# Patient Record
Sex: MALE | Race: OTHER | HISPANIC OR LATINO | URBAN - METROPOLITAN AREA
[De-identification: names, ages, dates, MRNs, and addresses within clinical notes are randomized per-mention and may not be internally consistent; named-entity substitution may affect disease eponyms.]

---

## 2017-02-13 ENCOUNTER — EMERGENCY (EMERGENCY)
Facility: HOSPITAL | Age: 62
LOS: 1 days | Discharge: HOME | End: 2017-02-13
Attending: EMERGENCY MEDICINE
Payer: COMMERCIAL

## 2017-02-13 VITALS
RESPIRATION RATE: 18 BRPM | OXYGEN SATURATION: 96 % | HEIGHT: 63 IN | TEMPERATURE: 98 F | WEIGHT: 199.96 LBS | SYSTOLIC BLOOD PRESSURE: 119 MMHG | HEART RATE: 80 BPM | DIASTOLIC BLOOD PRESSURE: 81 MMHG

## 2017-02-13 DIAGNOSIS — R41.82 ALTERED MENTAL STATUS, UNSPECIFIED: ICD-10-CM

## 2017-02-13 PROCEDURE — 99283 EMERGENCY DEPT VISIT LOW MDM: CPT

## 2017-02-13 NOTE — ED PROVIDER NOTE - OBJECTIVE STATEMENT
62 yo male sent in 62 yo male sent in from [facility] after he was difficult to awake and was sleeping for 10 minutes.  Patient has no complaints and wants to go back.

## 2017-02-13 NOTE — ED PROVIDER NOTE - PHYSICAL EXAMINATION
Constitutional: Alert, NAD.   ENT: Airway patent. Nose clear. Mouth with normal mucosa.   Head: Atraumatic.   Eyes: Clear bilateraly. PERRL.   Cardiac: Normal rate.   Respiratory: Breath sounds clear bilaterally.   GI: Abdomen soft, non-tender, no guarding.   : No CVA or bladder tenderness.   Musculoskeletal: FROM, no muscle or joint tenderness or swelling.   Neuro: alert and oriented, no focal deficits, no motor or sensory deficits.   Skin: Dry, intact, no rash.   Psych: normal mood and affect.

## 2017-02-13 NOTE — ED ADULT NURSE NOTE - OBJECTIVE STATEMENT
60y/o male, uncooperative. no complaints at this times. states 62y/o male, uncooperative. no complaints at this times. states he wants to leave. will continue to monitor.

## 2017-02-13 NOTE — ED PROVIDER NOTE - MEDICAL DECISION MAKING DETAILS
PAtient is alert and oriented and [facility] and is refusing any workup including EKG or labs.  Will d/c patient.

## 2017-08-28 ENCOUNTER — EMERGENCY (EMERGENCY)
Facility: HOSPITAL | Age: 62
LOS: 1 days | Discharge: HOME | End: 2017-08-28
Attending: EMERGENCY MEDICINE | Admitting: EMERGENCY MEDICINE
Payer: MEDICAID

## 2017-08-28 VITALS
HEART RATE: 65 BPM | RESPIRATION RATE: 20 BRPM | WEIGHT: 199.96 LBS | SYSTOLIC BLOOD PRESSURE: 147 MMHG | HEIGHT: 63 IN | OXYGEN SATURATION: 96 % | TEMPERATURE: 98 F | DIASTOLIC BLOOD PRESSURE: 91 MMHG

## 2017-08-28 VITALS
RESPIRATION RATE: 18 BRPM | HEART RATE: 75 BPM | SYSTOLIC BLOOD PRESSURE: 123 MMHG | DIASTOLIC BLOOD PRESSURE: 78 MMHG | TEMPERATURE: 99 F | OXYGEN SATURATION: 98 %

## 2017-08-28 LAB
ALBUMIN SERPL ELPH-MCNC: <1 G/DL — LOW (ref 3.3–5.2)
ALP SERPL-CCNC: 61 U/L — SIGNIFICANT CHANGE UP (ref 40–120)
ALT FLD-CCNC: <4 U/L — SIGNIFICANT CHANGE UP
ANION GAP SERPL CALC-SCNC: 13 MMOL/L — SIGNIFICANT CHANGE UP (ref 5–17)
AST SERPL-CCNC: 34 U/L — SIGNIFICANT CHANGE UP
BASOPHILS # BLD AUTO: 0 K/UL — SIGNIFICANT CHANGE UP (ref 0–0.2)
BILIRUB SERPL-MCNC: 0.3 MG/DL — LOW (ref 0.4–2)
BUN SERPL-MCNC: 12 MG/DL — SIGNIFICANT CHANGE UP (ref 8–20)
CALCIUM SERPL-MCNC: 9.4 MG/DL — SIGNIFICANT CHANGE UP (ref 8.6–10.2)
CHLORIDE SERPL-SCNC: 94 MMOL/L — LOW (ref 98–107)
CK SERPL-CCNC: 130 U/L — SIGNIFICANT CHANGE UP (ref 30–200)
CO2 SERPL-SCNC: 29 MMOL/L — SIGNIFICANT CHANGE UP (ref 22–29)
CREAT SERPL-MCNC: 0.93 MG/DL — SIGNIFICANT CHANGE UP (ref 0.5–1.3)
EOSINOPHIL # BLD AUTO: 0 K/UL — SIGNIFICANT CHANGE UP (ref 0–0.5)
GLUCOSE SERPL-MCNC: 67 MG/DL — LOW (ref 70–115)
HCT VFR BLD CALC: 44.3 % — SIGNIFICANT CHANGE UP (ref 42–52)
HGB BLD-MCNC: 15.5 G/DL — SIGNIFICANT CHANGE UP (ref 14–18)
LYMPHOCYTES # BLD AUTO: 0.7 K/UL — LOW (ref 1–4.8)
LYMPHOCYTES # BLD AUTO: 18 % — LOW (ref 20–55)
MCHC RBC-ENTMCNC: 32.7 PG — HIGH (ref 27–31)
MCHC RBC-ENTMCNC: 35 G/DL — SIGNIFICANT CHANGE UP (ref 32–36)
MCV RBC AUTO: 93.5 FL — SIGNIFICANT CHANGE UP (ref 80–94)
MONOCYTES # BLD AUTO: 0.5 K/UL — SIGNIFICANT CHANGE UP (ref 0–0.8)
MONOCYTES NFR BLD AUTO: 11 % — HIGH (ref 3–10)
NEUTROPHILS # BLD AUTO: 2.9 K/UL — SIGNIFICANT CHANGE UP (ref 1.8–8)
NEUTROPHILS NFR BLD AUTO: 69 % — SIGNIFICANT CHANGE UP (ref 37–73)
NEUTS BAND # BLD: 2 % — SIGNIFICANT CHANGE UP (ref 0–8)
PLAT MORPH BLD: NORMAL — SIGNIFICANT CHANGE UP
PLATELET # BLD AUTO: 149 K/UL — LOW (ref 150–400)
POTASSIUM SERPL-MCNC: 4.7 MMOL/L — SIGNIFICANT CHANGE UP (ref 3.5–5.3)
POTASSIUM SERPL-SCNC: 4.7 MMOL/L — SIGNIFICANT CHANGE UP (ref 3.5–5.3)
PROT SERPL-MCNC: 8 G/DL — SIGNIFICANT CHANGE UP (ref 6.6–8.7)
RBC # BLD: 4.74 M/UL — SIGNIFICANT CHANGE UP (ref 4.6–6.2)
RBC # FLD: 14.5 % — SIGNIFICANT CHANGE UP (ref 11–15.6)
RBC BLD AUTO: NORMAL — SIGNIFICANT CHANGE UP
SODIUM SERPL-SCNC: 136 MMOL/L — SIGNIFICANT CHANGE UP (ref 135–145)
TROPONIN T SERPL-MCNC: <0.01 NG/ML — SIGNIFICANT CHANGE UP (ref 0–0.06)
VALPROATE SERPL-MCNC: 113.2 UG/ML — HIGH (ref 50–100)
WBC # BLD: 4.2 K/UL — LOW (ref 4.8–10.8)
WBC # FLD AUTO: 4.2 K/UL — LOW (ref 4.8–10.8)

## 2017-08-28 PROCEDURE — 84484 ASSAY OF TROPONIN QUANT: CPT

## 2017-08-28 PROCEDURE — 82550 ASSAY OF CK (CPK): CPT

## 2017-08-28 PROCEDURE — 70450 CT HEAD/BRAIN W/O DYE: CPT | Mod: 26

## 2017-08-28 PROCEDURE — 85027 COMPLETE CBC AUTOMATED: CPT

## 2017-08-28 PROCEDURE — 96374 THER/PROPH/DIAG INJ IV PUSH: CPT

## 2017-08-28 PROCEDURE — 96375 TX/PRO/DX INJ NEW DRUG ADDON: CPT

## 2017-08-28 PROCEDURE — 93005 ELECTROCARDIOGRAM TRACING: CPT

## 2017-08-28 PROCEDURE — 70450 CT HEAD/BRAIN W/O DYE: CPT

## 2017-08-28 PROCEDURE — 80164 ASSAY DIPROPYLACETIC ACD TOT: CPT

## 2017-08-28 PROCEDURE — 93010 ELECTROCARDIOGRAM REPORT: CPT

## 2017-08-28 PROCEDURE — 36415 COLL VENOUS BLD VENIPUNCTURE: CPT

## 2017-08-28 PROCEDURE — 99284 EMERGENCY DEPT VISIT MOD MDM: CPT | Mod: 25

## 2017-08-28 PROCEDURE — 99285 EMERGENCY DEPT VISIT HI MDM: CPT

## 2017-08-28 PROCEDURE — 80053 COMPREHEN METABOLIC PANEL: CPT

## 2017-08-28 RX ORDER — HALOPERIDOL DECANOATE 100 MG/ML
5 INJECTION INTRAMUSCULAR ONCE
Qty: 0 | Refills: 0 | Status: DISCONTINUED | OUTPATIENT
Start: 2017-08-28 | End: 2017-08-28

## 2017-08-28 RX ORDER — DIPHENHYDRAMINE HCL 50 MG
25 CAPSULE ORAL ONCE
Qty: 0 | Refills: 0 | Status: DISCONTINUED | OUTPATIENT
Start: 2017-08-28 | End: 2017-08-28

## 2017-08-28 RX ORDER — HALOPERIDOL DECANOATE 100 MG/ML
5 INJECTION INTRAMUSCULAR ONCE
Qty: 0 | Refills: 0 | Status: COMPLETED | OUTPATIENT
Start: 2017-08-28 | End: 2017-08-28

## 2017-08-28 RX ORDER — SODIUM CHLORIDE 9 MG/ML
1000 INJECTION INTRAMUSCULAR; INTRAVENOUS; SUBCUTANEOUS ONCE
Qty: 0 | Refills: 0 | Status: COMPLETED | OUTPATIENT
Start: 2017-08-28 | End: 2017-08-28

## 2017-08-28 RX ORDER — DIPHENHYDRAMINE HCL 50 MG
25 CAPSULE ORAL ONCE
Qty: 0 | Refills: 0 | Status: COMPLETED | OUTPATIENT
Start: 2017-08-28 | End: 2017-08-28

## 2017-08-28 RX ADMIN — SODIUM CHLORIDE 1000 MILLILITER(S): 9 INJECTION INTRAMUSCULAR; INTRAVENOUS; SUBCUTANEOUS at 12:08

## 2017-08-28 RX ADMIN — Medication 2 MILLIGRAM(S): at 16:11

## 2017-08-28 RX ADMIN — HALOPERIDOL DECANOATE 5 MILLIGRAM(S): 100 INJECTION INTRAMUSCULAR at 14:35

## 2017-08-28 RX ADMIN — Medication 25 MILLIGRAM(S): at 14:35

## 2017-08-28 NOTE — ED PROVIDER NOTE - PHYSICAL EXAMINATION
Small ~1cm abrasion/ laceration to lateral R eyebrow with small amt surrounding edema. Small amt oozing.

## 2017-08-28 NOTE — ED PROVIDER NOTE - MEDICAL DECISION MAKING DETAILS
Fall at [place]. No known cardiac history.  Baseline shuffling gait as per records.  Plan to check CTh, EKG, labs to r/o hyponatremia or MI, wound care, reassess.

## 2017-08-28 NOTE — ED ADULT TRIAGE NOTE - CHIEF COMPLAINT QUOTE
Patient arrived to ED from [place] after trip and fall and hitting his head.  Patient has laceration above his right eye.  Non-witnessed fall.  Patient states he feels dizzy.  Patient is baseline as per EMS via [place] staff.

## 2017-08-28 NOTE — ED ADULT NURSE NOTE - OBJECTIVE STATEMENT
Patient A&OX3. c/o dizziness. Patient arrived to ED from [place] after trip and fall and hitting his head. laceration above his right eye noted.  Patient states he feels dizzy. VSS. safety maintained.

## 2017-08-28 NOTE — ED PROVIDER NOTE - PROGRESS NOTE DETAILS
I attempted to clean patient's eyebrow abrasion/ laceraiton and was able to irrigate with some water but patient started yelling that I almost got water in his eye and refused further wound care. Pt ambulating at baseline with shuffling gait.  I confirmed that pt normally walks like this w/ staff at [place] ( Kevin). Plan to discharge.

## 2017-08-28 NOTE — ED ADULT NURSE NOTE - CHIEF COMPLAINT QUOTE
Patient arrived to ED from [place] after trip and fall and hitting his head.  Patient has laceration above his right eye.  Non-witnessed fall.  Patient states he feels dizzy.  Patient is baseline as per EMS via [place] staff.

## 2017-08-28 NOTE — ED PROVIDER NOTE - OBJECTIVE STATEMENT
61M with h/o schizophrenia, polydipsia, hypothyroidism, HLD, pw fall at [place].  As per paperwork patient was reporting dizziness, but in the ER he denies.  He states he is unsure why he fell but just " fell".  Denies any headache/ pain.  Asking for coffee. Unable to provide more history.

## 2017-08-28 NOTE — ED ADULT NURSE REASSESSMENT NOTE - NS ED NURSE REASSESS COMMENT FT1
Report received from offgoing RN, chart as noted, pt awake and alert a&ox2 received in clothing on stretcher eating sandwich. Per day shift RN, pt is discharged and to be transported to [place]. RR even and unlabored. MAEx4, unsteady gait noted, pt ambulate to restroom with 1x person assist. Pt eating sandwich @  this time, cooperative with RN during assessment. PO fluids encouraged. Pt with small abrasion to Right forehead, bleeding controlled. PEARRL. Pt updated on POC, in no apparent distress, awaiting transport to [place] will continue to monitor and reassess
Patient A&OX3. Denies any pain or discomfort at this time. VSS. safety Maintained.

## 2018-02-05 ENCOUNTER — EMERGENCY (EMERGENCY)
Facility: HOSPITAL | Age: 63
LOS: 1 days | Discharge: HOME | End: 2018-02-05
Attending: STUDENT IN AN ORGANIZED HEALTH CARE EDUCATION/TRAINING PROGRAM | Admitting: EMERGENCY MEDICINE
Payer: MEDICAID

## 2018-02-05 VITALS
SYSTOLIC BLOOD PRESSURE: 122 MMHG | OXYGEN SATURATION: 99 % | RESPIRATION RATE: 16 BRPM | HEART RATE: 89 BPM | WEIGHT: 175.05 LBS | HEIGHT: 65 IN | TEMPERATURE: 99 F | DIASTOLIC BLOOD PRESSURE: 69 MMHG

## 2018-02-05 LAB
FLUBV RNA SPEC QL NAA+PROBE: DETECTED
RAPID RVP RESULT: DETECTED

## 2018-02-05 PROCEDURE — 99284 EMERGENCY DEPT VISIT MOD MDM: CPT

## 2018-02-05 PROCEDURE — 87798 DETECT AGENT NOS DNA AMP: CPT

## 2018-02-05 PROCEDURE — 71045 X-RAY EXAM CHEST 1 VIEW: CPT

## 2018-02-05 PROCEDURE — 87633 RESP VIRUS 12-25 TARGETS: CPT

## 2018-02-05 PROCEDURE — 71045 X-RAY EXAM CHEST 1 VIEW: CPT | Mod: 26

## 2018-02-05 PROCEDURE — 87486 CHLMYD PNEUM DNA AMP PROBE: CPT

## 2018-02-05 PROCEDURE — 99283 EMERGENCY DEPT VISIT LOW MDM: CPT | Mod: 25

## 2018-02-05 PROCEDURE — 87581 M.PNEUMON DNA AMP PROBE: CPT

## 2018-02-05 RX ADMIN — Medication 100 MILLIGRAM(S): at 21:24

## 2018-02-05 RX ADMIN — Medication 75 MILLIGRAM(S): at 22:56

## 2018-02-05 NOTE — ED ADULT NURSE REASSESSMENT NOTE - NS ED NURSE REASSESS COMMENT FT1
Pt is urinating on the floor, cursing at staff, refusing to sit in bed or chair. Call out to Dr. Mann. DARYL states "pt is not safe for dc due to being flu pos". ANM speaking with DARYL @ this time. Will continue to monitor.

## 2018-02-05 NOTE — ED ADULT NURSE NOTE - OBJECTIVE STATEMENT
Assumed pt care @ 1955. Pt received sitting on stretcher in NAD. Pt Awake and alert but not speaking. Pt follows commands. Unknown baseline. Pt with harsh cough. Rhonchi noted to the lungs. Pt refusing to wear a mask.  Abd soft non tender, + bowel sounds x 4quads.  Skin warm, dry, color appropriate for age and race.

## 2018-02-05 NOTE — ED ADULT NURSE NOTE - EENT ASSESSMENT, MLM
Stable.  ?cystic duct stump leak.  GI consult - spoke to  re ERCP.  Observation for now.  Check LFT's. - - -

## 2018-02-05 NOTE — ED PROVIDER NOTE - OBJECTIVE STATEMENT
This is a 61 y/o M PMHx HLD, hypothyroid and schizophrenia presents to ED c/o cough. Pt not providing hx, actively coughing during exam.

## 2018-02-05 NOTE — ED PROVIDER NOTE - MEDICAL DECISION MAKING DETAILS
63 y/o M pt with likely acute bronchitis will evaluate for flu due to group home setting. If stable will discharge home.

## 2018-02-05 NOTE — CHART NOTE - NSCHARTNOTEFT_GEN_A_CORE
DARYL Note - pt tests positive for FLU - pt resides in [place] [id number] at [facility] - Called and spoke to JENNIFER Boland and alerted her to returning pt - RN states she will alert Infection Control and pt will need to be isolated. pt given voucher  $15 to return as pt was disruptive and agitated. pt left with mask.

## 2018-02-05 NOTE — ED PROVIDER NOTE - UNABLE TO OBTAIN
Unable to obtain history due to patient not speaking. Unresponsive Unable to obtain ROS due to patient not speaking. Unable to obtain history due to patient refusing not speaking.

## 2018-02-05 NOTE — ED ADULT NURSE REASSESSMENT NOTE - NS ED NURSE REASSESS COMMENT FT1
SW called [facility] they aware working on getting a private room for pt. Will continue to monitor.

## 2018-11-03 ENCOUNTER — INPATIENT (INPATIENT)
Facility: HOSPITAL | Age: 63
LOS: 4 days | Discharge: OTHER INSTITUTION | DRG: 872 | End: 2018-11-08
Attending: HOSPITALIST | Admitting: HOSPITALIST
Payer: MEDICAID

## 2018-11-03 VITALS
WEIGHT: 184.97 LBS | OXYGEN SATURATION: 91 % | RESPIRATION RATE: 16 BRPM | HEART RATE: 110 BPM | TEMPERATURE: 102 F | SYSTOLIC BLOOD PRESSURE: 155 MMHG | DIASTOLIC BLOOD PRESSURE: 75 MMHG | HEIGHT: 67 IN

## 2018-11-03 DIAGNOSIS — A41.9 SEPSIS, UNSPECIFIED ORGANISM: ICD-10-CM

## 2018-11-03 DIAGNOSIS — R09.89 OTHER SPECIFIED SYMPTOMS AND SIGNS INVOLVING THE CIRCULATORY AND RESPIRATORY SYSTEMS: ICD-10-CM

## 2018-11-03 PROBLEM — E78.5 HYPERLIPIDEMIA, UNSPECIFIED: Chronic | Status: ACTIVE | Noted: 2017-08-28

## 2018-11-03 PROBLEM — F20.9 SCHIZOPHRENIA, UNSPECIFIED: Chronic | Status: ACTIVE | Noted: 2017-08-28

## 2018-11-03 PROBLEM — E03.9 HYPOTHYROIDISM, UNSPECIFIED: Chronic | Status: ACTIVE | Noted: 2017-08-28

## 2018-11-03 LAB
ALBUMIN SERPL ELPH-MCNC: 3.9 G/DL — SIGNIFICANT CHANGE UP (ref 3.3–5.2)
ALP SERPL-CCNC: 57 U/L — SIGNIFICANT CHANGE UP (ref 40–120)
ALT FLD-CCNC: 14 U/L — SIGNIFICANT CHANGE UP
ANION GAP SERPL CALC-SCNC: 13 MMOL/L — SIGNIFICANT CHANGE UP (ref 5–17)
ANISOCYTOSIS BLD QL: SLIGHT — SIGNIFICANT CHANGE UP
APPEARANCE UR: CLEAR — SIGNIFICANT CHANGE UP
APTT BLD: 20.4 SEC — LOW (ref 27.5–36.3)
AST SERPL-CCNC: 40 U/L — HIGH
BILIRUB SERPL-MCNC: 0.4 MG/DL — SIGNIFICANT CHANGE UP (ref 0.4–2)
BILIRUB UR-MCNC: NEGATIVE — SIGNIFICANT CHANGE UP
BUN SERPL-MCNC: 14 MG/DL — SIGNIFICANT CHANGE UP (ref 8–20)
CALCIUM SERPL-MCNC: 9.6 MG/DL — SIGNIFICANT CHANGE UP (ref 8.6–10.2)
CHLORIDE SERPL-SCNC: 93 MMOL/L — LOW (ref 98–107)
CO2 SERPL-SCNC: 23 MMOL/L — SIGNIFICANT CHANGE UP (ref 22–29)
COLOR SPEC: YELLOW — SIGNIFICANT CHANGE UP
CREAT SERPL-MCNC: 0.93 MG/DL — SIGNIFICANT CHANGE UP (ref 0.5–1.3)
DIFF PNL FLD: ABNORMAL
EPI CELLS # UR: SIGNIFICANT CHANGE UP
GLUCOSE SERPL-MCNC: 90 MG/DL — SIGNIFICANT CHANGE UP (ref 70–115)
GLUCOSE UR QL: NEGATIVE MG/DL — SIGNIFICANT CHANGE UP
HCT VFR BLD CALC: 39.6 % — LOW (ref 42–52)
HGB BLD-MCNC: 14.2 G/DL — SIGNIFICANT CHANGE UP (ref 14–18)
INR BLD: 1.04 RATIO — SIGNIFICANT CHANGE UP (ref 0.88–1.16)
KETONES UR-MCNC: NEGATIVE — SIGNIFICANT CHANGE UP
LACTATE BLDV-MCNC: 1.1 MMOL/L — SIGNIFICANT CHANGE UP (ref 0.5–2)
LACTATE BLDV-MCNC: 2.2 MMOL/L — HIGH (ref 0.5–2)
LEUKOCYTE ESTERASE UR-ACNC: NEGATIVE — SIGNIFICANT CHANGE UP
LYMPHOCYTES # BLD AUTO: 2 % — LOW (ref 20–55)
MACROCYTES BLD QL: SLIGHT — SIGNIFICANT CHANGE UP
MCHC RBC-ENTMCNC: 33.7 PG — HIGH (ref 27–31)
MCHC RBC-ENTMCNC: 35.9 G/DL — SIGNIFICANT CHANGE UP (ref 32–36)
MCV RBC AUTO: 94.1 FL — HIGH (ref 80–94)
MICROCYTES BLD QL: SLIGHT — SIGNIFICANT CHANGE UP
MONOCYTES NFR BLD AUTO: 4 % — SIGNIFICANT CHANGE UP (ref 3–10)
NEUTROPHILS NFR BLD AUTO: 92 % — HIGH (ref 37–73)
NEUTS BAND # BLD: 2 % — SIGNIFICANT CHANGE UP (ref 0–8)
NITRITE UR-MCNC: NEGATIVE — SIGNIFICANT CHANGE UP
OVALOCYTES BLD QL SMEAR: SLIGHT — SIGNIFICANT CHANGE UP
PH UR: 6.5 — SIGNIFICANT CHANGE UP (ref 5–8)
PLAT MORPH BLD: NORMAL — SIGNIFICANT CHANGE UP
PLATELET # BLD AUTO: 184 K/UL — SIGNIFICANT CHANGE UP (ref 150–400)
POIKILOCYTOSIS BLD QL AUTO: SLIGHT — SIGNIFICANT CHANGE UP
POLYCHROMASIA BLD QL SMEAR: SLIGHT — SIGNIFICANT CHANGE UP
POTASSIUM SERPL-MCNC: 4.1 MMOL/L — SIGNIFICANT CHANGE UP (ref 3.5–5.3)
POTASSIUM SERPL-SCNC: 4.1 MMOL/L — SIGNIFICANT CHANGE UP (ref 3.5–5.3)
PROT SERPL-MCNC: 7 G/DL — SIGNIFICANT CHANGE UP (ref 6.6–8.7)
PROT UR-MCNC: 15 MG/DL
PROTHROM AB SERPL-ACNC: 12 SEC — SIGNIFICANT CHANGE UP (ref 10–12.9)
RAPID RVP RESULT: SIGNIFICANT CHANGE UP
RBC # BLD: 4.21 M/UL — LOW (ref 4.6–6.2)
RBC # FLD: 14 % — SIGNIFICANT CHANGE UP (ref 11–15.6)
RBC BLD AUTO: ABNORMAL
RBC CASTS # UR COMP ASSIST: ABNORMAL /HPF (ref 0–4)
SODIUM SERPL-SCNC: 129 MMOL/L — LOW (ref 135–145)
SP GR SPEC: 1 — LOW (ref 1.01–1.02)
UROBILINOGEN FLD QL: 1 MG/DL
WBC # BLD: 11.9 K/UL — HIGH (ref 4.8–10.8)
WBC # FLD AUTO: 11.9 K/UL — HIGH (ref 4.8–10.8)
WBC UR QL: NEGATIVE — SIGNIFICANT CHANGE UP

## 2018-11-03 PROCEDURE — 99291 CRITICAL CARE FIRST HOUR: CPT

## 2018-11-03 PROCEDURE — 99223 1ST HOSP IP/OBS HIGH 75: CPT | Mod: GC

## 2018-11-03 RX ORDER — ENOXAPARIN SODIUM 100 MG/ML
40 INJECTION SUBCUTANEOUS EVERY 24 HOURS
Qty: 0 | Refills: 0 | Status: DISCONTINUED | OUTPATIENT
Start: 2018-11-03 | End: 2018-11-08

## 2018-11-03 RX ORDER — DIVALPROEX SODIUM 500 MG/1
1500 TABLET, DELAYED RELEASE ORAL
Qty: 0 | Refills: 0 | Status: DISCONTINUED | OUTPATIENT
Start: 2018-11-03 | End: 2018-11-08

## 2018-11-03 RX ORDER — ZIPRASIDONE HYDROCHLORIDE 20 MG/1
80 CAPSULE ORAL
Qty: 0 | Refills: 0 | Status: DISCONTINUED | OUTPATIENT
Start: 2018-11-03 | End: 2018-11-03

## 2018-11-03 RX ORDER — ACETAMINOPHEN 500 MG
650 TABLET ORAL EVERY 6 HOURS
Qty: 0 | Refills: 0 | Status: DISCONTINUED | OUTPATIENT
Start: 2018-11-03 | End: 2018-11-08

## 2018-11-03 RX ORDER — ERGOCALCIFEROL 1.25 MG/1
50000 CAPSULE ORAL
Qty: 0 | Refills: 0 | Status: DISCONTINUED | OUTPATIENT
Start: 2018-11-03 | End: 2018-11-08

## 2018-11-03 RX ORDER — ACETAMINOPHEN 500 MG
650 TABLET ORAL ONCE
Qty: 0 | Refills: 0 | Status: COMPLETED | OUTPATIENT
Start: 2018-11-03 | End: 2018-11-03

## 2018-11-03 RX ORDER — LEVOTHYROXINE SODIUM 125 MCG
50 TABLET ORAL DAILY
Qty: 0 | Refills: 0 | Status: DISCONTINUED | OUTPATIENT
Start: 2018-11-03 | End: 2018-11-08

## 2018-11-03 RX ORDER — SACCHAROMYCES BOULARDII 250 MG
250 POWDER IN PACKET (EA) ORAL
Qty: 0 | Refills: 0 | Status: DISCONTINUED | OUTPATIENT
Start: 2018-11-03 | End: 2018-11-08

## 2018-11-03 RX ORDER — OMEGA-3 ACID ETHYL ESTERS 1 G
1 CAPSULE ORAL
Qty: 0 | Refills: 0 | Status: DISCONTINUED | OUTPATIENT
Start: 2018-11-03 | End: 2018-11-08

## 2018-11-03 RX ORDER — VANCOMYCIN HCL 1 G
1250 VIAL (EA) INTRAVENOUS EVERY 12 HOURS
Qty: 0 | Refills: 0 | Status: DISCONTINUED | OUTPATIENT
Start: 2018-11-03 | End: 2018-11-06

## 2018-11-03 RX ORDER — CEFAZOLIN SODIUM 1 G
2000 VIAL (EA) INJECTION EVERY 8 HOURS
Qty: 0 | Refills: 0 | Status: DISCONTINUED | OUTPATIENT
Start: 2018-11-03 | End: 2018-11-06

## 2018-11-03 RX ORDER — CEFAZOLIN SODIUM 1 G
2000 VIAL (EA) INJECTION ONCE
Qty: 0 | Refills: 0 | Status: COMPLETED | OUTPATIENT
Start: 2018-11-03 | End: 2018-11-03

## 2018-11-03 RX ORDER — KETOROLAC TROMETHAMINE 30 MG/ML
15 SYRINGE (ML) INJECTION ONCE
Qty: 0 | Refills: 0 | Status: DISCONTINUED | OUTPATIENT
Start: 2018-11-03 | End: 2018-11-06

## 2018-11-03 RX ORDER — DOCUSATE SODIUM 100 MG
200 CAPSULE ORAL
Qty: 0 | Refills: 0 | Status: DISCONTINUED | OUTPATIENT
Start: 2018-11-03 | End: 2018-11-08

## 2018-11-03 RX ORDER — SIMVASTATIN 20 MG/1
20 TABLET, FILM COATED ORAL AT BEDTIME
Qty: 0 | Refills: 0 | Status: DISCONTINUED | OUTPATIENT
Start: 2018-11-03 | End: 2018-11-08

## 2018-11-03 RX ORDER — ASPIRIN/CALCIUM CARB/MAGNESIUM 324 MG
81 TABLET ORAL DAILY
Qty: 0 | Refills: 0 | Status: DISCONTINUED | OUTPATIENT
Start: 2018-11-03 | End: 2018-11-08

## 2018-11-03 RX ORDER — FOLIC ACID 0.8 MG
1 TABLET ORAL DAILY
Qty: 0 | Refills: 0 | Status: DISCONTINUED | OUTPATIENT
Start: 2018-11-03 | End: 2018-11-08

## 2018-11-03 RX ORDER — ZIPRASIDONE HYDROCHLORIDE 20 MG/1
120 CAPSULE ORAL
Qty: 0 | Refills: 0 | Status: DISCONTINUED | OUTPATIENT
Start: 2018-11-03 | End: 2018-11-08

## 2018-11-03 RX ORDER — SODIUM CHLORIDE 9 MG/ML
2500 INJECTION INTRAMUSCULAR; INTRAVENOUS; SUBCUTANEOUS ONCE
Qty: 0 | Refills: 0 | Status: COMPLETED | OUTPATIENT
Start: 2018-11-03 | End: 2018-11-03

## 2018-11-03 RX ORDER — BENZTROPINE MESYLATE 1 MG
1 TABLET ORAL
Qty: 0 | Refills: 0 | Status: DISCONTINUED | OUTPATIENT
Start: 2018-11-03 | End: 2018-11-08

## 2018-11-03 RX ADMIN — Medication 650 MILLIGRAM(S): at 17:45

## 2018-11-03 RX ADMIN — Medication 100 MILLIGRAM(S): at 17:45

## 2018-11-03 RX ADMIN — SODIUM CHLORIDE 5000 MILLILITER(S): 9 INJECTION INTRAMUSCULAR; INTRAVENOUS; SUBCUTANEOUS at 18:03

## 2018-11-03 NOTE — ED PROVIDER NOTE - OBJECTIVE STATEMENT
61 YO MALE SENT FROM [place]. PAIN LLE AND REDNESS WITH SWELLING. PT DENIED FEVER. + PAIN + CHILLS + REDNESS TO LEG. NO SOB. NO COUGH NO OTHER SYMPTOMS  PT IS SOMEWHAT BELLIGERENT BUT DISTRACTABLE. NOT A GOOD HISTORIAN  MED HX Hyperlipidemia    Hypothyroidism    Schizophrenia

## 2018-11-03 NOTE — H&P ADULT - HISTORY OF PRESENT ILLNESS
62M w/ PMH of HLD, hypothyroid & schizophrenia presented w/ unsteady gait 62M w/ PMH of HLD, hypothyroid & schizophrenia presented w/ LLE pain & swellling 62M w/ PMH of HLD, hypothyroid & schizophrenia sent from [place] w/ LLE pain & swelling 62M w/ PMH of HLD, hypothyroid, schizoaffective disorder & polydipsia sent from [place] w/ LLE pain & swelling 62M w/ PMH of HLD, hypothyroidism, schizoaffective disorder & polydipsia sent from [place] w/ LLE pain & swelling. Patient is a poor historian and goes off on tangents. He states he feels weak and has a fever but cannot tell me when it started. He states he also has a cough but it is nonproductive. He states his bones have been broken for many years and he just repaired them himself. Patient is urinating frequently but denies dysuria and had a normal BM yesterday. Patient is refusing to answer a lot of questions and is resistant to allowing a physical exam as well but allows with some coercion.

## 2018-11-03 NOTE — H&P ADULT - FAMILY HISTORY
No pertinent family history in first degree relatives No pertinent family history in first degree relatives, no family h/o skin infection.

## 2018-11-03 NOTE — H&P ADULT - ASSESSMENT
62M w/ PMH of HLD, hypothyroid & schizophrenia presented w/ unsteady gait admitted for sepsis 2/2 to E cellulitis    > Admit to medicine-resident service under Dr. Enriquez  > Bed:    > Diet: DASH/TLC  > Activity: ambulate w/ assistance  > Nursing: vitals per routine  > Oxygen: O2 via NC; keep O2 saturation > 92%    Cellulitis  > tylenol 650 mg PO Q6H PRN pain and/or fever  > IV fluids   > Blood, urine & wound cultures, lactate   > wound care, leg elevation   > LE dopplers to rule-out DVT or abscess collection     Sepsis  > plan as above     HLD  > lipid panel     Hypothyroidism  > TSH    Schizophrenia   >stable  >c/w home meds    DVT  > Lovenox 40mg SQ daily 62M w/ PMH of HLD, hypothyroid & schizophrenia presented w/  LLE pain & swelling admitted for sepsis 2/2 to LLE cellulitis    > Admit to medicine-resident service under Dr. Enriquez  > Bed:    > Diet: DASH/TLC  > Activity: ambulate w/ assistance  > Nursing: vitals per routine  > Oxygen: O2 via NC; keep O2 saturation > 92%    Cellulitis  > tylenol 650 mg PO Q6H PRN pain and/or fever  > IV fluids   > Blood, urine & wound cultures, lactate   > wound care, leg elevation   > LE dopplers to rule-out DVT or abscess collection     Sepsis  > plan as above     HLD  > f/u lipid panel   > c/w statins     Hypothyroidism  > stable  > f/u TSH    Schizophrenia   >stable  >c/w home meds    DVT  > Lovenox 40mg SQ daily 62M w/ PMH of HLD, hypothyroid & schizophrenia sent from [place] w/ LLE pain & swelling admitted for sepsis 2/2 to E cellulitis    > Admit to medicine-resident service under Dr. Enriquez  > Bed:    > Diet: DASH/TLC  > Activity: ambulate w/ assistance  > Nursing: vitals per routine  > Oxygen: O2 via NC; keep O2 saturation > 92%    Cellulitis  > tylenol 650 mg PO Q6H PRN pain and/or fever  > IV fluids   > Blood, urine & wound cultures, lactate   > wound care, leg elevation   > LE dopplers to rule-out DVT or abscess collection     Sepsis  > plan as above     HLD  > f/u lipid panel   > c/w statins     Hypothyroidism  > stable  > f/u TSH    Schizophrenia   >stable  >c/w home meds    DVT  > Lovenox 40mg SQ daily 62M w/ PMH of HLD, hypothyroid & schizophrenia sent from [place] w/ LLE pain & swelling admitted for sepsis 2/2 to [facility] cellulitis    > Admit to medicine-resident service under Dr. Enriquez  > Bed:    > Diet: DASH/TLC  > Activity: ambulate w/ assistance  > Nursing: vitals per routine  > Oxygen: O2 via NC; keep O2 saturation > 92%    Cellulitis  > tylenol 650 mg PO Q6H PRN pain and/or fever  > s/p IV fluids & ancef x1 in ED; now on ancef 2g IV Q8H w/ florastor   > Blood, urine & wound cultures, lactate   > wound care, leg elevation   > LE dopplers to rule-out DVT or abscess collection     Sepsis  > s/p IV fluids & ancef x1 in ED  > plan as above     HLD  > f/u lipid panel   > c/w statins     Hypothyroidism  > stable  > f/u TSH    Schizophrenia   >stable  >c/w home meds    DVT  > Lovenox 40mg SQ daily 62M w/ PMH of HLD, hypothyroid, schizoaffective disorder & polydipsia sent from [place] w/ LLE pain & swelling admitted for sepsis 2/2 to [facility] cellulitis    > Admit to medicine-resident service under Dr. Enriquez  > Bed:    > Diet: DASH/TLC  > Activity: ambulate w/ assistance  > Nursing: vitals per routine  > Oxygen: O2 via NC; keep O2 saturation > 92%    Cellulitis  > tylenol 650 mg PO Q6H PRN pain and/or fever  > s/p IV fluids & ancef x1 in ED; now on ancef 2g IV Q8H w/ florastor   > Blood, urine & wound cultures, lactate   > wound care, leg elevation   > LE dopplers to rule-out DVT or abscess collection     Sepsis  > s/p IV fluids & ancef x1 in ED  > plan as above     HLD  > f/u lipid panel   > c/w statins     Hypothyroidism  > stable  > c/w synthroid   > f/u TSH    Schizophrenia   > stable  > c/w home meds    Constipation  > c/w docusate     DVT  > Lovenox 40mg SQ daily 62M w/ PMH of HLD, hypothyroid, schizoaffective disorder & polydipsia sent from [place] w/ LLE pain & swelling admitted for sepsis 2/2 to E cellulitis    > Admit to medicine-resident service under Dr. Enriquez  > Bed:  Any bed  > Diet: DASH/TLC  > Activity: ambulate w/ assistance  > One to one observation  > Nursing: vitals per routine  > Oxygen: O2 via NC; keep O2 saturation > 92%    Cellulitis  > Tylenol 650 mg PO Q6H PRN pain and/or fever  > Lactate: 2.2  > s/p 2.5L NS & ancef x1 in ED; now on ancef 2g IV Q8H and Vancomycin 1250mg BID w/ florastor   > Vanc trough pre 4th dose  > Blood, urine & wound cultures  > wound care, leg elevation   > LE dopplers to rule-out DVT or abscess collection     Sepsis  > s/p IV fluids & ancef x1 in ED  > plan as above     LE Edema  > Chronic history, 3+ pitting, up to knees bilaterally  > Pending DVT doppler   > Pending US Abd/Pelv, r/o cirrhosis, abd ascites, obstructing abd/pelvic mass    Tinea Pedis  > B/l plantar surface skin infection  > Clotrimazole cream BID    HLD  > f/u lipid panel   > c/w statins     Hypothyroidism  > stable  > c/w synthroid   > f/u TSH    Schizophrenia   > stable  > c/w home meds    Constipation  > c/w docusate   > Last BM yesterday, normal    DVT  > Lovenox 40mg SQ daily 62M w/ PMH of HLD, hypothyroid, schizoaffective disorder & polydipsia sent from [place] w/ LLE pain & swelling admitted for sepsis 2/2 to E cellulitis    > Admit to medicine-resident service under Dr. Enriquez  > Bed:  Any bed  > Diet: DASH/TLC  > Activity: ambulate w/ assistance  > One to one observation, risk of removing IV lines  > Nursing: vitals per routine  > Oxygen: O2 via NC; keep O2 saturation > 92%    Cellulitis  > Tylenol 650 mg PO Q6H PRN pain and/or fever,   > Lactate: 2.2  > s/p 2.5L NS & ancef x1 in ED; now on ancef 2g IV Q8H and Vancomycin 1250mg BID w/ florastor   > Vanc trough pre 4th dose  > Blood, urine & wound cultures  > wound care, leg elevation   > LE dopplers to rule-out DVT or abscess collection     Sepsis  > s/p IV fluids & ancef x1 in ED  > plan as above     LE Edema  > Chronic history, 3+ pitting, up to knees bilaterally  > Pending DVT doppler   > Pending US Abd/Pelv, r/o cirrhosis, abd ascites, obstructing abd/pelvic mass    Tinea Pedis  > B/l plantar surface skin infection  > Clotrimazole cream BID    HLD  > f/u lipid panel   > c/w statins     Hypothyroidism  > stable  > c/w synthroid   > f/u TSH    Schizophrenia   > stable  > c/w home meds    Constipation  > c/w docusate   > Last BM yesterday, normal    DVT  > Lovenox 40mg SQ daily 62M w/ PMH of HLD, hypothyroid, schizoaffective disorder & polydipsia sent from [place] w/ LLE pain & swelling admitted for sepsis 2/2 to E cellulitis    > Admit to medicine-resident service under Dr. Enriquez  > Bed:  Any bed  > Diet: DASH/TLC  > Activity: ambulate w/ assistance  > One to one observation, risk of removing IV lines  > Nursing: vitals per routine  > Oxygen: O2 via NC; keep O2 saturation > 92%    Cellulitis  > Tylenol 650 mg PO Q6H PRN pain and/or fever,   > Lactate: 2.2  > s/p 2.5L NS & ancef x1 in ED; now on ancef 2g IV Q8H and Vancomycin 1250mg BID w/ florastor   > Vanc trough pre 4th dose  > Blood, urine & wound cultures  > wound care, leg elevation   > LE dopplers to rule-out DVT or abscess collection     Sepsis  > s/p IV fluids & ancef x1 in ED  > plan as above     LE Edema  > Chronic history, 3+ pitting, up to knees bilaterally  > Pending DVT doppler   > Pending US Abd/Pelv, r/o cirrhosis, abd ascites, obstructing abd/pelvic mass    Electrolyte Abnormality  > Euvolemic hyponatremia, likely secondary to polydipsia vs psych medication   > Unlikely to be secondary to IV fluids as labs drawn prior to infusion  > Currently asymptomatic  > Will trend, consider further work up and treatment with fluid restriction if it presists    Tinea Pedis  > B/l plantar surface skin infection  > Clotrimazole cream BID    HLD  > f/u lipid panel   > c/w statins     Hypothyroidism  > stable  > c/w synthroid   > f/u TSH    Schizophrenia   > stable  > c/w home meds    Constipation  > c/w docusate   > Last BM yesterday, normal    DVT  > Lovenox 40mg SQ daily 62M w/ PMH of HLD, hypothyroid, schizoaffective disorder & polydipsia sent from [place] w/ LLE pain & swelling admitted for sepsis 2/2 to E cellulitis    > Admit to medicine-resident service under Dr. Enriquez  > Bed:  Any bed  > Diet: DASH/TLC  > Activity: ambulate w/ assistance  > One to one observation, risk of removing IV lines  > Nursing: vitals per routine  > Oxygen: O2 via NC; keep O2 saturation > 92%    Cellulitis  > Tylenol 650 mg PO Q6H PRN pain and/or fever,   > Lactate: 2.2  > s/p 2.5L NS & ancef x1 in ED; now on ancef 2g IV Q8H and Vancomycin 1250mg BID w/ florastor   > Vanc trough pre 4th dose  > Blood, urine cultures  > Leg elevation   > LE dopplers to rule-out DVT      Sepsis  > s/p IV fluids & ancef x1 in ED  > plan as above     LE Edema  > Chronic history, 3+ pitting, up to knees bilaterally  > Pending DVT doppler   > Pending US Abd/Pelv, r/o cirrhosis, abd ascites, obstructing abd/pelvic mass    Electrolyte Abnormality  > Euvolemic hyponatremia, likely secondary to polydipsia vs psych medication   > Unlikely to be secondary to IV fluids as labs drawn prior to infusion  > Currently asymptomatic  > Will trend, consider further work up and treatment with fluid restriction if it presists    Tinea Pedis  > B/l plantar surface skin infection  > Clotrimazole cream BID    HLD  > f/u lipid panel   > c/w statins     Hypothyroidism  > stable  > c/w synthroid   > f/u TSH    Schizophrenia   > stable  > c/w home meds    Constipation  > c/w docusate   > Last BM yesterday, normal    DVT  > Lovenox 40mg SQ daily

## 2018-11-03 NOTE — ED PROVIDER NOTE - CRITICAL CARE INDICATION, MLM
patient was critically ill... Patient was critically ill with a high probability of imminent or life threatening deterioration. PT IS CODE SEPSIS. TACHYCARDIA AND FEBRILE. STAT IV FLUIDS 30 CC PER KG IV AB AND LABS XRAY CHEST ADMIT

## 2018-11-03 NOTE — ED PROVIDER NOTE - CHPI ED SYMPTOMS NEG
no headache/no decreased eating/drinking/no abdominal pain/no cough/no diarrhea/no fever/no vomiting

## 2018-11-03 NOTE — ED PROVIDER NOTE - CARE PLAN
Principal Discharge DX:	Sepsis, due to unspecified organism  Secondary Diagnosis:	Cellulitis of left leg

## 2018-11-03 NOTE — ED PROVIDER NOTE - CADM POA CENTRAL LINE
MRN:[id number]                      After Visit Summary   7/6/2018    Inga Ledesma    MRN: [id number]           Thank you!     Thank you for choosing [facility] for your care. Our goal is always to provide you with excellent care.        Patient Information     Date Of Birth          1966        Designated Caregiver       Most Recent Value    Caregiver    Will someone help with your care after discharge? no      About your hospital stay     You were admitted on:  July 6, 2018 You last received care in the:  [facility]    You were discharged on:  July 13, 2018       Who to Call     For medical emergencies, please call 911.  For non-urgent questions about your medical care, please call your primary care provider or clinic, [phone]          Attending Provider     Provider Specialty    Nacho Kirby MD Emergency Medicine    Nataliya Nathan MD Psychiatry    Desrosashli, Cosme Barajas MD Psychiatry    [facility], Aimee Brush MD Psychiatry       Primary Care Provider Office Phone # Fax #    Min Toledo PA-C [phone]      Your next 10 appointments already scheduled     Aug 14, 2018  4:20 PM CDT   [facility] Visit with Min [facility]-C   [facility]    [address] [address] [phone]           Bring a current list of meds and any records pertaining to this visit. For Physicals, please bring immunization records and any forms needing to be filled out. Please arrive 10 minutes early to complete paperwork.              Further instructions from your care team       Behavioral Discharge Planning and Instructions  THANK YOU FOR CHOOSING THE [facility]  [address] [place]                          [phone]    Summary:   You were admitted to [address] on 7/6/2018 for detoxification from alcohol. You are being discharged on 7/13/2018.  A medical examination was  performed that included lab work. You met with a  and opted to begin residential treatment at [facility] on 7/13/2018 - with transportation provided by [facility].   Please take care and make your recovery a daily priority, [place].     Recommendation: Transfer directly to begin residential treatment as stated above.    Main Diagnosis:    Alcohol use disorder severe  Tobacco use disorder  History of major depressive disorder  Likely sleep disorder  Rule out borderline personality disorder    Major Treatments, Procedures and Findings:  You have withdrawn from alcohol using the appropriate protocols.  You have had blood drawn, and the results have been reviewed with you.  Please take a copy of your lab work with you to your next primary care provider appointment.    Symptoms to Report:  If you experience more anxiety, confusion, sleeplessness, deep sadness or thoughts of suicide, notify your treatment team or notify your primary care physician. IF ANY OF THE SYMPTOMS YOU ARE EXPERIENCING ARE A MEDICAL EMERGENCY CALL 911 IMMEDIATELY.     Treatment Program Provider:  Admission on Friday, July 13, 2018  [place] [address]  Ph: [phone]    Primary Provider:    Dr. Min Toledo  [facility]  August 14, 2018 @ 4:20  [phone]    Lifestyle Adjustment & Health Action Plan:  1.   Create a daily schedule  2.   Eat Healthy  3.   Plan Enjoyable Sober Activities  4.   Use Problem Solving Skills and Deal with Issues as they Arise.   5.   Be Physically Active  6.   Take your medications as prescribed  7.   Get enough restful sleep  8.   Practice Relaxation  9.   Spend time with Supportive People  10. No use of alcohol, illegal drugs or addictive medications other than what is currently prescribed.   11. AA, NA Sponsor are excellent resources for support    Disposition:  [facility].    Resources:   National Suicide Prevention Line  (www.mentalhealthmn.org): 771-509-ADSA (6402).  Pathways ~ A Health Crisis Resource & Support Center:  294.940.4857.   Support Group:  AA/NA and Sponsor/support.  SMART Recovery - self management for addiction recovery:  www.smartrecovery.org  National Emerson on Mental Illness (www.mn.dakotah.org): 699.569.7256 or 561-047-1371.  Alcoholics Anonymous (www.alcoholics-anonymous.org): Check your phone book for your local chapter.  [facility] Emergency [facility]:  738.173.5244 or 1-653.673.4351.  Suicide Awareness Voices of Education (SAVE) (www.save.org): 836-441-SAVE (3931).  Mental Health Consumer/Survivor Network of [place] (www.mhcsn.net): 563.126.8707 or 163-009-0421.  Mental Health Association [place] (www.mentalhealth.org): 167.999.4402 or 514-514-4955.     Substance Abuse and Mental Health Services (www.samhsa.gov).    [facility] ([facility])  [facility] connects people seeking recovery to resources that help foster and sustain long-term recovery.  Whether you are seeking resources for treatment, transportation, housing, job training, education, health care or other pathways to recovery, [facility] is a great place to start.  156.754.9223. www.[facility].org    General Medication Instructions:   See your medication papers for instructions. Take all medicines as directed.  Make no changes unless your doctor suggests them. Go to all your doctor visits.  Be sure to have all your required lab tests. This way, your medicines can be refilled on time. Do not use any drugs not prescribed by your provider.  AA/NA and Sponsors are excellent resources for support. Avoid alcohol at all costs!    THANK YOU FOR CHOOSING THE [facility]    The treatment team has appreciated the opportunity to work with you.  We wish you the best in the future. Please bring this discharge folder with you to all follow  up appointments - it contains your lab results, diagnosis, medication list and discharge  recommendations.    For follow up questions:  Detox Nursing [phone]  Past medical records [phone]  Readmission [phone]         INSTRUCTIONS FOR USE OF SSRI ANTIDEPRESSANTS      DO:    - call clinic if you, or another provider, makes any medication changes  - call clinic if your use of Ibuprofen (or similar) increases significantly  - call clinic if you experience any symptom listed below      DO NOT:  stop this med abruptly         TRY TO AVOID:  excessive use of ibuprofen or similar pain medication      FOOD: take with or without food       COMMON ADVERSE EFFECTS:  headache, dizziness, nausea, diarrhea, fatigue, sleepiness, sexual problems, weight gain, anxiety, a need to pace or restlessness       CALL CLINIC URGENTLY or EMERGENCY ROOM:  if you have any concerns, especially the following...    - abnormal bleeding  or  easy bruising (susy if increase use of ibuprofen etc)  - significant pacing, restlessness or muscle spasm  - thoughts of death or hurting yourself    - suspect Serotonin Syndrome:   confusion   tremor  severe headache  sweats  fever   funny feeling in muscles  need to pace / restlessness   severe nausea, diarrhea      Pending Results     No orders found from 7/4/2018 to 7/7/2018.            Admission Information     Date & Time Provider Department Dept. Phone    7/6/2018 Aimee Vizcarra MD [facility] [phone]      Your Vitals Were     Blood Pressure Pulse Temperature Respirations Last Period Pulse Oximetry    118/77 (BP Location: Left arm) 83 98  F (36.7  C) (Tympanic) 16 06/02/2010 98%      MyChart Information     [employer] gives you secure access to your electronic health record. If you see a primary care provider, you can also send messages to your care team and make appointments. If you have questions, please call your primary care clinic.  If you do not have a primary care provider, please call [phone] and they will assist you.        Care  EveryWhere ID     This is your Care EveryWhere ID. This could be used by other organizations to access your [place] medical records  [id number]        Equal Access to Services     ИРИНА KERNS : Susan Mata, jenelle urbina, subhadom sharpejuaquin vilmaluis m, waxsudheer elainain hayaaeric esparzaagustín lewis jin george. So [facility] [phone].    ATENCIÓN: Si habla español, tiene a cook disposición servicios gratuitos de asistencia lingüística. Llame al [phone].    We comply with applicable federal civil rights laws and [place] laws. We do not discriminate on the basis of race, color, national origin, age, disability, sex, sexual orientation, or gender identity.               Review of your medicines      UNREVIEWED medicines. Ask your doctor about these medicines        Dose / Directions    alum & mag hydroxide-simethicone 200-200-20 MG/5ML Susp suspension   Commonly known as:  MYLANTA/MAALOX        Dose:  30 mL   Take 30 mLs by mouth every 6 hours as needed for indigestion   Refills:  0       guaiFENesin 20 mg/mL Soln solution   Commonly known as:  ROBITUSSIN        Dose:  10 mL   Take 10 mLs by mouth every 4 hours as needed for cough   Refills:  0       IBUPROFEN PO        Dose:  400 mg   Take 400 mg by mouth every 6 hours as needed for moderate pain   Refills:  0       loratadine 10 MG tablet   Commonly known as:  CLARITIN        Dose:  10 mg   Take 10 mg by mouth daily as needed for allergies   Refills:  0       MELATONIN PO        Dose:  3 mg   Take 3 mg by mouth nightly as needed   Refills:  0       phenol-menthol 14.5 MG lozenge        Dose:  1 lozenge   Place 1 lozenge inside cheek every 2 hours as needed for moderate pain   Refills:  0       senna-docusate 8.6-50 MG per tablet   Commonly known as:  SENOKOT-S;PERICOLACE        Dose:  2 tablet   Take 2 tablets by mouth daily as needed for constipation   Refills:  0         START taking        Dose / Directions    acetaminophen 325 MG tablet   Commonly known as:   TYLENOL   Used for:  Chronic pain syndrome   Replaces:  APAP 500 PO        Dose:  325 mg   Take 1 tablet (325 mg) by mouth every 4 hours as needed for mild pain   Quantity:  60 tablet   Refills:  1       traZODone 50 MG tablet   Commonly known as:  DESYREL   Used for:  Recurrent major depressive disorder, remission status unspecified (H)        Dose:  25 mg   Take 0.5 tablets (25 mg) by mouth At Bedtime   Quantity:  15 tablet   Refills:  1         CONTINUE these medicines which may have CHANGED, or have new prescriptions. If we are uncertain of the size of tablets/capsules you have at home, strength may be listed as something that might have changed.        Dose / Directions    albuterol 108 (90 Base) MCG/ACT Inhaler   Commonly known as:  VENTOLIN HFA   This may have changed:  additional instructions   Used for:  Chronic bronchitis, unspecified chronic bronchitis type (H)        Dose:  1-2 puff   Inhale 1-2 puffs into the lungs every 4 hours as needed for shortness of breath / dyspnea or wheezing   Quantity:  1 Inhaler   Refills:  0       DULoxetine 60 MG EC capsule   Commonly known as:  CYMBALTA   This may have changed:  medication strength   Used for:  Recurrent major depressive disorder, remission status unspecified (H)        Dose:  60 mg   Take 1 capsule (60 mg) by mouth daily   Quantity:  30 capsule   Refills:  1       FLUoxetine 40 MG capsule   Commonly known as:  PROzac   This may have changed:    - medication strength  - how much to take   Used for:  Major depressive disorder, recurrent episode, mild (H)        Dose:  40 mg   Take 1 capsule (40 mg) by mouth daily   Quantity:  30 capsule   Refills:  1       hydrOXYzine 25 MG tablet   Commonly known as:  ATARAX   This may have changed:    - medication strength  - how much to take  - when to take this   Used for:  Sacroiliitis (H)        Dose:  25 mg   Take 1 tablet (25 mg) by mouth 2 times daily as needed for anxiety or other (adjuvant pain)   Quantity:  60  tablet   Refills:  1       losartan 50 MG tablet   Commonly known as:  COZAAR   This may have changed:    - medication strength  - how much to take   Used for:  Hypertension goal BP (blood pressure) < 140/90        Dose:  50 mg   Take 1 tablet (50 mg) by mouth daily   Quantity:  30 tablet   Refills:  1       magnesium oxide 400 MG tablet   Commonly known as:  MAG-OX   This may have changed:  medication strength   Used for:  Alcohol dependence with unspecified alcohol-induced disorder (H)        Dose:  400 mg   Take 1 tablet (400 mg) by mouth daily   Quantity:  30 tablet   Refills:  1       oyster shell calcium 500 MG tablet   Commonly known as:  OS-[place] 500 mg [place]. Ca   This may have changed:  medication strength   Used for:  Alcohol dependence with unspecified alcohol-induced disorder (H)        Dose:  500 mg   Take 1 tablet (500 mg) by mouth daily   Quantity:  30 tablet   Refills:  1         CONTINUE these medicines which have NOT CHANGED        Dose / Directions    fluticasone-salmeterol 500-50 MCG/DOSE diskus inhaler   Commonly known as:  ADVAIR DISKUS   Used for:  Chronic bronchitis, unspecified chronic bronchitis type (H)        Dose:  1 puff   Inhale 1 puff into the lungs every 12 hours   Quantity:  1 Inhaler   Refills:  0       folic acid 1 MG tablet   Commonly known as:  FOLVITE   Used for:  Alcohol dependence with uncomplicated withdrawal (H)        Dose:  1 mg   Take 1 tablet (1 mg) by mouth daily   Quantity:  30 tablet   Refills:  1       furosemide 20 MG tablet   Commonly known as:  LASIX   Used for:  Essential hypertension with goal blood pressure less than 140/90        Dose:  10 mg   Take 0.5 tablets (10 mg) by mouth daily   Quantity:  30 tablet   Refills:  1       gabapentin 300 MG capsule   Commonly known as:  NEURONTIN   Used for:  Major depressive disorder, recurrent episode, mild (H), Sacroiliitis (H)        Take 1 capsule by mouth in the morning, 1 capsule in the afternoon, and 2 capsules at  bedtime.   Quantity:  120 capsule   Refills:  1       ipratropium - albuterol 0.5 mg/2.5 mg/3 mL 0.5-2.5 (3) MG/3ML neb solution   Commonly known as:  DUONEB   Used for:  Chronic bronchitis, unspecified chronic bronchitis type (H)        Dose:  3 mL   Take 1 vial (3 mLs) by nebulization 4 times daily as needed for wheezing   Quantity:  360 mL   Refills:  1       labetalol 100 MG tablet   Commonly known as:  NORMODYNE   Used for:  Hypertension goal BP (blood pressure) < 140/90        100mg in am, 200mg in pm.   Quantity:  90 tablet   Refills:  1       menthol 5 % Ptch   Commonly known as:  ICY HOT   Used for:  Muscle soreness        Dose:  1 patch   Apply 1 patch topically every 8 hours as needed for muscle soreness   Quantity:  30 patch   Refills:  1       multivitamin, therapeutic with minerals Tabs tablet   Used for:  Alcohol dependence with uncomplicated withdrawal (H)        Dose:  1 tablet   Take 1 tablet by mouth daily   Quantity:  30 each   Refills:  1       nicotine 21 MG/24HR 24 hr patch   Commonly known as:  NICODERM CQ   Used for:  Tobacco abuse        Dose:  1 patch   Place 1 patch onto the skin daily   Quantity:  30 patch   Refills:  1       nicotine polacrilex 2 MG gum   Commonly known as:  NICORETTE   Used for:  Tobacco abuse        Dose:  2 mg   Place 1 each (2 mg) inside cheek every hour as needed for smoking cessation   Quantity:  30 tablet   Refills:  1       omeprazole 20 MG CR capsule   Commonly known as:  priLOSEC   Used for:  Alcoholic gastritis with hemorrhage, unspecified chronicity        Dose:  20 mg   Take 1 capsule (20 mg) by mouth 2 times daily (before meals)   Quantity:  60 capsule   Refills:  1       order for DME   Used for:  Alcohol abuse, Fall, initial encounter, Dehydration, Alcohol withdrawal syndrome with perceptual disturbance (H)        Equipment being ordered: Cane () Treatment Diagnosis: Impaired functional mobility   Quantity:  1 each   Refills:  0       potassium  chloride SA 10 MEQ CR tablet   Commonly known as:  K-DUR/KLOR-CON M   Used for:  Essential hypertension with goal blood pressure less than 140/90        Dose:  10 mEq   Take 1 tablet (10 mEq) by mouth daily   Quantity:  30 tablet   Refills:  1       rOPINIRole 1 MG tablet   Commonly known as:  REQUIP   Used for:  RLS (restless legs syndrome)        Dose:  1 mg   Take 1 tablet (1 mg) by mouth 3 times daily   Quantity:  90 tablet   Refills:  1         STOP taking     APAP 500 PO   Replaced by:  acetaminophen 325 MG tablet                Where to get your medicines      These medications were sent to [facility] [place], [place] - [address]  [address] [address]     Phone:  [phone]     acetaminophen 325 MG tablet    albuterol 108 (90 Base) MCG/ACT Inhaler    DULoxetine 60 MG EC capsule    FLUoxetine 40 MG capsule    fluticasone-salmeterol 500-50 MCG/DOSE diskus inhaler    folic acid 1 MG tablet    furosemide 20 MG tablet    gabapentin 300 MG capsule    hydrOXYzine 25 MG tablet    ipratropium - albuterol 0.5 mg/2.5 mg/3 mL 0.5-2.5 (3) MG/3ML neb solution    labetalol 100 MG tablet    losartan 50 MG tablet    magnesium oxide 400 MG tablet    menthol 5 % Ptch    multivitamin, therapeutic with minerals Tabs tablet    nicotine 21 MG/24HR 24 hr patch    nicotine polacrilex 2 MG gum    omeprazole 20 MG CR capsule    oyster shell calcium 500 MG tablet    potassium chloride SA 10 MEQ CR tablet    rOPINIRole 1 MG tablet    traZODone 50 MG tablet                Protect others around you: Learn how to safely use, store and throw away your medicines at www.disposemymeds.org.             Medication List: This is a list of all your medications and when to take them. Check marks below indicate your daily home schedule. Keep this list as a reference.      Medications           Morning Afternoon Evening Bedtime As Needed    acetaminophen 325 MG tablet   Commonly known as:  TYLENOL    Take 1 tablet (325 mg) by mouth every 4 hours as needed for mild pain   Last time this was given:  650 mg on 7/12/2018  2:05 PM                                albuterol 108 (90 Base) MCG/ACT Inhaler   Commonly known as:  VENTOLIN HFA   Inhale 1-2 puffs into the lungs every 4 hours as needed for shortness of breath / dyspnea or wheezing   Last time this was given:  1 puff on 7/13/2018  8:27 AM                                alum & mag hydroxide-simethicone 200-200-20 MG/5ML Susp suspension   Commonly known as:  MYLANTA/MAALOX   Take 30 mLs by mouth every 6 hours as needed for indigestion                                DULoxetine 60 MG EC capsule   Commonly known as:  CYMBALTA   Take 1 capsule (60 mg) by mouth daily   Last time this was given:  60 mg on 7/13/2018  8:27 AM                                FLUoxetine 40 MG capsule   Commonly known as:  PROzac   Take 1 capsule (40 mg) by mouth daily   Last time this was given:  40 mg on 7/13/2018  8:27 AM                                fluticasone-salmeterol 500-50 MCG/DOSE diskus inhaler   Commonly known as:  ADVAIR DISKUS   Inhale 1 puff into the lungs every 12 hours                                folic acid 1 MG tablet   Commonly known as:  FOLVITE   Take 1 tablet (1 mg) by mouth daily   Last time this was given:  1 mg on 7/13/2018  8:26 AM                                furosemide 20 MG tablet   Commonly known as:  LASIX   Take 0.5 tablets (10 mg) by mouth daily   Last time this was given:  10 mg on 7/13/2018  8:26 AM                                gabapentin 300 MG capsule   Commonly known as:  NEURONTIN   Take 1 capsule by mouth in the morning, 1 capsule in the afternoon, and 2 capsules at bedtime.   Last time this was given:  300 mg on 7/13/2018  8:27 AM                                guaiFENesin 20 mg/mL Soln solution   Commonly known as:  ROBITUSSIN   Take 10 mLs by mouth every 4 hours as needed for cough                                hydrOXYzine 25 MG tablet    Commonly known as:  ATARAX   Take 1 tablet (25 mg) by mouth 2 times daily as needed for anxiety or other (adjuvant pain)   Last time this was given:  50 mg on 7/10/2018  8:48 PM                                IBUPROFEN PO   Take 400 mg by mouth every 6 hours as needed for moderate pain                                ipratropium - albuterol 0.5 mg/2.5 mg/3 mL 0.5-2.5 (3) MG/3ML neb solution   Commonly known as:  DUONEB   Take 1 vial (3 mLs) by nebulization 4 times daily as needed for wheezing   Last time this was given:  3 mLs on 7/8/2018  2:08 PM                                labetalol 100 MG tablet   Commonly known as:  NORMODYNE   100mg in am, 200mg in pm.   Last time this was given:  100 mg on 7/13/2018  8:26 AM                                loratadine 10 MG tablet   Commonly known as:  CLARITIN   Take 10 mg by mouth daily as needed for allergies                                losartan 50 MG tablet   Commonly known as:  COZAAR   Take 1 tablet (50 mg) by mouth daily   Last time this was given:  50 mg on 7/13/2018  8:27 AM                                magnesium oxide 400 MG tablet   Commonly known as:  MAG-OX   Take 1 tablet (400 mg) by mouth daily   Last time this was given:  400 mg on 7/13/2018  8:26 AM                                MELATONIN PO   Take 3 mg by mouth nightly as needed                                menthol 5 % Ptch   Commonly known as:  ICY HOT   Apply 1 patch topically every 8 hours as needed for muscle soreness   Last time this was given:  1 patch on 7/11/2018  4:17 PM                                multivitamin, therapeutic with minerals Tabs tablet   Take 1 tablet by mouth daily   Last time this was given:  1 tablet on 7/13/2018  8:26 AM                                nicotine 21 MG/24HR 24 hr patch   Commonly known as:  NICODERM CQ   Place 1 patch onto the skin daily                                nicotine polacrilex 2 MG gum   Commonly known as:  NICORETTE   Place 1 each (2 mg) inside  cheek every hour as needed for smoking cessation   Last time this was given:  4 mg on 7/13/2018  8:25 AM                                omeprazole 20 MG CR capsule   Commonly known as:  priLOSEC   Take 1 capsule (20 mg) by mouth 2 times daily (before meals)   Last time this was given:  20 mg on 7/13/2018  8:26 AM                                order for DME   Equipment being ordered: Cane () Treatment Diagnosis: Impaired functional mobility                                oyster shell calcium 500 MG tablet   Commonly known as:  OS-[place] 500 mg [place]. Ca   Take 1 tablet (500 mg) by mouth daily   Last time this was given:  500 mg on 7/12/2018  8:10 AM                                phenol-menthol 14.5 MG lozenge   Place 1 lozenge inside cheek every 2 hours as needed for moderate pain                                potassium chloride SA 10 MEQ CR tablet   Commonly known as:  K-DUR/KLOR-CON M   Take 1 tablet (10 mEq) by mouth daily   Last time this was given:  10 mEq on 7/13/2018  8:27 AM                                rOPINIRole 1 MG tablet   Commonly known as:  REQUIP   Take 1 tablet (1 mg) by mouth 3 times daily   Last time this was given:  1 mg on 7/13/2018  8:26 AM                                senna-docusate 8.6-50 MG per tablet   Commonly known as:  SENOKOT-S;PERICOLACE   Take 2 tablets by mouth daily as needed for constipation                                traZODone 50 MG tablet   Commonly known as:  DESYREL   Take 0.5 tablets (25 mg) by mouth At Bedtime   Last time this was given:  50 mg on 7/12/2018  8:02 PM                                   No

## 2018-11-03 NOTE — ED PROVIDER NOTE - CRITICAL CARE PROVIDED
additional history taking/consultation with other physicians/direct patient care (not related to procedure)/documentation/40 MINUTES

## 2018-11-03 NOTE — ED ADULT NURSE NOTE - OBJECTIVE STATEMENT
pt comes to ED from [place] with fever and reports of left pain. pt sent for unsteady gait. pt awake, combative and alert to person and place on arrival. pt uncooperative and refusing care initially. even and unlabored resps present, no distress noted. redness and swelling to left lower extremity. security brought to bedside on arrival as well as ER MD. code sepsis activated.

## 2018-11-03 NOTE — H&P ADULT - NSHPPHYSICALEXAM_GEN_ALL_CORE
Vitals  T(C): 37.3 (11-03-18 @ 20:30), Max: 38.8 (11-03-18 @ 17:35)  HR: 102 (11-03-18 @ 20:30) (102 - 119)  BP: 127/74 (11-03-18 @ 20:30) (127/74 - 155/75)  RR: 16 (11-03-18 @ 20:30) (16 - 16)  SpO2: 95% (11-03-18 @ 20:30) (91% - 96%)  Wt(kg): --    Physical Exam:   GENERAL: NAD, well-groomed, well-developed  HEAD:  Atraumatic, Normocephalic  EYES: EOMI, conjunctiva and sclera clear  ENMT: No tonsillar erythema, exudates, or enlargement; Moist mucous membranes, Poor dentition, No lesions  NECK: Supple, No JVD, Normal thyroid  RESP: Clear to auscultation bilaterally; No rales, rhonchi, wheezing, or rubs  CVS: Tachycardic, normal S1/S2; No murmurs, rubs, or gallops  GI: Large, Soft, Nontender; Bowel sounds present  EXTREMITIES:  3+ pitting edema bilaterally in LE from feet to knees, soft distal pulses due to swelling, good motor control of both legs  LYMPH: Positive left inguinal lymphadenopathy   SKIN: Cellulitis of left lower leg from ankle to the knee, warm to the touch

## 2018-11-04 ENCOUNTER — TRANSCRIPTION ENCOUNTER (OUTPATIENT)
Age: 63
End: 2018-11-04

## 2018-11-04 LAB
ANION GAP SERPL CALC-SCNC: 12 MMOL/L — SIGNIFICANT CHANGE UP (ref 5–17)
BASOPHILS # BLD AUTO: 0 K/UL — SIGNIFICANT CHANGE UP (ref 0–0.2)
BASOPHILS NFR BLD AUTO: 0.1 % — SIGNIFICANT CHANGE UP (ref 0–2)
BUN SERPL-MCNC: 11 MG/DL — SIGNIFICANT CHANGE UP (ref 8–20)
CALCIUM SERPL-MCNC: 8.7 MG/DL — SIGNIFICANT CHANGE UP (ref 8.6–10.2)
CHLORIDE SERPL-SCNC: 94 MMOL/L — LOW (ref 98–107)
CHOLEST SERPL-MCNC: 120 MG/DL — SIGNIFICANT CHANGE UP (ref 110–199)
CO2 SERPL-SCNC: 21 MMOL/L — LOW (ref 22–29)
CREAT SERPL-MCNC: 0.87 MG/DL — SIGNIFICANT CHANGE UP (ref 0.5–1.3)
CULTURE RESULTS: NO GROWTH — SIGNIFICANT CHANGE UP
EOSINOPHIL # BLD AUTO: 0 K/UL — SIGNIFICANT CHANGE UP (ref 0–0.5)
EOSINOPHIL NFR BLD AUTO: 0 % — SIGNIFICANT CHANGE UP (ref 0–5)
GLUCOSE SERPL-MCNC: 85 MG/DL — SIGNIFICANT CHANGE UP (ref 70–115)
HCT VFR BLD CALC: 38.2 % — LOW (ref 42–52)
HDLC SERPL-MCNC: 43 MG/DL — SIGNIFICANT CHANGE UP
HGB BLD-MCNC: 13.4 G/DL — LOW (ref 14–18)
LIPID PNL WITH DIRECT LDL SERPL: 62 MG/DL — SIGNIFICANT CHANGE UP
LYMPHOCYTES # BLD AUTO: 0.6 K/UL — LOW (ref 1–4.8)
LYMPHOCYTES # BLD AUTO: 4.4 % — LOW (ref 20–55)
MAGNESIUM SERPL-MCNC: 1.6 MG/DL — SIGNIFICANT CHANGE UP (ref 1.6–2.6)
MCHC RBC-ENTMCNC: 33.1 PG — HIGH (ref 27–31)
MCHC RBC-ENTMCNC: 35.1 G/DL — SIGNIFICANT CHANGE UP (ref 32–36)
MCV RBC AUTO: 94.3 FL — HIGH (ref 80–94)
MONOCYTES # BLD AUTO: 0.4 K/UL — SIGNIFICANT CHANGE UP (ref 0–0.8)
MONOCYTES NFR BLD AUTO: 2.4 % — LOW (ref 3–10)
NEUTROPHILS # BLD AUTO: 13.3 K/UL — HIGH (ref 1.8–8)
NEUTROPHILS NFR BLD AUTO: 92.5 % — HIGH (ref 37–73)
PHOSPHATE SERPL-MCNC: 2.6 MG/DL — SIGNIFICANT CHANGE UP (ref 2.4–4.7)
PLATELET # BLD AUTO: 170 K/UL — SIGNIFICANT CHANGE UP (ref 150–400)
POTASSIUM SERPL-MCNC: 3.7 MMOL/L — SIGNIFICANT CHANGE UP (ref 3.5–5.3)
POTASSIUM SERPL-SCNC: 3.7 MMOL/L — SIGNIFICANT CHANGE UP (ref 3.5–5.3)
RBC # BLD: 4.05 M/UL — LOW (ref 4.6–6.2)
RBC # FLD: 14.1 % — SIGNIFICANT CHANGE UP (ref 11–15.6)
SODIUM SERPL-SCNC: 127 MMOL/L — LOW (ref 135–145)
SPECIMEN SOURCE: SIGNIFICANT CHANGE UP
TOTAL CHOLESTEROL/HDL RATIO MEASUREMENT: 3 RATIO — LOW (ref 3.4–9.6)
TRIGL SERPL-MCNC: 73 MG/DL — SIGNIFICANT CHANGE UP (ref 10–200)
TSH SERPL-MCNC: 2.22 UIU/ML — SIGNIFICANT CHANGE UP (ref 0.27–4.2)
WBC # BLD: 14.4 K/UL — HIGH (ref 4.8–10.8)
WBC # FLD AUTO: 14.4 K/UL — HIGH (ref 4.8–10.8)

## 2018-11-04 PROCEDURE — 99232 SBSQ HOSP IP/OBS MODERATE 35: CPT | Mod: GC

## 2018-11-04 RX ORDER — POTASSIUM CHLORIDE 20 MEQ
40 PACKET (EA) ORAL ONCE
Qty: 0 | Refills: 0 | Status: COMPLETED | OUTPATIENT
Start: 2018-11-04 | End: 2018-11-04

## 2018-11-04 RX ADMIN — Medication 1 GRAM(S): at 17:18

## 2018-11-04 RX ADMIN — Medication 1 MILLIGRAM(S): at 18:05

## 2018-11-04 RX ADMIN — Medication 50 MICROGRAM(S): at 05:34

## 2018-11-04 RX ADMIN — Medication 81 MILLIGRAM(S): at 12:49

## 2018-11-04 RX ADMIN — SIMVASTATIN 20 MILLIGRAM(S): 20 TABLET, FILM COATED ORAL at 22:07

## 2018-11-04 RX ADMIN — Medication 40 MILLIEQUIVALENT(S): at 09:29

## 2018-11-04 RX ADMIN — ZIPRASIDONE HYDROCHLORIDE 120 MILLIGRAM(S): 20 CAPSULE ORAL at 18:06

## 2018-11-04 RX ADMIN — Medication 100 MILLIGRAM(S): at 23:41

## 2018-11-04 RX ADMIN — Medication 250 MILLIGRAM(S): at 17:17

## 2018-11-04 RX ADMIN — Medication 250 MILLIGRAM(S): at 05:34

## 2018-11-04 RX ADMIN — Medication 100 MILLIGRAM(S): at 09:29

## 2018-11-04 RX ADMIN — Medication 100 MILLIGRAM(S): at 00:33

## 2018-11-04 RX ADMIN — Medication 1 MILLIGRAM(S): at 05:35

## 2018-11-04 RX ADMIN — DIVALPROEX SODIUM 1500 MILLIGRAM(S): 500 TABLET, DELAYED RELEASE ORAL at 05:34

## 2018-11-04 RX ADMIN — Medication 100 MILLIGRAM(S): at 18:05

## 2018-11-04 RX ADMIN — Medication 1 MILLIGRAM(S): at 12:49

## 2018-11-04 RX ADMIN — Medication 1 APPLICATION(S): at 17:21

## 2018-11-04 RX ADMIN — ERGOCALCIFEROL 50000 UNIT(S): 1.25 CAPSULE ORAL at 18:05

## 2018-11-04 RX ADMIN — SIMVASTATIN 20 MILLIGRAM(S): 20 TABLET, FILM COATED ORAL at 00:33

## 2018-11-04 RX ADMIN — Medication 166.67 MILLIGRAM(S): at 05:35

## 2018-11-04 RX ADMIN — DIVALPROEX SODIUM 1500 MILLIGRAM(S): 500 TABLET, DELAYED RELEASE ORAL at 17:17

## 2018-11-04 RX ADMIN — Medication 200 MILLIGRAM(S): at 17:18

## 2018-11-04 RX ADMIN — Medication 200 MILLIGRAM(S): at 05:34

## 2018-11-04 RX ADMIN — Medication 0.5 MILLIGRAM(S): at 12:49

## 2018-11-04 RX ADMIN — ZIPRASIDONE HYDROCHLORIDE 120 MILLIGRAM(S): 20 CAPSULE ORAL at 05:33

## 2018-11-04 RX ADMIN — Medication 1 GRAM(S): at 05:34

## 2018-11-04 RX ADMIN — Medication 1 APPLICATION(S): at 05:56

## 2018-11-04 RX ADMIN — Medication 166.67 MILLIGRAM(S): at 18:00

## 2018-11-04 RX ADMIN — ENOXAPARIN SODIUM 40 MILLIGRAM(S): 100 INJECTION SUBCUTANEOUS at 05:46

## 2018-11-04 NOTE — DISCHARGE NOTE ADULT - INSTRUCTIONS
- Heart-healthy fish. Eat heart-healthy fish at least twice a week. Fish can be a good alternative to high-fat meats. For example, cod, tuna and halibut have less total fat, saturated fat and cholesterol than do meat and poultry. Fish such as salmon, mackerel, tuna, sardines and bluefish are rich in omega-3 fatty acids, which promote heart health by lowering blood fats called triglycerides. Avoid fried fish and fish with high levels of mercury, such as tilefish, swordfish and rahul mackerel.    - "Good" fats. Foods containing monounsaturated and polyunsaturated fats can help lower your cholesterol levels. These include avocados, almonds, pecans, walnuts, olives, and canola, olive and peanut oils. But don't overdo it, as all fats are high in calories.    Foods to avoid:  - Saturated fats. High-fat dairy products and animal proteins such as beef, hot dogs, sausage and pryor contain saturated fats. Limit your daily calories from saturated fat to less than 7 percent.  - Trans fats. These types of fats are found in processed snacks, baked goods, shortening and stick margarines. Avoid these items.  - Cholesterol. Sources of cholesterol include high-fat dairy products and high-fat animal proteins, egg yolks, liver, and other organ meats. Aim for no more than 200 milligrams (mg) of cholesterol a day.  Sodium. Aim for less than 2,300 mg of sodium a day.

## 2018-11-04 NOTE — DISCHARGE NOTE ADULT - MEDICATION SUMMARY - MEDICATIONS TO TAKE
I will START or STAY ON the medications listed below when I get home from the hospital:    Aspir 81 oral delayed release tablet  -- 1 tab(s) by mouth once a day  -- Indication: For Blood thinner    LORazepam 0.5 mg oral tablet  -- 1 tab(s) by mouth once a day (in the morning)  -- Indication: For Anxiety     divalproex sodium 500 mg oral delayed release tablet  -- 3 tab(s) by mouth 2 times a day  -- Indication: For Seizures    simvastatin 20 mg oral tablet  -- 1 tab(s) by mouth once a day (at bedtime)  -- Indication: For Hyperlipidemia    benztropine 1 mg oral tablet  -- 1 tab(s) by mouth 2 times a day  -- Indication: For Schizophrenia, unspecified type    ziprasidone 40 mg oral capsule  -- 1 cap(s) by mouth 2 times a day  -- Indication: For Schizophrenia, unspecified type    ziprasidone 80 mg oral capsule  -- 1 cap(s) by mouth 2 times a day  -- Indication: For Schizophrenia, unspecified type    Haldol Decanoate 100 mg/mL intramuscular solution  -- 4 milliliter(s) intramuscular every 4 weeks  -- Indication: For Schizophrenia, unspecified type    cephalexin 500 mg oral capsule  -- 1 cap(s) by mouth 4 times a day  -- Indication: For Cellulitis of left leg    clotrimazole 1% topical cream  -- 1 application on skin 2 times a day  -- Indication: For Fungal infection    nystatin 100,000 units/g topical cream  -- Apply on skin to affected area 2 times a day  -- Indication: For Fungal infection    docusate potassium 100 mg oral capsule  -- 2 tab(s) by mouth 2 times a day  -- Indication: For Constipation    clindamycin 300 mg oral capsule  -- 1 cap(s) by mouth 3 times a day  -- Indication: For Cellulitis of left leg    Fish Oil 1000 mg oral capsule  -- 1 cap(s) by mouth 2 times a day  -- Indication: For Hyperlipidemia    saccharomyces boulardii lyo 250 mg oral capsule  -- 1 cap(s) by mouth 2 times a day  -- Indication: For Prophylaxis     Synthroid 50 mcg (0.05 mg) oral tablet  -- 1 tab(s) by mouth once a day  -- Indication: For Hyperthiroidism    Vitamin D3 50,000 intl units oral capsule  -- 1 cap(s) by mouth every other week  -- Indication: For Low Vit D    folic acid 1 mg oral tablet  -- 1 tab(s) by mouth once a day  -- Indication: For Anemia I will START or STAY ON the medications listed below when I get home from the hospital:    Aspir 81 oral delayed release tablet  -- 1 tab(s) by mouth once a day  -- Indication: For Blood thinner    divalproex sodium 500 mg oral delayed release tablet  -- 3 tab(s) by mouth 2 times a day  -- Indication: For Seizures    LORazepam 0.5 mg oral tablet  -- 1 tab(s) by mouth once a day (in the morning)  -- Indication: For Anxiety     simvastatin 20 mg oral tablet  -- 1 tab(s) by mouth once a day (at bedtime)  -- Indication: For Hyperlipidemia    benztropine 1 mg oral tablet  -- 1 tab(s) by mouth 2 times a day  -- Indication: For Schizophrenia, unspecified type    ziprasidone 40 mg oral capsule  -- 1 cap(s) by mouth 2 times a day  -- Indication: For Schizophrenia, unspecified type    ziprasidone 80 mg oral capsule  -- 1 cap(s) by mouth 2 times a day  -- Indication: For Schizophrenia, unspecified type    Haldol Decanoate 100 mg/mL intramuscular solution  -- 4 milliliter(s) intramuscular every 4 weeks  -- Indication: For Schizophrenia, unspecified type    nystatin 100,000 units/g topical cream  -- Apply on skin to affected area 2 times a day  -- Indication: For Fungal infection    clotrimazole 1% topical cream  -- 1 application on skin 2 times a day  -- Indication: For Fungal infection    docusate potassium 100 mg oral capsule  -- 2 tab(s) by mouth 2 times a day  -- Indication: For Constipation    Fish Oil 1000 mg oral capsule  -- 1 cap(s) by mouth 2 times a day  -- Indication: For Hyperlipidemia    saccharomyces boulardii lyo 250 mg oral capsule  -- 1 cap(s) by mouth 2 times a day  -- Indication: For Prophylaxis     Synthroid 50 mcg (0.05 mg) oral tablet  -- 1 tab(s) by mouth once a day  -- Indication: For Hyperthiroidism    Vitamin D3 50,000 intl units oral capsule  -- 1 cap(s) by mouth every other week  -- Indication: For Low Vit D    folic acid 1 mg oral tablet  -- 1 tab(s) by mouth once a day  -- Indication: For Anemia I will START or STAY ON the medications listed below when I get home from the hospital:    Aspir 81 oral delayed release tablet  -- 1 tab(s) by mouth once a day  -- Indication: For Blood thinner    divalproex sodium 500 mg oral delayed release tablet  -- 3 tab(s) by mouth 2 times a day  -- Indication: For Seizures    LORazepam 0.5 mg oral tablet  -- 1 tab(s) by mouth once a day (in the morning)  -- Indication: For Anxiety     simvastatin 20 mg oral tablet  -- 1 tab(s) by mouth once a day (at bedtime)  -- Indication: For Hyperlipidemia    benztropine 1 mg oral tablet  -- 1 tab(s) by mouth 2 times a day  -- Indication: For Schizophrenia, unspecified type    ziprasidone 40 mg oral capsule  -- 1 cap(s) by mouth 2 times a day  -- Indication: For Schizophrenia, unspecified type    ziprasidone 80 mg oral capsule  -- 1 cap(s) by mouth 2 times a day  -- Indication: For Schizophrenia, unspecified type    Haldol Decanoate 100 mg/mL intramuscular solution  -- 4 milliliter(s) intramuscular every 4 weeks  -- Indication: For Schizophrenia, unspecified type    nystatin 100,000 units/g topical cream  -- Apply on skin to affected area 2 times a day  -- Indication: For Fungal infection    clotrimazole 1% topical cream  -- 1 application on skin 2 times a day  -- Indication: For Fungal infection    docusate potassium 100 mg oral capsule  -- 2 tab(s) by mouth 2 times a day  -- Indication: For Constipation    Fish Oil 1000 mg oral capsule  -- 1 cap(s) by mouth 2 times a day  -- Indication: For Hyperlipidemia    Synthroid 50 mcg (0.05 mg) oral tablet  -- 1 tab(s) by mouth once a day  -- Indication: For Hyperthiroidism    Vitamin D3 50,000 intl units oral capsule  -- 1 cap(s) by mouth every other week  -- Indication: For Low Vit D    folic acid 1 mg oral tablet  -- 1 tab(s) by mouth once a day  -- Indication: For Anemia I will START or STAY ON the medications listed below when I get home from the hospital:    Aspir 81 oral delayed release tablet  -- 1 tab(s) by mouth once a day  -- Indication: For Blood thinner    divalproex sodium 500 mg oral delayed release tablet  -- 3 tab(s) by mouth 2 times a day  -- Indication: For Seizures    LORazepam 0.5 mg oral tablet  -- 1 tab(s) by mouth once a day (in the morning)  -- Indication: For Anxiety     simvastatin 20 mg oral tablet  -- 1 tab(s) by mouth once a day (at bedtime)  -- Indication: For Hyperlipidemia    benztropine 1 mg oral tablet  -- 1 tab(s) by mouth 2 times a day  -- Indication: For Schizophrenia, unspecified type    ziprasidone 40 mg oral capsule  -- 1 cap(s) by mouth 2 times a day  -- Indication: For Schizophrenia, unspecified type    ziprasidone 80 mg oral capsule  -- 1 cap(s) by mouth 2 times a day  -- Indication: For Schizophrenia, unspecified type    Haldol Decanoate 100 mg/mL intramuscular solution  -- 4 milliliter(s) intramuscular every 4 weeks  -- Indication: For Schizophrenia, unspecified type    cephalexin 500 mg oral capsule  -- 1 cap(s) by mouth 4 times a day  -- Indication: For Cellulitis of left leg    clotrimazole 1% topical cream  -- 1 application on skin 2 times a day  -- Indication: For Fungal infection    nystatin 100,000 units/g topical cream  -- Apply on skin to affected area 2 times a day  -- Indication: For Fungal infection    docusate potassium 100 mg oral capsule  -- 2 tab(s) by mouth 2 times a day  -- Indication: For Constipation    clindamycin 300 mg oral capsule  -- 1 cap(s) by mouth 3 times a day  -- Indication: For Cellulitis of left leg    Fish Oil 1000 mg oral capsule  -- 1 cap(s) by mouth 2 times a day  -- Indication: For Hyperlipidemia    saccharomyces boulardii lyo 250 mg oral capsule  -- 1 cap(s) by mouth 2 times a day  -- Indication: For prophylaxis     Synthroid 50 mcg (0.05 mg) oral tablet  -- 1 tab(s) by mouth once a day  -- Indication: For Hyperthiroidism    Vitamin D3 50,000 intl units oral capsule  -- 1 cap(s) by mouth every other week  -- Indication: For Low Vit D    folic acid 1 mg oral tablet  -- 1 tab(s) by mouth once a day  -- Indication: For Anemia I will START or STAY ON the medications listed below when I get home from the hospital:    Aspir 81 oral delayed release tablet  -- 1 tab(s) by mouth once a day  -- Indication: For Blood thinner    divalproex sodium 500 mg oral delayed release tablet  -- 3 tab(s) by mouth 2 times a day  -- Indication: For Seizures    LORazepam 0.5 mg oral tablet  -- 1 tab(s) by mouth once a day (in the morning)  -- Indication: For Anxiety     simvastatin 20 mg oral tablet  -- 1 tab(s) by mouth once a day (at bedtime)  -- Indication: For Hyperlipidemia    benztropine 1 mg oral tablet  -- 1 tab(s) by mouth 2 times a day  -- Indication: For Schizophrenia, unspecified type    Haldol Decanoate 100 mg/mL intramuscular solution  -- 4 milliliter(s) intramuscular every 4 weeks  -- Indication: For Schizophrenia, unspecified type    ziprasidone 60 mg oral capsule  -- 2 cap(s) by mouth 2 times a day  -- Indication: For Schizophrenia, unspecified type    clotrimazole 1% topical cream  -- 1 application on skin 2 times a day  -- Indication: For Fungal infection    nystatin 100,000 units/g topical cream  -- Apply on skin to affected area 2 times a day  -- Indication: For Fungal infection    docusate potassium 100 mg oral capsule  -- 2 tab(s) by mouth 2 times a day  -- Indication: For Constipation    Fish Oil 1000 mg oral capsule  -- 1 cap(s) by mouth 2 times a day  -- Indication: For Hyperlipidemia    Synthroid 50 mcg (0.05 mg) oral tablet  -- 1 tab(s) by mouth once a day  -- Indication: For Hyperthiroidism    Vitamin D3 50,000 intl units oral capsule  -- 1 cap(s) by mouth every other week  -- Indication: For Low Vit D    folic acid 1 mg oral tablet  -- 1 tab(s) by mouth once a day  -- Indication: For Anemia I will START or STAY ON the medications listed below when I get home from the hospital:    Aspir 81 oral delayed release tablet  -- 1 tab(s) by mouth once a day  -- Indication: For Blood thinner    divalproex sodium 500 mg oral delayed release tablet  -- 3 tab(s) by mouth 2 times a day  -- Indication: For Seizures    LORazepam 0.5 mg oral tablet  -- 1 tab(s) by mouth once a day (in the morning)  -- Indication: For Anxiety     simvastatin 20 mg oral tablet  -- 1 tab(s) by mouth once a day (at bedtime)  -- Indication: For Hyperlipidemia    benztropine 1 mg oral tablet  -- 1 tab(s) by mouth 2 times a day  -- Indication: For Schizophrenia, unspecified type    Haldol Decanoate 100 mg/mL intramuscular solution  -- 4 milliliter(s) intramuscular every 4 weeks  -- Indication: For Schizophrenia, unspecified type    ziprasidone 60 mg oral capsule  -- 2 cap(s) by mouth 2 times a day  -- Indication: For Schizophrenia, unspecified type    cephalexin 500 mg oral capsule  -- 1 cap(s) by mouth 4 times a day  -- Indication: For Cellulitis of left leg    clotrimazole 1% topical cream  -- 1 application on skin 2 times a day  -- Indication: For Fungal infection    nystatin 100,000 units/g topical cream  -- Apply on skin to affected area 2 times a day  -- Indication: For Fungal infection    docusate potassium 100 mg oral capsule  -- 2 tab(s) by mouth 2 times a day  -- Indication: For Constipation    clindamycin 300 mg oral capsule  -- 1 cap(s) by mouth 3 times a day  -- Indication: For Cellulitis of left leg    Fish Oil 1000 mg oral capsule  -- 1 cap(s) by mouth 2 times a day  -- Indication: For Hyperlipidemia    saccharomyces boulardii lyo 250 mg oral capsule  -- 1 cap(s) by mouth 2 times a day  -- Indication: For Cellulitis of left leg    Synthroid 50 mcg (0.05 mg) oral tablet  -- 1 tab(s) by mouth once a day  -- Indication: For Hyperthiroidism    Vitamin D3 50,000 intl units oral capsule  -- 1 cap(s) by mouth every other week  -- Indication: For Low Vit D    folic acid 1 mg oral tablet  -- 1 tab(s) by mouth once a day  -- Indication: For Anemia

## 2018-11-04 NOTE — DISCHARGE NOTE ADULT - CARE PROVIDER_API CALL
[facility] outpatient,   Phone: (   )    -  Fax: (   )    -    Facility doctor,   [facility] facility doctor  Phone: (   )    -  Fax: (   )    -

## 2018-11-04 NOTE — DISCHARGE NOTE ADULT - OTHER SIGNIFICANT FINDINGS
13.6   5.8   )-----------( 157      ( 06 Nov 2018 08:20 )             38.9     11-06    135  |  97<L>  |  10.0  ----------------------------<  91  4.6   |  28.0  |  0.72    Ca    9.3      06 Nov 2018 08:20    .Urine Clean Catch (Midstream)  11-03 @ 18:08   No growth  --  --      .Blood Blood  11-03 @ 17:54   No growth at 48 hours  --  --      .Blood Blood  11-03 @ 17:53   No growth at 48 hours  --  --       EXAM:  US DPLX LWR EXT VEINS COMPL BI                        PROCEDURE DATE:  11/06/2018    INTERPRETATION:  CLINICAL INFORMATION: Lower extremity edema  COMPARISON: None available.  TECHNIQUE: Duplex sonography of the right LOWER extremities with color   and spectral Doppler, with and without compression.      FINDINGS:    There is normal compressibility of the bilateral common femoral, femoral   and popliteal veins. No calf vein thrombosis is detected.  Doppler examination shows normal spontaneous and phasic flow.    IMPRESSION:   No evidence of right deep venous thrombosis. Patient refused examination   of left leg.

## 2018-11-04 NOTE — DISCHARGE NOTE ADULT - SECONDARY DIAGNOSIS.
Sepsis, due to unspecified organism Hyponatremia Hyperlipidemia, unspecified hyperlipidemia type Hypothyroidism, unspecified type Schizophrenia, unspecified type

## 2018-11-04 NOTE — DISCHARGE NOTE ADULT - MEDICATION SUMMARY - MEDICATIONS TO CHANGE
I will SWITCH the dose or number of times a day I take the medications listed below when I get home from the hospital:  None I will SWITCH the dose or number of times a day I take the medications listed below when I get home from the hospital:    ziprasidone 80 mg oral capsule  -- 1 cap(s) by mouth 2 times a day    ziprasidone 40 mg oral capsule  -- 1 cap(s) by mouth 2 times a day

## 2018-11-04 NOTE — DISCHARGE NOTE ADULT - PLAN OF CARE
Resolution of infection Continue all antibiotics as prescribed. You may benefit from taking a probiotic such a florastor which can be bought over the counter for the duration of time that you are on antibiotics to help prevent an infectious diarrhea called Clostridium difficile. If you notice you are having more than 4-5 bowel movements that are very liquidy or diarrheal, be sure to see your primary care doctor or come to the ER to have your stool tested. If your original infection seems to get worse, if you are having lots of high fevers over 100.4F, chills, extreme shakiness, very bad difficulty breathing, cannot hold what you are eating down, or develop a bad rash, come to the ER immediately. Resolved Sepsis occurs when an infection trigger inflammation throughout the body. This can cause damage multiple organ systems, leading them to fail, sometimes even resulting in death.    Symptoms include fever, difficulty breathing, low blood pressure, fast heart rate, and mental confusion. if you continue to have symptoms, please see your Primary Care doctor or return to the ER. You were noticed to have blood work with some abnormalities. At this time, you are safe to leave the hospital, we do not suspect that anything immediately will come of these findings, but it is something that should be followed to see if it is getting better, staying the same, or getting worse. Be sure to discuss this at your follow up visit with your Primary Care Doctor to have these tests repeated and to see what work up, if any, is necessary to continue managing it. Stable Follow a low fat diet. Continue taking your cholesterol medications as prescribed. Follow up with your Primary Care Doctor to continue having your cholesterol levels checked on a continual basis. You have hypothyroidism. For this condition, it is important to continue taking medication as prescribed. If your dose of thyroid replacement medication has been changed, be sure to have your Primary Care Doctor or Endocronologist repeat blood work to check your Thyroid function in 6 weeks to make sure that your current dose does not need to be changed again to reach the best possible level for you. schizophrenia is characterized by thoughts or experiences that seem out of touch with reality, disorganized speech or behavior, and decreased participation in daily activities. Difficulty with concentration and memory may also be present. If you have any of symptoms mentioned above please seek medical attention. You were treated with IV antibiotics during the first few days of your hospital stay and then transitioned on 11/6 to keflex and clindamycin after being seen by Infectious disease Doctor   Your duplex was negative for blood clot, however, you did not cooperate much during the left sided exam   Your cellulitis has improved with treatment   please keep leg elevated when seated or when in bed INitially presented with sepsis due to cellulitis which was likely due to gram positive organism  resolved Likely due to polydypsia, and has improved during this admission   labs as below Follow a low fat diet. Continue taking your cholesterol medications as prescribed.   c/w statin continue with synthroid c/w home medications

## 2018-11-04 NOTE — DISCHARGE NOTE ADULT - HOSPITAL COURSE
62M w/ PMH of HLD, hypothyroid, schizoaffective disorder & polydipsia sent from [place] w/ LLE pain & swelling admitted for sepsis 2/2 to LLE cellulitis 62M w/ PMH of HLD, hypothyroid, schizoaffective disorder & polydipsia sent from [place] w/ [facility] pain & swelling admitted for sepsis 2/2 to [facility] cellulitis. On admission lab work showed elevated WBC, lactate, tachycardia and fever, meeting criteria for Sepsis. He was given IV fluids and antibiotics were started; Cefazolin and Vancomycin. On arrival significant bilateral LE 3 + pitting edema from feet to knees were noted, soft distal pulses due to swelling, skin in E was erythematous, painful to palpation, war to touch, with findings of Cellulitis from ankle to the knee in the LLE. Both blood cultures and urine cultures were negative, WBC came back to normal limits, Infectious Disease was consulted and the decision to transition IV meds to PO were made. Due to lower extremity swelling, a Ultrasound was done, DVT's were ruled out.   Hospital course was complicated for Hyponatremia, which could associated with polydipsia or a side effect of his psych medication, fluid restriction was ordered and Sodium level came to normal limits. While in the hospital patient continue receiving treatment for his other medical conditions with his current home meds.  No episodes of fever have been reported in the past 48 hours, pt remains asymptomatic, swelling and redness in left lower extremity have considerable improved.    All electrolyte abnormalities were monitored carefully and repleted as necessary during this hospitalization. At the time of discharge patient was hemodynamically stable and amenable to all terms of discharge. The patient has received verbal instructions from myself regarding discharge plans.     Length of Discharge: 45MIN 62M w/ PMH of HLD, hypothyroid, schizoaffective disorder & polydipsia sent from [place] w/ [facility] pain & swelling admitted for sepsis 2/2 to [facility] cellulitis. On admission lab work showed elevated WBC, lactate, tachycardia and fever, meeting criteria for Sepsis. He was given IV fluids and antibiotics were started; Cefazolin and Vancomycin. On arrival significant bilateral LE 3 + pitting edema from feet to knees were noted, soft distal pulses due to swelling, skin in E was erythematous, painful to palpation, war to touch, with findings of Cellulitis from ankle to the knee in the LLE. Both blood cultures and urine cultures were negative, WBC came back to normal limits, Infectious Disease was consulted and the decision to transition IV meds to PO were made. Due to lower extremity swelling, a Ultrasound was done, DVT's were ruled out.     Hospital course was complicated for Hyponatremia, which could associated with polydipsia or a side effect of his psych medication, fluid restriction was ordered and Sodium level came to normal limits. While in the hospital patient continue receiving treatment for his other medical conditions with his current home meds. No episodes of fever have been reported in the past 48 hours, pt remains asymptomatic, swelling and redness in left lower extremity have considerable improved.    Physical Therapist evaluation showed pt. unable to ambulate safely, limited mobility, due to poor balance, weakness and poor safety awareness, was unable to walk more than 5 feet, recommendation was made to discharge patient to [facility] in a skilled nursing facility.    All electrolyte abnormalities were monitored carefully and replated as necessary during this hospitalization. At the time of discharge patient was hemodynamically stable and amenable to all terms of discharge. The patient has received verbal instructions from myself regarding discharge plans.     Length of Discharge: 45MIN 62M w/ PMH of HLD, hypothyroid, schizoaffective disorder & polydipsia sent from [place] w/ [facility] pain & swelling admitted for sepsis 2/2 to [facility] cellulitis. On admission lab work showed elevated WBC, lactate, tachycardia and fever, meeting criteria for Sepsis. He was given IV fluids and antibiotics were started; Cefazolin and Vancomycin. On arrival significant bilateral LE 3 + pitting edema from feet to knees were noted, soft distal pulses due to swelling, skin in E was erythematous, painful to palpation, war to touch, with findings of Cellulitis from ankle to the knee in the LLE. Both blood cultures and urine cultures were negative, WBC came back to normal limits, Infectious Disease was consulted and the decision to transition IV meds to PO were made. Due to lower extremity swelling, a Ultrasound was done, DVT's were ruled out.     Hospital course was complicated for Hyponatremia, which could associated with polydipsia or a side effect of his psych medication, fluid restriction was ordered and Sodium level came to normal limits. While in the hospital patient continue receiving treatment for his other medical conditions with his current home meds. No episodes of fever have been reported in the past 48 hours, pt remains asymptomatic, swelling and redness in left lower extremity have considerable improved.    Physical Therapist evaluation showed pt. unable to ambulate safely, limited mobility, due to poor balance, weakness and poor safety awareness, was unable to walk more than 5 feet, recommendation was made to discharge patient to [facility] in a skilled nursing facility.    All electrolyte abnormalities were monitored carefully and replated as necessary during this hospitalization. At the time of discharge patient was hemodynamically stable and amenable to all terms of discharge. The patient has received verbal instructions from myself regarding discharge plans.     Length of Discharge: 45MIN. Plan d/w care team. 62M w/ PMH of HLD, hypothyroid, schizoaffective disorder & polydipsia sent from [place] w/ [facility] pain & swelling admitted for sepsis 2/2 to [facility] cellulitis. On admission lab work showed elevated WBC, lactate, tachycardia and fever, meeting criteria for Sepsis. He was given IV fluids and antibiotics were started; Cefazolin and Vancomycin. On arrival significant bilateral LE 3 + pitting edema from feet to knees were noted, soft distal pulses due to swelling, skin in E was erythematous, painful to palpation, war to touch, with findings of Cellulitis from ankle to the knee in the E. Both blood cultures and urine cultures were negative, WBC came back to normal limits, Infectious Disease was consulted and the decision to transition IV meds to PO were made. Due to lower extremity swelling, a Ultrasound was done, DVT's were ruled out.     Hospital course was complicated for Hyponatremia, which could associated with polydipsia or a side effect of his psych medication, fluid restriction was ordered and Sodium level came to normal limits. While in the hospital patient continue receiving treatment for his other medical conditions with his current home meds. No episodes of fever have been reported in the past 48 hours, pt remains asymptomatic, swelling and redness in left lower extremity have considerable improved.    Physical Therapist evaluation showed pt. unable to ambulate safely, limited mobility, due to poor balance, weakness and poor safety awareness, was unable to walk more than 5 feet, recommendation was made to discharge patient to [facility] in a skilled nursing facility. Plan changed on 11/8 when he walked 100 ft with PT and is now going to go back to [facility]     All electrolyte abnormalities were monitored carefully and replated as necessary during this hospitalization. At the time of discharge patient was hemodynamically stable and amenable to all terms of discharge. The patient has received verbal instructions from myself regarding discharge plans.     Length of Discharge: 45MIN. Plan d/w care team.

## 2018-11-04 NOTE — DISCHARGE NOTE ADULT - CARE PLAN
Principal Discharge DX:	Cellulitis of left leg  Goal:	Resolution of infection  Assessment and plan of treatment:	Continue all antibiotics as prescribed. You may benefit from taking a probiotic such a florastor which can be bought over the counter for the duration of time that you are on antibiotics to help prevent an infectious diarrhea called Clostridium difficile. If you notice you are having more than 4-5 bowel movements that are very liquidy or diarrheal, be sure to see your primary care doctor or come to the ER to have your stool tested. If your original infection seems to get worse, if you are having lots of high fevers over 100.4F, chills, extreme shakiness, very bad difficulty breathing, cannot hold what you are eating down, or develop a bad rash, come to the ER immediately.  Secondary Diagnosis:	Sepsis, due to unspecified organism Principal Discharge DX:	Cellulitis of left leg  Goal:	Resolution of infection  Assessment and plan of treatment:	Continue all antibiotics as prescribed. You may benefit from taking a probiotic such a florastor which can be bought over the counter for the duration of time that you are on antibiotics to help prevent an infectious diarrhea called Clostridium difficile. If you notice you are having more than 4-5 bowel movements that are very liquidy or diarrheal, be sure to see your primary care doctor or come to the ER to have your stool tested. If your original infection seems to get worse, if you are having lots of high fevers over 100.4F, chills, extreme shakiness, very bad difficulty breathing, cannot hold what you are eating down, or develop a bad rash, come to the ER immediately.  Secondary Diagnosis:	Sepsis, due to unspecified organism  Goal:	Resolved  Assessment and plan of treatment:	Sepsis occurs when an infection trigger inflammation throughout the body. This can cause damage multiple organ systems, leading them to fail, sometimes even resulting in death.    Symptoms include fever, difficulty breathing, low blood pressure, fast heart rate, and mental confusion. if you continue to have symptoms, please see your Primary Care doctor or return to the ER.  Secondary Diagnosis:	Hyponatremia  Goal:	Resolved  Assessment and plan of treatment:	You were noticed to have blood work with some abnormalities. At this time, you are safe to leave the hospital, we do not suspect that anything immediately will come of these findings, but it is something that should be followed to see if it is getting better, staying the same, or getting worse. Be sure to discuss this at your follow up visit with your Primary Care Doctor to have these tests repeated and to see what work up, if any, is necessary to continue managing it.  Secondary Diagnosis:	Hyperlipidemia, unspecified hyperlipidemia type  Goal:	Stable  Assessment and plan of treatment:	Follow a low fat diet. Continue taking your cholesterol medications as prescribed. Follow up with your Primary Care Doctor to continue having your cholesterol levels checked on a continual basis.  Secondary Diagnosis:	Hypothyroidism, unspecified type  Goal:	Stable  Assessment and plan of treatment:	You have hypothyroidism. For this condition, it is important to continue taking medication as prescribed. If your dose of thyroid replacement medication has been changed, be sure to have your Primary Care Doctor or Endocronologist repeat blood work to check your Thyroid function in 6 weeks to make sure that your current dose does not need to be changed again to reach the best possible level for you.  Secondary Diagnosis:	Schizophrenia, unspecified type  Goal:	Stable  Assessment and plan of treatment:	schizophrenia is characterized by thoughts or experiences that seem out of touch with reality, disorganized speech or behavior, and decreased participation in daily activities. Difficulty with concentration and memory may also be present. If you have any of symptoms mentioned above please seek medical attention. Principal Discharge DX:	Cellulitis of left leg  Goal:	Resolution of infection  Assessment and plan of treatment:	You were treated with IV antibiotics during the first few days of your hospital stay and then transitioned on 11/6 to keflex and clindamycin after being seen by Infectious disease Doctor   Your duplex was negative for blood clot, however, you did not cooperate much during the left sided exam   Your cellulitis has improved with treatment   please keep leg elevated when seated or when in bed  Secondary Diagnosis:	Sepsis, due to unspecified organism  Goal:	Resolved  Assessment and plan of treatment:	INitially presented with sepsis due to cellulitis which was likely due to gram positive organism  resolved  Secondary Diagnosis:	Hyponatremia  Goal:	Resolved  Assessment and plan of treatment:	Likely due to polydypsia, and has improved during this admission   labs as below  Secondary Diagnosis:	Hyperlipidemia, unspecified hyperlipidemia type  Goal:	Stable  Assessment and plan of treatment:	Follow a low fat diet. Continue taking your cholesterol medications as prescribed.   c/w statin  Secondary Diagnosis:	Hypothyroidism, unspecified type  Goal:	Stable  Assessment and plan of treatment:	continue with synthroid  Secondary Diagnosis:	Schizophrenia, unspecified type  Goal:	Stable  Assessment and plan of treatment:	c/w home medications

## 2018-11-04 NOTE — PROGRESS NOTE ADULT - SUBJECTIVE AND OBJECTIVE BOX
Patient is a 62y old  Male who presents with a chief complaint of unsteady gait (03 Nov 2018 20:18)    OVERNIGHT EVENTS:  Patient seen and examined at bedside.    Vitals  T(C): 36.9 (11-04-18 @ 04:40), Max: 38.8 (11-03-18 @ 17:35)  HR: 92 (11-04-18 @ 04:40) (92 - 119)  BP: 147/84 (11-04-18 @ 04:40) (127/74 - 155/75)  RR: 20 (11-04-18 @ 04:40) (16 - 20)  SpO2: 93% (11-04-18 @ 04:40) (91% - 96%)    Physical Exam:   GENERAL: NAD, well-groomed, well-developed  HEAD:  Atraumatic, Normocephalic  EYES: EOMI, conjunctiva and sclera clear  ENMT: No tonsillar erythema, exudates, or enlargement; Moist mucous membranes, Poor dentition, No lesions  NECK: Supple, No JVD, Normal thyroid  RESP: Clear to auscultation bilaterally; No rales, rhonchi, wheezing, or rubs  CVS: Tachycardic, normal S1/S2; No murmurs, rubs, or gallops  GI: Large, Soft, Nontender; Bowel sounds present  EXTREMITIES:  3+ pitting edema bilaterally in LE from feet to knees, soft distal pulses due to swelling, good motor control of both legs  LYMPH: Positive left inguinal lymphadenopathy   SKIN: Cellulitis of left lower leg from ankle to the knee, warm to the touch    Labs  Pending AM labs    MEDICATIONS  (STANDING):  aspirin enteric coated 81 milliGRAM(s) Oral daily  benztropine 1 milliGRAM(s) Oral two times a day  ceFAZolin   IVPB 2000 milliGRAM(s) IV Intermittent every 8 hours  clotrimazole 1% Cream 1 Application(s) Topical two times a day  diVALproex DR 1500 milliGRAM(s) Oral two times a day  docusate sodium 200 milliGRAM(s) Oral two times a day  enoxaparin Injectable 40 milliGRAM(s) SubCutaneous every 24 hours  ergocalciferol 53041 Unit(s) Oral every week  folic acid 1 milliGRAM(s) Oral daily  levothyroxine 50 MICROGram(s) Oral daily  LORazepam     Tablet 0.5 milliGRAM(s) Oral daily  omega-3-Acid Ethyl Esters 1 Gram(s) Oral two times a day  saccharomyces boulardii 250 milliGRAM(s) Oral two times a day  simvastatin 20 milliGRAM(s) Oral at bedtime  vancomycin  IVPB 1250 milliGRAM(s) IV Intermittent every 12 hours  ziprasidone 120 milliGRAM(s) Oral two times a day    MEDICATIONS  (PRN):  acetaminophen   Tablet .. 650 milliGRAM(s) Oral every 6 hours PRN Temp greater or equal to 38C (100.4F)  acetaminophen   Tablet .. 650 milliGRAM(s) Oral every 6 hours PRN Mild Pain (1 - 3), Moderate Pain (4 - 6)  ketorolac   Injectable 15 milliGRAM(s) IV Push once PRN Severe Pain (7 - 10) Patient is a 62y old  Male who presents with a chief complaint of unsteady gait (03 Nov 2018 20:18)    OVERNIGHT EVENTS:  Patient seen and examined at bedside. He reports he feels a little better but still feels warm. He then goes on to explain how he is trapped here and mumbles other unintelligible words.    Vitals  T(C): 36.9 (11-04-18 @ 04:40), Max: 38.8 (11-03-18 @ 17:35)  HR: 92 (11-04-18 @ 04:40) (92 - 119)  BP: 147/84 (11-04-18 @ 04:40) (127/74 - 155/75)  RR: 20 (11-04-18 @ 04:40) (16 - 20)  SpO2: 93% (11-04-18 @ 04:40) (91% - 96%)    Physical Exam:   GENERAL: NAD, well-groomed, well-developed  HEAD:  Atraumatic, Normocephalic  EYES: EOMI, conjunctiva and sclera clear  ENMT: No tonsillar erythema, exudates, or enlargement; Moist mucous membranes, Poor dentition, No lesions  NECK: Supple, No JVD, Normal thyroid  RESP: Clear to auscultation bilaterally; No rales, rhonchi, wheezing, or rubs  CVS: Tachycardic, normal S1/S2; No murmurs, rubs, or gallops  GI: Large, Soft, Nontender; Bowel sounds present  EXTREMITIES:  3+ pitting edema bilaterally in LE from feet to knees, soft distal pulses due to swelling, good motor control of both legs  LYMPH: Positive left inguinal lymphadenopathy   SKIN: Cellulitis of left lower leg from ankle to the knee, warm to the touch    Labs  Pending AM labs    Repeat LActate: 1.1    MEDICATIONS  (STANDING):  aspirin enteric coated 81 milliGRAM(s) Oral daily  benztropine 1 milliGRAM(s) Oral two times a day  ceFAZolin   IVPB 2000 milliGRAM(s) IV Intermittent every 8 hours  clotrimazole 1% Cream 1 Application(s) Topical two times a day  diVALproex DR 1500 milliGRAM(s) Oral two times a day  docusate sodium 200 milliGRAM(s) Oral two times a day  enoxaparin Injectable 40 milliGRAM(s) SubCutaneous every 24 hours  ergocalciferol 15339 Unit(s) Oral every week  folic acid 1 milliGRAM(s) Oral daily  levothyroxine 50 MICROGram(s) Oral daily  LORazepam     Tablet 0.5 milliGRAM(s) Oral daily  omega-3-Acid Ethyl Esters 1 Gram(s) Oral two times a day  saccharomyces boulardii 250 milliGRAM(s) Oral two times a day  simvastatin 20 milliGRAM(s) Oral at bedtime  vancomycin  IVPB 1250 milliGRAM(s) IV Intermittent every 12 hours  ziprasidone 120 milliGRAM(s) Oral two times a day    MEDICATIONS  (PRN):  acetaminophen   Tablet .. 650 milliGRAM(s) Oral every 6 hours PRN Temp greater or equal to 38C (100.4F)  acetaminophen   Tablet .. 650 milliGRAM(s) Oral every 6 hours PRN Mild Pain (1 - 3), Moderate Pain (4 - 6)  ketorolac   Injectable 15 milliGRAM(s) IV Push once PRN Severe Pain (7 - 10) Patient is a 62y old  Male who presents with a chief complaint of unsteady gait (03 Nov 2018 20:18)    OVERNIGHT EVENTS:  Patient seen and examined at bedside. He reports he feels a little better but still feels warm. He then goes on to explain how he is trapped here and mumbles other many words incoherently.    ROS: No chest pain, palpitations, sob, light headedness/dizziness, difficulty breathing/cough, fevers/chills, abdominal pain, n/v, diarrhea/constipation, dysuria or increased urinary frequency. All other ROS negative     Vitals  T(C): 36.9 (11-04-18 @ 04:40), Max: 38.8 (11-03-18 @ 17:35)  HR: 92 (11-04-18 @ 04:40) (92 - 119)  BP: 147/84 (11-04-18 @ 04:40) (127/74 - 155/75)  RR: 20 (11-04-18 @ 04:40) (16 - 20)  SpO2: 93% (11-04-18 @ 04:40) (91% - 96%)    Physical Exam:   GENERAL: NAD, well-groomed, well-developed  HEAD:  Atraumatic, Normocephalic  EYES: EOMI, conjunctiva and sclera clear  ENMT: No tonsillar erythema, exudates, or enlargement; Moist mucous membranes, Poor dentition, No lesions  NECK: Supple, No JVD, Normal thyroid  RESP: Clear to auscultation bilaterally; No rales, rhonchi, wheezing, or rubs  CVS: Tachycardic, normal S1/S2; No murmurs, rubs, or gallops  GI: Large, Soft, Nontender; Bowel sounds present  EXTREMITIES:  3+ pitting edema bilaterally in LE from feet to knees, soft distal pulses due to swelling, good motor control of both legs  LYMPH: Positive left inguinal lymphadenopathy   SKIN:Erythema of left lower leg from ankle to the knee with anterior bruising lesions noted, warm to the touch    Labs                        13.4   14.4  )-----------( 170      ( 04 Nov 2018 06:43 )             38.2   11-04    127<L>  |  94<L>  |  11.0  ----------------------------<  85  3.7   |  21.0<L>  |  0.87    Ca    8.7      04 Nov 2018 06:43  Phos  2.6     11-04  Mg     1.6     11-04    TPro  7.0  /  Alb  3.9  /  TBili  0.4  /  DBili  x   /  AST  40<H>  /  ALT  14  /  AlkPhos  57  11-03      Repeat LActate: 1.1    MEDICATIONS  (STANDING):  aspirin enteric coated 81 milliGRAM(s) Oral daily  benztropine 1 milliGRAM(s) Oral two times a day  ceFAZolin   IVPB 2000 milliGRAM(s) IV Intermittent every 8 hours  clotrimazole 1% Cream 1 Application(s) Topical two times a day  diVALproex DR 1500 milliGRAM(s) Oral two times a day  docusate sodium 200 milliGRAM(s) Oral two times a day  enoxaparin Injectable 40 milliGRAM(s) SubCutaneous every 24 hours  ergocalciferol 45337 Unit(s) Oral every week  folic acid 1 milliGRAM(s) Oral daily  levothyroxine 50 MICROGram(s) Oral daily  LORazepam     Tablet 0.5 milliGRAM(s) Oral daily  omega-3-Acid Ethyl Esters 1 Gram(s) Oral two times a day  saccharomyces boulardii 250 milliGRAM(s) Oral two times a day  simvastatin 20 milliGRAM(s) Oral at bedtime  vancomycin  IVPB 1250 milliGRAM(s) IV Intermittent every 12 hours  ziprasidone 120 milliGRAM(s) Oral two times a day    MEDICATIONS  (PRN):  acetaminophen   Tablet .. 650 milliGRAM(s) Oral every 6 hours PRN Temp greater or equal to 38C (100.4F)  acetaminophen   Tablet .. 650 milliGRAM(s) Oral every 6 hours PRN Mild Pain (1 - 3), Moderate Pain (4 - 6)  ketorolac   Injectable 15 milliGRAM(s) IV Push once PRN Severe Pain (7 - 10)

## 2018-11-04 NOTE — DISCHARGE NOTE ADULT - PROVIDER TOKENS
FREE:[LAST:[[facility] outpatient],PHONE:[(   )    -],FAX:[(   )    -]],FREE:[LAST:[Facility doctor],PHONE:[(   )    -],FAX:[(   )    -],ADDRESS:[[facility] doctor]]

## 2018-11-04 NOTE — DISCHARGE NOTE ADULT - ADDITIONAL INSTRUCTIONS
-Follow up with your Primary Care Doctor within 1-2 weeks after discharge. -Follow up with your Primary Care Doctor within 1-2 weeks after discharge who is affiliated with [place]

## 2018-11-04 NOTE — DISCHARGE NOTE ADULT - PATIENT PORTAL LINK FT
You can access the [facility], offered by [facility], by registering with the following website: [email or web address]

## 2018-11-04 NOTE — PROGRESS NOTE ADULT - ASSESSMENT
62M w/ PMH of HLD, hypothyroid, schizoaffective disorder & polydipsia sent from [place] w/ LLE pain & swelling admitted for sepsis 2/2 to LLE cellulitis    > Diet: DASH/TLC  > Activity: ambulate w/ assistance  > One to one observation, risk of removing IV lines    Cellulitis  > Tylenol 650 mg PO Q6H PRN pain and/or fever,   > Lactate: 2.2  > s/p 2.5L NS & ancef x1 in ED; now on ancef 2g IV Q8H and Vancomycin 1250mg BID w/ florastor   > Vanc trough pre 4th dose  > Blood, urine cultures  > Leg elevation   > LE dopplers to rule-out DVT      Sepsis  > s/p IV fluids & ancef x1 in ED  > plan as above     LE Edema  > Chronic history, 3+ pitting, up to knees bilaterally  > Pending DVT doppler   > Pending US Abd/Pelv, r/o cirrhosis, abd ascites, obstructing abd/pelvic mass    Electrolyte Abnormality  > Euvolemic hyponatremia, likely secondary to polydipsia vs psych medication   > Unlikely to be secondary to IV fluids as labs drawn prior to infusion  > Currently asymptomatic  > Will trend, consider further work up and treatment with fluid restriction if it presists    Tinea Pedis  > B/l plantar surface skin infection  > Clotrimazole cream BID    HLD  > f/u lipid panel   > c/w statins     Hypothyroidism  > stable  > c/w synthroid   > f/u TSH    Schizophrenia   > stable  > c/w home meds    Constipation  > c/w docusate   > Last BM yesterday, normal    DVT  > Lovenox 40mg SQ daily 62M w/ PMH of HLD, hypothyroid, schizoaffective disorder & polydipsia sent from [facility] w/ LLE pain & swelling admitted for sepsis 2/2 to LLE cellulitis    > Diet: DASH/TLC  > Activity: ambulate w/ assistance  > One to one observation, risk of removing IV lines    Cellulitis  > Tylenol 650 mg PO Q6H PRN pain and/or fever,   > Lactate: 2.2, Repeat 1.1  > s/p 2.5L NS & ancef x1 in ED; now on ancef 2g IV Q8H and Vancomycin 1250mg BID w/ florastor   > Vanc trough pre 4th dose  > Blood, urine cultures  > Leg elevation   > LE dopplers to rule-out DVT      Sepsis  > s/p IV fluids & ancef x1 in ED  > plan as above     LE Edema  > Chronic history, 3+ pitting, up to knees bilaterally  > Pending DVT doppler   > Pending US Abd/Pelv, r/o cirrhosis, abd ascites, obstructing abd/pelvic mass    Electrolyte Abnormality  > Euvolemic hyponatremia, likely secondary to polydipsia vs psych medication   > Unlikely to be secondary to IV fluids as labs drawn prior to infusion  > Currently asymptomatic  > Will trend, consider further work up and treatment with fluid restriction if it persists    Tinea Pedis  > B/l plantar surface skin infection  > Clotrimazole cream BID    HLD  > f/u lipid panel   > c/w statins     Hypothyroidism  > stable  > c/w synthroid   > f/u TSH    Schizophrenia   > stable  > c/w home meds    Constipation  > c/w docusate   > Last BM yesterday, normal    DVT  > Lovenox 40mg SQ daily 62M w/ PMH of HLD, hypothyroid, schizoaffective disorder & polydipsia sent from [place] w/ LLE pain & swelling admitted for sepsis 2/2 to [facility] cellulitis    Cellulitis presenting with sepsis   > Tylenol 650 mg PO Q6H PRN pain and/or fever,   > Lactate: 2.2, Repeat 1.1  > s/p 2.5L NS & ancef x1 in ED; now on ancef 2g IV Q8H and Vancomycin 1250mg BID w/ florastor   > Vanc trough pre 4th dose  > Blood, urine cultures  > Leg elevation   > LE dopplers to rule-out DVT - were refused by patient      LE Edema in both legs   -tried to inquire about sleeping habits, however, patient is not a good historian  > Chronic history, 3+ pitting, up to knees bilaterally  > Pending DVT doppler - refused by patient     Electrolyte Abnormality  > Euvolemic hyponatremia, likely secondary to polydipsia vs psych medication   > Unlikely to be secondary to IV fluids as labs drawn prior to infusion  > Currently asymptomatic  > Will trend, consider further work up and treatment with fluid restriction if it persists    Tinea Pedis  > B/l plantar surface skin infection  > Clotrimazole cream BID    HLD  > f/u lipid panel   > c/w statins     Hypothyroidism  > stable  > c/w synthroid   > f/u TSH    Schizophrenia   > stable  > c/w home meds    Constipation  > c/w docusate   > Last BM yesterday, normal    DVT  > Lovenox 40mg SQ daily 62M w/ PMH of HLD, hypothyroid, schizoaffective disorder & polydipsia sent from [place] w/ LLE pain & swelling admitted for sepsis 2/2 to [facility] cellulitis    Cellulitis presenting with sepsis   -with uptrending leucocytosis but no fevers at this time, f/up in am   > Tylenol 650 mg PO Q6H PRN pain and/or fever,   > Lactate: 2.2, Repeat 1.1  > s/p 2.5L NS & ancef x1 in ED; now on ancef 2g IV Q8H and Vancomycin 1250mg BID w/ florastor   > Vanc trough pre 4th dose  > Blood, urine cultures  > Leg elevation   > LE dopplers to rule-out DVT - were refused by patient      LE Edema in both legs   -tried to inquire about sleeping habits, however, patient is not a good historian  > Chronic history, 3+ pitting, up to knees bilaterally  > Pending DVT doppler - refused by patient     Electrolyte Abnormality  > Euvolemic hyponatremia, likely secondary to polydipsia vs psych medication   > Unlikely to be secondary to IV fluids as labs drawn prior to infusion  > Currently asymptomatic  > Will trend, consider further work up and treatment with fluid restriction if it persists    Tinea Pedis  > B/l plantar surface skin infection  > Clotrimazole cream BID    HLD  > f/u lipid panel   > c/w statins     Hypothyroidism  > stable  > c/w synthroid   > f/u TSH    Schizophrenia   > stable  > c/w home meds    Constipation  > c/w docusate   > Last BM yesterday, normal    DVT  > Lovenox 40mg SQ daily

## 2018-11-05 LAB
ANION GAP SERPL CALC-SCNC: 11 MMOL/L — SIGNIFICANT CHANGE UP (ref 5–17)
BASOPHILS # BLD AUTO: 0 K/UL — SIGNIFICANT CHANGE UP (ref 0–0.2)
BASOPHILS NFR BLD AUTO: 0.1 % — SIGNIFICANT CHANGE UP (ref 0–2)
BUN SERPL-MCNC: 12 MG/DL — SIGNIFICANT CHANGE UP (ref 8–20)
CALCIUM SERPL-MCNC: 8.5 MG/DL — LOW (ref 8.6–10.2)
CHLORIDE SERPL-SCNC: 93 MMOL/L — LOW (ref 98–107)
CO2 SERPL-SCNC: 27 MMOL/L — SIGNIFICANT CHANGE UP (ref 22–29)
CREAT SERPL-MCNC: 0.86 MG/DL — SIGNIFICANT CHANGE UP (ref 0.5–1.3)
EOSINOPHIL # BLD AUTO: 0 K/UL — SIGNIFICANT CHANGE UP (ref 0–0.5)
EOSINOPHIL NFR BLD AUTO: 0.7 % — SIGNIFICANT CHANGE UP (ref 0–6)
GLUCOSE SERPL-MCNC: 128 MG/DL — HIGH (ref 70–115)
HCT VFR BLD CALC: 37.2 % — LOW (ref 42–52)
HGB BLD-MCNC: 13 G/DL — LOW (ref 14–18)
LYMPHOCYTES # BLD AUTO: 0.5 K/UL — LOW (ref 1–4.8)
LYMPHOCYTES # BLD AUTO: 7.4 % — LOW (ref 20–55)
MCHC RBC-ENTMCNC: 32.7 PG — HIGH (ref 27–31)
MCHC RBC-ENTMCNC: 34.9 G/DL — SIGNIFICANT CHANGE UP (ref 32–36)
MCV RBC AUTO: 93.5 FL — SIGNIFICANT CHANGE UP (ref 80–94)
MONOCYTES # BLD AUTO: 0.5 K/UL — SIGNIFICANT CHANGE UP (ref 0–0.8)
MONOCYTES NFR BLD AUTO: 7.8 % — SIGNIFICANT CHANGE UP (ref 3–10)
NEUTROPHILS # BLD AUTO: 5.7 K/UL — SIGNIFICANT CHANGE UP (ref 1.8–8)
NEUTROPHILS NFR BLD AUTO: 83.7 % — HIGH (ref 37–73)
PLATELET # BLD AUTO: 142 K/UL — LOW (ref 150–400)
POTASSIUM SERPL-MCNC: 4.3 MMOL/L — SIGNIFICANT CHANGE UP (ref 3.5–5.3)
POTASSIUM SERPL-SCNC: 4.3 MMOL/L — SIGNIFICANT CHANGE UP (ref 3.5–5.3)
RBC # BLD: 3.98 M/UL — LOW (ref 4.6–6.2)
RBC # FLD: 13.9 % — SIGNIFICANT CHANGE UP (ref 11–15.6)
SODIUM SERPL-SCNC: 131 MMOL/L — LOW (ref 135–145)
VANCOMYCIN TROUGH SERPL-MCNC: 10.8 UG/ML — SIGNIFICANT CHANGE UP (ref 10–20)
WBC # BLD: 6.8 K/UL — SIGNIFICANT CHANGE UP (ref 4.8–10.8)
WBC # FLD AUTO: 6.8 K/UL — SIGNIFICANT CHANGE UP (ref 4.8–10.8)

## 2018-11-05 PROCEDURE — 93010 ELECTROCARDIOGRAM REPORT: CPT

## 2018-11-05 PROCEDURE — 99232 SBSQ HOSP IP/OBS MODERATE 35: CPT | Mod: GC

## 2018-11-05 RX ORDER — DOCUSATE SODIUM 100 MG
100 CAPSULE ORAL
Qty: 0 | Refills: 0 | Status: DISCONTINUED | OUTPATIENT
Start: 2018-11-05 | End: 2018-11-05

## 2018-11-05 RX ORDER — SENNA PLUS 8.6 MG/1
1 TABLET ORAL AT BEDTIME
Qty: 0 | Refills: 0 | Status: COMPLETED | OUTPATIENT
Start: 2018-11-05 | End: 2018-11-07

## 2018-11-05 RX ADMIN — Medication 1 GRAM(S): at 17:10

## 2018-11-05 RX ADMIN — Medication 100 MILLIGRAM(S): at 18:45

## 2018-11-05 RX ADMIN — Medication 1 MILLIGRAM(S): at 17:08

## 2018-11-05 RX ADMIN — SIMVASTATIN 20 MILLIGRAM(S): 20 TABLET, FILM COATED ORAL at 21:01

## 2018-11-05 RX ADMIN — SENNA PLUS 1 TABLET(S): 8.6 TABLET ORAL at 21:01

## 2018-11-05 RX ADMIN — Medication 50 MICROGRAM(S): at 05:53

## 2018-11-05 RX ADMIN — Medication 1 MILLIGRAM(S): at 05:52

## 2018-11-05 RX ADMIN — DIVALPROEX SODIUM 1500 MILLIGRAM(S): 500 TABLET, DELAYED RELEASE ORAL at 05:53

## 2018-11-05 RX ADMIN — ENOXAPARIN SODIUM 40 MILLIGRAM(S): 100 INJECTION SUBCUTANEOUS at 05:52

## 2018-11-05 RX ADMIN — ZIPRASIDONE HYDROCHLORIDE 120 MILLIGRAM(S): 20 CAPSULE ORAL at 05:53

## 2018-11-05 RX ADMIN — DIVALPROEX SODIUM 1500 MILLIGRAM(S): 500 TABLET, DELAYED RELEASE ORAL at 17:08

## 2018-11-05 RX ADMIN — Medication 166.67 MILLIGRAM(S): at 17:08

## 2018-11-05 RX ADMIN — Medication 100 MILLIGRAM(S): at 11:50

## 2018-11-05 RX ADMIN — Medication 250 MILLIGRAM(S): at 05:53

## 2018-11-05 RX ADMIN — Medication 166.67 MILLIGRAM(S): at 05:53

## 2018-11-05 RX ADMIN — Medication 1 APPLICATION(S): at 05:54

## 2018-11-05 RX ADMIN — Medication 1 GRAM(S): at 05:54

## 2018-11-05 RX ADMIN — Medication 81 MILLIGRAM(S): at 11:51

## 2018-11-05 RX ADMIN — Medication 1 MILLIGRAM(S): at 11:50

## 2018-11-05 RX ADMIN — Medication 0.5 MILLIGRAM(S): at 11:53

## 2018-11-05 RX ADMIN — Medication 250 MILLIGRAM(S): at 17:09

## 2018-11-05 RX ADMIN — Medication 200 MILLIGRAM(S): at 17:09

## 2018-11-05 RX ADMIN — ZIPRASIDONE HYDROCHLORIDE 120 MILLIGRAM(S): 20 CAPSULE ORAL at 17:09

## 2018-11-05 RX ADMIN — Medication 1 APPLICATION(S): at 17:08

## 2018-11-05 RX ADMIN — Medication 200 MILLIGRAM(S): at 05:53

## 2018-11-05 NOTE — PHYSICAL THERAPY INITIAL EVALUATION ADULT - CRITERIA FOR SKILLED THERAPEUTIC INTERVENTIONS
anticipated discharge recommendation/impairments found/rehab potential/predicted duration of therapy intervention/therapy frequency/functional limitations in following categories

## 2018-11-05 NOTE — PHYSICAL THERAPY INITIAL EVALUATION ADULT - GAIT PATTERN USED, PT EVAL
unsteadiness throughout, max verbal cues for sequencing, pt declining further ambulation despite max encouragement,

## 2018-11-05 NOTE — PROGRESS NOTE ADULT - ASSESSMENT
62M w/ PMH of HLD, hypothyroid, schizoaffective disorder & polydipsia sent from [place] w/ LLE pain & swelling admitted for sepsis 2/2 to E cellulitis  Pt. remains afebrile, unable to trend leucocytosis pt refused labs today as well as physical examination.      Cellulitis presenting with sepsis   -with uptrending leucocytosis but afebrile at this time  > Tylenol 650 mg PO Q6H PRN pain and/or fever,   > Lactate: 2.2, Repeat 1.1  > s/p 2.5L NS & ancef x1 in ED; now on ancef 2g IV Q8H and Vancomycin 1250mg BID w/ florastor   > Vanc trough pre 4th dose (pt refused lab this morning)  > Blood, urine cultures (11/03/2018)  > Leg elevation   > LE dopplers to rule-out DVT - were refused by patient      LE Edema in both legs   > Chronic history, 3+ pitting, up to knees bilaterally  > Pending DVT doppler - refused by patient     Electrolyte Abnormality  > Euvolemic hyponatremia, likely secondary to polydipsia vs psych medication   > Unlikely to be secondary to IV fluids as labs drawn prior to infusion  > Currently asymptomatic  > Will trend, consider further work up and treatment with fluid restriction if it persists    Tinea Pedis  > B/l plantar surface skin infection  > Clotrimazole cream BID    HLD  > f/u lipid panel   > c/w statins     Hypothyroidism  > stable  > c/w synthroid   > f/u TSH    Schizophrenia   > stable  > c/w home meds Ziprasidone Benztropine and Divalproex      Constipation  > c/w docusate   > Last BM yesterday, normal    DVT  > Lovenox 40mg SQ daily 62M w/ PMH of HLD, hypothyroid, schizoaffective disorder & polydipsia sent from [place] w/ LLE pain & swelling admitted for sepsis 2/2 to E cellulitis  Pt. remains afebrile, unable to trend leucocytosis pt refused labs today as well as physical examination.      Cellulitis presenting with sepsis   -with uptrending leucocytosis but afebrile at this time  > Tylenol 650 mg PO Q6H PRN pain and/or fever,   > Lactate: 2.2, Repeat 1.1  > s/p 2.5L NS & ancef x1 in ED; now on ancef 2g IV Q8H and Vancomycin 1250mg BID w/ florastor   > Vanc trough pre 4th dose (pt refused lab this morning)  > Blood, urine cultures (11/03/2018)  > Leg elevation   > LE dopplers to rule-out DVT - were refused by patient      LE Edema in both legs   > Chronic history, 3+ pitting, up to knees bilaterally  > Pending DVT doppler - refused by patient     Electrolyte Abnormality  > Euvolemic hyponatremia, likely secondary to polydipsia vs psych medication   > Unlikely to be secondary to IV fluids as labs drawn prior to infusion  > Currently asymptomatic  > Will trend, consider further work up and treatment with fluid restriction if it persists    Tinea Pedis  > B/l plantar surface skin infection  > Clotrimazole cream BID    HLD  > f/u lipid panel   > c/w statins     Hypothyroidism  > stable  > c/w synthroid   > f/u TSH    Schizophrenia   > stable  > c/w home meds Ziprasidone Benztropine and Divalproex      Constipation  > c/w docusate   > Last BM yesterday, normal    DVT  > Lovenox 40mg SQ daily    Dispo  > Pt to return to [facility] after discharge. 62M w/ PMH of HLD, hypothyroid, schizoaffective disorder & polydipsia sent from [place] w/ LLE pain & swelling admitted for sepsis 2/2 to E cellulitis  Pt. remains afebrile, unable to trend leucocytosis pt refused labs today as well as physical examination.      Cellulitis presenting with sepsis   -with uptrending leucocytosis but afebrile at this time and no new labs   > Tylenol 650 mg PO Q6H PRN pain and/or fever,   > Lactate: 2.2, Repeat 1.1  > s/p 2.5L NS & ancef x1 in ED; now on ancef 2g IV Q8H and Vancomycin 1250mg BID w/ florastor   > Vanc trough pre 4th dose (pt refused lab this morning)  > Blood, urine cultures (11/03/2018)  > Leg elevation   > LE dopplers to rule-out DVT - were refused by patient      LE Edema in both legs   > Chronic history, 3+ pitting, up to knees bilaterally  > Pending DVT doppler - refused by patient     Electrolyte Abnormality  > Euvolemic hyponatremia, likely secondary to polydipsia vs psych medication   > Unlikely to be secondary to IV fluids as labs drawn prior to infusion  > Currently asymptomatic  > Will trend, consider further work up and treatment with fluid restriction if it persists    Tinea Pedis  > B/l plantar surface skin infection  > Clotrimazole cream BID    HLD  > f/u lipid panel   > c/w statins     Hypothyroidism  > stable  > c/w synthroid   > f/u TSH    Schizophrenia   > stable  > c/w home meds Ziprasidone Benztropine and Divalproex      Constipation  > c/w docusate   > Last BM yesterday, normal    DVT  > Lovenox 40mg SQ daily    Dispo  > Pt to return to [facility] after discharge.

## 2018-11-05 NOTE — PROGRESS NOTE ADULT - SUBJECTIVE AND OBJECTIVE BOX
Patient is a 62y old  Male who presents with a chief complaint of unsteady gait (03 Nov 2018 20:18)    OVERNIGHT EVENTS:  Patient seen and examined at bedside. Pt. laying in bed, refused labs and physical examination today. As per nurse pt sleep well overnight, remains afebrile, less agitated, but still confused, mumbles words incoherently.  ROS: No chest pain, palpitations, sob, light headedness/dizziness, difficulty breathing/cough, fevers/chills, abdominal pain, n/v, diarrhea/constipation, dysuria or increased urinary frequency. All other ROS negative     Vitals  Vital Signs Last 24 Hrs  T(C): 36.8 (05 Nov 2018 00:58), Max: 36.8 (05 Nov 2018 00:58)  T(F): 98.2 (05 Nov 2018 00:58), Max: 98.2 (05 Nov 2018 00:58)  HR: 77 (05 Nov 2018 00:58) (77 - 87)  BP: 87/55 (05 Nov 2018 00:58) (70/48 - 114/78)  RR: 18 (04 Nov 2018 12:28) (18 - 18)  SpO2: 96% (04 Nov 2018 12:28) (96% - 96%)    Physical Exam: (unable to performed today, pt refused)  AS PER (11-)  GENERAL: NAD, well-groomed, well-developed  HEAD:  Atraumatic, Normocephalic  EYES: EOMI, conjunctiva and sclera clear  ENMT: No tonsillar erythema, exudates, or enlargement; Moist mucous membranes, Poor dentition, No lesions  NECK: Supple, No JVD, Normal thyroid  RESP: Clear to auscultation bilaterally; No rales, rhonchi, wheezing, or rubs  CVS: Tachycardic, normal S1/S2; No murmurs, rubs, or gallops  GI: Large, Soft, Nontender; Bowel sounds present  EXTREMITIES:  3+ pitting edema bilaterally in LE from feet to knees, soft distal pulses due to swelling, good motor control of both legs  LYMPH: Positive left inguinal lymphadenopathy   SKIN:Erythema of left lower leg from ankle to the knee with anterior bruising lesions noted, warm to the touch    Labs                        13.4   14.4  )-----------( 170      ( 04 Nov 2018 06:43 )             38.2   11-04    127<L>  |  94<L>  |  11.0  ----------------------------<  85  3.7   |  21.0<L>  |  0.87    Ca    8.7      04 Nov 2018 06:43  Phos  2.6     11-04  Mg     1.6     11-04    TPro  7.0  /  Alb  3.9  /  TBili  0.4  /  DBili  x   /  AST  40<H>  /  ALT  14  /  AlkPhos  57  11-03        Repeat LActate: 1.1    MEDICATIONS  (STANDING):  aspirin enteric coated 81 milliGRAM(s) Oral daily  benztropine 1 milliGRAM(s) Oral two times a day  ceFAZolin   IVPB 2000 milliGRAM(s) IV Intermittent every 8 hours  clotrimazole 1% Cream 1 Application(s) Topical two times a day  diVALproex DR 1500 milliGRAM(s) Oral two times a day  docusate sodium 200 milliGRAM(s) Oral two times a day  enoxaparin Injectable 40 milliGRAM(s) SubCutaneous every 24 hours  ergocalciferol 98225 Unit(s) Oral every week  folic acid 1 milliGRAM(s) Oral daily  levothyroxine 50 MICROGram(s) Oral daily  LORazepam     Tablet 0.5 milliGRAM(s) Oral daily  omega-3-Acid Ethyl Esters 1 Gram(s) Oral two times a day  saccharomyces boulardii 250 milliGRAM(s) Oral two times a day  simvastatin 20 milliGRAM(s) Oral at bedtime  vancomycin  IVPB 1250 milliGRAM(s) IV Intermittent every 12 hours  ziprasidone 120 milliGRAM(s) Oral two times a day    MEDICATIONS  (PRN):  acetaminophen   Tablet .. 650 milliGRAM(s) Oral every 6 hours PRN Temp greater or equal to 38C (100.4F)  acetaminophen   Tablet .. 650 milliGRAM(s) Oral every 6 hours PRN Mild Pain (1 - 3), Moderate Pain (4 - 6)  ketorolac   Injectable 15 milliGRAM(s) IV Push once PRN Severe Pain (7 - 10) Patient is a 62y old  Male who presents with a chief complaint of unsteady gait (03 Nov 2018 20:18)    OVERNIGHT EVENTS:  Patient seen and examined at bedside. Pt. laying in bed, refused labs and physical examination today. As per nurse pt sleep well overnight, remains afebrile, less agitated, but still confused, mumbles words incoherently.    ROS: No chest pain, palpitations, sob, light headedness/dizziness, difficulty breathing/cough, fevers/chills, abdominal pain, n/v, diarrhea/constipation, dysuria or increased urinary frequency. All other ROS negative     Vitals  Vital Signs Last 24 Hrs  T(C): 36.8 (05 Nov 2018 00:58), Max: 36.8 (05 Nov 2018 00:58)  T(F): 98.2 (05 Nov 2018 00:58), Max: 98.2 (05 Nov 2018 00:58)  HR: 77 (05 Nov 2018 00:58) (77 - 87)  BP: 87/55 (05 Nov 2018 00:58) (70/48 - 114/78)  RR: 18 (04 Nov 2018 12:28) (18 - 18)  SpO2: 96% (04 Nov 2018 12:28) (96% - 96%)    Physical Exam: (unable to performed today, pt refused)  AS PER (11-)  GENERAL: NAD, well-groomed, well-developed  HEENT: eomi, perrla ncat   RESP: Clear to auscultation bilaterally; No rales, rhonchi, wheezing, or rubs  CVS: Tachycardic, normal S1/S2; No murmurs, rubs, or gallops  GI: Large, Soft, Nontender; Bowel sounds present  EXTREMITIES:  3+ pitting edema bilaterally in LE from feet to knees, soft distal pulses due to swelling, good motor control of both legs  LYMPH: Positive left inguinal lymphadenopathy   SKIN:Erythema of left lower leg from ankle to the knee with anterior bruising lesions noted, warm to the touch- improved    Labs                        13.4   14.4  )-----------( 170      ( 04 Nov 2018 06:43 )             38.2   11-04    127<L>  |  94<L>  |  11.0  ----------------------------<  85  3.7   |  21.0<L>  |  0.87    Ca    8.7      04 Nov 2018 06:43  Phos  2.6     11-04  Mg     1.6     11-04    TPro  7.0  /  Alb  3.9  /  TBili  0.4  /  DBili  x   /  AST  40<H>  /  ALT  14  /  AlkPhos  57  11-03        Repeat LActate: 1.1    MEDICATIONS  (STANDING):  aspirin enteric coated 81 milliGRAM(s) Oral daily  benztropine 1 milliGRAM(s) Oral two times a day  ceFAZolin   IVPB 2000 milliGRAM(s) IV Intermittent every 8 hours  clotrimazole 1% Cream 1 Application(s) Topical two times a day  diVALproex DR 1500 milliGRAM(s) Oral two times a day  docusate sodium 200 milliGRAM(s) Oral two times a day  enoxaparin Injectable 40 milliGRAM(s) SubCutaneous every 24 hours  ergocalciferol 84106 Unit(s) Oral every week  folic acid 1 milliGRAM(s) Oral daily  levothyroxine 50 MICROGram(s) Oral daily  LORazepam     Tablet 0.5 milliGRAM(s) Oral daily  omega-3-Acid Ethyl Esters 1 Gram(s) Oral two times a day  saccharomyces boulardii 250 milliGRAM(s) Oral two times a day  simvastatin 20 milliGRAM(s) Oral at bedtime  vancomycin  IVPB 1250 milliGRAM(s) IV Intermittent every 12 hours  ziprasidone 120 milliGRAM(s) Oral two times a day    MEDICATIONS  (PRN):  acetaminophen   Tablet .. 650 milliGRAM(s) Oral every 6 hours PRN Temp greater or equal to 38C (100.4F)  acetaminophen   Tablet .. 650 milliGRAM(s) Oral every 6 hours PRN Mild Pain (1 - 3), Moderate Pain (4 - 6)  ketorolac   Injectable 15 milliGRAM(s) IV Push once PRN Severe Pain (7 - 10) Patient is a 62y old  Male who presents with a chief complaint of unsteady gait (03 Nov 2018 20:18)    OVERNIGHT EVENTS:  Patient seen and examined at bedside. Pt. laying in bed, refused labs and physical examination today. As per nurse pt sleep well overnight, remains afebrile, less agitated, but still confused, mumbles words incoherently.    ROS: No chest pain, palpitations, sob, light headedness/dizziness, difficulty breathing/cough, fevers/chills, abdominal pain, n/v, diarrhea/constipation, dysuria or increased urinary frequency. All other ROS negative     Vitals  Vital Signs Last 24 Hrs  T(C): 36.8 (05 Nov 2018 00:58), Max: 36.8 (05 Nov 2018 00:58)  T(F): 98.2 (05 Nov 2018 00:58), Max: 98.2 (05 Nov 2018 00:58)  HR: 77 (05 Nov 2018 00:58) (77 - 87)  BP: 87/55 (05 Nov 2018 00:58) (70/48 - 114/78)  RR: 18 (04 Nov 2018 12:28) (18 - 18)  SpO2: 96% (04 Nov 2018 12:28) (96% - 96%)    Physical Exam: (unable to performed today, pt refused)  AS PER (11-)  GENERAL: NAD, well-groomed, well-developed  HEENT: eomi, perrla ncat   RESP: Clear to auscultation bilaterally; No rales, rhonchi, wheezing, or rubs  CVS: Tachycardic, normal S1/S2; No murmurs, rubs, or gallops  GI: Large, Soft, Nontender; Bowel sounds present  EXTREMITIES:  3+ pitting edema bilaterally in LE from feet to knees, soft distal pulses due to swelling, good motor control of both legs  LYMPH: Positive left inguinal lymphadenopathy   SKIN:Erythema of left lower leg from ankle to the knee with anterior bruising lesions noted, warm to the touch- improved    Labs                        13.4   14.4  )-----------( 170      ( 04 Nov 2018 06:43 )             38.2   11-04    127<L>  |  94<L>  |  11.0  ----------------------------<  85  3.7   |  21.0<L>  |  0.87    Ca    8.7      04 Nov 2018 06:43  Phos  2.6     11-04  Mg     1.6     11-04    TPro  7.0  /  Alb  3.9  /  TBili  0.4  /  DBili  x   /  AST  40<H>  /  ALT  14  /  AlkPhos  57  11-03      EKG - nsr no acute kris or twi     Repeat LActate: 1.1    MEDICATIONS  (STANDING):  aspirin enteric coated 81 milliGRAM(s) Oral daily  benztropine 1 milliGRAM(s) Oral two times a day  ceFAZolin   IVPB 2000 milliGRAM(s) IV Intermittent every 8 hours  clotrimazole 1% Cream 1 Application(s) Topical two times a day  diVALproex DR 1500 milliGRAM(s) Oral two times a day  docusate sodium 200 milliGRAM(s) Oral two times a day  enoxaparin Injectable 40 milliGRAM(s) SubCutaneous every 24 hours  ergocalciferol 96596 Unit(s) Oral every week  folic acid 1 milliGRAM(s) Oral daily  levothyroxine 50 MICROGram(s) Oral daily  LORazepam     Tablet 0.5 milliGRAM(s) Oral daily  omega-3-Acid Ethyl Esters 1 Gram(s) Oral two times a day  saccharomyces boulardii 250 milliGRAM(s) Oral two times a day  simvastatin 20 milliGRAM(s) Oral at bedtime  vancomycin  IVPB 1250 milliGRAM(s) IV Intermittent every 12 hours  ziprasidone 120 milliGRAM(s) Oral two times a day    MEDICATIONS  (PRN):  acetaminophen   Tablet .. 650 milliGRAM(s) Oral every 6 hours PRN Temp greater or equal to 38C (100.4F)  acetaminophen   Tablet .. 650 milliGRAM(s) Oral every 6 hours PRN Mild Pain (1 - 3), Moderate Pain (4 - 6)  ketorolac   Injectable 15 milliGRAM(s) IV Push once PRN Severe Pain (7 - 10)

## 2018-11-05 NOTE — PHYSICAL THERAPY INITIAL EVALUATION ADULT - ADDITIONAL COMMENTS
pt a poor historian, as per medical chart, pt lives at [place], was independent with all mobility

## 2018-11-06 DIAGNOSIS — L03.116 CELLULITIS OF LEFT LOWER LIMB: ICD-10-CM

## 2018-11-06 DIAGNOSIS — F20.9 SCHIZOPHRENIA, UNSPECIFIED: ICD-10-CM

## 2018-11-06 LAB
ANION GAP SERPL CALC-SCNC: 10 MMOL/L — SIGNIFICANT CHANGE UP (ref 5–17)
BASOPHILS # BLD AUTO: 0 K/UL — SIGNIFICANT CHANGE UP (ref 0–0.2)
BASOPHILS NFR BLD AUTO: 0.2 % — SIGNIFICANT CHANGE UP (ref 0–2)
BUN SERPL-MCNC: 10 MG/DL — SIGNIFICANT CHANGE UP (ref 8–20)
CALCIUM SERPL-MCNC: 9.3 MG/DL — SIGNIFICANT CHANGE UP (ref 8.6–10.2)
CHLORIDE SERPL-SCNC: 97 MMOL/L — LOW (ref 98–107)
CO2 SERPL-SCNC: 28 MMOL/L — SIGNIFICANT CHANGE UP (ref 22–29)
CREAT SERPL-MCNC: 0.72 MG/DL — SIGNIFICANT CHANGE UP (ref 0.5–1.3)
EOSINOPHIL # BLD AUTO: 0 K/UL — SIGNIFICANT CHANGE UP (ref 0–0.5)
EOSINOPHIL NFR BLD AUTO: 0.7 % — SIGNIFICANT CHANGE UP (ref 0–5)
GLUCOSE SERPL-MCNC: 91 MG/DL — SIGNIFICANT CHANGE UP (ref 70–115)
HCT VFR BLD CALC: 38.9 % — LOW (ref 42–52)
HGB BLD-MCNC: 13.6 G/DL — LOW (ref 14–18)
LYMPHOCYTES # BLD AUTO: 0.4 K/UL — LOW (ref 1–4.8)
LYMPHOCYTES # BLD AUTO: 6.9 % — LOW (ref 20–55)
MCHC RBC-ENTMCNC: 32.7 PG — HIGH (ref 27–31)
MCHC RBC-ENTMCNC: 35 G/DL — SIGNIFICANT CHANGE UP (ref 32–36)
MCV RBC AUTO: 93.5 FL — SIGNIFICANT CHANGE UP (ref 80–94)
MONOCYTES # BLD AUTO: 0.6 K/UL — SIGNIFICANT CHANGE UP (ref 0–0.8)
MONOCYTES NFR BLD AUTO: 9.6 % — SIGNIFICANT CHANGE UP (ref 3–10)
NEUTROPHILS # BLD AUTO: 4.8 K/UL — SIGNIFICANT CHANGE UP (ref 1.8–8)
NEUTROPHILS NFR BLD AUTO: 82.3 % — HIGH (ref 37–73)
PLATELET # BLD AUTO: 157 K/UL — SIGNIFICANT CHANGE UP (ref 150–400)
POTASSIUM SERPL-MCNC: 4.6 MMOL/L — SIGNIFICANT CHANGE UP (ref 3.5–5.3)
POTASSIUM SERPL-SCNC: 4.6 MMOL/L — SIGNIFICANT CHANGE UP (ref 3.5–5.3)
RBC # BLD: 4.16 M/UL — LOW (ref 4.6–6.2)
RBC # FLD: 13.8 % — SIGNIFICANT CHANGE UP (ref 11–15.6)
SODIUM SERPL-SCNC: 135 MMOL/L — SIGNIFICANT CHANGE UP (ref 135–145)
WBC # BLD: 5.8 K/UL — SIGNIFICANT CHANGE UP (ref 4.8–10.8)
WBC # FLD AUTO: 5.8 K/UL — SIGNIFICANT CHANGE UP (ref 4.8–10.8)

## 2018-11-06 PROCEDURE — 93970 EXTREMITY STUDY: CPT | Mod: 26

## 2018-11-06 PROCEDURE — 99232 SBSQ HOSP IP/OBS MODERATE 35: CPT | Mod: GC

## 2018-11-06 PROCEDURE — 99222 1ST HOSP IP/OBS MODERATE 55: CPT

## 2018-11-06 RX ORDER — HALOPERIDOL DECANOATE 100 MG/ML
0.5 INJECTION INTRAMUSCULAR ONCE
Qty: 0 | Refills: 0 | Status: DISCONTINUED | OUTPATIENT
Start: 2018-11-06 | End: 2018-11-06

## 2018-11-06 RX ORDER — CEPHALEXIN 500 MG
1 CAPSULE ORAL
Qty: 20 | Refills: 0 | OUTPATIENT
Start: 2018-11-06 | End: 2018-11-10

## 2018-11-06 RX ORDER — HALOPERIDOL DECANOATE 100 MG/ML
0.5 INJECTION INTRAMUSCULAR ONCE
Qty: 0 | Refills: 0 | Status: DISCONTINUED | OUTPATIENT
Start: 2018-11-06 | End: 2018-11-08

## 2018-11-06 RX ORDER — CEPHALEXIN 500 MG
500 CAPSULE ORAL
Qty: 0 | Refills: 0 | Status: DISCONTINUED | OUTPATIENT
Start: 2018-11-06 | End: 2018-11-08

## 2018-11-06 RX ORDER — SACCHAROMYCES BOULARDII 250 MG
1 POWDER IN PACKET (EA) ORAL
Qty: 10 | Refills: 0 | OUTPATIENT
Start: 2018-11-06 | End: 2018-11-10

## 2018-11-06 RX ADMIN — SIMVASTATIN 20 MILLIGRAM(S): 20 TABLET, FILM COATED ORAL at 20:49

## 2018-11-06 RX ADMIN — Medication 1 MILLIGRAM(S): at 04:52

## 2018-11-06 RX ADMIN — Medication 500 MILLIGRAM(S): at 17:28

## 2018-11-06 RX ADMIN — Medication 300 MILLIGRAM(S): at 20:48

## 2018-11-06 RX ADMIN — SENNA PLUS 1 TABLET(S): 8.6 TABLET ORAL at 20:49

## 2018-11-06 RX ADMIN — Medication 0.5 MILLIGRAM(S): at 12:26

## 2018-11-06 RX ADMIN — Medication 50 MICROGRAM(S): at 04:53

## 2018-11-06 RX ADMIN — Medication 200 MILLIGRAM(S): at 04:53

## 2018-11-06 RX ADMIN — Medication 250 MILLIGRAM(S): at 17:28

## 2018-11-06 RX ADMIN — Medication 81 MILLIGRAM(S): at 12:19

## 2018-11-06 RX ADMIN — Medication 250 MILLIGRAM(S): at 04:52

## 2018-11-06 RX ADMIN — Medication 1 GRAM(S): at 04:55

## 2018-11-06 RX ADMIN — Medication 1 APPLICATION(S): at 16:37

## 2018-11-06 RX ADMIN — DIVALPROEX SODIUM 1500 MILLIGRAM(S): 500 TABLET, DELAYED RELEASE ORAL at 04:53

## 2018-11-06 RX ADMIN — Medication 1 MILLIGRAM(S): at 12:19

## 2018-11-06 RX ADMIN — Medication 1 APPLICATION(S): at 05:45

## 2018-11-06 RX ADMIN — Medication 166.67 MILLIGRAM(S): at 04:55

## 2018-11-06 RX ADMIN — Medication 1 GRAM(S): at 18:18

## 2018-11-06 RX ADMIN — DIVALPROEX SODIUM 1500 MILLIGRAM(S): 500 TABLET, DELAYED RELEASE ORAL at 17:28

## 2018-11-06 RX ADMIN — ZIPRASIDONE HYDROCHLORIDE 120 MILLIGRAM(S): 20 CAPSULE ORAL at 18:18

## 2018-11-06 RX ADMIN — Medication 1 MILLIGRAM(S): at 18:18

## 2018-11-06 RX ADMIN — ZIPRASIDONE HYDROCHLORIDE 120 MILLIGRAM(S): 20 CAPSULE ORAL at 04:52

## 2018-11-06 RX ADMIN — Medication 100 MILLIGRAM(S): at 02:10

## 2018-11-06 RX ADMIN — Medication 200 MILLIGRAM(S): at 18:18

## 2018-11-06 RX ADMIN — Medication 300 MILLIGRAM(S): at 14:13

## 2018-11-06 NOTE — CONSULT NOTE ADULT - SUBJECTIVE AND OBJECTIVE BOX
[facility]  INFECTIOUS DISEASES AND INTERNAL MEDICINE at [place]  =======================================================  Hansel Corrales MD  Diplomates American Board of Internal Medicine and Infectious Diseases  =======================================================    [facility]-[id number]  RM HORTON   "62M w/ PMH of HLD, hypothyroidism, schizoaffective disorder & polydipsia sent from [place] w/ LLE pain & swelling. Patient is a poor historian and goes off on tangents. He states he feels weak and has a fever but cannot tell me when it started. He states he also has a cough but it is nonproductive. He states his bones have been broken for many years and he just repaired them himself. Patient is urinating frequently but denies dysuria and had a normal BM yesterday. Patient is refusing to answer a lot of questions and is resistant to allowing a physical exam as well but allows with some coercion. (03 Nov 2018 20:18)"    patient is being treated here for left leg cellulitis.  patient was on Vancomycin and Kefzol for cellulitis.  This AM, patient removed his peripheral IV lines, being cooperative.  history is limited due to his psychiatric illness.  no fevers noted. no hypotension currently.     =======================================================  Past Medical & Surgical Hx:  =====================  PAST MEDICAL & SURGICAL HISTORY:  Hyperlipidemia  Hypothyroidism  Schizophrenia  No significant past surgical history      Problem List:  ==========  HEALTH ISSUES - PROBLEM Dx:  Suspected deep vein thrombosis       Social Hx:  =======  no toxic habits currently    FAMILY HISTORY:  No pertinent family history in first degree relatives: no family h/o skin infection.  no significant family history of immunosuppressive disorders in mother or father    Allergies    clozapine (Unknown)  lithium (Unknown)    Intolerances        Antibiotics:  cephalexin 500 milliGRAM(s) Oral four times a day  clindamycin   Capsule 300 milliGRAM(s) Oral three times a day    Other medications:  aspirin enteric coated 81 milliGRAM(s) Oral daily  benztropine 1 milliGRAM(s) Oral two times a day  clotrimazole 1% Cream 1 Application(s) Topical two times a day  diVALproex DR 1500 milliGRAM(s) Oral two times a day  docusate sodium 200 milliGRAM(s) Oral two times a day  enoxaparin Injectable 40 milliGRAM(s) SubCutaneous every 24 hours  ergocalciferol 55403 Unit(s) Oral every week  folic acid 1 milliGRAM(s) Oral daily  haloperidol    Injectable 0.5 milliGRAM(s) IntraMuscular once  levothyroxine 50 MICROGram(s) Oral daily  LORazepam     Tablet 0.5 milliGRAM(s) Oral daily  omega-3-Acid Ethyl Esters 1 Gram(s) Oral two times a day  saccharomyces boulardii 250 milliGRAM(s) Oral two times a day  senna 1 Tablet(s) Oral at bedtime  simvastatin 20 milliGRAM(s) Oral at bedtime  ziprasidone 120 milliGRAM(s) Oral two times a day     ceFAZolin   IVPB   100 mL/Hr IV Intermittent (11-03-18 @ 17:45)    ceFAZolin   IVPB   100 mL/Hr IV Intermittent (11-04-18 @ 00:33)   100 mL/Hr IV Intermittent (11-04-18 @ 09:29)   100 mL/Hr IV Intermittent (11-04-18 @ 18:05)   100 mL/Hr IV Intermittent (11-04-18 @ 23:41)   100 mL/Hr IV Intermittent (11-05-18 @ 11:50)   100 mL/Hr IV Intermittent (11-05-18 @ 18:45)   100 mL/Hr IV Intermittent (11-06-18 @ 02:10)    vancomycin  IVPB   166.67 mL/Hr IV Intermittent (11-04-18 @ 05:35)   166.67 mL/Hr IV Intermittent (11-04-18 @ 18:00)   166.67 mL/Hr IV Intermittent (11-05-18 @ 05:53)   166.67 mL/Hr IV Intermittent (11-05-18 @ 17:08)   166.67 mL/Hr IV Intermittent (11-06-18 @ 04:55)       =======================================================  REVIEW OF SYSTEMS:  limited; history is limited due to his psychiatric illness.  ======================================================  Physical Exam:  ============  T(F): 98.2 (06 Nov 2018 08:10), Max: 98.3 (05 Nov 2018 15:23)  HR: 68 (05 Nov 2018 23:40)  BP: 96/72 (05 Nov 2018 23:40)  RR: 18 (05 Nov 2018 23:40)  SpO2: 99% (05 Nov 2018 23:40) (94% - 99%)    General:  No acute distress. SITTING IN CHAIR  Eye: Pupils are equal, round and reactive to light, Extraocular movements are intact, Normal conjunctiva.  HENT: Normocephalic, Oral mucosa is moist, No pharyngeal erythema, No sinus tenderness.  Neck: Supple, No lymphadenopathy.  Respiratory: Lungs are clear to auscultation, Respirations are non-labored.  Cardiovascular: Normal rate, Regular rhythm, No murmur, Good pulses equal in all extremities, No edema.  Gastrointestinal: Soft, Non-tender, Non-distended, Normal bowel sounds.  Genitourinary: No costovertebral angle tenderness.  Lymphatics: No lymphadenopathy neck,   Musculoskeletal: Normal range of motion, Normal strength.  Integumentary:  LEFT LEG WITH ERYTHEMA ANTERIORLY. INCREASE WARMTH  Neurologic:  No focal deficits, Cranial Nerves II-XII are grossly intact.  Psychiatric: Uncooperative      =======================================================  Labs:  ====                           13.6   5.8   )-----------( 157      ( 06 Nov 2018 08:20 )             38.9     11-06    135  |  97<L>  |  10.0  ----------------------------<  91  4.6   |  28.0  |  0.72    Ca    9.3      06 Nov 2018 08:20         Culture - Urine (collected 11-03-18 @ 18:08)  Source: .Urine Clean Catch (Midstream)  Final Report (11-04-18 @ 16:26):    No growth    Culture - Blood (collected 11-03-18 @ 17:54)  Source: .Blood Blood    Culture - Blood (collected 11-03-18 @ 17:53)  Source: .Blood Blood

## 2018-11-06 NOTE — PROGRESS NOTE ADULT - ASSESSMENT
62M w/ PMH of HLD, hypothyroid, schizoaffective disorder & polydipsia sent from [facility] w/ LLE pain & swelling admitted for sepsis 2/2 to LLE cellulitis  Pt. remains afebrile, no elevated white count, however erythema and swelling in LLE persists, will consult ID        Cellulitis presenting with sepsis   > Elevated WBC at admision, today wnl  > Tylenol 650 mg PO Q6H PRN pain and/or fever,   > Lactate: 2.2, Repeat 1.1  > s/p 2.5L NS & ancef x1 in ED; now on ancef 2g IV Q8H and Vancomycin 1250mg BID w/ florastor   > Vanc trough 10.8  > Preliminary Blood and urine cultures negative (11/03/2018)  > Leg elevation   > LLE Doppler pending   > ID consult pending      LE Edema in both legs   > Chronic history, 3+ pitting, up to knees bilaterally  > Pending DVT doppler     Electrolyte Abnormality  > Euvolemic hyponatremia, resolved likely secondary to polydipsia vs psych medication   > Currently asymptomatic  > Will trend continue to monitor     Tinea Pedis  > B/l plantar surface skin infection  > Clotrimazole cream BID    HLD  > f/u lipid panel   > c/w statins     Hypothyroidism  > stable  > c/w synthroid   > TSH normal    Schizophrenia   > stable  > c/w home meds Ziprasidone Benztropine and Divalproex      Constipation  > c/w docusate   > Last BM yesterday, normal    DVT  > Lovenox 40mg SQ daily    Dispo  > Pt to return to [place] after discharge. 62M w/ PMH of HLD, hypothyroid, schizoaffective disorder & polydipsia sent from [facility] w/ LLE pain & swelling admitted for sepsis 2/2 to LLE cellulitis  Pt. remains afebrile, no elevated white count, however erythema and swelling in LLE persists, will consult ID        Cellulitis presenting with sepsis   > Elevated WBC at admision, today wnl  > Tylenol 650 mg PO Q6H PRN pain and/or fever,   > Lactate: 2.2, Repeat 1.1  > s/p 2.5L NS & ancef x1 in ED; now on ancef 2g IV Q8H and Vancomycin 1250mg BID w/ florastor   > Vanc trough 10.8  > Preliminary Blood and urine cultures negative (11/03/2018)  > Leg elevation   > LLE Doppler pending   > ID consult pending      LE Edema in both legs   > Chronic history, 2+ pitting, improved   > Pending DVT doppler     Electrolyte Abnormality  > Euvolemic hyponatremia, resolved likely secondary to polydipsia vs psych medication   > Currently asymptomatic  > Will trend continue to monitor     Tinea Pedis  > B/l plantar surface skin infection  > Clotrimazole cream BID    HLD  > f/u lipid panel   > c/w statins     Hypothyroidism  > stable  > c/w synthroid   > TSH normal    Schizophrenia   > stable  > c/w home meds Ziprasidone Benztropine and Divalproex      Constipation  > c/w docusate   > Last BM yesterday, normal    DVT  > Lovenox 40mg SQ daily    Dispo  > Pt to return to [place] after discharge. 62M w/ PMH of HLD, hypothyroid, schizoaffective disorder & polydipsia sent from [facility] w/ LLE pain & swelling admitted for sepsis 2/2 to LLE cellulitis  Pt. remains afebrile, no elevated white count, however erythema and swelling in LLE persists, will consult ID        Cellulitis presenting with sepsis   > Elevated WBC at admision, today wnl - clinically improving   -chronic LE edema   > Tylenol 650 mg PO Q6H PRN pain and/or fever,   > Lactate: 2.2, Repeat 1.1  > s/p 2.5L NS & ancef x1 in ED; now on ancef 2g IV Q8H and Vancomycin 1250mg BID w/ florastor   > Vanc trough 10.8  > Preliminary Blood and urine cultures negative (11/03/2018)  > Leg elevation   > LLE Doppler negative, refused left leg exam   > ID consult noted - switch abx to keflex and clinda     Electrolyte Abnormality  > Euvolemic hyponatremia, resolved likely secondary to polydipsia vs psych medication   > Currently asymptomatic  > Will trend continue to monitor     Tinea Pedis  > B/l plantar surface skin infection  > Clotrimazole cream BID    HLD  > f/u lipid panel   > c/w statins     Hypothyroidism  > stable  > c/w synthroid   > TSH normal    Schizophrenia   > stable  > c/w home meds Ziprasidone Benztropine and Divalproex      Constipation  > c/w docusate   > Last BM yesterday, normal    DVT  > Lovenox 40mg SQ daily    Dispo  > Pt to return to [place] however, PT with 5 steps only - needs to go to [facility] 62M w/ PMH of HLD, hypothyroid, schizoaffective disorder & polydipsia sent from [facility] w/ LLE pain & swelling admitted for sepsis 2/2 to E cellulitis  Pt. remains afebrile, no elevated white count, however erythema and swelling in LLE persists, will consult ID        Cellulitis presenting with sepsis   > Elevated WBC at admision, today wnl - clinically improving   -chronic LE edema   > Tylenol 650 mg PO Q6H PRN pain and/or fever,   > Lactate: 2.2, Repeat 1.1  > s/p 2.5L NS & ancef x1 in ED; now on ancef 2g IV Q8H and Vancomycin 1250mg BID w/ florastor   > Vanc trough 10.8  > Preliminary Blood and urine cultures negative (11/03/2018)  > Leg elevation   > LLE Doppler negative, refused left leg exam   > ID consult noted - switch abx to keflex and clinda     Elevated BP   -x1, will repeat for now     Electrolyte Abnormality  > Euvolemic hyponatremia, resolved likely secondary to polydipsia vs psych medication   > Currently asymptomatic  > Will trend continue to monitor     Tinea Pedis  > B/l plantar surface skin infection  > Clotrimazole cream BID    HLD  > f/u lipid panel   > c/w statins     Hypothyroidism  > stable  > c/w synthroid   > TSH normal    Schizophrenia   > stable  > c/w home meds Ziprasidone Benztropine and Divalproex      Constipation  > c/w docusate   > Last BM yesterday, normal    DVT  > Lovenox 40mg SQ daily    Dispo  > Pt to return to [place] however, PT with 5 steps only - needs to go to [facility]

## 2018-11-06 NOTE — CONSULT NOTE ADULT - ASSESSMENT
This 62 y.o. man from outpatient psych, here for left leg cellulitis.     - had been on Vanco and Kefzol.   no IV access    - will change to PO Keflex  x 5 days (ordered) and PO Clindamycin x 3 days (ordered) to cover skin greer

## 2018-11-06 NOTE — CHART NOTE - NSCHARTNOTEFT_GEN_A_CORE
Pt was evaluated by Physical Therapist today, unable to ambulate safely,  mobility remains limited, due to poor balance, weakness and poor safety awareness. Pt unable to walk more than 5 feet.   Pt. needs [facility] in a skilled nursing facility which do not have to exceed 120 days.  aware and working on [facility] placement.

## 2018-11-07 PROCEDURE — 99239 HOSP IP/OBS DSCHRG MGMT >30: CPT

## 2018-11-07 RX ORDER — CEPHALEXIN 500 MG
1 CAPSULE ORAL
Qty: 16 | Refills: 0 | OUTPATIENT
Start: 2018-11-07 | End: 2018-11-10

## 2018-11-07 RX ORDER — ZIPRASIDONE HYDROCHLORIDE 20 MG/1
2 CAPSULE ORAL
Qty: 0 | Refills: 0 | DISCHARGE
Start: 2018-11-07

## 2018-11-07 RX ORDER — CEPHALEXIN 500 MG
1 CAPSULE ORAL
Qty: 20 | Refills: 0 | OUTPATIENT
Start: 2018-11-07 | End: 2018-11-11

## 2018-11-07 RX ORDER — ZIPRASIDONE HYDROCHLORIDE 20 MG/1
1 CAPSULE ORAL
Qty: 0 | Refills: 0 | COMMUNITY

## 2018-11-07 RX ORDER — SACCHAROMYCES BOULARDII 250 MG
1 POWDER IN PACKET (EA) ORAL
Qty: 14 | Refills: 0 | OUTPATIENT
Start: 2018-11-07 | End: 2018-11-13

## 2018-11-07 RX ORDER — SACCHAROMYCES BOULARDII 250 MG
1 POWDER IN PACKET (EA) ORAL
Qty: 10 | Refills: 0 | OUTPATIENT
Start: 2018-11-07 | End: 2018-11-11

## 2018-11-07 RX ADMIN — Medication 1 MILLIGRAM(S): at 11:19

## 2018-11-07 RX ADMIN — Medication 0.5 MILLIGRAM(S): at 11:19

## 2018-11-07 RX ADMIN — Medication 300 MILLIGRAM(S): at 21:16

## 2018-11-07 RX ADMIN — Medication 1 GRAM(S): at 17:22

## 2018-11-07 RX ADMIN — SENNA PLUS 1 TABLET(S): 8.6 TABLET ORAL at 21:16

## 2018-11-07 RX ADMIN — ZIPRASIDONE HYDROCHLORIDE 120 MILLIGRAM(S): 20 CAPSULE ORAL at 17:22

## 2018-11-07 RX ADMIN — Medication 81 MILLIGRAM(S): at 11:19

## 2018-11-07 RX ADMIN — Medication 1 MILLIGRAM(S): at 17:21

## 2018-11-07 RX ADMIN — Medication 250 MILLIGRAM(S): at 04:21

## 2018-11-07 RX ADMIN — Medication 1 GRAM(S): at 04:21

## 2018-11-07 RX ADMIN — Medication 500 MILLIGRAM(S): at 00:54

## 2018-11-07 RX ADMIN — Medication 500 MILLIGRAM(S): at 11:51

## 2018-11-07 RX ADMIN — Medication 500 MILLIGRAM(S): at 04:21

## 2018-11-07 RX ADMIN — Medication 500 MILLIGRAM(S): at 17:22

## 2018-11-07 RX ADMIN — SIMVASTATIN 20 MILLIGRAM(S): 20 TABLET, FILM COATED ORAL at 21:16

## 2018-11-07 RX ADMIN — DIVALPROEX SODIUM 1500 MILLIGRAM(S): 500 TABLET, DELAYED RELEASE ORAL at 04:21

## 2018-11-07 RX ADMIN — ZIPRASIDONE HYDROCHLORIDE 120 MILLIGRAM(S): 20 CAPSULE ORAL at 07:25

## 2018-11-07 RX ADMIN — Medication 200 MILLIGRAM(S): at 04:21

## 2018-11-07 RX ADMIN — Medication 1 APPLICATION(S): at 04:22

## 2018-11-07 RX ADMIN — DIVALPROEX SODIUM 1500 MILLIGRAM(S): 500 TABLET, DELAYED RELEASE ORAL at 17:21

## 2018-11-07 RX ADMIN — Medication 250 MILLIGRAM(S): at 17:21

## 2018-11-07 RX ADMIN — Medication 1 MILLIGRAM(S): at 04:21

## 2018-11-07 RX ADMIN — Medication 500 MILLIGRAM(S): at 23:42

## 2018-11-07 RX ADMIN — Medication 300 MILLIGRAM(S): at 04:22

## 2018-11-07 RX ADMIN — Medication 50 MICROGRAM(S): at 04:21

## 2018-11-07 RX ADMIN — Medication 300 MILLIGRAM(S): at 13:26

## 2018-11-07 NOTE — PROGRESS NOTE ADULT - SUBJECTIVE AND OBJECTIVE BOX
Patient is a 62y old  Male who presents with a chief complaint of unsteady gait (03 Nov 2018 20:18)    OVERNIGHT EVENTS:  Patient seen and examined at bedside. Pt. back to his base line mentation, swelling in LLE has significantly improve as well as the erythema. Pt. voiding properlly, last BM this morining. Pt. is tolerating PO w/o n  ROS: No chest pain, palpitations, sob, light headedness/dizziness, difficulty breathing/cough, fevers/chills, abdominal pain, n/v, diarrhea/constipation, dysuria or increased urinary frequency. All other ROS negative     Vital Signs Last 24 Hrs  T(C): 36.8 (07 Nov 2018 07:40), Max: 36.8 (07 Nov 2018 07:40)  T(F): 98.2 (07 Nov 2018 07:40), Max: 98.2 (07 Nov 2018 07:40)  HR: 68 (07 Nov 2018 07:40) (68 - 81)  BP: 135/79 (07 Nov 2018 07:40) (135/79 - 162/89)  RR: 18 (07 Nov 2018 07:40) (18 - 18)  SpO2: 96% (07 Nov 2018 07:40) (95% - 96%)    Physical Exam:   GENERAL: NAD  HEENT: eomi, perrla ncat   RESP: Clear to auscultation bilaterally; No rales, rhonchi, wheezing, or rubs  CVS: Tachycardic, normal S1/S2; No murmurs, rubs, or gallops  GI: Large, Soft, Nontender; Bowel sounds present  EXTREMITIES:  2+ pitting edema bilaterally in LE improved, soft distal pulses due to swelling, good motor control of both legs  LYMPH: Positive left inguinal lymphadenopathy   SKIN: Erythema of left lower leg from ankle to the knee with anterior bruising lesions noted, warm to the touch improved significantly   Vascular : b/l DP pulses noted 2+    Labs                                   13.6   5.8   )-----------( 157      ( 06 Nov 2018 08:20 )             38.9     11-06    135  |  97<L>  |  10.0  ----------------------------<  91  4.6   |  28.0  |  0.72    Ca    9.3      06 Nov 2018 08:20        EKG - nsr no acute kris or twi     Repeat LActate: 1.1    MEDICATIONS  (STANDING):  aspirin enteric coated 81 milliGRAM(s) Oral daily  benztropine 1 milliGRAM(s) Oral two times a day  ceFAZolin   IVPB 2000 milliGRAM(s) IV Intermittent every 8 hours  clotrimazole 1% Cream 1 Application(s) Topical two times a day  diVALproex DR 1500 milliGRAM(s) Oral two times a day  docusate sodium 200 milliGRAM(s) Oral two times a day  enoxaparin Injectable 40 milliGRAM(s) SubCutaneous every 24 hours  ergocalciferol 26506 Unit(s) Oral every week  folic acid 1 milliGRAM(s) Oral daily  levothyroxine 50 MICROGram(s) Oral daily  LORazepam     Tablet 0.5 milliGRAM(s) Oral daily  omega-3-Acid Ethyl Esters 1 Gram(s) Oral two times a day  saccharomyces boulardii 250 milliGRAM(s) Oral two times a day  simvastatin 20 milliGRAM(s) Oral at bedtime  vancomycin  IVPB 1250 milliGRAM(s) IV Intermittent every 12 hours  ziprasidone 120 milliGRAM(s) Oral two times a day    MEDICATIONS  (PRN):  acetaminophen   Tablet .. 650 milliGRAM(s) Oral every 6 hours PRN Temp greater or equal to 38C (100.4F)  acetaminophen   Tablet .. 650 milliGRAM(s) Oral every 6 hours PRN Mild Pain (1 - 3), Moderate Pain (4 - 6)  ketorolac   Injectable 15 milliGRAM(s) IV Push once PRN Severe Pain (7 - 10) Patient is a 62y old  Male who presents with a chief complaint of unsteady gait (03 Nov 2018 20:18)    OVERNIGHT EVENTS:  Patient seen and examined at bedside. Pt. back to his base line mentation, swelling in LLE has significantly improve as well as the erythema. Pt. voiding properlly, last BM this morining. Pt. is tolerating PO w/o nausea     ROS: No chest pain, palpitations, sob, light headedness/dizziness, difficulty breathing/cough, fevers/chills, abdominal pain, n/v, diarrhea/constipation, dysuria or increased urinary frequency. All other ROS negative     Vital Signs Last 24 Hrs  T(C): 36.8 (07 Nov 2018 07:40), Max: 36.8 (07 Nov 2018 07:40)  T(F): 98.2 (07 Nov 2018 07:40), Max: 98.2 (07 Nov 2018 07:40)  HR: 68 (07 Nov 2018 07:40) (68 - 81)  BP: 135/79 (07 Nov 2018 07:40) (135/79 - 162/89)  RR: 18 (07 Nov 2018 07:40) (18 - 18)  SpO2: 96% (07 Nov 2018 07:40) (95% - 96%)    Physical Exam:   GENERAL: NAD  HEENT: eomi, perrla ncat   RESP: Clear to auscultation bilaterally; No rales, rhonchi, wheezing, or rubs  CVS: Tachycardic, normal S1/S2; No murmurs, rubs, or gallops  GI: Large, Soft, Nontender; Bowel sounds present  EXTREMITIES:  2+ pitting edema bilaterally in LE improved, soft distal pulses due to swelling, good motor control of both legs  LYMPH: Positive left inguinal lymphadenopathy   SKIN: Erythema of left lower leg from ankle to the knee with anterior bruising lesions noted, warm to the touch improved significantly   Vascular : b/l DP pulses noted 2+    Labs - no new labs   Blood cx x 2 negative                                    13.6   5.8   )-----------( 157      ( 06 Nov 2018 08:20 )             38.9     11-06    135  |  97<L>  |  10.0  ----------------------------<  91  4.6   |  28.0  |  0.72    Ca    9.3      06 Nov 2018 08:20        EKG - nsr no acute kris or twi     Repeat LActate: 1.1    MEDICATIONS  (STANDING):  aspirin enteric coated 81 milliGRAM(s) Oral daily  benztropine 1 milliGRAM(s) Oral two times a day  cephalexin 500 milliGRAM(s) Oral four times a day  clindamycin   Capsule 300 milliGRAM(s) Oral three times a day  clotrimazole 1% Cream 1 Application(s) Topical two times a day  diVALproex DR 1500 milliGRAM(s) Oral two times a day  docusate sodium 200 milliGRAM(s) Oral two times a day  enoxaparin Injectable 40 milliGRAM(s) SubCutaneous every 24 hours  ergocalciferol 81915 Unit(s) Oral every week  folic acid 1 milliGRAM(s) Oral daily  haloperidol    Injectable 0.5 milliGRAM(s) IntraMuscular once  levothyroxine 50 MICROGram(s) Oral daily  LORazepam     Tablet 0.5 milliGRAM(s) Oral daily  omega-3-Acid Ethyl Esters 1 Gram(s) Oral two times a day  saccharomyces boulardii 250 milliGRAM(s) Oral two times a day  senna 1 Tablet(s) Oral at bedtime  simvastatin 20 milliGRAM(s) Oral at bedtime  ziprasidone 120 milliGRAM(s) Oral two times a day    MEDICATIONS  (PRN):  acetaminophen   Tablet .. 650 milliGRAM(s) Oral every 6 hours PRN Temp greater or equal to 38C (100.4F)  acetaminophen   Tablet .. 650 milliGRAM(s) Oral every 6 hours PRN Mild Pain (1 - 3), Moderate Pain (4 - 6)

## 2018-11-07 NOTE — PROGRESS NOTE ADULT - ASSESSMENT
62M w/ PMH of HLD, hypothyroid, schizoaffective disorder & polydipsia sent from [facility] w/ LLE pain & swelling admitted for sepsis 2/2 to E cellulitis  Pt. remains afebrile, no elevated white count, erythema and swelling have improved. Pt switched to PO antibiotics yesterday.        Cellulitis presenting with sepsis   > Elevated WBC at admision, today wnl - clinically improving   -chronic LE edema   > Tylenol 650 mg PO Q6H PRN pain and/or fever,   > Lactate: 2.2, Repeat 1.1  > switched to PO antibiotics Keflex 500mg q6h and Clindamycin 300mg TID  w/ florastor   > Vanc trough 10.8  > Blood and urine cultures negative (11/03/2018)  > Leg elevation   > LLE Doppler negative.   > ID consult noted - switch abx to keflex and clinda     Elevated BP   -x1, will repeat for now     Electrolyte Abnormality  > Euvolemic hyponatremia, resolved likely secondary to polydipsia vs psych medication   > Currently asymptomatic  > Will trend continue to monitor     Tinea Pedis  > B/l plantar surface skin infection  > Clotrimazole cream BID    HLD  > f/u lipid panel   > c/w statins     Hypothyroidism  > stable  > c/w synthroid   > TSH normal    Schizophrenia   > stable  > c/w home meds Ziprasidone Benztropine and Divalproex      Constipation  > c/w docusate   > Last BM yesterday, normal    DVT  > Lovenox 40mg SQ daily    Dispo  > Pt to return to [place] however, PT with 5 steps only - needs to go to [facility] 62M w/ PMH of HLD, hypothyroid, schizoaffective disorder & polydipsia sent from [place] w/ LLE pain & swelling admitted for sepsis 2/2 to E cellulitis  Pt. remains afebrile, no elevated white count, erythema and swelling have improved. Pt switched to PO antibiotics yesterday.        Cellulitis presenting with sepsis   > Elevated WBC at admision, today wnl - clinically improving   -chronic LE edema   > Tylenol 650 mg PO Q6H PRN pain and/or fever,   > Lactate: 2.2, Repeat 1.1  > switched to PO antibiotics Keflex 500mg q6h and Clindamycin 300mg TID  w/ florastor   > Vanc trough 10.8  > Blood and urine cultures negative (11/03/2018)  > Leg elevation   > LLE Doppler negative.   > ID consult noted - switch abx to keflex and clinda     Elevated BP   -x1, will repeat for now     Electrolyte Abnormality  > Euvolemic hyponatremia, resolved likely secondary to polydipsia vs psych medication   > Currently asymptomatic  > Will trend continue to monitor     Tinea Pedis  > B/l plantar surface skin infection  > Clotrimazole cream BID    HLD  > f/u lipid panel   > c/w statins     Hypothyroidism  > stable  > c/w synthroid   > TSH normal    Schizophrenia   > stable  > c/w home meds Ziprasidone Benztropine and Divalproex      Constipation  > c/w docusate   > Last BM yesterday, normal    DVT  > Lovenox 40mg SQ daily    Dispo  > PT walks 5 steps only - needs to go to [facility], pending placement today. 62M w/ PMH of HLD, hypothyroid, schizoaffective disorder & polydipsia sent from [place] w/ LLE pain & swelling admitted for sepsis 2/2 to E cellulitis  Pt. remains afebrile, no elevated white count, erythema and swelling have improved. Pt switched to PO antibiotics yesterday on 11/6. He is planned for rehab discharge. Pending placement     Cellulitis presenting with sepsis   > Elevated WBC at admision, today wnl - clinically improving   -chronic LE edema   > Tylenol 650 mg PO Q6H PRN pain and/or fever,   > Lactate: 2.2, Repeat 1.1  > switched to PO antibiotics Keflex 500mg q6h and Clindamycin 300mg TID  w/ florastor (Day #2, day 5 of all abx)   > Blood and urine cultures negative (11/03/2018)  > Leg elevation   > LLE Doppler negative.   > ID consult noted - switch abx to keflex and clinda     Elevated BP   -x2,  and trending down. If continues to have elevated bp, will start low dose ccb     Electrolyte Abnormality  > Euvolemic hyponatremia, resolved likely secondary to polydipsia vs psych medication     Tinea Pedis  > B/l plantar surface skin infection  > Clotrimazole cream BID    HLD  > c/w statins     Hypothyroidism  > c/w synthroid   > TSH normal    Schizophrenia   > stable  > c/w home meds Ziprasidone Benztropine and Divalproex      Constipation  > c/w docusate   > Last BM yesterday, normal    DVT  > Lovenox 40mg SQ daily    Dispo  > PT walks 5 steps only - needs to go to [facility], pending placement today.

## 2018-11-08 VITALS
SYSTOLIC BLOOD PRESSURE: 135 MMHG | TEMPERATURE: 98 F | RESPIRATION RATE: 18 BRPM | HEART RATE: 70 BPM | DIASTOLIC BLOOD PRESSURE: 77 MMHG | OXYGEN SATURATION: 97 %

## 2018-11-08 LAB
CULTURE RESULTS: SIGNIFICANT CHANGE UP
CULTURE RESULTS: SIGNIFICANT CHANGE UP
SPECIMEN SOURCE: SIGNIFICANT CHANGE UP
SPECIMEN SOURCE: SIGNIFICANT CHANGE UP

## 2018-11-08 PROCEDURE — 97163 PT EVAL HIGH COMPLEX 45 MIN: CPT

## 2018-11-08 PROCEDURE — 87798 DETECT AGENT NOS DNA AMP: CPT

## 2018-11-08 PROCEDURE — 99232 SBSQ HOSP IP/OBS MODERATE 35: CPT | Mod: GC

## 2018-11-08 PROCEDURE — 80048 BASIC METABOLIC PNL TOTAL CA: CPT

## 2018-11-08 PROCEDURE — 87086 URINE CULTURE/COLONY COUNT: CPT

## 2018-11-08 PROCEDURE — 97110 THERAPEUTIC EXERCISES: CPT

## 2018-11-08 PROCEDURE — 80053 COMPREHEN METABOLIC PANEL: CPT

## 2018-11-08 PROCEDURE — 97116 GAIT TRAINING THERAPY: CPT

## 2018-11-08 PROCEDURE — 83735 ASSAY OF MAGNESIUM: CPT

## 2018-11-08 PROCEDURE — 84100 ASSAY OF PHOSPHORUS: CPT

## 2018-11-08 PROCEDURE — 96374 THER/PROPH/DIAG INJ IV PUSH: CPT

## 2018-11-08 PROCEDURE — 87040 BLOOD CULTURE FOR BACTERIA: CPT

## 2018-11-08 PROCEDURE — 80202 ASSAY OF VANCOMYCIN: CPT

## 2018-11-08 PROCEDURE — 80061 LIPID PANEL: CPT

## 2018-11-08 PROCEDURE — 93005 ELECTROCARDIOGRAM TRACING: CPT

## 2018-11-08 PROCEDURE — 85027 COMPLETE CBC AUTOMATED: CPT

## 2018-11-08 PROCEDURE — 85730 THROMBOPLASTIN TIME PARTIAL: CPT

## 2018-11-08 PROCEDURE — 36415 COLL VENOUS BLD VENIPUNCTURE: CPT

## 2018-11-08 PROCEDURE — 83605 ASSAY OF LACTIC ACID: CPT

## 2018-11-08 PROCEDURE — 99285 EMERGENCY DEPT VISIT HI MDM: CPT | Mod: 25

## 2018-11-08 PROCEDURE — 93970 EXTREMITY STUDY: CPT

## 2018-11-08 PROCEDURE — 87486 CHLMYD PNEUM DNA AMP PROBE: CPT

## 2018-11-08 PROCEDURE — 81001 URINALYSIS AUTO W/SCOPE: CPT

## 2018-11-08 PROCEDURE — 87633 RESP VIRUS 12-25 TARGETS: CPT

## 2018-11-08 PROCEDURE — 87581 M.PNEUMON DNA AMP PROBE: CPT

## 2018-11-08 PROCEDURE — 84443 ASSAY THYROID STIM HORMONE: CPT

## 2018-11-08 PROCEDURE — 85610 PROTHROMBIN TIME: CPT

## 2018-11-08 RX ORDER — CEPHALEXIN 500 MG
1 CAPSULE ORAL
Qty: 20 | Refills: 0
Start: 2018-11-08 | End: 2018-11-12

## 2018-11-08 RX ORDER — SACCHAROMYCES BOULARDII 250 MG
1 POWDER IN PACKET (EA) ORAL
Qty: 14 | Refills: 0
Start: 2018-11-08 | End: 2018-11-14

## 2018-11-08 RX ADMIN — ZIPRASIDONE HYDROCHLORIDE 120 MILLIGRAM(S): 20 CAPSULE ORAL at 06:11

## 2018-11-08 RX ADMIN — Medication 200 MILLIGRAM(S): at 06:11

## 2018-11-08 RX ADMIN — Medication 300 MILLIGRAM(S): at 15:06

## 2018-11-08 RX ADMIN — Medication 1 MILLIGRAM(S): at 06:13

## 2018-11-08 RX ADMIN — ENOXAPARIN SODIUM 40 MILLIGRAM(S): 100 INJECTION SUBCUTANEOUS at 06:13

## 2018-11-08 RX ADMIN — Medication 250 MILLIGRAM(S): at 06:11

## 2018-11-08 RX ADMIN — Medication 500 MILLIGRAM(S): at 12:03

## 2018-11-08 RX ADMIN — DIVALPROEX SODIUM 1500 MILLIGRAM(S): 500 TABLET, DELAYED RELEASE ORAL at 06:11

## 2018-11-08 RX ADMIN — Medication 300 MILLIGRAM(S): at 06:11

## 2018-11-08 RX ADMIN — Medication 1 APPLICATION(S): at 06:13

## 2018-11-08 RX ADMIN — Medication 81 MILLIGRAM(S): at 12:03

## 2018-11-08 RX ADMIN — Medication 50 MICROGRAM(S): at 06:11

## 2018-11-08 RX ADMIN — Medication 500 MILLIGRAM(S): at 06:11

## 2018-11-08 RX ADMIN — Medication 1 GRAM(S): at 06:11

## 2018-11-08 RX ADMIN — Medication 0.5 MILLIGRAM(S): at 12:03

## 2018-11-08 RX ADMIN — Medication 1 MILLIGRAM(S): at 12:03

## 2018-11-08 NOTE — PROGRESS NOTE ADULT - SUBJECTIVE AND OBJECTIVE BOX
Patient is a 62y old  Male who presents with a chief complaint of unsteady gait (03 Nov 2018 20:18)    OVERNIGHT EVENTS:  Patient seen and examined at bedside. Pt. back to his base line mentation. He is pleasant today with no complaints and wants to go home.     ROS: No chest pain, palpitations, sob, light headedness/dizziness, difficulty breathing/cough, fevers/chills, abdominal pain, n/v, diarrhea/constipation, dysuria or increased urinary frequency. All other ROS negative     Vital Signs Last 24 Hrs  T(C): 36.8 (08 Nov 2018 07:29), Max: 36.9 (07 Nov 2018 16:01)  T(F): 98.2 (08 Nov 2018 07:29), Max: 98.5 (07 Nov 2018 16:01)  HR: 70 (08 Nov 2018 07:29) (62 - 78)  BP: 135/77 (08 Nov 2018 07:29) (134/78 - 139/74)  RR: 18 (08 Nov 2018 07:29) (18 - 18)  SpO2: 97% (08 Nov 2018 07:29) (97% - 98%)    Physical Exam:   GENERAL: NAD  HEENT: eomi, perrla ncat   RESP: Clear to auscultation bilaterally; No rales, rhonchi, wheezing, or rubs  CVS: Tachycardic, normal S1/S2; No murmurs, rubs, or gallops  GI: Large, Soft, Nontender; Bowel sounds present  EXTREMITIES:  2+ pitting edema bilaterally in LE improved, soft distal pulses due to swelling, good motor control of both legs  LYMPH: Positive left inguinal lymphadenopathy   SKIN: Erythema of left lower leg from ankle to the knee with anterior bruising lesions noted, warm to the touch improved significantly   Vascular : b/l DP pulses noted 2+    Labs - no new labs   Blood cx x 2 negative   urine cx negative                                    13.6   5.8   )-----------( 157      ( 06 Nov 2018 08:20 )             38.9     11-06    135  |  97<L>  |  10.0  ----------------------------<  91  4.6   |  28.0  |  0.72    Ca    9.3      06 Nov 2018 08:20        EKG - nsr no acute kris or twi     Repeat LActate: 1.1    MEDICATIONS  (STANDING):  aspirin enteric coated 81 milliGRAM(s) Oral daily  benztropine 1 milliGRAM(s) Oral two times a day  cephalexin 500 milliGRAM(s) Oral four times a day  clindamycin   Capsule 300 milliGRAM(s) Oral three times a day  clotrimazole 1% Cream 1 Application(s) Topical two times a day  diVALproex DR 1500 milliGRAM(s) Oral two times a day  docusate sodium 200 milliGRAM(s) Oral two times a day  enoxaparin Injectable 40 milliGRAM(s) SubCutaneous every 24 hours  ergocalciferol 91623 Unit(s) Oral every week  folic acid 1 milliGRAM(s) Oral daily  haloperidol    Injectable 0.5 milliGRAM(s) IntraMuscular once  levothyroxine 50 MICROGram(s) Oral daily  LORazepam     Tablet 0.5 milliGRAM(s) Oral daily  omega-3-Acid Ethyl Esters 1 Gram(s) Oral two times a day  saccharomyces boulardii 250 milliGRAM(s) Oral two times a day  simvastatin 20 milliGRAM(s) Oral at bedtime  ziprasidone 120 milliGRAM(s) Oral two times a day    MEDICATIONS  (PRN):  acetaminophen   Tablet .. 650 milliGRAM(s) Oral every 6 hours PRN Temp greater or equal to 38C (100.4F)  acetaminophen   Tablet .. 650 milliGRAM(s) Oral every 6 hours PRN Mild Pain (1 - 3), Moderate Pain (4 - 6)

## 2018-11-08 NOTE — CHART NOTE - NSCHARTNOTEFT_GEN_A_CORE
62M w/ PMH of HLD, hypothyroid, schizoaffective disorder & polydipsia sent from [place] w/ E pain & swelling admitted for sepsis 2/2 to [facility] cellulitis  Pt. remains afebrile, hemodynamically stable, no elevated white count, erythema and swelling have considerably improved. Pt switched to PO antibiotics 11/6. Planned for discharge to rehab facility. Pending placement.

## 2018-11-08 NOTE — PROGRESS NOTE ADULT - ATTENDING COMMENTS
Note addended where needed. Plan discussed with patient and care team. ALC
Note addended where needed. Plan discussed with patient at bedside with nurse
Note addended where needed. Plan discussed with patient at bedside. Pending Placement to rehab
Note addended where needed. Plan discussed with patient and CM
Note addended where needed. Plan discussed with patient at bedside with resident team. c/w 1;1

## 2018-11-08 NOTE — PROGRESS NOTE ADULT - ASSESSMENT
62M w/ PMH of HLD, hypothyroid, schizoaffective disorder & polydipsia sent from [place] w/ LLE pain & swelling admitted for sepsis 2/2 to [facility] cellulitis  Pt. remains afebrile, no elevated white count, erythema and swelling have improved. Pt switched to PO antibiotics yesterday on 11/6. He is planned for rehab discharge. Pending placement     Cellulitis presenting with sepsis   > Elevated WBC at admision, today wnl - clinically improving   -chronic LE edema   > Tylenol 650 mg PO Q6H PRN pain and/or fever,   > Lactate: 2.2, Repeat 1.1  > switched to PO antibiotics Keflex 500mg q6h and Clindamycin 300mg TID  w/ florastor (Day #3, day 5 of all abx)   > Blood and urine cultures negative (11/03/2018)  > ID consult noted - switch abx to keflex and clinda     Elevated BP   -x2,  and trending down. If continues to have elevated bp, will start low dose ccb     Electrolyte Abnormality  > Euvolemic hyponatremia, resolved likely secondary to polydipsia vs psych medication     Tinea Pedis  > B/l plantar surface skin infection  > Clotrimazole cream BID    HLD  > c/w statins     Hypothyroidism  > c/w synthroid   > TSH normal    Schizophrenia   > stable  > c/w home meds Ziprasidone Benztropine and Divalproex      Constipation  > c/w docusate   -s/p BM 2 days ago     DVT  > Lovenox 40mg SQ daily    Dispo  > PT walks 5 steps only - needs to go to [facility], pending placement today.

## 2019-07-30 ENCOUNTER — EMERGENCY (EMERGENCY)
Facility: HOSPITAL | Age: 64
LOS: 1 days | Discharge: HOME | End: 2019-07-30
Attending: EMERGENCY MEDICINE
Payer: MEDICAID

## 2019-07-30 VITALS
OXYGEN SATURATION: 96 % | HEART RATE: 69 BPM | SYSTOLIC BLOOD PRESSURE: 146 MMHG | DIASTOLIC BLOOD PRESSURE: 76 MMHG | RESPIRATION RATE: 18 BRPM | TEMPERATURE: 96 F

## 2019-07-30 VITALS
DIASTOLIC BLOOD PRESSURE: 65 MMHG | HEART RATE: 78 BPM | SYSTOLIC BLOOD PRESSURE: 132 MMHG | TEMPERATURE: 98 F | OXYGEN SATURATION: 98 % | RESPIRATION RATE: 16 BRPM

## 2019-07-30 PROCEDURE — 99284 EMERGENCY DEPT VISIT MOD MDM: CPT

## 2019-07-30 PROCEDURE — 73130 X-RAY EXAM OF HAND: CPT

## 2019-07-30 PROCEDURE — 93971 EXTREMITY STUDY: CPT

## 2019-07-30 PROCEDURE — 73130 X-RAY EXAM OF HAND: CPT | Mod: 26,LT

## 2019-07-30 PROCEDURE — 93971 EXTREMITY STUDY: CPT | Mod: 26,LT

## 2019-07-30 RX ORDER — ACETAMINOPHEN 500 MG
650 TABLET ORAL ONCE
Refills: 0 | Status: COMPLETED | OUTPATIENT
Start: 2019-07-30 | End: 2019-07-30

## 2019-07-30 RX ADMIN — Medication 650 MILLIGRAM(S): at 12:56

## 2019-07-30 NOTE — ED PROVIDER NOTE - CLINICAL SUMMARY MEDICAL DECISION MAKING FREE TEXT BOX
63y M w/ schizophrenia, presenting from [facility] for evaluation of L leg ecchymosis/pain/swelling and chronic L hand swelling.  Pt has been ambulatory as per [facility] staff and is unlikely to have lower extremity fracture; ecchymosis may be due to ruptured muscle.  Will obtain venous doppler LLE to r/o DVT, obtain X-ray L hand, treat with Tylenol.
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
## 2019-07-30 NOTE — ED PROVIDER NOTE - ATTENDING CONTRIBUTION TO CARE
I personally saw the patient with the resident, and completed the key components of the history and physical exam. I then discussed the management plan with the resident.    62 y/o M with schizophrenia who lives at a [facility] on [address] presents for evaluation of left lower extremity bruising x 1 week as well as intermittent left hand swelling. Per [facility] manager, patient reported falling into bed, but unclear of etiology of the ecchymosis, but worsening swelling of the left lower leg. Patient has no complaints.     Exam: NAD, baseline mental status, poor dentition, RRR, lungs CTA B/L, abd soft, NT/ND, left leg with tense ecchymosis on the posterior aspect streaking down to the posterior calf, with 4+  pitting edema to the left leg, normal ROM of the left ankle and foot, 2+ distal pulses, decreased ROM of the left knee 2/2 pain.    Will obtain US to r/o DVT of the left lower extremity and xray left hand as [facility] has been trying to get it XR'd for a long time, tylenol for pain and reassess. Patient has been ambulating steadily.
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
## 2019-07-30 NOTE — ED PROVIDER NOTE - NS ED ROS FT
Constitutional: no fever, no chills  Eyes: no vision changes  ENT: no nasal congestion  CV: no chest pain  Resp: no shortness of breath  GI: no abdominal pain  : no dysuria  MSK: +L hand swelling, +L leg pain  Skin: no rash  Neuro: no headache

## 2019-07-30 NOTE — ED PROVIDER NOTE - PHYSICAL EXAMINATION
Constitutional: Awake, alert, in no acute distress, obese  Eyes: no scleral icterus  HENT: moist oral mucosa  CV: RRR, no murmur  Pulm: non-labored respirations, CTAB  Abdomen: soft, non-tender, non-distended  Extremities: Extensive, healing ecchymosis over L anterior, medial and posterior thigh/knee, +mild diffuse L calf tenderness, no ankle tenderness or swelling.  B/l 2-3+ pitting edema lower extremities, L > R; 2+ distal pulses.  +Significant swelling over dorsum of L hand, +mild diffuse tenderness.  Skin: no jaundice  Neuro: moving all extremities equally  Psych: cooperative
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
## 2019-07-30 NOTE — CHART NOTE - NSCHARTNOTEFT_GEN_A_CORE
Social work note:  arranged medicaid taxi through [employer]. Spoke to Lucretia. Reservation # for this trip is [id number]. Taxi [employer] is bringing patient to [facility] [address] Worker called [facility] and spoke to Alisson ([phone]) who confirmd pt can return in a medicaid taxi; asked worker to fax d/c paperwork to [phone]. RN to be notified. Pt must wait in ER lobby.  time is 5pm.
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
## 2019-07-30 NOTE — ED ADULT NURSE NOTE - CHPI ED NUR SYMPTOMS NEG
no fever/no numbness/no difficulty bearing weight/no abrasion/no stiffness/no back pain/no weakness/no tingling/no bruising/no deformity

## 2019-08-08 ENCOUNTER — INPATIENT (INPATIENT)
Facility: HOSPITAL | Age: 64
LOS: 11 days | Discharge: HOME | DRG: 605 | End: 2019-08-20
Attending: HOSPITALIST | Admitting: FAMILY MEDICINE
Payer: MEDICAID

## 2019-08-08 VITALS
WEIGHT: 199.96 LBS | HEART RATE: 68 BPM | TEMPERATURE: 97 F | OXYGEN SATURATION: 98 % | DIASTOLIC BLOOD PRESSURE: 81 MMHG | SYSTOLIC BLOOD PRESSURE: 132 MMHG | RESPIRATION RATE: 18 BRPM | HEIGHT: 64 IN

## 2019-08-08 DIAGNOSIS — M79.605 PAIN IN LEFT LEG: ICD-10-CM

## 2019-08-08 LAB
AMPHET UR-MCNC: NEGATIVE — SIGNIFICANT CHANGE UP
APPEARANCE UR: CLEAR — SIGNIFICANT CHANGE UP
BACTERIA # UR AUTO: ABNORMAL
BARBITURATES UR SCN-MCNC: NEGATIVE — SIGNIFICANT CHANGE UP
BENZODIAZ UR-MCNC: NEGATIVE — SIGNIFICANT CHANGE UP
BILIRUB UR-MCNC: NEGATIVE — SIGNIFICANT CHANGE UP
COCAINE METAB.OTHER UR-MCNC: NEGATIVE — SIGNIFICANT CHANGE UP
COLOR SPEC: YELLOW — SIGNIFICANT CHANGE UP
DIFF PNL FLD: ABNORMAL
EPI CELLS # UR: SIGNIFICANT CHANGE UP
GLUCOSE UR QL: NEGATIVE MG/DL — SIGNIFICANT CHANGE UP
KETONES UR-MCNC: NEGATIVE — SIGNIFICANT CHANGE UP
LEUKOCYTE ESTERASE UR-ACNC: NEGATIVE — SIGNIFICANT CHANGE UP
METHADONE UR-MCNC: NEGATIVE — SIGNIFICANT CHANGE UP
NITRITE UR-MCNC: NEGATIVE — SIGNIFICANT CHANGE UP
OPIATES UR-MCNC: NEGATIVE — SIGNIFICANT CHANGE UP
PCP SPEC-MCNC: SIGNIFICANT CHANGE UP
PCP UR-MCNC: NEGATIVE — SIGNIFICANT CHANGE UP
PH UR: 8 — SIGNIFICANT CHANGE UP (ref 5–8)
PROT UR-MCNC: NEGATIVE MG/DL — SIGNIFICANT CHANGE UP
RBC CASTS # UR COMP ASSIST: SIGNIFICANT CHANGE UP /HPF (ref 0–4)
SP GR SPEC: 1.01 — SIGNIFICANT CHANGE UP (ref 1.01–1.02)
THC UR QL: NEGATIVE — SIGNIFICANT CHANGE UP
UROBILINOGEN FLD QL: NEGATIVE MG/DL — SIGNIFICANT CHANGE UP
WBC UR QL: SIGNIFICANT CHANGE UP

## 2019-08-08 PROCEDURE — 70450 CT HEAD/BRAIN W/O DYE: CPT | Mod: 26

## 2019-08-08 PROCEDURE — 73562 X-RAY EXAM OF KNEE 3: CPT | Mod: 26,LT

## 2019-08-08 PROCEDURE — 71045 X-RAY EXAM CHEST 1 VIEW: CPT | Mod: 26

## 2019-08-08 PROCEDURE — 99285 EMERGENCY DEPT VISIT HI MDM: CPT

## 2019-08-08 RX ORDER — DIVALPROEX SODIUM 500 MG/1
1500 TABLET, DELAYED RELEASE ORAL
Refills: 0 | Status: DISCONTINUED | OUTPATIENT
Start: 2019-08-08 | End: 2019-08-20

## 2019-08-08 RX ORDER — DOCUSATE SODIUM 100 MG
200 CAPSULE ORAL
Refills: 0 | Status: DISCONTINUED | OUTPATIENT
Start: 2019-08-08 | End: 2019-08-20

## 2019-08-08 RX ORDER — ZIPRASIDONE HYDROCHLORIDE 20 MG/1
40 CAPSULE ORAL
Refills: 0 | Status: DISCONTINUED | OUTPATIENT
Start: 2019-08-08 | End: 2019-08-20

## 2019-08-08 RX ORDER — ASPIRIN/CALCIUM CARB/MAGNESIUM 324 MG
81 TABLET ORAL DAILY
Refills: 0 | Status: DISCONTINUED | OUTPATIENT
Start: 2019-08-08 | End: 2019-08-20

## 2019-08-08 RX ORDER — FOLIC ACID 0.8 MG
1 TABLET ORAL DAILY
Refills: 0 | Status: DISCONTINUED | OUTPATIENT
Start: 2019-08-08 | End: 2019-08-20

## 2019-08-08 RX ORDER — LEVOTHYROXINE SODIUM 125 MCG
50 TABLET ORAL DAILY
Refills: 0 | Status: DISCONTINUED | OUTPATIENT
Start: 2019-08-08 | End: 2019-08-20

## 2019-08-08 RX ORDER — FUROSEMIDE 40 MG
20 TABLET ORAL DAILY
Refills: 0 | Status: DISCONTINUED | OUTPATIENT
Start: 2019-08-08 | End: 2019-08-20

## 2019-08-08 RX ORDER — ZIPRASIDONE HYDROCHLORIDE 20 MG/1
80 CAPSULE ORAL
Refills: 0 | Status: DISCONTINUED | OUTPATIENT
Start: 2019-08-08 | End: 2019-08-20

## 2019-08-08 RX ORDER — BENZTROPINE MESYLATE 1 MG
1 TABLET ORAL
Refills: 0 | Status: DISCONTINUED | OUTPATIENT
Start: 2019-08-08 | End: 2019-08-20

## 2019-08-08 RX ORDER — SIMVASTATIN 20 MG/1
20 TABLET, FILM COATED ORAL AT BEDTIME
Refills: 0 | Status: DISCONTINUED | OUTPATIENT
Start: 2019-08-08 | End: 2019-08-09

## 2019-08-08 NOTE — PHYSICAL THERAPY INITIAL EVALUATION ADULT - IMPAIRMENTS FOUND, PT EVAL
ROM/arousal, attention, and cognition/muscle strength/poor safety awareness/posture/gait, locomotion, and balance/cognitive impairment

## 2019-08-08 NOTE — ED ADULT NURSE NOTE - OBJECTIVE STATEMENT
pt biba c/o left LE edema and left hand swelling that has been present for the past 3 weeks pt  went to his PMD who thought that he was very lethargic and was concerned for stroke, Sheila (Manager) at [facility] who knows him well, notes that he alternated between feisty and lethargic and this is normal behavior for him, especially because he frequently stays up all night smoking and then sleeps all day, but was unable to do that today because he had a doctor's appointment. Manager Sheila states that he was feisty yesterday, is unsure if he was up all night last night, but did not seem to think he was acting abnormally for himself today. He has been requiring assistance with ADL's, such as getting dressed and ambulating due to the tense edema of his left leg and says "ow" when walking at times, but his gait is not difference than usual. He does take 0.5 mg of lorazepam every morning and did have his dose today. He is on antibiotics for cellulitis of the left leg,     HIV:

## 2019-08-08 NOTE — H&P ADULT - NSICDXFAMILYHX_GEN_ALL_CORE_FT
FAMILY HISTORY:  No pertinent family history in first degree relatives, pt. not giving good family  history. has h/o schizophrenia .

## 2019-08-08 NOTE — ED ADULT NURSE REASSESSMENT NOTE - NS ED NURSE REASSESS COMMENT FT1
Received report from JENNIFER Salcido. As per raghav, pt baseline mental status, breathing equal and unlabored, color good. Pt has no medical complaints at this time. POC explained and verbalized understanding.  Pt awaiting admit bed.

## 2019-08-08 NOTE — ED ADULT NURSE NOTE - CHIEF COMPLAINT QUOTE
From [facility] with aide.  Sent from [facility] for swelling of left leg and left hand X 2 weeks.   Has been to ER and multiple doctors over the past two  weeks.  Paperwork from MD office today states "change in mental status and gait possible CVA."  This RN spoke with EDGARDO Persaud who states unknown onset of change in mental status/lethargy definitely more than 24 hours.

## 2019-08-08 NOTE — PHYSICAL THERAPY INITIAL EVALUATION ADULT - ADDITIONAL COMMENTS
Pt lives in a group home on [address] property. Pt states there are stairs but unclear if it is necessary for pt to navigate them or how many are needed. Per MD note, Pt was ambulatory PTA. Pt state he does not  have a RW or other DME.

## 2019-08-08 NOTE — PHYSICAL THERAPY INITIAL EVALUATION ADULT - RANGE OF MOTION EXAMINATION, REHAB EVAL
LLE knee and hip ROM severely limited 2*2 edema and c/o pain with PROM of knee and abduction of hip.

## 2019-08-08 NOTE — ED STATDOCS - NS ED ROS FT
Constitutional: (-) fever  (-)chills  (-)sweats  Eyes/ENT: (-) blurry vision, (-) epistaxis  (-)rhinorrhea   (-) sore throat    Cardiovascular: (-) chest pain, (-) palpitations (-) edema   Respiratory: (-) cough, (-) shortness of breath   Gastrointestinal: (-)nausea  (-)vomiting, (-) diarrhea  (-) abdominal pain   :  (-)dysuria, (-)frequency, (-)urgency, (-)hematuria  Musculoskeletal: (-) neck pain, (-) back pain, (+) left knee swelling and pain, (+) left hand swelling and pain  Integumentary: (-) rash, (-) edema  Neurological: (-) headache, (-) altered mental status  (-)LOC

## 2019-08-08 NOTE — ED PROVIDER NOTE - PHYSICAL EXAMINATION
Const: Awake, somnolent, but arousable to voice. In no acute distress. Well appearing.  HEENT: NC/AT. Moist mucous membranes.  Eyes: No scleral icterus. EOMI.  Neck:. Soft and supple. Full ROM without pain.  Cardiac: Regular rate and regular rhythm. +S1/S2. No murmurs. Peripheral pulses 2+ and symmetric. Tense edema of the left lower extremity to the hip.  Resp: Speaking in full sentences. No evidence of respiratory distress. Coarse breath sounds diffusely  Abd: Soft, non-tender, non-distended. Normal bowel sounds in all 4 quadrants. No guarding or rebound.  Back: Spine midline and non-tender. No CVAT.  Skin: Erythema of the left lower extremity and hand. Ecchymosis in advanced stage of healing over left posterior upper leg.  MSK: Decreased ROM of the left knee, left LE held in external rotation. Left hand edematous with normal ROM.  Neuro: Awake, somnolent, but awake. Garbled speech (baseline), withdraws to pain symmetrically. Moves all extremities.

## 2019-08-08 NOTE — H&P ADULT - NSHPPHYSICALEXAM_GEN_ALL_CORE
General: Well developed. male sitting up in bed not in distress.   HEENT: AT, NC. PERRL. intact EOM. no throat erythema or exudate.   Neck: supple. no JVD.  Chest: CTA bilaterally  Heart: S1,S2. RRR. no heart murmur.  Abdomen: soft. non-tender. obese + BS.   Ext: pt. reports pain around knee , thigh and lower leg. not co-operative with lower ext. good exam with ROM. + DP, no warmth or erythema over LLE  noted. refusing ct scan of LLE.   Neuro: Alert, awake, no focal weakness. motor /sensory intact.   psych : no agitation, no si/hi, speech at times is pressured type. General: Well developed. male sitting up in bed not in distress.   HEENT: AT, NC. PERRL. intact EOM. no throat erythema or exudate.   Neck: supple. no JVD.  Chest: CTA bilaterally  Heart: S1,S2. RRR. no heart murmur. 1 + edema of b/l lower ext . noted.   Abdomen: soft. non-tender. obese + BS.   Ext: pt. reports pain around knee , thigh and lower leg. not co-operative with lower ext. good exam with ROM. + DP, no warmth or erythema over LLE  noted. refusing ct scan of LLE.   Neuro: Alert, awake, no focal weakness. motor /sensory intact.   psych : no agitation, no si/hi, speech at times is pressured type.

## 2019-08-08 NOTE — PHYSICAL THERAPY INITIAL EVALUATION ADULT - PERTINENT HX OF CURRENT PROBLEM, REHAB EVAL
62 y/o M with PMH schizophrenia, hypothyrodism and hyperlipidemia for left LE edema and left hand swelling that has been present for the past 3 weeks (possible fall at that time?). Today Pt went to his PMD who thought that he was very lethargic and was concerned for CVA. MD sent pt to ED for workup.

## 2019-08-08 NOTE — PROVIDER CONTACT NOTE (OTHER) - BACKGROUND
64 y/o M PMH schizophrenia, hypothyrodism and hyperlipidemia for LLE edema and LUE swelling for 3 weeks. (? fall 3 weeks ago) Of note: + ecchymosis noted on left inner thigh with 4+ Edema in LLE.

## 2019-08-08 NOTE — H&P ADULT - HISTORY OF PRESENT ILLNESS
62 y/o male with pmh h/o schizophrenia , hypothyroidism and hyperlipidemia  was sent in for left lower ext pain/ swelling which pt. has c/o for past 3 weeks. pt. was seen at [facility] ER about 1 week ago as well and had negative work up and was sent back. pt's ct head was done as his pcp thought that he was a little slow than normal. ct head is negative. mmended walker and back to his residence or subacute rehab. Pt's residence was contacted by ER physician and they cannot take him back as he will need more help for ADLS due to his current lower ext. complaint. now pt. is admitted for sub acute rehab placement. pt. is not a good historian and does not want any CT scans of his LLE. pt. can not tell what happened to his lt. lower ext. pt. keep on asking for coffee and ginger ale. 62 y/o male with pmh h/o schizophrenia , hypothyroidism and hyperlipidemia  was sent in for left lower ext pain/ swelling which pt. has c/o for past 3 weeks. pt. was seen at [facility] ER about 1 week ago as well and had negative work up and was sent back. Today pt's ct head was done as his pcp thought that he was a little slow than normal. ct head is negative. PT recommended walker and subacute rehab. Pt's residence was contacted by ER physician to see if they cannot take him back but was not accepted as he will need more help for ADLS due to his current lower ext. complaint. now pt. is admitted for sub acute rehab placement. pt. is not a good historian and does not want any CT scans of his LLE. pt. can not tell what happened to his lt. lower ext. pt. keep on asking for coffee and ginger ale. does not want any needles or blood work. 62 y/o male with pmh h/o schizophrenia , hypothyroidism and hyperlipidemia  was sent in for left lower ext pain/ swelling which pt. has c/o for past 3 weeks. pt. was seen at [facility] ER about 1 week ago as well and had negative work up and was sent back. Today pt's ct head was done as his pcp thought that he was a little slow than normal. ct head is negative. PT recommended walker and subacute rehab. Pt's residence was contacted by ER physician to see if they cannot take him back but was not accepted as he will need more help for ADLS due to his current lower ext. complaint. now pt. is admitted for sub acute rehab placement. As per ER physician she spoke to the staff at his residence and they told that pt. is sometimes a little slow and other times is hyper and that is normal for him. pt. is not a good historian and does not want any CT scans of his LLE. pt. can not tell what happened to his lt. lower ext. pt. keep on asking for coffee and ginger ale. does not want any needles or blood work. 62 y/o male with pmh h/o schizophrenia , hypothyroidism and hyperlipidemia  was sent in for left lower ext pain/ swelling which pt. has c/o for past 3 weeks. pt. was seen at [facility] ER about 1 week ago as well and had negative work up and was sent back. Today pt's ct head was done as his pcp thought that he was a little slow than normal. ct head is negative. PT recommended walker and subacute rehab. Pt's residence was contacted by ER physician to see if they cannot take him back but was not accepted as he will need more help for ADLS due to his current lower ext. complaint. now pt. is admitted for sub acute rehab placement. As per ER physician she spoke to the staff at his residence and they told that pt. is sometimes a little slow and other times is hyper and that is normal for him. pt. is not a good historian and does not want any CT scans of his LLE. pt. can not tell what happened to his lt. lower ext. pt. keep on asking for coffee and ginger ale. does not want any needles or blood work.   As per his med record he is on haldol im 400 mg q hr weeks and last dose was on 7/24/19. pt. was treated with keflex by his pcp at facility for possible leg cellulitis ? about few pills are left. clinically no signs of cellulitis from the extent of exam pt. let me to have. 62 y/o male with pmh h/o schizophrenia , hypothyroidism and hyperlipidemia  was sent in for left lower ext pain/ swelling which pt. has c/o for past 3 weeks. pt. was seen at [facility] ER about 1 week ago as well and had negative work up and was sent back. Today pt's ct head was done as his pcp thought that he was a little slow than normal. ct head is negative. PT recommended walker and subacute rehab. Pt's residence was contacted by ER physician to see if they cannot take him back but was not accepted as he will need more help for ADLS due to his current lower ext. complaint. now pt. is admitted for sub acute rehab placement. As per ER physician she spoke to the staff at his residence and they told that pt. is sometimes a little slow and other times is hyper and that is normal for him. pt. is not a good historian and does not want any CT scans or any studies of his LLE. pt. can not tell what happened to his lt. lower ext. pt. keep on asking for coffee and ginger ale. does not want any needles or blood work.   As per his med record he is on haldol im 400 mg q hr weeks and last dose was on 7/24/19. pt. was treated with keflex by his pcp at facility for possible leg cellulitis ? about few pills are left. clinically no signs of cellulitis from the extent of exam pt. let me to have. 64 y/o male with pmh h/o schizophrenia , hypothyroidism and hyperlipidemia  was sent in for left lower ext pain/ swelling which pt. has c/o for past 3 weeks. pt. was seen at [facility] ER about 1 week ago as well and had negative work up and was sent back. Today pt's ct head was done as his pcp thought that he was a little slow than normal. ct head is negative. PT recommended walker and subacute rehab. Pt's residence was contacted by ER physician to see if they cannot take him back but was not accepted as he will need more help for ADLS due to his current lower ext. complaint. now pt. is admitted for sub acute rehab placement. As per ER physician she spoke to the staff at his residence and they told that pt. is sometimes a little slow and other times is hyper and that is normal for him. pt. is not a good historian and does not want any CT scans or any studies of his LLE. pt. can not tell what happened to his lt. lower ext. pt. keep on asking for coffee and ginger ale. does not want any needles or blood work.   As per his med record he is on haldol im 400 mg q 4 weeks and last dose was on 7/24/19. pt. was treated with keflex by his pcp at facility for possible leg cellulitis ? about few pills are left. clinically no signs of cellulitis from the extent of exam pt. let me to have.

## 2019-08-08 NOTE — PHYSICAL THERAPY INITIAL EVALUATION ADULT - LEVEL OF INDEPENDENCE: STAIR NEGOTIATION, REHAB EVAL
unable to perform/Pt unable to flex left or right hips in standing, Unsafe to attempt stair assessment.

## 2019-08-08 NOTE — PHYSICAL THERAPY INITIAL EVALUATION ADULT - GAIT DEVIATIONS NOTED, PT EVAL
decreased step length/decreased stride length/decreased edgar/decreased weight-shifting ability/increased time in double stance

## 2019-08-08 NOTE — ED ADULT NURSE REASSESSMENT NOTE - NS ED NURSE REASSESS COMMENT FT1
pt continues to pull off his gown and expose himself, despite encouragement to remain covered up. pt refused dinner tray, drank a total of 5 coffee's , 3 ginger ales, and cup of water. pt awaiting dispo back to [facility].

## 2019-08-08 NOTE — PHYSICAL THERAPY INITIAL EVALUATION ADULT - GENERAL OBSERVATIONS, REHAB EVAL
Pt received supine in bed, c/o 0/10 pain except with mobility pt demonstrating non verbal indications of pain, PT is A&O x2, agreeable to PT

## 2019-08-08 NOTE — PHYSICAL THERAPY INITIAL EVALUATION ADULT - CRITERIA FOR SKILLED THERAPEUTIC INTERVENTIONS
anticipated discharge recommendation/anticipated equipment needs at discharge/JUANA/impairments found

## 2019-08-08 NOTE — ED STATDOCS - OBJECTIVE STATEMENT
63y/oM; PMHx signif for schizophrenia, hypothyroidism; presents to the ED with [place] aide c/o left leg and left hand swelling and weakness x2weeks. Per aide, pt sent from [facility].

## 2019-08-08 NOTE — H&P ADULT - ASSESSMENT
pt. is admitted for     left lower ext. pain. i will order venous doppler of LLE to reassess for dvt. july 30th , 2019 it was negative. we will see if pt. let tech to do exam. refusing ct scan of LLE. will add po lasix 20 mg daily for lower ext. edema.     Schizophrenia, unspecified type, continue his home meds as he uses.     Hypothyroidism unspecified type, continue synthroid.     SW consult for JUANA. pt. is admitted for     left lower ext. pain. i will order venous doppler of LLE to reassess for dvt. july 30th , 2019 it was negative. we will see if pt. let tech to do exam. refusing ct scan / studies of LLE. will add po lasix 20 mg daily for lower ext. edema.     Schizophrenia, unspecified type, continue his home meds as he uses.     Hypothyroidism unspecified type, continue synthroid.     DARYL consult for JUANA. pt. is admitted for     left lower ext. pain. i will order venous doppler of LLE to reassess for dvt. july 30th , 2019 it was negative. we will see if pt. let tech to do exam. refusing ct scan / studies of LLE. will add po lasix 20 mg daily for lower ext. edema.     Schizophrenia, unspecified type, continue his home meds as he uses.     Hypothyroidism unspecified type, continue synthroid.     SW consult for JUANA.     cannot put him on lovenox for dvt prophylaxis atthism point as he is refusing lab work. will order blood work for am and request day team to follow on that and if blood work done then use chemical dvt prophylaxis.

## 2019-08-08 NOTE — ED PROVIDER NOTE - OBJECTIVE STATEMENT
64 y/o M with PMH schizophrenia, hypothyrodism and hyperlipidemia for left LE edema and left hand swelling that has been present for the past 3 weeks (I saw him 8 days ago for this problem), today went to his PMD who thought that he was very lethargic and was concerned for CVA - she was unsure when this started. I discussed with Sheila (Manager) at [facility] who knows him well, notes that he alternated between fiesty and lethargic and this is normal behavior for him, especially because he frequently stays up all night smoking and then sleeps all day, but was unable to do that today because he had a doctor's appointment. Manager Sheila states that he was fiesty yesterday, is unsure if he was up all night last night, but did not seem to think he was acting abnormally for himself today. He has been requiring assistance with ADL's, such as getting dressed and ambulating due to the tense edema of his left leg and says "ow" when walking at times, but his gait is not difference than usual. He does take 0.5 mg of lorazapam every morning and did have his dose today. He is on antibiotics for cellulitis of the left leg, has about 3 pills left.

## 2019-08-08 NOTE — PHYSICAL THERAPY INITIAL EVALUATION ADULT - IMPAIRMENTS CONTRIBUTING TO GAIT DEVIATIONS, PT EVAL
impaired balance/pain/impaired postural control/decreased strength/pt ambulating with short shuffled steps, unsteady on turns but pt refuses to allow physical contact to assist. PT maintained close supervision for safety.

## 2019-08-08 NOTE — ED PROVIDER NOTE - PROGRESS NOTE DETAILS
Patient refused XR. I bartered 2 coffees and 2 ginger ales in order for patient to get XR. He agrees.

## 2019-08-08 NOTE — ED STATDOCS - PHYSICAL EXAMINATION
Gen: Alert, NAD  Head: NC, AT, PERRL, EOMI, normal lids/conjunctiva  ENT: B TM WNL, normal hearing, patent oropharynx without erythema/exudate, uvula midline  Neck: +supple, no tenderness/meningismus/JVD, +Trachea midline  Pulm: Bilateral BS, normal resp effort, no wheeze/stridor/retractions  CV: RRR, no M/R/G, +dist pulses  Abd: soft, NT/ND, +BS, no hepatosplenomegaly  Mskel: no edema/erythema/cyanosis  Skin: no rash  Neuro: AAOx3, CN 2-12 intact, LUE drift, LLE drift

## 2019-08-08 NOTE — PROVIDER CONTACT NOTE (OTHER) - ASSESSMENT
Pt requires Min A for transfers and supervision for ambulation with RW. Pt ambulates with short step length bilaterally and shuffled steps with instability more apparent on turns.

## 2019-08-09 LAB
HCV AB S/CO SERPL IA: 0.14 S/CO — SIGNIFICANT CHANGE UP (ref 0–0.99)
HCV AB SERPL-IMP: SIGNIFICANT CHANGE UP

## 2019-08-09 PROCEDURE — 93970 EXTREMITY STUDY: CPT | Mod: 26

## 2019-08-09 PROCEDURE — 99233 SBSQ HOSP IP/OBS HIGH 50: CPT

## 2019-08-09 PROCEDURE — 73700 CT LOWER EXTREMITY W/O DYE: CPT | Mod: 26,LT

## 2019-08-09 PROCEDURE — 99222 1ST HOSP IP/OBS MODERATE 55: CPT

## 2019-08-09 RX ORDER — HEPARIN SODIUM 5000 [USP'U]/ML
5000 INJECTION INTRAVENOUS; SUBCUTANEOUS EVERY 12 HOURS
Refills: 0 | Status: DISCONTINUED | OUTPATIENT
Start: 2019-08-09 | End: 2019-08-12

## 2019-08-09 RX ADMIN — Medication 50 MICROGRAM(S): at 05:22

## 2019-08-09 RX ADMIN — Medication 81 MILLIGRAM(S): at 11:43

## 2019-08-09 RX ADMIN — Medication 20 MILLIGRAM(S): at 05:21

## 2019-08-09 RX ADMIN — Medication 200 MILLIGRAM(S): at 17:30

## 2019-08-09 RX ADMIN — HEPARIN SODIUM 5000 UNIT(S): 5000 INJECTION INTRAVENOUS; SUBCUTANEOUS at 17:30

## 2019-08-09 RX ADMIN — ZIPRASIDONE HYDROCHLORIDE 80 MILLIGRAM(S): 20 CAPSULE ORAL at 05:23

## 2019-08-09 RX ADMIN — Medication 0.5 MILLIGRAM(S): at 11:43

## 2019-08-09 RX ADMIN — Medication 2 MILLIGRAM(S): at 12:33

## 2019-08-09 RX ADMIN — ZIPRASIDONE HYDROCHLORIDE 40 MILLIGRAM(S): 20 CAPSULE ORAL at 17:30

## 2019-08-09 RX ADMIN — ZIPRASIDONE HYDROCHLORIDE 40 MILLIGRAM(S): 20 CAPSULE ORAL at 05:21

## 2019-08-09 RX ADMIN — DIVALPROEX SODIUM 1500 MILLIGRAM(S): 500 TABLET, DELAYED RELEASE ORAL at 05:22

## 2019-08-09 RX ADMIN — Medication 200 MILLIGRAM(S): at 05:23

## 2019-08-09 RX ADMIN — ZIPRASIDONE HYDROCHLORIDE 80 MILLIGRAM(S): 20 CAPSULE ORAL at 17:30

## 2019-08-09 RX ADMIN — Medication 1 MILLIGRAM(S): at 05:22

## 2019-08-09 RX ADMIN — Medication 1 MILLIGRAM(S): at 17:30

## 2019-08-09 RX ADMIN — DIVALPROEX SODIUM 1500 MILLIGRAM(S): 500 TABLET, DELAYED RELEASE ORAL at 17:30

## 2019-08-09 RX ADMIN — Medication 1 MILLIGRAM(S): at 11:43

## 2019-08-09 NOTE — CONSULT NOTE ADULT - ATTENDING COMMENTS
Patient seen and examined.  unclear history of trauma  left medial thing with ecchymosis ands edema, thigh compartments are soft, pain on knee flexion but not out of proportion,   At this current time, this specific patient does not have compartment syndrome/  agree with compression, elevation ands supportive care Patient seen and examined.  unclear history of trauma  left medial thing with ecchymosis ands edema, thigh compartments are soft, pain on knee flexion but not out of proportion,   At this current time, this specific patient does not have compartment syndrome/  hematoma etiology could be as described by radiology form heterotropic calcifications.  agree with compression, elevation ands supportive care
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
## 2019-08-09 NOTE — PROGRESS NOTE ADULT - SUBJECTIVE AND OBJECTIVE BOX
CC: Left lower ext pain, swelling and limitation of movement.  Schizophrenia.   HPI:  64 y/o male with pmh h/o schizophrenia , hypothyroidism and hyperlipidemia  was sent in for left lower ext pain/ swelling which pt. has c/o for past 3 weeks. pt. was seen at [facility] ER about 1 week ago as well and had negative work up and was sent back. Today pt's ct head was done as his pcp thought that he was a little slow than normal. ct head is negative. PT recommended walker and subacute rehab. Pt's residence was contacted by ER physician to see if they cannot take him back but was not accepted as he will need more help for ADLS due to his current lower ext. complaint. now pt. is admitted for sub acute rehab placement. As per ER physician she spoke to the staff at his residence and they told that pt. is sometimes a little slow and other times is hyper and that is normal for him. pt. is not a good historian and does not want any CT scans or any studies of his LLE. pt. can not tell what happened to his lt. lower ext. pt. keep on asking for coffee and ginger ale. does not want any needles or blood work.   As per his med record he is on haldol im 400 mg q 4 weeks and last dose was on 19. pt. was treated with keflex by his pcp at facility for possible leg cellulitis ? about few pills are left. clinically no signs of cellulitis from the extent of exam pt. let me to have. (08 Aug 2019 21:01)    REVIEW OF SYSTEMS:    Patient denied fever, chills, abdominal pain, nausea, vomiting, cough, shortness of breath, chest pain or palpitations    Vital Signs Last 24 Hrs  T(C): 36.7 (09 Aug 2019 00:12), Max: 36.7 (09 Aug 2019 00:12)  T(F): 98 (09 Aug 2019 00:12), Max: 98 (09 Aug 2019 00:12)  HR: 74 (09 Aug 2019 00:12) (66 - 74)  BP: 148/88 (09 Aug 2019 00:12) (120/74 - 148/88)  BP(mean): --  RR: 18 (09 Aug 2019 00:12) (18 - 18)  SpO2: 98% (09 Aug 2019 00:12) (98% - 98%)I&O's Summary    08 Aug 2019 07:01  -  09 Aug 2019 07:00  --------------------------------------------------------  IN: 0 mL / OUT: 3700 mL / NET: -3700 mL      PHYSICAL EXAM:  GENERAL: NAD,   HEENT: PERRL, +EOMI, anicteric, no [place]  NECK: Supple, No JVD   CHEST/LUNG: CTA bilaterally; Normal effort  HEART: S1S2 Normal intensity, no murmurs, gallops or rubs noted  ABDOMEN: Soft, BS Normoactive, NT, ND, no HSM noted  EXTREMITIES:  Left lower ext edema, tenderness on palpation. Motion tenderness. Limitation of movement  SKIN: No rashes or lesions noted  NEURO: Somnolent, no focal deficits noted, CN II-XII intact  PSYCH: Depressed mood and affect; insight/judgement appropriate  LABS:            Urinalysis Basic - ( 08 Aug 2019 18:09 )    Color: Yellow / Appearance: Clear / S.010 / pH: x  Gluc: x / Ketone: Negative  / Bili: Negative / Urobili: Negative mg/dL   Blood: x / Protein: Negative mg/dL / Nitrite: Negative   Leuk Esterase: Negative / RBC: 0-2 /HPF / WBC 0-2   Sq Epi: x / Non Sq Epi: Occasional / Bacteria: Occasional      RADIOLOGY & ADDITIONAL TESTS:    MEDICATIONS:  MEDICATIONS  (STANDING):  aspirin enteric coated 81 milliGRAM(s) Oral daily  benztropine 1 milliGRAM(s) Oral two times a day  diVALproex DR 1500 milliGRAM(s) Oral two times a day  docusate sodium 200 milliGRAM(s) Oral two times a day  folic acid 1 milliGRAM(s) Oral daily  furosemide    Tablet 20 milliGRAM(s) Oral daily  heparin  Injectable 5000 Unit(s) SubCutaneous every 12 hours  levothyroxine 50 MICROGram(s) Oral daily  LORazepam     Tablet 0.5 milliGRAM(s) Oral daily  simvastatin 20 milliGRAM(s) Oral at bedtime  ziprasidone 80 milliGRAM(s) Oral two times a day  ziprasidone 40 milliGRAM(s) Oral two times a day    MEDICATIONS  (PRN):
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
## 2019-08-09 NOTE — PROGRESS NOTE ADULT - ASSESSMENT
left lower ext. pain and swelling   Involve entire limb.  Exquisitely tender on palpation.  Venous doppler of LLE showing no DVT  Compartment syndrome may be a possibility.  Called acute care surgery to evaluate.   Refusing ct scan / studies of LLE. w  Lasix 20 mg daily for localized edema.  Hold simvastatin      Schizophrenia, unspecified type, continue Ziprasidone     Hypothyroidism unspecified type, continue synthroid.     SW consult for JUANA.     cannot place on lovenox for dvt prophylaxis at this time t as he is refusing lab work.   Will order blood work start.

## 2019-08-10 PROCEDURE — 99233 SBSQ HOSP IP/OBS HIGH 50: CPT

## 2019-08-10 PROCEDURE — 99231 SBSQ HOSP IP/OBS SF/LOW 25: CPT

## 2019-08-10 RX ORDER — FAMOTIDINE 10 MG/ML
20 INJECTION INTRAVENOUS
Refills: 0 | Status: DISCONTINUED | OUTPATIENT
Start: 2019-08-10 | End: 2019-08-20

## 2019-08-10 RX ORDER — IBUPROFEN 200 MG
400 TABLET ORAL
Refills: 0 | Status: DISCONTINUED | OUTPATIENT
Start: 2019-08-10 | End: 2019-08-12

## 2019-08-10 RX ADMIN — Medication 1 MILLIGRAM(S): at 04:50

## 2019-08-10 RX ADMIN — FAMOTIDINE 20 MILLIGRAM(S): 10 INJECTION INTRAVENOUS at 16:46

## 2019-08-10 RX ADMIN — ZIPRASIDONE HYDROCHLORIDE 40 MILLIGRAM(S): 20 CAPSULE ORAL at 04:50

## 2019-08-10 RX ADMIN — Medication 20 MILLIGRAM(S): at 04:50

## 2019-08-10 RX ADMIN — ZIPRASIDONE HYDROCHLORIDE 40 MILLIGRAM(S): 20 CAPSULE ORAL at 16:40

## 2019-08-10 RX ADMIN — DIVALPROEX SODIUM 1500 MILLIGRAM(S): 500 TABLET, DELAYED RELEASE ORAL at 04:50

## 2019-08-10 RX ADMIN — Medication 1 MILLIGRAM(S): at 12:10

## 2019-08-10 RX ADMIN — Medication 200 MILLIGRAM(S): at 16:40

## 2019-08-10 RX ADMIN — Medication 81 MILLIGRAM(S): at 12:10

## 2019-08-10 RX ADMIN — DIVALPROEX SODIUM 1500 MILLIGRAM(S): 500 TABLET, DELAYED RELEASE ORAL at 16:38

## 2019-08-10 RX ADMIN — ZIPRASIDONE HYDROCHLORIDE 80 MILLIGRAM(S): 20 CAPSULE ORAL at 04:50

## 2019-08-10 RX ADMIN — Medication 50 MICROGRAM(S): at 04:49

## 2019-08-10 RX ADMIN — HEPARIN SODIUM 5000 UNIT(S): 5000 INJECTION INTRAVENOUS; SUBCUTANEOUS at 04:51

## 2019-08-10 RX ADMIN — Medication 0.5 MILLIGRAM(S): at 12:10

## 2019-08-10 RX ADMIN — Medication 1 MILLIGRAM(S): at 16:39

## 2019-08-10 RX ADMIN — ZIPRASIDONE HYDROCHLORIDE 80 MILLIGRAM(S): 20 CAPSULE ORAL at 16:41

## 2019-08-10 RX ADMIN — Medication 200 MILLIGRAM(S): at 04:50
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
## 2019-08-10 NOTE — PROGRESS NOTE ADULT - SUBJECTIVE AND OBJECTIVE BOX
CC: Schizophrenia disorganized.  Left leg pain swelling and mobility limitation secondary to likely trauma.    HPI:  62 y/o male with pmh h/o schizophrenia , hypothyroidism and hyperlipidemia  was sent in for left lower ext pain/ swelling which pt. has c/o for past 3 weeks. pt. was seen at [facility] ER about 1 week ago as well and had negative work up and was sent back. Today pt's ct head was done as his pcp thought that he was a little slow than normal. ct head is negative. PT recommended walker and subacute rehab. Pt's residence was contacted by ER physician to see if they cannot take him back but was not accepted as he will need more help for ADLS due to his current lower ext. complaint. now pt. is admitted for sub acute rehab placement. As per ER physician she spoke to the staff at his residence and they told that pt. is sometimes a little slow and other times is hyper and that is normal for him. pt. is not a good historian and does not want any CT scans or any studies of his LLE. pt. can not tell what happened to his lt. lower ext. pt. keep on asking for coffee and ginger ale. does not want any needles or blood work.   As per his med record he is on haldol im 400 mg q 4 weeks and last dose was on 19. pt. was treated with keflex by his pcp at facility for possible leg cellulitis ? about few pills are left. clinically no signs of cellulitis from the extent of exam pt. let me to have. (08 Aug 2019 21:01)    REVIEW OF SYSTEMS:    Patient denied fever, chills, abdominal pain, nausea, vomiting, cough, shortness of breath, chest pain or palpitations    Vital Signs Last 24 Hrs  T(C): 36.8 (10 Aug 2019 07:39), Max: 37 (09 Aug 2019 17:25)  T(F): 98.2 (10 Aug 2019 07:39), Max: 98.6 (09 Aug 2019 17:25)  HR: 70 (10 Aug 2019 07:39) (70 - 83)  BP: 147/70 (10 Aug 2019 07:39) (139/79 - 147/70)  BP(mean): --  RR: 19 (10 Aug 2019 07:39) (18 - 19)  SpO2: 98% (09 Aug 2019 17:25) (98% - 98%)I&O's Summary    09 Aug 2019 07:01  -  10 Aug 2019 07:00  --------------------------------------------------------  IN: 0 mL / OUT: 900 mL / NET: -900 mL      PHYSICAL EXAM:  GENERAL: Confused   HEENT: PERRL, +EOMI, anicteric, no [place]  NECK: Supple, No JVD   CHEST/LUNG: CTA bilaterally; Normal effort  HEART: S1S2 Normal intensity, no murmurs, gallops or rubs noted  ABDOMEN: Soft, BS Normoactive, NT, ND, no HSM noted  EXTREMITIES:  left lower ext edema, tenderness, motion tenderness.    SKIN: No rashes or lesions noted  NEURO: Confused, shuffling gait. Bradykinesia  PSYCH: Depressed mood and affect; insight/judgement appropriate  LABS:            Urinalysis Basic - ( 08 Aug 2019 18:09 )    Color: Yellow / Appearance: Clear / S.010 / pH: x  Gluc: x / Ketone: Negative  / Bili: Negative / Urobili: Negative mg/dL   Blood: x / Protein: Negative mg/dL / Nitrite: Negative   Leuk Esterase: Negative / RBC: 0-2 /HPF / WBC 0-2   Sq Epi: x / Non Sq Epi: Occasional / Bacteria: Occasional      RADIOLOGY & ADDITIONAL TESTS:    MEDICATIONS:  MEDICATIONS  (STANDING):  aspirin enteric coated 81 milliGRAM(s) Oral daily  benztropine 1 milliGRAM(s) Oral two times a day  diVALproex DR 1500 milliGRAM(s) Oral two times a day  docusate sodium 200 milliGRAM(s) Oral two times a day  folic acid 1 milliGRAM(s) Oral daily  furosemide    Tablet 20 milliGRAM(s) Oral daily  heparin  Injectable 5000 Unit(s) SubCutaneous every 12 hours  levothyroxine 50 MICROGram(s) Oral daily  LORazepam     Tablet 0.5 milliGRAM(s) Oral daily  ziprasidone 80 milliGRAM(s) Oral two times a day  ziprasidone 40 milliGRAM(s) Oral two times a day    MEDICATIONS  (PRN):
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
## 2019-08-11 PROCEDURE — 99233 SBSQ HOSP IP/OBS HIGH 50: CPT

## 2019-08-11 RX ADMIN — DIVALPROEX SODIUM 1500 MILLIGRAM(S): 500 TABLET, DELAYED RELEASE ORAL at 18:26

## 2019-08-11 RX ADMIN — DIVALPROEX SODIUM 1500 MILLIGRAM(S): 500 TABLET, DELAYED RELEASE ORAL at 05:10

## 2019-08-11 RX ADMIN — Medication 20 MILLIGRAM(S): at 05:09

## 2019-08-11 RX ADMIN — Medication 200 MILLIGRAM(S): at 05:09

## 2019-08-11 RX ADMIN — HEPARIN SODIUM 5000 UNIT(S): 5000 INJECTION INTRAVENOUS; SUBCUTANEOUS at 18:27

## 2019-08-11 RX ADMIN — Medication 1 MILLIGRAM(S): at 18:27

## 2019-08-11 RX ADMIN — ZIPRASIDONE HYDROCHLORIDE 80 MILLIGRAM(S): 20 CAPSULE ORAL at 18:26

## 2019-08-11 RX ADMIN — Medication 1 MILLIGRAM(S): at 05:09

## 2019-08-11 RX ADMIN — HEPARIN SODIUM 5000 UNIT(S): 5000 INJECTION INTRAVENOUS; SUBCUTANEOUS at 05:10

## 2019-08-11 RX ADMIN — FAMOTIDINE 20 MILLIGRAM(S): 10 INJECTION INTRAVENOUS at 05:09

## 2019-08-11 RX ADMIN — Medication 0.5 MILLIGRAM(S): at 13:11

## 2019-08-11 RX ADMIN — Medication 81 MILLIGRAM(S): at 13:02

## 2019-08-11 RX ADMIN — Medication 200 MILLIGRAM(S): at 18:27

## 2019-08-11 RX ADMIN — ZIPRASIDONE HYDROCHLORIDE 40 MILLIGRAM(S): 20 CAPSULE ORAL at 05:09

## 2019-08-11 RX ADMIN — Medication 1 MILLIGRAM(S): at 13:03

## 2019-08-11 RX ADMIN — FAMOTIDINE 20 MILLIGRAM(S): 10 INJECTION INTRAVENOUS at 18:27

## 2019-08-11 RX ADMIN — Medication 50 MICROGRAM(S): at 05:10

## 2019-08-11 RX ADMIN — ZIPRASIDONE HYDROCHLORIDE 40 MILLIGRAM(S): 20 CAPSULE ORAL at 18:27

## 2019-08-11 RX ADMIN — ZIPRASIDONE HYDROCHLORIDE 80 MILLIGRAM(S): 20 CAPSULE ORAL at 05:09

## 2019-08-11 NOTE — PROGRESS NOTE ADULT - SUBJECTIVE AND OBJECTIVE BOX
CC:   HPI:  62 y/o male with pmh h/o schizophrenia , hypothyroidism and hyperlipidemia  was sent in for left lower ext pain/ swelling which pt. has c/o for past 3 weeks. pt. was seen at [facility] ER about 1 week ago as well and had negative work up and was sent back. Today pt's ct head was done as his pcp thought that he was a little slow than normal. ct head is negative. PT recommended walker and subacute rehab. Pt's residence was contacted by ER physician to see if they cannot take him back but was not accepted as he will need more help for ADLS due to his current lower ext. complaint. now pt. is admitted for sub acute rehab placement. As per ER physician she spoke to the staff at his residence and they told that pt. is sometimes a little slow and other times is hyper and that is normal for him. pt. is not a good historian and does not want any CT scans or any studies of his LLE. pt. can not tell what happened to his lt. lower ext. pt. keep on asking for coffee and ginger ale. does not want any needles or blood work.   As per his med record he is on haldol im 400 mg q 4 weeks and last dose was on 7/24/19. pt. was treated with keflex by his pcp at facility for possible leg cellulitis ? about few pills are left. clinically no signs of cellulitis from the extent of exam pt. let me to have. (08 Aug 2019 21:01)    REVIEW OF SYSTEMS:    Patient denied fever, chills, abdominal pain, nausea, vomiting, cough, shortness of breath, chest pain or palpitations    Vital Signs Last 24 Hrs  T(C): 36.7 (11 Aug 2019 08:06), Max: 36.7 (10 Aug 2019 16:07)  T(F): 98 (11 Aug 2019 08:06), Max: 98 (10 Aug 2019 16:07)  HR: 80 (11 Aug 2019 08:06) (80 - 98)  BP: 111/75 (11 Aug 2019 08:06) (111/75 - 121/75)  BP(mean): --  RR: 19 (11 Aug 2019 08:06) (18 - 19)  SpO2: 92% (10 Aug 2019 16:07) (92% - 92%)I&O's Summary    10 Aug 2019 07:01  -  11 Aug 2019 07:00  --------------------------------------------------------  IN: 0 mL / OUT: 1425 mL / NET: -1425 mL    11 Aug 2019 07:01  -  11 Aug 2019 12:17  --------------------------------------------------------  IN: 472 mL / OUT: 700 mL / NET: -228 mL      PHYSICAL EXAM:  GENERAL: NAD, well-groomed  HEENT: PERRL, +EOMI, anicteric, no [place]  NECK: Supple, No JVD   CHEST/LUNG: CTA bilaterally; Normal effort  HEART: S1S2 Normal intensity, no murmurs, gallops or rubs noted  ABDOMEN: Soft, BS Normoactive, NT, ND, no HSM noted  EXTREMITIES:  2+ radial and DP pulses noted, no clubbing, cyanosis, or edema noted, FROM x 4  SKIN: No rashes or lesions noted  NEURO: A&Ox3, no focal deficits noted, CN II-XII intact  PSYCH: normal mood and affect; insight/judgement appropriate  LABS:              RADIOLOGY & ADDITIONAL TESTS:    MEDICATIONS:  MEDICATIONS  (STANDING):  aspirin enteric coated 81 milliGRAM(s) Oral daily  benztropine 1 milliGRAM(s) Oral two times a day  diVALproex DR 1500 milliGRAM(s) Oral two times a day  docusate sodium 200 milliGRAM(s) Oral two times a day  famotidine    Tablet 20 milliGRAM(s) Oral two times a day  folic acid 1 milliGRAM(s) Oral daily  furosemide    Tablet 20 milliGRAM(s) Oral daily  heparin  Injectable 5000 Unit(s) SubCutaneous every 12 hours  ibuprofen  Tablet. 400 milliGRAM(s) Oral four times a day  levothyroxine 50 MICROGram(s) Oral daily  LORazepam     Tablet 0.5 milliGRAM(s) Oral daily  ziprasidone 80 milliGRAM(s) Oral two times a day  ziprasidone 40 milliGRAM(s) Oral two times a day    MEDICATIONS  (PRN): CC: Left vastus intermedius hematoma. Left lower ext pain and swelling is resolving. Schizophrenia  HPI:  62 y/o male with pmh h/o schizophrenia , hypothyroidism and hyperlipidemia  was sent in for left lower ext pain/ swelling which pt. has c/o for past 3 weeks. pt. was seen at [facility] ER about 1 week ago as well and had negative work up and was sent back. Today pt's ct head was done as his pcp thought that he was a little slow than normal. ct head is negative. PT recommended walker and subacute rehab. Pt's residence was contacted by ER physician to see if they cannot take him back but was not accepted as he will need more help for ADLS due to his current lower ext. complaint. now pt. is admitted for sub acute rehab placement. As per ER physician she spoke to the staff at his residence and they told that pt. is sometimes a little slow and other times is hyper and that is normal for him. pt. is not a good historian and does not want any CT scans or any studies of his LLE. pt. can not tell what happened to his lt. lower ext. pt. keep on asking for coffee and ginger ale. does not want any needles or blood work.   As per his med record he is on haldol im 400 mg q 4 weeks and last dose was on 7/24/19. pt. was treated with keflex by his pcp at facility for possible leg cellulitis ? about few pills are left. clinically no signs of cellulitis from the extent of exam pt. let me to have. (08 Aug 2019 21:01)    REVIEW OF SYSTEMS:    Patient denied fever, chills, abdominal pain, nausea, vomiting, cough, shortness of breath, chest pain or palpitations    Vital Signs Last 24 Hrs  T(C): 36.7 (11 Aug 2019 08:06), Max: 36.7 (10 Aug 2019 16:07)  T(F): 98 (11 Aug 2019 08:06), Max: 98 (10 Aug 2019 16:07)  HR: 80 (11 Aug 2019 08:06) (80 - 98)  BP: 111/75 (11 Aug 2019 08:06) (111/75 - 121/75)  BP(mean): --  RR: 19 (11 Aug 2019 08:06) (18 - 19)  SpO2: 92% (10 Aug 2019 16:07) (92% - 92%)I&O's Summary    10 Aug 2019 07:01  -  11 Aug 2019 07:00  --------------------------------------------------------  IN: 0 mL / OUT: 1425 mL / NET: -1425 mL    11 Aug 2019 07:01  -  11 Aug 2019 12:17  --------------------------------------------------------  IN: 472 mL / OUT: 700 mL / NET: -228 mL      PHYSICAL EXAM:  GENERAL: Disorganized.  HEENT: PERRL, +EOMI, anicteric, no [place]  NECK: Supple, No JVD   CHEST/LUNG: CTA bilaterally; Normal effort  HEART: S1S2 Normal intensity, no murmurs, gallops or rubs noted  ABDOMEN: Soft, BS Normoactive, NT, ND, no HSM noted  EXTREMITIES:  No cyanosis, Left pedal edema noted, Limited mobility.  SKIN: No rashes or lesions noted  NEURO: Confused , no focal deficits noted, CN II-XII intact  PSYCH: Depressed mood and affect; insight/judgement inappropriate  LABS:      RADIOLOGY & ADDITIONAL TESTS:    MEDICATIONS:  MEDICATIONS  (STANDING):  aspirin enteric coated 81 milliGRAM(s) Oral daily  benztropine 1 milliGRAM(s) Oral two times a day  diVALproex DR 1500 milliGRAM(s) Oral two times a day  docusate sodium 200 milliGRAM(s) Oral two times a day  famotidine    Tablet 20 milliGRAM(s) Oral two times a day  folic acid 1 milliGRAM(s) Oral daily  furosemide    Tablet 20 milliGRAM(s) Oral daily  heparin  Injectable 5000 Unit(s) SubCutaneous every 12 hours  ibuprofen  Tablet. 400 milliGRAM(s) Oral four times a day  levothyroxine 50 MICROGram(s) Oral daily  LORazepam     Tablet 0.5 milliGRAM(s) Oral daily  ziprasidone 80 milliGRAM(s) Oral two times a day  ziprasidone 40 milliGRAM(s) Oral two times a day    MEDICATIONS  (PRN):
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
## 2019-08-11 NOTE — PROGRESS NOTE ADULT - SUBJECTIVE AND OBJECTIVE BOX
INTERVAL HPI/OVERNIGHT EVENTS: Patient removed his dressing overnight. Still refusing labs      MEDICATIONS  (STANDING):  aspirin enteric coated 81 milliGRAM(s) Oral daily  benztropine 1 milliGRAM(s) Oral two times a day  diVALproex DR 1500 milliGRAM(s) Oral two times a day  docusate sodium 200 milliGRAM(s) Oral two times a day  famotidine    Tablet 20 milliGRAM(s) Oral two times a day  folic acid 1 milliGRAM(s) Oral daily  furosemide    Tablet 20 milliGRAM(s) Oral daily  heparin  Injectable 5000 Unit(s) SubCutaneous every 12 hours  ibuprofen  Tablet. 400 milliGRAM(s) Oral four times a day  levothyroxine 50 MICROGram(s) Oral daily  LORazepam     Tablet 0.5 milliGRAM(s) Oral daily  ziprasidone 80 milliGRAM(s) Oral two times a day  ziprasidone 40 milliGRAM(s) Oral two times a day    MEDICATIONS  (PRN):      Vital Signs Last 24 Hrs  T(C): 36.7 (11 Aug 2019 08:06), Max: 36.7 (10 Aug 2019 16:07)  T(F): 98 (11 Aug 2019 08:06), Max: 98 (10 Aug 2019 16:07)  HR: 80 (11 Aug 2019 08:06) (80 - 98)  BP: 111/75 (11 Aug 2019 08:06) (111/75 - 121/75)  BP(mean): --  RR: 19 (11 Aug 2019 08:06) (18 - 19)  SpO2: 92% (10 Aug 2019 16:07) (92% - 92%)    PE  Gen: NAD  Pulm: No increased WOB  Abd: Soft, NT, ND  Ext: LLE hematoma has not changed in size, stable      I&O's Detail    10 Aug 2019 07:01  -  11 Aug 2019 07:00  --------------------------------------------------------  IN:  Total IN: 0 mL    OUT:    Voided: 1425 mL  Total OUT: 1425 mL    Total NET: -1425 mL      11 Aug 2019 07:01  -  11 Aug 2019 09:08  --------------------------------------------------------  IN:    Oral Fluid: 354 mL  Total IN: 354 mL    OUT:    Voided: 400 mL  Total OUT: 400 mL    Total NET: -46 mL          LABS:                RADIOLOGY & ADDITIONAL STUDIES:

## 2019-08-12 LAB
ALBUMIN SERPL ELPH-MCNC: 3 G/DL — LOW (ref 3.3–5.2)
ALP SERPL-CCNC: 108 U/L — SIGNIFICANT CHANGE UP (ref 40–120)
ALT FLD-CCNC: 7 U/L — SIGNIFICANT CHANGE UP
ANION GAP SERPL CALC-SCNC: 10 MMOL/L — SIGNIFICANT CHANGE UP (ref 5–17)
ANION GAP SERPL CALC-SCNC: 8 MMOL/L — SIGNIFICANT CHANGE UP (ref 5–17)
AST SERPL-CCNC: 14 U/L — SIGNIFICANT CHANGE UP
BASOPHILS # BLD AUTO: 0.1 K/UL — SIGNIFICANT CHANGE UP (ref 0–0.2)
BASOPHILS NFR BLD AUTO: 1.8 % — SIGNIFICANT CHANGE UP (ref 0–2)
BILIRUB SERPL-MCNC: 0.4 MG/DL — SIGNIFICANT CHANGE UP (ref 0.4–2)
BUN SERPL-MCNC: 18 MG/DL — SIGNIFICANT CHANGE UP (ref 8–20)
BUN SERPL-MCNC: 19 MG/DL — SIGNIFICANT CHANGE UP (ref 8–20)
CALCIUM SERPL-MCNC: 8.8 MG/DL — SIGNIFICANT CHANGE UP (ref 8.6–10.2)
CALCIUM SERPL-MCNC: 8.9 MG/DL — SIGNIFICANT CHANGE UP (ref 8.6–10.2)
CHLORIDE SERPL-SCNC: 93 MMOL/L — LOW (ref 98–107)
CHLORIDE SERPL-SCNC: 94 MMOL/L — LOW (ref 98–107)
CK SERPL-CCNC: 90 U/L — SIGNIFICANT CHANGE UP (ref 30–200)
CO2 SERPL-SCNC: 29 MMOL/L — SIGNIFICANT CHANGE UP (ref 22–29)
CO2 SERPL-SCNC: 31 MMOL/L — HIGH (ref 22–29)
CREAT SERPL-MCNC: 1.01 MG/DL — SIGNIFICANT CHANGE UP (ref 0.5–1.3)
CREAT SERPL-MCNC: 1.02 MG/DL — SIGNIFICANT CHANGE UP (ref 0.5–1.3)
EOSINOPHIL # BLD AUTO: 0.15 K/UL — SIGNIFICANT CHANGE UP (ref 0–0.5)
EOSINOPHIL NFR BLD AUTO: 2.6 % — SIGNIFICANT CHANGE UP (ref 0–6)
GIANT PLATELETS BLD QL SMEAR: PRESENT — SIGNIFICANT CHANGE UP
GLUCOSE SERPL-MCNC: 152 MG/DL — HIGH (ref 70–115)
GLUCOSE SERPL-MCNC: 155 MG/DL — HIGH (ref 70–115)
HCT VFR BLD CALC: 34.7 % — LOW (ref 39–50)
HGB BLD-MCNC: 11.4 G/DL — LOW (ref 13–17)
LYMPHOCYTES # BLD AUTO: 0.79 K/UL — LOW (ref 1–3.3)
LYMPHOCYTES # BLD AUTO: 13.9 % — SIGNIFICANT CHANGE UP (ref 13–44)
MANUAL SMEAR VERIFICATION: SIGNIFICANT CHANGE UP
MCHC RBC-ENTMCNC: 31.9 PG — SIGNIFICANT CHANGE UP (ref 27–34)
MCHC RBC-ENTMCNC: 32.9 GM/DL — SIGNIFICANT CHANGE UP (ref 32–36)
MCV RBC AUTO: 97.2 FL — SIGNIFICANT CHANGE UP (ref 80–100)
MONOCYTES # BLD AUTO: 0.1 K/UL — SIGNIFICANT CHANGE UP (ref 0–0.9)
MONOCYTES NFR BLD AUTO: 1.7 % — LOW (ref 2–14)
MYELOCYTES NFR BLD: 1.7 % — HIGH (ref 0–0)
NEUTROPHILS # BLD AUTO: 4.32 K/UL — SIGNIFICANT CHANGE UP (ref 1.8–7.4)
NEUTROPHILS NFR BLD AUTO: 75.7 % — SIGNIFICANT CHANGE UP (ref 43–77)
PLAT MORPH BLD: NORMAL — SIGNIFICANT CHANGE UP
PLATELET # BLD AUTO: 297 K/UL — SIGNIFICANT CHANGE UP (ref 150–400)
POTASSIUM SERPL-MCNC: 4.2 MMOL/L — SIGNIFICANT CHANGE UP (ref 3.5–5.3)
POTASSIUM SERPL-MCNC: 4.2 MMOL/L — SIGNIFICANT CHANGE UP (ref 3.5–5.3)
POTASSIUM SERPL-SCNC: 4.2 MMOL/L — SIGNIFICANT CHANGE UP (ref 3.5–5.3)
POTASSIUM SERPL-SCNC: 4.2 MMOL/L — SIGNIFICANT CHANGE UP (ref 3.5–5.3)
PROT SERPL-MCNC: 6.1 G/DL — LOW (ref 6.6–8.7)
RBC # BLD: 3.57 M/UL — LOW (ref 4.2–5.8)
RBC # FLD: 14.2 % — SIGNIFICANT CHANGE UP (ref 10.3–14.5)
RBC BLD AUTO: NORMAL — SIGNIFICANT CHANGE UP
SODIUM SERPL-SCNC: 132 MMOL/L — LOW (ref 135–145)
SODIUM SERPL-SCNC: 133 MMOL/L — LOW (ref 135–145)
VARIANT LYMPHS # BLD: 2.6 % — SIGNIFICANT CHANGE UP (ref 0–6)
WBC # BLD: 5.71 K/UL — SIGNIFICANT CHANGE UP (ref 3.8–10.5)
WBC # FLD AUTO: 5.71 K/UL — SIGNIFICANT CHANGE UP (ref 3.8–10.5)

## 2019-08-12 PROCEDURE — 99232 SBSQ HOSP IP/OBS MODERATE 35: CPT

## 2019-08-12 RX ADMIN — ZIPRASIDONE HYDROCHLORIDE 80 MILLIGRAM(S): 20 CAPSULE ORAL at 05:29

## 2019-08-12 RX ADMIN — Medication 200 MILLIGRAM(S): at 05:29

## 2019-08-12 RX ADMIN — Medication 50 MICROGRAM(S): at 05:29

## 2019-08-12 RX ADMIN — FAMOTIDINE 20 MILLIGRAM(S): 10 INJECTION INTRAVENOUS at 05:29

## 2019-08-12 RX ADMIN — Medication 1 MILLIGRAM(S): at 11:14

## 2019-08-12 RX ADMIN — Medication 81 MILLIGRAM(S): at 11:14

## 2019-08-12 RX ADMIN — FAMOTIDINE 20 MILLIGRAM(S): 10 INJECTION INTRAVENOUS at 17:14

## 2019-08-12 RX ADMIN — Medication 0.5 MILLIGRAM(S): at 11:14

## 2019-08-12 RX ADMIN — DIVALPROEX SODIUM 1500 MILLIGRAM(S): 500 TABLET, DELAYED RELEASE ORAL at 05:29

## 2019-08-12 RX ADMIN — DIVALPROEX SODIUM 1500 MILLIGRAM(S): 500 TABLET, DELAYED RELEASE ORAL at 17:14

## 2019-08-12 RX ADMIN — HEPARIN SODIUM 5000 UNIT(S): 5000 INJECTION INTRAVENOUS; SUBCUTANEOUS at 05:28

## 2019-08-12 RX ADMIN — Medication 1 MILLIGRAM(S): at 05:29

## 2019-08-12 RX ADMIN — ZIPRASIDONE HYDROCHLORIDE 80 MILLIGRAM(S): 20 CAPSULE ORAL at 17:15

## 2019-08-12 RX ADMIN — ZIPRASIDONE HYDROCHLORIDE 40 MILLIGRAM(S): 20 CAPSULE ORAL at 17:14

## 2019-08-12 RX ADMIN — Medication 20 MILLIGRAM(S): at 05:37

## 2019-08-12 RX ADMIN — ZIPRASIDONE HYDROCHLORIDE 40 MILLIGRAM(S): 20 CAPSULE ORAL at 05:29

## 2019-08-12 RX ADMIN — Medication 1 MILLIGRAM(S): at 17:15

## 2019-08-12 RX ADMIN — Medication 200 MILLIGRAM(S): at 17:15

## 2019-08-12 NOTE — PROGRESS NOTE ADULT - ASSESSMENT
64 y/o male with h/o schizophrenia, hypothyroidism and hyperlipidemia, s/p fall and traum a few weeks prior to admission and noted with LLE swelling.  Noticed on imaging with LLE hematoma of vastus intermedius, initially pt refusing blood work and care but amenable today. H/h remains stable and sx consulted and no evidence of compartment syndrome. Given hematoma difficult for pt to ambulate PT consulted and patient recommended for JUANA placement.     Left lower extremity  Hematoma of vastus intermedius    - h/h stable   - improving swelling and pain   - ck wnl   - discontinued heparin sq   - Venous doppler of LLE showing no DVT  - surgery f/u noted and appreciated, no further work up and signed off  - pt will need repeat imaging in 1-2 weeks to show improvement of hematoma, likely to self resolve  - Avoid blood thinner therapy     Schizophrenia, unspecified type  - c/w  Ziprasidone  - c/w 1:1 observation for safety b/c pt agitated at times  - psych consulted for medication optimization as well as level 2 clearance need for JUANA placement     Hypothyroidism   - c/w synthroid po qd     DVT ppx  - d/c heparin sq due to hematoma       Dispo: D/w CM and SW unable to return to psych facility b/c of difficulty with ambulation pt for JUANA, needs level 2 clearance. DC instructions

## 2019-08-12 NOTE — PROGRESS NOTE ADULT - SUBJECTIVE AND OBJECTIVE BOX
Patient is a 63y old  Male who presents with a chief complaint of left leg pain (11 Aug 2019 12:17)      HEALTH ISSUES - PROBLEM Dx:      INTERVAL HPI/OVERNIGHT EVENTS:  Patient seen and examined at bedside. No acute events overnight. Patient states           Vital Signs Last 24 Hrs  T(C): 37.1 (12 Aug 2019 16:07), Max: 37.1 (12 Aug 2019 16:07)  T(F): 98.8 (12 Aug 2019 16:07), Max: 98.8 (12 Aug 2019 16:07)  HR: 71 (12 Aug 2019 16:07) (71 - 73)  BP: 113/72 (12 Aug 2019 16:07) (101/64 - 113/72)  BP(mean): --  RR: 20 (12 Aug 2019 16:07) (19 - 20)  SpO2: 94% (12 Aug 2019 16:07) (94% - 94%)    I&O's Summary    11 Aug 2019 07:01  -  12 Aug 2019 07:00  --------------------------------------------------------  IN: 832 mL / OUT: 1800 mL / NET: -968 mL        CONSTITUTIONAL: Well appearing, well nourished, awake, alert and in no apparent distress  ENMT: Airway patent, Nasal mucosa clear. Mouth with normal mucosa. Throat has no vesicles, no oropharyngeal exudates and uvula is midline.  EYES: Clear bilaterally, pupils equal, round and reactive to light. EOMI.  CARDIAC: Normal rate, regular rhythm.  Heart sounds S1, S2.  No murmurs, rubs or gallops   RESPIRATORY: Breath sounds clear and equal bilaterally. No wheezes, rhales or rhonchi  MUSCULOSKELETAL: Spine appears normal, range of motion is not limited, no muscle or joint tenderness  EXTREMITIES: No edema, cyanosis or deformity   NEUROLOGICAL: Alert and oriented, no focal deficits, no motor or sensory deficits.  SKIN: No rash, skin turgor     MEDICATIONS  (STANDING):  aspirin enteric coated 81 milliGRAM(s) Oral daily  benztropine 1 milliGRAM(s) Oral two times a day  diVALproex DR 1500 milliGRAM(s) Oral two times a day  docusate sodium 200 milliGRAM(s) Oral two times a day  famotidine    Tablet 20 milliGRAM(s) Oral two times a day  folic acid 1 milliGRAM(s) Oral daily  furosemide    Tablet 20 milliGRAM(s) Oral daily  levothyroxine 50 MICROGram(s) Oral daily  LORazepam     Tablet 0.5 milliGRAM(s) Oral daily  ziprasidone 80 milliGRAM(s) Oral two times a day  ziprasidone 40 milliGRAM(s) Oral two times a day    MEDICATIONS  (PRN):  HYDROcodone/homatropine Syrup 5 milliLiter(s) Oral three times a day PRN Cough      LABS:                        11.4   5.71  )-----------( 297      ( 12 Aug 2019 11:37 )             34.7     08-12    133<L>  |  94<L>  |  18.0  ----------------------------<  152<H>  4.2   |  31.0<H>  |  1.01    Ca    8.8      12 Aug 2019 11:37    TPro  6.1<L>  /  Alb  3.0<L>  /  TBili  0.4  /  DBili  x   /  AST  14  /  ALT  7   /  AlkPhos  108  08-12        LIVER FUNCTIONS - ( 12 Aug 2019 11:37 )  Alb: 3.0 g/dL / Pro: 6.1 g/dL / ALK PHOS: 108 U/L / ALT: 7 U/L / AST: 14 U/L / GGT: x             RADIOLOGY & ADDITIONAL TESTS: Patient is a 63y old  Male who presents with a chief complaint of left leg pain (11 Aug 2019 12:17) improving       HEALTH ISSUES - PROBLEM Dx:      INTERVAL HPI/OVERNIGHT EVENTS:  Patient seen and examined at bedside. No acute events overnight. Patient states he is felling fine. Wants to sleep. Is amenable to having blood tested to chest hemoglboin level. Reports pain is improving. Remains sleepy. Patient denies chest pain, SOB, abd pain, N/V, fever, chills, dysuria or any other complaints. All remainder ROS negative.       Vital Signs Last 24 Hrs  T(C): 37.1 (12 Aug 2019 16:07), Max: 37.1 (12 Aug 2019 16:07)  T(F): 98.8 (12 Aug 2019 16:07), Max: 98.8 (12 Aug 2019 16:07)  HR: 71 (12 Aug 2019 16:07) (71 - 73)  BP: 113/72 (12 Aug 2019 16:07) (101/64 - 113/72)  BP(mean): --  RR: 20 (12 Aug 2019 16:07) (19 - 20)  SpO2: 94% (12 Aug 2019 16:07) (94% - 94%)    I&O's Summary    11 Aug 2019 07:01  -  12 Aug 2019 07:00  --------------------------------------------------------  IN: 832 mL / OUT: 1800 mL / NET: -968 mL        CONSTITUTIONAL: Well appearing, well nourished, awake, alert and in no apparent distress  CARDIAC: Normal rate, regular rhythm.  Heart sounds S1, S2.  No murmurs, rubs or gallops   RESPIRATORY: Breath sounds clear and equal bilaterally. No wheezes, rhales or rhonchi  GASTROENTEROLOGY: Soft nt nd bs +   EXTREMITIES:   NEUROLOGICAL: Alert and oriented, no focal deficits, no motor or sensory deficits.  SKIN: No rash, skin turgor     MEDICATIONS  (STANDING):  aspirin enteric coated 81 milliGRAM(s) Oral daily  benztropine 1 milliGRAM(s) Oral two times a day  diVALproex DR 1500 milliGRAM(s) Oral two times a day  docusate sodium 200 milliGRAM(s) Oral two times a day  famotidine    Tablet 20 milliGRAM(s) Oral two times a day  folic acid 1 milliGRAM(s) Oral daily  furosemide    Tablet 20 milliGRAM(s) Oral daily  levothyroxine 50 MICROGram(s) Oral daily  LORazepam     Tablet 0.5 milliGRAM(s) Oral daily  ziprasidone 80 milliGRAM(s) Oral two times a day  ziprasidone 40 milliGRAM(s) Oral two times a day    MEDICATIONS  (PRN):  HYDROcodone/homatropine Syrup 5 milliLiter(s) Oral three times a day PRN Cough      LABS:                        11.4   5.71  )-----------( 297      ( 12 Aug 2019 11:37 )             34.7     08-12    133<L>  |  94<L>  |  18.0  ----------------------------<  152<H>  4.2   |  31.0<H>  |  1.01    Ca    8.8      12 Aug 2019 11:37    TPro  6.1<L>  /  Alb  3.0<L>  /  TBili  0.4  /  DBili  x   /  AST  14  /  ALT  7   /  AlkPhos  108  08-12        LIVER FUNCTIONS - ( 12 Aug 2019 11:37 )  Alb: 3.0 g/dL / Pro: 6.1 g/dL / ALK PHOS: 108 U/L / ALT: 7 U/L / AST: 14 U/L / GGT: x             RADIOLOGY & ADDITIONAL TESTS:  No new imaging studies Left vastus intermedius hematoma     Patient is a 63y old  Male who presents with a chief complaint of left leg pain (11 Aug 2019 12:17) improving       HEALTH ISSUES - PROBLEM Dx:      INTERVAL HPI/OVERNIGHT EVENTS:  Patient seen and examined at bedside. No acute events overnight. Patient states he is felling fine. Wants to sleep. Is amenable to having blood tested to chest hemoglboin level. Reports pain is improving. Remains sleepy. Patient denies chest pain, SOB, abd pain, N/V, fever, chills, dysuria or any other complaints. All remainder ROS negative.       Vital Signs Last 24 Hrs  T(C): 37.1 (12 Aug 2019 16:07), Max: 37.1 (12 Aug 2019 16:07)  T(F): 98.8 (12 Aug 2019 16:07), Max: 98.8 (12 Aug 2019 16:07)  HR: 71 (12 Aug 2019 16:07) (71 - 73)  BP: 113/72 (12 Aug 2019 16:07) (101/64 - 113/72)  BP(mean): --  RR: 20 (12 Aug 2019 16:07) (19 - 20)  SpO2: 94% (12 Aug 2019 16:07) (94% - 94%)    I&O's Summary    11 Aug 2019 07:01  -  12 Aug 2019 07:00  --------------------------------------------------------  IN: 832 mL / OUT: 1800 mL / NET: -968 mL        CONSTITUTIONAL: Well appearing, well nourished, awake, alert and in no apparent distress  CARDIAC: Normal rate, regular rhythm.  Heart sounds S1, S2.  No murmurs, rubs or gallops   RESPIRATORY: Breath sounds clear and equal bilaterally. No wheezes, rhales or rhonchi  GASTROENTEROLOGY: Soft nt nd bs +   EXTREMITIES:   NEUROLOGICAL: Alert and oriented, no focal deficits, no motor or sensory deficits.  SKIN: No rash, skin turgor     MEDICATIONS  (STANDING):  aspirin enteric coated 81 milliGRAM(s) Oral daily  benztropine 1 milliGRAM(s) Oral two times a day  diVALproex DR 1500 milliGRAM(s) Oral two times a day  docusate sodium 200 milliGRAM(s) Oral two times a day  famotidine    Tablet 20 milliGRAM(s) Oral two times a day  folic acid 1 milliGRAM(s) Oral daily  furosemide    Tablet 20 milliGRAM(s) Oral daily  levothyroxine 50 MICROGram(s) Oral daily  LORazepam     Tablet 0.5 milliGRAM(s) Oral daily  ziprasidone 80 milliGRAM(s) Oral two times a day  ziprasidone 40 milliGRAM(s) Oral two times a day    MEDICATIONS  (PRN):  HYDROcodone/homatropine Syrup 5 milliLiter(s) Oral three times a day PRN Cough      LABS:                        11.4   5.71  )-----------( 297      ( 12 Aug 2019 11:37 )             34.7     08-12    133<L>  |  94<L>  |  18.0  ----------------------------<  152<H>  4.2   |  31.0<H>  |  1.01    Ca    8.8      12 Aug 2019 11:37    TPro  6.1<L>  /  Alb  3.0<L>  /  TBili  0.4  /  DBili  x   /  AST  14  /  ALT  7   /  AlkPhos  108  08-12        LIVER FUNCTIONS - ( 12 Aug 2019 11:37 )  Alb: 3.0 g/dL / Pro: 6.1 g/dL / ALK PHOS: 108 U/L / ALT: 7 U/L / AST: 14 U/L / GGT: x             RADIOLOGY & ADDITIONAL TESTS:  No new imaging studies

## 2019-08-13 DIAGNOSIS — F20.9 SCHIZOPHRENIA, UNSPECIFIED: ICD-10-CM

## 2019-08-13 PROCEDURE — 99222 1ST HOSP IP/OBS MODERATE 55: CPT

## 2019-08-13 PROCEDURE — 99233 SBSQ HOSP IP/OBS HIGH 50: CPT

## 2019-08-13 RX ADMIN — Medication 1 MILLIGRAM(S): at 06:41

## 2019-08-13 RX ADMIN — FAMOTIDINE 20 MILLIGRAM(S): 10 INJECTION INTRAVENOUS at 17:43

## 2019-08-13 RX ADMIN — Medication 1 MILLIGRAM(S): at 17:43

## 2019-08-13 RX ADMIN — ZIPRASIDONE HYDROCHLORIDE 80 MILLIGRAM(S): 20 CAPSULE ORAL at 06:42

## 2019-08-13 RX ADMIN — DIVALPROEX SODIUM 1500 MILLIGRAM(S): 500 TABLET, DELAYED RELEASE ORAL at 06:42

## 2019-08-13 RX ADMIN — Medication 200 MILLIGRAM(S): at 17:43

## 2019-08-13 RX ADMIN — Medication 1 MILLIGRAM(S): at 11:25

## 2019-08-13 RX ADMIN — DIVALPROEX SODIUM 1500 MILLIGRAM(S): 500 TABLET, DELAYED RELEASE ORAL at 17:42

## 2019-08-13 RX ADMIN — Medication 200 MILLIGRAM(S): at 06:41

## 2019-08-13 RX ADMIN — Medication 50 MICROGRAM(S): at 06:41

## 2019-08-13 RX ADMIN — ZIPRASIDONE HYDROCHLORIDE 80 MILLIGRAM(S): 20 CAPSULE ORAL at 17:43

## 2019-08-13 RX ADMIN — FAMOTIDINE 20 MILLIGRAM(S): 10 INJECTION INTRAVENOUS at 06:41

## 2019-08-13 RX ADMIN — ZIPRASIDONE HYDROCHLORIDE 40 MILLIGRAM(S): 20 CAPSULE ORAL at 06:42

## 2019-08-13 RX ADMIN — Medication 20 MILLIGRAM(S): at 06:41

## 2019-08-13 RX ADMIN — Medication 0.5 MILLIGRAM(S): at 11:25

## 2019-08-13 RX ADMIN — Medication 81 MILLIGRAM(S): at 11:25

## 2019-08-13 RX ADMIN — ZIPRASIDONE HYDROCHLORIDE 40 MILLIGRAM(S): 20 CAPSULE ORAL at 17:43

## 2019-08-13 NOTE — BEHAVIORAL HEALTH ASSESSMENT NOTE - HPI (INCLUDE ILLNESS QUALITY, SEVERITY, DURATION, TIMING, CONTEXT, MODIFYING FACTORS, ASSOCIATED SIGNS AND SYMPTOMS)
pt is 63 year old male who presented from [facility] due to left leg pain. pt states he cannot ambulate well currently due to his condition. pt currently with one to one for fall risk. per [facility] forms pt has schizophrenia and is living in residence on [place] grounds. pt displays disorganized speech but states he does not want to harm himself or others. pt appears to be at his baseline and denies hearing voices.

## 2019-08-13 NOTE — BEHAVIORAL HEALTH ASSESSMENT NOTE - NSBHCHARTREVIEWLAB_PSY_A_CORE FT
11.4   5.71  )-----------( 297      ( 12 Aug 2019 11:37 )             34.7   08-12  133<L>  |  94<L>  |  18.0  ----------------------------<  152<H>  4.2   |  31.0<H>  |  1.01  Ca    8.8      12 Aug 2019 11:37  TPro  6.1<L>  /  Alb  3.0<L>  /  TBili  0.4  /  DBili  x   /  AST  14  /  ALT  7   /  AlkPhos  108  08-12  LIVER FUNCTIONS - ( 12 Aug 2019 11:37 )  Alb: 3.0 g/dL / Pro: 6.1 g/dL / ALK PHOS: 108 U/L / ALT: 7 U/L / AST: 14 U/L / GGT: x

## 2019-08-13 NOTE — BEHAVIORAL HEALTH ASSESSMENT NOTE - SUMMARY
pt is 63 year old male being treated inpatient for left lower extremity pain. pt dx with schizophrenia and  has longstanding psychiatric history and is currently living on [place] grounds at [facility]. pt appears baseline and has no acute psychiatric complaints.

## 2019-08-13 NOTE — BEHAVIORAL HEALTH ASSESSMENT NOTE - NSBHCONSULTFOLLOWAFTERCARE_PSY_A_CORE FT
continue all psychiatric medications [facility]. transfer to [facility] to regain strength. No symptoms to warrant inpatient psychiatric admission

## 2019-08-13 NOTE — PROGRESS NOTE ADULT - SUBJECTIVE AND OBJECTIVE BOX
CC: Left   HPI:  64 y/o male with pmh h/o schizophrenia , hypothyroidism and hyperlipidemia  was sent in for left lower ext pain/ swelling which pt. has c/o for past 3 weeks. pt. was seen at [facility] ER about 1 week ago as well and had negative work up and was sent back. Today pt's ct head was done as his pcp thought that he was a little slow than normal. ct head is negative. PT recommended walker and subacute rehab. Pt's residence was contacted by ER physician to see if they cannot take him back but was not accepted as he will need more help for ADLS due to his current lower ext. complaint. now pt. is admitted for sub acute rehab placement. As per ER physician she spoke to the staff at his residence and they told that pt. is sometimes a little slow and other times is hyper and that is normal for him. pt. is not a good historian and does not want any CT scans or any studies of his LLE. pt. can not tell what happened to his lt. lower ext. pt. keep on asking for coffee and ginger ale. does not want any needles or blood work.   As per his med record he is on haldol im 400 mg q 4 weeks and last dose was on 7/24/19. pt. was treated with keflex by his pcp at facility for possible leg cellulitis ? about few pills are left. clinically no signs of cellulitis from the extent of exam pt. let me to have. (08 Aug 2019 21:01)    REVIEW OF SYSTEMS:    Patient denied fever, chills, abdominal pain, nausea, vomiting, cough, shortness of breath, chest pain or palpitations    Vital Signs Last 24 Hrs  T(C): 36.8 (13 Aug 2019 07:30), Max: 37.1 (12 Aug 2019 16:07)  T(F): 98.2 (13 Aug 2019 07:30), Max: 98.8 (12 Aug 2019 16:07)  HR: 70 (13 Aug 2019 07:30) (65 - 71)  BP: 125/70 (13 Aug 2019 07:30) (113/72 - 137/78)  BP(mean): --  RR: 19 (13 Aug 2019 07:30) (19 - 20)  SpO2: 96% (13 Aug 2019 07:30) (94% - 98%)I&O's Summary    PHYSICAL EXAM:  GENERAL: NAD, well-groomed  HEENT: PERRL, +EOMI, anicteric, no [place]  NECK: Supple, No JVD   CHEST/LUNG: CTA bilaterally; Normal effort  HEART: S1S2 Normal intensity, no murmurs, gallops or rubs noted  ABDOMEN: Soft, BS Normoactive, NT, ND, no HSM noted  EXTREMITIES:  2+ radial and DP pulses noted, no clubbing, cyanosis, or edema noted, FROM x 4  SKIN: No rashes or lesions noted  NEURO: A&Ox3, no focal deficits noted, CN II-XII intact  PSYCH: normal mood and affect; insight/judgement appropriate  LABS:                        11.4   5.71  )-----------( 297      ( 12 Aug 2019 11:37 )             34.7     08-12    133<L>  |  94<L>  |  18.0  ----------------------------<  152<H>  4.2   |  31.0<H>  |  1.01    Ca    8.8      12 Aug 2019 11:37    TPro  6.1<L>  /  Alb  3.0<L>  /  TBili  0.4  /  DBili  x   /  AST  14  /  ALT  7   /  AlkPhos  108  08-12        RADIOLOGY & ADDITIONAL TESTS:    MEDICATIONS:  MEDICATIONS  (STANDING):  aspirin enteric coated 81 milliGRAM(s) Oral daily  benztropine 1 milliGRAM(s) Oral two times a day  diVALproex DR 1500 milliGRAM(s) Oral two times a day  docusate sodium 200 milliGRAM(s) Oral two times a day  famotidine    Tablet 20 milliGRAM(s) Oral two times a day  folic acid 1 milliGRAM(s) Oral daily  furosemide    Tablet 20 milliGRAM(s) Oral daily  levothyroxine 50 MICROGram(s) Oral daily  LORazepam     Tablet 0.5 milliGRAM(s) Oral daily  ziprasidone 80 milliGRAM(s) Oral two times a day  ziprasidone 40 milliGRAM(s) Oral two times a day    MEDICATIONS  (PRN):  HYDROcodone/homatropine Syrup 5 milliLiter(s) Oral three times a day PRN Cough

## 2019-08-13 NOTE — PROGRESS NOTE ADULT - ASSESSMENT
64 y/o male with h/o schizophrenia, hypothyroidism and hyperlipidemia, s/p fall and traum a few weeks prior to admission and noted with LLE swelling.  Noticed on imaging with LLE hematoma of vastus intermedius, initially pt refusing blood work and care but amenable today. H/h remains stable and sx consulted and no evidence of compartment syndrome. Given hematoma difficult for pt to ambulate PT consulted and patient recommended for JUANA placement.     Left lower extremity  Hematoma of vastus intermedius    - h/h stable   - improving swelling and pain   - ck wnl   - discontinued heparin sq   - Venous doppler of LLE showing no DVT  - surgery f/u noted and appreciated, no further work up and signed off  - pt will need repeat imaging in 1-2 weeks to show improvement of hematoma, likely to self resolve  - Avoid blood thinner therapy     Schizophrenia, unspecified type  - c/w  Ziprasidone  - c/w 1:1 observation for safety b/c pt agitated at times  - psych consulted for medication optimization as well as level 2 clearance need for JUANA placement     Hypothyroidism   - c/w synthroid po qd     DVT ppx  - d/c heparin sq due to hematoma       Dispo: D/w CM and SW unable to return to psych facility b/c of difficulty with ambulation pt for JUANA, needs level 2 clearance.

## 2019-08-13 NOTE — BEHAVIORAL HEALTH ASSESSMENT NOTE - AFFECT RANGE
"  [facility]  Pediatric Cardiology  Visit Note    October 16, 2018    Dear Dr. Whitt,    I had the pleasure of evaluating Sarbjit Mora in the [facility] on 10/16/2018 for follow-up evaluation. He presents with his mother. As you remember, Sarbjit is a 12  year old 5  month old male with shortness of breath and palpitations described as fast heart rate associated with playing soccer. These symptoms have been ongoing for the past 2 years but have started to occur more frequently since Spring 2018 (about once a month). They occur suddenly and disappear slowly with rest. Of note, he does not endorse these symptoms with other activities. Sarbjit particularly struggles with playing in the heat and complains of easy fatigability, often sooner than his peers. He denies chest pain, dizziness or fainting. Sarbjit generally has a poor appetite and doesn't eat much throughout the day. He also does not drink much in the way of fluids. His mother says that Sarbjit is usually an anxious child, which she thinks may contribute to some of his complaints.    I initially saw Sarbjit 2 months ago, at which point I thought his anxiety may have been playing a role in making his shortness of breath worse. Given palpitations, I placed an event monitor. I allowed him to continue playing soccer competitively, which he has done. Since that visit, Sarbjit has noticed a \"fast heart rate\" up to 2 times per week, again only occurring with playing soccer. He denies, again, chest pain or dizziness. He has experienced less shortness of breath and fatigue, which mom attributes to cooler temperatures.  His fluid intake has not improved; however, he is sleeping more now that school has started. Mom reports he is eating regular meals, particularly a good breakfast every morning.    A comprehensive review of systems is otherwise negative.    Past Medical History  As " "above.  Anxiety  ADHD    Family History  Maternal grandfather-valve disease; sudden death while walking  Father-adopted; HTN  Cousin-sudden death at age 29 years while playing kickball; known to have a valve defect    Social History  Lives with family in [place], [place]. In 7th grade. Plays soccer competitively.    Medications    No current outpatient prescriptions on file prior to visit.  No current facility-administered medications on file prior to visit.     Allergies  No Known Allergies    Physical Examination  /73  Pulse 90  Resp 16  Ht 1.467 m (4' 9.75\")  Wt 35.6 kg (78 lb 7.7 oz)  SpO2 97%  BMI 16.55 kg/m2    Blood pressure percentiles are 83 % systolic and 86 % diastolic based on the 2017 AAP Clinical Practice Guideline. Blood pressure percentile targets: 90: 115/75, 95: 119/79, 95 + 12 mmH/91.    General: in no acute distress, well-appearing  HEENT: atraumatic, extraocular movements intact, moist mucous membranes  Resp: easy work of breathing, equal air entry bilaterally, clear to auscultate bilaterally  CVS: regular rate and rhythm, normal S1 and physiologically split S2; no murmurs, rubs or gallops  Abdomen: soft, non-tender, non-distended, no organomegaly  Extremities: warm and well-perfused; peripheral pulses 2+; no edema  Skin: acyanotic  Neuro: normal tone; antigravity strength  Mental Status: alert and active    Laboratory Studies:  Event Monitor (-2018): No symptoms or activities were noted. 6 auto triggered events revealed sinus tachycardia to max rate of 182 bpm.    Assessment:  Patient Active Problem List   Diagnosis     Heart palpitations     Exercise intolerance     Exertional dyspnea     Anxiety     Sarbjit has situational palpitations and dyspnea only while playing soccer, which improve slowly with rest. He continues to deny these symptoms with other physical activity, which is still reassuring. There is no evidence of a tachyarrhythmia on event monitor while he " is exerting himself. I suspect that he may be hyper-aware of his heart rate during peak exertion. Additionally, I think that he is not keeping himself hydrated enough and/or may be a little deconditioned to play at the level he desires, especially in a hot environment. His anxiety could be exacerbating his dyspnea.    Plan:  - recommended improving diet, eating regularly and improving fluid intake  - an exercise stress test would be unlikely to yield useful data given reassuring results of event monitor  - could consider pulmonary function tests  - if shortness of breath with soccer persists, consider pulmonary referral    Activity Restriction: none  SBE prophylaxis: NOT indicated    Follow-up: none needed unless new concerns arise    Thank you for allowing me to participate in Sarbjit's care. Please contact me with questions or concerns.    Sincerely,    Vincent Choudhury MD    Division of Pediatric Cardiology  Department of Pediatrics  [facility]   Full

## 2019-08-13 NOTE — BEHAVIORAL HEALTH ASSESSMENT NOTE - NSBHCHARTREVIEWVS_PSY_A_CORE FT
Vital Signs Last 24 Hrs  T(C): 36.8 (13 Aug 2019 07:30), Max: 37.1 (12 Aug 2019 16:07)  T(F): 98.2 (13 Aug 2019 07:30), Max: 98.8 (12 Aug 2019 16:07)  HR: 70 (13 Aug 2019 07:30) (65 - 71)  BP: 125/70 (13 Aug 2019 07:30) (113/72 - 137/78)  RR: 19 (13 Aug 2019 07:30) (19 - 20)  SpO2: 96% (13 Aug 2019 07:30) (94% - 98%)

## 2019-08-13 NOTE — BEHAVIORAL HEALTH ASSESSMENT NOTE - RISK ASSESSMENT
high fall risk due to left lower extremity pain  Acute Suicide Risk  (  ) High   (  ) Moderate   ( x ) Low   (  ) Unable to determine   Rationale: states he does not want to harm himself or others   Elevated Chronic Risk   (  ) Yes   Details:   (x  ) No

## 2019-08-13 NOTE — BEHAVIORAL HEALTH ASSESSMENT NOTE - NSBHCONSULTMEDS_PSY_A_CORE FT
MEDICATIONS  (STANDING):  benztropine 1 milliGRAM(s) Oral two times a day  Divalproex DR 1500 milliGRAM(s) Oral two times a day  Lorazepam     Tablet 0.5 milliGRAM(s) Oral daily  ziprasidone 80 milliGRAM(s) Oral two times a day  ziprasidone 40 milliGRAM(s) Oral two times a day

## 2019-08-13 NOTE — BEHAVIORAL HEALTH ASSESSMENT NOTE - DIFFERENTIAL
C-SSRS Screener     1. Have you ever wished to be dead or wished you could go to sleep and not wake up?  [  ]Yes, [  x]No, [  ]Unable to Assess  Details: states he does not want to harm himself or others     2. Have you actually had any thoughts of killing yourself?   [  ]Yes, [x  ]No, [  ]Unable to Assess  Details:     If answer is “No” for 1 and 2, stop here. If answer is “Yes” to 1 or 2, proceed to 3.

## 2019-08-14 ENCOUNTER — TRANSCRIPTION ENCOUNTER (OUTPATIENT)
Age: 64
End: 2019-08-14

## 2019-08-14 PROCEDURE — 99232 SBSQ HOSP IP/OBS MODERATE 35: CPT

## 2019-08-14 RX ADMIN — Medication 200 MILLIGRAM(S): at 06:14

## 2019-08-14 RX ADMIN — Medication 1 MILLIGRAM(S): at 17:29

## 2019-08-14 RX ADMIN — DIVALPROEX SODIUM 1500 MILLIGRAM(S): 500 TABLET, DELAYED RELEASE ORAL at 17:28

## 2019-08-14 RX ADMIN — DIVALPROEX SODIUM 1500 MILLIGRAM(S): 500 TABLET, DELAYED RELEASE ORAL at 06:13

## 2019-08-14 RX ADMIN — Medication 50 MICROGRAM(S): at 06:15

## 2019-08-14 RX ADMIN — Medication 81 MILLIGRAM(S): at 10:23

## 2019-08-14 RX ADMIN — Medication 200 MILLIGRAM(S): at 17:28

## 2019-08-14 RX ADMIN — ZIPRASIDONE HYDROCHLORIDE 40 MILLIGRAM(S): 20 CAPSULE ORAL at 17:29

## 2019-08-14 RX ADMIN — FAMOTIDINE 20 MILLIGRAM(S): 10 INJECTION INTRAVENOUS at 06:13

## 2019-08-14 RX ADMIN — FAMOTIDINE 20 MILLIGRAM(S): 10 INJECTION INTRAVENOUS at 17:29

## 2019-08-14 RX ADMIN — ZIPRASIDONE HYDROCHLORIDE 80 MILLIGRAM(S): 20 CAPSULE ORAL at 06:15

## 2019-08-14 RX ADMIN — Medication 20 MILLIGRAM(S): at 06:14

## 2019-08-14 RX ADMIN — Medication 1 MILLIGRAM(S): at 10:23

## 2019-08-14 RX ADMIN — ZIPRASIDONE HYDROCHLORIDE 40 MILLIGRAM(S): 20 CAPSULE ORAL at 06:13

## 2019-08-14 RX ADMIN — Medication 0.5 MILLIGRAM(S): at 10:23

## 2019-08-14 RX ADMIN — ZIPRASIDONE HYDROCHLORIDE 80 MILLIGRAM(S): 20 CAPSULE ORAL at 17:28

## 2019-08-14 RX ADMIN — Medication 1 MILLIGRAM(S): at 06:15

## 2019-08-14 NOTE — PROGRESS NOTE ADULT - ASSESSMENT
64 y/o male with h/o schizophrenia, hypothyroidism and hyperlipidemia, s/p fall and traum a few weeks prior to admission and noted with LLE swelling.  Noticed on imaging with LLE hematoma of vastus intermedius, initially pt refusing blood work and care but amenable today. H/h remains stable and sx consulted and no evidence of compartment syndrome. Given hematoma difficult for pt to ambulate PT consulted and patient recommended for JUANA placement.     Left lower extremity  Hematoma of vastus intermedius    - h/h stable   - improving swelling and pain   - ck wnl   - discontinued heparin sq   - Venous doppler of LLE showing no DVT  - surgery f/u noted and appreciated, no further work up and signed off  - pt will need repeat imaging in 1-2 weeks to show improvement of hematoma, likely to self resolve      Schizophrenia, unspecified type  - c/w  Ziprasidone  - c/w 1:1 observation for safety b/c pt agitated at times  - psych consulted for medication optimization as well as level 2 clearance need for JUANA placement     Hypothyroidism   - c/w synthroid po qd     DVT ppx  - d/c heparin sq due to hematoma       Dispo: D/w CM and SW unable to return to psych facility b/c of difficulty with ambulation pt for JUANA, needs level 2 clearance.

## 2019-08-14 NOTE — PROGRESS NOTE ADULT - SUBJECTIVE AND OBJECTIVE BOX
CC: Left vastus intermedius hematoma.  Schizophrenia.   HPI:  62 y/o male with pmh h/o schizophrenia , hypothyroidism and hyperlipidemia  was sent in for left lower ext pain/ swelling which pt. has c/o for past 3 weeks. pt. was seen at [facility] ER about 1 week ago as well and had negative work up and was sent back. Today pt's ct head was done as his pcp thought that he was a little slow than normal. ct head is negative. PT recommended walker and subacute rehab. Pt's residence was contacted by ER physician to see if they cannot take him back but was not accepted as he will need more help for ADLS due to his current lower ext. complaint. now pt. is admitted for sub acute rehab placement. As per ER physician she spoke to the staff at his residence and they told that pt. is sometimes a little slow and other times is hyper and that is normal for him. pt. is not a good historian and does not want any CT scans or any studies of his LLE. pt. can not tell what happened to his lt. lower ext. pt. keep on asking for coffee and ginger ale. does not want any needles or blood work.   As per his med record he is on haldol im 400 mg q 4 weeks and last dose was on 7/24/19. pt. was treated with keflex by his pcp at facility for possible leg cellulitis ? about few pills are left. clinically no signs of cellulitis from the extent of exam pt. let me to have. (08 Aug 2019 21:01)    REVIEW OF SYSTEMS:    Patient denied fever, chills, abdominal pain, nausea, vomiting, cough, shortness of breath, chest pain or palpitations    Vital Signs Last 24 Hrs  T(C): 36.6 (14 Aug 2019 07:18), Max: 36.6 (14 Aug 2019 07:18)  T(F): 97.9 (14 Aug 2019 07:18), Max: 97.9 (14 Aug 2019 07:18)  HR: 62 (14 Aug 2019 07:18) (62 - 62)  BP: 129/78 (14 Aug 2019 07:18) (129/78 - 129/78)  BP(mean): --  RR: 19 (14 Aug 2019 07:18) (19 - 19)  SpO2: 96% (14 Aug 2019 07:18) (96% - 96%)I&O's Summary    13 Aug 2019 07:01  -  14 Aug 2019 07:00  --------------------------------------------------------  IN: 0 mL / OUT: 350 mL / NET: -350 mL    14 Aug 2019 07:01  -  14 Aug 2019 16:49  --------------------------------------------------------  IN: 0 mL / OUT: 2700 mL / NET: -2700 mL      PHYSICAL EXAM:  GENERAL: NAD,   HEENT: PERRL, +EOMI, anicteric, no [place]  NECK: Supple, No JVD   CHEST/LUNG: CTA bilaterally; Normal effort  HEART: S1S2 Normal intensity, no murmurs, gallops or rubs noted  ABDOMEN: Soft, BS Normoactive, NT, ND, no HSM noted  EXTREMITIES:  2+ radial and DP pulses noted, no clubbing, cyanosis, Left lower ext edema noted, Limited mobility. Bradykinesia   SKIN: No rashes or lesions noted  NEURO: A&O, no focal deficits noted, CN II-XII intact  PSYCH: Depressed  mood and affect; insight/judgement inappropriate  LABS:              RADIOLOGY & ADDITIONAL TESTS:    MEDICATIONS:  MEDICATIONS  (STANDING):  aspirin enteric coated 81 milliGRAM(s) Oral daily  benztropine 1 milliGRAM(s) Oral two times a day  diVALproex DR 1500 milliGRAM(s) Oral two times a day  docusate sodium 200 milliGRAM(s) Oral two times a day  famotidine    Tablet 20 milliGRAM(s) Oral two times a day  folic acid 1 milliGRAM(s) Oral daily  furosemide    Tablet 20 milliGRAM(s) Oral daily  levothyroxine 50 MICROGram(s) Oral daily  LORazepam     Tablet 0.5 milliGRAM(s) Oral daily  ziprasidone 80 milliGRAM(s) Oral two times a day  ziprasidone 40 milliGRAM(s) Oral two times a day    MEDICATIONS  (PRN):  HYDROcodone/homatropine Syrup 5 milliLiter(s) Oral three times a day PRN Cough

## 2019-08-14 NOTE — DISCHARGE NOTE PROVIDER - HOSPITAL COURSE
64 y/o male with h/o schizophrenia, hypothyroidism and hyperlipidemia, s/p fall and trauma few weeks prior to admission and noted with LLE swelling.  Noticed on imaging, with LLE hematoma of vastus intermedius. Initially pt refused blood work and care, but later cooperated.     A Venous doppler of LLE done showed no evidence of no DVT. No evidence of compartment syndrome noted. Acute care surgery was consulted and evaluated patient for compartmental syndrome, and determined that no further work up indicated.      Patient will need repeat imaging in 1-2 weeks to show improvement of hematoma, likely to self resolve. Du to ambulation secondary to hematoma, PT consulted, evaluated patient and recommended for JUANA placement.         For Schizophrenia, unspecified type, Ziprasidone was continued    Constant 1:1 observation for safety also put in place due to patient being agitated at times    Psych consulted for medication optimization as well as level 2 clearance need for JUANA placement         For Hypothyroidism Synthroid po daily was continued.        The patient has improved in response to medical management, and is currently medically stable for discharge 62 y/o male with h/o schizophrenia, hypothyroidism and hyperlipidemia, s/p fall and trauma few weeks prior to admission and noted with LLE swelling.  Noticed on imaging, with LLE hematoma of vastus intermedius. Initially pt refused blood work and care, but later cooperated.     A Venous doppler of LLE done showed no evidence of no DVT. No evidence of compartment syndrome noted. Acute care surgery was consulted and evaluated patient for compartmental syndrome, and determined that no further work up indicated.      Patient will need repeat imaging in 1 week to show improvement of hematoma, likely to self resolve. Due to difficulty with ambulation secondary to hematoma, PT consulted, evaluated patient and recommended for JUANA placement. PT contiued to work with patient and patient now stable for discharge to [facility].  Psych followed during hospitalization.  Patient takes IM Haldol q month outpatient.  Haldol due 8/21/19.  Haldol ordered and to be given today, 8/20/19 prior to discharge. 64 y/o male with h/o schizophrenia, hypothyroidism and hyperlipidemia, s/p fall and trauma few weeks prior to admission and noted with LLE swelling.  Noticed on imaging, with LLE hematoma of vastus intermedius. Initially pt refused blood work and care, but later cooperated.     A Venous doppler of LLE done showed no evidence of no DVT. No evidence of compartment syndrome noted. Acute care surgery was consulted and evaluated patient for compartmental syndrome, and determined that no further work up indicated.      Patient will need repeat imaging in 1 week to show improvement of hematoma, likely to self resolve. Due to difficulty with ambulation secondary to hematoma, PT consulted, evaluated patient and recommended for JUNAA placement. PT contiued to work with patient and patient now stable for discharge to [facility].  Psych followed during hospitalization.  Patient takes IM Haldol q month outpatient.  Haldol due 8/21/19.  Haldol ordered and to be given today, 8/20/19 prior to discharge.        total time spend 35 min coordinating care and discharge

## 2019-08-14 NOTE — DISCHARGE NOTE PROVIDER - NSDCCPCAREPLAN_GEN_ALL_CORE_FT
PRINCIPAL DISCHARGE DIAGNOSIS  Diagnosis: Hematoma of left lower extremity  Assessment and Plan of Treatment: Hematoma of vastus intermedius of left lower extremity secondary to fall  noted on CT and doppler US. Due to suspicion for DVT or compartmental syndrome, Dopppler US done, but showed no evidence of DVT or compartmental syndrom. Surgery consulted, but on evaluation determined that no further work up was indicated.   -Pain control with analgesics.  -IV fluids and CK monitored PRINCIPAL DISCHARGE DIAGNOSIS  Diagnosis: Hematoma of left lower extremity  Assessment and Plan of Treatment: Hematoma of vastus intermedius of left lower extremity secondary to fall  noted on CT and doppler US. Due to suspicion for DVT or compartmental syndrome, Dopppler US done, but showed no evidence of DVT or compartmental syndrom. Surgery consulted, but on evaluation determined that no further work up was indicated.   Will need repeat imaging in 1 week to follow up.      SECONDARY DISCHARGE DIAGNOSES  Diagnosis: Schizophrenia, unspecified type  Assessment and Plan of Treatment: Continue current medications as prescribed.  Follow up with psych.

## 2019-08-15 PROCEDURE — 99232 SBSQ HOSP IP/OBS MODERATE 35: CPT

## 2019-08-15 RX ORDER — NICOTINE POLACRILEX 2 MG
1 GUM BUCCAL DAILY
Refills: 0 | Status: DISCONTINUED | OUTPATIENT
Start: 2019-08-15 | End: 2019-08-20

## 2019-08-15 RX ADMIN — FAMOTIDINE 20 MILLIGRAM(S): 10 INJECTION INTRAVENOUS at 05:10

## 2019-08-15 RX ADMIN — Medication 1 PATCH: at 13:20

## 2019-08-15 RX ADMIN — Medication 200 MILLIGRAM(S): at 17:35

## 2019-08-15 RX ADMIN — Medication 50 MICROGRAM(S): at 05:10

## 2019-08-15 RX ADMIN — DIVALPROEX SODIUM 1500 MILLIGRAM(S): 500 TABLET, DELAYED RELEASE ORAL at 17:35

## 2019-08-15 RX ADMIN — Medication 81 MILLIGRAM(S): at 11:26

## 2019-08-15 RX ADMIN — DIVALPROEX SODIUM 1500 MILLIGRAM(S): 500 TABLET, DELAYED RELEASE ORAL at 05:11

## 2019-08-15 RX ADMIN — ZIPRASIDONE HYDROCHLORIDE 80 MILLIGRAM(S): 20 CAPSULE ORAL at 17:36

## 2019-08-15 RX ADMIN — ZIPRASIDONE HYDROCHLORIDE 40 MILLIGRAM(S): 20 CAPSULE ORAL at 05:10

## 2019-08-15 RX ADMIN — FAMOTIDINE 20 MILLIGRAM(S): 10 INJECTION INTRAVENOUS at 17:35

## 2019-08-15 RX ADMIN — ZIPRASIDONE HYDROCHLORIDE 80 MILLIGRAM(S): 20 CAPSULE ORAL at 05:10

## 2019-08-15 RX ADMIN — Medication 1 MILLIGRAM(S): at 11:26

## 2019-08-15 RX ADMIN — ZIPRASIDONE HYDROCHLORIDE 40 MILLIGRAM(S): 20 CAPSULE ORAL at 17:36

## 2019-08-15 RX ADMIN — Medication 1 MILLIGRAM(S): at 05:10

## 2019-08-15 RX ADMIN — Medication 20 MILLIGRAM(S): at 05:10

## 2019-08-15 RX ADMIN — Medication 0.5 MILLIGRAM(S): at 11:25

## 2019-08-15 RX ADMIN — Medication 200 MILLIGRAM(S): at 05:12

## 2019-08-15 RX ADMIN — Medication 1 MILLIGRAM(S): at 17:35

## 2019-08-15 NOTE — PROGRESS NOTE ADULT - SUBJECTIVE AND OBJECTIVE BOX
CC: Left vastus intermedius hematoma.  Schizophrenia disorganized.   HPI:  64 y/o male with pmh h/o schizophrenia , hypothyroidism and hyperlipidemia  was sent in for left lower ext pain/ swelling which pt. has c/o for past 3 weeks. pt. was seen at [facility] ER about 1 week ago as well and had negative work up and was sent back. Today pt's ct head was done as his pcp thought that he was a little slow than normal. ct head is negative. PT recommended walker and subacute rehab. Pt's residence was contacted by ER physician to see if they cannot take him back but was not accepted as he will need more help for ADLS due to his current lower ext. complaint. now pt. is admitted for sub acute rehab placement. As per ER physician she spoke to the staff at his residence and they told that pt. is sometimes a little slow and other times is hyper and that is normal for him. pt. is not a good historian and does not want any CT scans or any studies of his LLE. pt. can not tell what happened to his lt. lower ext. pt. keep on asking for coffee and ginger ale. does not want any needles or blood work.   As per his med record he is on haldol im 400 mg q 4 weeks and last dose was on 7/24/19. pt. was treated with keflex by his pcp at facility for possible leg cellulitis ? about few pills are left. clinically no signs of cellulitis from the extent of exam pt. let me to have. (08 Aug 2019 21:01)    REVIEW OF SYSTEMS:    Patient denied fever, chills, abdominal pain, nausea, vomiting, cough, shortness of breath, chest pain or palpitations    Vital Signs Last 24 Hrs  T(C): 36.8 (15 Aug 2019 08:33), Max: 36.9 (14 Aug 2019 22:42)  T(F): 98.2 (15 Aug 2019 08:33), Max: 98.5 (15 Aug 2019 00:00)  HR: 70 (15 Aug 2019 08:33) (70 - 81)  BP: 153/74 (15 Aug 2019 08:33) (135/87 - 153/74)  BP(mean): --  RR: 18 (15 Aug 2019 08:33) (18 - 18)  SpO2: 98% (15 Aug 2019 08:33) (98% - 98%)I&O's Summary    14 Aug 2019 07:01  -  15 Aug 2019 07:00  --------------------------------------------------------  IN: 0 mL / OUT: 5650 mL / NET: -5650 mL      PHYSICAL EXAM:  GENERAL: NAD,   HEENT: PERRL, +EOMI, anicteric, no [place]  NECK: Supple, No JVD   CHEST/LUNG: CTA bilaterally; Normal effort  HEART: S1S2 Normal intensity, no murmurs, gallops or rubs noted  ABDOMEN: Soft, BS Normoactive, NT, ND, no HSM noted  EXTREMITIES:  2+ radial and DP pulses noted, no clubbing, cyanosis, or edema noted, Limited mobility   SKIN: No rashes or lesions noted  NEURO: Awake , confused , Hypokinesia, CN II-XII intact  PSYCH: Depressed mood and affect; insight/judgement inappropriate  LABS:        RADIOLOGY & ADDITIONAL TESTS:    MEDICATIONS:  MEDICATIONS  (STANDING):  aspirin enteric coated 81 milliGRAM(s) Oral daily  benztropine 1 milliGRAM(s) Oral two times a day  diVALproex DR 1500 milliGRAM(s) Oral two times a day  docusate sodium 200 milliGRAM(s) Oral two times a day  famotidine    Tablet 20 milliGRAM(s) Oral two times a day  folic acid 1 milliGRAM(s) Oral daily  furosemide    Tablet 20 milliGRAM(s) Oral daily  levothyroxine 50 MICROGram(s) Oral daily  LORazepam     Tablet 0.5 milliGRAM(s) Oral daily  nicotine - 21 mG/24Hr(s) Patch 1 patch Transdermal daily  ziprasidone 80 milliGRAM(s) Oral two times a day  ziprasidone 40 milliGRAM(s) Oral two times a day    MEDICATIONS  (PRN):  HYDROcodone/homatropine Syrup 5 milliLiter(s) Oral three times a day PRN Cough

## 2019-08-15 NOTE — PROGRESS NOTE ADULT - ASSESSMENT
62 y/o male with h/o schizophrenia, hypothyroidism and hyperlipidemia, s/p fall and traum a few weeks prior to admission and noted with LLE swelling.  Noticed on imaging with LLE hematoma of vastus intermedius, initially pt refusing blood work and care but amenable today. H/h remains stable and sx consulted and no evidence of compartment syndrome. Given hematoma difficult for pt to ambulate PT consulted and patient recommended for [facility] placement.     Left lower extremity  Hematoma of vastus intermedius    - h/h stable   - improving swelling and pain   - ck wnl   - discontinued heparin sq   - Venous doppler of LLE showing no DVT  - surgery f/u noted and appreciated, no further work up and signed off  - pt will need repeat imaging in 1-2 weeks to show improvement of hematoma, likely to self resolve      Schizophrenia, unspecified type  - c/w  Ziprasidone  - c/w 1:1 observation for safety b/c pt agitated at times  - psych consulted for medication optimization as well as level 2 clearance need for [facility] placement     Hypothyroidism   - c/w synthroid po qd     DVT ppx  - d/c heparin sq due to hematoma       Dispo: Unable to return to psych facility b/c of difficulty with ambulation pt for [facility], May or may not needs level 2 clearance.

## 2019-08-16 PROCEDURE — 99232 SBSQ HOSP IP/OBS MODERATE 35: CPT

## 2019-08-16 RX ADMIN — Medication 20 MILLIGRAM(S): at 05:36

## 2019-08-16 RX ADMIN — ZIPRASIDONE HYDROCHLORIDE 80 MILLIGRAM(S): 20 CAPSULE ORAL at 05:35

## 2019-08-16 RX ADMIN — FAMOTIDINE 20 MILLIGRAM(S): 10 INJECTION INTRAVENOUS at 18:30

## 2019-08-16 RX ADMIN — FAMOTIDINE 20 MILLIGRAM(S): 10 INJECTION INTRAVENOUS at 05:35

## 2019-08-16 RX ADMIN — Medication 1 MILLIGRAM(S): at 18:28

## 2019-08-16 RX ADMIN — Medication 1 MILLIGRAM(S): at 13:27

## 2019-08-16 RX ADMIN — ZIPRASIDONE HYDROCHLORIDE 40 MILLIGRAM(S): 20 CAPSULE ORAL at 05:35

## 2019-08-16 RX ADMIN — Medication 0.5 MILLIGRAM(S): at 13:27

## 2019-08-16 RX ADMIN — Medication 50 MICROGRAM(S): at 05:36

## 2019-08-16 RX ADMIN — Medication 1 MILLIGRAM(S): at 05:36

## 2019-08-16 RX ADMIN — DIVALPROEX SODIUM 1500 MILLIGRAM(S): 500 TABLET, DELAYED RELEASE ORAL at 05:36

## 2019-08-16 RX ADMIN — Medication 1 PATCH: at 19:34

## 2019-08-16 RX ADMIN — ZIPRASIDONE HYDROCHLORIDE 80 MILLIGRAM(S): 20 CAPSULE ORAL at 18:27

## 2019-08-16 RX ADMIN — Medication 81 MILLIGRAM(S): at 13:27

## 2019-08-16 RX ADMIN — Medication 1 PATCH: at 13:27

## 2019-08-16 RX ADMIN — Medication 200 MILLIGRAM(S): at 18:29

## 2019-08-16 RX ADMIN — Medication 200 MILLIGRAM(S): at 05:37

## 2019-08-16 RX ADMIN — DIVALPROEX SODIUM 1500 MILLIGRAM(S): 500 TABLET, DELAYED RELEASE ORAL at 18:29

## 2019-08-16 RX ADMIN — ZIPRASIDONE HYDROCHLORIDE 40 MILLIGRAM(S): 20 CAPSULE ORAL at 18:28

## 2019-08-16 NOTE — PROGRESS NOTE ADULT - SUBJECTIVE AND OBJECTIVE BOX
CC: left leg pain/hematoma    INTERVAL HPI/OVERNIGHT EVENTS: PAtient seen and examined. Impulse, tries to get up without assistance, unsteady on feet. LEg swelling improving. Appears comfortable.    Vital Signs Last 24 Hrs  T(C): 36.7 (16 Aug 2019 07:40), Max: 36.7 (15 Aug 2019 16:10)  T(F): 98 (16 Aug 2019 07:40), Max: 98.1 (15 Aug 2019 16:10)  HR: 72 (16 Aug 2019 07:40) (66 - 73)  BP: 104/65 (16 Aug 2019 07:40) (104/65 - 126/65)  BP(mean): 85 (15 Aug 2019 23:36) (85 - 85)  RR: 17 (16 Aug 2019 07:40) (17 - 18)  SpO2: 98% (16 Aug 2019 07:40) (96% - 98%)    ROS:  Limited due to baseline mental status      PHYSICAL EXAM:  GENERAL: NAD,   HEENT: PERRL, +EOMI, anicteric, no [place]  NECK: Supple, No JVD   CHEST/LUNG: CTA bilaterally; Normal effort  HEART: S1S2 Normal intensity, no murmurs, gallops or rubs noted  ABDOMEN: Soft, BS Normoactive, NT, ND, no HSM noted  EXTREMITIES:  LLE hematoma  SKIN: No rashes or lesions noted  NEURO: Awake , non focal  PSYCH: Impulsive; insight/judgement inappropriate      I&O's Detail    16 Aug 2019 07:01  -  16 Aug 2019 14:19  --------------------------------------------------------  IN:  Total IN: 0 mL    OUT:    Voided: 800 mL  Total OUT: 800 mL    Total NET: -800 mL                        CAPILLARY BLOOD GLUCOSE              MEDICATIONS  (STANDING):  aspirin enteric coated 81 milliGRAM(s) Oral daily  benztropine 1 milliGRAM(s) Oral two times a day  diVALproex DR 1500 milliGRAM(s) Oral two times a day  docusate sodium 200 milliGRAM(s) Oral two times a day  famotidine    Tablet 20 milliGRAM(s) Oral two times a day  folic acid 1 milliGRAM(s) Oral daily  furosemide    Tablet 20 milliGRAM(s) Oral daily  levothyroxine 50 MICROGram(s) Oral daily  LORazepam     Tablet 0.5 milliGRAM(s) Oral daily  nicotine - 21 mG/24Hr(s) Patch 1 patch Transdermal daily  ziprasidone 80 milliGRAM(s) Oral two times a day  ziprasidone 40 milliGRAM(s) Oral two times a day    MEDICATIONS  (PRN):  HYDROcodone/homatropine Syrup 5 milliLiter(s) Oral three times a day PRN Cough      RADIOLOGY & ADDITIONAL TESTS:

## 2019-08-16 NOTE — PROGRESS NOTE ADULT - ASSESSMENT
64 y/o male with h/o schizophrenia, hypothyroidism and hyperlipidemia, s/p fall and traum a few weeks prior to admission and noted with LLE swelling.  Noticed on imaging with LLE hematoma of vastus intermedius, initially pt refusing blood work and care but amenable today. H/h remains stable and sx consulted and no evidence of compartment syndrome. Given hematoma difficult for pt to ambulate PT consulted and patient recommended for [facility] placement.     Left lower extremity  Hematoma of vastus intermedius    - h/h stable   - improving swelling and pain   - ck wnl   - discontinued heparin sq   - Venous doppler of LLE showing no DVT  - surgery f/u noted and appreciated, no further work up and signed off  - pt will need repeat imaging in 1-2 weeks to show improvement of hematoma, likely to self resolve      Schizophrenia, unspecified type  - c/w  Ziprasidone  - c/w 1:1 observation for safety b/c pt agitated at times  - will require level 2 clearance need for [facility]NA placement . Social Work on board    Hypothyroidism   - c/w synthroid po qd     DVT ppx  - d/c heparin sq due to hematoma       Dispo: Unable to return to psych facility b/c of difficulty with ambulation pt for J[facility]NA needs level 2 clearance.

## 2019-08-16 NOTE — PROGRESS NOTE ADULT - ATTENDING COMMENTS
Schizophrenia  Left lower ext hematoma is resolving ;  Pain and swelling is resolving.   Hypokinesia persist.   Ambulate with pT  Continue ziprasidone for schizo  Discharge planning - for Rehab placement

## 2019-08-17 PROCEDURE — 99232 SBSQ HOSP IP/OBS MODERATE 35: CPT

## 2019-08-17 RX ADMIN — ZIPRASIDONE HYDROCHLORIDE 80 MILLIGRAM(S): 20 CAPSULE ORAL at 17:46

## 2019-08-17 RX ADMIN — Medication 50 MICROGRAM(S): at 05:31

## 2019-08-17 RX ADMIN — Medication 20 MILLIGRAM(S): at 05:31

## 2019-08-17 RX ADMIN — ZIPRASIDONE HYDROCHLORIDE 40 MILLIGRAM(S): 20 CAPSULE ORAL at 17:46

## 2019-08-17 RX ADMIN — Medication 1 MILLIGRAM(S): at 11:14

## 2019-08-17 RX ADMIN — Medication 1 PATCH: at 11:14

## 2019-08-17 RX ADMIN — Medication 81 MILLIGRAM(S): at 11:14

## 2019-08-17 RX ADMIN — Medication 200 MILLIGRAM(S): at 05:32

## 2019-08-17 RX ADMIN — Medication 200 MILLIGRAM(S): at 17:45

## 2019-08-17 RX ADMIN — Medication 1 PATCH: at 06:25

## 2019-08-17 RX ADMIN — ZIPRASIDONE HYDROCHLORIDE 80 MILLIGRAM(S): 20 CAPSULE ORAL at 05:32

## 2019-08-17 RX ADMIN — Medication 1 MILLIGRAM(S): at 05:31

## 2019-08-17 RX ADMIN — DIVALPROEX SODIUM 1500 MILLIGRAM(S): 500 TABLET, DELAYED RELEASE ORAL at 05:26

## 2019-08-17 RX ADMIN — Medication 1 PATCH: at 11:18

## 2019-08-17 RX ADMIN — DIVALPROEX SODIUM 1500 MILLIGRAM(S): 500 TABLET, DELAYED RELEASE ORAL at 17:46

## 2019-08-17 RX ADMIN — Medication 1 MILLIGRAM(S): at 17:46

## 2019-08-17 RX ADMIN — Medication 1 PATCH: at 19:30

## 2019-08-17 RX ADMIN — Medication 0.5 MILLIGRAM(S): at 11:14

## 2019-08-17 RX ADMIN — FAMOTIDINE 20 MILLIGRAM(S): 10 INJECTION INTRAVENOUS at 17:46

## 2019-08-17 RX ADMIN — ZIPRASIDONE HYDROCHLORIDE 40 MILLIGRAM(S): 20 CAPSULE ORAL at 05:31

## 2019-08-17 RX ADMIN — FAMOTIDINE 20 MILLIGRAM(S): 10 INJECTION INTRAVENOUS at 05:31

## 2019-08-17 NOTE — PROGRESS NOTE ADULT - SUBJECTIVE AND OBJECTIVE BOX
CC: Left thigh hematoma involving the vastus intermedius.  Swelling hematoma is resolving .  Schizophrenia. Difficulty ambulating- Hypokinesia.   HPI:  64 y/o male with pmh h/o schizophrenia , hypothyroidism and hyperlipidemia  was sent in for left lower ext pain/ swelling which pt. has c/o for past 3 weeks. pt. was seen at [facility] ER about 1 week ago as well and had negative work up and was sent back. Today pt's ct head was done as his pcp thought that he was a little slow than normal. ct head is negative. PT recommended walker and subacute rehab. Pt's residence was contacted by ER physician to see if they cannot take him back but was not accepted as he will need more help for ADLS due to his current lower ext. complaint. now pt. is admitted for sub acute rehab placement. As per ER physician she spoke to the staff at his residence and they told that pt. is sometimes a little slow and other times is hyper and that is normal for him. pt. is not a good historian and does not want any CT scans or any studies of his LLE. pt. can not tell what happened to his lt. lower ext. pt. keep on asking for coffee and ginger ale. does not want any needles or blood work.   As per his med record he is on haldol im 400 mg q 4 weeks and last dose was on 7/24/19. pt. was treated with keflex by his pcp at facility for possible leg cellulitis ? about few pills are left. clinically no signs of cellulitis from the extent of exam pt. let me to have. (08 Aug 2019 21:01)    REVIEW OF SYSTEMS:    Patient denied fever, chills, abdominal pain, nausea, vomiting, cough, shortness of breath, chest pain or palpitations    Vital Signs Last 24 Hrs  T(C): 36.8 (17 Aug 2019 09:16), Max: 36.8 (16 Aug 2019 23:11)  T(F): 98.2 (17 Aug 2019 09:16), Max: 98.2 (16 Aug 2019 23:11)  HR: 66 (17 Aug 2019 09:16) (61 - 78)  BP: 124/78 (17 Aug 2019 09:16) (98/62 - 162/81)  BP(mean): --  RR: 18 (17 Aug 2019 09:16) (18 - 20)  SpO2: 98% (17 Aug 2019 09:16) (95% - 99%)I&O's Summary    16 Aug 2019 07:01  -  17 Aug 2019 07:00  --------------------------------------------------------  IN: 1320 mL / OUT: 2800 mL / NET: -1480 mL      PHYSICAL EXAM:  GENERAL: NAD,   HEENT: PERRL, +EOMI, anicteric, no [place]  NECK: Supple, No JVD   CHEST/LUNG: CTA bilaterally; Normal effort  HEART: S1S2 Normal intensity, no murmurs, gallops or rubs noted  ABDOMEN: Soft, BS Normoactive, NT, ND, no HSM noted  EXTREMITIES:  2+ radial and DP pulses noted, no clubbing, cyanosis, or edema noted, Limited mobility   SKIN: No rashes or lesions noted  NEURO: Confused,  CN II-XII intact  PSYCH: Depressed mood and affect; insight/judgement inappropriate  LABS:        RADIOLOGY & ADDITIONAL TESTS:    MEDICATIONS:  MEDICATIONS  (STANDING):  aspirin enteric coated 81 milliGRAM(s) Oral daily  benztropine 1 milliGRAM(s) Oral two times a day  diVALproex DR 1500 milliGRAM(s) Oral two times a day  docusate sodium 200 milliGRAM(s) Oral two times a day  famotidine    Tablet 20 milliGRAM(s) Oral two times a day  folic acid 1 milliGRAM(s) Oral daily  furosemide    Tablet 20 milliGRAM(s) Oral daily  levothyroxine 50 MICROGram(s) Oral daily  LORazepam     Tablet 0.5 milliGRAM(s) Oral daily  nicotine - 21 mG/24Hr(s) Patch 1 patch Transdermal daily  ziprasidone 80 milliGRAM(s) Oral two times a day  ziprasidone 40 milliGRAM(s) Oral two times a day    MEDICATIONS  (PRN):  HYDROcodone/homatropine Syrup 5 milliLiter(s) Oral three times a day PRN Cough

## 2019-08-17 NOTE — DIETITIAN INITIAL EVALUATION ADULT. - OTHER INFO
Pt s/p fall and trauma a few weeks ago presents with L. leg pain, hx schizophrenia, hypothyroid, HLD. Pt is a poor historian, alert and confused unable to report weight hx/PO intake PTA. RN reports Pt with good PO intake, completing >75% of meals. Observed breakfast tray at bedside, Pt completed 100% of meal per plate waste. Unclear accuracy of weights, admit weight 199.9 lbs (noted with 2+ L leg/foot/ankle edema), current bedscale weight 181lbs. Aware diuretic use and no edema currently documented, ~19lbs weight loss likely fluid related as there are no physical signs of muscle wasting/fat loss present and Pt continues with good appetite/PO. Will continue to monitor weights for accuracy. Pt s/p fall and trauma a few weeks ago presents with L. leg pain, hx schizophrenia, hypothyroid, HLD. Pt is a poor historian, alert and confused unable to report weight hx/PO intake PTA. RN reports Pt with good PO intake, completing >75% of meals. Observed breakfast tray at bedside, Pt completed 100% of meal per plate waste. Unclear accuracy of weights, admit weight 199.9 lbs (noted with 2+ L leg/foot/ankle edema), current bedscale weight 181lbs. Aware diuretic use and no edema currently documented, ~19lbs weight loss likely fluid related as there are no physical signs of muscle wasting/fat loss present and Pt continues with good appetite/PO. Will continue to monitor weights for accuracy. Aware Pt pending level 2 clearance for d/c to [facility].

## 2019-08-17 NOTE — PROGRESS NOTE ADULT - ASSESSMENT
64 y/o male with h/o schizophrenia, hypothyroidism and hyperlipidemia, s/p fall and traum a few weeks prior to admission and noted with LLE swelling.  Noticed on imaging with LLE hematoma of vastus intermedius, initially pt refusing blood work and care but amenable today. H/h remains stable and sx consulted and no evidence of compartment syndrome. Given hematoma difficult for pt to ambulate PT consulted and patient recommended for [facility] placement.     Left lower extremity  Hematoma of vastus intermedius    - h/h stable   - improving swelling and pain   - CPK is WNL   - Venous doppler of LLE showing no DVT  - surgery f/u noted and appreciated, no further work up and signed off  - pt will need repeat imaging in 1-2 weeks to show improvement of hematoma, likely to self resolve    Schizophrenia, unspecified type  - c/w  Ziprasidone  - c/w 1:1 observation for safety b/c pt agitated at times  - will require level 2 clearance need for J[facility]NA placement . Social Work on board    Hypothyroidism   - c/w synthroid po qd     DVT ppx  - d/c heparin sq due to hematoma     Dispo: Unable to return to psych facility b/c of difficulty with ambulation pt for J[facility]NA awaiting.

## 2019-08-18 PROCEDURE — 99232 SBSQ HOSP IP/OBS MODERATE 35: CPT

## 2019-08-18 RX ADMIN — DIVALPROEX SODIUM 1500 MILLIGRAM(S): 500 TABLET, DELAYED RELEASE ORAL at 05:37

## 2019-08-18 RX ADMIN — DIVALPROEX SODIUM 1500 MILLIGRAM(S): 500 TABLET, DELAYED RELEASE ORAL at 18:31

## 2019-08-18 RX ADMIN — Medication 200 MILLIGRAM(S): at 18:32

## 2019-08-18 RX ADMIN — Medication 1 PATCH: at 11:36

## 2019-08-18 RX ADMIN — ZIPRASIDONE HYDROCHLORIDE 40 MILLIGRAM(S): 20 CAPSULE ORAL at 18:31

## 2019-08-18 RX ADMIN — Medication 0.5 MILLIGRAM(S): at 11:36

## 2019-08-18 RX ADMIN — Medication 1 MILLIGRAM(S): at 05:37

## 2019-08-18 RX ADMIN — FAMOTIDINE 20 MILLIGRAM(S): 10 INJECTION INTRAVENOUS at 18:31

## 2019-08-18 RX ADMIN — Medication 50 MICROGRAM(S): at 05:37

## 2019-08-18 RX ADMIN — ZIPRASIDONE HYDROCHLORIDE 40 MILLIGRAM(S): 20 CAPSULE ORAL at 05:37

## 2019-08-18 RX ADMIN — ZIPRASIDONE HYDROCHLORIDE 80 MILLIGRAM(S): 20 CAPSULE ORAL at 05:37

## 2019-08-18 RX ADMIN — Medication 1 MILLIGRAM(S): at 18:32

## 2019-08-18 RX ADMIN — FAMOTIDINE 20 MILLIGRAM(S): 10 INJECTION INTRAVENOUS at 05:37

## 2019-08-18 RX ADMIN — Medication 20 MILLIGRAM(S): at 05:37

## 2019-08-18 RX ADMIN — ZIPRASIDONE HYDROCHLORIDE 80 MILLIGRAM(S): 20 CAPSULE ORAL at 18:31

## 2019-08-18 RX ADMIN — Medication 1 PATCH: at 11:00

## 2019-08-18 RX ADMIN — Medication 1 PATCH: at 07:33

## 2019-08-18 RX ADMIN — Medication 1 MILLIGRAM(S): at 11:36

## 2019-08-18 RX ADMIN — Medication 1 PATCH: at 19:30

## 2019-08-18 RX ADMIN — Medication 81 MILLIGRAM(S): at 11:36

## 2019-08-18 RX ADMIN — Medication 200 MILLIGRAM(S): at 05:37

## 2019-08-18 NOTE — PROGRESS NOTE ADULT - ASSESSMENT
64 y/o male with h/o schizophrenia, hypothyroidism and hyperlipidemia, s/p fall and traum a few weeks prior to admission and noted with LLE swelling.  Noticed on imaging with LLE hematoma of vastus intermedius, initially pt refusing blood work and care but amenable today. H/h remains stable and sx consulted and no evidence of compartment syndrome. Given hematoma difficult for pt to ambulate PT consulted and patient recommended for [facility] placement.     Left lower extremity  Hematoma of vastus intermedius    - h/h stable   - improving swelling and pain   - CPK is WNL   - Venous doppler of LLE showing no DVT  - surgery f/u noted and appreciated, no further work up and signed off  - pt will need repeat imaging in 1-2 weeks to show improvement of hematoma, likely to self resolve    Schizophrenia, unspecified type  - c/w  Ziprasidone  - c/w 1:1 observation for safety b/c pt agitated at times  -  for [facility]NA placement . Social Work on board    Hypothyroidism   - c/w synthroid po qd     DVT ppx  - d/c heparin sq due to hematoma     Dispo: Unable to return to psych facility b/c of difficulty with ambulation pt for J[facility]NA awaiting.

## 2019-08-18 NOTE — PROGRESS NOTE ADULT - SUBJECTIVE AND OBJECTIVE BOX
CC: Left lower ext hematoma is resolving.  Mobility limitations . Schizophrenia.   HPI:  62 y/o male with pmh h/o schizophrenia , hypothyroidism and hyperlipidemia  was sent in for left lower ext pain/ swelling which pt. has c/o for past 3 weeks. pt. was seen at [facility] ER about 1 week ago as well and had negative work up and was sent back. Today pt's ct head was done as his pcp thought that he was a little slow than normal. ct head is negative. PT recommended walker and subacute rehab. Pt's residence was contacted by ER physician to see if they cannot take him back but was not accepted as he will need more help for ADLS due to his current lower ext. complaint. now pt. is admitted for sub acute rehab placement. As per ER physician she spoke to the staff at his residence and they told that pt. is sometimes a little slow and other times is hyper and that is normal for him. pt. is not a good historian and does not want any CT scans or any studies of his LLE. pt. can not tell what happened to his lt. lower ext. pt. keep on asking for coffee and ginger ale. does not want any needles or blood work.   As per his med record he is on haldol im 400 mg q 4 weeks and last dose was on 7/24/19. pt. was treated with keflex by his pcp at facility for possible leg cellulitis ? about few pills are left. clinically no signs of cellulitis from the extent of exam pt. let me to have. (08 Aug 2019 21:01)    REVIEW OF SYSTEMS:    Patient denied fever, chills, abdominal pain, nausea, vomiting, cough, shortness of breath, chest pain or palpitations    Vital Signs Last 24 Hrs  T(C): 36.3 (18 Aug 2019 16:00), Max: 37.1 (18 Aug 2019 00:08)  T(F): 97.3 (18 Aug 2019 16:00), Max: 98.7 (18 Aug 2019 00:08)  HR: 72 (18 Aug 2019 16:00) (64 - 82)  BP: 117/73 (18 Aug 2019 16:00) (102/62 - 121/82)  BP(mean): --  RR: 18 (18 Aug 2019 16:00) (18 - 18)  SpO2: 98% (18 Aug 2019 16:00) (98% - 98%)I&O's Summary    17 Aug 2019 07:01  -  18 Aug 2019 07:00  --------------------------------------------------------  IN: 0 mL / OUT: 950 mL / NET: -950 mL      PHYSICAL EXAM:  GENERAL: NAD,   HEENT: PERRL, +EOMI, anicteric, no [place]  NECK: Supple, No JVD   CHEST/LUNG: CTA bilaterally; Normal effort  HEART: S1S2 Normal intensity, no murmurs, gallops or rubs noted  ABDOMEN: Soft, BS Normoactive, NT, ND, no HSM noted  EXTREMITIES:  2+ radial and DP pulses noted, no clubbing, cyanosis, or edema noted, Mobility limitations   SKIN: No rashes or lesions noted  NEURO: A&O , hypokinesia .  CN II-XII intact  PSYCH: Depressed mood and affect; insight/judgement in appropriate  LABS:              RADIOLOGY & ADDITIONAL TESTS:    MEDICATIONS:  MEDICATIONS  (STANDING):  aspirin enteric coated 81 milliGRAM(s) Oral daily  benztropine 1 milliGRAM(s) Oral two times a day  diVALproex DR 1500 milliGRAM(s) Oral two times a day  docusate sodium 200 milliGRAM(s) Oral two times a day  famotidine    Tablet 20 milliGRAM(s) Oral two times a day  folic acid 1 milliGRAM(s) Oral daily  furosemide    Tablet 20 milliGRAM(s) Oral daily  levothyroxine 50 MICROGram(s) Oral daily  LORazepam     Tablet 0.5 milliGRAM(s) Oral daily  nicotine - 21 mG/24Hr(s) Patch 1 patch Transdermal daily  ziprasidone 80 milliGRAM(s) Oral two times a day  ziprasidone 40 milliGRAM(s) Oral two times a day    MEDICATIONS  (PRN):  HYDROcodone/homatropine Syrup 5 milliLiter(s) Oral three times a day PRN Cough

## 2019-08-19 PROCEDURE — 99232 SBSQ HOSP IP/OBS MODERATE 35: CPT

## 2019-08-19 RX ADMIN — ZIPRASIDONE HYDROCHLORIDE 80 MILLIGRAM(S): 20 CAPSULE ORAL at 05:29

## 2019-08-19 RX ADMIN — Medication 1 PATCH: at 12:05

## 2019-08-19 RX ADMIN — Medication 1 PATCH: at 19:57

## 2019-08-19 RX ADMIN — FAMOTIDINE 20 MILLIGRAM(S): 10 INJECTION INTRAVENOUS at 17:22

## 2019-08-19 RX ADMIN — DIVALPROEX SODIUM 1500 MILLIGRAM(S): 500 TABLET, DELAYED RELEASE ORAL at 05:28

## 2019-08-19 RX ADMIN — Medication 200 MILLIGRAM(S): at 05:28

## 2019-08-19 RX ADMIN — DIVALPROEX SODIUM 1500 MILLIGRAM(S): 500 TABLET, DELAYED RELEASE ORAL at 17:23

## 2019-08-19 RX ADMIN — ZIPRASIDONE HYDROCHLORIDE 40 MILLIGRAM(S): 20 CAPSULE ORAL at 05:29

## 2019-08-19 RX ADMIN — Medication 200 MILLIGRAM(S): at 17:22

## 2019-08-19 RX ADMIN — Medication 1 MILLIGRAM(S): at 12:05

## 2019-08-19 RX ADMIN — FAMOTIDINE 20 MILLIGRAM(S): 10 INJECTION INTRAVENOUS at 05:28

## 2019-08-19 RX ADMIN — ZIPRASIDONE HYDROCHLORIDE 40 MILLIGRAM(S): 20 CAPSULE ORAL at 17:23

## 2019-08-19 RX ADMIN — Medication 1 MILLIGRAM(S): at 05:29

## 2019-08-19 RX ADMIN — ZIPRASIDONE HYDROCHLORIDE 80 MILLIGRAM(S): 20 CAPSULE ORAL at 17:23

## 2019-08-19 RX ADMIN — Medication 20 MILLIGRAM(S): at 05:28

## 2019-08-19 RX ADMIN — Medication 1 MILLIGRAM(S): at 17:22

## 2019-08-19 RX ADMIN — Medication 50 MICROGRAM(S): at 05:28

## 2019-08-19 RX ADMIN — Medication 81 MILLIGRAM(S): at 12:05

## 2019-08-19 RX ADMIN — Medication 1 PATCH: at 12:04

## 2019-08-19 RX ADMIN — Medication 0.5 MILLIGRAM(S): at 12:04

## 2019-08-19 NOTE — PROGRESS NOTE ADULT - ASSESSMENT
62 y/o male with h/o schizophrenia, hypothyroidism and hyperlipidemia, s/p fall and traum a few weeks prior to admission and noted with LLE swelling.  Noticed on imaging with LLE hematoma of vastus intermedius, initially pt refusing blood work and care but amenable today. H/h remains stable and sx consulted and no evidence of compartment syndrome. Given hematoma difficult for pt to ambulate PT consulted.     Left lower extremity  Hematoma of vastus intermedius    - h/h stable   - improving swelling and pain   - CPK is WNL   - Venous doppler of LLE showing no DVT  - surgery f/u noted and appreciated, no further work up and signed off  - pt will need repeat imaging in 1-2 weeks to show improvement of hematoma, likely to self resolve    Schizophrenia, unspecified type  - c/w  Ziprasidone  - enhanced supervision    Hypothyroidism   - c/w synthroid po qd     Nicotine dependence  -Nicotine patch  -smoking cessation advised    DVT ppx  - Not on heparin due to hematoma.     Dispo: Patient ambulating in room. Will have PT see patient to determine if at acceptable level to return to [place] outpatient residence

## 2019-08-19 NOTE — PROGRESS NOTE ADULT - SUBJECTIVE AND OBJECTIVE BOX
CC: LLE hematoma    INTERVAL HPI/OVERNIGHT EVENTS: Patient seen and examined. Ambulating in room to bathroom. Wants to go home. Wants to have a cigarette. C/O discomfort on bottom of left foot. Denies chest pain, SOB, dizziness, nausea, vomiting, fever, chills.     Vital Signs Last 24 Hrs  T(C): 36.4 (19 Aug 2019 08:14), Max: 36.8 (19 Aug 2019 00:06)  T(F): 97.6 (19 Aug 2019 08:14), Max: 98.2 (19 Aug 2019 00:06)  HR: 71 (19 Aug 2019 08:14) (65 - 72)  BP: 119/78 (19 Aug 2019 08:14) (117/73 - 126/79)  BP(mean): --  RR: 18 (19 Aug 2019 08:14) (18 - 18)  SpO2: 98% (19 Aug 2019 08:14) (98% - 98%)      PHYSICAL EXAM:  GENERAL: NAD,   HEENT: PERRL, +EOMI, anicteric, no [place]  NECK: Supple, No JVD   CHEST/LUNG: CTA bilaterally; Normal effort  HEART: S1S2 Normal intensity, no murmurs, gallops or rubs noted  ABDOMEN: Soft, BS Normoactive, NT, ND, no HSM noted  EXTREMITIES:  2+ radial and DP pulses noted, LLE hematoma resolving  SKIN: No rashes or lesions noted  NEURO: No focal deficits  PSYCH: Calm and cooperative at present    LABS:                  MEDICATIONS  (STANDING):  aspirin enteric coated 81 milliGRAM(s) Oral daily  benztropine 1 milliGRAM(s) Oral two times a day  diVALproex DR 1500 milliGRAM(s) Oral two times a day  docusate sodium 200 milliGRAM(s) Oral two times a day  famotidine    Tablet 20 milliGRAM(s) Oral two times a day  folic acid 1 milliGRAM(s) Oral daily  furosemide    Tablet 20 milliGRAM(s) Oral daily  levothyroxine 50 MICROGram(s) Oral daily  LORazepam     Tablet 0.5 milliGRAM(s) Oral daily  nicotine - 21 mG/24Hr(s) Patch 1 patch Transdermal daily  ziprasidone 80 milliGRAM(s) Oral two times a day  ziprasidone 40 milliGRAM(s) Oral two times a day    MEDICATIONS  (PRN):      RADIOLOGY & ADDITIONAL TESTS:

## 2019-08-20 ENCOUNTER — TRANSCRIPTION ENCOUNTER (OUTPATIENT)
Age: 64
End: 2019-08-20

## 2019-08-20 VITALS
SYSTOLIC BLOOD PRESSURE: 149 MMHG | HEART RATE: 66 BPM | TEMPERATURE: 98 F | RESPIRATION RATE: 18 BRPM | DIASTOLIC BLOOD PRESSURE: 79 MMHG

## 2019-08-20 PROCEDURE — 99239 HOSP IP/OBS DSCHRG MGMT >30: CPT

## 2019-08-20 PROCEDURE — 71045 X-RAY EXAM CHEST 1 VIEW: CPT

## 2019-08-20 PROCEDURE — 82550 ASSAY OF CK (CPK): CPT

## 2019-08-20 PROCEDURE — 86803 HEPATITIS C AB TEST: CPT

## 2019-08-20 PROCEDURE — 93970 EXTREMITY STUDY: CPT

## 2019-08-20 PROCEDURE — 80053 COMPREHEN METABOLIC PANEL: CPT

## 2019-08-20 PROCEDURE — 73700 CT LOWER EXTREMITY W/O DYE: CPT

## 2019-08-20 PROCEDURE — 80048 BASIC METABOLIC PNL TOTAL CA: CPT

## 2019-08-20 PROCEDURE — 93005 ELECTROCARDIOGRAM TRACING: CPT

## 2019-08-20 PROCEDURE — 80307 DRUG TEST PRSMV CHEM ANLYZR: CPT

## 2019-08-20 PROCEDURE — 85027 COMPLETE CBC AUTOMATED: CPT

## 2019-08-20 PROCEDURE — 99285 EMERGENCY DEPT VISIT HI MDM: CPT

## 2019-08-20 PROCEDURE — 73562 X-RAY EXAM OF KNEE 3: CPT

## 2019-08-20 PROCEDURE — 76377 3D RENDER W/INTRP POSTPROCES: CPT

## 2019-08-20 PROCEDURE — 81001 URINALYSIS AUTO W/SCOPE: CPT

## 2019-08-20 PROCEDURE — 70450 CT HEAD/BRAIN W/O DYE: CPT

## 2019-08-20 PROCEDURE — 36415 COLL VENOUS BLD VENIPUNCTURE: CPT

## 2019-08-20 RX ORDER — FUROSEMIDE 40 MG
1 TABLET ORAL
Qty: 30 | Refills: 0
Start: 2019-08-20 | End: 2019-09-18

## 2019-08-20 RX ORDER — NYSTATIN CREAM 100000 [USP'U]/G
1 CREAM TOPICAL
Qty: 0 | Refills: 0 | DISCHARGE

## 2019-08-20 RX ORDER — HALOPERIDOL DECANOATE 100 MG/ML
400 INJECTION INTRAMUSCULAR ONCE
Refills: 0 | Status: COMPLETED | OUTPATIENT
Start: 2019-08-20 | End: 2019-08-20

## 2019-08-20 RX ORDER — HALOPERIDOL DECANOATE 100 MG/ML
4 INJECTION INTRAMUSCULAR
Qty: 0 | Refills: 0 | DISCHARGE

## 2019-08-20 RX ORDER — FAMOTIDINE 10 MG/ML
1 INJECTION INTRAVENOUS
Qty: 60 | Refills: 0
Start: 2019-08-20 | End: 2019-09-18

## 2019-08-20 RX ORDER — ZIPRASIDONE HYDROCHLORIDE 20 MG/1
1 CAPSULE ORAL
Qty: 0 | Refills: 0 | DISCHARGE
Start: 2019-08-20

## 2019-08-20 RX ADMIN — Medication 1 PATCH: at 12:15

## 2019-08-20 RX ADMIN — Medication 1 MILLIGRAM(S): at 05:19

## 2019-08-20 RX ADMIN — Medication 1 PATCH: at 07:53

## 2019-08-20 RX ADMIN — Medication 81 MILLIGRAM(S): at 12:18

## 2019-08-20 RX ADMIN — ZIPRASIDONE HYDROCHLORIDE 40 MILLIGRAM(S): 20 CAPSULE ORAL at 05:17

## 2019-08-20 RX ADMIN — Medication 50 MICROGRAM(S): at 05:19

## 2019-08-20 RX ADMIN — HALOPERIDOL DECANOATE 400 MILLIGRAM(S): 100 INJECTION INTRAMUSCULAR at 13:12

## 2019-08-20 RX ADMIN — Medication 1 MILLIGRAM(S): at 12:18

## 2019-08-20 RX ADMIN — Medication 200 MILLIGRAM(S): at 05:19

## 2019-08-20 RX ADMIN — Medication 1 PATCH: at 12:18

## 2019-08-20 RX ADMIN — DIVALPROEX SODIUM 1500 MILLIGRAM(S): 500 TABLET, DELAYED RELEASE ORAL at 05:18

## 2019-08-20 RX ADMIN — FAMOTIDINE 20 MILLIGRAM(S): 10 INJECTION INTRAVENOUS at 05:19

## 2019-08-20 RX ADMIN — ZIPRASIDONE HYDROCHLORIDE 80 MILLIGRAM(S): 20 CAPSULE ORAL at 05:17

## 2019-08-20 RX ADMIN — Medication 0.5 MILLIGRAM(S): at 12:18

## 2019-08-20 RX ADMIN — Medication 20 MILLIGRAM(S): at 05:18

## 2019-08-20 NOTE — PROGRESS NOTE ADULT - PROVIDER SPECIALTY LIST ADULT
Hospitalist
Surgery
Surgery
Hospitalist
Hospitalist

## 2019-08-20 NOTE — PROGRESS NOTE ADULT - SUBJECTIVE AND OBJECTIVE BOX
CC: left leg pain (19 Aug 2019 13:22)    HPI:  62 y/o male with pmh h/o schizophrenia , hypothyroidism and hyperlipidemia  was sent in for left lower ext pain/ swelling which pt. had for 3 weeks.     INTERVAL HPI/OVERNIGHT EVENTS: Patient seen and examined lying in bed.  Patient denies any complaints.  No acute issues overnight.    Vital Signs Last 24 Hrs  T(C): 36.7 (20 Aug 2019 07:46), Max: 36.7 (19 Aug 2019 17:38)  T(F): 98 (20 Aug 2019 07:46), Max: 98 (19 Aug 2019 17:38)  HR: 66 (20 Aug 2019 07:46) (66 - 87)  BP: 149/79 (20 Aug 2019 07:46) (112/60 - 149/79)  BP(mean): --  RR: 18 (20 Aug 2019 07:46) (18 - 18)  SpO2: 99% (19 Aug 2019 23:10) (96% - 99%)  I&O's Detail    19 Aug 2019 07:01  -  20 Aug 2019 07:00  --------------------------------------------------------  IN:  Total IN: 0 mL    OUT:    Voided: 2575 mL  Total OUT: 2575 mL    Total NET: -2575 mL    PHYSICAL EXAM:  GENERAL: NAD  HEAD:  Atraumatic, Normocephalic  NECK: Supple, No JVD, Normal thyroid  NERVOUS SYSTEM:  Alert, Good concentration; Motor Strength 5/5 B/L upper and lower extremities  CHEST/LUNG: Clear to auscultation bilaterally; No rales, rhonchi, wheezing, or rubs  HEART: Regular rate and rhythm; No murmurs, rubs, or gallops  ABDOMEN: Soft, Nontender, Nondistended; Bowel sounds present  EXTREMITIES:  2+ Peripheral Pulses        MEDICATIONS  (STANDING):  aspirin enteric coated 81 milliGRAM(s) Oral daily  benztropine 1 milliGRAM(s) Oral two times a day  diVALproex DR 1500 milliGRAM(s) Oral two times a day  docusate sodium 200 milliGRAM(s) Oral two times a day  famotidine    Tablet 20 milliGRAM(s) Oral two times a day  folic acid 1 milliGRAM(s) Oral daily  furosemide    Tablet 20 milliGRAM(s) Oral daily  haloperidol decanoate Injectable, Long Acting 400 milliGRAM(s) IntraMuscular once  levothyroxine 50 MICROGram(s) Oral daily  LORazepam     Tablet 0.5 milliGRAM(s) Oral daily  nicotine - 21 mG/24Hr(s) Patch 1 patch Transdermal daily  ziprasidone 80 milliGRAM(s) Oral two times a day  ziprasidone 40 milliGRAM(s) Oral two times a day    MEDICATIONS  (PRN):

## 2019-08-20 NOTE — PROGRESS NOTE ADULT - ASSESSMENT
64 y/o male with h/o schizophrenia, hypothyroidism and hyperlipidemia, s/p fall and trauma a few weeks prior to admission and noted with LLE swelling.  Noticed on imaging with LLE hematoma of vastus intermedius, initially pt refusing blood work and care but now amenable. H/h remains stable and sx consulted and no evidence of compartment syndrome. Given hematoma difficult for pt to ambulate. PT consulted.  PT initially recommended JUANA, but then patient progressed to be able to go home.    Left lower extremity  Hematoma of vastus intermedius    - h/h stable   - improving swelling and pain   - CPK is WNL   - Venous doppler of LLE showing no DVT  - surgery f/u noted and appreciated, no further work up and signed off  - pt will need repeat imaging in 1-2 weeks to show improvement of hematoma, likely to self resolve    Schizophrenia, unspecified type  - c/w  Ziprasidone  - enhanced supervision  - Patient was due for Haldol 400mg IM qmonthly shot 8/21/19, discussed with Dr. Alarcon, who will order for today prior to discharge.    Hypothyroidism   - c/w synthroid po qd     Nicotine dependence  -Nicotine patch  -smoking cessation advised    DVT ppx  - Not on heparin due to hematoma.     Dispo: Patient ambulating in room. PT followed up on patient and determined he is at acceptable level to return to [place] outpatient residence

## 2019-08-20 NOTE — PROGRESS NOTE ADULT - ATTENDING COMMENTS
pt is seen examined , d/w NP Pily   agree with above findings and examination   discharge back to [place],

## 2019-08-20 NOTE — DISCHARGE NOTE NURSING/CASE MANAGEMENT/SOCIAL WORK - NSDCDPATPORTLINK_GEN_ALL_CORE
You can access the [facility] Patient Portal, offered by [facility], by registering with the following website: [email or web address]

## 2019-08-20 NOTE — PROGRESS NOTE ADULT - REASON FOR ADMISSION
left leg pain

## 2020-01-30 NOTE — ED ADULT NURSE NOTE - BREATHING, MLM
Spontaneous, unlabored and symmetrical Silver Nitrate Text: The wound bed was treated with silver nitrate after the biopsy was performed.

## 2020-02-09 NOTE — ED ADULT TRIAGE NOTE - BP NONINVASIVE DIASTOLIC (MM HG)
69
Airway patent, TM normal bilaterally, normal appearing mouth, nose, throat, neck supple with full range of motion, no cervical adenopathy.

## 2020-03-07 NOTE — PROGRESS NOTE ADULT - SUBJECTIVE AND OBJECTIVE BOX
Patient is a 62y old  Male who presents with a chief complaint of unsteady gait (03 Nov 2018 20:18)    OVERNIGHT EVENTS:  Patient seen and examined at bedside. Pt. laying in bed, no episodes of fever were reported in the past 24 hours, pt slept well during night, pt. remains confused but less agitated and combative, mumbles words incoherently.   ROS: No chest pain, palpitations, sob, light headedness/dizziness, difficulty breathing/cough, fevers/chills, abdominal pain, n/v, diarrhea/constipation, dysuria or increased urinary frequency. All other ROS negative     Vital Signs Last 24 Hrs  T(C): 36.8 (06 Nov 2018 08:10), Max: 36.8 (05 Nov 2018 15:23)  T(F): 98.2 (06 Nov 2018 08:10), Max: 98.3 (05 Nov 2018 15:23)  HR: 68 (05 Nov 2018 23:40) (68 - 82)  BP: 96/72 (05 Nov 2018 23:40) (94/70 - 100/60)  RR: 18 (05 Nov 2018 23:40) (18 - 18)  SpO2: 99% (05 Nov 2018 23:40) (94% - 99%)    Physical Exam:   GENERAL: NAD  HEENT: eomi, perrla ncat   RESP: Clear to auscultation bilaterally; No rales, rhonchi, wheezing, or rubs  CVS: Tachycardic, normal S1/S2; No murmurs, rubs, or gallops  GI: Large, Soft, Nontender; Bowel sounds present  EXTREMITIES:  3+ pitting edema bilaterally in LE from feet to knees, soft distal pulses due to swelling, good motor control of both legs  LYMPH: Positive left inguinal lymphadenopathy   SKIN: Erythema of left lower leg from ankle to the knee with anterior bruising lesions noted, warm to the touch    Labs                        13.6   5.8   )-----------( 157      ( 06 Nov 2018 08:20 )             38.9     11-06    135  |  97<L>  |  10.0  ----------------------------<  91  4.6   |  28.0  |  0.72    Ca    9.3      06 Nov 2018 08:20        EKG - nsr no acute kris or twi     Repeat LActate: 1.1    MEDICATIONS  (STANDING):  aspirin enteric coated 81 milliGRAM(s) Oral daily  benztropine 1 milliGRAM(s) Oral two times a day  ceFAZolin   IVPB 2000 milliGRAM(s) IV Intermittent every 8 hours  clotrimazole 1% Cream 1 Application(s) Topical two times a day  diVALproex DR 1500 milliGRAM(s) Oral two times a day  docusate sodium 200 milliGRAM(s) Oral two times a day  enoxaparin Injectable 40 milliGRAM(s) SubCutaneous every 24 hours  ergocalciferol 81289 Unit(s) Oral every week  folic acid 1 milliGRAM(s) Oral daily  levothyroxine 50 MICROGram(s) Oral daily  LORazepam     Tablet 0.5 milliGRAM(s) Oral daily  omega-3-Acid Ethyl Esters 1 Gram(s) Oral two times a day  saccharomyces boulardii 250 milliGRAM(s) Oral two times a day  simvastatin 20 milliGRAM(s) Oral at bedtime  vancomycin  IVPB 1250 milliGRAM(s) IV Intermittent every 12 hours  ziprasidone 120 milliGRAM(s) Oral two times a day    MEDICATIONS  (PRN):  acetaminophen   Tablet .. 650 milliGRAM(s) Oral every 6 hours PRN Temp greater or equal to 38C (100.4F)  acetaminophen   Tablet .. 650 milliGRAM(s) Oral every 6 hours PRN Mild Pain (1 - 3), Moderate Pain (4 - 6)  ketorolac   Injectable 15 milliGRAM(s) IV Push once PRN Severe Pain (7 - 10) Patient is a 62y old  Male who presents with a chief complaint of unsteady gait (03 Nov 2018 20:18)    OVERNIGHT EVENTS:  Patient seen and examined at bedside. Pt. laying in bed, no episodes of fever were reported in the past 24 hours, pt slept well during night, pt. remains confused but less agitated and combative, mumbles words incoherently.   ROS: No chest pain, palpitations, sob, light headedness/dizziness, difficulty breathing/cough, fevers/chills, abdominal pain, n/v, diarrhea/constipation, dysuria or increased urinary frequency. All other ROS negative     Vital Signs Last 24 Hrs  T(C): 36.8 (06 Nov 2018 08:10), Max: 36.8 (05 Nov 2018 15:23)  T(F): 98.2 (06 Nov 2018 08:10), Max: 98.3 (05 Nov 2018 15:23)  HR: 68 (05 Nov 2018 23:40) (68 - 82)  BP: 96/72 (05 Nov 2018 23:40) (94/70 - 100/60)  RR: 18 (05 Nov 2018 23:40) (18 - 18)  SpO2: 99% (05 Nov 2018 23:40) (94% - 99%)    Physical Exam:   GENERAL: NAD  HEENT: eomi, perrla ncat   RESP: Clear to auscultation bilaterally; No rales, rhonchi, wheezing, or rubs  CVS: Tachycardic, normal S1/S2; No murmurs, rubs, or gallops  GI: Large, Soft, Nontender; Bowel sounds present  EXTREMITIES:  2+ pitting edema bilaterally in LE improved, soft distal pulses due to swelling, good motor control of both legs  LYMPH: Positive left inguinal lymphadenopathy   SKIN: Erythema of left lower leg from ankle to the knee with anterior bruising lesions noted, warm to the touch    Labs                        13.6   5.8   )-----------( 157      ( 06 Nov 2018 08:20 )             38.9     11-06    135  |  97<L>  |  10.0  ----------------------------<  91  4.6   |  28.0  |  0.72    Ca    9.3      06 Nov 2018 08:20        EKG - nsr no acute kris or twi     Repeat LActate: 1.1    MEDICATIONS  (STANDING):  aspirin enteric coated 81 milliGRAM(s) Oral daily  benztropine 1 milliGRAM(s) Oral two times a day  ceFAZolin   IVPB 2000 milliGRAM(s) IV Intermittent every 8 hours  clotrimazole 1% Cream 1 Application(s) Topical two times a day  diVALproex DR 1500 milliGRAM(s) Oral two times a day  docusate sodium 200 milliGRAM(s) Oral two times a day  enoxaparin Injectable 40 milliGRAM(s) SubCutaneous every 24 hours  ergocalciferol 57904 Unit(s) Oral every week  folic acid 1 milliGRAM(s) Oral daily  levothyroxine 50 MICROGram(s) Oral daily  LORazepam     Tablet 0.5 milliGRAM(s) Oral daily  omega-3-Acid Ethyl Esters 1 Gram(s) Oral two times a day  saccharomyces boulardii 250 milliGRAM(s) Oral two times a day  simvastatin 20 milliGRAM(s) Oral at bedtime  vancomycin  IVPB 1250 milliGRAM(s) IV Intermittent every 12 hours  ziprasidone 120 milliGRAM(s) Oral two times a day    MEDICATIONS  (PRN):  acetaminophen   Tablet .. 650 milliGRAM(s) Oral every 6 hours PRN Temp greater or equal to 38C (100.4F)  acetaminophen   Tablet .. 650 milliGRAM(s) Oral every 6 hours PRN Mild Pain (1 - 3), Moderate Pain (4 - 6)  ketorolac   Injectable 15 milliGRAM(s) IV Push once PRN Severe Pain (7 - 10) Patient is a 62y old  Male who presents with a chief complaint of unsteady gait (03 Nov 2018 20:18)    OVERNIGHT EVENTS:  Patient seen and examined at bedside. Pt. laying in bed, no episodes of fever were reported in the past 24 hours, pt slept well during night, pt. remains confused but less agitated and combative, mumbles words incoherently.   ROS: No chest pain, palpitations, sob, light headedness/dizziness, difficulty breathing/cough, fevers/chills, abdominal pain, n/v, diarrhea/constipation, dysuria or increased urinary frequency. All other ROS negative     Vital Signs Last 24 Hrs  T(C): 36.8 (06 Nov 2018 08:10), Max: 36.8 (05 Nov 2018 15:23)  T(F): 98.2 (06 Nov 2018 08:10), Max: 98.3 (05 Nov 2018 15:23)  HR: 68 (05 Nov 2018 23:40) (68 - 82)  BP: 96/72 (05 Nov 2018 23:40) (94/70 - 100/60)  RR: 18 (05 Nov 2018 23:40) (18 - 18)  SpO2: 99% (05 Nov 2018 23:40) (94% - 99%)    Physical Exam:   GENERAL: NAD  HEENT: eomi, perrla ncat   RESP: Clear to auscultation bilaterally; No rales, rhonchi, wheezing, or rubs  CVS: Tachycardic, normal S1/S2; No murmurs, rubs, or gallops  GI: Large, Soft, Nontender; Bowel sounds present  EXTREMITIES:  2+ pitting edema bilaterally in LE improved, soft distal pulses due to swelling, good motor control of both legs  LYMPH: Positive left inguinal lymphadenopathy   SKIN: Erythema of left lower leg from ankle to the knee with anterior bruising lesions noted, warm to the touch improved significantly   Vascular : b/l DP pulses noted 2+    Labs                        13.6   5.8   )-----------( 157      ( 06 Nov 2018 08:20 )             38.9     11-06    135  |  97<L>  |  10.0  ----------------------------<  91  4.6   |  28.0  |  0.72    Ca    9.3      06 Nov 2018 08:20        EKG - nsr no acute kris or twi     Repeat LActate: 1.1    MEDICATIONS  (STANDING):  aspirin enteric coated 81 milliGRAM(s) Oral daily  benztropine 1 milliGRAM(s) Oral two times a day  ceFAZolin   IVPB 2000 milliGRAM(s) IV Intermittent every 8 hours  clotrimazole 1% Cream 1 Application(s) Topical two times a day  diVALproex DR 1500 milliGRAM(s) Oral two times a day  docusate sodium 200 milliGRAM(s) Oral two times a day  enoxaparin Injectable 40 milliGRAM(s) SubCutaneous every 24 hours  ergocalciferol 21473 Unit(s) Oral every week  folic acid 1 milliGRAM(s) Oral daily  levothyroxine 50 MICROGram(s) Oral daily  LORazepam     Tablet 0.5 milliGRAM(s) Oral daily  omega-3-Acid Ethyl Esters 1 Gram(s) Oral two times a day  saccharomyces boulardii 250 milliGRAM(s) Oral two times a day  simvastatin 20 milliGRAM(s) Oral at bedtime  vancomycin  IVPB 1250 milliGRAM(s) IV Intermittent every 12 hours  ziprasidone 120 milliGRAM(s) Oral two times a day    MEDICATIONS  (PRN):  acetaminophen   Tablet .. 650 milliGRAM(s) Oral every 6 hours PRN Temp greater or equal to 38C (100.4F)  acetaminophen   Tablet .. 650 milliGRAM(s) Oral every 6 hours PRN Mild Pain (1 - 3), Moderate Pain (4 - 6)  ketorolac   Injectable 15 milliGRAM(s) IV Push once PRN Severe Pain (7 - 10) no

## 2020-07-23 ENCOUNTER — EMERGENCY (EMERGENCY)
Facility: HOSPITAL | Age: 65
LOS: 1 days | Discharge: HOME | End: 2020-07-23
Attending: EMERGENCY MEDICINE
Payer: MEDICAID

## 2020-07-23 VITALS
HEIGHT: 60 IN | HEART RATE: 53 BPM | RESPIRATION RATE: 16 BRPM | DIASTOLIC BLOOD PRESSURE: 84 MMHG | WEIGHT: 179.02 LBS | OXYGEN SATURATION: 98 % | TEMPERATURE: 98 F | SYSTOLIC BLOOD PRESSURE: 131 MMHG

## 2020-07-23 LAB — AMMONIA BLD-MCNC: 34 UMOL/L — SIGNIFICANT CHANGE UP (ref 11–55)

## 2020-07-23 PROCEDURE — 82140 ASSAY OF AMMONIA: CPT

## 2020-07-23 PROCEDURE — 36415 COLL VENOUS BLD VENIPUNCTURE: CPT

## 2020-07-23 PROCEDURE — 70450 CT HEAD/BRAIN W/O DYE: CPT | Mod: 26

## 2020-07-23 PROCEDURE — 99285 EMERGENCY DEPT VISIT HI MDM: CPT

## 2020-07-23 PROCEDURE — 99284 EMERGENCY DEPT VISIT MOD MDM: CPT

## 2020-07-23 PROCEDURE — 70450 CT HEAD/BRAIN W/O DYE: CPT

## 2020-07-23 NOTE — ED PROVIDER NOTE - PATIENT PORTAL LINK FT
You can access the FollowMyHealth Patient Portal offered by [facility] by registering at the following website: [email or web address]. By joining [employer]’s FollowMyHealth portal, you will also be able to view your health information using other applications (apps) compatible with our system.

## 2020-07-23 NOTE — ED ADULT TRIAGE NOTE - CHIEF COMPLAINT QUOTE
coming from [place] with aid. unwitnessed fall from height few days ago. as per aid at baseline mental status (nonverbal, lethargic). minor abrasions and lacs to head. sent in by family md for ct of head.

## 2020-07-23 NOTE — ED ADULT NURSE NOTE - NSIMPLEMENTINTERV_GEN_ALL_ED
Implemented All Fall Risk Interventions:  [place] to call system. Call bell, personal items and telephone within reach. Instruct patient to call for assistance. Room bathroom lighting operational. Non-slip footwear when patient is off stretcher. Physically safe environment: no spills, clutter or unnecessary equipment. Stretcher in lowest position, wheels locked, appropriate side rails in place. Provide visual cue, wrist band, yellow gown, etc. Monitor gait and stability. Monitor for mental status changes and reorient to person, place, and time. Review medications for side effects contributing to fall risk. Reinforce activity limits and safety measures with patient and family.

## 2020-07-23 NOTE — ED ADULT NURSE NOTE - CAS DISC DELAYS
40 k units of epo changed to 14 k units of epo three times a week per protocol    JOSSE BlackmonD Waiting ambulance service

## 2020-07-23 NOTE — ED ADULT NURSE REASSESSMENT NOTE - NS ED NURSE REASSESS COMMENT FT1
pt a&ox1, denies any pain/discomfort. ambulated to bathroom with asst. meal provided. no n/v. ct resulted. pending dispo

## 2020-07-23 NOTE — ED PROVIDER NOTE - OBJECTIVE STATEMENT
65 y/o M, resides at [facility] sent in from PMD for head CT.  History obtained from patient's aide at bedside.  He states that the patient complained that he had fallen yesterday while walking around outside.  Patient unable to give any further details due to mental impairment.  He was seen at PMD today who sent him to ED for a head CT and serum ammonia level.  As per discussion with manager at [facility], patient has had increased somnolence and declining ability to walk over the past few weeks.  Patient recently had a negative neurology workup since these symptoms have started.  As per aide, patient is at his baseline.

## 2020-07-23 NOTE — CHART NOTE - NSCHARTNOTEFT_GEN_A_CORE
Social work note: Pt from [place] [address]. Worker spoke with Mimi at [facility] who confirmed pt an return [phone]. Worker requested first available ambulette. Pt is not eligible for medicaid taxi's at this time. No NEAF required. No additional SW needs at this time

## 2020-07-23 NOTE — ED ADULT NURSE NOTE - OBJECTIVE STATEMENT
65yo male sent from [place] for multiple falls and increased lethargy. pt arrives a&ox1 per [facility] aide baseline mental status. pt using wheelchair. denies any pain/discomfort. MIGUEL x 4, PERRL. scab noted to right eye brow

## 2020-07-23 NOTE — ED PROVIDER NOTE - ATTENDING CONTRIBUTION TO CARE
64 year old male PMHx schizophrenia, HTN c/o transient headache s/p mechanical fall few days ago. PE: NAD, at baseline as per aide, CV RRR, lungs clear, CN grossly intact. I&P: CT WNL, transient headache resolved, PMD or clinic follow up recommended for reassessment. Patient and aide are aware of signs/symptoms to return to the emergency department.

## 2020-07-23 NOTE — ED ADULT NURSE NOTE - CHIEF COMPLAINT QUOTE
coming from [place] with aid. unwitnessed fall from height few days ago. as per aid at baseline mental status (nonverbal, lethargic). minor abrasions and lacs to head. sent in by family md for ct of head.

## 2020-08-10 ENCOUNTER — EMERGENCY (EMERGENCY)
Facility: HOSPITAL | Age: 65
LOS: 1 days | Discharge: OTHER INSTITUTION | End: 2020-08-10
Attending: STUDENT IN AN ORGANIZED HEALTH CARE EDUCATION/TRAINING PROGRAM
Payer: MEDICAID

## 2020-08-10 ENCOUNTER — INPATIENT (INPATIENT)
Facility: HOSPITAL | Age: 65
LOS: 2 days | Discharge: HOME | End: 2020-08-13
Attending: STUDENT IN AN ORGANIZED HEALTH CARE EDUCATION/TRAINING PROGRAM | Admitting: STUDENT IN AN ORGANIZED HEALTH CARE EDUCATION/TRAINING PROGRAM
Payer: MEDICAID

## 2020-08-10 VITALS
DIASTOLIC BLOOD PRESSURE: 93 MMHG | HEART RATE: 66 BPM | RESPIRATION RATE: 18 BRPM | TEMPERATURE: 98 F | OXYGEN SATURATION: 100 % | SYSTOLIC BLOOD PRESSURE: 154 MMHG

## 2020-08-10 VITALS
HEART RATE: 94 BPM | DIASTOLIC BLOOD PRESSURE: 78 MMHG | SYSTOLIC BLOOD PRESSURE: 112 MMHG | OXYGEN SATURATION: 97 % | RESPIRATION RATE: 18 BRPM

## 2020-08-10 VITALS — WEIGHT: 179.9 LBS

## 2020-08-10 LAB
APAP SERPL-MCNC: <7.5 UG/ML — LOW (ref 10–26)
BASOPHILS # BLD AUTO: 0 K/UL — SIGNIFICANT CHANGE UP (ref 0–0.2)
BASOPHILS NFR BLD AUTO: 0 % — SIGNIFICANT CHANGE UP (ref 0–2)
EOSINOPHIL # BLD AUTO: 0 K/UL — SIGNIFICANT CHANGE UP (ref 0–0.5)
EOSINOPHIL NFR BLD AUTO: 0 % — SIGNIFICANT CHANGE UP (ref 0–6)
ETHANOL SERPL-MCNC: <10 MG/DL — SIGNIFICANT CHANGE UP
HCT VFR BLD CALC: 43.9 % — SIGNIFICANT CHANGE UP (ref 39–50)
HGB BLD-MCNC: 14.9 G/DL — SIGNIFICANT CHANGE UP (ref 13–17)
LYMPHOCYTES # BLD AUTO: 0.41 K/UL — LOW (ref 1–3.3)
LYMPHOCYTES # BLD AUTO: 5.3 % — LOW (ref 13–44)
MANUAL SMEAR VERIFICATION: SIGNIFICANT CHANGE UP
MCHC RBC-ENTMCNC: 33.7 PG — SIGNIFICANT CHANGE UP (ref 27–34)
MCHC RBC-ENTMCNC: 33.9 GM/DL — SIGNIFICANT CHANGE UP (ref 32–36)
MCV RBC AUTO: 99.3 FL — SIGNIFICANT CHANGE UP (ref 80–100)
MONOCYTES # BLD AUTO: 1.09 K/UL — HIGH (ref 0–0.9)
MONOCYTES NFR BLD AUTO: 14 % — SIGNIFICANT CHANGE UP (ref 2–14)
NEUTROPHILS # BLD AUTO: 6.28 K/UL — SIGNIFICANT CHANGE UP (ref 1.8–7.4)
NEUTROPHILS NFR BLD AUTO: 77.2 % — HIGH (ref 43–77)
NEUTS BAND # BLD: 3.5 % — SIGNIFICANT CHANGE UP (ref 0–8)
PLAT MORPH BLD: NORMAL — SIGNIFICANT CHANGE UP
PLATELET # BLD AUTO: 148 K/UL — LOW (ref 150–400)
POLYCHROMASIA BLD QL SMEAR: SIGNIFICANT CHANGE UP
RBC # BLD: 4.42 M/UL — SIGNIFICANT CHANGE UP (ref 4.2–5.8)
RBC # FLD: 13.2 % — SIGNIFICANT CHANGE UP (ref 10.3–14.5)
RBC BLD AUTO: ABNORMAL
SALICYLATES SERPL-MCNC: <0.6 MG/DL — LOW (ref 10–20)
WBC # BLD: 7.78 K/UL — SIGNIFICANT CHANGE UP (ref 3.8–10.5)
WBC # FLD AUTO: 7.78 K/UL — SIGNIFICANT CHANGE UP (ref 3.8–10.5)

## 2020-08-10 PROCEDURE — 99284 EMERGENCY DEPT VISIT MOD MDM: CPT

## 2020-08-10 PROCEDURE — 99285 EMERGENCY DEPT VISIT HI MDM: CPT

## 2020-08-10 PROCEDURE — 99285 EMERGENCY DEPT VISIT HI MDM: CPT | Mod: 25

## 2020-08-10 PROCEDURE — 80307 DRUG TEST PRSMV CHEM ANLYZR: CPT

## 2020-08-10 PROCEDURE — 80053 COMPREHEN METABOLIC PANEL: CPT

## 2020-08-10 PROCEDURE — 96372 THER/PROPH/DIAG INJ SC/IM: CPT | Mod: XU

## 2020-08-10 PROCEDURE — 96365 THER/PROPH/DIAG IV INF INIT: CPT

## 2020-08-10 PROCEDURE — 70487 CT MAXILLOFACIAL W/DYE: CPT | Mod: 26

## 2020-08-10 PROCEDURE — 96375 TX/PRO/DX INJ NEW DRUG ADDON: CPT | Mod: XU

## 2020-08-10 PROCEDURE — 85027 COMPLETE CBC AUTOMATED: CPT

## 2020-08-10 PROCEDURE — 36415 COLL VENOUS BLD VENIPUNCTURE: CPT

## 2020-08-10 PROCEDURE — 70487 CT MAXILLOFACIAL W/DYE: CPT

## 2020-08-10 RX ORDER — AMPICILLIN SODIUM AND SULBACTAM SODIUM 250; 125 MG/ML; MG/ML
3 INJECTION, POWDER, FOR SUSPENSION INTRAMUSCULAR; INTRAVENOUS ONCE
Refills: 0 | Status: COMPLETED | OUTPATIENT
Start: 2020-08-10 | End: 2020-08-10

## 2020-08-10 RX ORDER — HALOPERIDOL DECANOATE 100 MG/ML
2 INJECTION INTRAMUSCULAR ONCE
Refills: 0 | Status: DISCONTINUED | OUTPATIENT
Start: 2020-08-10 | End: 2020-08-10

## 2020-08-10 RX ORDER — HALOPERIDOL DECANOATE 100 MG/ML
5 INJECTION INTRAMUSCULAR ONCE
Refills: 0 | Status: COMPLETED | OUTPATIENT
Start: 2020-08-10 | End: 2020-08-10

## 2020-08-10 RX ORDER — HALOPERIDOL DECANOATE 100 MG/ML
5 INJECTION INTRAMUSCULAR ONCE
Refills: 0 | Status: DISCONTINUED | OUTPATIENT
Start: 2020-08-10 | End: 2020-08-10

## 2020-08-10 RX ADMIN — AMPICILLIN SODIUM AND SULBACTAM SODIUM 200 GRAM(S): 250; 125 INJECTION, POWDER, FOR SUSPENSION INTRAMUSCULAR; INTRAVENOUS at 15:29

## 2020-08-10 RX ADMIN — Medication 2 MILLIGRAM(S): at 10:19

## 2020-08-10 RX ADMIN — Medication 2 MILLIGRAM(S): at 15:03

## 2020-08-10 RX ADMIN — HALOPERIDOL DECANOATE 5 MILLIGRAM(S): 100 INJECTION INTRAMUSCULAR at 13:00

## 2020-08-10 RX ADMIN — AMPICILLIN SODIUM AND SULBACTAM SODIUM 3 GRAM(S): 250; 125 INJECTION, POWDER, FOR SUSPENSION INTRAMUSCULAR; INTRAVENOUS at 16:00

## 2020-08-10 NOTE — ED PROVIDER NOTE - CLINICAL SUMMARY MEDICAL DECISION MAKING FREE TEXT BOX
63 y/o M presents with facial swelling for the past 24 hours    CT maxillofacial w/ contrast  Ativan 2 mg PO / Haldol 5mg IM for sedation due to patient being very combative and non-cooperative. Patient refusing physical exam and unable to look inside oral cavity. Noticable swelling of the L face, hard to understand the few words that the patient did manage to say.

## 2020-08-10 NOTE — ED PROVIDER NOTE - CLINICAL SUMMARY MEDICAL DECISION MAKING FREE TEXT BOX
Lisseth Preciado MD, PGY-1: 65YO male, hx of schizophrenia, presenting as a transfer for dental to drain periapical abscess as determined by CT maxillofacial w/IV contrast at OSH. Pt with stable VS, afebrile. Plan for dental consult and medications for agitation. Pt given unasyn at OSH. Lisseth Preciado MD, PGY-1: 65YO male, hx of schizophrenia, presenting as a transfer for dental to drain periapical abscess as determined by CT maxillofacial w/IV contrast at OSH. Pt with stable VS, afebrile. dentistry consulted, saw patient at bedside. q6h unasyn and plan for I+D at dental clinic in AM on tuesday pending consent from psychiatric facility, who I attempted to contact x2 with no response. given psychiatric dx, will admit to medicine with dentistry following.

## 2020-08-10 NOTE — ED ADULT NURSE NOTE - CHIEF COMPLAINT QUOTE
Pt arrives from [place] with Left facial swelling and confirmed dental abcess; Hx of schizophrenia; was sedated for CT of jaw and decaying teeth given Haldol and ativan in order to complete the CT scan because pt is combative and not cooperative, pt is still sedated on arrival, does wake up to loud stimulus; addendum: pt awake and ripped IV out of Left AC.

## 2020-08-10 NOTE — ED ADULT TRIAGE NOTE - CHIEF COMPLAINT QUOTE
Pt arrives from [place] with Left facial swelling and confirmed dental abcess; Hx of schizophrenia; was sedated for CT of jaw and decaying teeth given Haldol and ativan in order to complete the CT scan because pt is combative and not cooperative, pt is still sedated on arrival, does wake up to loud stimulus Pt arrives from [place] with Left facial swelling and confirmed dental abcess; Hx of schizophrenia; was sedated for CT of jaw and decaying teeth given Haldol and ativan in order to complete the CT scan because pt is combative and not cooperative, pt is still sedated on arrival, does wake up to loud stimulus; addendum: pt awake and ripped IV out of Left AC.

## 2020-08-10 NOTE — ED ADULT NURSE NOTE - OBJECTIVE STATEMENT
65 y/o male arrives from [place] and presents to spot #10 with facial swelling to left side - confirmed dental abscess. Pt sedated, responds to painful stimuli. See triage note. Vitals as per flowsheets. MD at bedside for eval. Will monitor.

## 2020-08-10 NOTE — ED PROVIDER NOTE - PROGRESS NOTE DETAILS
Patient going for CT maxillofacial w/ IV contrast, sedated with 5mg haldol IM, unable to consent due to underlying psychiatric illness. Scan, IV and sedation done due to medical necessity. Will transfer patient to Dr. Price, dental service at [facility]. Spoke to Dr. Price and ER doctor Dr. Broderick who will accept patient for further management.

## 2020-08-10 NOTE — ED ADULT NURSE NOTE - OBJECTIVE STATEMENT
Pt BIBA from [facility] for  L sided  facial swelling that started this morning.  P/s  " I got my ass whooped" denies pain, no bruising noted to area.    Patient is agitated and  combative and threatened staff when trying to obtain blood.   Patient declines to give any more information and declines physical exam, wants us to go away.

## 2020-08-10 NOTE — ED CLERICAL - NS ED CLERK NOTE PRE-ARRIVAL INFORMATION; ADDITIONAL PRE-ARRIVAL INFORMATION
Left facial swelling.  CT shows periapical abscess.  Sent for dental eval.  Hx schizophrenia- OP @ [facility]. Uncooperative at [place]- required Ativan and Haldol for CT and exam

## 2020-08-10 NOTE — ED PROVIDER NOTE - PROGRESS NOTE DETAILS
Lisseth Preciado MD, PGY-1: attempted to contact [facility] x2. No response. Given that patient does not appear capable of consenting to I+D procedure by dentistry, plan to admit and have dentistry complete procedure at later date.

## 2020-08-10 NOTE — ED PROVIDER NOTE - OBJECTIVE STATEMENT
63 y/o M presents with facial swelling for the past 24 hours. Patient is agitated, combative when trying to obtain history. Patient declines to give any more information and declines physical exam, wants us to go away.

## 2020-08-10 NOTE — ED ADULT NURSE REASSESSMENT NOTE - NS ED NURSE REASSESS COMMENT FT1
Report given to Lety RODRIGUEZ at [facility]- Pt transferred at this time with EMS.  Pt left on EMS stretcher, calm and cooperative.

## 2020-08-10 NOTE — ED PROVIDER NOTE - OBJECTIVE STATEMENT
65YO male, hx of schizophrenia, presenting as a transfer from OSH for dental abscess. CT maxillofacial showing: There is extensive inflammation involving the buccal region of the left mandible with periapical abscess as described. No abscess is seen in the soft tissues. Patient arrives with stable vital signs but appears somnolent due to Ativan 2 mg PO / Haldol 5mg IM given. 63YO male, hx of schizophrenia, presenting as a transfer from OSH for dental abscess. CT maxillofacial showing: There is extensive inflammation involving the buccal region of the left mandible with periapical abscess as described. No abscess is seen in the soft tissues. Patient arrives with stable vital signs but appears somnolent due to Ativan 2 mg PO / Haldol 5mg IM given. pt currently denies any pain. Is able to open mouth with difficulty.

## 2020-08-10 NOTE — ED PROVIDER NOTE - PHYSICAL EXAMINATION
General: Combative, non-cooperative   HEENT: Normocephalic, atraumatic. Moist mucous membranes. Oropharynx clear. No lymphadenopathy. + swelling L oral cavity, large abscess located  deep inside oral cavity, difficult to appreciate nidus and complete borders of the abscess. Tenderness over L facial swelling.   Eyes: No scleral icterus. EOMI. SARBJIT.  Neck:. Soft and supple. Full ROM without pain. No midline tenderness  Cardiac: Regular rate and regular rhythm. No murmurs, rubs, gallops. Peripheral pulses 2+ and symmetric. No LE edema.  Resp: Lungs CTAB. Speaking in full sentences. No wheezes, rales or rhonchi.  Abd: Soft, non-tender, non-distended. No guarding or rebound. No scars, masses, or lesions.  Back: Spine midline and non-tender. No CVA tenderness.    Skin: No rashes, abrasions, or lacerations.  Neuro: AO x 3. Moves all extremities symmetrically. Motor strength and sensation grossly intact.

## 2020-08-10 NOTE — ED PROVIDER NOTE - ATTENDING CONTRIBUTION TO CARE
64 yom pmh hld, hypothyroid, schizophrenia sent from [place] for facial swelling. Patient is poor historian and combative, unwilling to have anyone look in his mouth.   AP - poor dentition and left swelling in cheek. will need CT imaging to evaluate for infection. sedation as patient is combative 64 yom pmh hld, hypothyroid, schizophrenia sent from [facility] for facial swelling. Patient is poor historian and combative, unwilling to have anyone look in his mouth. Unable to consent  AP - poor dentition and left swelling in cheek. will need CT imaging to evaluate for infection. sedation as patient is combative. attempted to I&D abscess but unable to given depth. attempted to reach psych center ([facility]) for consent to transfer but no one able to give consent. will transfer out of medical necessity for dental eval

## 2020-08-10 NOTE — ED ADULT TRIAGE NOTE - CHIEF COMPLAINT QUOTE
Pt comes from [facility] s/p left facial swelling starting this morning, pt at baseline and nonverbal as per EMS, pt noted to eat out of garbage on occasion, no known food allergies, resp even and unlabored

## 2020-08-10 NOTE — ED PROVIDER NOTE - NS ED ROS FT
General: Denies fever, chills  HEENT: Denies sensory changes, sore throat. + L sided facial/oral swelling   Neck: Denies neck pain, neck stiffness  Resp: Denies coughing, SOB  Cardiovascular: Denies CP, palpitations, LE edema  GI: Denies nausea, vomiting, abdominal pain, diarrhea, constipation, blood in stool  : Denies dysuria, hematuria, frequency, incontinence  MSK: Denies back pain  Neuro: Denies HA, dizziness, numbness, weakness  Skin: Denies rashes.

## 2020-08-10 NOTE — ED PROVIDER NOTE - PHYSICAL EXAMINATION
Gen: WDWN, NAD  HEENT: EOMI, no nasal discharge, mucous membranes moist. L cheek swelling; tenderness to palpation. Able to open and close mouth. Poor dentition. Large abscess located  deep inside oral cavity,  CV: RRR, +S1/S2, no M/R/G  Resp: CTAB, no W/R/R  GI: Abdomen soft non-distended, NTTP, no masses  MSK: No open wounds, no bruising, no LE edema  Neuro: A&Ox4, following commands, moving all four extremities spontaneously  Psych: appropriate mood, denies AH, VH, SI

## 2020-08-11 DIAGNOSIS — E78.5 HYPERLIPIDEMIA, UNSPECIFIED: ICD-10-CM

## 2020-08-11 DIAGNOSIS — F20.9 SCHIZOPHRENIA, UNSPECIFIED: ICD-10-CM

## 2020-08-11 DIAGNOSIS — E03.9 HYPOTHYROIDISM, UNSPECIFIED: ICD-10-CM

## 2020-08-11 DIAGNOSIS — K04.7 PERIAPICAL ABSCESS WITHOUT SINUS: ICD-10-CM

## 2020-08-11 DIAGNOSIS — Z29.9 ENCOUNTER FOR PROPHYLACTIC MEASURES, UNSPECIFIED: ICD-10-CM

## 2020-08-11 DIAGNOSIS — Z02.9 ENCOUNTER FOR ADMINISTRATIVE EXAMINATIONS, UNSPECIFIED: ICD-10-CM

## 2020-08-11 LAB
ANION GAP SERPL CALC-SCNC: 12 MMO/L — SIGNIFICANT CHANGE UP (ref 7–14)
APTT BLD: 32.5 SEC — SIGNIFICANT CHANGE UP (ref 27–36.3)
BLD GP AB SCN SERPL QL: NEGATIVE — SIGNIFICANT CHANGE UP
BUN SERPL-MCNC: 14 MG/DL — SIGNIFICANT CHANGE UP (ref 7–23)
CALCIUM SERPL-MCNC: 9.4 MG/DL — SIGNIFICANT CHANGE UP (ref 8.4–10.5)
CHLORIDE SERPL-SCNC: 99 MMOL/L — SIGNIFICANT CHANGE UP (ref 98–107)
CO2 SERPL-SCNC: 27 MMOL/L — SIGNIFICANT CHANGE UP (ref 22–31)
CREAT SERPL-MCNC: 0.78 MG/DL — SIGNIFICANT CHANGE UP (ref 0.5–1.3)
GLUCOSE SERPL-MCNC: 91 MG/DL — SIGNIFICANT CHANGE UP (ref 70–99)
HCT VFR BLD CALC: 40.9 % — SIGNIFICANT CHANGE UP (ref 39–50)
HGB BLD-MCNC: 13.6 G/DL — SIGNIFICANT CHANGE UP (ref 13–17)
INR BLD: 1.12 — SIGNIFICANT CHANGE UP (ref 0.88–1.16)
MAGNESIUM SERPL-MCNC: 1.9 MG/DL — SIGNIFICANT CHANGE UP (ref 1.6–2.6)
MCHC RBC-ENTMCNC: 32.5 PG — SIGNIFICANT CHANGE UP (ref 27–34)
MCHC RBC-ENTMCNC: 33.3 % — SIGNIFICANT CHANGE UP (ref 32–36)
MCV RBC AUTO: 97.6 FL — SIGNIFICANT CHANGE UP (ref 80–100)
NRBC # FLD: 0 K/UL — SIGNIFICANT CHANGE UP (ref 0–0)
PHOSPHATE SERPL-MCNC: 3.7 MG/DL — SIGNIFICANT CHANGE UP (ref 2.5–4.5)
PLATELET # BLD AUTO: 133 K/UL — LOW (ref 150–400)
PMV BLD: 11.4 FL — SIGNIFICANT CHANGE UP (ref 7–13)
POTASSIUM SERPL-MCNC: 4.5 MMOL/L — SIGNIFICANT CHANGE UP (ref 3.5–5.3)
POTASSIUM SERPL-SCNC: 4.5 MMOL/L — SIGNIFICANT CHANGE UP (ref 3.5–5.3)
PROTHROM AB SERPL-ACNC: 12.8 SEC — SIGNIFICANT CHANGE UP (ref 10.6–13.6)
RBC # BLD: 4.19 M/UL — LOW (ref 4.2–5.8)
RBC # FLD: 13.4 % — SIGNIFICANT CHANGE UP (ref 10.3–14.5)
RH IG SCN BLD-IMP: POSITIVE — SIGNIFICANT CHANGE UP
SARS-COV-2 RNA SPEC QL NAA+PROBE: SIGNIFICANT CHANGE UP
SODIUM SERPL-SCNC: 138 MMOL/L — SIGNIFICANT CHANGE UP (ref 135–145)
WBC # BLD: 5.38 K/UL — SIGNIFICANT CHANGE UP (ref 3.8–10.5)
WBC # FLD AUTO: 5.38 K/UL — SIGNIFICANT CHANGE UP (ref 3.8–10.5)

## 2020-08-11 PROCEDURE — 12345: CPT | Mod: NC

## 2020-08-11 PROCEDURE — 99223 1ST HOSP IP/OBS HIGH 75: CPT

## 2020-08-11 PROCEDURE — 93010 ELECTROCARDIOGRAM REPORT: CPT

## 2020-08-11 RX ORDER — ENOXAPARIN SODIUM 100 MG/ML
40 INJECTION SUBCUTANEOUS DAILY
Refills: 0 | Status: DISCONTINUED | OUTPATIENT
Start: 2020-08-11 | End: 2020-08-13

## 2020-08-11 RX ORDER — DOCUSATE SODIUM 100 MG
2 CAPSULE ORAL
Qty: 0 | Refills: 0 | DISCHARGE

## 2020-08-11 RX ORDER — LEVOTHYROXINE SODIUM 125 MCG
1 TABLET ORAL
Qty: 0 | Refills: 0 | DISCHARGE

## 2020-08-11 RX ORDER — FUROSEMIDE 40 MG
20 TABLET ORAL DAILY
Refills: 0 | Status: DISCONTINUED | OUTPATIENT
Start: 2020-08-11 | End: 2020-08-13

## 2020-08-11 RX ORDER — CHOLECALCIFEROL (VITAMIN D3) 125 MCG
1 CAPSULE ORAL
Qty: 0 | Refills: 0 | DISCHARGE

## 2020-08-11 RX ORDER — ZIPRASIDONE HYDROCHLORIDE 20 MG/1
80 CAPSULE ORAL
Refills: 0 | Status: DISCONTINUED | OUTPATIENT
Start: 2020-08-11 | End: 2020-08-11

## 2020-08-11 RX ORDER — SENNA PLUS 8.6 MG/1
2 TABLET ORAL AT BEDTIME
Refills: 0 | Status: DISCONTINUED | OUTPATIENT
Start: 2020-08-11 | End: 2020-08-13

## 2020-08-11 RX ORDER — NYSTATIN CREAM 100000 [USP'U]/G
1 CREAM TOPICAL
Qty: 0 | Refills: 0 | DISCHARGE

## 2020-08-11 RX ORDER — FAMOTIDINE 10 MG/ML
20 INJECTION INTRAVENOUS
Refills: 0 | Status: DISCONTINUED | OUTPATIENT
Start: 2020-08-11 | End: 2020-08-13

## 2020-08-11 RX ORDER — FOLIC ACID 0.8 MG
1 TABLET ORAL
Qty: 0 | Refills: 0 | DISCHARGE

## 2020-08-11 RX ORDER — HALOPERIDOL DECANOATE 100 MG/ML
4 INJECTION INTRAMUSCULAR
Qty: 0 | Refills: 0 | DISCHARGE

## 2020-08-11 RX ORDER — SIMVASTATIN 20 MG/1
20 TABLET, FILM COATED ORAL AT BEDTIME
Refills: 0 | Status: DISCONTINUED | OUTPATIENT
Start: 2020-08-11 | End: 2020-08-13

## 2020-08-11 RX ORDER — ICOSAPENT ETHYL 500 MG/1
1 CAPSULE, LIQUID FILLED ORAL
Qty: 0 | Refills: 0 | DISCHARGE

## 2020-08-11 RX ORDER — DIVALPROEX SODIUM 500 MG/1
3 TABLET, DELAYED RELEASE ORAL
Qty: 0 | Refills: 0 | DISCHARGE

## 2020-08-11 RX ORDER — ZIPRASIDONE HYDROCHLORIDE 20 MG/1
40 CAPSULE ORAL
Refills: 0 | Status: DISCONTINUED | OUTPATIENT
Start: 2020-08-11 | End: 2020-08-11

## 2020-08-11 RX ORDER — SIMVASTATIN 20 MG/1
1 TABLET, FILM COATED ORAL
Qty: 0 | Refills: 0 | DISCHARGE

## 2020-08-11 RX ORDER — HALOPERIDOL DECANOATE 100 MG/ML
0.5 INJECTION INTRAMUSCULAR ONCE
Refills: 0 | Status: COMPLETED | OUTPATIENT
Start: 2020-08-11 | End: 2020-08-11

## 2020-08-11 RX ORDER — LEVOTHYROXINE SODIUM 125 MCG
50 TABLET ORAL DAILY
Refills: 0 | Status: DISCONTINUED | OUTPATIENT
Start: 2020-08-11 | End: 2020-08-12

## 2020-08-11 RX ORDER — ERGOCALCIFEROL 1.25 MG/1
1 CAPSULE ORAL
Qty: 0 | Refills: 0 | DISCHARGE

## 2020-08-11 RX ORDER — BENZTROPINE MESYLATE 1 MG
1 TABLET ORAL
Qty: 0 | Refills: 0 | DISCHARGE

## 2020-08-11 RX ORDER — SODIUM CHLORIDE 9 MG/ML
1000 INJECTION, SOLUTION INTRAVENOUS
Refills: 0 | Status: DISCONTINUED | OUTPATIENT
Start: 2020-08-11 | End: 2020-08-12

## 2020-08-11 RX ORDER — ASPIRIN/CALCIUM CARB/MAGNESIUM 324 MG
1 TABLET ORAL
Qty: 0 | Refills: 0 | DISCHARGE

## 2020-08-11 RX ORDER — AMPICILLIN SODIUM AND SULBACTAM SODIUM 250; 125 MG/ML; MG/ML
3 INJECTION, POWDER, FOR SUSPENSION INTRAMUSCULAR; INTRAVENOUS EVERY 6 HOURS
Refills: 0 | Status: DISCONTINUED | OUTPATIENT
Start: 2020-08-11 | End: 2020-08-12

## 2020-08-11 RX ORDER — HALOPERIDOL DECANOATE 100 MG/ML
5 INJECTION INTRAMUSCULAR ONCE
Refills: 0 | Status: COMPLETED | OUTPATIENT
Start: 2020-08-11 | End: 2020-08-11

## 2020-08-11 RX ORDER — FAMOTIDINE 10 MG/ML
1 INJECTION INTRAVENOUS
Qty: 0 | Refills: 0 | DISCHARGE

## 2020-08-11 RX ORDER — BENZTROPINE MESYLATE 1 MG
1 TABLET ORAL
Refills: 0 | Status: DISCONTINUED | OUTPATIENT
Start: 2020-08-11 | End: 2020-08-13

## 2020-08-11 RX ORDER — DIVALPROEX SODIUM 500 MG/1
1500 TABLET, DELAYED RELEASE ORAL
Refills: 0 | Status: DISCONTINUED | OUTPATIENT
Start: 2020-08-11 | End: 2020-08-13

## 2020-08-11 RX ORDER — OMEGA-3 ACID ETHYL ESTERS 1 G
1 CAPSULE ORAL
Qty: 0 | Refills: 0 | DISCHARGE

## 2020-08-11 RX ORDER — HALOPERIDOL DECANOATE 100 MG/ML
5 INJECTION INTRAMUSCULAR ONCE
Refills: 0 | Status: DISCONTINUED | OUTPATIENT
Start: 2020-08-11 | End: 2020-08-11

## 2020-08-11 RX ORDER — FOLIC ACID 0.8 MG
1 TABLET ORAL DAILY
Refills: 0 | Status: DISCONTINUED | OUTPATIENT
Start: 2020-08-11 | End: 2020-08-13

## 2020-08-11 RX ORDER — ASPIRIN/CALCIUM CARB/MAGNESIUM 324 MG
81 TABLET ORAL DAILY
Refills: 0 | Status: DISCONTINUED | OUTPATIENT
Start: 2020-08-11 | End: 2020-08-13

## 2020-08-11 RX ORDER — ZIPRASIDONE HYDROCHLORIDE 20 MG/1
120 CAPSULE ORAL
Refills: 0 | Status: DISCONTINUED | OUTPATIENT
Start: 2020-08-11 | End: 2020-08-13

## 2020-08-11 RX ADMIN — Medication 0.5 MILLIGRAM(S): at 13:50

## 2020-08-11 RX ADMIN — DIVALPROEX SODIUM 1500 MILLIGRAM(S): 500 TABLET, DELAYED RELEASE ORAL at 18:49

## 2020-08-11 RX ADMIN — Medication 50 MICROGRAM(S): at 05:57

## 2020-08-11 RX ADMIN — Medication 81 MILLIGRAM(S): at 13:50

## 2020-08-11 RX ADMIN — Medication 20 MILLIGRAM(S): at 13:50

## 2020-08-11 RX ADMIN — Medication 2 MILLIGRAM(S): at 04:28

## 2020-08-11 RX ADMIN — ZIPRASIDONE HYDROCHLORIDE 40 MILLIGRAM(S): 20 CAPSULE ORAL at 19:28

## 2020-08-11 RX ADMIN — HALOPERIDOL DECANOATE 0.5 MILLIGRAM(S): 100 INJECTION INTRAMUSCULAR at 10:38

## 2020-08-11 RX ADMIN — HALOPERIDOL DECANOATE 5 MILLIGRAM(S): 100 INJECTION INTRAMUSCULAR at 08:51

## 2020-08-11 RX ADMIN — AMPICILLIN SODIUM AND SULBACTAM SODIUM 200 GRAM(S): 250; 125 INJECTION, POWDER, FOR SUSPENSION INTRAMUSCULAR; INTRAVENOUS at 05:57

## 2020-08-11 RX ADMIN — Medication 2 MILLIGRAM(S): at 00:42

## 2020-08-11 RX ADMIN — FAMOTIDINE 20 MILLIGRAM(S): 10 INJECTION INTRAVENOUS at 18:49

## 2020-08-11 RX ADMIN — AMPICILLIN SODIUM AND SULBACTAM SODIUM 200 GRAM(S): 250; 125 INJECTION, POWDER, FOR SUSPENSION INTRAMUSCULAR; INTRAVENOUS at 23:17

## 2020-08-11 RX ADMIN — Medication 1 MILLIGRAM(S): at 13:50

## 2020-08-11 RX ADMIN — Medication 1 MILLIGRAM(S): at 18:49

## 2020-08-11 RX ADMIN — AMPICILLIN SODIUM AND SULBACTAM SODIUM 3 GRAM(S): 250; 125 INJECTION, POWDER, FOR SUSPENSION INTRAMUSCULAR; INTRAVENOUS at 02:05

## 2020-08-11 RX ADMIN — AMPICILLIN SODIUM AND SULBACTAM SODIUM 200 GRAM(S): 250; 125 INJECTION, POWDER, FOR SUSPENSION INTRAMUSCULAR; INTRAVENOUS at 01:21

## 2020-08-11 RX ADMIN — SODIUM CHLORIDE 100 MILLILITER(S): 9 INJECTION, SOLUTION INTRAVENOUS at 04:29

## 2020-08-11 RX ADMIN — HALOPERIDOL DECANOATE 5 MILLIGRAM(S): 100 INJECTION INTRAMUSCULAR at 00:42

## 2020-08-11 NOTE — BEHAVIORAL HEALTH ASSESSMENT NOTE - NSBHCHARTREVIEWLAB_PSY_A_CORE FT
CBC Full  -  ( 11 Aug 2020 06:00 )  WBC Count : 5.38 K/uL  RBC Count : 4.19 M/uL  Hemoglobin : 13.6 g/dL  Hematocrit : 40.9 %  Platelet Count - Automated : 133 K/uL  Mean Cell Volume : 97.6 fL  Mean Cell Hemoglobin : 32.5 pg  Mean Cell Hemoglobin Concentration : 33.3 %  Auto Neutrophil # : x  Auto Lymphocyte # : x  Auto Monocyte # : x  Auto Eosinophil # : x  Auto Basophil # : x  Auto Neutrophil % : x  Auto Lymphocyte % : x  Auto Monocyte % : x  Auto Eosinophil % : x  Auto Basophil % : x  08-11    138  |  99  |  14  ----------------------------<  91  4.5   |  27  |  0.78    Ca    9.4      11 Aug 2020 06:00  Phos  3.7     08-11  Mg     1.9     08-11    TPro  6.9  /  Alb  3.5  /  TBili  0.4  /  DBili  x   /  AST  20  /  ALT  11  /  AlkPhos  71  08-10

## 2020-08-11 NOTE — H&P ADULT - PROBLEM SELECTOR PLAN 3
Needs med rec for synthroid dosing. Per old records 2019 was on synthroid 50mcg qd. Will resume for now

## 2020-08-11 NOTE — ED ADULT NURSE REASSESSMENT NOTE - NS ED NURSE REASSESS COMMENT FT1
break coverage RN: pt received in spot 10. pt agitated and attempting to get off stretcher but is able to be redirected by staff at this time. L facial swelling noted however pt able to swallow w/o difficulty. 20G IV placed to R hand. Medicated as per EMAR. will continue to monitor

## 2020-08-11 NOTE — BEHAVIORAL HEALTH ASSESSMENT NOTE - NSBHCONSULTOBSREASON_PSY_A_CORE FT
per primary team, enhanced supervision-monitor patient's behavior, change in presentation, may have to adjust observational status accordingly

## 2020-08-11 NOTE — H&P ADULT - ASSESSMENT
64M with PMHx schizophrenia, HLD, hypothyroidism presenting from [facility] with periapical left mandibular abscess on CT

## 2020-08-11 NOTE — ED ADULT NURSE REASSESSMENT NOTE - NS ED NURSE REASSESS COMMENT FT1
pt got up from stretcher and IV got pulled out. pt became agitated but was able to be redirected to bed. new IV inserted. pt medicated as per MD orders. safety maintained. will continue to monitor.

## 2020-08-11 NOTE — H&P ADULT - PROBLEM SELECTOR PLAN 6
Lovenox  NPO pending possible I&D of dental abscess Hold DVT ppx as ambulatory and possible dental procedure  NPO pending possible I&D of dental abscess

## 2020-08-11 NOTE — H&P ADULT - NSHPPHYSICALEXAM_GEN_ALL_CORE
PHYSICAL EXAM:  Vital Signs Last 24 Hrs  T(C): 36.7 (08-11-20 @ 03:03)  T(F): 98.1 (08-11-20 @ 03:03), Max: 98.6 (08-10-20 @ 23:24)  HR: 75 (08-11-20 @ 03:03) (66 - 94)  BP: 113/89 (08-11-20 @ 03:03)  BP(mean): --  RR: 16 (08-11-20 @ 03:03) (12 - 18)  SpO2: 100% (08-11-20 @ 03:03) (97% - 100%)  Wt(kg): --    Patient uncooperative with full exam    Constitutional: NAD, sleeping but arousable to voice  EYES: EOMI  ENT:  Left side facial swelling and erythema, TTP left lower mandible, no drainage. Mouth partially opened and no drooling but patient not following command to open more wide  Neck: Soft and supple , no thyromegaly   Respiratory: Breath sounds are clear bilaterally, No wheezing, rales or rhonchi  Cardiovascular: S1 and S2, regular rate and rhythm, no Murmurs, gallops or rubs, no JVD,    Gastrointestinal: Bowel Sounds present, soft, nontender, nondistended, no guarding, no rebound  Extremities: No cyanosis or clubbing; warm to touch  Vascular: 2+ peripheral pulses lower ex  Neurological: Unable to assess due to mental status  Musculoskeletal: Unable to assess due to mental status  Skin: Erythema of left jaw line  Psych: Unable to assess due to mental status

## 2020-08-11 NOTE — H&P ADULT - PROBLEM SELECTOR PLAN 2
Unable to reach [facility] regarding medication list. Had agitation in [place] ED requiring ativan/haldol  -needs med rec as soon as possible when they are reachable for psych meds. Appears to be on valproic acid, ziprasidone, benztropine, haldol from old records   -ativan 2mg IV q8hrs for agitation for now  -add haldol PRN if needed Unable to reach [facility] regarding medication list. Had agitation in [place] ED requiring ativan/haldol  -needs med rec as soon as possible when they are reachable for psych meds. Appears to be on valproic acid, ziprasidone, benztropine, haldol from old records. [facility]   -ativan 2mg IV q8hrs PRN for agitation for now  -add haldol PRN if needed

## 2020-08-11 NOTE — BEHAVIORAL HEALTH ASSESSMENT NOTE - OTHER
limited cooperation not assessed-in bed variable ? r/t dental abscess pt would not answer focused on leaving/eating unable to assess-pt not engaged in interview hx schizophrenia, agitation, combativeness residential stability, has brother

## 2020-08-11 NOTE — H&P ADULT - NSHPLABSRESULTS_GEN_ALL_CORE
Labs personally reviewed by me below:  Imaging personally reviewed by my below:                          14.9   7.78  )-----------( 148      ( 10 Aug 2020 11:10 )             43.9   08-10-20 @ 11:11    136  |  97<L>  |  15.0             --------------------------< 63<L>     4.5  |  29.0  | 1.20    eGFR AA: 74  eGFR N-AA: 64    Calcium: 9.7  Phosphorus: --  Magnesium: --    AST: 20    ALT: 11  AlkPhos: 71  Protein: 6.9  Albumin: 3.5  TBili: 0.4  D-Bili: --    CT maxillofacial with IV contrast:  IMPRESSION:    There is extensive inflammation involving the buccal region of the left mandible with periapical abscess as described. No abscess is seen in the soft tissues.

## 2020-08-11 NOTE — BEHAVIORAL HEALTH ASSESSMENT NOTE - NSBHMEDSOTHERFT_PSY_A_CORE
Home Medications:  Aspirin Enteric Coated 81 mg oral delayed release tablet: 1 tab(s) orally once a day (11 Aug 2020 17:19)  benztropine 1 mg oral tablet: 1 tab(s) orally 2 times a day (11 Aug 2020 17:19)  divalproex sodium 500 mg oral delayed release tablet: 3 tab(s) (1500mg) orally 2 times a day (11 Aug 2020 17:19)  docusate sodium 100 mg oral capsule: 2 cap(s) orally 2 times a day (11 Aug 2020 17:19)  famotidine 20 mg oral tablet: 1 tab(s) orally once a day (in the morning) (11 Aug 2020 17:19)  folic acid 1 mg oral tablet: 1 tab(s) orally once a day (in the morning) (11 Aug 2020 17:19)  Haldol Decanoate 100 mg/mL intramuscular solution: 4 milliliter(s) intramuscular every 4 weeks  Last dose 8/6/2020 (11 Aug 2020 17:19)  levothyroxine 88 mcg (0.088 mg) oral tablet: 1 tab(s) orally once a day (in the morning) (11 Aug 2020 17:19)  LORazepam 0.5 mg oral tablet: 1 tab(s) orally once a day (in the morning) (11 Aug 2020 17:19)  nystatin 100,000 units/g topical powder: Apply topically to affected area 2 times a day (11 Aug 2020 17:19)  potassium chloride 10 mEq oral tablet, extended release: 1 tab(s) orally 2 times a day (11 Aug 2020 17:19)  simvastatin 20 mg oral tablet: 1 tab(s) orally once a day (at bedtime) (11 Aug 2020 17:19)  traZODone 100 mg oral tablet: 1 tab(s) orally once a day (at bedtime) (11 Aug 2020 17:19)  Vascepa 1 g oral capsule: 1 cap(s) orally 2 times a day (11 Aug 2020 17:19)  Vitamin D2 50,000 intl units (1.25 mg) oral capsule: 1 cap(s) orally every 2 weeks  Next dose due 8/15/2020 (11 Aug 2020 17:19)  ziprasidone 40 mg oral capsule: 1 cap(s) orally 2 times a day  Total dose 120mg orally 2 times a day (11 Aug 2020 17:19)  ziprasidone 80 mg oral capsule: 1 cap(s) orally 2 times a day  Total dose 120mg orally 2 times a day (11 Aug 2020 17:19)

## 2020-08-11 NOTE — BEHAVIORAL HEALTH ASSESSMENT NOTE - NSBHCONSULTMEDAGITATION_PSY_A_CORE FT
Ativan 2mg PO/IV/IM Q6H PRN anxiety/agitation-monitor respiratory status, hold if RR<12, hold for sedation

## 2020-08-11 NOTE — BEHAVIORAL HEALTH ASSESSMENT NOTE - VIOLENCE RISK FACTORS:
Affective dysregulation/Feeling of being under threat and being unable to control threat/Impulsivity

## 2020-08-11 NOTE — BEHAVIORAL HEALTH ASSESSMENT NOTE - RISK ASSESSMENT
Low Acute Suicide Risk Risk Factors: acute medical condition, schizophrenia, combative, aggressive, pulling out iv's, denies having dental abscess, refusing to sign consent for treatment  Protective Factors: residential stability, has brother, denies si, poor historian, denies a/vh

## 2020-08-11 NOTE — BEHAVIORAL HEALTH ASSESSMENT NOTE - NSBHCHARTREVIEWVS_PSY_A_CORE FT
Vital Signs Last 24 Hrs  T(C): 36.7 (11 Aug 2020 03:03), Max: 37 (10 Aug 2020 23:24)  T(F): 98.1 (11 Aug 2020 03:03), Max: 98.6 (10 Aug 2020 23:24)  HR: 60 (11 Aug 2020 06:19) (60 - 75)  BP: 148/69 (11 Aug 2020 06:19) (113/89 - 154/93)  BP(mean): --  RR: 16 (11 Aug 2020 06:19) (12 - 18)  SpO2: 100% (11 Aug 2020 06:19) (100% - 100%)

## 2020-08-11 NOTE — BEHAVIORAL HEALTH ASSESSMENT NOTE - NSBHCONSFOLLOWNEEDS_PSY_A_CORE
This note written by NP, the above impression/plan needs to be reviewed by Dr. Iniguez, this note needs to be cosigned.

## 2020-08-11 NOTE — H&P ADULT - PROBLEM SELECTOR PLAN 4
Per old med list appears to be on ASA and simvastatin and will resume. Needs med rec Per old med list appears to be on ASA and simvastatin. Will resume simvastatin but hold ASA for now as possible dental procedure

## 2020-08-11 NOTE — PHARMACOTHERAPY INTERVENTION NOTE - COMMENTS
Medication history is complete. Medication list updated in Outpatient Medication Record (OMR). Please call spectra [id number] if you have any questions.  Patient uses [facility]; Phone: [phone]  Patient is not inpatient from [facility]. Per EDGARDO Le, lives on the grounds as part of outpatient group home program: [facility]   Phone: [phone]  Address: [facility] [address] [place] [address]

## 2020-08-11 NOTE — H&P ADULT - PROBLEM SELECTOR PLAN 1
Found to have periapical abscess of left mandible of unknown duration on CT. Nontoxic appearing, no leukocytosis, afebrile. However patient cannot provide hx due to schizophrenia. He is unable to give consent for I&D by dentistry. Attempts by myself and ED providers have been unsuccessful to reach [facility] for collateral and consent  -obtain collateral from [facility] and reach out to dentistry when able to get consent  -NPO/IVF pending possible procedure  -continue unasyn 3g IV q6hrs  -monitor VS and CBC

## 2020-08-11 NOTE — BEHAVIORAL HEALTH ASSESSMENT NOTE - NSBHCHARTREVIEWIMAGING_PSY_A_CORE FT
EXAM:  CT MAXILLOFACIAL  IC                          PROCEDURE DATE:  08/10/2020          INTERPRETATION:  CT maxillofacial with contrast.    CLINICAL INDICATION: Facial swelling.    TECHNIQUE:  Axial CT imaging was performed through the facial bones without the administration of intravenous contrast.. Coronal and sagittal reformatted images were also obtained. Dose optimization techniques were utilized including kVp/mA modulation along with iterative reconstructions.    COMPARISON: Relevant portions of CT brain, 7/23/2020.    DOSE REPORT: Radiation Dose Estimate (DLP) Dose 267 mGy-cm.    Contrast: 92 mL of intravenous contrast Omnipaque 300 was administered.    INTERPRETATION:    The bony orbits are intact. The nasal bones are intact. Themandible, maxilla, zygoma and pterygoid plates are intact.    The globes are symmetric and intact. There is no retrobulbar hematoma. The extraocular muscles and optic nerve sheath complexes are unremarkable.    There are no fluid levels within the paranasal sinuses. The visualized mastoid air cells are clear.    The visualized portions of the intracranial compartment are unremarkable.    There is soft tissue stranding of the subcutaneous tissues in the buccal region of the left mandible and thickening of the platysma muscle. There is periapical cyst involving the left lower mandibular molar with dehiscence in alveolar ridge, series 7 image 78, without evidence of loculated fluid collection suggest abscess.      IMPRESSION:    There is extensive inflammation involving the buccal region of the left mandible with periapical abscess as described. No abscess is seen in the soft tissues.

## 2020-08-11 NOTE — BEHAVIORAL HEALTH ASSESSMENT NOTE - SUMMARY
63 yo male residing in outpatient residence on grounds of [facility] with PMHx HLD, hypothyroidism, PPHx schizophrenia presents from [facility] with dental abscess, combative in ED required Haldol 5/Ativan 2, started on Unasyn. [facility] psychiatry consulted for agitation.    Patient seen and evaluated at bedside accompanied by PCA, patient calm initially, however irritable at times, easily agitated, raises voice when irritated, alert to self, , aware in hospital but does not know name, poor historian, however, denies having dental abscess, limited interview as patient not engaged, perseverating on eating, (NPO r/t ?I&d), denies a/v/h, denies si.    Staff report patient can be combative, redirectable at times, pulled out iv multiple times. Personal collateral unavailable, unable to reach [facility] for collateral.    Medications below were reconciled by Dr. Caruso,  per ELLEN Candelaria MD was able to contact [facility] today and verify medication regiment, see recommendations below:    PLAN:  -c/w Enhanced Supervision per primary team, monitor and adjust accordingly based on patient's presentation, behavior  -Obtain EKG qtc given patient given multiple prn's today of Haldol  -Obtain Valproic Level  -c/w Cogentin 1mg bid  -c/w Depakote DR 1500mg bid  -c/w Ativan 0.5mg daily  -c/w Geodon 120mg bid  -Ativan 2mg prn po/iv/im q6h agitation/anxiety, monitor respiratory status, hold for sedation, hold if RR<12  ?-Unable to determine last dose given Haldol Decanoate 100mg/ml IM q4wks at this time.    [facility] psychiatry to follow patient.

## 2020-08-11 NOTE — BEHAVIORAL HEALTH ASSESSMENT NOTE - HPI (INCLUDE ILLNESS QUALITY, SEVERITY, DURATION, TIMING, CONTEXT, MODIFYING FACTORS, ASSOCIATED SIGNS AND SYMPTOMS)
63 yo male residing in outpatient residence on grounds of [facility] with PMHx HLD, hypothyroidism, PPHx schizophrenia presents from [facility] with dental abscess, combative in ED required Haldol 5/Ativan 2, received Haldol X2 on unit today, started on Unasyn. [facility] psychiatry consulted for agitation.    Patient seen and evaluated at bedside accompanied by PCA, patient calm initially, however irritable at times, easily agitated, raises voice when irritated, alert to self, , aware in hospital but does not know name, poor historian, however, denies having dental abscess, limited interview as patient not engaged, perseverating on eating, (NPO r/t ?I&d), denies a/v/h, denies si.    Staff report patient can be combative, redirectable at times, pulled out iv multiple times. Collateral unavailable.

## 2020-08-11 NOTE — H&P ADULT - HISTORY OF PRESENT ILLNESS
64M with PMHx schizophrenia, HLD, hypothyroidism presenting from [facility] with dental abscess. Patient cannot provide any history and will not answer any questions or cooperate with exam due to his behavioral disorder. He was arousable to voice upon interview but would not speak. Called [facility] at [phone] number provided in chart for collateral and medication list but phone rang out. ED provider tried twice as well but no response. History is obtained from chart review. Patient initially sent to [facility] ED for left facial swelling for one day? CT done and showed here is extensive inflammation involving the buccal region of the left mandible with periapical abscess as described. No abscess is seen in the soft tissues. Transferred here for dental evaluation. Pt was combative and required haldol 5 and ativan 2. Per ED notes, pt was able to open mouth but with difficulty. Started on unasyn. Evaluated by dentistry and rec'd to reach back to them when consent can be provided for I&D of abscess.

## 2020-08-12 PROCEDURE — 99223 1ST HOSP IP/OBS HIGH 75: CPT

## 2020-08-12 PROCEDURE — 99233 SBSQ HOSP IP/OBS HIGH 50: CPT

## 2020-08-12 RX ORDER — LEVOTHYROXINE SODIUM 125 MCG
88 TABLET ORAL DAILY
Refills: 0 | Status: DISCONTINUED | OUTPATIENT
Start: 2020-08-12 | End: 2020-08-13

## 2020-08-12 RX ADMIN — Medication 1 MILLIGRAM(S): at 09:27

## 2020-08-12 RX ADMIN — Medication 1 MILLIGRAM(S): at 17:26

## 2020-08-12 RX ADMIN — ZIPRASIDONE HYDROCHLORIDE 120 MILLIGRAM(S): 20 CAPSULE ORAL at 17:26

## 2020-08-12 RX ADMIN — Medication 81 MILLIGRAM(S): at 12:39

## 2020-08-12 RX ADMIN — FAMOTIDINE 20 MILLIGRAM(S): 10 INJECTION INTRAVENOUS at 17:26

## 2020-08-12 RX ADMIN — Medication 1 TABLET(S): at 17:25

## 2020-08-12 RX ADMIN — Medication 2 MILLIGRAM(S): at 01:20

## 2020-08-12 RX ADMIN — Medication 20 MILLIGRAM(S): at 09:27

## 2020-08-12 RX ADMIN — Medication 1 TABLET(S): at 14:49

## 2020-08-12 RX ADMIN — Medication 0.5 MILLIGRAM(S): at 12:39

## 2020-08-12 RX ADMIN — ENOXAPARIN SODIUM 40 MILLIGRAM(S): 100 INJECTION SUBCUTANEOUS at 12:39

## 2020-08-12 RX ADMIN — Medication 50 MICROGRAM(S): at 09:28

## 2020-08-12 RX ADMIN — ZIPRASIDONE HYDROCHLORIDE 120 MILLIGRAM(S): 20 CAPSULE ORAL at 09:28

## 2020-08-12 RX ADMIN — SIMVASTATIN 20 MILLIGRAM(S): 20 TABLET, FILM COATED ORAL at 22:49

## 2020-08-12 RX ADMIN — Medication 1 MILLIGRAM(S): at 12:39

## 2020-08-12 RX ADMIN — DIVALPROEX SODIUM 1500 MILLIGRAM(S): 500 TABLET, DELAYED RELEASE ORAL at 09:27

## 2020-08-12 RX ADMIN — FAMOTIDINE 20 MILLIGRAM(S): 10 INJECTION INTRAVENOUS at 09:27

## 2020-08-12 RX ADMIN — DIVALPROEX SODIUM 1500 MILLIGRAM(S): 500 TABLET, DELAYED RELEASE ORAL at 17:27

## 2020-08-12 NOTE — PROVIDER CONTACT NOTE (OTHER) - SITUATION
Pt received alert and agitated, on e/c. IV placed for ABX tx, tolerated 10pm dose, removed IV. refuses to take any PO medications. Combative with sitter/RNS

## 2020-08-12 NOTE — PROGRESS NOTE BEHAVIORAL HEALTH - SUMMARY
63 yo male residing in outpatient residence on grounds of [facility] with PMHx HLD, hypothyroidism, PPHx schizophrenia presents from [facility] with dental abscess, combative in ED required Haldol 5/Ativan 2, started on Unasyn. [facility] psychiatry consulted for agitation.    PLAN:  -c/w Enhanced Supervision per primary team, monitor and adjust accordingly based on patient's presentation, behavior  -Obtain EKG qtc given patient given multiple prn's today of Haldol  -Obtain Valproic Level  -c/w Cogentin 1mg bid  -c/w Depakote DR 1500mg bid  -c/w Ativan 0.5mg daily  -c/w Geodon 120mg bid  -Ativan 2mg prn po/iv/im q6h agitation/anxiety, monitor respiratory status, hold for sedation, hold if RR<12  ?-Unable to determine last dose given Haldol Decanoate 100mg/ml IM q4wks at this time.    [facility] psychiatry to follow patient. 63 yo male residing in outpatient residence on grounds of [facility] with PMHx HLD, hypothyroidism, PPHx schizophrenia presents from [facility] with dental abscess, combative in ED required Haldol 5/Ativan 2, started on Unasyn. [facility] psychiatry consulted for agitation.    Patient irritable, disorganized. Awaiting collateral from group home. PRNs as below, patient mostly somnolent today but would increase meds as below to prevent agitation while inpt. Patient does not demonstrate capacity to refuse workup, necessary interventions regarding the dental abscess.     PLAN:  -c/w Enhanced Supervision per primary team, monitor and adjust accordingly based on patient's presentation, behavior  - Increase Ativan to 0.5mg qAM + 1mg qHS standing (hold for respiratory depression)  - f/u EKG for qtc, hold antipsychotics for qtc> 500 and call psych  -Obtain Valproic Level  -c/w Cogentin 1mg bid  -c/w Depakote DR 1500mg bid  -c/w Geodon 120mg bid  -Haldol 5mg + Ativan 2mg prn po/iv/im q6h agitation/anxiety, monitor respiratory status, hold for sedation, hold if RR<12  - awaitng callback to confirm last haldol dec DURAN shot.

## 2020-08-12 NOTE — PROVIDER CONTACT NOTE (OTHER) - RECOMMENDATIONS
Assess pt at bedside, reschedule morning meds to 9AM, Hold of on new IV placement while agitated. Allow pt to rest safely will on an e/c.

## 2020-08-12 NOTE — PROGRESS NOTE BEHAVIORAL HEALTH - RELATEDNESS
Eyes with no visual disturbances.  Ears clean and dry and no hearing difficulties. Nose with pink mucosa and no drainage.  Mouth mucous membranes moist and pink.  No tenderness or swelling to throat or neck.
Poor

## 2020-08-12 NOTE — CHART NOTE - NSCHARTNOTEFT_GEN_A_CORE
Dental evaluation for today for decreased swelling, infection improved.  Difficult IV access.  Will change to PO Augmentin 875mg BID x 7 days and starting today.  Breanne HUGHES Dental abscessevaluated today and found decreased swelling, infection improved.  Difficult IV access.  Will change to PO Augmentin 875mg BID x 7 days and starting today. PMD made aware.  Breanne HUGHES

## 2020-08-12 NOTE — PROGRESS NOTE BEHAVIORAL HEALTH - NSBHCHARTREVIEWLAB_PSY_A_CORE FT
13.6   5.38  )-----------( 133      ( 11 Aug 2020 06:00 )             40.9     08-11    138  |  99  |  14  ----------------------------<  91  4.5   |  27  |  0.78    Ca    9.4      11 Aug 2020 06:00  Phos  3.7     08-11  Mg     1.9     08-11          PT/INR - ( 11 Aug 2020 06:00 )   PT: 12.8 SEC;   INR: 1.12          PTT - ( 11 Aug 2020 06:00 )  PTT:32.5 SEC

## 2020-08-12 NOTE — PROVIDER CONTACT NOTE (OTHER) - ASSESSMENT
Pt is alert and combative very agitated, pulled out IV, refusing to take morning medications. Therapeutic Communication used.

## 2020-08-12 NOTE — PROGRESS NOTE BEHAVIORAL HEALTH - NSBHFUPINTERVALHXFT_PSY_A_CORE
patient seen as f/u, somnlent on exam, poor historian, no understanding of current condition, irritable, exam limited    per staff patient disorganized, does not believe he has any illness, asking for "chinese cigarettes", easily agitated.    Chart reviewed, meds consistant with med list when seen by psych in 2019, patient was similarly disorganized then, is likely baseline.

## 2020-08-12 NOTE — PROGRESS NOTE BEHAVIORAL HEALTH - NSBHCHARTREVIEWVS_PSY_A_CORE FT
Vital Signs Last 24 Hrs  T(C): 36.4 (12 Aug 2020 06:58), Max: 37.1 (11 Aug 2020 23:27)  T(F): 97.5 (12 Aug 2020 06:58), Max: 98.7 (11 Aug 2020 23:27)  HR: 70 (12 Aug 2020 06:58) (70 - 85)  BP: 145/90 (12 Aug 2020 06:58) (135/70 - 145/90)  BP(mean): --  RR: 18 (12 Aug 2020 06:58) (18 - 18)  SpO2: 97% (12 Aug 2020 06:58) (97% - 99%)

## 2020-08-12 NOTE — PROGRESS NOTE BEHAVIORAL HEALTH - NSBHCONSFOLLOWNEEDS_PSY_A_CORE
This note written by NP, the above impression/plan needs to be reviewed by Dr. Iniguez, this note needs to be cosigned. no psychiatric contraindications to discharge

## 2020-08-12 NOTE — PROGRESS NOTE ADULT - PROBLEM SELECTOR PLAN 6
Hold DVT ppx as ambulatory and possible dental procedure  NPO pending possible I&D of dental abscess
Hold DVT ppx as ambulatory and possible dental procedure  NPO pending possible I&D of dental abscess

## 2020-08-12 NOTE — PROGRESS NOTE BEHAVIORAL HEALTH - NSBHCONSULTFOLLOWDETAILS_PSY_A_CORE FT
Patient chronically psychiatrically ill, lives in psychiatric residence, should return to this when medicaly stable

## 2020-08-12 NOTE — PROGRESS NOTE BEHAVIORAL HEALTH - OTHER
limited cooperation not assessed-in bed variable focused on leaving/eating unable to assess-pt not engaged in interview

## 2020-08-12 NOTE — PROVIDER CONTACT NOTE (OTHER) - ACTION/TREATMENT ORDERED:
Provider made aware, will reschedule medications, will continue to monitor pt during my shift. and endorse new orders to dayshift RN.

## 2020-08-13 ENCOUNTER — TRANSCRIPTION ENCOUNTER (OUTPATIENT)
Age: 65
End: 2020-08-13

## 2020-08-13 VITALS
DIASTOLIC BLOOD PRESSURE: 97 MMHG | RESPIRATION RATE: 18 BRPM | SYSTOLIC BLOOD PRESSURE: 151 MMHG | OXYGEN SATURATION: 100 % | HEART RATE: 99 BPM | TEMPERATURE: 98 F

## 2020-08-13 LAB
HCT VFR BLD CALC: 41.5 % — SIGNIFICANT CHANGE UP (ref 39–50)
HGB BLD-MCNC: 13.9 G/DL — SIGNIFICANT CHANGE UP (ref 13–17)
MCHC RBC-ENTMCNC: 32.4 PG — SIGNIFICANT CHANGE UP (ref 27–34)
MCHC RBC-ENTMCNC: 33.5 % — SIGNIFICANT CHANGE UP (ref 32–36)
MCV RBC AUTO: 96.7 FL — SIGNIFICANT CHANGE UP (ref 80–100)
NRBC # FLD: 0 K/UL — SIGNIFICANT CHANGE UP (ref 0–0)
PLATELET # BLD AUTO: 161 K/UL — SIGNIFICANT CHANGE UP (ref 150–400)
PMV BLD: 11.2 FL — SIGNIFICANT CHANGE UP (ref 7–13)
RBC # BLD: 4.29 M/UL — SIGNIFICANT CHANGE UP (ref 4.2–5.8)
RBC # FLD: 13.3 % — SIGNIFICANT CHANGE UP (ref 10.3–14.5)
VALPROATE SERPL-MCNC: 77.3 UG/ML — SIGNIFICANT CHANGE UP (ref 50–100)
WBC # BLD: 4.76 K/UL — SIGNIFICANT CHANGE UP (ref 3.8–10.5)
WBC # FLD AUTO: 4.76 K/UL — SIGNIFICANT CHANGE UP (ref 3.8–10.5)

## 2020-08-13 PROCEDURE — 99239 HOSP IP/OBS DSCHRG MGMT >30: CPT

## 2020-08-13 RX ORDER — POTASSIUM CHLORIDE 20 MEQ
1 PACKET (EA) ORAL
Qty: 0 | Refills: 0 | DISCHARGE

## 2020-08-13 RX ORDER — SENNA PLUS 8.6 MG/1
2 TABLET ORAL
Qty: 0 | Refills: 0 | DISCHARGE
Start: 2020-08-13

## 2020-08-13 RX ORDER — DOCUSATE SODIUM 100 MG
2 CAPSULE ORAL
Qty: 0 | Refills: 0 | DISCHARGE

## 2020-08-13 RX ORDER — TRAZODONE HCL 50 MG
1 TABLET ORAL
Qty: 0 | Refills: 0 | DISCHARGE

## 2020-08-13 RX ADMIN — Medication 20 MILLIGRAM(S): at 06:30

## 2020-08-13 RX ADMIN — Medication 1 TABLET(S): at 17:33

## 2020-08-13 RX ADMIN — DIVALPROEX SODIUM 1500 MILLIGRAM(S): 500 TABLET, DELAYED RELEASE ORAL at 06:30

## 2020-08-13 RX ADMIN — Medication 88 MICROGRAM(S): at 06:31

## 2020-08-13 RX ADMIN — Medication 81 MILLIGRAM(S): at 11:36

## 2020-08-13 RX ADMIN — Medication 1 MILLIGRAM(S): at 06:30

## 2020-08-13 RX ADMIN — DIVALPROEX SODIUM 1500 MILLIGRAM(S): 500 TABLET, DELAYED RELEASE ORAL at 17:34

## 2020-08-13 RX ADMIN — ZIPRASIDONE HYDROCHLORIDE 120 MILLIGRAM(S): 20 CAPSULE ORAL at 06:32

## 2020-08-13 RX ADMIN — Medication 1 MILLIGRAM(S): at 17:33

## 2020-08-13 RX ADMIN — ZIPRASIDONE HYDROCHLORIDE 120 MILLIGRAM(S): 20 CAPSULE ORAL at 17:33

## 2020-08-13 RX ADMIN — Medication 1 TABLET(S): at 06:30

## 2020-08-13 RX ADMIN — FAMOTIDINE 20 MILLIGRAM(S): 10 INJECTION INTRAVENOUS at 17:33

## 2020-08-13 RX ADMIN — FAMOTIDINE 20 MILLIGRAM(S): 10 INJECTION INTRAVENOUS at 06:30

## 2020-08-13 RX ADMIN — Medication 1 MILLIGRAM(S): at 11:36

## 2020-08-13 RX ADMIN — ENOXAPARIN SODIUM 40 MILLIGRAM(S): 100 INJECTION SUBCUTANEOUS at 11:36

## 2020-08-13 RX ADMIN — Medication 0.5 MILLIGRAM(S): at 11:36

## 2020-08-13 NOTE — PROGRESS NOTE ADULT - ASSESSMENT
64M with PMHx schizophrenia, HLD, hypothyroidism presenting from [facility] with periapical left mandibular abscess on CT started on po abx
64M with PMHx schizophrenia, HLD, hypothyroidism presenting from [facility] with periapical left mandibular abscess on CT
64M with PMHx schizophrenia, HLD, hypothyroidism presenting from [facility] with periapical left mandibular abscess on CT

## 2020-08-13 NOTE — PHYSICAL THERAPY INITIAL EVALUATION ADULT - GENERAL OBSERVATIONS, REHAB EVAL
Patient found supine in bed; alert; confused; requires verbal cues to stay on task and to increase safety awareness; responds well to garcia commands.

## 2020-08-13 NOTE — PHYSICAL THERAPY INITIAL EVALUATION ADULT - PLANNED THERAPY INTERVENTIONS, PT EVAL
transfer training/bed mobility training/Patient left supine in bed in NAD, call bell in reach, all lines intact. +Bed alarm. Nursing staff present./gait training/strengthening

## 2020-08-13 NOTE — DISCHARGE NOTE PROVIDER - NSDCMRMEDTOKEN_GEN_ALL_CORE_FT
amoxicillin-clavulanate 875 mg-125 mg oral tablet: 1 tab(s) orally 2 times a day  Aspirin Enteric Coated 81 mg oral delayed release tablet: 1 tab(s) orally once a day  benztropine 1 mg oral tablet: 1 tab(s) orally 2 times a day  divalproex sodium 500 mg oral delayed release tablet: 3 tab(s) (1500mg) orally 2 times a day  famotidine 20 mg oral tablet: 1 tab(s) orally once a day (in the morning)  folic acid 1 mg oral tablet: 1 tab(s) orally once a day (in the morning)  Haldol Decanoate 100 mg/mL intramuscular solution: 4 milliliter(s) intramuscular every 4 weeks  Last dose 8/6/2020  levothyroxine 88 mcg (0.088 mg) oral tablet: 1 tab(s) orally once a day (in the morning)  LORazepam 0.5 mg oral tablet: 1 tab(s) orally once a day (in the morning)  nystatin 100,000 units/g topical powder: Apply topically to affected area 2 times a day  senna oral tablet: 2 tab(s) orally once a day (at bedtime)  simvastatin 20 mg oral tablet: 1 tab(s) orally once a day (at bedtime)  traZODone 100 mg oral tablet: 1 tab(s) orally once a day (at bedtime)  Vascepa 1 g oral capsule: 1 cap(s) orally 2 times a day  Vitamin D2 50,000 intl units (1.25 mg) oral capsule: 1 cap(s) orally every 2 weeks  Next dose due 8/15/2020  ziprasidone 40 mg oral capsule: 1 cap(s) orally 2 times a day  Total dose 120mg orally 2 times a day  ziprasidone 80 mg oral capsule: 1 cap(s) orally 2 times a day  Total dose 120mg orally 2 times a day amoxicillin-clavulanate 875 mg-125 mg oral tablet: 1 tab(s) orally 2 times a day  Aspirin Enteric Coated 81 mg oral delayed release tablet: 1 tab(s) orally once a day  benztropine 1 mg oral tablet: 1 tab(s) orally 2 times a day  divalproex sodium 500 mg oral delayed release tablet: 3 tab(s) (1500mg) orally 2 times a day  docusate sodium 100 mg oral tablet: 2 tab(s) orally 2 times a day  famotidine 20 mg oral tablet: 1 tab(s) orally once a day (in the morning)  folic acid 1 mg oral tablet: 1 tab(s) orally once a day (in the morning)  Haldol Decanoate 100 mg/mL intramuscular solution: 4 milliliter(s) intramuscular every 4 weeks  Last dose 8/6/2020  levothyroxine 88 mcg (0.088 mg) oral tablet: 1 tab(s) orally once a day (in the morning)  LORazepam 0.5 mg oral tablet: 1 tab(s) orally once a day (in the morning)  nystatin 100,000 units/g topical powder: Apply topically to affected area 2 times a day  simvastatin 20 mg oral tablet: 1 tab(s) orally once a day (at bedtime)  Vascepa 1 g oral capsule: 1 cap(s) orally 2 times a day  Vitamin D2 50,000 intl units (1.25 mg) oral capsule: 1 cap(s) orally every 2 weeks  Next dose due 8/15/2020  ziprasidone 40 mg oral capsule: 1 cap(s) orally 2 times a day  Total dose 120mg orally 2 times a day  ziprasidone 80 mg oral capsule: 1 cap(s) orally 2 times a day  Total dose 120mg orally 2 times a day

## 2020-08-13 NOTE — PHYSICAL THERAPY INITIAL EVALUATION ADULT - DISCHARGE DISPOSITION, PT EVAL
Anticipate return to [facility] facility with no PT needs. Patient to be seen during this hospital stay to attain all PT goals.

## 2020-08-13 NOTE — DISCHARGE NOTE NURSING/CASE MANAGEMENT/SOCIAL WORK - PATIENT PORTAL LINK FT
You can access the FollowMyHealth Patient Portal offered by [facility] by registering at the following website: [email or web address]. By joining [facility]’s FollowMyHealth portal, you will also be able to view your health information using other applications (apps) compatible with our system.

## 2020-08-13 NOTE — PROGRESS NOTE ADULT - PROBLEM SELECTOR PLAN 4
Will resume simvastatin but hold ASA for now as possible dental procedure
Will resume simvastatin, ASA resume on d/c
Will resume simvastatin but hold ASA for now as possible dental procedure

## 2020-08-13 NOTE — DISCHARGE NOTE PROVIDER - HOSPITAL COURSE
64M with PMHx schizophrenia, HLD, hypothyroidism presenting from [facility] with periapical left mandibular abscess on CT         Problem/Plan - 1:    ·  Problem: Dental abscess.  Plan: Found to have periapical abscess of left mandible of unknown duration on CT. Nontoxic appearing, no leukocytosis, afebrile. However patient cannot provide hx due to schizophrenia.     - per discussion with Linda he is resident at [facility], [facility] ([phone]), no designated HCP. Brother James Orellana [phone] is only living relative and unable to contact after several attempts. Per discussion w/ SW and Linda pt would require 2 PC for dental abscess drainage.     - transitioned to amoxicillin 875 bid for treatment of abscess    - as per dental,  Continue abx treatment and monitor for decreased swelling.       F/U with outpatient dentist for comprehensive dental care upon discharge.         Problem/Plan - 2:    ·  Problem: Schizophrenia, unspecified type.  Plan: - pt on benztropine 1 mg bid  mg 3 tabs bid Haldol Deconoate 100 mg IM 4 ml q4 weeks Lorazepam 0.5 mg 1 tab in AM Ziprasidone 40 mg 1 cap bid ziprasidone 80 mg 1 cap bid, confirmed by [place]    - QTC <500, on ziprasidone 120 mg bid, Cogentin 1mg bid, Depakote DR 1500mg bid    -Haldol 5mg + Ativan 2mg prn q6 for agitation    - VPA level wnl         Problem/Plan - 3:    ·  Problem: Hypothyroidism, unspecified type.  Plan: - pt on synthroid 88 mcg at home, will resume for now.          Problem/Plan - 4:    ·  Problem: Hyperlipidemia, unspecified hyperlipidemia type.  Plan: Will resume simvastatin but hold ASA for now as possible dental procedure.             Discussed case with / Zuly, pt cleared for discharge back to group home with oral antibiotics.

## 2020-08-13 NOTE — PROGRESS NOTE ADULT - PROBLEM SELECTOR PLAN 2
- pt on benztropine 1 mg bid  mg 3 tabs bid Haldol Deconoate 100 mg IM 4 ml q4 weeks Lorazepam 0.5 mg 1 tab in AM Ziprasidone 40 mg 1 cap bid ziprasidone 80 mg 1 cap bid, confirmed by [place]  - will obtain QTC, on ziprasidone 120 mg bid, Cogentin 1mg bid, Depakote DR 1500mg bid  -Haldol 5mg + Ativan 2mg prn q6 for agitation  - VPA wnl
- pt on benztropine 1 mg bid  mg 3 tabs bid Haldol Deconoate 100 mg IM 4 ml q4 weeks Lorazepam 0.5 mg 1 tab in AM Ziprasidone 40 mg 1 cap bid ziprasidone 80 mg 1 cap bid, confirmed by [place]  - will resume medications at this time, will obtain baseline EKG to measure QTC
- pt on benztropine 1 mg bid  mg 3 tabs bid Haldol Deconoate 100 mg IM 4 ml q4 weeks Lorazepam 0.5 mg 1 tab in AM Ziprasidone 40 mg 1 cap bid ziprasidone 80 mg 1 cap bid, confirmed by [place]  - will obtain QTC, on ziprasidone 120 mg bid, Cogentin 1mg bid, Depakote DR 1500mg bid  -Haldol 5mg + Ativan 2mg prn q6 for agitation  - VPA level in AM.

## 2020-08-13 NOTE — DISCHARGE NOTE PROVIDER - NSDCCPCAREPLAN_GEN_ALL_CORE_FT
PRINCIPAL DISCHARGE DIAGNOSIS  Diagnosis: Dental abscess  Assessment and Plan of Treatment: Continue Augmentin for 6 more days to complete a 7 day course. Monitor for improved swelling, follow up with PCP and with dental for dental check up.      SECONDARY DISCHARGE DIAGNOSES  Diagnosis: Schizophrenia, unspecified type  Assessment and Plan of Treatment: Continue all medications as prescribed. Follow up with your psychiatrist for close monitoring.    Diagnosis: Hyperlipidemia, unspecified hyperlipidemia type  Assessment and Plan of Treatment: Low fat diet, exercise daily and continue current medications. Follow up with primary care physician and cardiologist for management.    Diagnosis: Hypothyroidism, unspecified type  Assessment and Plan of Treatment: Continue Synthroid. Follow up with PCP.

## 2020-08-13 NOTE — PROGRESS NOTE ADULT - REASON FOR ADMISSION
Dental abscess from OSH
Left facial swelling
Dental abscess from OSH

## 2020-08-13 NOTE — PHYSICAL THERAPY INITIAL EVALUATION ADULT - PERTINENT HX OF CURRENT PROBLEM, REHAB EVAL
64 year old male with PMHx schizophrenia, HLD, hypothyroidism presenting from [facility] with dental abscess.

## 2020-08-13 NOTE — PROGRESS NOTE ADULT - PROBLEM SELECTOR PLAN 5
Transitions of Care Status:  1.  Name of PCP:  2.  PCP Contacted on Admission: [ ] Y    [ x] N  Night admission  3.  PCP contacted at Discharge: [ ] Y    [ ] N    [ ] N/A  4.  Post-Discharge Appointment Date and Location:  5.  Summary of Handoff given to PCP:
Hold DVT ppx as ambulatory
Transitions of Care Status:  1.  Name of PCP:  2.  PCP Contacted on Admission: [ ] Y    [ x] N  Night admission  3.  PCP contacted at Discharge: [ ] Y    [ ] N    [ ] N/A  4.  Post-Discharge Appointment Date and Location:  5.  Summary of Handoff given to PCP:

## 2020-08-13 NOTE — PROGRESS NOTE ADULT - PROBLEM SELECTOR PLAN 1
Found to have periapical abscess of left mandible of unknown duration on CT. Nontoxic appearing, no leukocytosis, afebrile. However patient cannot provide hx due to schizophrenia.   - per discussion with Linda he is resident at outpatient [place], [facility] ([phone]), no designated HCP. Brother James Orellana [phone] is only living relative and unable to contact after several attempts. Per discussion w/ DARYL and Linda pt would require 2 PC for dental abscess drainage but given improvement w/ abx plan to d/c today w/ total 10 day course of amoxicillin with outpatient dental f/u  - will f/u with dental regarding necessity of drainage, likely temporizing measure and pt prone to repeat abscess formation given dentition.
Found to have periapical abscess of left mandible of unknown duration on CT. Nontoxic appearing, no leukocytosis, afebrile. However patient cannot provide hx due to schizophrenia.   - per discussion with Linda he is resident at outpatient [facility], [facility] ([phone]), no designated HCP. Brother James Orellana [phone] is only living relative and unable to contact after several attempts. Per discussion w/ SW and Linda pt would require 2 PC for dental abscess drainage. Will f/u with dentistry regarding absolute necessity for drainage.  -continue unasyn 3g IV q6hrs  -monitor VS and CBC
Found to have periapical abscess of left mandible of unknown duration on CT. Nontoxic appearing, no leukocytosis, afebrile. However patient cannot provide hx due to schizophrenia.   - per discussion with Linda he is resident at outpatient [place], [facility] ([phone]), no designated HCP. Brother James Orellana [phone] is only living relative and unable to contact after several attempts. Per discussion w/ SW and Linda pt would require 2 PC for dental abscess drainage.   - transitioned to amoxicillin 875 bid for treatment of abscess  - will f/u with dental regarding necessity of drainage, likely temporizing measure and pt prone to repeat abscess formation given dentition.   -monitor VS and CBC

## 2020-08-13 NOTE — PROGRESS NOTE ADULT - PROBLEM SELECTOR PLAN 3
- pt on synthroid 88 mcg at home, will resume for now.
Needs med rec for synthroid dosing. Per old records 2019 was on synthroid 50mcg qd. Will resume for now
- pt on synthroid 88 mcg at home, will resume for now.

## 2020-09-03 ENCOUNTER — TRANSCRIPTION ENCOUNTER (OUTPATIENT)
Age: 65
End: 2020-09-03

## 2020-09-06 ENCOUNTER — EMERGENCY (EMERGENCY)
Facility: HOSPITAL | Age: 65
LOS: 1 days | Discharge: HOME | End: 2020-09-06
Attending: EMERGENCY MEDICINE
Payer: MEDICAID

## 2020-09-06 VITALS
SYSTOLIC BLOOD PRESSURE: 101 MMHG | OXYGEN SATURATION: 95 % | HEIGHT: 62 IN | HEART RATE: 92 BPM | TEMPERATURE: 98 F | DIASTOLIC BLOOD PRESSURE: 61 MMHG | WEIGHT: 179.9 LBS | RESPIRATION RATE: 18 BRPM

## 2020-09-06 VITALS
RESPIRATION RATE: 18 BRPM | DIASTOLIC BLOOD PRESSURE: 89 MMHG | HEART RATE: 64 BPM | OXYGEN SATURATION: 93 % | TEMPERATURE: 98 F | SYSTOLIC BLOOD PRESSURE: 188 MMHG

## 2020-09-06 LAB — SARS-COV-2 RNA SPEC QL NAA+PROBE: SIGNIFICANT CHANGE UP

## 2020-09-06 PROCEDURE — 99284 EMERGENCY DEPT VISIT MOD MDM: CPT

## 2020-09-06 PROCEDURE — 70450 CT HEAD/BRAIN W/O DYE: CPT

## 2020-09-06 PROCEDURE — 70450 CT HEAD/BRAIN W/O DYE: CPT | Mod: 26

## 2020-09-06 PROCEDURE — 99285 EMERGENCY DEPT VISIT HI MDM: CPT

## 2020-09-06 PROCEDURE — 87635 SARS-COV-2 COVID-19 AMP PRB: CPT

## 2020-09-06 PROCEDURE — 70486 CT MAXILLOFACIAL W/O DYE: CPT

## 2020-09-06 PROCEDURE — 72125 CT NECK SPINE W/O DYE: CPT

## 2020-09-06 PROCEDURE — 70486 CT MAXILLOFACIAL W/O DYE: CPT | Mod: 26

## 2020-09-06 PROCEDURE — 72125 CT NECK SPINE W/O DYE: CPT | Mod: 26

## 2020-09-06 NOTE — ED ADULT TRIAGE NOTE - CHIEF COMPLAINT QUOTE
pt arrive by ambulance from [place] with reports of a fall off a bench, story unclear, pt poor historian. pt recalls the fall, baseline oriented x1 as per EMS. abrasion to left upper and lower eye. MD Scott norton, priority CT called 1341.

## 2020-09-06 NOTE — ED ADULT NURSE NOTE - CHIEF COMPLAINT QUOTE
pt arrive by ambulance from [place] with reports of a fall off a bench, story unclear, pt poor historian. pt recalls the fall, baseline oriented x1 as per EMS. abrasion to left upper and lower eye. MD Scott norton, priority CT called 1341.

## 2020-09-06 NOTE — ED PROVIDER NOTE - PROGRESS NOTE DETAILS
Patient smiling.  no pain.  reassessed.  no signs of infection.  no focal pain.  no rash/redness on legs.  d/w Beulah at [place] and will return.

## 2020-09-06 NOTE — ED PROVIDER NOTE - OBJECTIVE STATEMENT
Pertinent PMH/PSH/FHx/SHx and Review of Systems contained within:  Patient presents to the ED for fall with facial injury.  VSS but BP 100s/60s. BIBA due to fall.  Patient with PMH of schizophrenai, recent dental abscess.  Unclear mechanism.  Patient states "I slid down off the bench."  Patient not tolerating ccollar.  due to nature of injury and no focal TTP, will obtain stat CT.  Non toxic.  Well appearing. No aggravating or relieving factors. No other pertinent PMH.  No other pertinent PSH.  No other pertinent FHx.  Patient denies EtOH/tobacco/illicit substance use. No fever/chills, No photophobia/eye pain/changes in vision, No ear pain/sore throat/dysphagia, No chest pain/palpitations, no SOB/cough/wheeze/stridor, No abdominal pain, No N/V/D, no dysuria/frequency/discharge, No neck/back pain, no rash, no changes in neurological status/function.

## 2020-09-06 NOTE — ED ADULT NURSE NOTE - NS_NURSE_DISC_TEACHING_YN_ED_ALL_ED
[FreeTextEntry1] : She is a 89 y/o  F with Stage II B left breast cancer s/p left lumpectomy. We reviewed her pathology and the role of adjuvant therapy. She tolerated surgery well. Given her age and performance status, we would not recommend any adjuvant chemotherapy. As long as she is recovered from surgery in June, we would recommend adjuvant endocrine therapy with AI: letrozole starting with MWF initially to ensure no intolerable side effects and then increase to daily. If she has intolerable side effects, we would recommend continued surveillance. Her family is agreeable with conservative plan. Next follow up in 2 months. \par \par Her dtr is working with social work to see what additional help she could qualify for at home. 
Yes

## 2020-09-06 NOTE — ED PROVIDER NOTE - PATIENT PORTAL LINK FT
You can access the FollowMyHealth Patient Portal offered by [facility] by registering at the following website: [email or web address]. By joining [facility]’s FollowMyHealth portal, you will also be able to view your health information using other applications (apps) compatible with our system.

## 2020-09-06 NOTE — ED PROVIDER NOTE - CLINICAL SUMMARY MEDICAL DECISION MAKING FREE TEXT BOX
Patient with stated slip from seated position with abrasion on left face.  CT head/face/cspine without acute pathology.  no return of prior dental abscess.  BP normotensive.  Non toxic.  Well appearing. Uneventful ED observation period. Discussed results and outcome of testing with the patient.  Patient advised to please follow up with their primary care doctor within the next 24 hours and return to the Emergency Department for worsening symptoms or any other concerns.  Patient advised that their doctor may call  to follow up on the specific results of the tests performed today in the emergency department.

## 2020-09-06 NOTE — CHART NOTE - NSCHARTNOTEFT_GEN_A_CORE
SW Note: SW received call that pt is from [facility], on gounds on PPC, is covid neg, and medically ready to return. SW placed call to [facility], spoke to Donna to make aware of d/c, d/c documents and negative covid faxed to [phone]. DARYL arranged transport via [facility] (Pt is A&Ox1 at baseline, hx of schizophrenia) NEAF left on unit, no further SW services identified

## 2020-09-06 NOTE — ED PROVIDER NOTE - PHYSICAL EXAMINATION
Gen: Alert, NAD  Head: NC, AT, PERRL, EOMI, normal lids/conjunctiva  ENT: normal hearing, patent oropharynx without erythema/exudate, uvula midline  Neck: +supple, no tenderness/meningismus/JVD, +Trachea midline  Pulm: Bilateral BS, normal resp effort, no wheeze/stridor/retractions  CV: RRR, no M/R/G, +dist pulses  Abd: soft, NT/ND, +BS, no hepatosplenomegaly  Mskel: no edema/erythema/cyanosis  Skin: no rash, abrasions over left orbit and left zygoma  Neuro: AAOx2 (no date), no gross sensory/motor deficits,

## 2020-09-06 NOTE — ED ADULT NURSE NOTE - OBJECTIVE STATEMENT
63 y/o male comes to ED from [place] for fall. pt. is poor historian. pt. not answering questions appropriately. abrasion noted above left eye.

## 2020-12-15 NOTE — ED PROVIDER NOTE - CROS ED CARDIOVAS ALL NEG
Physical Therapy Daily Progress Note    Patient: Eliseo Phan   : 1965  Diagnosis/ICD-10 Code:  Acute left-sided low back pain with left-sided sciatica [M54.42]  Referring practitioner: Thomas Torres MD  Date of Initial Visit: Type: THERAPY  Noted: 2020  Today's Date: 12/15/2020  Patient seen for 24 sessions           Subjective Feeling good     Objective     LTR x 5 bilaterally in 2 foot positions  HIP stretch              Cross body and piriformis stretch   REVIEW DYNAMIC LUMBAR STABILIZATION  With legs unsupported       side lying over ball fascial stretch               Upper torso rotation x 5 bilaterally              Upper and lower torso dynamic ROM x 10 bilaterally ADD red resistance loop              Both with verbal and tactile cueing for proper technique    ball under sacrum              Scissor legs              Hip abd/add              integration combination 4 count   NEW hip  Circles both directions x 6 each 2 sets               All x 10 2 sets with verbal and tactile cueing for proper technique      Single limb stance + hip hinging with red resistance loop x 10 with verbal and tactile cueing for core stabilization   Squats with resistance from loop x 10       Assessment/Plan  A: progressing well with fascial work to manage symptoms  PLAN: finalize HEP and prep for DC          Timed:    Manual Therapy:    0     mins  95402;  Therapeutic Exercise:    30     mins  00077;     Neuromuscular Sharlene:    15    mins  16826;    Therapeutic Activity:     0     mins  74011;     Gait Trainin     mins  55717;     Ultrasound:     0     mins  25653;    Electrical Stimulation:    0     mins  42861 ( );  Iontophoresis    0     mins 45720;  Aquatic Therapy    0     mins 18671;  Dry Needling              0     mins    Untimed:  Electrical Stimulation:    0     mins  06800 ( );  Mechanical Traction:    0     mins  96649;     Timed Treatment:   45   mins   Total Treatment:     45    mins  Steffanie Hickman, PT  Physical Therapist   negative...
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
## 2021-06-05 NOTE — ED PROVIDER NOTE - NS ED MD TWO NIGHTS YN
Assessment and Plan:   Ms Amilcar Pratt a 40-year-old female with history significant for systemic lupus erythematosus and Sjogren's syndrome diagnosed in her early 45s by Rheumatology in [place], who presents for a follow-up  She is temporarily holding the HCQ as she thought this may be associated with worsening joint pains       # SLE and Sjogren's syndrome  # Sjogren's related ILD  - Mauricio Fierro presents today for a follow-up of systemic lupus erythematosus and Sjogren's syndrome which has primarily presented with chronic fatigue, sicca complaints, the possible lower extremity skin rash and likely the Sjogren's syndrome related interstitial lung disease  She was started on hydroxychloroquine 200 mg twice daily at her office visit in October 2020 but temporarily discontinued the hydroxychloroquine as she thought that it may be contributing to joint pains which resolved upon cessation of the medication  I advised her to retry this again in a few weeks and if she continues to experience the joint pains we will monitor her off the hydroxychloroquine  I did advise her that this is an important medication to continue given her underlying autoimmune disorders as it can prevent future complications as a result of the lupus  If she restarts the hydroxychloroquine she will follow-up with ophthalmology for annual eye exams    She will continue the pilocarpine 5 mg 3 times daily to help with the sicca complaints      - It is reassuring to note over the years she has not presented with progressively worsening complaints and overall appears to be stable at this time, with the most pertinent issues that need to be addressed including the interstitial lung disease (likely lymphocytic interstitial pneumonia as a result of Sjogren's syndrome) and extensive intrathoracic and intra-abdominal lymphadenopathy, for which she did have an axillary lymph node biopsy repeated in February 2021 with the histopathological diagnosis suggestive of an atypical lymphoproliferative disorder  She will follow-up with Hematology periodically to continue monitoring this but at this time there does not appear to be concerns for an underlying malignant process  It is reassuring to note that she has been stable from a respiratory standpoint and only complains of dyspnea on exertion which is not debilitating to her  She is still due to schedule the pulmonary function testing  The plan from pulmonology is to continue monitoring for now without the indication to start her on immunosuppressives      - Other then the hydroxychloroquine if additional rheumatic medications are required I would likely consider mycophenolate or belimumab   I will try to avoid use of methotrexate given the underlying lung/renal disease as well as azathioprine which can be associated with an increased risk of lymphoma which she is anyways predisposed to due to the underlying Sjogren's syndrome      # Skin rash  - The appearance of this seems suggestive of small-vessel vasculitis, but it is interesting to note that her symptoms resolve spontaneously within a few days  As it has been an ongoing and recurrent issue I would like her to establish with Dermatology for consideration of a skin biopsy        Plan:  Diagnoses and all orders for this visit:    Systemic lupus erythematosus, unspecified SLE type, unspecified organ involvement status ([facility] )  -     CBC and differential; Future  -     Comprehensive metabolic panel; Future  -     C-reactive protein; Future  -     Sedimentation rate, automated; Future  -     C4 complement; Future  -     C3 complement; Future  -     Anti-DNA antibody, double-stranded; Future  -     Urinalysis with microscopic  -     Protein / creatinine ratio, urine  -     Ambulatory referral to Dermatology; Future    Sjogren's syndrome, with unspecified organ involvement ([facility] )  -     pilocarpine (SALAGEN) 5 mg tablet;  Take 1 tablet (5 mg total) by mouth 3 (three) times a day    Long-term use of Plaquenil    Skin rash  -     Ambulatory referral to Dermatology; Future    Lymphocytic interstitial pneumonia (HCC)    Atypical lymphoproliferative disorder (HCC)    Mediastinal adenopathy    Bilateral primary osteoarthritis of knee    Osteopenia, unspecified location    Stage 3b chronic kidney disease ([address] )      Activities as tolerated  Exercise: try to maintain a low impact exercise regimen as much as possible  Walk for 30 minutes a day for at least 3 days a week  Continue other medications as prescribed by PCP and other specialists  RTC in 3 months          HPI    INITIAL VISIT NOTE (10/2020):  Ms Soraida Delgado a 59-year-old female with history significant for systemic lupus erythematosus and Sjogren's syndrome diagnosed in her early 45s by Rheumatology in [place], who presents to reestablish with [facility]  Eliceo Willingham is currently not on DMARDs  Eliceo Willingham is referred today by Dr Karishma Wade for a rheumatology consult      Patient reports in her early 45s she started to develop various joint pains which eventually led to the diagnosis of systemic lupus erythematosus and Sjogren's syndrome   She reports surrounding the time of her diagnosis is when the joint pains were most prevalent, but appear to have spontaneously resolved   She did experience joint pains again a few years later but states that this also resolved and recently she has not had any joint related issues   At the time of her diagnosis it appears like she was treated with steroids as well as hydroxychloroquine   She was on the hydroxychloroquine for less than a year at which time it was discontinued due to nausea and other side effects which she cannot recall  Eliceo Willingham has never been restarted on the hydroxychloroquine following that      The timeline of her rheumatology visits is unclear as we do not have her complete prior records   Based on chart review as well as patient's history, she has been on multiple DMARDs including methotrexate, azathioprine and Benlysta   She states that the methotrexate and azathioprine were prescribed in the past 10-15 years  Willjuan Martínez is unclear what these medications for specifically prescribed for and if they helped with any of her symptoms   Most recently she was on Benlysta approximately 3 years ago when she was being seen by Dr Queen Moralez states that this helped significantly with how she was feeling overall and her general well-being, as well as significantly helped with the fatigue   She was on this for about 7-8 months and unfortunately it was discontinued when she turned 65 as her insurance no longer covered it   I do not see any of her lupus related labs in our system, but on review of her prior rheumatologist's note from 2015 she appeared to have a positive MALATHI at 1:1280, with positive SSA, SSB and RNP antibodies    A rheumatoid factor was also elevated at 105 with an ESR of 84   Her antiphospholipid antibodies were negative        She states over the years it has primarily been the excessive fatigue that has bothered her and only recently has she been dealing with chronic respiratory symptoms as well as intrathoracic and abdominal lymphadenopathy that has been monitored by Oncology  Lima Franz terms of her respiratory symptoms she reports shortness of breath with exertion, usually with walking about a block, but at times she may experience shortness of breath with day-to-day activities  Gayla Moreland also has a chronic productive cough   On review of her CT chest and abdomen as far back as 2017, she has had abnormal findings concerning for interstitial lung disease as well as the extensive lymphadenopathy   On her most recent CT chest from September 2020 this showed asymmetric bilateral areas of ground-glass opacities and lung cysts likely representing a spectrum of lymphocytic interstitial pneumonia as well as worsening adenopathy in the mediastinum and bilateral hilar regions   She did undergo an IR guided biopsy of an axillary lymph node on 10/08/2020 which did not show any evidence of a lymphoproliferative disorder   The biopsy was not diagnostic   On her most recent follow-up with Hematology/Oncology, as there are no clear features to suggest an underlying malignancy the plan will be to continue monitoring   She has not seen pulmonology yet and is actually establishing for her initial appointment tomorrow  Chanda Warren did undergo a bronchoscopy in December 2017 with cytology negative for malignancy and showing an abundant mixed but predominantly chronic inflammatory infiltrate   Flow cytometry as well as cultures at that time were unrevealing      As mentioned, she is primarily dealing with the abnormal CT chest and abdomen findings at this time   Symptom wise she overall appears to be stable   She does report chronic dry eyes, dry nose and dry mouth which she manages with topical lubricants   Due to the dry nose as well as recent face masking she has been noticing nose bleeds and feels like there may be a "hole" in her nasal septum   She has not yet established with ENT   On occasion she may notice a "butterfly rash" and states that she is photosensitive but strictly avoids sun exposure and utilizes daily sunscreen   She has also been experiencing blister-like skin lesions scattered on her extremities, which heal spontaneously   She has not had any mouth ulcerations in years  Chanda Warren denies fevers, chills, night sweats, unintentional weight loss, focal alopecia, inflammatory eye disease, psoriasis, swollen glands, pleuritic chest pain, hemoptysis, abdominal pain, vomiting, diarrhea, blood in stools (is up-to-date with a screening colonoscopy), blood clots, miscarriages, Raynaud's, joint pain/swelling/stiffness, renal disease, neurological disease/seizures or family history of autoimmune disease         2/10/2021:  Patient presents for a follow-up of systemic lupus erythematosus and Sjogren's syndrome    She is currently on hydroxychloroquine 200 mg twice daily that was started at the last office visit in October 2020  We reviewed her testing done following the last visit which showed a positive MALATHI 1:1280 speckled pattern with an SSA and SSB antibody greater than 8  An RNP antibody was 6 8  A rheumatoid factor was elevated at 80  An SPEP showed a possible monoclonal gammopathy but this was not clearly determined on serum immunofixation  An immunoglobulin assay showed hypergammaglobulinemia  A peripheral flow cytometry showed monoclonal B-cells with the phenotype of CLL  A vitamin-D level was low at 13 5  An ESR and CRP were elevated at 130 and 5 4, respectively  The remainder of the MALATHI specificity, C3, C4, antiphospholipid antibody testing, hepatitis panel, HIV, urinalysis, urine protein creatinine ratio, angiotensin-converting enzyme, anti neutrophilic cytoplasmic antibody, CK and anti CCP antibody were unremarkable  She had a CT chest done yesterday with the results still pending  She is going to be establishing with thoracic surgery soon to consider the mediastinal lymphadenopathy biopsy      She reports overall she has been stable in terms of her symptoms  She mostly describes the dry eyes worse on the right side and unfortunately the Restasis has not been approved by her insurance  She also reports dry nose and dry mouth  She will have nose bleeds due to the dryness  She reports chronic fatigue without any improvement seen with starting the hydroxychloroquine  She also reports within the past year she has had approximately 4 episodes of a pinpoint, red and itchy skin rash arise on her bilateral lower extremities  At times she may use topical steroids but has never been prescribed oral steroids for this  The rash will usually resolve spontaneously within 3-4 days  She has not been seen by Dermatology for this    She was recently seen in the emergency room for an occurrence of the skin rash after receiving the COVID vaccine without any specific treatment and states that the rash eventually resolved spontaneously  There is no rash today  No other complaints that she describes today  6/8/2021:  Patient presents for a follow-up of systemic lupus erythematosus and Sjogren's syndrome  She temporarily suspended the hydroxychloroquine as she thought that this was causing joint pains in her knees and ankles which did subside with holding the hydroxychloroquine  I did review her labs done following the last office visit which showed an unremarkable CBC, CMP, C3 and C4  The ESR and CRP were elevated at 81 and 4 2, respectively  Since the last office visit she also underwent a left axillary lymph node biopsy which showed an atypical lymphoproliferative disorder  She is followed up with Hematology/Oncology without any concerns for an underlying malignant process and the plan is to continue monitoring for now  She continues to experience an intermittent skin rash affecting her bilateral lower extremities which she thought may be related to an allergic reaction from dog fur  She states that this will happen irrespective of pet exposure  The rash is usually itchy and at times she may use topical hydrocortisone to help with her symptoms but has not been on oral steroids  She mentions that the rash will appear and remit spontaneously  She continues to report the dry eyes and dry mouth which she is able to manage with the pilocarpine  Other than this no complaints today  The following portions of the patient's history were reviewed and updated as appropriate: allergies, current medications, past family history, past medical history, past social history, past surgical history and problem list       Review of Systems  Constitutional: Negative for weight change, fevers, chills, night sweats, fatigue    ENT/Mouth: Negative for hearing changes, ear pain, nasal congestion, sinus pain, hoarseness, sore throat, rhinorrhea, swallowing difficulty  Eyes: Negative for pain, redness, discharge, vision changes  Cardiovascular: Negative for chest pain, SOB, palpitations  Respiratory: Negative for cough, sputum, wheezing, dyspnea  Gastrointestinal: Negative for nausea, vomiting, diarrhea, constipation, pain, heartburn  Genitourinary: Negative for dysuria, urinary frequency, hematuria  Musculoskeletal: As per HPI  Skin: Negative for color changes  Positive for skin rash  Neuro: Negative for weakness, numbness, tingling, loss of consciousness  Psych: Negative for anxiety, depression  Heme/Lymph: Negative for easy bruising, bleeding, lymphadenopathy  Past Medical History:   Diagnosis Date    Bipolar 1 disorder ([facility] )     Deaf     Pt lost all hearing in right ear after having [place] measles    Hard of hearing     Pt wears a hearing in left ear   Hypothyroidism     Psychiatric disorder     Sjogren's syndrome ([facility]     [facility]     Wears glasses     Wears partial dentures        Past Surgical History:   Procedure Laterality Date    BREAST BIOPSY Right     COLONOSCOPY      FRACTURE SURGERY Right     elbow    GASTRIC BYPASS      HYSTERECTOMY Bilateral 1975    IR BIOPSY LYMPH NODE  10/8/2020    NV BRONCHOSCOPY,DIAGNOSTIC N/A 12/8/2017    Procedure: BRONCHOSCOPY;  Surgeon: Emperatriz Padilla MD;  [facility];   Service: Pulmonary    NV NEEDLE BIOPSY, LYMPH NODE(S) Left 2/25/2021    Procedure: EXCISION BIOPSY LYMPH NODE: left axillary lymph node biopsy;  Surgeon: Fran Hagan MD;  [facility]: [facility];  Service: Thoracic    TONSILLECTOMY         Social History     Socioeconomic History    Marital status: /Civil Union     Spouse name: Not on file    Number of children: Not on file    Years of education: Not on file    Highest education level: Not on file   Occupational History    Not on file   Social Needs    Financial resource strain: Not on file  Food insecurity     Worry: Not on file     Inability: Not on file    Transportation needs     Medical: Not on file     Non-medical: Not on file   Tobacco Use    Smoking status: Never Smoker    Smokeless tobacco: Never Used   Substance and Sexual Activity    Alcohol use: No    Drug use: No    Sexual activity: Yes     Partners: Male     Birth control/protection: None     Comment:    Lifestyle    Physical activity     Days per week: Not on file     Minutes per session: Not on file    Stress: Not on file   Relationships    Social connections     Talks on phone: Not on file     Gets together: Not on file     Attends Orthodox service: Not on file     Active member of club or organization: Not on file     Attends meetings of clubs or organizations: Not on file     Relationship status: Not on file    Intimate partner violence     Fear of current or ex partner: Not on file     Emotionally abused: Not on file     Physically abused: Not on file     Forced sexual activity: Not on file   Other Topics Concern    Not on file   Social History Narrative    Not on file       Family History   Problem Relation Age of Onset    Heart disease Mother     Diabetes Mother     Kidney cancer Mother     Heart disease Father     Stroke Father     Diabetes Father     Cancer Father     Leukemia Half-Sister 48       Allergies   Allergen Reactions    Ciprofloxacin Hives and Swelling     Pt reports that lips and mouth and face swelled   Cymbalta [Duloxetine Hcl] Other (See Comments)     Hallucinations, fatigue, faints    Percocet [Oxycodone-Acetaminophen] Other (See Comments)     Dry mouth - pt states it kept her up all night   States had to continually drink fluids       Current Outpatient Medications:     albuterol (Ventolin HFA) 90 mcg/act inhaler, Inhale 1 puff every 4 (four) hours as needed for wheezing, Disp: 3 Inhaler, Rfl: 3    buPROPion (WELLBUTRIN) 100 mg tablet, Take 100 mg by mouth 3 (three) times a day , Disp: , Rfl:     calcium citrate-vitamin D (CITRACAL+D) 315-200 MG-UNIT per tablet, Take 1 tablet by mouth daily  , Disp: , Rfl:     hydroxychloroquine (PLAQUENIL) 200 mg tablet, Take 1 tablet (200 mg total) by mouth 2 (two) times a day with meals (Patient not taking: Reported on 6/7/2021), Disp: 60 tablet, Rfl: 5    levothyroxine 50 mcg tablet, Take 1 tablet (50 mcg total) by mouth daily, Disp: 30 tablet, Rfl: 1    LORazepam (ATIVAN) 1 mg tablet, 0 5 mg as needed , Disp: , Rfl:     omeprazole (PriLOSEC) 20 mg delayed release capsule, Take 1 capsule (20 mg total) by mouth daily (Patient taking differently: Take 20 mg by mouth as needed ), Disp: 90 capsule, Rfl: 1    pilocarpine (SALAGEN) 5 mg tablet, Take 1 tablet (5 mg total) by mouth 3 (three) times a day, Disp: 90 tablet, Rfl: 5    QUEtiapine (SEROquel) 300 mg tablet, Take 300 mg by mouth daily at bedtime  , Disp: , Rfl:     temazepam (RESTORIL) 30 mg capsule, Take 2 capsules by mouth daily at bedtime as needed , Disp: , Rfl:       Objective:    Vitals:    06/08/21 1459   BP: 122/74   Pulse: 74       Physical Exam  General: Well appearing, well nourished, in no distress  Oriented x 3, normal mood and affect  Ambulating without difficulty  Skin: Good turgor, no unusual bruising or prominent lesions  Pinpoint appearing erythematous macular and papular rash scattered on her bilateral lower extremities, with intense erythema noted on the dorsum of her left foot  Hair: Normal texture and distribution  Nails: Normal color, no deformities  HEENT:  Head: Normocephalic, atraumatic  Eyes: Conjunctiva clear, sclera non-icteric, EOM intact  Extremities: No amputations or deformities, cyanosis, edema  Musculoskeletal: No swelling visualized  Neurologic: Alert and oriented  No focal neurological deficits appreciated  Psychiatric: Normal mood and affect  KIRK Juarez    Rheumatology Yes

## 2021-10-13 NOTE — ED PROVIDER NOTE - ATTENDING CONTRIBUTION TO CARE
Attending note:   After face to face evaluation of this patient, I concur with above noted hx, pe, and care plan for this patient.  Vincent: 64 yom with dental abscess on left side. Pt. had ct today. Pt is agitated when examined, required sedation in OSH. Pt pulled out IV in triage. There is very poor dentition and left sided facial edema, no resp distress, no stridor, RRR, moving all ext well. Pt will require further IV meds, due for another dose of unasyn now, will require haldol/ativan prior, dental aware and will come to evaluate pt.
none

## 2021-11-18 NOTE — PHYSICAL THERAPY INITIAL EVALUATION ADULT - MD/RN NOTIFIED
[facility] MEDICAL OFFICE CTR RADIATION ONCOLOGY  [address] [address]  [id number]  [facility] [address]  Dept Phone: [phone]    Radiation Oncology Clinical Treatment Plan Note    Patient Name:  Aaron Ramirez  YOB: 1954  MRN:  [id number]  Account Number:  [id number]  Referring Physician:  Javier Wei MD  Dictating Physician:  Suzi Parker MD    Diagnosis:  Prostate cancer (CMS/HCC) C61    Cancer Staging  No matching staging information was found for the patient.    Summary:  Aaron has been offered radiation therapy.  Clinical treatment plan was done for the patient for the above diagnosis. Plan section describes the radiation treatment plan and prescription. Orders section lists simulation orders and treatment device as well as treatment planning related orders.    Plan - Intent: salvate    We are planning to deliver IMRT.  The planned prescription is listed below.  These elements change at the time of treatment planning or may be altered during the course of therapy based on patient tolerance and disease response.        Orders:  In order to implement the above plan, the following elements will be required and are therefore requested. Simulation orders are included as well as simulation devices. Treatment plan orders are included for physics staff for treatment planning. Imaging orders are included for setting up treatment fields and verifying the accuracy during the treatment as well.  These may change as needed before or during the treatment.    RO Orders 10/7/2021   2.5MM SLICE THICKNESS Yes   CBCT DAILY Yes   COMPUTER PLAN LEVEL - IMRT Yes   CONTINUING MEDICAL PHYSICS Yes   CONTRAST - URETHROGRAM Yes   DOSIMETRY CALC (QTY) Yes   FULL BLADDER Yes   IMRT MLC Yes   PELVIS SACN L2 TO MID FEMUR Yes   RADIATION THERAPY Yes   RECTAL BALLOON Yes   SIMULATION COMPLEX Yes   SUPINE Yes   VAC-SULAIMAN Yes   VERIFICATION SIMULATION Yes        Evaluation:  As the patient undergoes therapy,  this patient will be evaluated at least weekly to assure tolerance to the therapy and so that any of the above elements can be changed as needed. Imaging studies will be done at least weekly using on-board imaging technology as ordered and compared to the reference images for accurate setup of the patient.     Additional Information:  Setup instruction, immobilization device information, contrast information and other simulation details are given below.        yes

## 2021-12-17 ENCOUNTER — EMERGENCY (EMERGENCY)
Facility: HOSPITAL | Age: 66
LOS: 1 days | Discharge: HOME | End: 2021-12-17
Attending: EMERGENCY MEDICINE | Admitting: EMERGENCY MEDICINE
Payer: MEDICAID

## 2021-12-17 VITALS
DIASTOLIC BLOOD PRESSURE: 92 MMHG | OXYGEN SATURATION: 95 % | HEART RATE: 84 BPM | TEMPERATURE: 97 F | RESPIRATION RATE: 20 BRPM | SYSTOLIC BLOOD PRESSURE: 170 MMHG | WEIGHT: 205.03 LBS

## 2021-12-17 DIAGNOSIS — F25.0 SCHIZOAFFECTIVE DISORDER, BIPOLAR TYPE: ICD-10-CM

## 2021-12-17 LAB
ALBUMIN SERPL ELPH-MCNC: 2.9 G/DL — LOW (ref 3.3–5)
ALP SERPL-CCNC: 93 U/L — SIGNIFICANT CHANGE UP (ref 30–120)
ALT FLD-CCNC: 20 U/L DA — SIGNIFICANT CHANGE UP (ref 10–60)
AMPHET UR-MCNC: NEGATIVE — SIGNIFICANT CHANGE UP
ANION GAP SERPL CALC-SCNC: 12 MMOL/L — SIGNIFICANT CHANGE UP (ref 5–17)
APAP SERPL-MCNC: 1 UG/ML — LOW (ref 10–30)
APPEARANCE UR: CLEAR — SIGNIFICANT CHANGE UP
AST SERPL-CCNC: 15 U/L — SIGNIFICANT CHANGE UP (ref 10–40)
BARBITURATES UR SCN-MCNC: NEGATIVE — SIGNIFICANT CHANGE UP
BASOPHILS # BLD AUTO: 0.03 K/UL — SIGNIFICANT CHANGE UP (ref 0–0.2)
BASOPHILS NFR BLD AUTO: 0.4 % — SIGNIFICANT CHANGE UP (ref 0–2)
BENZODIAZ UR-MCNC: NEGATIVE — SIGNIFICANT CHANGE UP
BILIRUB SERPL-MCNC: 0.1 MG/DL — LOW (ref 0.2–1.2)
BILIRUB UR-MCNC: NEGATIVE — SIGNIFICANT CHANGE UP
BUN SERPL-MCNC: 19 MG/DL — SIGNIFICANT CHANGE UP (ref 7–23)
CALCIUM SERPL-MCNC: 9 MG/DL — SIGNIFICANT CHANGE UP (ref 8.4–10.5)
CHLORIDE SERPL-SCNC: 96 MMOL/L — SIGNIFICANT CHANGE UP (ref 96–108)
CO2 SERPL-SCNC: 25 MMOL/L — SIGNIFICANT CHANGE UP (ref 22–31)
COCAINE METAB.OTHER UR-MCNC: NEGATIVE — SIGNIFICANT CHANGE UP
COLOR SPEC: YELLOW — SIGNIFICANT CHANGE UP
CREAT SERPL-MCNC: 0.98 MG/DL — SIGNIFICANT CHANGE UP (ref 0.5–1.3)
DIFF PNL FLD: NEGATIVE — SIGNIFICANT CHANGE UP
EOSINOPHIL # BLD AUTO: 0.11 K/UL — SIGNIFICANT CHANGE UP (ref 0–0.5)
EOSINOPHIL NFR BLD AUTO: 1.5 % — SIGNIFICANT CHANGE UP (ref 0–6)
ETHANOL SERPL-MCNC: <3 MG/DL — SIGNIFICANT CHANGE UP (ref 0–3)
GLUCOSE SERPL-MCNC: 181 MG/DL — HIGH (ref 70–99)
GLUCOSE UR QL: NEGATIVE MG/DL — SIGNIFICANT CHANGE UP
HCT VFR BLD CALC: 40.5 % — SIGNIFICANT CHANGE UP (ref 39–50)
HGB BLD-MCNC: 13.4 G/DL — SIGNIFICANT CHANGE UP (ref 13–17)
IMM GRANULOCYTES NFR BLD AUTO: 1.9 % — HIGH (ref 0–1.5)
KETONES UR-MCNC: NEGATIVE — SIGNIFICANT CHANGE UP
LEUKOCYTE ESTERASE UR-ACNC: NEGATIVE — SIGNIFICANT CHANGE UP
LYMPHOCYTES # BLD AUTO: 1.29 K/UL — SIGNIFICANT CHANGE UP (ref 1–3.3)
LYMPHOCYTES # BLD AUTO: 17.7 % — SIGNIFICANT CHANGE UP (ref 13–44)
MCHC RBC-ENTMCNC: 29.1 PG — SIGNIFICANT CHANGE UP (ref 27–34)
MCHC RBC-ENTMCNC: 33.1 GM/DL — SIGNIFICANT CHANGE UP (ref 32–36)
MCV RBC AUTO: 87.9 FL — SIGNIFICANT CHANGE UP (ref 80–100)
METHADONE UR-MCNC: NEGATIVE — SIGNIFICANT CHANGE UP
MONOCYTES # BLD AUTO: 0.77 K/UL — SIGNIFICANT CHANGE UP (ref 0–0.9)
MONOCYTES NFR BLD AUTO: 10.6 % — SIGNIFICANT CHANGE UP (ref 2–14)
NEUTROPHILS # BLD AUTO: 4.94 K/UL — SIGNIFICANT CHANGE UP (ref 1.8–7.4)
NEUTROPHILS NFR BLD AUTO: 67.9 % — SIGNIFICANT CHANGE UP (ref 43–77)
NITRITE UR-MCNC: NEGATIVE — SIGNIFICANT CHANGE UP
NRBC # BLD: 0 /100 WBCS — SIGNIFICANT CHANGE UP (ref 0–0)
OPIATES UR-MCNC: NEGATIVE — SIGNIFICANT CHANGE UP
PCP SPEC-MCNC: SIGNIFICANT CHANGE UP
PCP UR-MCNC: NEGATIVE — SIGNIFICANT CHANGE UP
PH UR: 6.5 — SIGNIFICANT CHANGE UP (ref 5–8)
PLATELET # BLD AUTO: 241 K/UL — SIGNIFICANT CHANGE UP (ref 150–400)
POTASSIUM SERPL-MCNC: 4.1 MMOL/L — SIGNIFICANT CHANGE UP (ref 3.5–5.3)
POTASSIUM SERPL-SCNC: 4.1 MMOL/L — SIGNIFICANT CHANGE UP (ref 3.5–5.3)
PROT SERPL-MCNC: 7.3 G/DL — SIGNIFICANT CHANGE UP (ref 6–8.3)
PROT UR-MCNC: NEGATIVE MG/DL — SIGNIFICANT CHANGE UP
RBC # BLD: 4.61 M/UL — SIGNIFICANT CHANGE UP (ref 4.2–5.8)
RBC # FLD: 15.7 % — HIGH (ref 10.3–14.5)
SALICYLATES SERPL-MCNC: <3 MG/DL — SIGNIFICANT CHANGE UP (ref 2.8–20)
SARS-COV-2 RNA SPEC QL NAA+PROBE: SIGNIFICANT CHANGE UP
SODIUM SERPL-SCNC: 133 MMOL/L — LOW (ref 135–145)
SP GR SPEC: 1 — LOW (ref 1.01–1.02)
THC UR QL: NEGATIVE — SIGNIFICANT CHANGE UP
UROBILINOGEN FLD QL: NEGATIVE MG/DL — SIGNIFICANT CHANGE UP
VALPROATE SERPL-MCNC: 86 UG/ML — SIGNIFICANT CHANGE UP (ref 50–100)
WBC # BLD: 7.28 K/UL — SIGNIFICANT CHANGE UP (ref 3.8–10.5)
WBC # FLD AUTO: 7.28 K/UL — SIGNIFICANT CHANGE UP (ref 3.8–10.5)

## 2021-12-17 PROCEDURE — 81003 URINALYSIS AUTO W/O SCOPE: CPT

## 2021-12-17 PROCEDURE — 80307 DRUG TEST PRSMV CHEM ANLYZR: CPT

## 2021-12-17 PROCEDURE — 93005 ELECTROCARDIOGRAM TRACING: CPT

## 2021-12-17 PROCEDURE — 99285 EMERGENCY DEPT VISIT HI MDM: CPT

## 2021-12-17 PROCEDURE — 93010 ELECTROCARDIOGRAM REPORT: CPT

## 2021-12-17 PROCEDURE — 85025 COMPLETE CBC W/AUTO DIFF WBC: CPT

## 2021-12-17 PROCEDURE — 99285 EMERGENCY DEPT VISIT HI MDM: CPT | Mod: 25

## 2021-12-17 PROCEDURE — 80164 ASSAY DIPROPYLACETIC ACD TOT: CPT

## 2021-12-17 PROCEDURE — 90792 PSYCH DIAG EVAL W/MED SRVCS: CPT | Mod: 95

## 2021-12-17 PROCEDURE — 87635 SARS-COV-2 COVID-19 AMP PRB: CPT

## 2021-12-17 PROCEDURE — 80053 COMPREHEN METABOLIC PANEL: CPT

## 2021-12-17 PROCEDURE — 36415 COLL VENOUS BLD VENIPUNCTURE: CPT

## 2021-12-17 RX ORDER — BENZTROPINE MESYLATE 1 MG
2 TABLET ORAL ONCE
Refills: 0 | Status: COMPLETED | OUTPATIENT
Start: 2021-12-17 | End: 2021-12-17

## 2021-12-17 RX ORDER — METFORMIN HYDROCHLORIDE 850 MG/1
1000 TABLET ORAL ONCE
Refills: 0 | Status: COMPLETED | OUTPATIENT
Start: 2021-12-17 | End: 2021-12-17

## 2021-12-17 RX ORDER — TRAZODONE HCL 50 MG
100 TABLET ORAL ONCE
Refills: 0 | Status: COMPLETED | OUTPATIENT
Start: 2021-12-17 | End: 2021-12-17

## 2021-12-17 RX ORDER — RISPERIDONE 4 MG/1
2 TABLET ORAL ONCE
Refills: 0 | Status: COMPLETED | OUTPATIENT
Start: 2021-12-17 | End: 2021-12-17

## 2021-12-17 RX ORDER — SIMVASTATIN 20 MG/1
20 TABLET, FILM COATED ORAL AT BEDTIME
Refills: 0 | Status: DISCONTINUED | OUTPATIENT
Start: 2021-12-17 | End: 2021-12-21

## 2021-12-17 RX ORDER — HALOPERIDOL DECANOATE 100 MG/ML
5 INJECTION INTRAMUSCULAR ONCE
Refills: 0 | Status: COMPLETED | OUTPATIENT
Start: 2021-12-17 | End: 2021-12-17

## 2021-12-17 RX ORDER — QUETIAPINE FUMARATE 200 MG/1
400 TABLET, FILM COATED ORAL ONCE
Refills: 0 | Status: COMPLETED | OUTPATIENT
Start: 2021-12-17 | End: 2021-12-17

## 2021-12-17 RX ADMIN — SIMVASTATIN 20 MILLIGRAM(S): 20 TABLET, FILM COATED ORAL at 22:57

## 2021-12-17 RX ADMIN — Medication 100 MILLIGRAM(S): at 22:56

## 2021-12-17 RX ADMIN — QUETIAPINE FUMARATE 400 MILLIGRAM(S): 200 TABLET, FILM COATED ORAL at 22:59

## 2021-12-17 RX ADMIN — Medication 2 MILLIGRAM(S): at 23:00

## 2021-12-17 RX ADMIN — METFORMIN HYDROCHLORIDE 1000 MILLIGRAM(S): 850 TABLET ORAL at 22:59

## 2021-12-17 RX ADMIN — RISPERIDONE 2 MILLIGRAM(S): 4 TABLET ORAL at 22:58

## 2021-12-17 RX ADMIN — HALOPERIDOL DECANOATE 5 MILLIGRAM(S): 100 INJECTION INTRAMUSCULAR at 17:27

## 2021-12-17 NOTE — ED BEHAVIORAL HEALTH ASSESSMENT NOTE - DETAILS
case discussed with ED provider Unable to assess awaiting callback from residence Clozapine & Lithium listed as allergies (reactions unknown)
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
## 2021-12-17 NOTE — ED BEHAVIORAL HEALTH ASSESSMENT NOTE - PSYCHIATRIC ISSUES AND PLAN (INCLUDE STANDING AND PRN MEDICATION)
offer patient Haldol 5 mg PO for psychosis and agitation. For acute agitation with safety threat - can give Haldol 5 mg and Ativan 1 mg PRN
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
## 2021-12-17 NOTE — ED PROVIDER NOTE - NSFOLLOWUPINSTRUCTIONS_ED_ALL_ED_FT
[employer] CareNotes®     :  [facility]  	                       DEMENTIA - AfterCare(R) Instructions(ER/ED)           Dementia    WHAT YOU NEED TO KNOW:    Dementia is a condition that causes loss of memory, thought control, and judgment. Alzheimer disease is the most common cause of dementia. Other common causes are loss of blood flow or nerve damage in the brain, and long-term alcohol or drug use. Dementia cannot be cured or prevented, but treatment may slow or reduce your symptoms.    DISCHARGE INSTRUCTIONS:    Return to the emergency department if you or someone close to you notices:   •You have signs of delirium, such as extreme confusion, and seeing or hearing things that are not there.      •You become angry or violent, and cannot be calmed down.      •You faint and cannot be woken.      Call your doctor or have someone else call if:   •You have a fever.      •You have increased confusion, behavior, or mood changes.      •You have questions or concerns about your condition or care.      Medicines: You may need any of the following:   •Antianxiety medicine may be used to help reduce anxiety and keep you calm.      •Antipsychotics may be used to help control any anger or violence.      •Antidepressants may be used to help improve your mood and reduce your symptoms of depression.      •Take your medicine as directed. Contact your healthcare provider if you think your medicine is not helping or if you have side effects. Tell him of her if you are allergic to any medicine. Keep a list of the medicines, vitamins, and herbs you take. Include the amounts, and when and why you take them. Bring the list or the pill bottles to follow-up visits. Carry your medicine list with you in case of an emergency.      Manage dementia: You may begin to need an in-home aide to help you remember your daily tasks. Ask your healthcare provider for a list of organizations that can help. It is best to arrange for help while you are thinking clearly. The following may also help you manage dementia:  •Keep your mind and body active. Do activities that you love, such as art, gardening, or listening to music. Call or visit people often. This will keep your social skills sharp, and may help reduce depression.      •Take all of your medicines as directed. This will help control medical conditions, such as high blood pressure, high cholesterol, and diabetes.      •Write daily schedules and routines. Record medical appointments, times to take your medicines, meal times, or any other things to remember. Write down reminders to use the bathroom if you have trouble remembering. You may need to ask someone to write things down for you.      •Place clocks and calendars where you can see them. This will help you remember appointments and tasks.      •Do not smoke. Nicotine and other chemicals in cigarettes and cigars can cause blood vessel damage. Ask your healthcare provider for information if you currently smoke and need help to quit. E-cigarettes or smokeless tobacco still contain nicotine. Talk to your healthcare provider before you use these products.      •Eat healthy foods. Examples are fruits, vegetables, whole-grain breads, low-fat dairy products, beans, lean meats, and fish. Ask if you need to be on a special diet.      Follow up with your healthcare provider as directed: Write down your questions so you remember to ask them during your visits. Ask someone to go in with you if you have trouble understanding or remembering what your healthcare provider tells you. The person can take notes for you during the visit and go over the notes with you later.       © Copyright [employer] 2021           back to top                          © Copyright [employer] 2021
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
## 2021-12-17 NOTE — ED PROVIDER NOTE - CLINICAL SUMMARY MEDICAL DECISION MAKING FREE TEXT BOX
Schizophrenia pt from nursing home w/ agitation and aggressive behavior. Plan medical clearance and psych evaluation.

## 2021-12-17 NOTE — ED PROVIDER NOTE - PATIENT PORTAL LINK FT
You can access the FollowMyHealth Patient Portal offered by [facility] by registering at the following website: [email or web address]. By joining [employer]’s FollowMyHealth portal, you will also be able to view your health information using other applications (apps) compatible with our system.

## 2021-12-17 NOTE — ED BEHAVIORAL HEALTH ASSESSMENT NOTE - CURRENT MEDICATION
As per psyckes; Seroquel 400 mg 1/day, Risperdal 2 mg 2/day, Trazodone 100 mg 1/day, Depakote 500 mg 3/day, Cogentin 1 mg 4/day, Glimepridie 1 mg 1/day, Levothyroxine 88 mcg 1/day, Metformin 500 mg 4/day and Simvastain 20 mg 1/day

## 2021-12-17 NOTE — ED BEHAVIORAL HEALTH ASSESSMENT NOTE - OTHER
Peers Records Checked: [facility], [place] [facility], [facility], [facility] [place] [facility], [facility], [facility], [facility], [facility], [facility], [facility], [facility], [facility], [facility], [facility], [facility], [facility], [facility], [facility], [facility], [facility], [place] [facility], Social Media (For example - Facebook, [employer], [email or web address]), Web search, [employer] Unable to assess intermittent disorganized

## 2021-12-17 NOTE — ED BEHAVIORAL HEALTH ASSESSMENT NOTE - RISK ASSESSMENT
Unable to determine Suicide Risk Patient unable to engage meaningfully in safety assessment so unable to definitively determine suicide risk at this time.

## 2021-12-17 NOTE — ED PROVIDER NOTE - PROGRESS NOTE DETAILS
discussed case with telepsych, recommend observation overnight after giving night meds, will call back around 4am, if sleeping well, can d/c back to facility pt just went to sleep 15 minutes ago, discussed case with telepsych, recommend admission, 2pc As per Telepsych fredy Thomas back to CS
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
## 2021-12-17 NOTE — ED ADULT NURSE NOTE - OBJECTIVE STATEMENT
pt is A&Ox4, presented to the ER for psych evaluation, pt appears calm and cooperative at this moment. pt placed on 1:1 constant obs, belongings placed in the cabinet, resp even and unlabored, will continue to monitor

## 2021-12-18 VITALS
RESPIRATION RATE: 18 BRPM | TEMPERATURE: 98 F | SYSTOLIC BLOOD PRESSURE: 179 MMHG | OXYGEN SATURATION: 97 % | HEART RATE: 81 BPM | DIASTOLIC BLOOD PRESSURE: 89 MMHG

## 2021-12-18 DIAGNOSIS — F03.90 UNSPECIFIED DEMENTIA WITHOUT BEHAVIORAL DISTURBANCE: ICD-10-CM

## 2021-12-18 RX ORDER — RISPERIDONE 4 MG/1
2 TABLET ORAL ONCE
Refills: 0 | Status: DISCONTINUED | OUTPATIENT
Start: 2021-12-18 | End: 2021-12-21

## 2021-12-18 RX ORDER — DIVALPROEX SODIUM 500 MG/1
500 TABLET, DELAYED RELEASE ORAL ONCE
Refills: 0 | Status: DISCONTINUED | OUTPATIENT
Start: 2021-12-18 | End: 2021-12-21

## 2021-12-18 RX ORDER — BENZTROPINE MESYLATE 1 MG
2 TABLET ORAL ONCE
Refills: 0 | Status: DISCONTINUED | OUTPATIENT
Start: 2021-12-18 | End: 2021-12-21

## 2021-12-18 RX ORDER — GLIMEPIRIDE 1 MG
1 TABLET ORAL ONCE
Refills: 0 | Status: DISCONTINUED | OUTPATIENT
Start: 2021-12-18 | End: 2021-12-21

## 2021-12-18 RX ORDER — METFORMIN HYDROCHLORIDE 850 MG/1
1000 TABLET ORAL ONCE
Refills: 0 | Status: DISCONTINUED | OUTPATIENT
Start: 2021-12-18 | End: 2021-12-21

## 2021-12-18 NOTE — PROGRESS NOTE BEHAVIORAL HEALTH - NSBHFUPINTERVALHXFT_PSY_A_CORE
Patient seen and examined. Chart reviewed. Case discussed with EM attending. Patient had some sun-downing behaviors overnight, showing more confusion and agitation. He responded well to medications which were given (patient's standing PTA bedtime medications: Seroquel 400 mg PO, Risperdal 2 mg PO, Cogentin 2 mg PO, Trazodone 100 mg PO, Metformin 1000 mg PO and Simvastastin 20 mg PO). This morning he is alert and oriented x1 (to person), unaware of the date/year/season, where he is or why he is there. When asked if he has any complaints, he only complains of being uncomfortably positioned on his stretcher, having difficulty sitting up. He denies any SI, intent or plan. Denies HI/aggressive/violent ideation, saying "I don't want to hurt anyone!" When asked about paranoid ideation regarding his residence, he denies any at this time. No overt delusions elicited on exam this morning. He is calm, cooperative, and re-directable this morning on exam.

## 2021-12-18 NOTE — PROGRESS NOTE BEHAVIORAL HEALTH - NSBHCONSULTFOLLOWAFTERCARE_PSY_A_CORE FT
Recommend discharging to residence with recommendation for residence case management to explore alternate residence placement with higher level of supervision.

## 2021-12-18 NOTE — ED BEHAVIORAL HEALTH NOTE - BEHAVIORAL HEALTH NOTE
TELEPSYCHIATRY REASSESSMENT:      Telepsychiatry consulting on this patient who is holding in the ED for observation of overnight behaviors to finalize disposition      INTERVAL HISTORY:  Patient is sleeping at this time so not visualized on camera  Updated ED course is obtained from RN and ED provider: Patient accepted HS medications without event. However, he has been markedly agitated throughout the night and just finally began to sleep in the last half hour. Prior to that, he was having a rough night, restlessly moving around, screaming and having other behavior outbursts. He has not required any PRN medications for agitation but requires continuous redirection from bedside one to one and RN staff.   MAR reviewed and patient received recommended medications at 2300 as such: Seroquel 400 mg PO, Risperdal 2 mg PO, Cogentin 2 mg PO, Trazodone 100 mg PO, Metformin 1000 mg PO and Simvastatin 20 mg PO.  No updated Vitals from previous.      ASSESSMENT:   This is a 66 year old male, single and disabled, non-caregiver, domiciled in a nursing home ([facility]), with past psychiatric history of Schizophrenia vs Schizoaffective disorder vs Bipolar I disorder, Generalized Anxiety and Major depression superimposed on Dementia with behavioral disturbance (A&Ox1 at baseline; intermittent unprovoked aggression at the residence), on Seroquel, Risperdal, Depakote and Trazodone, with past psychiatric admissions (last known to [facility] 8/6-8/31/2021); otherwise unknown history regarding suicide attempts, non-suicidal self injury, known history of aggressive episodes at the residence, no known legal issues or substance use, and past medical history of aforementioned dementia, heart failure, cataracts, hypothyroidism and type II diabetes who presents to the ED BIB EMS activated by his residence after patient became agitated when confronted about his stealing of property from other residents. He is calm in the ED and has not required any medications for agitation but is noted to be incoherent in his responses to questions. Psychiatric evaluation is limited as patient is largely nonsensical in his responses unable to engage in question and direct answer or safety evaluation. 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 Collateral review is highly suggestive of progression of Dementia with behavioral disturbance characterized by misconduct in the residence (wandering into other residents' rooms and stealing their possession; intermittent unprovoked agitation or staff & residents; odd sleep habits refusing to sleep in his own bed at times and sleeping in common area chairs instead). Further observation in the ED reveals markedly dysregulated sleep pattern and agitated restlessness in spite of compliance with current regimen. It is unlikely that patient's safety can be maintained at his residence with his current mental state. As such, recommend psychiatric admission for mood, behavior and sleep stabilization in an effort to safely return patient to his residence.        PLAN:   - Admit/Transfer to inpatient psychiatric unit ([facility]-psychiatric unit if feasible)      Recommend engaging [facility]  in bed search at [facility] as patient is well known to their psychiatric facility     - Place order for patient's prior to admission medications to ensure he continues to receive them while in the ED as such:          Depakote 1500 mg daily (administered in the mornings), Seroquel 400 mg HS, Risperdal 2 mg BID, Cogentin 2 mg BID, Trazodone 100 mg HS, Metformin 1000 mg BID, Glimepiride 1 mg daily, Levothyroxine 88 mcg daily before breakfast and Simvastatin 20 mg HS     - Address medical issues that may be barriers to psychiatric admissions as such:          Obtain updated vital signs and maintain BP control if still hypertensive        Continue fluid restriction while in the ED (patient with psychogenic polydipsia with tendencies to become hyponatremic if fluid intake is unrestricted)          consider repeating sodium to assure it is stable or up-trending as inpatient psychiatric unit will likely request this       if sodium is downtrending or BP is difficult to control then consider medical admission for stabilization instead    - Residence should be updated on disposition plan and location once a bed is secured      Telepsychiatry remains available overnight for any psychiatric issues pertaining to patient.

## 2021-12-18 NOTE — PROGRESS NOTE BEHAVIORAL HEALTH - RISK ASSESSMENT
Modifiable risk factors: intermittent confusion 2/2 dementia  Unmodifiable risk factors: history of dementia; history of psychiatric hospitalization; history of psychotic disorder  Protective factors: No past SA; no past NSSIB; no co-morbid substance use; living in supervised setting (nursing home); lack of current suicidality or homicidally; lack of urges to self-harm or engage in NSSIB

## 2021-12-18 NOTE — PROGRESS NOTE BEHAVIORAL HEALTH - NSBHCONSULTMEDS_PSY_A_CORE FT
Patient:   SHY VINCENT            MRN: [id number]            FIN: [id number]              Age:   67 years     Sex:  MALE     :  52   Associated Diagnoses:   None   Author:   JARON BARTLETT       Subjective   Patient seen at bedside. Reported groin pain. Denies fevers or chills.  Medications (14) Active  Scheduled: (9)  Acetaminophen 325 mg tab  650 mg 2 tab, Oral, Q6H  Ascorbic acid 500 mg tab  1,000 mg 2 tab, Oral, Daily  Cholecalciferol 1,000 unit (25 mcg) tab  1,000 unit 1 tab, Oral, Daily  Cyanocobalamin 500 mcg tab  1,000 mcg 2 tab, Oral, Daily  Ferrous sulfate 325 mg tab [Fe 65 mg]  325 mg 1 tab, Oral, Daily  Magnesium oxide 400 mg tab [Mg 240 mg]  400 mg 1 tab, Oral, Daily  piperacillin-tazobactam  3.375 gm, IVPB, Q8H  Sodium hypochlorite 0.25% (1/2 str) irrigation 473 mL  1 application, Topical, Q12H  Tamsulosin 0.4 mg cap  0.8 mg 2 cap, Oral, Daily [after dinner]  Continuous: (1)  Dextrose 5% - 0.45% NaCl 1,000 mL  1,000 mL, IV, 80 mL/hr  PRN: (4)  Benzocaine-menthol 15-3.6 mg lozenge  1 lozenge, Buccal, As Directed PRN  Hydrocodone-acetaminophen 7.5-325 mg tab  1 tab, Oral, Q4H  Morphine 5 mg/2.5 mL oral liquid repack  2.5 mg 1.25 mL, Oral, Q2H  Sodium chloride PF 0.9% flush inj 10 mL  2 mL, Flush, As Directed PRN  Objective   Vitals between:   2019 17:38:27   TO   2019 17:38:27                   LAST RESULT MINIMUM MAXIMUM  Temperature 37.3 36.6 37.3  Heart Rate 103 96 107  Respiratory Rate 16 16 18  NISBP           100 100 124  NIDBP           66 66 79  SpO2                    94 93 94  Physical Exam  Gen: NAD, laying in hospital bed  HEENT: Moist mucus membranes. EOMI  Pulm: Normal work of breathing. Equal chest rise and fall  CV: RRR  GI: Soft, non-distended, non-tender. Ostomy in place, no surrounding erythema. Mucosa pink. Dark green liquid in ostomy bag. Perineal wound visualized with mild necrotic tissue on edges of wound. In right medial thigh next to scrotum,  there is a 1cm opening expressing fecal/purulent drainage. Tract packed with iodoform dressing.  Labs between:  06-NOV-2019 17:38 to 07-NOV-2019 17:38  CBC:                 WBC  HgB  Hct  Plt  MCV  RDW   07-NOV-2019 (H) 16.7  (L) 8.6  (L) 28.1  (L) 74  93.7  (H) 16.8   DIFF:                 Seg  Neutroph//ABS  Lymph//ABS  Mono//ABS  EOS/ABS  07-NOV-2019 NOT APPLICABLE  88 // (H) 14.7  5 // (L) 0.8  6 // 0.9 0 // (L) 0.0                  A/P:  67yoM with advanced rectal cancer undergoing chemotherapy that formed a fistula/controlled perforation of his rectum to his scrotum. POD #8 laparoscopic creation of loop colostomy, incision and drainage of scrotal abscess  Surgery re-consulted after recent increased drainage from a potential fistual tract.  Plan  - Wound is freely draining from tract/abscess. Fully drained and packed with iodoform dressing  - IV Abx per ID  - Continue wound care per reccomendations   - Sitz baths TID  F: D5 + 0.5NS @80cc/hr  E: Replete PRN  N: General Diet   Patient discussed with Dr. Martel.   Horacio Diop   General Surgery PGY1  Agree with note   continue home medications

## 2021-12-18 NOTE — PROGRESS NOTE BEHAVIORAL HEALTH - SUMMARY
Scott is a 66 year-old male, single and disabled, non-caregiver, domiciled in a nursing home ([facility]), with past psychiatric history of Schizophrenia vs Schizoaffective disorder vs Bipolar I disorder, Generalized Anxiety and Major depression superimposed on Dementia with behavioral disturbance (A&Ox1 at baseline; intermittent unprovoked aggression at the residence), on Seroquel, Risperdal, Depakote and Trazodone, with past psychiatric admissions (last known to [facility] 8/6-8/31/2021); otherwise unknown history regarding suicide attempts, non-suicidal self injury, known history of aggressive episodes at the residence, no known legal issues or substance use, and past medical history of aforementioned dementia, heart failure, cataracts, hypothyroidism and type II diabetes who presents to the ED BIB EMS activated by his residence after patient became agitated when confronted about his stealing of property from other residents. Overnight patient experienced some confusion and sun-downing behaviors, but responded well to his PTA bedtime medications. Medical workup is unyielding for any acute disturbances provoking behavior change and only notable for mild hyponatremia consistent with psychogenic polydipsia reported by his residence. Per EM, patient is medically cleared at this time.    Collateral review is highly suggestive of progression of Dementia with behavioral disturbance characterized by misconduct in the residence (wandering into other residents' rooms and stealing their possession; intermittent unprovoked agitation or staff & residents; odd sleep habits refusing to sleep in his own bed at times and sleeping in common area chairs instead). It is unclear that psychiatric medication adjustments are required to address these behaviors. Rather, patient likely needs an alternative residence with a higher level of supervision and support. This morning, patient is calm, cooperative and in no acute distress. He is re-directable. Presentation is most consistent at this time with dementia.     Recommend discharging to residence with recommendation for residence case management to explore alternate residence placement with higher level of supervision as was discussed with nursing supervisor at nursing home last night.

## 2021-12-20 NOTE — ED ADULT NURSE NOTE - NSHOSCREENINGQ1_ED_ALL_ED
Additional Notes: Patient consent was obtained to proceed with the visit and recommended plan of care after discussion of all risks and benefits, including the risks of COVID-19 exposure. Detail Level: Simple No

## 2022-08-15 NOTE — ED PROVIDER NOTE - NSTIMEPROVIDERCAREINITIATE_GEN_ER
8 weeks pregnant, vomiting with pregnancy, OB prescribed new medication for n/v. Since last night unable to keep anything down including water. Denies abdominal pain.     23-Jul-2020 11:14

## 2022-10-28 NOTE — ED PROVIDER NOTE - CROS ED ROS STATEMENT
I saw and evaluated the patient, performing the key elements of the service. I discussed the findings, assessment and plan with the resident and agree with the resident's findings and plan as documented in the resident's note. all other ROS negative except as per HPI

## 2022-12-02 NOTE — ED ADULT NURSE REASSESSMENT NOTE - NS ED NURSE REASSESS COMMENT FT1
pt vital signs as documented. updated regarding plan of care. lactate 2.2 will redraw at 830 pm dr dutton aware
security at bedside for assistance.
Report given to CDU nurse Sanaz.  Sepsis flow sheet to be continued, handed over to RN. 1:1 at bedside for safety.
Report received from offgoing RN, charting as noted. Patient is A&Ox3, received in yellow gown, belongings out of reach, 1:1 in place for safety.  Patient agitated, asking for coffee and food. states he wants IV out of his arm. Patient denies chest pain/shortness of breath.
No

## 2023-03-12 NOTE — PROGRESS NOTE ADULT - SUBJECTIVE AND OBJECTIVE BOX
Patient is a 64y old  Male who presents with a chief complaint of Dental abscess from OSH (12 Aug 2020 15:28)      SUBJECTIVE / OVERNIGHT EVENTS:    MEDICATIONS  (STANDING):  amoxicillin  875 milliGRAM(s)/clavulanate 1 Tablet(s) Oral two times a day  aspirin enteric coated 81 milliGRAM(s) Oral daily  benztropine 1 milliGRAM(s) Oral two times a day  diVALproex DR 1500 milliGRAM(s) Oral two times a day  enoxaparin Injectable 40 milliGRAM(s) SubCutaneous daily  famotidine    Tablet 20 milliGRAM(s) Oral two times a day  folic acid 1 milliGRAM(s) Oral daily  furosemide    Tablet 20 milliGRAM(s) Oral daily  levothyroxine 88 MICROGram(s) Oral daily  LORazepam     Tablet 0.5 milliGRAM(s) Oral daily  senna 2 Tablet(s) Oral at bedtime  simvastatin 20 milliGRAM(s) Oral at bedtime  ziprasidone 120 milliGRAM(s) Oral two times a day    MEDICATIONS  (PRN):  LORazepam   Injectable 2 milliGRAM(s) IV Push every 6 hours PRN Agitation      Vital Signs Last 24 Hrs  T(C): 36.4 (13 Aug 2020 10:58), Max: 36.6 (12 Aug 2020 17:24)  T(F): 97.6 (13 Aug 2020 10:58), Max: 97.9 (13 Aug 2020 06:16)  HR: 80 (13 Aug 2020 10:58) (64 - 80)  BP: 115/77 (13 Aug 2020 10:58) (115/77 - 169/78)  BP(mean): --  RR: 18 (13 Aug 2020 10:58) (18 - 18)  SpO2: 97% (13 Aug 2020 10:58) (97% - 99%)  CAPILLARY BLOOD GLUCOSE        I&O's Summary      PHYSICAL EXAM:  Vital Signs Last 24 Hrs  T(C): 36.4 (08-13-20 @ 10:58)  T(F): 97.6 (08-13-20 @ 10:58), Max: 97.9 (08-13-20 @ 06:16)  HR: 80 (08-13-20 @ 10:58) (64 - 80)  BP: 115/77 (08-13-20 @ 10:58)  BP(mean): --  RR: 18 (08-13-20 @ 10:58) (18 - 18)  SpO2: 97% (08-13-20 @ 10:58) (97% - 99%)  Wt(kg): --    Constitutional: NAD, awake and alert  EYES: EOMI  ENT:  Normal Hearing, no tonsillar exudates   Neck: Soft and supple , no thyromegaly   Respiratory: Breath sounds are clear bilaterally, No wheezing, rales or rhonchi  Cardiovascular: S1 and S2, regular rate and rhythm, no Murmurs, gallops or rubs, no JVD,    Gastrointestinal: Bowel Sounds present, soft, nontender, nondistended, no guarding, no rebound  Extremities: No cyanosis or clubbing; warm to touch  Vascular: 2+ peripheral pulses lower ex  Neurological: No focal deficits, CN II-XII intact bilaterally, sensation to light touch intact in all extremities, gait intact. Pupils are equally reactive to light and symmetrical in size.   Musculoskeletal: 5/5 strength b/l upper and lower extremities; no joint swelling.  Skin: No rashes  Psych: no depression or anhedonia, AAOx3  HEME: no bruises, no nose bleeds      LABS:                        13.9   4.76  )-----------( 161      ( 13 Aug 2020 05:36 )             41.5                     RADIOLOGY & ADDITIONAL TESTS:    Imaging Personally Reviewed:    Consultant(s) Notes Reviewed:      Care Discussed with Consultants/Other Providers:
Please refer to previous dental consult note for additional information.     MEDICATIONS  (STANDING):  amoxicillin  875 milliGRAM(s)/clavulanate 1 Tablet(s) Oral two times a day  aspirin enteric coated 81 milliGRAM(s) Oral daily  benztropine 1 milliGRAM(s) Oral two times a day  diVALproex DR 1500 milliGRAM(s) Oral two times a day  enoxaparin Injectable 40 milliGRAM(s) SubCutaneous daily  famotidine    Tablet 20 milliGRAM(s) Oral two times a day  folic acid 1 milliGRAM(s) Oral daily  furosemide    Tablet 20 milliGRAM(s) Oral daily  levothyroxine 88 MICROGram(s) Oral daily  LORazepam     Tablet 0.5 milliGRAM(s) Oral daily  senna 2 Tablet(s) Oral at bedtime  simvastatin 20 milliGRAM(s) Oral at bedtime  ziprasidone 120 milliGRAM(s) Oral two times a day    MEDICATIONS  (PRN):  LORazepam   Injectable 2 milliGRAM(s) IV Push every 6 hours PRN Agitation    Allergies    clozapine (Unknown)  lithium (Unknown)    Intolerances    Vital Signs Last 24 Hrs  T(C): 36.2 (12 Aug 2020 12:38), Max: 37.1 (11 Aug 2020 23:27)  T(F): 97.2 (12 Aug 2020 12:38), Max: 98.7 (11 Aug 2020 23:27)  HR: 60 (12 Aug 2020 12:38) (60 - 85)  BP: 137/80 (12 Aug 2020 12:38) (135/70 - 145/90)  BP(mean): --  RR: 18 (12 Aug 2020 12:38) (18 - 18)  SpO2: 98% (12 Aug 2020 12:38) (97% - 99%)    LABS:                        13.6   5.38  )-----------( 133      ( 11 Aug 2020 06:00 )             40.9     08-11    138  |  99  |  14  ----------------------------<  91  4.5   |  27  |  0.78    Ca    9.4      11 Aug 2020 06:00  Phos  3.7     08-11  Mg     1.9     08-11      CLINICAL EXAM: Patient tolerated bedside exam today with a plastic mirror.    EOE: patient no longer tender to palpation on the left body of the mandible.  Patient still reacts and is tender to palpation on anterior chin. Patient tolerated palpation of the entire inferior border of the mandible.      IOE: (+) palpation on buccal gingiva at the depth of the vestibule on the left and in the anterior region apical to #23, 24, 25. No trismus, no floor of the mouth swelling, no draining sinus tract.    ASSESSMENT: Swelling appears much improved since yesterday's bedside exam.  The patient can tolerate the pressure on the left cheek and left body of the mandible.      PLAN:  Patient should continue abx and be monitored for signs of continued improvement.      PROCEDURE: Limited oral evaluation at bedside.     RECOMMENDATIONS:  1) Continue abx treatment and monitor for decreased swelling.     2) F/U with outpatient dentist for comprehensive dental care upon discharge.  3) If swelling, fever, difficulty breathing/swallowing occurs, page Dental.     Jose Chang, DMD #[id number]
Patient is a 64y old  Male who presents with a chief complaint of Dental abscess from OSH (11 Aug 2020 04:25)      SUBJECTIVE / OVERNIGHT EVENTS:    Pt seen and examined at bedside. Pt attempted to leave bed w/o clothing during interview, did not have specific concerns.    MEDICATIONS  (STANDING):  ampicillin/sulbactam  IVPB 3 Gram(s) IV Intermittent every 6 hours  aspirin enteric coated 81 milliGRAM(s) Oral daily  benztropine 1 milliGRAM(s) Oral two times a day  dextrose 5% + sodium chloride 0.45%. 1000 milliLiter(s) (100 mL/Hr) IV Continuous <Continuous>  diVALproex DR 1500 milliGRAM(s) Oral two times a day  enoxaparin Injectable 40 milliGRAM(s) SubCutaneous daily  famotidine    Tablet 20 milliGRAM(s) Oral two times a day  folic acid 1 milliGRAM(s) Oral daily  furosemide    Tablet 20 milliGRAM(s) Oral daily  levothyroxine 50 MICROGram(s) Oral daily  LORazepam     Tablet 0.5 milliGRAM(s) Oral daily  senna 2 Tablet(s) Oral at bedtime  simvastatin 20 milliGRAM(s) Oral at bedtime  ziprasidone 80 milliGRAM(s) Oral two times a day    MEDICATIONS  (PRN):  LORazepam   Injectable 2 milliGRAM(s) IV Push every 8 hours PRN Agitation      Vital Signs Last 24 Hrs  T(C): 36.7 (11 Aug 2020 03:03), Max: 37 (10 Aug 2020 23:24)  T(F): 98.1 (11 Aug 2020 03:03), Max: 98.6 (10 Aug 2020 23:24)  HR: 60 (11 Aug 2020 06:19) (60 - 75)  BP: 148/69 (11 Aug 2020 06:19) (113/89 - 154/93)  BP(mean): --  RR: 16 (11 Aug 2020 06:19) (12 - 18)  SpO2: 100% (11 Aug 2020 06:19) (100% - 100%)  CAPILLARY BLOOD GLUCOSE        I&O's Summary      PHYSICAL EXAM:  Vital Signs Last 24 Hrs  T(C): 36.7 (08-11-20 @ 03:03)  T(F): 98.1 (08-11-20 @ 03:03), Max: 98.6 (08-10-20 @ 23:24)  HR: 60 (08-11-20 @ 06:19) (60 - 75)  BP: 148/69 (08-11-20 @ 06:19)  BP(mean): --  RR: 16 (08-11-20 @ 06:19) (12 - 18)  SpO2: 100% (08-11-20 @ 06:19) (100% - 100%)  Wt(kg): --    Constitutional: NAD, awake and alert  EYES: EOMI  ENT:  Normal Hearing, TTP L mandible w/ facial swelling, no active drainage.   Neck: Soft and supple , no thyromegaly   Respiratory: Breath sounds are clear bilaterally, No wheezing, rales or rhonchi  Cardiovascular: S1 and S2, regular rate and rhythm, no Murmurs, gallops or rubs, no JVD,    Gastrointestinal: Bowel Sounds present, soft, nontender, nondistended, no guarding, no rebound  Extremities: No cyanosis or clubbing; warm to touch  Vascular: 2+ peripheral pulses lower ex  Neurological: Unable to examine due to mental status   Musculoskeletal: Unable to examine due to mental status however moving all extremities spontaneously.   Skin: No rashes  Psych: no depression or anhedonia, AAOx0-1  HEME: no bruises, no nose bleeds      LABS:                        13.6   5.38  )-----------( 133      ( 11 Aug 2020 06:00 )             40.9     08-11    138  |  99  |  14  ----------------------------<  91  4.5   |  27  |  0.78    Ca    9.4      11 Aug 2020 06:00  Phos  3.7     08-11  Mg     1.9     08-11    TPro  6.9  /  Alb  3.5  /  TBili  0.4  /  DBili  x   /  AST  20  /  ALT  11  /  AlkPhos  71  08-10    PT/INR - ( 11 Aug 2020 06:00 )   PT: 12.8 SEC;   INR: 1.12          PTT - ( 11 Aug 2020 06:00 )  PTT:32.5 SEC          RADIOLOGY & ADDITIONAL TESTS:    Imaging Personally Reviewed:    Consultant(s) Notes Reviewed:      Care Discussed with Consultants/Other Providers:
Patient is a 64y old  Male who presents with a chief complaint of left sided facial swelling.     HPI: Patient was transferred on 8/10/2020 to [facility] ED from [place] ED for left sided facial swelling associated with dental abscess.      PAST MEDICAL & SURGICAL HISTORY:  Hyperlipidemia  Hypothyroidism  Schizophrenia  No significant past surgical history    MEDICATIONS  (STANDING):    MEDICATIONS  (PRN):    Allergies    clozapine (Unknown)  lithium (Unknown)    Intolerances    FAMILY HISTORY:  Family history unknown    SOCIAL HISTORY: Patient is a member of [employer].  Patient presented to ED alone.     Vital Signs Last 24 Hrs  T(C): 37 (10 Aug 2020 23:24), Max: 37 (10 Aug 2020 23:24)  T(F): 98.6 (10 Aug 2020 23:24), Max: 98.6 (10 Aug 2020 23:24)  HR: 70 (10 Aug 2020 23:24) (66 - 94)  BP: 142/86 (10 Aug 2020 23:24) (112/78 - 154/93)  BP(mean): --  RR: 12 (10 Aug 2020 23:24) (12 - 18)  SpO2: 100% (10 Aug 2020 23:24) (97% - 100%)    EOE:    Mandible FROM             Facial bones and MOM grossly intact             ( +  ) trismus - limited opening possibly due to guarding.              ( -  ) LAD             ( +  ) swelling - swelling of the left cheek and chin.              ( +  ) asymmetry - left sided swelling present.             ( +  ) palpation - tender to palpation of left buccal mucosa and chin.              ( -  ) SOB             ( -  ) LOC    IOE:  permanent dentition: multiple carious teeth and multiple missing teeth          ( + ) swelling in the left mandibular region that was cellulitic in appearance.            Multiple carious and broken down teeth.  Heavy plaque and calculus.  Fibrotic gingival tissue.  Left cheek swelling.            #21 root tip         No fluctuant lesion present.       LABS:                        14.9   7.78  )-----------( 148      ( 10 Aug 2020 11:10 )             43.9     08-10    136  |  97<L>  |  15.0  ----------------------------<  63<L>  4.5   |  29.0  |  1.20    Ca    9.7      10 Aug 2020 11:11    TPro  6.9  /  Alb  3.5  /  TBili  0.4  /  DBili  x   /  AST  20  /  ALT  11  /  AlkPhos  71  08-10    WBC Count: 7.78 K/uL [3.80 - 10.50] (08-10 @ 11:10)  Platelet Count - Automated: 148 K/uL <L> [150 - 400] (08-10 @ 11:10)    DENTAL RADIOGRAPHS: unable to obtain dental radiographs at this time.      RADIOLOGY & ADDITIONAL STUDIES: maxillofacial CT taken and interpreted at [facility] with the following impression:    "extensive inflammation involving the buccal region of the left mandible with periapical abscess."     ASSESSMENT: Patient has multiple broken down teeth with left facial swelling.  Patient would benefit from IV antibiotics and Incision and drainage procedure.  At this time, it is unclear if the patient can consent for himself.  The med team has reached out to his home facility, but has yet to hear a response on this matter.  Once consent is obtained, dental team will continue treatment.     Procedure: Limited oral evaluation     RECOMMENDATIONS:  1) IV Unasyn and OTC pain medication for pain management.    2) Page dental [phone]) when consent is obtained.    3) If any difficulty swallowing/breathing, fever occur, page dental.   4) Follow up with outpatient dentist, or [facility] dental clinic ([phone]) for comprehensive care.     Jose Chang DMD Pager #[phone]  Dillon Be DDS Pager #[phone]
Please refer to previous dental consult note for additional information.     MEDICATIONS  (STANDING):  amoxicillin  875 milliGRAM(s)/clavulanate 1 Tablet(s) Oral two times a day  aspirin enteric coated 81 milliGRAM(s) Oral daily  benztropine 1 milliGRAM(s) Oral two times a day  diVALproex DR 1500 milliGRAM(s) Oral two times a day  enoxaparin Injectable 40 milliGRAM(s) SubCutaneous daily  famotidine    Tablet 20 milliGRAM(s) Oral two times a day  folic acid 1 milliGRAM(s) Oral daily  furosemide    Tablet 20 milliGRAM(s) Oral daily  levothyroxine 88 MICROGram(s) Oral daily  LORazepam     Tablet 0.5 milliGRAM(s) Oral daily  senna 2 Tablet(s) Oral at bedtime  simvastatin 20 milliGRAM(s) Oral at bedtime  ziprasidone 120 milliGRAM(s) Oral two times a day    MEDICATIONS  (PRN):  LORazepam   Injectable 2 milliGRAM(s) IV Push every 6 hours PRN Agitation    Allergies    clozapine (Unknown)  lithium (Unknown)    Intolerances    Vital Signs Last 24 Hrs  T(C): 36.7 (13 Aug 2020 17:27), Max: 36.7 (13 Aug 2020 17:27)  T(F): 98.1 (13 Aug 2020 17:27), Max: 98.1 (13 Aug 2020 17:27)  HR: 99 (13 Aug 2020 17:27) (64 - 99)  BP: 151/97 (13 Aug 2020 17:27) (115/77 - 169/78)  BP(mean): --  RR: 18 (13 Aug 2020 17:27) (18 - 18)  SpO2: 100% (13 Aug 2020 17:27) (97% - 100%)    LABS:                        13.9   4.76  )-----------( 161      ( 13 Aug 2020 05:36 )             41.5           WBC Count: 4.76 K/uL [3.8 - 10.5] (08-13 @ 05:36)  Platelet Count - Automated: 161 K/uL [150 - 400] (08-13 @ 05:36)      CLINICAL EXAM:    Patient would not allow a bedside clinical exam.  He yelled and flailed his arms when I asked to examine his cheek.  Patient continued to waive his arms when I approached the patient.    Another bedside exam will be attempted tomorrow.      RECOMMENDATIONS:  1) Continue course of antibiotics.  2) F/U with outpatient dentist for comprehensive dental care upon discharge.  3) If swelling, fever, difficulty breathing/swallowing occurs, page Dental.     Jose Chang DMD #[id number]
Please refer to previous dental consult note for additional information.     MEDICATIONS  (STANDING):  ampicillin/sulbactam  IVPB 3 Gram(s) IV Intermittent every 6 hours  aspirin enteric coated 81 milliGRAM(s) Oral daily  benztropine 1 milliGRAM(s) Oral two times a day  dextrose 5% + sodium chloride 0.45%. 1000 milliLiter(s) (100 mL/Hr) IV Continuous <Continuous>  diVALproex DR 1500 milliGRAM(s) Oral two times a day  enoxaparin Injectable 40 milliGRAM(s) SubCutaneous daily  famotidine    Tablet 20 milliGRAM(s) Oral two times a day  folic acid 1 milliGRAM(s) Oral daily  furosemide    Tablet 20 milliGRAM(s) Oral daily  levothyroxine 50 MICROGram(s) Oral daily  LORazepam     Tablet 0.5 milliGRAM(s) Oral daily  senna 2 Tablet(s) Oral at bedtime  simvastatin 20 milliGRAM(s) Oral at bedtime  ziprasidone 80 milliGRAM(s) Oral two times a day  ziprasidone 40 milliGRAM(s) Oral two times a day    MEDICATIONS  (PRN):  LORazepam   Injectable 2 milliGRAM(s) IV Push every 6 hours PRN Agitation    Allergies    clozapine (Unknown)  lithium (Unknown)    Intolerances      Vital Signs Last 24 Hrs  T(C): 36.7 (11 Aug 2020 03:03), Max: 37 (10 Aug 2020 23:24)  T(F): 98.1 (11 Aug 2020 03:03), Max: 98.6 (10 Aug 2020 23:24)  HR: 60 (11 Aug 2020 06:19) (60 - 75)  BP: 148/69 (11 Aug 2020 06:19) (113/89 - 154/93)  BP(mean): --  RR: 16 (11 Aug 2020 06:19) (12 - 18)  SpO2: 100% (11 Aug 2020 06:19) (100% - 100%)    LABS:                        13.6   5.38  )-----------( 133      ( 11 Aug 2020 06:00 )             40.9     08-11    138  |  99  |  14  ----------------------------<  91  4.5   |  27  |  0.78    Ca    9.4      11 Aug 2020 06:00  Phos  3.7     08-11  Mg     1.9     08-11    TPro  6.9  /  Alb  3.5  /  TBili  0.4  /  DBili  x   /  AST  20  /  ALT  11  /  AlkPhos  71  08-10    WBC Count: 5.38 K/uL [3.8 - 10.5] (08-11 @ 06:00)  Platelet Count - Automated: 133 K/uL <L> [150 - 400] (08-11 @ 06:00)  INR: 1.12 [0.88 - 1.16] (08-11 @ 06:00)      CLINICAL EXAM: Limited oral evaluation completed at bedside.  Patient appears to have little to no improvement from last encounter despite antibiotic treatment.  Patient was slightly combative at evaluation, but did tolerate a mirror in his oral cavity for exam.  Firm swelling of lower left buccal mucosa present which was tender to palpation.  Patient was tender to palpation along body of left mandible extraorally and anterior chin extraorally.      ASSESSMENT: Intraoral and extraoral swelling present with little improvement since last patient encounter.      PLAN: Follow-up with dental team tomorrow to monitor efficacy of abx treatment.     PROCEDURE:  Verbal consent given.    RECOMMENDATIONS:  1) Continue abx treatment.   2) F/U with inpatient dentist tomorrow for evaluation of swelling.  3) If swelling, fever, difficulty breathing/swallowing occurs, page Dental #[phone]    Jose Chang #[id number]
Patient is a 64y old  Male who presents with a chief complaint of Dental abscess from OSH (11 Aug 2020 17:13)      SUBJECTIVE / OVERNIGHT EVENTS:    Pt seen and examined at bedside. Pt resting asleep, no agitation events throughout day. At this time w/o fever, episodes of dyspnea chest pain.     MEDICATIONS  (STANDING):  ampicillin/sulbactam  IVPB 3 Gram(s) IV Intermittent every 6 hours  aspirin enteric coated 81 milliGRAM(s) Oral daily  benztropine 1 milliGRAM(s) Oral two times a day  dextrose 5% + sodium chloride 0.45%. 1000 milliLiter(s) (100 mL/Hr) IV Continuous <Continuous>  diVALproex DR 1500 milliGRAM(s) Oral two times a day  enoxaparin Injectable 40 milliGRAM(s) SubCutaneous daily  famotidine    Tablet 20 milliGRAM(s) Oral two times a day  folic acid 1 milliGRAM(s) Oral daily  furosemide    Tablet 20 milliGRAM(s) Oral daily  levothyroxine 50 MICROGram(s) Oral daily  LORazepam     Tablet 0.5 milliGRAM(s) Oral daily  senna 2 Tablet(s) Oral at bedtime  simvastatin 20 milliGRAM(s) Oral at bedtime  ziprasidone 120 milliGRAM(s) Oral two times a day    MEDICATIONS  (PRN):  LORazepam   Injectable 2 milliGRAM(s) IV Push every 6 hours PRN Agitation      Vital Signs Last 24 Hrs  T(C): 36.2 (12 Aug 2020 12:38), Max: 37.1 (11 Aug 2020 23:27)  T(F): 97.2 (12 Aug 2020 12:38), Max: 98.7 (11 Aug 2020 23:27)  HR: 60 (12 Aug 2020 12:38) (60 - 85)  BP: 137/80 (12 Aug 2020 12:38) (135/70 - 145/90)  BP(mean): --  RR: 18 (12 Aug 2020 12:38) (18 - 18)  SpO2: 98% (12 Aug 2020 12:38) (97% - 99%)  CAPILLARY BLOOD GLUCOSE        I&O's Summary      PHYSICAL EXAM:  Vital Signs Last 24 Hrs  T(C): 36.2 (08-12-20 @ 12:38)  T(F): 97.2 (08-12-20 @ 12:38), Max: 98.7 (08-11-20 @ 23:27)  HR: 60 (08-12-20 @ 12:38) (60 - 85)  BP: 137/80 (08-12-20 @ 12:38)  BP(mean): --  RR: 18 (08-12-20 @ 12:38) (18 - 18)  SpO2: 98% (08-12-20 @ 12:38) (97% - 99%)  Wt(kg): --    Constitutional: NAD, awake and alert  EYES: EOMI  ENT:  Normal Hearing, L facial swelling w/ mild TTP L mandible  Neck: Soft and supple , no thyromegaly   Respiratory: Breath sounds are clear bilaterally, No wheezing, rales or rhonchi  Cardiovascular: S1 and S2, regular rate and rhythm, no Murmurs, gallops or rubs, no JVD,    Gastrointestinal: Bowel Sounds present, soft, nontender, nondistended, no guarding, no rebound  Extremities: No cyanosis or clubbing; warm to touch  Vascular: 2+ peripheral pulses lower ex  Neurological: moving all extremities spontaneously   Musculoskeletal: moving all extremities spontaneously  Skin: No rashes  Psych: no depression or anhedonia, AAOx0-1 w/ tangential speech   HEME: no bruises, no nose bleeds      LABS:                        13.6   5.38  )-----------( 133      ( 11 Aug 2020 06:00 )             40.9     08-11    138  |  99  |  14  ----------------------------<  91  4.5   |  27  |  0.78    Ca    9.4      11 Aug 2020 06:00  Phos  3.7     08-11  Mg     1.9     08-11      PT/INR - ( 11 Aug 2020 06:00 )   PT: 12.8 SEC;   INR: 1.12          PTT - ( 11 Aug 2020 06:00 )  PTT:32.5 SEC          RADIOLOGY & ADDITIONAL TESTS:    Imaging Personally Reviewed:    Consultant(s) Notes Reviewed:      Care Discussed with Consultants/Other Providers:
No
70.8

## 2023-04-04 NOTE — ED ADULT NURSE NOTE - AS SC BRADEN MOISTURE
[FreeTextEntry1] : 1.  symptoms of dyspepsia, mainly manifested by post prandial nausea, fullness, and early  satiety, occurring with most foods,perhaps worse later in the day;  and the symptoms are most suggestive of poor gastric emptying..\par ie dysmotility, perhaps diabetic gstroparesis, or\par perhaps pyloric or pyloroduodenal disease, ulcer, stricture etc\par \par 2.  not particularly progressve\par \par 3.  my recommendation is two fold;\par a.  egd to start, which gives us quite a bit of information\par b.  follow this by a gastric emptying study, ie to measure via radio isotopes the emptying of solid food by the stomach\par c.  further work up would depend on these results\par \par More than 50% of the face to face time was devoted to counseling and /or coordination of care.  THis coordination of care may have included reviewing other medical notes and reports, and communicating with other health professionals\par \par AS WE OBTAIN INFORMED CONSENT FOR COLONOSCOPY, UPPER ENDOSCOPY [EGD], OR BOTH PROCEDURES TOGETHER;\par \par As with all procedures, there are risks of which the patient should be aware\par \par 1.  Anesthesia; deep sedation with Propofol;  there is a small risk of aspiration and pulmonary infection.  The Anesthesiologist meets with the patient the morning of the procedure to discuss in more detail\par \par 2.  risk of bleeding; from removal of a polyp, or rarely, from biopsies, 1 % or less\par \par 3.  risk of injury or perforation of the colon or upper GI tract; one in a thousand or less,  from removing a polyp or from advancing the instrument\par \par \par 
(4) rarely moist

## 2023-04-27 NOTE — BEHAVIORAL HEALTH ASSESSMENT NOTE - NSBHCONSULTWORKUP_PSY_A_CORE
Stairs allowed/Walking - Indoors allowed/No heavy lifting/straining/Walking - Outdoors allowed/Follow Instructions Provided by your Surgical Team
no

## 2023-06-19 NOTE — ED ADULT NURSE NOTE - NS ED NURSE RECORD ANOTHER HT AND WT
Blood sugar and 90 day average sugar reviewed  Results for orders placed or performed in visit on 06/19/23   POC Glycosylated Hemoglobin (Hb A1C)    Specimen: Blood   Result Value Ref Range    Hemoglobin A1C 7.0 %    Lot Number [id number]     Expiration Date 02/19/2025    POC Glucose, Blood    Specimen: Blood   Result Value Ref Range    Glucose 124 70 - 130 mg/dL    Lot Number [id number]     Expiration Date 11/25/2023      Pump and sensor downloaded and discussed 13 days of sensor data  Is utd with eye exam  No foot lesion   Ur alb neg  Higher pp sugars- does not feel like ozempic is suppressing appetite as effectively   Trial mounjaro sent    Yes

## 2023-06-26 NOTE — ED ADULT NURSE NOTE - NSSUHOSCREENINGYN_ED_ALL_ED

## 2023-07-06 NOTE — PROGRESS NOTE ADULT - ASSESSMENT
Left lower extremity Hematoma.   Pain and swelling and mobility limitation  Venous doppler of LLE showing no DVT  Trauma surgery rule out compartment syndrome and recommend compression, coagulation panel , serial cbc, serial exams.  .   CPK  Pain control    Schizophrenia, unspecified type, continue Ziprasidone  Constant observation    Hypothyroidism unspecified type, continue synthroid.     SW consult for JUANA. Discharge planning . Consent was obtained from the patient. The risks and benefits to therapy were discussed in detail. Specifically, the risks of infection, scarring, bleeding, prolonged wound healing, incomplete removal, allergy to anesthesia, nerve injury and recurrence were addressed. Prior to the procedure, the treatment site was clearly identified and confirmed by the patient. All components of Universal Protocol/PAUSE Rule completed.

## 2023-10-07 ENCOUNTER — INPATIENT (INPATIENT)
Facility: HOSPITAL | Age: 68
LOS: 0 days | Discharge: ANOTHER HOSPITAL | DRG: 281 | End: 2023-10-08
Attending: HOSPITALIST | Admitting: HOSPITALIST
Payer: MEDICAID

## 2023-10-07 VITALS
OXYGEN SATURATION: 98 % | SYSTOLIC BLOOD PRESSURE: 88 MMHG | HEIGHT: 65 IN | TEMPERATURE: 98 F | RESPIRATION RATE: 22 BRPM | HEART RATE: 164 BPM | WEIGHT: 201.94 LBS | DIASTOLIC BLOOD PRESSURE: 49 MMHG

## 2023-10-07 DIAGNOSIS — I48.91 UNSPECIFIED ATRIAL FIBRILLATION: ICD-10-CM

## 2023-10-07 LAB
ALBUMIN SERPL ELPH-MCNC: 3 G/DL — LOW (ref 3.3–5)
ALP SERPL-CCNC: 82 U/L — SIGNIFICANT CHANGE UP (ref 30–120)
ALT FLD-CCNC: 19 U/L — SIGNIFICANT CHANGE UP (ref 10–60)
AMPHET UR-MCNC: NEGATIVE — SIGNIFICANT CHANGE UP
ANION GAP SERPL CALC-SCNC: 12 MMOL/L — SIGNIFICANT CHANGE UP (ref 5–17)
APPEARANCE UR: CLEAR — SIGNIFICANT CHANGE UP
APTT BLD: 31.1 SEC — SIGNIFICANT CHANGE UP (ref 24.5–35.6)
APTT BLD: 80.3 SEC — HIGH (ref 24.5–35.6)
AST SERPL-CCNC: 18 U/L — SIGNIFICANT CHANGE UP (ref 10–40)
BARBITURATES UR SCN-MCNC: NEGATIVE — SIGNIFICANT CHANGE UP
BASOPHILS # BLD AUTO: 0.03 K/UL — SIGNIFICANT CHANGE UP (ref 0–0.2)
BASOPHILS NFR BLD AUTO: 0.5 % — SIGNIFICANT CHANGE UP (ref 0–2)
BENZODIAZ UR-MCNC: NEGATIVE — SIGNIFICANT CHANGE UP
BILIRUB SERPL-MCNC: 0.2 MG/DL — SIGNIFICANT CHANGE UP (ref 0.2–1.2)
BILIRUB UR-MCNC: NEGATIVE — SIGNIFICANT CHANGE UP
BUN SERPL-MCNC: 22 MG/DL — SIGNIFICANT CHANGE UP (ref 7–23)
CALCIUM SERPL-MCNC: 8.9 MG/DL — SIGNIFICANT CHANGE UP (ref 8.4–10.5)
CHLORIDE SERPL-SCNC: 98 MMOL/L — SIGNIFICANT CHANGE UP (ref 96–108)
CK SERPL-CCNC: 90 U/L — SIGNIFICANT CHANGE UP (ref 39–308)
CO2 SERPL-SCNC: 24 MMOL/L — SIGNIFICANT CHANGE UP (ref 22–31)
COCAINE METAB.OTHER UR-MCNC: NEGATIVE — SIGNIFICANT CHANGE UP
COLOR SPEC: YELLOW — SIGNIFICANT CHANGE UP
CREAT SERPL-MCNC: 1.63 MG/DL — HIGH (ref 0.5–1.3)
D DIMER BLD IA.RAPID-MCNC: 411 NG/ML DDU — HIGH
DIFF PNL FLD: NEGATIVE — SIGNIFICANT CHANGE UP
EGFR: 46 ML/MIN/1.73M2 — LOW
EOSINOPHIL # BLD AUTO: 0.08 K/UL — SIGNIFICANT CHANGE UP (ref 0–0.5)
EOSINOPHIL NFR BLD AUTO: 1.5 % — SIGNIFICANT CHANGE UP (ref 0–6)
GLUCOSE BLDC GLUCOMTR-MCNC: 118 MG/DL — HIGH (ref 70–99)
GLUCOSE BLDC GLUCOMTR-MCNC: 157 MG/DL — HIGH (ref 70–99)
GLUCOSE SERPL-MCNC: 201 MG/DL — HIGH (ref 70–99)
GLUCOSE UR QL: NEGATIVE MG/DL — SIGNIFICANT CHANGE UP
HCT VFR BLD CALC: 35.6 % — LOW (ref 39–50)
HCT VFR BLD CALC: 39.3 % — SIGNIFICANT CHANGE UP (ref 39–50)
HGB BLD-MCNC: 11.8 G/DL — LOW (ref 13–17)
HGB BLD-MCNC: 13 G/DL — SIGNIFICANT CHANGE UP (ref 13–17)
IMM GRANULOCYTES NFR BLD AUTO: 0.7 % — SIGNIFICANT CHANGE UP (ref 0–0.9)
INR BLD: 1.02 RATIO — SIGNIFICANT CHANGE UP (ref 0.85–1.18)
KETONES UR-MCNC: NEGATIVE MG/DL — SIGNIFICANT CHANGE UP
LACTATE SERPL-SCNC: 3 MMOL/L — HIGH (ref 0.7–2)
LACTATE SERPL-SCNC: 3.9 MMOL/L — HIGH (ref 0.7–2)
LEUKOCYTE ESTERASE UR-ACNC: NEGATIVE — SIGNIFICANT CHANGE UP
LYMPHOCYTES # BLD AUTO: 1.29 K/UL — SIGNIFICANT CHANGE UP (ref 1–3.3)
LYMPHOCYTES # BLD AUTO: 23.5 % — SIGNIFICANT CHANGE UP (ref 13–44)
MCHC RBC-ENTMCNC: 29.9 PG — SIGNIFICANT CHANGE UP (ref 27–34)
MCHC RBC-ENTMCNC: 30.3 PG — SIGNIFICANT CHANGE UP (ref 27–34)
MCHC RBC-ENTMCNC: 33.1 GM/DL — SIGNIFICANT CHANGE UP (ref 32–36)
MCHC RBC-ENTMCNC: 33.1 GM/DL — SIGNIFICANT CHANGE UP (ref 32–36)
MCV RBC AUTO: 90.3 FL — SIGNIFICANT CHANGE UP (ref 80–100)
MCV RBC AUTO: 91.3 FL — SIGNIFICANT CHANGE UP (ref 80–100)
METHADONE UR-MCNC: NEGATIVE — SIGNIFICANT CHANGE UP
MONOCYTES # BLD AUTO: 0.55 K/UL — SIGNIFICANT CHANGE UP (ref 0–0.9)
MONOCYTES NFR BLD AUTO: 10 % — SIGNIFICANT CHANGE UP (ref 2–14)
NEUTROPHILS # BLD AUTO: 3.51 K/UL — SIGNIFICANT CHANGE UP (ref 1.8–7.4)
NEUTROPHILS NFR BLD AUTO: 63.8 % — SIGNIFICANT CHANGE UP (ref 43–77)
NITRITE UR-MCNC: NEGATIVE — SIGNIFICANT CHANGE UP
NRBC # BLD: 0 /100 WBCS — SIGNIFICANT CHANGE UP (ref 0–0)
NRBC # BLD: 0 /100 WBCS — SIGNIFICANT CHANGE UP (ref 0–0)
NT-PROBNP SERPL-SCNC: 179 PG/ML — HIGH (ref 0–125)
OPIATES UR-MCNC: NEGATIVE — SIGNIFICANT CHANGE UP
PCP SPEC-MCNC: SIGNIFICANT CHANGE UP
PCP UR-MCNC: NEGATIVE — SIGNIFICANT CHANGE UP
PH UR: 6 — SIGNIFICANT CHANGE UP (ref 5–8)
PLATELET # BLD AUTO: 237 K/UL — SIGNIFICANT CHANGE UP (ref 150–400)
PLATELET # BLD AUTO: 239 K/UL — SIGNIFICANT CHANGE UP (ref 150–400)
POTASSIUM SERPL-MCNC: 5 MMOL/L — SIGNIFICANT CHANGE UP (ref 3.5–5.3)
POTASSIUM SERPL-SCNC: 5 MMOL/L — SIGNIFICANT CHANGE UP (ref 3.5–5.3)
PROT SERPL-MCNC: 6.9 G/DL — SIGNIFICANT CHANGE UP (ref 6–8.3)
PROT UR-MCNC: SIGNIFICANT CHANGE UP MG/DL
PROTHROM AB SERPL-ACNC: 11.1 SEC — SIGNIFICANT CHANGE UP (ref 9.5–13)
RAPID RVP RESULT: SIGNIFICANT CHANGE UP
RBC # BLD: 3.9 M/UL — LOW (ref 4.2–5.8)
RBC # BLD: 4.35 M/UL — SIGNIFICANT CHANGE UP (ref 4.2–5.8)
RBC # FLD: 13.3 % — SIGNIFICANT CHANGE UP (ref 10.3–14.5)
RBC # FLD: 13.7 % — SIGNIFICANT CHANGE UP (ref 10.3–14.5)
SARS-COV-2 RNA SPEC QL NAA+PROBE: SIGNIFICANT CHANGE UP
SODIUM SERPL-SCNC: 134 MMOL/L — LOW (ref 135–145)
SP GR SPEC: 1.01 — SIGNIFICANT CHANGE UP (ref 1–1.03)
THC UR QL: NEGATIVE — SIGNIFICANT CHANGE UP
TROPONIN I, HIGH SENSITIVITY RESULT: 208.6 NG/L — HIGH
TROPONIN I, HIGH SENSITIVITY RESULT: HIGH NG/L
UROBILINOGEN FLD QL: 0.2 MG/DL — SIGNIFICANT CHANGE UP (ref 0.2–1)
VALPROATE SERPL-MCNC: 60 UG/ML — SIGNIFICANT CHANGE UP (ref 50–100)
WBC # BLD: 5.5 K/UL — SIGNIFICANT CHANGE UP (ref 3.8–10.5)
WBC # BLD: 8.6 K/UL — SIGNIFICANT CHANGE UP (ref 3.8–10.5)
WBC # FLD AUTO: 5.5 K/UL — SIGNIFICANT CHANGE UP (ref 3.8–10.5)
WBC # FLD AUTO: 8.6 K/UL — SIGNIFICANT CHANGE UP (ref 3.8–10.5)

## 2023-10-07 PROCEDURE — 99223 1ST HOSP IP/OBS HIGH 75: CPT

## 2023-10-07 PROCEDURE — 70450 CT HEAD/BRAIN W/O DYE: CPT | Mod: 26,MA

## 2023-10-07 PROCEDURE — 99291 CRITICAL CARE FIRST HOUR: CPT

## 2023-10-07 PROCEDURE — 71045 X-RAY EXAM CHEST 1 VIEW: CPT | Mod: 26

## 2023-10-07 PROCEDURE — 93010 ELECTROCARDIOGRAM REPORT: CPT | Mod: 76

## 2023-10-07 RX ORDER — LEVOTHYROXINE SODIUM 125 MCG
88 TABLET ORAL DAILY
Refills: 0 | Status: DISCONTINUED | OUTPATIENT
Start: 2023-10-07 | End: 2023-10-08

## 2023-10-07 RX ORDER — ASPIRIN/CALCIUM CARB/MAGNESIUM 324 MG
81 TABLET ORAL DAILY
Refills: 0 | Status: DISCONTINUED | OUTPATIENT
Start: 2023-10-07 | End: 2023-10-08

## 2023-10-07 RX ORDER — HEPARIN SODIUM 5000 [USP'U]/ML
5600 INJECTION INTRAVENOUS; SUBCUTANEOUS ONCE
Refills: 0 | Status: COMPLETED | OUTPATIENT
Start: 2023-10-07 | End: 2023-10-07

## 2023-10-07 RX ORDER — ASPIRIN/CALCIUM CARB/MAGNESIUM 324 MG
324 TABLET ORAL ONCE
Refills: 0 | Status: COMPLETED | OUTPATIENT
Start: 2023-10-07 | End: 2023-10-07

## 2023-10-07 RX ORDER — DEXTROSE 50 % IN WATER 50 %
15 SYRINGE (ML) INTRAVENOUS ONCE
Refills: 0 | Status: DISCONTINUED | OUTPATIENT
Start: 2023-10-07 | End: 2023-10-08

## 2023-10-07 RX ORDER — SODIUM CHLORIDE 9 MG/ML
1000 INJECTION INTRAMUSCULAR; INTRAVENOUS; SUBCUTANEOUS ONCE
Refills: 0 | Status: COMPLETED | OUTPATIENT
Start: 2023-10-07 | End: 2023-10-07

## 2023-10-07 RX ORDER — INSULIN LISPRO 100/ML
VIAL (ML) SUBCUTANEOUS
Refills: 0 | Status: DISCONTINUED | OUTPATIENT
Start: 2023-10-07 | End: 2023-10-08

## 2023-10-07 RX ORDER — DEXTROSE 50 % IN WATER 50 %
25 SYRINGE (ML) INTRAVENOUS ONCE
Refills: 0 | Status: DISCONTINUED | OUTPATIENT
Start: 2023-10-07 | End: 2023-10-08

## 2023-10-07 RX ORDER — HEPARIN SODIUM 5000 [USP'U]/ML
5000 INJECTION INTRAVENOUS; SUBCUTANEOUS EVERY 6 HOURS
Refills: 0 | Status: DISCONTINUED | OUTPATIENT
Start: 2023-10-07 | End: 2023-10-08

## 2023-10-07 RX ORDER — DIVALPROEX SODIUM 500 MG/1
1500 TABLET, DELAYED RELEASE ORAL DAILY
Refills: 0 | Status: DISCONTINUED | OUTPATIENT
Start: 2023-10-07 | End: 2023-10-08

## 2023-10-07 RX ORDER — SODIUM CHLORIDE 9 MG/ML
1000 INJECTION INTRAMUSCULAR; INTRAVENOUS; SUBCUTANEOUS
Refills: 0 | Status: DISCONTINUED | OUTPATIENT
Start: 2023-10-07 | End: 2023-10-07

## 2023-10-07 RX ORDER — CEFTRIAXONE 500 MG/1
1000 INJECTION, POWDER, FOR SOLUTION INTRAMUSCULAR; INTRAVENOUS ONCE
Refills: 0 | Status: COMPLETED | OUTPATIENT
Start: 2023-10-07 | End: 2023-10-07

## 2023-10-07 RX ORDER — SODIUM CHLORIDE 9 MG/ML
1000 INJECTION, SOLUTION INTRAVENOUS
Refills: 0 | Status: DISCONTINUED | OUTPATIENT
Start: 2023-10-07 | End: 2023-10-07

## 2023-10-07 RX ORDER — BENZTROPINE MESYLATE 1 MG
2 TABLET ORAL
Refills: 0 | Status: DISCONTINUED | OUTPATIENT
Start: 2023-10-07 | End: 2023-10-08

## 2023-10-07 RX ORDER — SODIUM CHLORIDE 9 MG/ML
1000 INJECTION, SOLUTION INTRAVENOUS
Refills: 0 | Status: DISCONTINUED | OUTPATIENT
Start: 2023-10-07 | End: 2023-10-08

## 2023-10-07 RX ORDER — LANOLIN ALCOHOL/MO/W.PET/CERES
3 CREAM (GRAM) TOPICAL AT BEDTIME
Refills: 0 | Status: DISCONTINUED | OUTPATIENT
Start: 2023-10-07 | End: 2023-10-08

## 2023-10-07 RX ORDER — QUETIAPINE FUMARATE 200 MG/1
400 TABLET, FILM COATED ORAL AT BEDTIME
Refills: 0 | Status: DISCONTINUED | OUTPATIENT
Start: 2023-10-07 | End: 2023-10-08

## 2023-10-07 RX ORDER — CEFTRIAXONE 500 MG/1
1000 INJECTION, POWDER, FOR SOLUTION INTRAMUSCULAR; INTRAVENOUS EVERY 24 HOURS
Refills: 0 | Status: DISCONTINUED | OUTPATIENT
Start: 2023-10-07 | End: 2023-10-08

## 2023-10-07 RX ORDER — RISPERIDONE 4 MG/1
3 TABLET ORAL AT BEDTIME
Refills: 0 | Status: DISCONTINUED | OUTPATIENT
Start: 2023-10-07 | End: 2023-10-08

## 2023-10-07 RX ORDER — ONDANSETRON 8 MG/1
4 TABLET, FILM COATED ORAL EVERY 8 HOURS
Refills: 0 | Status: DISCONTINUED | OUTPATIENT
Start: 2023-10-07 | End: 2023-10-08

## 2023-10-07 RX ORDER — SIMVASTATIN 20 MG/1
20 TABLET, FILM COATED ORAL AT BEDTIME
Refills: 0 | Status: DISCONTINUED | OUTPATIENT
Start: 2023-10-07 | End: 2023-10-08

## 2023-10-07 RX ORDER — DILTIAZEM HCL 120 MG
10 CAPSULE, EXT RELEASE 24 HR ORAL ONCE
Refills: 0 | Status: COMPLETED | OUTPATIENT
Start: 2023-10-07 | End: 2023-10-07

## 2023-10-07 RX ORDER — RISPERIDONE 4 MG/1
2 TABLET ORAL DAILY
Refills: 0 | Status: DISCONTINUED | OUTPATIENT
Start: 2023-10-07 | End: 2023-10-08

## 2023-10-07 RX ORDER — HEPARIN SODIUM 5000 [USP'U]/ML
INJECTION INTRAVENOUS; SUBCUTANEOUS
Qty: 25000 | Refills: 0 | Status: DISCONTINUED | OUTPATIENT
Start: 2023-10-07 | End: 2023-10-08

## 2023-10-07 RX ORDER — HEPARIN SODIUM 5000 [USP'U]/ML
5000 INJECTION INTRAVENOUS; SUBCUTANEOUS ONCE
Refills: 0 | Status: COMPLETED | OUTPATIENT
Start: 2023-10-07 | End: 2023-10-07

## 2023-10-07 RX ORDER — GLUCAGON INJECTION, SOLUTION 0.5 MG/.1ML
1 INJECTION, SOLUTION SUBCUTANEOUS ONCE
Refills: 0 | Status: DISCONTINUED | OUTPATIENT
Start: 2023-10-07 | End: 2023-10-08

## 2023-10-07 RX ORDER — TRAZODONE HCL 50 MG
100 TABLET ORAL AT BEDTIME
Refills: 0 | Status: DISCONTINUED | OUTPATIENT
Start: 2023-10-07 | End: 2023-10-08

## 2023-10-07 RX ORDER — ACETAMINOPHEN 500 MG
650 TABLET ORAL EVERY 6 HOURS
Refills: 0 | Status: DISCONTINUED | OUTPATIENT
Start: 2023-10-07 | End: 2023-10-08

## 2023-10-07 RX ADMIN — Medication 100 MILLIGRAM(S): at 21:10

## 2023-10-07 RX ADMIN — SODIUM CHLORIDE 1000 MILLILITER(S): 9 INJECTION INTRAMUSCULAR; INTRAVENOUS; SUBCUTANEOUS at 11:36

## 2023-10-07 RX ADMIN — Medication 10 MILLIGRAM(S): at 10:30

## 2023-10-07 RX ADMIN — HEPARIN SODIUM 1000 UNIT(S)/HR: 5000 INJECTION INTRAVENOUS; SUBCUTANEOUS at 19:17

## 2023-10-07 RX ADMIN — CEFTRIAXONE 100 MILLIGRAM(S): 500 INJECTION, POWDER, FOR SOLUTION INTRAMUSCULAR; INTRAVENOUS at 11:36

## 2023-10-07 RX ADMIN — SIMVASTATIN 20 MILLIGRAM(S): 20 TABLET, FILM COATED ORAL at 21:10

## 2023-10-07 RX ADMIN — HEPARIN SODIUM 1000 UNIT(S)/HR: 5000 INJECTION INTRAVENOUS; SUBCUTANEOUS at 16:01

## 2023-10-07 RX ADMIN — HEPARIN SODIUM 5000 UNIT(S): 5000 INJECTION INTRAVENOUS; SUBCUTANEOUS at 16:19

## 2023-10-07 RX ADMIN — QUETIAPINE FUMARATE 400 MILLIGRAM(S): 200 TABLET, FILM COATED ORAL at 21:10

## 2023-10-07 RX ADMIN — Medication 1 MILLIGRAM(S): at 17:15

## 2023-10-07 RX ADMIN — Medication 2 MILLIGRAM(S): at 21:10

## 2023-10-07 RX ADMIN — Medication 81 MILLIGRAM(S): at 15:54

## 2023-10-07 RX ADMIN — SODIUM CHLORIDE 1000 MILLILITER(S): 9 INJECTION INTRAMUSCULAR; INTRAVENOUS; SUBCUTANEOUS at 10:50

## 2023-10-07 RX ADMIN — RISPERIDONE 3 MILLIGRAM(S): 4 TABLET ORAL at 21:10

## 2023-10-07 RX ADMIN — Medication 10 MILLIGRAM(S): at 09:53

## 2023-10-07 RX ADMIN — HEPARIN SODIUM 900 UNIT(S)/HR: 5000 INJECTION INTRAVENOUS; SUBCUTANEOUS at 22:38

## 2023-10-07 RX ADMIN — Medication 1 MILLIGRAM(S): at 10:19

## 2023-10-07 RX ADMIN — SODIUM CHLORIDE 1000 MILLILITER(S): 9 INJECTION INTRAMUSCULAR; INTRAVENOUS; SUBCUTANEOUS at 09:54

## 2023-10-07 NOTE — H&P ADULT - NSHPPHYSICALEXAM_GEN_ALL_CORE
GENERAL: NAD  HEAD:  Atraumatic, Normocephalic  EYES: EOMI, PERRLA, conjunctiva and sclera clear  ENMT: No tonsillar erythema, exudates, or enlargement; Moist mucous membranes  NECK: Supple, No JVD, Normal thyroid  CHEST/LUNG: Clear to percussion bilaterally; No rales, rhonchi, wheezing, or rubs  HEART: IRIR,  ABDOMEN: Soft, Nontender, Nondistended; Bowel sounds present  EXTREMITIES:  2+ Peripheral Pulses, No clubbing, cyanosis, or edema  NERVOUS SYSTEM:  Alert & Oriented X1,  Motor Strength 5/5 B/L upper and lower extremities; DTRs 2+ intact and symmetric  SKIN: No rashes or lesions

## 2023-10-07 NOTE — ED PROVIDER NOTE - CLINICAL SUMMARY MEDICAL DECISION MAKING FREE TEXT BOX
Spoke with [facility] where Sharyn Snow use to work at and she no longer works there. I called pt to see who they see as pcp no answer.    67-year-old male with history of schizophrenia, dementia, diabetes, brought by ambulance from [facility] for evaluation of altered mental status today.  Transfer paper states patient had chest pain and was briefly unresponsive.  Patient not answering questions.  Just ranting that he does not want any needles.  Will not cooperate with questions or exam.  His PCP is Dr. Murtaza Oneill.    Physical exam is normal except for tachycardia.  EKG reveals rapid A-fib with ventricular rate of 151.  Impression rapid A-fib  Plan is labs cardiac work-up will give IV Cardizem.  Will need cardiac evaluation and likely admission for further evaluation and treatment.

## 2023-10-07 NOTE — ED ADULT TRIAGE NOTE - WEIGHT IN LBS
Care Due:                  Date            Visit Type   Department     Provider  --------------------------------------------------------------------------------                                [facility]                              [facility]  [facility] 04[facility]      [facility]   [facility]                              [facility]                              [facility]  Next Visit[facility] 07[facility]      CARE ([facility])   [facility]  Karin                                                            Last  Test          Frequency    Reason                     Performed    Due Date  --------------------------------------------------------------------------------    HBA1C.......  6 months...  glipiZIDE, metFORMIN.....  01- 07-    [facility] Embedded Care Gaps. Reference number: [id number]. 5/04/2022   12:15:46 AM CDT  
Refill Authorization Note   Junior Rodriguez  
201.9

## 2023-10-07 NOTE — ED ADULT NURSE NOTE - NSFALLUNIVINTERV_ED_ALL_ED
Bed/Stretcher in lowest position, wheels locked, appropriate side rails in place/Call bell, personal items and telephone in reach/Instruct patient to call for assistance before getting out of bed/chair/stretcher/Non-slip footwear applied when patient is off stretcher/[place] to call system/Physically safe environment - no spills, clutter or unnecessary equipment/Purposeful proactive rounding/Room/bathroom lighting operational, light cord in reach

## 2023-10-07 NOTE — H&P ADULT - HISTORY OF PRESENT ILLNESS
67-year-old male with history of schizophrenia, diabetes, hypothyrodism, hld, tobacco dependence brought by ambulance from [facility] for evaluation of altered mental status today.  Transfer paper states patient had chest pain and was briefly unresponsive.  Patient not answering questions.  Just ranting that he does not want any needles.  Will not cooperate with questions or exam.  His PCP is Dr. Murtaza Oneill.    in the ED                          13.0   5.50  )-----------( 237      ( 07 Oct 2023 09:52 )             39.3     10-07    134<L>  |  98  |  22  ----------------------------<  201<H>  5.0   |  24  |  1.63<H>    Ca    8.9      07 Oct 2023 10:45    TPro  6.9  /  Alb  3.0<L>  /  TBili  0.2  /  DBili  x   /  AST  18  /  ALT  19  /  AlkPhos  82  10-07    seen by cardiology  troponins elevated 2/2 demand ischemia    pt was in rapid afib  s/p IV cardizem 20mg  and ativan given    lactate elevated at 3.9 to 3    Ceftriaxone given    u/a pending

## 2023-10-07 NOTE — PROVIDER CONTACT NOTE (CRITICAL VALUE NOTIFICATION) - ACTION/TREATMENT ORDERED:
Cardiology consult. comfort measure provided, callbell within reach, reinforced surrounding and plan of care,

## 2023-10-07 NOTE — CONSULT NOTE ADULT - ASSESSMENT
Initial evaluation/Pulmonary Critical Care consultation requested by DR HENLEY  on  10/7/2023   from Dr Charly Webster    Patient examined chart reviewed    HOSPITAL ADMISSION   PATIENT CAME  FROM (if information available)      REASON FOR VISIT  .. Management of problems listed below      ROS  . Patient unable to give ROS     PHYSICAL EXAM    HEENT Unremarkable  atraumatic   RESP Fair air entry  Harsh breath sound   CARDIAC S1 S2 No S3     NO JVD    ABDOMEN No hepatosplenomegaly   PEDAL EDEMA present No calf tenderness  NO rash       DOA C/C.     . as per EMS crew  " He is hypotensive, the staff initially called for chest pain   " PM Anemia Schizophrenia Anxiety , Pt is awake but restless as per EMS crew  " He is hypotensive, the staff initially called for chest pain   " [facility] Anemia Schizophrenia Anxiety , Pt is awake but restless  PRESENTATION.   . 10/7/2023 67-year-old male with history of schizophrenia, dementia, diabetes, brought by ambulance from [facility] for evaluation of altered mental status today.  Transfer paper states patient had chest pain and was briefly unresponsive.  Patient not answering questions.  Just ranting that he does not want any needles.  Will not cooperate with questions or exam.  His PCP is Dr. Murtaza Oneill.  . 10/7/2023 Pulm consulted   PMH.   . Details unclear at this time   HOME MEDS.   COURSE.     PROBLEMS/ASSESSMENT/RECOMMENDATIONS (A/R).  PULM.  .. O2 as needed to keep po betw 90 and 95%    INFECTION.   .. W 10/7/2023 w 5.5   .. 10/7/2023 1:14 PM pt already given dose of rocephin   .. Check blod urine cs     CARDIAC.  . Lacticemia  .. La 10/7/2023 la 3.9   .. Cautious fluids monitor     . RO MI   .. Tr 10/7/2023 Tr 208   .. may need cath   .. 10/7/2023 1:11 PM pt already seen by Cardio     . Shock  .. 10/7/2023 1:15 PM pt already got 2l ns bolus   .. 10/7/2023 Cardio recommends pressors if bp remains low   .. 10/7/2023 plan levophed if bp remains low     . CHF  .. Bnp 10/7/2023 bnp 179  .. CXR possible chf   .. 10/7/2023 Check echo    . A fib   .. 10/7/2023 Cardizem as needed  .. 10/7/2023 may not be ble to use cardizem drip as pt is hypotensive   .. 10/7/2023 anticoagn      HEMAT.  .. Hb 10/7/2023 Hb 13   .. INR 10/7/2023 inr 1   .. monitor     RENAL.  .. Na 10/7/2023 Na 134   .. Cr 10/7/2023 Cr 1.6   .. Fluids monitor     NEURO.  .. ct h 10/7/2023 (-)     MAIN ISSUES.  . Sepsis   . Lacticemia   . RO MI   . Shock 10/7/2023   . A fib rvr   . HUBERT     TIME SPENT.   . Over 55 minutes aggregate care time spent on encounter; activities included   direct patient care, counseling and/or coordinating care reviewing notes, lab data/ imaging , discussion with multidisciplinary team/ patient  /family and explaining in detail risks, benefits, alternatives  of the recommendations     CLIFFORD ABDALLA 67 m 10/7/2023 1955 DR CAROLINA JOHNSON

## 2023-10-07 NOTE — ED PROVIDER NOTE - PROGRESS NOTE DETAILS
Heart rate improving after Cardizem blood pressure stable but still on the low side.  Patient more cooperative after Ativan.  Seen by Dr. Webster and Dr. Santos.  Plan is admit to S PCU.  May need Cardizem drip/pressors.

## 2023-10-07 NOTE — ED ADULT NURSE NOTE - CHIEF COMPLAINT QUOTE
as per EMS crew  " He is hypotensive, the staff initially called for chest pain   " PMH Anemia Schizophrenia Anxiety , Pt is awake but restless , pale looking still hypotensive (+) Rapid Afib no fever. Pt complains of chest pain but unable to use # scale

## 2023-10-07 NOTE — H&P ADULT - ASSESSMENT
67-year-old male with history of schizophrenia, diabetes, hypothyrodism, hld, tobacco dependence admitted for rapid afib rule out ACS    #Rapid afib  rule out ACS  initial trops elevated  cardiology recs appreciated  likely from ischemic demand, will repeat   s/p 20mg of IV cardizem  now in NSR    #sepsis?   unclear, pt was hypotensive inititally but now normotensive, BP has improved  lactacte elevated at 3, s/p 3L  will trend  s/p ceftriaxone  blood cx obtain, U/A urine cx not obtained yet,   continue IV ceftriaxone for now until cultures return    #HUBERT vs CKD  Cr elevated  will repeat in AM    #DM2  hold metformin  ISS  A1C    #HLD  statins    #Schizophrenia  continue risperdol, depakoate    #hypothyroidism  levothyroxine    #DVT proph  lovenox

## 2023-10-07 NOTE — CHART NOTE - NSCHARTNOTEFT_GEN_A_CORE
Elevated troponin of >443235  Dr. Kaye recommended transfer for evaluation for cardiac cath    Spoke with Dr. Alonzo from transfer center.  Not urgent need for intervention at this time, can be transferred on monday.  For now start patient on heparin gtt ACS protocol and obtain echo cardiogram

## 2023-10-07 NOTE — CONSULT NOTE ADULT - SUBJECTIVE AND OBJECTIVE BOX
CARDIOLOGY CONSULT NOTE    Patient is a 67y Male with a known history of :    HPI:      REVIEW OF SYSTEMS:    CONSTITUTIONAL: No fever, weight loss, or fatigue  EYES: No eye pain, visual disturbances, or discharge  ENMT:  No difficulty hearing, tinnitus, vertigo; No sinus or throat pain  NECK: No pain or stiffness  BREASTS: No pain, masses, or nipple discharge  RESPIRATORY: No cough, wheezing, chills or hemoptysis; No shortness of breath  CARDIOVASCULAR: No chest pain, palpitations, dizziness, or leg swelling  GASTROINTESTINAL: No abdominal or epigastric pain. No nausea, vomiting, or hematemesis; No diarrhea or constipation. No melena or hematochezia.  GENITOURINARY: No dysuria, frequency, hematuria, or incontinence  NEUROLOGICAL: No headaches, memory loss, loss of strength, numbness, or tremors  SKIN: No itching, burning, rashes, or lesions   LYMPH NODES: No enlarged glands  ENDOCRINE: No heat or cold intolerance; No hair loss  MUSCULOSKELETAL: No joint pain or swelling; No muscle, back, or extremity pain  PSYCHIATRIC: No depression, anxiety, mood swings, or difficulty sleeping  HEME/LYMPH: No easy bruising, or bleeding gums  ALLERGY AND IMMUNOLOGIC: No hives or eczema    MEDICATIONS  (STANDING):    MEDICATIONS  (PRN):      ALLERGIES: aspartate (Unknown)  clozapine (Unknown)  lithium (Unknown)      FAMILY HISTORY:      Social History:  Alochol:   Smoking:   Drug Use:   Marital Status:     I&O's Detail      PHYSICAL EXAMINATION:  -----------------------------  T(C): 36.8 (10-07-23 @ 09:33), Max: 36.8 (10-07-23 @ 09:33)  HR: 129 (10-07-23 @ 10:44) (120 - 164)  BP: 84/48 (10-07-23 @ 10:35) (65/44 - 88/49)  RR: 25 (10-07-23 @ 10:44) (22 - 25)  SpO2: 96% (10-07-23 @ 10:44) (95% - 98%)  Wt(kg): --    Height (cm): 165.1 (10-07 @ 09:33)  Weight (kg): 91.6 (10-07 @ 09:33)  BMI (kg/m2): 33.6 (10-07 @ 09:33)  BSA (m2): 1.99 (10-07 @ 09:33)    Constitutional: well developed, normal appearance, well groomed, well nourished, no deformities and no acute distress.   Eyes: the conjunctiva exhibited no abnormalities and the eyelids demonstrated no xanthelasmas.   HEENT: normal oral mucosa, no oral pallor and no oral cyanosis.   Neck: normal jugular venous A waves present, normal jugular venous V waves present and no jugular venous chung A waves.   Pulmonary: no respiratory distress, normal respiratory rhythm and effort, no accessory muscle use and lungs were clear to auscultation bilaterally. Anteriorly  Cardiovascular: heart rate and rhythm were irreg/irreg, normal S1 and S2 and no murmur, gallop, rub, heave or thrill are present.   Musculoskeletal: the gait could not be assessed.   Extremities: no clubbing of the fingernails, no localized cyanosis, no petechial hemorrhages and no ischemic changes.   Skin: normal skin color and pigmentation, no rash, no venous stasis, no skin lesions, no skin ulcer and no xanthoma was observed.       LABS:   --------                             13.0   5.50  )-----------( 237      ( 07 Oct 2023 09:52 )             39.3                   RADIOLOGY:  -----------------        ECG: A-Fib with diffuse ST changes

## 2023-10-07 NOTE — ED ADULT NURSE NOTE - OBJECTIVE STATEMENT
66yo yo male BIBA, as per ems "he was hypotensive at the nursing home". pt has pmh schizophrenia, pt appears pale, hypotensive, irritable and tachycardic . pt is c/o chest pain.

## 2023-10-07 NOTE — PATIENT PROFILE ADULT - FALL HARM RISK - HARM RISK INTERVENTIONS

## 2023-10-07 NOTE — ED PROVIDER NOTE - OBJECTIVE STATEMENT
RASH 67-year-old male with history of schizophrenia, dementia, diabetes, brought by ambulance from [facility] for evaluation of altered mental status today.  Transfer paper states patient had chest pain and was briefly unresponsive.  Patient not answering questions.  Just ranting that he does not want any needles.  Will not cooperate with questions or exam.  His PCP is Dr. Murtaza Oneill.

## 2023-10-07 NOTE — CONSULT NOTE ADULT - SUBJECTIVE AND OBJECTIVE BOX
CHIEF COMPLAINT/ REASON FOR VISIT  .. Patient was seen to address the  issue listed under PROBLEM LIST which is located toward bottom of this note     RM STEELE EDIP 04    Allergies    aspartate (Unknown)  clozapine (Unknown)  lithium (Unknown)    Intolerances        PAST MEDICAL & SURGICAL HISTORY:  Schizophrenia      Hypothyroid      Anxiety      DM (diabetes mellitus)      No significant past surgical history          FAMILY HISTORY:  No pertinent family history in first degree relatives        Home Medications:  benztropine 2 mg oral tablet: 1 tab(s) orally 2 times a day (07 Oct 2023 11:32)  Depakote  mg oral tablet, extended release: 3 tab(s) orally once a day (07 Oct 2023 11:32)  levothyroxine 88 mcg (0.088 mg) oral tablet: 1 tab(s) orally once a day (07 Oct 2023 11:32)  losartan 50 mg oral tablet: 1 tab(s) orally once a day (07 Oct 2023 11:32)  magnesium oxide 400 mg oral tablet: 1 tab(s) orally 2 times a day (07 Oct 2023 13:14)  metFORMIN 1000 mg oral tablet: 1 tab(s) orally 2 times a day (07 Oct 2023 11:32)  QUEtiapine 400 mg oral tablet: 1 tab(s) orally once a day (at bedtime) (07 Oct 2023 13:11)  RisperDAL 2 mg oral tablet: 1 tab(s) orally once a day (07 Oct 2023 13:16)  RisperDAL 3 mg oral tablet: 1 tab(s) orally once a day (at bedtime) (07 Oct 2023 13:16)  simvastatin 20 mg oral tablet: 1 tab(s) orally once a day (at bedtime) (07 Oct 2023 13:16)  sodium chloride 1 g oral tablet: orally 2 times a day (07 Oct 2023 13:15)  traZODone 100 mg oral tablet: orally once a day (at bedtime) (07 Oct 2023 13:16)      MEDICATIONS  (STANDING):    MEDICATIONS  (PRN):              Vital Signs Last 24 Hrs  T(C): 36.8 (07 Oct 2023 09:33), Max: 36.8 (07 Oct 2023 09:33)  T(F): 98.2 (07 Oct 2023 09:33), Max: 98.2 (07 Oct 2023 09:33)  HR: 73 (07 Oct 2023 13:22) (73 - 164)  BP: 122/71 (07 Oct 2023 13:22) (65/44 - 122/71)  BP(mean): 69 (07 Oct 2023 11:58) (69 - 69)  RR: 20 (07 Oct 2023 13:22) (20 - 25)  SpO2: 97% (07 Oct 2023 13:22) (95% - 98%)    Parameters below as of 07 Oct 2023 13:22  Patient On (Oxygen Delivery Method): nasal cannula  O2 Flow (L/min): 2                LABS:                        13.0   5.50  )-----------( 237      ( 07 Oct 2023 09:52 )             39.3     10-07    134<L>  |  98  |  22  ----------------------------<  201<H>  5.0   |  24  |  1.63<H>    Ca    8.9      07 Oct 2023 10:45    TPro  6.9  /  Alb  3.0<L>  /  TBili  0.2  /  DBili  x   /  AST  18  /  ALT  19  /  AlkPhos  82  10-07    PT/INR - ( 07 Oct 2023 10:45 )   PT: 11.1 sec;   INR: 1.02 ratio         PTT - ( 07 Oct 2023 10:45 )  PTT:31.1 sec  Urinalysis Basic - ( 07 Oct 2023 10:45 )    Color: x / Appearance: x / SG: x / pH: x  Gluc: 201 mg/dL / Ketone: x  / Bili: x / Urobili: x   Blood: x / Protein: x / Nitrite: x   Leuk Esterase: x / RBC: x / WBC x   Sq Epi: x / Non Sq Epi: x / Bacteria: x            WBC:  WBC Count: 5.50 K/uL (10-07 @ 09:52)      MICROBIOLOGY:  RECENT CULTURES:              PT/INR - ( 07 Oct 2023 10:45 )   PT: 11.1 sec;   INR: 1.02 ratio         PTT - ( 07 Oct 2023 10:45 )  PTT:31.1 sec    Sodium:  Sodium: 134 mmol/L (10-07 @ 10:45)      1.63 mg/dL 10-07 @ 10:45      Hemoglobin:  Hemoglobin: 13.0 g/dL (10-07 @ 09:52)      Platelets: Platelet Count - Automated: 237 K/uL (10-07 @ 09:52)      LIVER FUNCTIONS - ( 07 Oct 2023 10:45 )  Alb: 3.0 g/dL / Pro: 6.9 g/dL / ALK PHOS: 82 U/L / ALT: 19 U/L / AST: 18 U/L / GGT: x             Urinalysis Basic - ( 07 Oct 2023 10:45 )    Color: x / Appearance: x / SG: x / pH: x  Gluc: 201 mg/dL / Ketone: x  / Bili: x / Urobili: x   Blood: x / Protein: x / Nitrite: x   Leuk Esterase: x / RBC: x / WBC x   Sq Epi: x / Non Sq Epi: x / Bacteria: x        RADIOLOGY & ADDITIONAL STUDIES:      MICROBIOLOGY:  RECENT CULTURES:

## 2023-10-07 NOTE — CONSULT NOTE ADULT - ASSESSMENT
66y/o seen at [facility] ER. From [facility]  Patient lying flat, sleeping comfortably  Easily arousable but essentially non-verbal (given Ativan earlier)  History from chart  History dementia, HTN, high cholesterol, A-Fib, NIDDM, GERD, thyroid disease, schizophrenia, anxiety, GERD, parkinson's disease    Admitted for mental status changes  According to the transfer papers, patient may have had some chest pain and ?unresponsive episode  Patient also given a total of 20mg IV Cardizem in the ER    Plan:  - Agree with IV fluids  - Pressors as needed  - Hold Losartan due to hypotension  - Continue Simvastatin-20mg HS  - Check TSH  - Echocardiogram  - Check cardiac enzymes

## 2023-10-07 NOTE — ED ADULT TRIAGE NOTE - CHIEF COMPLAINT QUOTE
as per EMS crew  " He is hypotensive, the staff initially called for chest pain   " as per EMS crew  " He is hypotensive, the staff initially called for chest pain   " PMH Anemia Schizophrenia Anxiety , Pt is awake but restless , pale looking still hypotensive (+) Tachycardia no fever. Pt complains of chest pain but unable to use # scale as per EMS crew  " He is hypotensive, the staff initially called for chest pain   " PMH Anemia Schizophrenia Anxiety , Pt is awake but restless , pale looking still hypotensive (+) Rapid Afib no fever. Pt complains of chest pain but unable to use # scale

## 2023-10-08 ENCOUNTER — INPATIENT (INPATIENT)
Facility: HOSPITAL | Age: 68
LOS: 3 days | Discharge: NURSING, REHAB OR LONG-TERM CARE | DRG: 281 | End: 2023-10-12
Attending: GENERAL PRACTICE | Admitting: GENERAL PRACTICE
Payer: MEDICAID

## 2023-10-08 VITALS
SYSTOLIC BLOOD PRESSURE: 142 MMHG | DIASTOLIC BLOOD PRESSURE: 82 MMHG | WEIGHT: 220.02 LBS | TEMPERATURE: 98 F | HEIGHT: 65 IN | RESPIRATION RATE: 20 BRPM | HEART RATE: 83 BPM | OXYGEN SATURATION: 97 %

## 2023-10-08 VITALS — TEMPERATURE: 97 F

## 2023-10-08 DIAGNOSIS — I48.91 UNSPECIFIED ATRIAL FIBRILLATION: ICD-10-CM

## 2023-10-08 DIAGNOSIS — F20.9 SCHIZOPHRENIA, UNSPECIFIED: ICD-10-CM

## 2023-10-08 DIAGNOSIS — I21.4 NON-ST ELEVATION (NSTEMI) MYOCARDIAL INFARCTION: ICD-10-CM

## 2023-10-08 DIAGNOSIS — E11.9 TYPE 2 DIABETES MELLITUS WITHOUT COMPLICATIONS: ICD-10-CM

## 2023-10-08 DIAGNOSIS — E03.9 HYPOTHYROIDISM, UNSPECIFIED: ICD-10-CM

## 2023-10-08 DIAGNOSIS — E78.5 HYPERLIPIDEMIA, UNSPECIFIED: ICD-10-CM

## 2023-10-08 LAB
A1C WITH ESTIMATED AVERAGE GLUCOSE RESULT: 7.1 % — HIGH (ref 4–5.6)
ALBUMIN SERPL ELPH-MCNC: 3 G/DL — LOW (ref 3.3–5)
ALBUMIN SERPL ELPH-MCNC: 3.5 G/DL — SIGNIFICANT CHANGE UP (ref 3.3–5)
ALP SERPL-CCNC: 85 U/L — SIGNIFICANT CHANGE UP (ref 30–120)
ALP SERPL-CCNC: 95 U/L — SIGNIFICANT CHANGE UP (ref 40–120)
ALT FLD-CCNC: 23 U/L — SIGNIFICANT CHANGE UP (ref 10–45)
ALT FLD-CCNC: 33 U/L — SIGNIFICANT CHANGE UP (ref 10–60)
ANION GAP SERPL CALC-SCNC: 4 MMOL/L — LOW (ref 5–17)
ANION GAP SERPL CALC-SCNC: 9 MMOL/L — SIGNIFICANT CHANGE UP (ref 5–17)
APTT BLD: 120.5 SEC — CRITICAL HIGH (ref 24.5–35.6)
APTT BLD: 29 SEC — SIGNIFICANT CHANGE UP (ref 24.5–35.6)
APTT BLD: 37.1 SEC — HIGH (ref 24.5–35.6)
APTT BLD: 58.2 SEC — HIGH (ref 24.5–35.6)
AST SERPL-CCNC: 197 U/L — HIGH (ref 10–40)
AST SERPL-CCNC: 98 U/L — HIGH (ref 10–40)
BASOPHILS # BLD AUTO: 0.03 K/UL — SIGNIFICANT CHANGE UP (ref 0–0.2)
BASOPHILS NFR BLD AUTO: 0.6 % — SIGNIFICANT CHANGE UP (ref 0–2)
BILIRUB SERPL-MCNC: 0.1 MG/DL — LOW (ref 0.2–1.2)
BILIRUB SERPL-MCNC: 0.2 MG/DL — SIGNIFICANT CHANGE UP (ref 0.2–1.2)
BUN SERPL-MCNC: 14 MG/DL — SIGNIFICANT CHANGE UP (ref 7–23)
BUN SERPL-MCNC: 17 MG/DL — SIGNIFICANT CHANGE UP (ref 7–23)
CALCIUM SERPL-MCNC: 9.1 MG/DL — SIGNIFICANT CHANGE UP (ref 8.4–10.5)
CALCIUM SERPL-MCNC: 9.2 MG/DL — SIGNIFICANT CHANGE UP (ref 8.4–10.5)
CHLORIDE SERPL-SCNC: 101 MMOL/L — SIGNIFICANT CHANGE UP (ref 96–108)
CHLORIDE SERPL-SCNC: 101 MMOL/L — SIGNIFICANT CHANGE UP (ref 96–108)
CK SERPL-CCNC: 622 U/L — HIGH (ref 30–200)
CK SERPL-CCNC: 951 U/L — HIGH (ref 39–308)
CO2 SERPL-SCNC: 26 MMOL/L — SIGNIFICANT CHANGE UP (ref 22–31)
CO2 SERPL-SCNC: 28 MMOL/L — SIGNIFICANT CHANGE UP (ref 22–31)
CREAT SERPL-MCNC: 0.86 MG/DL — SIGNIFICANT CHANGE UP (ref 0.5–1.3)
CREAT SERPL-MCNC: 0.87 MG/DL — SIGNIFICANT CHANGE UP (ref 0.5–1.3)
EGFR: 95 ML/MIN/1.73M2 — SIGNIFICANT CHANGE UP
EGFR: 95 ML/MIN/1.73M2 — SIGNIFICANT CHANGE UP
EOSINOPHIL # BLD AUTO: 0.1 K/UL — SIGNIFICANT CHANGE UP (ref 0–0.5)
EOSINOPHIL NFR BLD AUTO: 2 % — SIGNIFICANT CHANGE UP (ref 0–6)
ESTIMATED AVERAGE GLUCOSE: 157 MG/DL — HIGH (ref 68–114)
GLUCOSE BLDC GLUCOMTR-MCNC: 160 MG/DL — HIGH (ref 70–99)
GLUCOSE SERPL-MCNC: 110 MG/DL — HIGH (ref 70–99)
GLUCOSE SERPL-MCNC: 166 MG/DL — HIGH (ref 70–99)
HCT VFR BLD CALC: 34.6 % — LOW (ref 39–50)
HCT VFR BLD CALC: 35.1 % — LOW (ref 39–50)
HCT VFR BLD CALC: 36.2 % — LOW (ref 39–50)
HCV AB S/CO SERPL IA: 0.24 S/CO — SIGNIFICANT CHANGE UP (ref 0–0.99)
HCV AB SERPL-IMP: SIGNIFICANT CHANGE UP
HGB BLD-MCNC: 11.8 G/DL — LOW (ref 13–17)
HGB BLD-MCNC: 11.8 G/DL — LOW (ref 13–17)
HGB BLD-MCNC: 11.9 G/DL — LOW (ref 13–17)
IMM GRANULOCYTES NFR BLD AUTO: 0.8 % — SIGNIFICANT CHANGE UP (ref 0–0.9)
INR BLD: 0.99 RATIO — SIGNIFICANT CHANGE UP (ref 0.85–1.18)
LACTATE SERPL-SCNC: 1.1 MMOL/L — SIGNIFICANT CHANGE UP (ref 0.7–2)
LDH SERPL L TO P-CCNC: 725 U/L — HIGH (ref 50–242)
LYMPHOCYTES # BLD AUTO: 0.99 K/UL — LOW (ref 1–3.3)
LYMPHOCYTES # BLD AUTO: 20.3 % — SIGNIFICANT CHANGE UP (ref 13–44)
MCHC RBC-ENTMCNC: 29.9 PG — SIGNIFICANT CHANGE UP (ref 27–34)
MCHC RBC-ENTMCNC: 30.8 PG — SIGNIFICANT CHANGE UP (ref 27–34)
MCHC RBC-ENTMCNC: 31 PG — SIGNIFICANT CHANGE UP (ref 27–34)
MCHC RBC-ENTMCNC: 32.6 GM/DL — SIGNIFICANT CHANGE UP (ref 32–36)
MCHC RBC-ENTMCNC: 33.9 GM/DL — SIGNIFICANT CHANGE UP (ref 32–36)
MCHC RBC-ENTMCNC: 34.1 GM/DL — SIGNIFICANT CHANGE UP (ref 32–36)
MCV RBC AUTO: 90.3 FL — SIGNIFICANT CHANGE UP (ref 80–100)
MCV RBC AUTO: 91.4 FL — SIGNIFICANT CHANGE UP (ref 80–100)
MCV RBC AUTO: 91.9 FL — SIGNIFICANT CHANGE UP (ref 80–100)
MONOCYTES # BLD AUTO: 0.79 K/UL — SIGNIFICANT CHANGE UP (ref 0–0.9)
MONOCYTES NFR BLD AUTO: 16.2 % — HIGH (ref 2–14)
NEUTROPHILS # BLD AUTO: 2.93 K/UL — SIGNIFICANT CHANGE UP (ref 1.8–7.4)
NEUTROPHILS NFR BLD AUTO: 60.1 % — SIGNIFICANT CHANGE UP (ref 43–77)
NRBC # BLD: 0 /100 WBCS — SIGNIFICANT CHANGE UP (ref 0–0)
NT-PROBNP SERPL-SCNC: 2863 PG/ML — HIGH (ref 0–300)
NT-PROBNP SERPL-SCNC: 3090 PG/ML — HIGH (ref 0–125)
PHOSPHATE SERPL-MCNC: 2.8 MG/DL — SIGNIFICANT CHANGE UP (ref 2.5–4.5)
PLATELET # BLD AUTO: 210 K/UL — SIGNIFICANT CHANGE UP (ref 150–400)
PLATELET # BLD AUTO: 215 K/UL — SIGNIFICANT CHANGE UP (ref 150–400)
PLATELET # BLD AUTO: 228 K/UL — SIGNIFICANT CHANGE UP (ref 150–400)
POTASSIUM SERPL-MCNC: 4.5 MMOL/L — SIGNIFICANT CHANGE UP (ref 3.5–5.3)
POTASSIUM SERPL-MCNC: 4.6 MMOL/L — SIGNIFICANT CHANGE UP (ref 3.5–5.3)
POTASSIUM SERPL-SCNC: 4.5 MMOL/L — SIGNIFICANT CHANGE UP (ref 3.5–5.3)
POTASSIUM SERPL-SCNC: 4.6 MMOL/L — SIGNIFICANT CHANGE UP (ref 3.5–5.3)
PROCALCITONIN SERPL-MCNC: 0.05 NG/ML — SIGNIFICANT CHANGE UP (ref 0.02–0.1)
PROCALCITONIN SERPL-MCNC: 0.07 NG/ML — SIGNIFICANT CHANGE UP (ref 0.02–0.1)
PROT SERPL-MCNC: 6.7 G/DL — SIGNIFICANT CHANGE UP (ref 6–8.3)
PROT SERPL-MCNC: 7 G/DL — SIGNIFICANT CHANGE UP (ref 6–8.3)
PROTHROM AB SERPL-ACNC: 10.4 SEC — SIGNIFICANT CHANGE UP (ref 9.5–13)
RBC # BLD: 3.83 M/UL — LOW (ref 4.2–5.8)
RBC # BLD: 3.84 M/UL — LOW (ref 4.2–5.8)
RBC # BLD: 3.94 M/UL — LOW (ref 4.2–5.8)
RBC # FLD: 13.5 % — SIGNIFICANT CHANGE UP (ref 10.3–14.5)
RBC # FLD: 13.6 % — SIGNIFICANT CHANGE UP (ref 10.3–14.5)
RBC # FLD: 13.7 % — SIGNIFICANT CHANGE UP (ref 10.3–14.5)
SODIUM SERPL-SCNC: 133 MMOL/L — LOW (ref 135–145)
SODIUM SERPL-SCNC: 136 MMOL/L — SIGNIFICANT CHANGE UP (ref 135–145)
TROPONIN I, HIGH SENSITIVITY RESULT: HIGH NG/L
TROPONIN T, HIGH SENSITIVITY RESULT: 3298 NG/L — HIGH (ref 0–51)
TROPONIN T, HIGH SENSITIVITY RESULT: 3403 NG/L — HIGH (ref 0–51)
WBC # BLD: 4.88 K/UL — SIGNIFICANT CHANGE UP (ref 3.8–10.5)
WBC # BLD: 6.09 K/UL — SIGNIFICANT CHANGE UP (ref 3.8–10.5)
WBC # BLD: 6.32 K/UL — SIGNIFICANT CHANGE UP (ref 3.8–10.5)
WBC # FLD AUTO: 4.88 K/UL — SIGNIFICANT CHANGE UP (ref 3.8–10.5)
WBC # FLD AUTO: 6.09 K/UL — SIGNIFICANT CHANGE UP (ref 3.8–10.5)
WBC # FLD AUTO: 6.32 K/UL — SIGNIFICANT CHANGE UP (ref 3.8–10.5)

## 2023-10-08 PROCEDURE — 85730 THROMBOPLASTIN TIME PARTIAL: CPT

## 2023-10-08 PROCEDURE — 80307 DRUG TEST PRSMV CHEM ANLYZR: CPT

## 2023-10-08 PROCEDURE — 82550 ASSAY OF CK (CPK): CPT

## 2023-10-08 PROCEDURE — 85027 COMPLETE CBC AUTOMATED: CPT

## 2023-10-08 PROCEDURE — 87086 URINE CULTURE/COLONY COUNT: CPT

## 2023-10-08 PROCEDURE — 99291 CRITICAL CARE FIRST HOUR: CPT

## 2023-10-08 PROCEDURE — 93005 ELECTROCARDIOGRAM TRACING: CPT

## 2023-10-08 PROCEDURE — 83605 ASSAY OF LACTIC ACID: CPT

## 2023-10-08 PROCEDURE — 82962 GLUCOSE BLOOD TEST: CPT

## 2023-10-08 PROCEDURE — 0225U NFCT DS DNA&RNA 21 SARSCOV2: CPT

## 2023-10-08 PROCEDURE — 81003 URINALYSIS AUTO W/O SCOPE: CPT

## 2023-10-08 PROCEDURE — 36415 COLL VENOUS BLD VENIPUNCTURE: CPT

## 2023-10-08 PROCEDURE — 83615 LACTATE (LD) (LDH) ENZYME: CPT

## 2023-10-08 PROCEDURE — 99233 SBSQ HOSP IP/OBS HIGH 50: CPT

## 2023-10-08 PROCEDURE — 96375 TX/PRO/DX INJ NEW DRUG ADDON: CPT

## 2023-10-08 PROCEDURE — 84100 ASSAY OF PHOSPHORUS: CPT

## 2023-10-08 PROCEDURE — 83036 HEMOGLOBIN GLYCOSYLATED A1C: CPT

## 2023-10-08 PROCEDURE — 96376 TX/PRO/DX INJ SAME DRUG ADON: CPT

## 2023-10-08 PROCEDURE — 71045 X-RAY EXAM CHEST 1 VIEW: CPT

## 2023-10-08 PROCEDURE — 85025 COMPLETE CBC W/AUTO DIFF WBC: CPT

## 2023-10-08 PROCEDURE — 84484 ASSAY OF TROPONIN QUANT: CPT

## 2023-10-08 PROCEDURE — 87040 BLOOD CULTURE FOR BACTERIA: CPT

## 2023-10-08 PROCEDURE — 96374 THER/PROPH/DIAG INJ IV PUSH: CPT

## 2023-10-08 PROCEDURE — 85610 PROTHROMBIN TIME: CPT

## 2023-10-08 PROCEDURE — 85379 FIBRIN DEGRADATION QUANT: CPT

## 2023-10-08 PROCEDURE — 99285 EMERGENCY DEPT VISIT HI MDM: CPT

## 2023-10-08 PROCEDURE — 70450 CT HEAD/BRAIN W/O DYE: CPT | Mod: MA

## 2023-10-08 PROCEDURE — 84145 PROCALCITONIN (PCT): CPT

## 2023-10-08 PROCEDURE — 83880 ASSAY OF NATRIURETIC PEPTIDE: CPT

## 2023-10-08 PROCEDURE — 86803 HEPATITIS C AB TEST: CPT

## 2023-10-08 PROCEDURE — 99255 IP/OBS CONSLTJ NEW/EST HI 80: CPT

## 2023-10-08 PROCEDURE — 80164 ASSAY DIPROPYLACETIC ACD TOT: CPT

## 2023-10-08 PROCEDURE — 80053 COMPREHEN METABOLIC PANEL: CPT

## 2023-10-08 RX ORDER — HEPARIN SODIUM 5000 [USP'U]/ML
700 INJECTION INTRAVENOUS; SUBCUTANEOUS
Qty: 25000 | Refills: 0 | Status: DISCONTINUED | OUTPATIENT
Start: 2023-10-08 | End: 2023-10-10

## 2023-10-08 RX ORDER — DEXTROSE 50 % IN WATER 50 %
15 SYRINGE (ML) INTRAVENOUS ONCE
Refills: 0 | Status: DISCONTINUED | OUTPATIENT
Start: 2023-10-08 | End: 2023-10-12

## 2023-10-08 RX ORDER — DEXTROSE 50 % IN WATER 50 %
25 SYRINGE (ML) INTRAVENOUS ONCE
Refills: 0 | Status: DISCONTINUED | OUTPATIENT
Start: 2023-10-08 | End: 2023-10-12

## 2023-10-08 RX ORDER — CLOPIDOGREL BISULFATE 75 MG/1
75 TABLET, FILM COATED ORAL DAILY
Refills: 0 | Status: DISCONTINUED | OUTPATIENT
Start: 2023-10-09 | End: 2023-10-10

## 2023-10-08 RX ORDER — CLOPIDOGREL BISULFATE 75 MG/1
600 TABLET, FILM COATED ORAL ONCE
Refills: 0 | Status: COMPLETED | OUTPATIENT
Start: 2023-10-08 | End: 2023-10-09

## 2023-10-08 RX ORDER — LEVOTHYROXINE SODIUM 125 MCG
88 TABLET ORAL DAILY
Refills: 0 | Status: DISCONTINUED | OUTPATIENT
Start: 2023-10-08 | End: 2023-10-12

## 2023-10-08 RX ORDER — ASPIRIN/CALCIUM CARB/MAGNESIUM 324 MG
81 TABLET ORAL DAILY
Refills: 0 | Status: DISCONTINUED | OUTPATIENT
Start: 2023-10-08 | End: 2023-10-11

## 2023-10-08 RX ORDER — SODIUM CHLORIDE 9 MG/ML
1000 INJECTION, SOLUTION INTRAVENOUS
Refills: 0 | Status: DISCONTINUED | OUTPATIENT
Start: 2023-10-08 | End: 2023-10-08

## 2023-10-08 RX ORDER — TRAZODONE HCL 50 MG
100 TABLET ORAL AT BEDTIME
Refills: 0 | Status: DISCONTINUED | OUTPATIENT
Start: 2023-10-08 | End: 2023-10-12

## 2023-10-08 RX ORDER — HEPARIN SODIUM 5000 [USP'U]/ML
5600 INJECTION INTRAVENOUS; SUBCUTANEOUS EVERY 6 HOURS
Refills: 0 | Status: DISCONTINUED | OUTPATIENT
Start: 2023-10-08 | End: 2023-10-10

## 2023-10-08 RX ORDER — HEPARIN SODIUM 5000 [USP'U]/ML
1150 INJECTION INTRAVENOUS; SUBCUTANEOUS
Qty: 25000 | Refills: 0 | Status: DISCONTINUED | OUTPATIENT
Start: 2023-10-08 | End: 2023-10-08

## 2023-10-08 RX ORDER — RISPERIDONE 4 MG/1
3 TABLET ORAL AT BEDTIME
Refills: 0 | Status: DISCONTINUED | OUTPATIENT
Start: 2023-10-08 | End: 2023-10-12

## 2023-10-08 RX ORDER — HEPARIN SODIUM 5000 [USP'U]/ML
INJECTION INTRAVENOUS; SUBCUTANEOUS
Qty: 25000 | Refills: 0 | Status: DISCONTINUED | OUTPATIENT
Start: 2023-10-08 | End: 2023-10-08

## 2023-10-08 RX ORDER — ACETAMINOPHEN 500 MG
650 TABLET ORAL EVERY 6 HOURS
Refills: 0 | Status: DISCONTINUED | OUTPATIENT
Start: 2023-10-08 | End: 2023-10-12

## 2023-10-08 RX ORDER — SODIUM CHLORIDE 9 MG/ML
1000 INJECTION, SOLUTION INTRAVENOUS
Refills: 0 | Status: DISCONTINUED | OUTPATIENT
Start: 2023-10-08 | End: 2023-10-12

## 2023-10-08 RX ORDER — LANOLIN ALCOHOL/MO/W.PET/CERES
3 CREAM (GRAM) TOPICAL AT BEDTIME
Refills: 0 | Status: DISCONTINUED | OUTPATIENT
Start: 2023-10-08 | End: 2023-10-12

## 2023-10-08 RX ORDER — GLUCAGON INJECTION, SOLUTION 0.5 MG/.1ML
1 INJECTION, SOLUTION SUBCUTANEOUS ONCE
Refills: 0 | Status: DISCONTINUED | OUTPATIENT
Start: 2023-10-08 | End: 2023-10-12

## 2023-10-08 RX ORDER — INSULIN LISPRO 100/ML
VIAL (ML) SUBCUTANEOUS
Refills: 0 | Status: DISCONTINUED | OUTPATIENT
Start: 2023-10-08 | End: 2023-10-12

## 2023-10-08 RX ORDER — HEPARIN SODIUM 5000 [USP'U]/ML
5600 INJECTION INTRAVENOUS; SUBCUTANEOUS EVERY 6 HOURS
Refills: 0 | Status: DISCONTINUED | OUTPATIENT
Start: 2023-10-08 | End: 2023-10-08

## 2023-10-08 RX ORDER — DIVALPROEX SODIUM 500 MG/1
1500 TABLET, DELAYED RELEASE ORAL DAILY
Refills: 0 | Status: DISCONTINUED | OUTPATIENT
Start: 2023-10-08 | End: 2023-10-12

## 2023-10-08 RX ORDER — METOPROLOL TARTRATE 50 MG
25 TABLET ORAL EVERY 12 HOURS
Refills: 0 | Status: DISCONTINUED | OUTPATIENT
Start: 2023-10-08 | End: 2023-10-12

## 2023-10-08 RX ORDER — RISPERIDONE 4 MG/1
2 TABLET ORAL DAILY
Refills: 0 | Status: DISCONTINUED | OUTPATIENT
Start: 2023-10-08 | End: 2023-10-12

## 2023-10-08 RX ORDER — SIMVASTATIN 20 MG/1
20 TABLET, FILM COATED ORAL AT BEDTIME
Refills: 0 | Status: DISCONTINUED | OUTPATIENT
Start: 2023-10-08 | End: 2023-10-09

## 2023-10-08 RX ORDER — LOSARTAN POTASSIUM 100 MG/1
50 TABLET, FILM COATED ORAL DAILY
Refills: 0 | Status: DISCONTINUED | OUTPATIENT
Start: 2023-10-08 | End: 2023-10-12

## 2023-10-08 RX ORDER — DEXTROSE 50 % IN WATER 50 %
12.5 SYRINGE (ML) INTRAVENOUS ONCE
Refills: 0 | Status: DISCONTINUED | OUTPATIENT
Start: 2023-10-08 | End: 2023-10-12

## 2023-10-08 RX ORDER — MORPHINE SULFATE 50 MG/1
1 CAPSULE, EXTENDED RELEASE ORAL EVERY 4 HOURS
Refills: 0 | Status: DISCONTINUED | OUTPATIENT
Start: 2023-10-08 | End: 2023-10-08

## 2023-10-08 RX ORDER — QUETIAPINE FUMARATE 200 MG/1
400 TABLET, FILM COATED ORAL AT BEDTIME
Refills: 0 | Status: DISCONTINUED | OUTPATIENT
Start: 2023-10-09 | End: 2023-10-12

## 2023-10-08 RX ORDER — QUETIAPINE FUMARATE 200 MG/1
400 TABLET, FILM COATED ORAL ONCE
Refills: 0 | Status: COMPLETED | OUTPATIENT
Start: 2023-10-08 | End: 2023-10-08

## 2023-10-08 RX ORDER — ONDANSETRON 8 MG/1
4 TABLET, FILM COATED ORAL EVERY 8 HOURS
Refills: 0 | Status: DISCONTINUED | OUTPATIENT
Start: 2023-10-08 | End: 2023-10-12

## 2023-10-08 RX ORDER — BENZTROPINE MESYLATE 1 MG
2 TABLET ORAL
Refills: 0 | Status: DISCONTINUED | OUTPATIENT
Start: 2023-10-08 | End: 2023-10-12

## 2023-10-08 RX ADMIN — HEPARIN SODIUM 5600 UNIT(S): 5000 INJECTION INTRAVENOUS; SUBCUTANEOUS at 22:54

## 2023-10-08 RX ADMIN — HEPARIN SODIUM 900 UNIT(S)/HR: 5000 INJECTION INTRAVENOUS; SUBCUTANEOUS at 04:34

## 2023-10-08 RX ADMIN — HEPARIN SODIUM 1150 UNIT(S)/HR: 5000 INJECTION INTRAVENOUS; SUBCUTANEOUS at 14:15

## 2023-10-08 RX ADMIN — Medication 0.5 MILLIGRAM(S): at 12:59

## 2023-10-08 RX ADMIN — HEPARIN SODIUM 700 UNIT(S)/HR: 5000 INJECTION INTRAVENOUS; SUBCUTANEOUS at 19:24

## 2023-10-08 RX ADMIN — Medication 0.5 MILLIGRAM(S): at 07:48

## 2023-10-08 RX ADMIN — Medication 81 MILLIGRAM(S): at 11:31

## 2023-10-08 RX ADMIN — HEPARIN SODIUM 1000 UNIT(S)/HR: 5000 INJECTION INTRAVENOUS; SUBCUTANEOUS at 16:21

## 2023-10-08 RX ADMIN — Medication 1: at 11:47

## 2023-10-08 RX ADMIN — CEFTRIAXONE 100 MILLIGRAM(S): 500 INJECTION, POWDER, FOR SOLUTION INTRAMUSCULAR; INTRAVENOUS at 11:31

## 2023-10-08 RX ADMIN — Medication 88 MICROGRAM(S): at 06:34

## 2023-10-08 RX ADMIN — SODIUM CHLORIDE 125 MILLILITER(S): 9 INJECTION, SOLUTION INTRAVENOUS at 23:03

## 2023-10-08 RX ADMIN — SODIUM CHLORIDE 125 MILLILITER(S): 9 INJECTION, SOLUTION INTRAVENOUS at 11:31

## 2023-10-08 RX ADMIN — DIVALPROEX SODIUM 1500 MILLIGRAM(S): 500 TABLET, DELAYED RELEASE ORAL at 11:41

## 2023-10-08 RX ADMIN — HEPARIN SODIUM 1150 UNIT(S)/HR: 5000 INJECTION INTRAVENOUS; SUBCUTANEOUS at 11:39

## 2023-10-08 RX ADMIN — SODIUM CHLORIDE 125 MILLILITER(S): 9 INJECTION, SOLUTION INTRAVENOUS at 01:19

## 2023-10-08 RX ADMIN — RISPERIDONE 2 MILLIGRAM(S): 4 TABLET ORAL at 11:41

## 2023-10-08 RX ADMIN — HEPARIN SODIUM 900 UNIT(S)/HR: 5000 INJECTION INTRAVENOUS; SUBCUTANEOUS at 07:20

## 2023-10-08 RX ADMIN — HEPARIN SODIUM 1000 UNIT(S)/HR: 5000 INJECTION INTRAVENOUS; SUBCUTANEOUS at 22:54

## 2023-10-08 RX ADMIN — HEPARIN SODIUM 700 UNIT(S)/HR: 5000 INJECTION INTRAVENOUS; SUBCUTANEOUS at 18:51

## 2023-10-08 RX ADMIN — Medication 2 MILLIGRAM(S): at 06:34

## 2023-10-08 RX ADMIN — Medication 25 MILLIGRAM(S): at 18:55

## 2023-10-08 NOTE — ED ADULT TRIAGE NOTE - ADDITIONAL SAFETY/BANDS...
The right coronary artery was selectively engaged and injected. A Catheter Jr Cor 5 Crv 4fr .042in 100cm Infnt was used. Multiple views of the injected vessel were taken.  Additional Safety/Bands:

## 2023-10-08 NOTE — ED ADULT NURSE NOTE - NS ED NURSE LEVEL OF CONSCIOUSNESS SPEECH
Recommendation Preamble: The following recommendations were made during the visit:  do not use razors in the shower make sure it’s dry and clean before every use. Detail Level: Zone Inappropriate speech

## 2023-10-08 NOTE — CONSULT NOTE ADULT - SUBJECTIVE AND OBJECTIVE BOX
Date/Time Patient Seen:  		  Referring MD:   Data Reviewed	       Patient is a 67y old  Male who presents with a chief complaint of     Subjective/HPI   67-year-old male with history of schizophrenia, diabetes, hypothyrodism, hld, tobacco dependence brought by ambulance from [facility] for evaluation of altered mental status today.  Transfer paper states patient had chest pain and was briefly unresponsive.  Patient not answering questions.  Just ranting that he does not want any needles.  Will not cooperate with questions or exam.  His PCP is Dr. Murtaza Oneill.  PAST MEDICAL & SURGICAL HISTORY:  Schizophrenia    Hypothyroid    Anxiety    DM (diabetes mellitus)    No significant past surgical history    PAST MEDICAL HISTORY:  Anxiety     DM (diabetes mellitus)     Hypothyroid     Schizophrenia.     PAST SURGICAL HISTORY:  No significant past surgical history.     FAMILY HISTORY:  No pertinent family history in first degree relatives. No pertinent family history of: asthma.     Social History:  · Substance use	Unable to obtain     Tobacco Screening:  · Core Measure Site	No    Risk Assessment:    Present on Admission:  Deep Venous Thrombosis	no  Pulmonary Embolus	no     HIV Screening:  · In accordance with [place] State law, we offer every patient who comes to our ED an HIV test. Would you like to be tested today?	Unable to answer due to medical condition/unresponsive/etc...        Medication list         MEDICATIONS  (STANDING):  aspirin enteric coated 81 milliGRAM(s) Oral daily  benztropine 2 milliGRAM(s) Oral two times a day  cefTRIAXone   IVPB 1000 milliGRAM(s) IV Intermittent every 24 hours  dextrose 5%. 1000 milliLiter(s) (50 mL/Hr) IV Continuous <Continuous>  dextrose 50% Injectable 25 Gram(s) IV Push once  divalproex ER 1500 milliGRAM(s) Oral daily  glucagon  Injectable 1 milliGRAM(s) IntraMuscular once  heparin  Infusion.  Unit(s)/Hr (10 mL/Hr) IV Continuous <Continuous>  insulin lispro (ADMELOG) corrective regimen sliding scale   SubCutaneous three times a day before meals  lactated ringers. 1000 milliLiter(s) (125 mL/Hr) IV Continuous <Continuous>  lactated ringers. 1000 milliLiter(s) (125 mL/Hr) IV Continuous <Continuous>  levothyroxine 88 MICROGram(s) Oral daily  QUEtiapine 400 milliGRAM(s) Oral at bedtime  risperiDONE   Tablet 3 milliGRAM(s) Oral at bedtime  risperiDONE   Tablet 2 milliGRAM(s) Oral daily  simvastatin 20 milliGRAM(s) Oral at bedtime  traZODone 100 milliGRAM(s) Oral at bedtime    MEDICATIONS  (PRN):  acetaminophen     Tablet .. 650 milliGRAM(s) Oral every 6 hours PRN Temp greater or equal to 38C (100.4F), Mild Pain (1 - 3)  aluminum hydroxide/magnesium hydroxide/simethicone Suspension 30 milliLiter(s) Oral every 4 hours PRN Dyspepsia  dextrose Oral Gel 15 Gram(s) Oral once PRN Blood Glucose LESS THAN 70 milliGRAM(s)/deciliter  heparin   Injectable 5000 Unit(s) IV Push every 6 hours PRN For aPTT less than 40  melatonin 3 milliGRAM(s) Oral at bedtime PRN Insomnia  ondansetron Injectable 4 milliGRAM(s) IV Push every 8 hours PRN Nausea and/or Vomiting         Vitals log        ICU Vital Signs Last 24 Hrs  T(C): 36.3 (08 Oct 2023 04:04), Max: 36.8 (07 Oct 2023 09:33)  T(F): 97.4 (08 Oct 2023 04:04), Max: 98.2 (07 Oct 2023 09:33)  HR: 80 (08 Oct 2023 04:00) (73 - 164)  BP: 95/57 (08 Oct 2023 04:00) (65/44 - 167/92)  BP(mean): 70 (08 Oct 2023 04:00) (69 - 106)  ABP: --  ABP(mean): --  RR: 17 (08 Oct 2023 04:00) (17 - 25)  SpO2: 93% (08 Oct 2023 04:00) (92% - 98%)    O2 Parameters below as of 08 Oct 2023 04:00  Patient On (Oxygen Delivery Method): room air                 Input and Output:  I&O's Detail    07 Oct 2023 07:01  -  08 Oct 2023 06:03  --------------------------------------------------------  IN:    Heparin Infusion: 81 mL    Lactated Ringers: 1125 mL  Total IN: 1206 mL    OUT:    Voided (mL): 1800 mL  Total OUT: 1800 mL    Total NET: -594 mL          Lab Data                        11.8   8.60  )-----------( 239      ( 07 Oct 2023 21:59 )             35.6     10-07    134<L>  |  98  |  22  ----------------------------<  201<H>  5.0   |  24  |  1.63<H>    Ca    8.9      07 Oct 2023 10:45    TPro  6.9  /  Alb  3.0<L>  /  TBili  0.2  /  DBili  x   /  AST  18  /  ALT  19  /  AlkPhos  82  10-07      CARDIAC MARKERS ( 07 Oct 2023 14:00 )  x     / x     / 90 U/L / x     / x            Review of Systems	      Objective     Physical Examination        Pertinent Lab findings & Imaging      Michele:  NO   Adequate UO     I&O's Detail    07 Oct 2023 07:01  -  08 Oct 2023 06:03  --------------------------------------------------------  IN:    Heparin Infusion: 81 mL    Lactated Ringers: 1125 mL  Total IN: 1206 mL    OUT:    Voided (mL): 1800 mL  Total OUT: 1800 mL    Total NET: -594 mL               Discussed with:     Cultures:	        Radiology      ACC: [id number] EXAM:  CT BRAIN   ORDERED BY: CHANO HENLEY     PROCEDURE DATE:  10/07/2023          INTERPRETATION:  Clinical Indication: Altered mental status    5mm axial sections of the brain were obtained from base to vertex,   without the intravenous administration of contrast material. Coronal and   sagittal computer generated reconstructed views are available.    No prior brain imaging is available for comparison.      The ventricles and sulci are prominent consistent age appropriate   involutional changes. Small vessel white matter ischemic changes are   noted. There is no hemorrhage, mass, or shift of midline structures. Bone   window examination is unremarkable.    IMPRESSION: Age-appropriate involutional and ischemic gliotic changes. No   hemorrhage.    --- End of Report ---            HEAVENLY ORTIZ MD; Attending Radiologist  This document has been electronically signed. Oct  7 2023 11:20AM                         Date/Time Patient Seen:  		  Referring MD:   Data Reviewed	       Patient is a 67y old  Male who presents with a chief complaint of     Subjective/HPI   67-year-old male with history of schizophrenia, diabetes, hypothyrodism, hld, tobacco dependence brought by ambulance from [facility] for evaluation of altered mental status today.  Transfer paper states patient had chest pain and was briefly unresponsive.  Patient not answering questions.  Just ranting that he does not want any needles.  Will not cooperate with questions or exam.  His PCP is Dr. Murtaza Oneill.  PAST MEDICAL & SURGICAL HISTORY:  Schizophrenia    Hypothyroid    Anxiety    DM (diabetes mellitus)    No significant past surgical history    PAST MEDICAL HISTORY:  Anxiety     DM (diabetes mellitus)     Hypothyroid     Schizophrenia.     PAST SURGICAL HISTORY:  No significant past surgical history.     FAMILY HISTORY:  No pertinent family history in first degree relatives. No pertinent family history of: asthma.     Social History:  · Substance use	Unable to obtain     Tobacco Screening:  · Core Measure Site	No    Risk Assessment:    Present on Admission:  Deep Venous Thrombosis	no  Pulmonary Embolus	no     HIV Screening:  · In accordance with [place] law, we offer every patient who comes to our ED an HIV test. Would you like to be tested today?	Unable to answer due to medical condition/unresponsive/etc...        Medication list         MEDICATIONS  (STANDING):  aspirin enteric coated 81 milliGRAM(s) Oral daily  benztropine 2 milliGRAM(s) Oral two times a day  cefTRIAXone   IVPB 1000 milliGRAM(s) IV Intermittent every 24 hours  dextrose 5%. 1000 milliLiter(s) (50 mL/Hr) IV Continuous <Continuous>  dextrose 50% Injectable 25 Gram(s) IV Push once  divalproex ER 1500 milliGRAM(s) Oral daily  glucagon  Injectable 1 milliGRAM(s) IntraMuscular once  heparin  Infusion.  Unit(s)/Hr (10 mL/Hr) IV Continuous <Continuous>  insulin lispro (ADMELOG) corrective regimen sliding scale   SubCutaneous three times a day before meals  lactated ringers. 1000 milliLiter(s) (125 mL/Hr) IV Continuous <Continuous>  lactated ringers. 1000 milliLiter(s) (125 mL/Hr) IV Continuous <Continuous>  levothyroxine 88 MICROGram(s) Oral daily  QUEtiapine 400 milliGRAM(s) Oral at bedtime  risperiDONE   Tablet 3 milliGRAM(s) Oral at bedtime  risperiDONE   Tablet 2 milliGRAM(s) Oral daily  simvastatin 20 milliGRAM(s) Oral at bedtime  traZODone 100 milliGRAM(s) Oral at bedtime    MEDICATIONS  (PRN):  acetaminophen     Tablet .. 650 milliGRAM(s) Oral every 6 hours PRN Temp greater or equal to 38C (100.4F), Mild Pain (1 - 3)  aluminum hydroxide/magnesium hydroxide/simethicone Suspension 30 milliLiter(s) Oral every 4 hours PRN Dyspepsia  dextrose Oral Gel 15 Gram(s) Oral once PRN Blood Glucose LESS THAN 70 milliGRAM(s)/deciliter  heparin   Injectable 5000 Unit(s) IV Push every 6 hours PRN For aPTT less than 40  melatonin 3 milliGRAM(s) Oral at bedtime PRN Insomnia  ondansetron Injectable 4 milliGRAM(s) IV Push every 8 hours PRN Nausea and/or Vomiting         Vitals log        ICU Vital Signs Last 24 Hrs  T(C): 36.3 (08 Oct 2023 04:04), Max: 36.8 (07 Oct 2023 09:33)  T(F): 97.4 (08 Oct 2023 04:04), Max: 98.2 (07 Oct 2023 09:33)  HR: 80 (08 Oct 2023 04:00) (73 - 164)  BP: 95/57 (08 Oct 2023 04:00) (65/44 - 167/92)  BP(mean): 70 (08 Oct 2023 04:00) (69 - 106)  ABP: --  ABP(mean): --  RR: 17 (08 Oct 2023 04:00) (17 - 25)  SpO2: 93% (08 Oct 2023 04:00) (92% - 98%)    O2 Parameters below as of 08 Oct 2023 04:00  Patient On (Oxygen Delivery Method): room air                 Input and Output:  I&O's Detail    07 Oct 2023 07:01  -  08 Oct 2023 06:03  --------------------------------------------------------  IN:    Heparin Infusion: 81 mL    Lactated Ringers: 1125 mL  Total IN: 1206 mL    OUT:    Voided (mL): 1800 mL  Total OUT: 1800 mL    Total NET: -594 mL          Lab Data                        11.8   8.60  )-----------( 239      ( 07 Oct 2023 21:59 )             35.6     10-07    134<L>  |  98  |  22  ----------------------------<  201<H>  5.0   |  24  |  1.63<H>    Ca    8.9      07 Oct 2023 10:45    TPro  6.9  /  Alb  3.0<L>  /  TBili  0.2  /  DBili  x   /  AST  18  /  ALT  19  /  AlkPhos  82  10-07      CARDIAC MARKERS ( 07 Oct 2023 14:00 )  x     / x     / 90 U/L / x     / x            Review of Systems	  confused    Objective     Physical Examination    heart s1s2  lung dec BS  headnc      Pertinent Lab findings & Imaging      Michele:  NO   Adequate UO     I&O's Detail    07 Oct 2023 07:01  -  08 Oct 2023 06:03  --------------------------------------------------------  IN:    Heparin Infusion: 81 mL    Lactated Ringers: 1125 mL  Total IN: 1206 mL    OUT:    Voided (mL): 1800 mL  Total OUT: 1800 mL    Total NET: -594 mL               Discussed with:     Cultures:	        Radiology      ACC: [id number] EXAM:  CT BRAIN   ORDERED BY: CHANO HENLEY     PROCEDURE DATE:  10/07/2023          INTERPRETATION:  Clinical Indication: Altered mental status    5mm axial sections of the brain were obtained from base to vertex,   without the intravenous administration of contrast material. Coronal and   sagittal computer generated reconstructed views are available.    No prior brain imaging is available for comparison.      The ventricles and sulci are prominent consistent age appropriate   involutional changes. Small vessel white matter ischemic changes are   noted. There is no hemorrhage, mass, or shift of midline structures. Bone   window examination is unremarkable.    IMPRESSION: Age-appropriate involutional and ischemic gliotic changes. No   hemorrhage.    --- End of Report ---            HEAVENLY ORTIZ MD; Attending Radiologist  This document has been electronically signed. Oct  7 2023 11:20AM

## 2023-10-08 NOTE — CONSULT NOTE ADULT - ASSESSMENT
67-year-old male with history of schizophrenia, diabetes, hypothyrodism, hld, tobacco dependence brought by ambulance from [facility] for evaluation of altered mental status 67-year-old male with history of schizophrenia, diabetes, hypothyrodism, hld, tobacco dependence brought by ambulance from [facility] for evaluation of altered mental status    called emergency numbers - no answer - no voicemail set up  plan for transfer  pt is confused with hx of Psych dx   old records reviewed  ACS eval in progress  morphine prn for chest pain  ativan prn for agitation  supportive measures  oral hygiene  skin care  fall prec

## 2023-10-08 NOTE — H&P ADULT - HISTORY OF PRESENT ILLNESS
>>>>>>Medical Attending Initial / Admission note<<<<<<  -------------------------------------------------------------------------------  CHIEF COMPLAINT & HISTORY OF PRESENT ILLNESS:      Patient is a 66yo Male with past medical history of schizophrenia, DM, hypothyroidism, HLD, tobacco use presented to [facility] from a nursing home with AMS,   Per records, patient had complained of some chest pain at the nursing home. Patient is a very poor historian. At OSH, patient was noted to be in afib w/ RVR. Initial labs showed lactate 3.9 -> 3, trop I 208 -> <25K, . EKG showed Afib w/ , Q wave in III, ST depressions in V4-V6, II, aVF. Patient was given cardizem IV. Repeat EKG showed sinus rhythm with HR 76, Q wave in III, KRISSY in III, aVF. Case was discussed with interventionalist, patient given ASA, heparin gtt, was transferred to [facility].  At [facility], patient reports no chest pain or discomfort. EKG on arrival shows Q waves in II, III, aVF, no ST segment changes. Patient in sinus, HR 82. Labs showing hsTrop 3403. Lactate 1.1.  Patient seen and eroded by cardiology, and admitted to medicine for further management    --------------------------------------------------------------------------------  PAST MEDICAL / SURGICAL  HISTORY:    Schizophrenia  Hypothyroid  Anxiety  DM (diabetes mellitus)    No significant past surgical history Reported    --------------------------------------------------------------------------------  FAMILY HISTORY:    No pertinent family history in first degree relatives Reported    --------------------------------------------------------------------------------  SOCIAL HISTORY:  Alcohol: None reported  Smoking: Positive smoking history, unable to get details from the patient    --------------------------------------------------------------------------------  ALLERGIES:    [As bellow, reviewed]    --------------------------------------------------------------------------------  MEDICATIONS:   [As bellow, reviewed]    --------------------------------------------------------------------------------  REVIEW OF SYSTEM:    Limited, patient not very cooperative, states he does not have chest pain and does not want to be examined, asking for a peanut butter cup  Otherwise an unreliable historian    --------------------------------------------------------------------------------  VITAL SIGNS:  Height (cm): 165.1  Weight (kg): 90.7  BMI (kg/m2): 33.3  Vital Signs Last 24 HrsT(C): 37.2 (10-08-23 @ 21:23)  T(F): 98.9 (10-08-23 @ 21:23), Max: 98.9 (10-08-23 @ 21:23)  HR: 86 (10-08-23 @ 21:23) (80 - 86)  BP: 129/98 (10-08-23 @ 21:23)  RR: 20 (10-08-23 @ 21:23) (15 - 20)  SpO2: 95% (10-08-23 @ 21:23) (95% - 98%)      POCT Blood Glucose.: 160 mg/dL (08 Oct 2023 11:44)      -------------------------------------------------------------------------------  PHYSICAL EXAM:    GEN: Awake and alert, NAD , comfortable, pleasant, calm, Otherwise as above refusing to be examined  HEENT: NCAT, PERRL, MMM, hearing intact  Neck: supple , no JVD  CVS: Unable to examine  PULM: Unable to examine  ABD.: soft. non tender, non distended,  Obese  Extrem: intact pulses , no edema   Derm: No rash , no ecchymoses appreciated   PSYCH :  As above,  no delusion not anxious    --------------------------------------------------------------------------------  LAB AND IMAGING:   [As anita, mesfin]    --------------------------------------------------------------------------------  ASSESSMENT AND PLAN:   [As bellow]    --------------------  Case discussed with medical team and hospital staff..   ___________________________  HNelda Contreras D.O.  Pager: [phone]  10-08-23     ( Note written / Date of service 10-08-23 ( This is certified to be the same as "ENTERED" date above ( for billing Purposes )))

## 2023-10-08 NOTE — ED ADULT NURSE NOTE - OBJECTIVE STATEMENT
66 y/o M w/ PMH of schizo, anxiety, urinary incontinence came via transfer from [place] [facility], w/ complaints of AMS. Per EMS, pt was found more lethargic and AMS at nursing home. Per EMS, at [place], pt was found to be new onset rapid afib, that converted ot NSR, trop of 25,000, and pt required ativan to calm pt down. Pt a/ox0, not able to answer questions, inappropriate speech noted, pt pulling at IV, stating" get these out of me". PA at bedside notified, constant observation placed. side rails up and bed placed in lowest position, hospital socks on. Pt placed on cardiac monitor, reading nsr in 80s. 2 iv 20g located on left forearm noted on arrival. heparin running at 11.5 ml/hr. Pt weighed and documented in flow sheet.

## 2023-10-08 NOTE — PROGRESS NOTE ADULT - SUBJECTIVE AND OBJECTIVE BOX
Patient is a 67y Male with a known history of :    HPI:  67-year-old male with history of schizophrenia, diabetes, hypothyrodism, hld, tobacco dependence brought by ambulance from [facility] for evaluation of altered mental status today.  Transfer paper states patient had chest pain and was briefly unresponsive.  Patient not answering questions.  Just ranting that he does not want any needles.  Will not cooperate with questions or exam.  His PCP is Dr. Murtaza Oneill.    in the ED                          13.0   5.50  )-----------( 237      ( 07 Oct 2023 09:52 )             39.3     10-07    134<L>  |  98  |  22  ----------------------------<  201<H>  5.0   |  24  |  1.63<H>    Ca    8.9      07 Oct 2023 10:45    TPro  6.9  /  Alb  3.0<L>  /  TBili  0.2  /  DBili  x   /  AST  18  /  ALT  19  /  AlkPhos  82  10-07    seen by cardiology  troponins elevated 2/2 demand ischemia    pt was in rapid afib  s/p IV cardizem 20mg  and ativan given    lactate elevated at 3.9 to 3    Ceftriaxone given    u/a pending       (07 Oct 2023 13:01)      REVIEW OF SYSTEMS:    CONSTITUTIONAL: No fever, weight loss, or fatigue  EYES: No eye pain, visual disturbances, or discharge  ENMT:  No difficulty hearing, tinnitus, vertigo; No sinus or throat pain  NECK: No pain or stiffness  BREASTS: No pain, masses, or nipple discharge  RESPIRATORY: No cough, wheezing, chills or hemoptysis; No shortness of breath  CARDIOVASCULAR: No chest pain, palpitations, dizziness, or leg swelling  GASTROINTESTINAL: No abdominal or epigastric pain. No nausea, vomiting, or hematemesis; No diarrhea or constipation. No melena or hematochezia.  GENITOURINARY: No dysuria, frequency, hematuria, or incontinence  NEUROLOGICAL: No headaches, memory loss, loss of strength, numbness, or tremors  SKIN: No itching, burning, rashes, or lesions   LYMPH NODES: No enlarged glands  ENDOCRINE: No heat or cold intolerance; No hair loss  MUSCULOSKELETAL: No joint pain or swelling; No muscle, back, or extremity pain  PSYCHIATRIC: No depression, anxiety, mood swings, or difficulty sleeping  HEME/LYMPH: No easy bruising, or bleeding gums  ALLERGY AND IMMUNOLOGIC: No hives or eczema    MEDICATIONS  (STANDING):  aspirin enteric coated 81 milliGRAM(s) Oral daily  benztropine 2 milliGRAM(s) Oral two times a day  cefTRIAXone   IVPB 1000 milliGRAM(s) IV Intermittent every 24 hours  dextrose 5%. 1000 milliLiter(s) (50 mL/Hr) IV Continuous <Continuous>  dextrose 50% Injectable 25 Gram(s) IV Push once  divalproex ER 1500 milliGRAM(s) Oral daily  glucagon  Injectable 1 milliGRAM(s) IntraMuscular once  heparin  Infusion. 1150 Unit(s)/Hr (11.5 mL/Hr) IV Continuous <Continuous>  insulin lispro (ADMELOG) corrective regimen sliding scale   SubCutaneous three times a day before meals  lactated ringers. 1000 milliLiter(s) (125 mL/Hr) IV Continuous <Continuous>  lactated ringers. 1000 milliLiter(s) (125 mL/Hr) IV Continuous <Continuous>  levothyroxine 88 MICROGram(s) Oral daily  QUEtiapine 400 milliGRAM(s) Oral at bedtime  risperiDONE   Tablet 2 milliGRAM(s) Oral daily  risperiDONE   Tablet 3 milliGRAM(s) Oral at bedtime  simvastatin 20 milliGRAM(s) Oral at bedtime  traZODone 100 milliGRAM(s) Oral at bedtime    MEDICATIONS  (PRN):  acetaminophen     Tablet .. 650 milliGRAM(s) Oral every 6 hours PRN Temp greater or equal to 38C (100.4F), Mild Pain (1 - 3)  aluminum hydroxide/magnesium hydroxide/simethicone Suspension 30 milliLiter(s) Oral every 4 hours PRN Dyspepsia  dextrose Oral Gel 15 Gram(s) Oral once PRN Blood Glucose LESS THAN 70 milliGRAM(s)/deciliter  LORazepam   Injectable 0.5 milliGRAM(s) IV Push every 2 hours PRN Agitation  melatonin 3 milliGRAM(s) Oral at bedtime PRN Insomnia  morphine  - Injectable 1 milliGRAM(s) IV Push every 4 hours PRN for chest pain  ondansetron Injectable 4 milliGRAM(s) IV Push every 8 hours PRN Nausea and/or Vomiting      ALLERGIES: aspartate (Unknown)  clozapine (Unknown)  lithium (Unknown)      FAMILY HISTORY:  No pertinent family history in first degree relatives        Social history:  Alochol:   Smoking:   Drug Use:   Marital Status:     PHYSICAL EXAMINATION:  -----------------------------  T(C): 36.3 (10-08-23 @ 12:10), Max: 36.5 (10-07-23 @ 23:00)  HR: 82 (10-08-23 @ 12:00) (80 - 100)  BP: 149/96 (10-08-23 @ 12:00) (95/57 - 167/92)  RR: 20 (10-08-23 @ 12:00) (17 - 22)  SpO2: 97% (10-08-23 @ 12:00) (92% - 98%)  Wt(kg): --    10-07 @ 07:01  -  10-08 @ 07:00  --------------------------------------------------------  IN:    Heparin Infusion: 90 mL    Lactated Ringers: 1250 mL    Oral Fluid: 240 mL  Total IN: 1580 mL    OUT:    Voided (mL): 2300 mL  Total OUT: 2300 mL    Total NET: -720 mL      10-08 @ 07:01  -  10-08 @ 14:23  --------------------------------------------------------  IN:    Heparin Infusion: 27 mL    Heparin Infusion: 34.5 mL    Lactated Ringers: 500 mL    Oral Fluid: 240 mL  Total IN: 801.5 mL    OUT:    Voided (mL): 550 mL  Total OUT: 550 mL    Total NET: 251.5 mL          Weight (kg): 89.6 (10-07 @ 23:00)    Constitutional: well developed, normal appearance, well groomed, well nourished, no deformities and no acute distress.   Eyes: the conjunctiva exhibited no abnormalities and the eyelids demonstrated no xanthelasmas.   HEENT: normal oral mucosa, no oral pallor and no oral cyanosis.   Neck: normal jugular venous A waves present, normal jugular venous V waves present and no jugular venous chung A waves.   Pulmonary: no respiratory distress, normal respiratory rhythm and effort, no accessory muscle use and lungs were clear to auscultation bilaterally. Anteriorly  Cardiovascular: heart rate and rhythm were normal, normal S1 and S2 and no murmur, gallop, rub, heave or thrill are present.   Musculoskeletal: the gait could not be assessed.   Extremities: no clubbing of the fingernails, no localized cyanosis, no petechial hemorrhages and no ischemic changes.   Skin: normal skin color and pigmentation, no rash, no venous stasis, no skin lesions, no skin ulcer and no xanthoma was observed.       LABS:   --------  10-08    133<L>  |  101  |  17  ----------------------------<  166<H>  4.5   |  28  |  0.87    Ca    9.1      08 Oct 2023 06:47  Phos  2.8     10-08    TPro  6.7  /  Alb  3.0<L>  /  TBili  0.2  /  DBili  x   /  AST  197<H>  /  ALT  33  /  AlkPhos  85  10-08                         11.8   6.09  )-----------( 228      ( 08 Oct 2023 06:47 )             36.2     PT/INR - ( 07 Oct 2023 10:45 )   PT: 11.1 sec;   INR: 1.02 ratio         PTT - ( 08 Oct 2023 10:44 )  PTT:29.0 sec        Culture Results:   No growth at 24 hours (10-07 @ 09:52)  Culture Results:   No growth at 24 hours (10-07 @ 09:52)    10-07 @ 09:52    Organism --   Gram Stain Blood -- Gram Stain --  Specimen Source .Blood Blood  Culture-Blood --        Radiology:    
    CHIEF COMPLAINT/ REASON FOR VISIT  .. Patient was seen to address the  issue listed under PROBLEM LIST which is located toward bottom of this note     [facility]    [facility]    Allergies    aspartate (Unknown)  clozapine (Unknown)  lithium (Unknown)    Intolerances        PAST MEDICAL & SURGICAL HISTORY:  Schizophrenia      Hypothyroid      Anxiety      DM (diabetes mellitus)      No significant past surgical history          FAMILY HISTORY:  No pertinent family history in first degree relatives        Home Medications:  benztropine 2 mg oral tablet: 1 tab(s) orally 2 times a day (07 Oct 2023 11:32)  Depakote  mg oral tablet, extended release: 3 tab(s) orally once a day (07 Oct 2023 11:32)  levothyroxine 88 mcg (0.088 mg) oral tablet: 1 tab(s) orally once a day (07 Oct 2023 11:32)  losartan 50 mg oral tablet: 1 tab(s) orally once a day (07 Oct 2023 11:32)  magnesium oxide 400 mg oral tablet: 1 tab(s) orally 2 times a day (07 Oct 2023 13:14)  metFORMIN 1000 mg oral tablet: 1 tab(s) orally 2 times a day (07 Oct 2023 11:32)  QUEtiapine 400 mg oral tablet: 1 tab(s) orally once a day (at bedtime) (07 Oct 2023 13:11)  RisperDAL 2 mg oral tablet: 1 tab(s) orally once a day (07 Oct 2023 13:16)  RisperDAL 3 mg oral tablet: 1 tab(s) orally once a day (at bedtime) (07 Oct 2023 13:16)  simvastatin 20 mg oral tablet: 1 tab(s) orally once a day (at bedtime) (07 Oct 2023 13:16)  sodium chloride 1 g oral tablet: orally 2 times a day (07 Oct 2023 13:15)  traZODone 100 mg oral tablet: orally once a day (at bedtime) (07 Oct 2023 13:16)      MEDICATIONS  (STANDING):  aspirin enteric coated 81 milliGRAM(s) Oral daily  benztropine 2 milliGRAM(s) Oral two times a day  cefTRIAXone   IVPB 1000 milliGRAM(s) IV Intermittent every 24 hours  dextrose 5%. 1000 milliLiter(s) (50 mL/Hr) IV Continuous <Continuous>  dextrose 50% Injectable 25 Gram(s) IV Push once  divalproex ER 1500 milliGRAM(s) Oral daily  glucagon  Injectable 1 milliGRAM(s) IntraMuscular once  heparin  Infusion.  Unit(s)/Hr (10 mL/Hr) IV Continuous <Continuous>  insulin lispro (ADMELOG) corrective regimen sliding scale   SubCutaneous three times a day before meals  lactated ringers. 1000 milliLiter(s) (125 mL/Hr) IV Continuous <Continuous>  lactated ringers. 1000 milliLiter(s) (125 mL/Hr) IV Continuous <Continuous>  levothyroxine 88 MICROGram(s) Oral daily  QUEtiapine 400 milliGRAM(s) Oral at bedtime  risperiDONE   Tablet 2 milliGRAM(s) Oral daily  risperiDONE   Tablet 3 milliGRAM(s) Oral at bedtime  simvastatin 20 milliGRAM(s) Oral at bedtime  traZODone 100 milliGRAM(s) Oral at bedtime    MEDICATIONS  (PRN):  acetaminophen     Tablet .. 650 milliGRAM(s) Oral every 6 hours PRN Temp greater or equal to 38C (100.4F), Mild Pain (1 - 3)  aluminum hydroxide/magnesium hydroxide/simethicone Suspension 30 milliLiter(s) Oral every 4 hours PRN Dyspepsia  dextrose Oral Gel 15 Gram(s) Oral once PRN Blood Glucose LESS THAN 70 milliGRAM(s)/deciliter  heparin   Injectable 5000 Unit(s) IV Push every 6 hours PRN For aPTT less than 40  LORazepam   Injectable 0.5 milliGRAM(s) IV Push every 2 hours PRN Agitation  melatonin 3 milliGRAM(s) Oral at bedtime PRN Insomnia  morphine  - Injectable 1 milliGRAM(s) IV Push every 4 hours PRN for chest pain  ondansetron Injectable 4 milliGRAM(s) IV Push every 8 hours PRN Nausea and/or Vomiting      Diet, Regular (10-07-23 @ 13:39) [Active]          Vital Signs Last 24 Hrs  T(C): 36.4 (08 Oct 2023 08:00), Max: 36.5 (07 Oct 2023 23:00)  T(F): 97.6 (08 Oct 2023 08:00), Max: 97.7 (07 Oct 2023 23:00)  HR: 80 (08 Oct 2023 08:00) (73 - 139)  BP: 95/57 (08 Oct 2023 04:00) (65/44 - 167/92)  BP(mean): 70 (08 Oct 2023 04:00) (69 - 106)  RR: 17 (08 Oct 2023 08:00) (17 - 25)  SpO2: 94% (08 Oct 2023 08:00) (92% - 98%)    Parameters below as of 08 Oct 2023 08:00  Patient On (Oxygen Delivery Method): room air          10-07-23 @ 07:01  -  10-08-23 @ 07:00  --------------------------------------------------------  IN: 1580 mL / OUT: 2300 mL / NET: -720 mL    10-08-23 @ 07:01  -  10-08-23 @ 09:45  --------------------------------------------------------  IN: 134 mL / OUT: 200 mL / NET: -66 mL              LABS:                        11.8   6.09  )-----------( 228      ( 08 Oct 2023 06:47 )             36.2     10-08    133<L>  |  101  |  17  ----------------------------<  166<H>  4.5   |  28  |  0.87    Ca    9.1      08 Oct 2023 06:47  Phos  2.8     10-08    TPro  6.7  /  Alb  3.0<L>  /  TBili  0.2  /  DBili  x   /  AST  197<H>  /  ALT  33  /  AlkPhos  85  10-08    PT/INR - ( 07 Oct 2023 10:45 )   PT: 11.1 sec;   INR: 1.02 ratio         PTT - ( 08 Oct 2023 04:13 )  PTT:58.2 sec  Urinalysis Basic - ( 08 Oct 2023 06:47 )    Color: x / Appearance: x / SG: x / pH: x  Gluc: 166 mg/dL / Ketone: x  / Bili: x / Urobili: x   Blood: x / Protein: x / Nitrite: x   Leuk Esterase: x / RBC: x / WBC x   Sq Epi: x / Non Sq Epi: x / Bacteria: x            WBC:  WBC Count: 6.09 K/uL (10-08 @ 06:47)  WBC Count: 8.60 K/uL (10-07 @ 21:59)  WBC Count: 5.50 K/uL (10-07 @ 09:52)      MICROBIOLOGY:  RECENT CULTURES:        CARDIAC MARKERS ( 08 Oct 2023 06:47 )  x     / x     / 951 U/L / x     / x      CARDIAC MARKERS ( 07 Oct 2023 14:00 )  x     / x     / 90 U/L / x     / x            PT/INR - ( 07 Oct 2023 10:45 )   PT: 11.1 sec;   INR: 1.02 ratio         PTT - ( 08 Oct 2023 04:13 )  PTT:58.2 sec    Sodium:  Sodium: 133 mmol/L (10-08 @ 06:47)  Sodium: 134 mmol/L (10-07 @ 10:45)      0.87 mg/dL 10-08 @ 06:47  1.63 mg/dL 10-07 @ 10:45      Hemoglobin:  Hemoglobin: 11.8 g/dL (10-08 @ 06:47)  Hemoglobin: 11.8 g/dL (10-07 @ 21:59)  Hemoglobin: 13.0 g/dL (10-07 @ 09:52)      Platelets: Platelet Count - Automated: 228 K/uL (10-08 @ 06:47)  Platelet Count - Automated: 239 K/uL (10-07 @ 21:59)  Platelet Count - Automated: 237 K/uL (10-07 @ 09:52)      LIVER FUNCTIONS - ( 08 Oct 2023 06:47 )  Alb: 3.0 g/dL / Pro: 6.7 g/dL / ALK PHOS: 85 U/L / ALT: 33 U/L / AST: 197 U/L / GGT: x             Urinalysis Basic - ( 08 Oct 2023 06:47 )    Color: x / Appearance: x / SG: x / pH: x  Gluc: 166 mg/dL / Ketone: x  / Bili: x / Urobili: x   Blood: x / Protein: x / Nitrite: x   Leuk Esterase: x / RBC: x / WBC x   Sq Epi: x / Non Sq Epi: x / Bacteria: x        RADIOLOGY & ADDITIONAL STUDIES:      MICROBIOLOGY:  RECENT CULTURES:          
Patient is a 67y old  Male who presents with a chief complaint of     INTERVAL HPI/OVERNIGHT EVENTS:    reviewed EKG with cardiology Dr. Luzmaria Kaye.  It shows "Normal sinus rhythm ST elevation, consider inferior injury or acute infarct, Consider right ventricular involvement in acute inferior infarct"    patient is currently calm at bedside and denying any chest pain at this time.     Spoke with transfer center last night regarding transfer.  Transfer will be to [facility] instead of [facility] 2/2 diversion and bed availability.       MEDICATIONS  (STANDING):  aspirin enteric coated 81 milliGRAM(s) Oral daily  benztropine 2 milliGRAM(s) Oral two times a day  cefTRIAXone   IVPB 1000 milliGRAM(s) IV Intermittent every 24 hours  dextrose 5%. 1000 milliLiter(s) (50 mL/Hr) IV Continuous <Continuous>  dextrose 50% Injectable 25 Gram(s) IV Push once  divalproex ER 1500 milliGRAM(s) Oral daily  glucagon  Injectable 1 milliGRAM(s) IntraMuscular once  heparin  Infusion.  Unit(s)/Hr (10 mL/Hr) IV Continuous <Continuous>  insulin lispro (ADMELOG) corrective regimen sliding scale   SubCutaneous three times a day before meals  lactated ringers. 1000 milliLiter(s) (125 mL/Hr) IV Continuous <Continuous>  lactated ringers. 1000 milliLiter(s) (125 mL/Hr) IV Continuous <Continuous>  levothyroxine 88 MICROGram(s) Oral daily  QUEtiapine 400 milliGRAM(s) Oral at bedtime  risperiDONE   Tablet 2 milliGRAM(s) Oral daily  risperiDONE   Tablet 3 milliGRAM(s) Oral at bedtime  simvastatin 20 milliGRAM(s) Oral at bedtime  traZODone 100 milliGRAM(s) Oral at bedtime    MEDICATIONS  (PRN):  acetaminophen     Tablet .. 650 milliGRAM(s) Oral every 6 hours PRN Temp greater or equal to 38C (100.4F), Mild Pain (1 - 3)  aluminum hydroxide/magnesium hydroxide/simethicone Suspension 30 milliLiter(s) Oral every 4 hours PRN Dyspepsia  dextrose Oral Gel 15 Gram(s) Oral once PRN Blood Glucose LESS THAN 70 milliGRAM(s)/deciliter  heparin   Injectable 5000 Unit(s) IV Push every 6 hours PRN For aPTT less than 40  LORazepam   Injectable 0.5 milliGRAM(s) IV Push every 2 hours PRN Agitation  melatonin 3 milliGRAM(s) Oral at bedtime PRN Insomnia  morphine  - Injectable 1 milliGRAM(s) IV Push every 4 hours PRN for chest pain  ondansetron Injectable 4 milliGRAM(s) IV Push every 8 hours PRN Nausea and/or Vomiting      Allergies    aspartate (Unknown)  clozapine (Unknown)  lithium (Unknown)    Intolerances        REVIEW OF SYSTEMS:  unable to obtain    Vital Signs Last 24 Hrs  T(C): 36.3 (08 Oct 2023 04:04), Max: 36.8 (07 Oct 2023 09:33)  T(F): 97.4 (08 Oct 2023 04:04), Max: 98.2 (07 Oct 2023 09:33)  HR: 80 (08 Oct 2023 04:00) (73 - 164)  BP: 95/57 (08 Oct 2023 04:00) (65/44 - 167/92)  BP(mean): 70 (08 Oct 2023 04:00) (69 - 106)  RR: 17 (08 Oct 2023 04:00) (17 - 25)  SpO2: 93% (08 Oct 2023 04:00) (92% - 98%)    Parameters below as of 08 Oct 2023 04:00  Patient On (Oxygen Delivery Method): room air        PHYSICAL EXAM:  GENERAL: NAD  HEAD:  Atraumatic, Normocephalic  EYES: EOMI, PERRLA, conjunctiva and sclera clear  ENMT: Moist mucous membranes, No lesions; No tonsillar erythema, exudates, or enlargement  NECK: Supple, No JVD, Normal thyroid  NERVOUS SYSTEM:  Alert & Oriented X1, All 4 extremities mobile, no gross sensory deficits.   CHEST/LUNG: Clear to auscultation bilaterally; No rales, rhonchi, wheezing, or rubs  HEART: Regular rate and rhythm; No murmurs, rubs, or gallops  ABDOMEN: Soft, Nontender, Nondistended; Bowel sounds present  EXTREMITIES:  2+ Peripheral Pulses, No clubbing, cyanosis, or edema    LABS:                        11.8   6.09  )-----------( 228      ( 08 Oct 2023 06:47 )             36.2     08 Oct 2023 06:47    133    |  101    |  17     ----------------------------<  166    4.5     |  28     |  0.87     Ca    9.1        08 Oct 2023 06:47  Phos  2.8       08 Oct 2023 06:47    TPro  6.7    /  Alb  3.0    /  TBili  0.2    /  DBili  x      /  AST  197    /  ALT  33     /  AlkPhos  85     08 Oct 2023 06:47    PT/INR - ( 07 Oct 2023 10:45 )   PT: 11.1 sec;   INR: 1.02 ratio         PTT - ( 08 Oct 2023 04:13 )  PTT:58.2 sec  Urinalysis Basic - ( 08 Oct 2023 06:47 )    Color: x / Appearance: x / SG: x / pH: x  Gluc: 166 mg/dL / Ketone: x  / Bili: x / Urobili: x   Blood: x / Protein: x / Nitrite: x   Leuk Esterase: x / RBC: x / WBC x   Sq Epi: x / Non Sq Epi: x / Bacteria: x      CAPILLARY BLOOD GLUCOSE      POCT Blood Glucose.: 118 mg/dL (07 Oct 2023 21:11)  POCT Blood Glucose.: 157 mg/dL (07 Oct 2023 16:19)  POCT Blood Glucose.: 222 mg/dL (07 Oct 2023 09:35)      RADIOLOGY & ADDITIONAL TESTS:

## 2023-10-08 NOTE — ED PROVIDER NOTE - ATTENDING CONTRIBUTION TO CARE
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  Patient cannot contribute to his story due to his mental health condition.  I am unable to examine the patient's heart and lungs as the patient grabbed my stethoscope in an assaultative manner and given the known findings as well as EKG there is a risk of restraining or sedating the patient is higher than the benefit of my examining his heart.  He is awake alert and mumbling  I reviewed the EKG from the [facility] from yesterday at 1:19 PM.  The EKG at that time showed inferior STEMI.  My EKG now shows that in the location of the inferior STEMI he now has Q waves with no ST segment elevation.  Clinically the patient likely completed his infarction.  We will continue antiplatelet and anticoagulant therapy.  I discussed the case with the cardiology fellow at the desk and he states that they are not going to do angiogram today and recommend continued medical therapy.  We will admit the patient to telemetry as the cardiology fellow did not need the patient admitted to the CCU at this time.

## 2023-10-08 NOTE — ED ADULT TRIAGE NOTE - CHIEF COMPLAINT QUOTE
NSTEMI transfer from [place], presented to OSH yesterday c/o of chest pain   found to be in afibb with elevated troponins  on heparin drip

## 2023-10-08 NOTE — ED ADULT NURSE REASSESSMENT NOTE - NS ED NURSE REASSESS COMMENT FT1
ACP Onel Thrasher contacted via teams to notify pt of BP. Rn also attempted to call [phone], no answer

## 2023-10-08 NOTE — H&P ADULT - ASSESSMENT
Patient is a 66yo Male with past medical history of schizophrenia, DM, hypothyroidism, HLD, tobacco use presented to [facility] from a nursing home with AMS,   Per records, patient had complained of some chest pain at the nursing home. Patient is a very poor historian. At OSH, patient was noted to be in afib w/ RVR. Initial labs showed lactate 3.9 -> 3, trop I 208 -> <25K, . EKG showed Afib w/ , Q wave in III, ST depressions in V4-V6, II, aVF. Patient was given cardizem IV. Repeat EKG showed sinus rhythm with HR 76, Q wave in III, KRISSY in III, aVF. Case was discussed with interventionalist, patient given ASA, heparin gtt, was transferred to [facility].  At [facility], patient reports no chest pain or discomfort. EKG on arrival shows Q waves in II, III, aVF, no ST segment changes. Patient in sinus, HR 82. Labs showing hsTrop 3403. Lactate 1.1.  Patient seen and eroded by cardiology, and admitted to medicine for further management

## 2023-10-08 NOTE — ED PROVIDER NOTE - ATTENDING APP SHARED VISIT CONTRIBUTION OF CARE
Attending Statement: I have personally provided the amount of critical care time documented below concurrently with the resident/fellow.  This time excludes time spent on separate procedures and time spent teaching. I have reviewed the resident’s / fellow’s documentation and I agree with the history, exam, and assessment and plan of care.     Critical Care Time Spent (min) Must be 30 or more minutes to qualify: 40.     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 with NSTEMI.  Patient cannot contribute to his story due to his mental health condition.  I am unable to examine the patient's heart and lungs as the patient grabbed my stethoscope in an assaultative manner and given the known findings as well as EKG there is a risk of restraining or sedating the patient is higher than the benefit of my examining his heart.  He is awake alert and mumbling  I reviewed the EKG from the [facility] from yesterday at 1:19 PM.  The EKG at that time showed inferior STEMI.  My EKG now shows that in the location of the inferior STEMI he now has Q waves with no ST segment elevation.  Clinically the patient likely completed his infarction.  We will continue antiplatelet and anticoagulant therapy.  I discussed the case with the cardiology fellow at the desk and he states that they are not going to do angiogram today and recommend continued medical therapy.  We will admit the patient to telemetry as the cardiology fellow did not need the patient admitted to the CCU at this time.

## 2023-10-08 NOTE — ED ADULT NURSE REASSESSMENT NOTE - NS ED NURSE REASSESS COMMENT FT1
Heparin paused per heparin nomogram at 16:38. RN Bradley at bedside comfirming dosage and stopping of heparin

## 2023-10-08 NOTE — CONSULT NOTE ADULT - ASSESSMENT
67M with schizophrenia, DM, hypothyroidism, HLD, tobacco use presenting from a nursing home with AMS, noted to be in rapid afib, now converted to normal sinus. Also with ischemic changes on EKG and troponemia, now chest pain free and Q waves on EKG in inferior leads. Patient presentation fits with demand ischemia 2/2 afib with RVR vs NSTEMI.    Recs:  -c/w ASA, heparin gtt  -obtain TTE  -start metoprolol tartrate 25mg BID  -continue to trend troponin to peak  -monitor on telemetry  -no plan for LHC at current, case discussed with interventionalist     67M with schizophrenia, DM, hypothyroidism, HLD, tobacco use presenting from a nursing home with AMS, noted to be in rapid afib, now converted to normal sinus. Also with ischemic changes on EKG and troponemia, now chest pain free and Q waves on EKG in inferior leads. Patient presentation fits with demand ischemia 2/2 afib with RVR vs NSTEMI.    Recs:  -c/w ASA, heparin gtt  -give plavix 600mg x1 and start plavix 75mg daily  -obtain TTE  -start metoprolol tartrate 25mg BID  -continue to trend troponin to peak  -monitor on telemetry  -no plan for LHC at current, case discussed with interventionalist

## 2023-10-08 NOTE — ED ADULT NURSE NOTE - NSFALLHARMRISKINTERV_ED_ALL_ED
Assistance OOB with selected safe patient handling equipment if applicable/Communicate risk of Fall with Harm to all staff, patient, and family/Monitor for mental status changes and reorient to person, place, and time, as needed/Move patient closer to nursing station/within visual sight of ED staff/Provide visual cue: red socks, yellow wristband, yellow gown, etc/Reinforce activity limits and safety measures with patient and family/Toileting schedule using arm’s reach rule for commode and bathroom/Use of alarms - bed, stretcher, chair and/or video monitoring/Bed in lowest position, wheels locked, appropriate side rails in place/Call bell, personal items and telephone in reach/Instruct patient to call for assistance before getting out of bed/chair/stretcher/Non-slip footwear applied when patient is off stretcher/[place] to call system/Physically safe environment - no spills, clutter or unnecessary equipment/Purposeful Proactive Rounding/Room/bathroom lighting operational, light cord in reach

## 2023-10-08 NOTE — ED PROVIDER NOTE - CONSTITUTIONAL MANNER
"Anesthesia Transfer of Care Note    Patient: Augustine Boswell III    Procedure(s) Performed: Procedure(s) (LRB):  RELEASE, ELBOW, LATERAL EPICONDYLE (Right)    Patient location: PACU    Anesthesia Type: general    Transport from OR: Transported from OR on room air with adequate spontaneous ventilation    Post pain: adequate analgesia    Post assessment: no apparent anesthetic complications and tolerated procedure well    Post vital signs: stable    Level of consciousness: awake, alert and oriented    Nausea/Vomiting: no nausea/vomiting    Complications: none    Transfer of care protocol was followed      Last vitals:   Visit Vitals  BP (!) 142/97   Pulse (!) 58   Temp 36.7 °C (98.1 °F) (Oral)   Resp 16   Ht 6' 2" (1.88 m)   Wt 102.1 kg (225 lb)   SpO2 98%   BMI 28.89 kg/m²     "
appropriate for situation

## 2023-10-08 NOTE — ED ADULT NURSE NOTE - CHIEF COMPLAINT QUOTE
NSTEMI transfer from [place], presented to OSH yesterday c/o of chest pain   found to be in afibb with elevated troponins  on heparin drip

## 2023-10-08 NOTE — H&P ADULT - PROBLEM SELECTOR PLAN 3
continue multiple medications as listed   Pharmacy evaluation for reconciliation and review of the medications  psychiatry valuation as needed  Continuous observation

## 2023-10-08 NOTE — H&P ADULT - PROBLEM SELECTOR PLAN 1
Cardiology evaluation appreciated  Aspirin  Plavix   heparin drip  Monitor vitals, labs  control heart rate  Echocardiogram  Further ischemic work of [place] cardiology

## 2023-10-08 NOTE — ED PROVIDER NOTE - OBJECTIVE STATEMENT
67-year-old male with history of schizophrenia, diabetes, hypothyroidism, hld, , smoking, atrial fib resides at [facility] transferred from [place] after pt found to be in afib RVR and NSTEMI with trops >25,0000    Patient does not provide any history

## 2023-10-08 NOTE — CONSULT NOTE ADULT - SUBJECTIVE AND OBJECTIVE BOX
Nicholas Knox MD  Cardiology Fellow  [phone]  All Cardiology service information can be found 24/7 on [email or web address], password: [email or web address]    Patient seen and evaluated at bedside    Chief Complaint: troponemia    HPI:  67M with schizophrenia, DM, hypothyroidism, HLD, tobacco use presenting from a nursing home with AMS, initially to [place]. Per records, patient had complained of some chest pain at the nursing home. Patient is a very poor historian. At OSH, patient was noted to be in afib w/ RVR. Initial labs showed lactate 3.9 -> 3, trop I 208 -> <25K, . EKG showed Afib w/ , Q wave in III, ST depressions in V4-V6, II, aVF. Patient was given cardizem IV. Repeat EKG showed sinus rhythm with HR 76, Q wave in III, KRISSY in III, aVF. Case was discussed with interventionalist, patient given ASA, heparin gtt, was transferred to [facility].    At [facility], patient reports no chest pain or discomfort. EKG on arrival shows Q waves in II, III, aVF, no ST segment changes. Patient in sinus, HR 82. Labs showing hsTrop 3403. Lactate 1.1.     PMHx:   Schizophrenia  Hypothyroid  Anxiety  DM (diabetes mellitus)    PSHx:   No significant past surgical history    Allergies:  lithium (Unknown)  clozapine (Unknown)  aspartate (Unknown)      Home Meds:    Current Medications:   heparin   Injectable 5600 Unit(s) IV Push every 6 hours PRN  heparin  Infusion.  Unit(s)/Hr IV Continuous <Continuous>      FAMILY HISTORY:  No pertinent family history in first degree relatives      REVIEW OF SYSTEMS:  All other review of systems is negative unless indicated above.    Physical Exam:  T(F): 97.8 (10-08), Max: 97.8 (10-08)  HR: 83 (10-08) (80 - 100)  BP: 142/82 (10-08) (95/57 - 167/92)  RR: 20 (10-08)  SpO2: 97% (10-08)  GENERAL: No acute distress  HEAD:  Atraumatic, Normocephalic  ENT: EOMI, PERRL  CHEST/LUNG/HEART: Patient refused a stethescope  ABDOMEN: Soft, Nontender, Nondistended  EXTREMITIES:  No edema    CXR: Personally reviewed    Labs: Personally reviewed                        11.9   4.88  )-----------( 210      ( 08 Oct 2023 15:55 )             35.1     10-08    136  |  101  |  14  ----------------------------<  110<H>  4.6   |  26  |  0.86    Ca    9.2      08 Oct 2023 15:55  Phos  2.8     10-08    TPro  7.0  /  Alb  3.5  /  TBili  0.1<L>  /  DBili  x   /  AST  98<H>  /  ALT  23  /  AlkPhos  95  10-08    PT/INR - ( 08 Oct 2023 15:55 )   PT: 10.4 sec;   INR: 0.99 ratio         PTT - ( 08 Oct 2023 15:55 )  PTT:120.5 sec    CARDIAC MARKERS ( 08 Oct 2023 15:55 )  3403 ng/L / x     / x     / 622 U/L / x     / x      CARDIAC MARKERS ( 08 Oct 2023 06:47 )  x     / x     / x     / 951 U/L / x     / x      CARDIAC MARKERS ( 07 Oct 2023 14:00 )  x     / x     / x     / 90 U/L / x     / x

## 2023-10-08 NOTE — CHART NOTE - NSCHARTNOTEFT_GEN_A_CORE
spoke with Transfer center    Dr. Kelley Graham from cardiology will see patient at [facility]  therefore pt will go to [facility] instead of [facility] for intervention.

## 2023-10-08 NOTE — PROGRESS NOTE ADULT - ASSESSMENT
67-year-old male with history of schizophrenia, diabetes, hypothyrodism, hld, tobacco dependence admitted for rapid afib rule out ACS      #NSTEMI   repeat trops >[id number]  Dr. Kaye cardiology recommending transfer  Transfer center called  likely transfer to [facility] for cardiac cath  pt is not competent, unable to reach emergency numbers from family, voicemail not setup.   per Dr. Alonzo cardiology, continue aspirin 81mg daily and heparin gtt  pt currently asymptomatic and stable    #AFIB  s/p 20mg of IV cardizem  now in NSR  cardiology following      #sepsis?   unclear, pt was hypotensive inititally but now normotensive, BP has improved  lactacte elevated at 3, s/p 3L  will trend  s/p ceftriaxone  blood cx obtain, U/A urine cx not obtained yet,   continue IV ceftriaxone for now until cultures return    #HUBERT   Cr was elevated  improved after IVF, now WNL      #DM2  hold metformin  ISS  A1C    #HLD  statins    #Schizophrenia  continue risperdol, depakoate  agitated overnight  anxiolytics added    #hypothyroidism  levothyroxine    #DVT proph  heparin gtt  
66y/o seen at [facility] ER. From [facility]  Patient lying flat, sleeping comfortably  Easily arousable but essentially non-verbal (given Ativan earlier)  History from chart  History dementia, HTN, high cholesterol, A-Fib, NIDDM, GERD, thyroid disease, schizophrenia, anxiety, GERD, parkinson's disease    Admitted for mental status changes  According to the transfer papers, patient may have had some chest pain and ?unresponsive episode  Patient also given a total of 20mg IV Cardizem in the ER    10/8/23  Seen at [facility] ICU  Lying flat, comfortably  Awake and alert  Troponin >25,00 x2.  CPK-951.  EKG post conversion to NS with mild ST elevations inferiorly    Plan:  - On Heparin IV  - ASA-81mg OD  - Check TSH  - Echocardiogram  - Patient is being transferred to [facility] for further cardiac management  
  REASON FOR VISIT  .. Management of problems listed below      ROS  . Patient unable to give ROS     PHYSICAL EXAM    HEENT Unremarkable  atraumatic   RESP Fair air entry  Harsh breath sound   CARDIAC S1 S2 No S3     NO JVD    ABDOMEN No hepatosplenomegaly   PEDAL EDEMA present No calf tenderness  NO rash       GENERAL DATA .     GOC.  ..10/7/2023 full code    ICU STAY. .. 10/7/2023   COVID. ..    BEST PRACTICE ISSUES.    HOB ELEVATN. .. Yes  DVT PPLX. ..  10/8/2023 iv ufh     SPEECH SWALLOW RECOMMENDATIONS.    ..        DIET.  ..  10/7 regl   IV fl ... 10/8 lr 125   STRESS ULCER PPLX. ..      INFECTION PPLX. ..     ALLGY. ..  clozapine lithium aspartate                        WT. ..  10/8/2023 89   BMI. ..    10/8/2023 32     ABGS.   .     VS/ PO/IO/ VENT/ DRIPS.   10/8/2023 afeb 80 140/90   10/8/2023 ra 94%     DOA C/C.     . as per EMS crew  " He is hypotensive, the staff initially called for chest pain   " PMH Anemia Schizophrenia Anxiety , Pt is awake but restless as per EMS crew  " He is hypotensive, the staff initially called for chest pain   " PMH Anemia Schizophrenia Anxiety , Pt is awake but restless  PRESENTATION.   . 10/7/2023 67-year-old male with history of schizophrenia, dementia, diabetes, brought by ambulance from [facility] for evaluation of altered mental status today.  Transfer paper states patient had chest pain and was briefly unresponsive.  Patient not answering questions.  Just ranting that he does not want any needles.  Will not cooperate with questions or exam.  His PCP is Dr. Murtaza Oneill.  . 10/7/2023 Pulm consulted   PMH.   . Details unclear at this time   HOME MEDS.   COURSE.     PROBLEMS/ASSESSMENT/RECOMMENDATIONS (A/R).  PULM.  .. O2 as needed to keep po betw 90 and 95%    INFECTION.   .. W 10/7-10/8/2023 w 5.5- 4.8    .. 10/7/2023 1:14 PM pt already given dose of rocephin   .. 10/7 rocephin   .. Check blod urine cs     CARDIAC.  . Lacticemia  .. La 10/7/2023 la 3.9   .. Cautious fluids monitor     . RO MI   .. Tr 10/7-10/8/2023 Tr 208 - 3403  .. ck 10/8/2023 622   .. 10/7 asa 81   .. 10/7/2023 1:11 PM pt already seen by Cardio   .. consider cath     . Shock  .. 10/7/2023 1:15 PM pt already got 2l ns bolus   .. 10/7/2023 Cardio recommends pressors if bp remains low   .. 10/7/2023 plan levophed if bp remains low     . CHF  .. Bnp 10/7-10/8/2023 bnp 179- 2863   .. CXR possible chf   .. 10/7/2023 Check echo    . A fib   .. 10/7/2023 Cardizem as needed  .. 10/7/2023 may not be ble to use cardizem drip as pt is hypotensive   .. 10/7/2023 anticoagn      HEMAT.  .. Hb 10/7-10/8/2023 Hb 13 - 11.9   .. INR 10/7/2023 inr 1   .. monitor     RENAL.  .. Na 10/7/2023 Na 134   .. Cr 10/7/2023 Cr 1.6   .. Fluids monitor     NEURO.  .. ct h 10/7/2023 (-)     MAIN ISSUES.  . Sepsis   . Lacticemia   . RO MI   . Shock 10/7/2023   . A fib rvr   . HUBERT     TIME SPENT.   . Over 36 minutes aggregate care time spent on encounter; activities included   direct patient care, counseling and/or coordinating care reviewing notes, lab data/ imaging , discussion with multidisciplinary team/ patient  /family and explaining in detail risks, benefits, alternatives  of the recommendations     CLIFFORD ABDALLA 67 m 10/7/2023 1955 DR CAROLINA JOHNSON

## 2023-10-08 NOTE — ED PROVIDER NOTE - NS ED ATTENDING STATEMENT MOD
I have personally provided the amount of critical care time documented below concurrently with the resident/fellow.  This time excludes time spent on separate procedures and time spent teaching. I have reviewed the resident’s / fellow’s documentation and I agree with the history, exam, and assessment and plan of care. This was a shared visit with the ETHAN. I reviewed and verified the documentation and independently performed the documented:

## 2023-10-09 LAB
ALBUMIN SERPL ELPH-MCNC: 3.6 G/DL — SIGNIFICANT CHANGE UP (ref 3.3–5)
ALP SERPL-CCNC: 86 U/L — SIGNIFICANT CHANGE UP (ref 40–120)
ALT FLD-CCNC: 19 U/L — SIGNIFICANT CHANGE UP (ref 10–45)
ANION GAP SERPL CALC-SCNC: 15 MMOL/L — SIGNIFICANT CHANGE UP (ref 5–17)
APTT BLD: 145.8 SEC — CRITICAL HIGH (ref 24.5–35.6)
APTT BLD: 38.3 SEC — HIGH (ref 24.5–35.6)
APTT BLD: 53.8 SEC — HIGH (ref 24.5–35.6)
AST SERPL-CCNC: 58 U/L — HIGH (ref 10–40)
BASOPHILS # BLD AUTO: 0.05 K/UL — SIGNIFICANT CHANGE UP (ref 0–0.2)
BASOPHILS NFR BLD AUTO: 0.8 % — SIGNIFICANT CHANGE UP (ref 0–2)
BILIRUB SERPL-MCNC: 0.2 MG/DL — SIGNIFICANT CHANGE UP (ref 0.2–1.2)
BUN SERPL-MCNC: 21 MG/DL — SIGNIFICANT CHANGE UP (ref 7–23)
CALCIUM SERPL-MCNC: 9.3 MG/DL — SIGNIFICANT CHANGE UP (ref 8.4–10.5)
CHLORIDE SERPL-SCNC: 97 MMOL/L — SIGNIFICANT CHANGE UP (ref 96–108)
CHOLEST SERPL-MCNC: 214 MG/DL — HIGH
CO2 SERPL-SCNC: 21 MMOL/L — LOW (ref 22–31)
CREAT SERPL-MCNC: 1.09 MG/DL — SIGNIFICANT CHANGE UP (ref 0.5–1.3)
CULTURE RESULTS: SIGNIFICANT CHANGE UP
EGFR: 74 ML/MIN/1.73M2 — SIGNIFICANT CHANGE UP
EOSINOPHIL # BLD AUTO: 0.12 K/UL — SIGNIFICANT CHANGE UP (ref 0–0.5)
EOSINOPHIL NFR BLD AUTO: 1.9 % — SIGNIFICANT CHANGE UP (ref 0–6)
GLUCOSE BLDC GLUCOMTR-MCNC: 142 MG/DL — HIGH (ref 70–99)
GLUCOSE BLDC GLUCOMTR-MCNC: 169 MG/DL — HIGH (ref 70–99)
GLUCOSE BLDC GLUCOMTR-MCNC: 180 MG/DL — HIGH (ref 70–99)
GLUCOSE BLDC GLUCOMTR-MCNC: 215 MG/DL — HIGH (ref 70–99)
GLUCOSE BLDC GLUCOMTR-MCNC: 257 MG/DL — HIGH (ref 70–99)
GLUCOSE SERPL-MCNC: 154 MG/DL — HIGH (ref 70–99)
HCT VFR BLD CALC: 34.5 % — LOW (ref 39–50)
HDLC SERPL-MCNC: 42 MG/DL — SIGNIFICANT CHANGE UP
HGB BLD-MCNC: 11.7 G/DL — LOW (ref 13–17)
IMM GRANULOCYTES NFR BLD AUTO: 1.1 % — HIGH (ref 0–0.9)
LIPID PNL WITH DIRECT LDL SERPL: 120 MG/DL — HIGH
LYMPHOCYTES # BLD AUTO: 1.69 K/UL — SIGNIFICANT CHANGE UP (ref 1–3.3)
LYMPHOCYTES # BLD AUTO: 27 % — SIGNIFICANT CHANGE UP (ref 13–44)
MCHC RBC-ENTMCNC: 30.3 PG — SIGNIFICANT CHANGE UP (ref 27–34)
MCHC RBC-ENTMCNC: 33.9 GM/DL — SIGNIFICANT CHANGE UP (ref 32–36)
MCV RBC AUTO: 89.4 FL — SIGNIFICANT CHANGE UP (ref 80–100)
MONOCYTES # BLD AUTO: 0.64 K/UL — SIGNIFICANT CHANGE UP (ref 0–0.9)
MONOCYTES NFR BLD AUTO: 10.2 % — SIGNIFICANT CHANGE UP (ref 2–14)
NEUTROPHILS # BLD AUTO: 3.68 K/UL — SIGNIFICANT CHANGE UP (ref 1.8–7.4)
NEUTROPHILS NFR BLD AUTO: 59 % — SIGNIFICANT CHANGE UP (ref 43–77)
NON HDL CHOLESTEROL: 172 MG/DL — HIGH
NRBC # BLD: 0 /100 WBCS — SIGNIFICANT CHANGE UP (ref 0–0)
PLATELET # BLD AUTO: 226 K/UL — SIGNIFICANT CHANGE UP (ref 150–400)
POTASSIUM SERPL-MCNC: 4.6 MMOL/L — SIGNIFICANT CHANGE UP (ref 3.5–5.3)
POTASSIUM SERPL-SCNC: 4.6 MMOL/L — SIGNIFICANT CHANGE UP (ref 3.5–5.3)
PROT SERPL-MCNC: 7 G/DL — SIGNIFICANT CHANGE UP (ref 6–8.3)
RBC # BLD: 3.86 M/UL — LOW (ref 4.2–5.8)
RBC # FLD: 13.4 % — SIGNIFICANT CHANGE UP (ref 10.3–14.5)
SODIUM SERPL-SCNC: 133 MMOL/L — LOW (ref 135–145)
SPECIMEN SOURCE: SIGNIFICANT CHANGE UP
TRIGL SERPL-MCNC: 301 MG/DL — HIGH
TROPONIN T, HIGH SENSITIVITY RESULT: 3278 NG/L — HIGH (ref 0–51)
TSH SERPL-MCNC: 3.25 UIU/ML — SIGNIFICANT CHANGE UP (ref 0.27–4.2)
WBC # BLD: 6.25 K/UL — SIGNIFICANT CHANGE UP (ref 3.8–10.5)
WBC # FLD AUTO: 6.25 K/UL — SIGNIFICANT CHANGE UP (ref 3.8–10.5)

## 2023-10-09 PROCEDURE — 93308 TTE F-UP OR LMTD: CPT | Mod: 26

## 2023-10-09 PROCEDURE — 99233 SBSQ HOSP IP/OBS HIGH 50: CPT

## 2023-10-09 PROCEDURE — 93321 DOPPLER ECHO F-UP/LMTD STD: CPT | Mod: 26

## 2023-10-09 RX ORDER — ATORVASTATIN CALCIUM 80 MG/1
80 TABLET, FILM COATED ORAL AT BEDTIME
Refills: 0 | Status: DISCONTINUED | OUTPATIENT
Start: 2023-10-09 | End: 2023-10-12

## 2023-10-09 RX ORDER — HALOPERIDOL DECANOATE 100 MG/ML
3 INJECTION INTRAMUSCULAR ONCE
Refills: 0 | Status: COMPLETED | OUTPATIENT
Start: 2023-10-09 | End: 2023-10-09

## 2023-10-09 RX ADMIN — QUETIAPINE FUMARATE 400 MILLIGRAM(S): 200 TABLET, FILM COATED ORAL at 00:07

## 2023-10-09 RX ADMIN — Medication 2 MILLIGRAM(S): at 05:20

## 2023-10-09 RX ADMIN — Medication 2: at 17:03

## 2023-10-09 RX ADMIN — Medication 81 MILLIGRAM(S): at 12:47

## 2023-10-09 RX ADMIN — Medication 3: at 08:45

## 2023-10-09 RX ADMIN — LOSARTAN POTASSIUM 50 MILLIGRAM(S): 100 TABLET, FILM COATED ORAL at 05:19

## 2023-10-09 RX ADMIN — HEPARIN SODIUM 1000 UNIT(S)/HR: 5000 INJECTION INTRAVENOUS; SUBCUTANEOUS at 20:51

## 2023-10-09 RX ADMIN — HEPARIN SODIUM 700 UNIT(S)/HR: 5000 INJECTION INTRAVENOUS; SUBCUTANEOUS at 07:43

## 2023-10-09 RX ADMIN — HEPARIN SODIUM 700 UNIT(S)/HR: 5000 INJECTION INTRAVENOUS; SUBCUTANEOUS at 07:03

## 2023-10-09 RX ADMIN — Medication 2 MILLIGRAM(S): at 17:04

## 2023-10-09 RX ADMIN — ATORVASTATIN CALCIUM 80 MILLIGRAM(S): 80 TABLET, FILM COATED ORAL at 22:49

## 2023-10-09 RX ADMIN — HEPARIN SODIUM 0 UNIT(S)/HR: 5000 INJECTION INTRAVENOUS; SUBCUTANEOUS at 05:57

## 2023-10-09 RX ADMIN — Medication 88 MICROGRAM(S): at 05:19

## 2023-10-09 RX ADMIN — HALOPERIDOL DECANOATE 3 MILLIGRAM(S): 100 INJECTION INTRAMUSCULAR at 05:28

## 2023-10-09 RX ADMIN — HEPARIN SODIUM 1000 UNIT(S)/HR: 5000 INJECTION INTRAVENOUS; SUBCUTANEOUS at 14:23

## 2023-10-09 RX ADMIN — Medication 100 MILLIGRAM(S): at 22:49

## 2023-10-09 RX ADMIN — CLOPIDOGREL BISULFATE 600 MILLIGRAM(S): 75 TABLET, FILM COATED ORAL at 00:07

## 2023-10-09 RX ADMIN — QUETIAPINE FUMARATE 400 MILLIGRAM(S): 200 TABLET, FILM COATED ORAL at 22:49

## 2023-10-09 RX ADMIN — DIVALPROEX SODIUM 1500 MILLIGRAM(S): 500 TABLET, DELAYED RELEASE ORAL at 17:03

## 2023-10-09 RX ADMIN — RISPERIDONE 3 MILLIGRAM(S): 4 TABLET ORAL at 00:07

## 2023-10-09 RX ADMIN — Medication 25 MILLIGRAM(S): at 05:19

## 2023-10-09 RX ADMIN — RISPERIDONE 3 MILLIGRAM(S): 4 TABLET ORAL at 22:49

## 2023-10-09 RX ADMIN — Medication 25 MILLIGRAM(S): at 17:04

## 2023-10-09 RX ADMIN — CLOPIDOGREL BISULFATE 75 MILLIGRAM(S): 75 TABLET, FILM COATED ORAL at 12:48

## 2023-10-09 RX ADMIN — RISPERIDONE 2 MILLIGRAM(S): 4 TABLET ORAL at 12:47

## 2023-10-09 NOTE — PROGRESS NOTE ADULT - ASSESSMENT
_________________________________________________________________________________________  ========>>  M E D I C A L   A T T E N D I N G    F O L L O W  U P  N O T E  <<=========  -----------------------------------------------------------------------------------------------------    - Patient seen and examined by me earlier today.   - In summary,  RM HORTON is a 67y year old man admitted with NSTEMI  - Patient today overall doing ok, comfortable, eating well, CP free     ==================>> REVIEW OF SYSTEM <<=================    GEN: no fever, no chills, no pain  RESP: no SOB, no cough, no sputum  CVS: no chest pain, no palpitations,  GI: no abdominal pain, no nausea  : no dysuria, no frequency  Neuro: no headache, no dizziness  Derm : no itching, no rash    ==================>> PHYSICAL EXAM <<=================    GEN: A&O X 2 , NAD , comfortable, pleasant, calm , poor historian, disoriented   HEENT: NCAT, PERRL, MMM, hearing intact  Neck: supple , no JVD appreciated  CVS: S1S2 , regular , No M/R/G appreciated  PULM: CTA B/L,  no W/R/R appreciated  ABD.: soft. non tender, non distended,  obese   Extrem: intact pulses , no edema   PSYCH : normal mood,  not anxious          ( Note written / Date of service 10-09-23 ( This is certified to be the same as "ENTERED" date above ( for billing purposes)))    ==================>> MEDICATIONS <<====================    MEDICATIONS  (STANDING):  aspirin enteric coated 81 milliGRAM(s) Oral daily  atorvastatin 80 milliGRAM(s) Oral at bedtime  benztropine 2 milliGRAM(s) Oral two times a day  clopidogrel Tablet 75 milliGRAM(s) Oral daily  dextrose 5%. 1000 milliLiter(s) (50 mL/Hr) IV Continuous <Continuous>  dextrose 5%. 1000 milliLiter(s) (100 mL/Hr) IV Continuous <Continuous>  dextrose 50% Injectable 12.5 Gram(s) IV Push once  dextrose 50% Injectable 25 Gram(s) IV Push once  dextrose 50% Injectable 25 Gram(s) IV Push once  divalproex ER 1500 milliGRAM(s) Oral daily  glucagon  Injectable 1 milliGRAM(s) IntraMuscular once  heparin  Infusion. 700 Unit(s)/Hr (7 mL/Hr) IV Continuous <Continuous>  insulin lispro (ADMELOG) corrective regimen sliding scale   SubCutaneous three times a day before meals  levothyroxine 88 MICROGram(s) Oral daily  losartan 50 milliGRAM(s) Oral daily  metoprolol tartrate 25 milliGRAM(s) Oral every 12 hours  QUEtiapine 400 milliGRAM(s) Oral at bedtime  risperiDONE   Tablet 3 milliGRAM(s) Oral at bedtime  risperiDONE   Tablet 2 milliGRAM(s) Oral daily  traZODone 100 milliGRAM(s) Oral at bedtime    MEDICATIONS  (PRN):  acetaminophen     Tablet .. 650 milliGRAM(s) Oral every 6 hours PRN Temp greater or equal to 38C (100.4F), Mild Pain (1 - 3)  aluminum hydroxide/magnesium hydroxide/simethicone Suspension 30 milliLiter(s) Oral every 4 hours PRN Dyspepsia  dextrose Oral Gel 15 Gram(s) Oral once PRN Blood Glucose LESS THAN 70 milliGRAM(s)/deciliter  heparin   Injectable 5600 Unit(s) IV Push every 6 hours PRN For aPTT less than 40  melatonin 3 milliGRAM(s) Oral at bedtime PRN Insomnia  ondansetron Injectable 4 milliGRAM(s) IV Push every 8 hours PRN Nausea and/or Vomiting    ___________  Active diet:  Diet, Regular:   Consistent Carbohydrate Evening Snack (CSTCHOSN)  DASH/TLC Sodium & Cholesterol Restricted (DASH)  ___________________    ==================>> VITAL SIGNS <<==================    T(C): 36.5 (10-09-23 @ 07:56), Max: 37.2 (10-08-23 @ 21:23)  HR: 69 (10-09-23 @ 07:56) (69 - 86)  BP: 124/82 (10-09-23 @ 07:56) (124/82 - 188/105)  RR: 20 (10-09-23 @ 07:56) (15 - 20)  SpO2: 94% (10-09-23 @ 07:56) (94% - 98%)     CAPILLARY BLOOD GLUCOSE  POCT Blood Glucose.: 142 mg/dL (09 Oct 2023 11:27)  POCT Blood Glucose.: 257 mg/dL (09 Oct 2023 08:33)  POCT Blood Glucose.: 180 mg/dL (09 Oct 2023 07:34)    I&O's Summary    09 Oct 2023 07:01  -  09 Oct 2023 12:37  --------------------------------------------------------  IN: 0 mL / OUT: 1150 mL / NET: -1150 mL     ==================>> LAB AND IMAGING <<==================                        11.7   6.25  )-----------( 226      ( 09 Oct 2023 04:50 )             34.5        10-09    133<L>  |  97  |  21  ----------------------------<  154<H>  4.6   |  21<L>  |  1.09    Sodium:   133  <==, 136  <==, 133  <==, 134  <==    Ca    9.3      09 Oct 2023 06:47  Phos  2.8     10-08    TPro  7.0  /  Alb  3.6  /  TBili  0.2  /  DBili  x   /  AST  58<H>  /  ALT  19  /  AlkPhos  86  10-09    PT/INR - ( 08 Oct 2023 15:55 )   PT: 10.4 sec;   INR: 0.99 ratio    PTT - ( 09 Oct 2023 04:50 )  PTT:145.8 sec              ~~ High Sensitivity Troponin  ~~  3298  <<--, 3403  <<--  >73685.0  <<--, >97044.0  <<--, 208.6  <<--    Pro-Brain Natriuretic Peptide: 2863 pg/mL (10.08.23 @ 15:55)     Urinalysis Basic - ( 09 Oct 2023 06:47 )  Color: x / Appearance: x / SG: x / pH: x  Gluc: 154 mg/dL / Ketone: x  / Bili: x / Urobili: x   Blood: x / Protein: x / Nitrite: x   Leuk Esterase: x / RBC: x / WBC x   Sq Epi: x / Non Sq Epi: x / Bacteria: x    Lipid profile:  (10-09-23)     Total: 214     LDL  : (p)     HDL  :42     TG   :301     HgA1C:   (10-08-23)          (10-08-23)      7.1    ___________________________________________________________________________________  ===============>>  A S S E S S M E N T   A N D   P L A N <<===============  ------------------------------------------------------------------------------------------    · Assessment	  Patient is a 66yo Male with past medical history of schizophrenia, DM, hypothyroidism, HLD, tobacco use presented to [facility] from a nursing home with AMS,   Per records, patient had complained of some chest pain at the nursing home. Patient is a very poor historian. At OSH, patient was noted to be in afib w/ RVR. Initial labs showed lactate 3.9 -> 3, trop I 208 -> <25K, . EKG showed Afib w/ , Q wave in III, ST depressions in V4-V6, II, aVF. Patient was given cardizem IV. Repeat EKG showed sinus rhythm with HR 76, Q wave in III, KRISSY in III, aVF. Case was discussed with interventionalist, patient given ASA, heparin gtt, was transferred to [facility].  At [facility], patient reports no chest pain or discomfort. EKG on arrival shows Q waves in II, III, aVF, no ST segment changes. Patient in sinus, HR 82. Labs showing hsTrop 3403. Lactate 1.1.  Patient seen and eroded by cardiology, and admitted to medicine for further management        Problem/Plan - 1:  ·  Problem: NSTEMI (non-ST elevation myocardial infarction).   ·  Plan: Cardiology evaluation appreciated  Aspirin  Plavix   statin  heparin drip  Monitor vitals, labs  control heart rate  Echocardiogram  Further ischemic work up as per cardiology.    Problem/Plan - 2:  ·  Problem: Atrial fibrillation.   ·  Plan: control heart rate with beta blockers  heparin drip  monitor on telemetry.    Problem/Plan - 3:  ·  Problem: Schizophrenia.   ·  Plan: continue multiple medications as listed   Pharmacy evaluation for reconciliation and review of the medications  psychiatry valuation as needed  Continuous observation.    Problem/Plan - 4:  ·  Problem: Diabetes.   ·  Plan: Hold oral medications  insulin sliding scale   monitor finger sticks  A1c.    Problem/Plan - 5:  ·  Problem: Hypothyroidism.   ·  Plan: Continue home medications and monitor.  TSH.    Problem/Plan - 6:  ·  Problem: HLD (hyperlipidemia).   ·  Plan: statin  lipid profile noted    statin changed to Atorva as recom    --------------------------------------------  Case discussed with med team/ staff  ___________________________  H. DILLON Contreras.  Pager: [phone]

## 2023-10-09 NOTE — PROGRESS NOTE ADULT - SUBJECTIVE AND OBJECTIVE BOX
Cardiology Progress Note  ------------------------------------------------------------------------------------------    SUBJECTIVE/EVENTS::  - Tele:   -     -------------------------------------------------------------------------------------------  ROS:  CV: chest pain (-), palpitation (-), orthopnea (-), PND (-), edema (-)  PULM: SOB (-), cough (-), wheezing (-), hemoptysis (-).   CONST: fever (-), chills (-) or fatigue (-)  GI: abdominal distension (-), abdominal pain (-) , nausea/vomiting (-), hematemesis, (-), melena (-), hematochezia (-)  : dysuria (-), frequency (-), hematuria (-).   NEURO: numbness (-), weakness (-), dizziness (-)  MSK: myalgia (-), joint pain (-)   SKIN: itching (-), rash (-)  HEENT:  visual changes (-); vertigo or throat pain (-);  neck stiffness (-)   Psych: change in mood (-), anxiety (-), depression (-)     All other review of systems is negative unless indicated above.   -------------------------------------------------------------------------------------------  VS:  T(F): 97.7 (10-09), Max: 98.9 (10-08)  HR: 69 (10-09) (69 - 86)  BP: 124/82 (10-09) (115/60 - 188/105)  RR: 20 (10-09)  SpO2: 94% (10-09)  I&O's Summary    ------------------------------------------------------------------------------------------  PHYSICAL EXAM:  GEN: NAD, sitting in bed  HEENT: NCAT, EOMI  CV: RRR, Ns1/s2, no m/r/g, JVP not elevated  RESP: CTA B/l, no w/r/r  ABD: soft, nt, nd  Ext: warm, 2+ pulses, no edema  Neuro: AAOx3, no focal deficits    -------------------------------------------------------------------------------------------  LABS:                          11.7   6.25  )-----------( 226      ( 09 Oct 2023 04:50 )             34.5     10-09    133<L>  |  97  |  21  ----------------------------<  154<H>  4.6   |  21<L>  |  1.09    Ca    9.3      09 Oct 2023 06:47  Phos  2.8     10-08    TPro  7.0  /  Alb  3.6  /  TBili  0.2  /  DBili  x   /  AST  58<H>  /  ALT  19  /  AlkPhos  86  10-09    PT/INR - ( 08 Oct 2023 15:55 )   PT: 10.4 sec;   INR: 0.99 ratio         PTT - ( 09 Oct 2023 04:50 )  PTT:145.8 sec  CARDIAC MARKERS ( 08 Oct 2023 22:23 )  3298 ng/L / x     / x     / x     / x     / x      CARDIAC MARKERS ( 08 Oct 2023 15:55 )  3403 ng/L / x     / x     / 622 U/L / x     / x      CARDIAC MARKERS ( 08 Oct 2023 06:47 )  x     / x     / x     / 951 U/L / x     / x      CARDIAC MARKERS ( 07 Oct 2023 14:00 )  x     / x     / x     / 90 U/L / x     / x                -------------------------------------------------------------------------------------------  Meds:  acetaminophen     Tablet .. 650 milliGRAM(s) Oral every 6 hours PRN  aluminum hydroxide/magnesium hydroxide/simethicone Suspension 30 milliLiter(s) Oral every 4 hours PRN  aspirin enteric coated 81 milliGRAM(s) Oral daily  benztropine 2 milliGRAM(s) Oral two times a day  clopidogrel Tablet 75 milliGRAM(s) Oral daily  dextrose 5%. 1000 milliLiter(s) IV Continuous <Continuous>  dextrose 5%. 1000 milliLiter(s) IV Continuous <Continuous>  dextrose 50% Injectable 12.5 Gram(s) IV Push once  dextrose 50% Injectable 25 Gram(s) IV Push once  dextrose 50% Injectable 25 Gram(s) IV Push once  dextrose Oral Gel 15 Gram(s) Oral once PRN  divalproex ER 1500 milliGRAM(s) Oral daily  glucagon  Injectable 1 milliGRAM(s) IntraMuscular once  heparin   Injectable 5600 Unit(s) IV Push every 6 hours PRN  heparin  Infusion. 700 Unit(s)/Hr IV Continuous <Continuous>  insulin lispro (ADMELOG) corrective regimen sliding scale   SubCutaneous three times a day before meals  levothyroxine 88 MICROGram(s) Oral daily  losartan 50 milliGRAM(s) Oral daily  melatonin 3 milliGRAM(s) Oral at bedtime PRN  metoprolol tartrate 25 milliGRAM(s) Oral every 12 hours  ondansetron Injectable 4 milliGRAM(s) IV Push every 8 hours PRN  QUEtiapine 400 milliGRAM(s) Oral at bedtime  risperiDONE   Tablet 3 milliGRAM(s) Oral at bedtime  risperiDONE   Tablet 2 milliGRAM(s) Oral daily  simvastatin 20 milliGRAM(s) Oral at bedtime  traZODone 100 milliGRAM(s) Oral at bedtime    -------------------------------------------------------------------------------------------  Cardiovascular Diagnostic Testing:      -------------------------------------------------------------------------------------------  Assessment and Plan:   67M with schizophrenia, DM, hypothyroidism, HTN, HLD, tobacco use presenting from a nursing home with AMS and reported CP, noted to be in rapid afib, spont converted to normal sinus. Also with ischemic changes on EKG, w/ Q waves inferiorly and troponemia, now chest pain free. Patient presentation fits with demand ischemia 2/2 afib with RVR vs NSTEMI.    1. AFib - noted to be in new AFib w/ RVR, spontaneously converted to NSR and rate controlled. Started on metop and AC. [id number] is 3  2. NSTEMI - Pt had reported CP at the nursing home but by eval in the hospitalization was CP free. Trop nopted to be 3400 > 3200 and EKG showed Q waves in the inferior leads.     Recommendations:  - continue w/ ASA, plavix  - continue w/ heparin gtt for 48hr  - please change simvstatain to atorva 80mg qd  - obtain TTE  - continue metop 25mg BID  - please contact pts guardian /   - continue losartan,       *** Recommendations are preliminary until cosigned by the attending.    Alberto Saunders MD  Cardiology Fellow    For all New Consults and Questions:  [email or web address]   Login: [facility] Progress Note  ------------------------------------------------------------------------------------------    SUBJECTIVE/EVENTS::  - Tele:   -     -------------------------------------------------------------------------------------------  ROS:  CV: chest pain (-), palpitation (-), orthopnea (-), PND (-), edema (-)  PULM: SOB (-), cough (-), wheezing (-), hemoptysis (-).   CONST: fever (-), chills (-) or fatigue (-)  GI: abdominal distension (-), abdominal pain (-) , nausea/vomiting (-), hematemesis, (-), melena (-), hematochezia (-)  : dysuria (-), frequency (-), hematuria (-).   NEURO: numbness (-), weakness (-), dizziness (-)  MSK: myalgia (-), joint pain (-)   SKIN: itching (-), rash (-)  HEENT:  visual changes (-); vertigo or throat pain (-);  neck stiffness (-)   Psych: change in mood (-), anxiety (-), depression (-)     All other review of systems is negative unless indicated above.   -------------------------------------------------------------------------------------------  VS:  T(F): 97.7 (10-09), Max: 98.9 (10-08)  HR: 69 (10-09) (69 - 86)  BP: 124/82 (10-09) (115/60 - 188/105)  RR: 20 (10-09)  SpO2: 94% (10-09)  I&O's Summary    ------------------------------------------------------------------------------------------  PHYSICAL EXAM:  GEN: NAD, sitting in bed  HEENT: NCAT, EOMI  CV: RRR, Ns1/s2, no m/r/g, JVP not elevated  RESP: CTA B/l, no w/r/r  ABD: soft, nt, nd  Ext: warm, 2+ pulses, no edema  Neuro: AAOx3, no focal deficits    -------------------------------------------------------------------------------------------  LABS:                          11.7   6.25  )-----------( 226      ( 09 Oct 2023 04:50 )             34.5     10-09    133<L>  |  97  |  21  ----------------------------<  154<H>  4.6   |  21<L>  |  1.09    Ca    9.3      09 Oct 2023 06:47  Phos  2.8     10-08    TPro  7.0  /  Alb  3.6  /  TBili  0.2  /  DBili  x   /  AST  58<H>  /  ALT  19  /  AlkPhos  86  10-09    PT/INR - ( 08 Oct 2023 15:55 )   PT: 10.4 sec;   INR: 0.99 ratio         PTT - ( 09 Oct 2023 04:50 )  PTT:145.8 sec  CARDIAC MARKERS ( 08 Oct 2023 22:23 )  3298 ng/L / x     / x     / x     / x     / x      CARDIAC MARKERS ( 08 Oct 2023 15:55 )  3403 ng/L / x     / x     / 622 U/L / x     / x      CARDIAC MARKERS ( 08 Oct 2023 06:47 )  x     / x     / x     / 951 U/L / x     / x      CARDIAC MARKERS ( 07 Oct 2023 14:00 )  x     / x     / x     / 90 U/L / x     / x                -------------------------------------------------------------------------------------------  Meds:  acetaminophen     Tablet .. 650 milliGRAM(s) Oral every 6 hours PRN  aluminum hydroxide/magnesium hydroxide/simethicone Suspension 30 milliLiter(s) Oral every 4 hours PRN  aspirin enteric coated 81 milliGRAM(s) Oral daily  benztropine 2 milliGRAM(s) Oral two times a day  clopidogrel Tablet 75 milliGRAM(s) Oral daily  dextrose 5%. 1000 milliLiter(s) IV Continuous <Continuous>  dextrose 5%. 1000 milliLiter(s) IV Continuous <Continuous>  dextrose 50% Injectable 12.5 Gram(s) IV Push once  dextrose 50% Injectable 25 Gram(s) IV Push once  dextrose 50% Injectable 25 Gram(s) IV Push once  dextrose Oral Gel 15 Gram(s) Oral once PRN  divalproex ER 1500 milliGRAM(s) Oral daily  glucagon  Injectable 1 milliGRAM(s) IntraMuscular once  heparin   Injectable 5600 Unit(s) IV Push every 6 hours PRN  heparin  Infusion. 700 Unit(s)/Hr IV Continuous <Continuous>  insulin lispro (ADMELOG) corrective regimen sliding scale   SubCutaneous three times a day before meals  levothyroxine 88 MICROGram(s) Oral daily  losartan 50 milliGRAM(s) Oral daily  melatonin 3 milliGRAM(s) Oral at bedtime PRN  metoprolol tartrate 25 milliGRAM(s) Oral every 12 hours  ondansetron Injectable 4 milliGRAM(s) IV Push every 8 hours PRN  QUEtiapine 400 milliGRAM(s) Oral at bedtime  risperiDONE   Tablet 3 milliGRAM(s) Oral at bedtime  risperiDONE   Tablet 2 milliGRAM(s) Oral daily  simvastatin 20 milliGRAM(s) Oral at bedtime  traZODone 100 milliGRAM(s) Oral at bedtime    -------------------------------------------------------------------------------------------  Cardiovascular Diagnostic Testing:      -------------------------------------------------------------------------------------------  Assessment and Plan:   67M with schizophrenia, DM, hypothyroidism, HTN, HLD, tobacco use presenting from a nursing home with AMS and reported CP, noted to be in rapid afib, spont converted to normal sinus. Also with ischemic changes on EKG, w/ Q waves inferiorly and troponemia, now chest pain free. Patient presentation fits with demand ischemia 2/2 afib with RVR vs NSTEMI.    1. AFib - noted to be in AFib w/ RVR (unclear if new, not on BB or AC at home), spontaneously converted to NSR and rate controlled. Started on metop and AC. [id number] is 3 (age, HTN, DM), HAS-BLED is 2.  2. NSTEMI - Pt had reported CP at the nursing home but by eval in the hospitalization was CP free. Trop nopted to be 3400 > 3200 and EKG showed Q waves in the inferior leads.     Recommendations:  - continue w/ ASA, plavix  - continue w/ heparin gtt for 48hr  - please change simvastatin to atorva 80mg qd  - obtain TTE  - continue metop 25mg BID  - please contact pts guardian to discuss ongoing medical issues  - continue losartan    *** Recommendations are preliminary until cosigned by the attending.    Alberto Saunders MD  Cardiology Fellow    For all New Consults and Questions:  [email or web address]   Login: [facility] Progress Note  ------------------------------------------------------------------------------------------    SUBJECTIVE/EVENTS::  - Tele: No events  - Reports no chest pain, and no SOB    -------------------------------------------------------------------------------------------  ROS:  Pt unable to anwer questions    All other review of systems is negative unless indicated above.   -------------------------------------------------------------------------------------------  VS:  T(F): 97.7 (10-09), Max: 98.9 (10-08)  HR: 69 (10-09) (69 - 86)  BP: 124/82 (10-09) (115/60 - 188/105)  RR: 20 (10-09)  SpO2: 94% (10-09)  I&O's Summary    ------------------------------------------------------------------------------------------  PHYSICAL EXAM:  Pt refusing physical  GEN: NAD, sitting in bed    -------------------------------------------------------------------------------------------  LABS:                          11.7   6.25  )-----------( 226      ( 09 Oct 2023 04:50 )             34.5     10-09    133<L>  |  97  |  21  ----------------------------<  154<H>  4.6   |  21<L>  |  1.09    Ca    9.3      09 Oct 2023 06:47  Phos  2.8     10-08    TPro  7.0  /  Alb  3.6  /  TBili  0.2  /  DBili  x   /  AST  58<H>  /  ALT  19  /  AlkPhos  86  10-09    PT/INR - ( 08 Oct 2023 15:55 )   PT: 10.4 sec;   INR: 0.99 ratio         PTT - ( 09 Oct 2023 04:50 )  PTT:145.8 sec  CARDIAC MARKERS ( 08 Oct 2023 22:23 )  3298 ng/L / x     / x     / x     / x     / x      CARDIAC MARKERS ( 08 Oct 2023 15:55 )  3403 ng/L / x     / x     / 622 U/L / x     / x      CARDIAC MARKERS ( 08 Oct 2023 06:47 )  x     / x     / x     / 951 U/L / x     / x      CARDIAC MARKERS ( 07 Oct 2023 14:00 )  x     / x     / x     / 90 U/L / x     / x                -------------------------------------------------------------------------------------------  Meds:  acetaminophen     Tablet .. 650 milliGRAM(s) Oral every 6 hours PRN  aluminum hydroxide/magnesium hydroxide/simethicone Suspension 30 milliLiter(s) Oral every 4 hours PRN  aspirin enteric coated 81 milliGRAM(s) Oral daily  benztropine 2 milliGRAM(s) Oral two times a day  clopidogrel Tablet 75 milliGRAM(s) Oral daily  dextrose 5%. 1000 milliLiter(s) IV Continuous <Continuous>  dextrose 5%. 1000 milliLiter(s) IV Continuous <Continuous>  dextrose 50% Injectable 12.5 Gram(s) IV Push once  dextrose 50% Injectable 25 Gram(s) IV Push once  dextrose 50% Injectable 25 Gram(s) IV Push once  dextrose Oral Gel 15 Gram(s) Oral once PRN  divalproex ER 1500 milliGRAM(s) Oral daily  glucagon  Injectable 1 milliGRAM(s) IntraMuscular once  heparin   Injectable 5600 Unit(s) IV Push every 6 hours PRN  heparin  Infusion. 700 Unit(s)/Hr IV Continuous <Continuous>  insulin lispro (ADMELOG) corrective regimen sliding scale   SubCutaneous three times a day before meals  levothyroxine 88 MICROGram(s) Oral daily  losartan 50 milliGRAM(s) Oral daily  melatonin 3 milliGRAM(s) Oral at bedtime PRN  metoprolol tartrate 25 milliGRAM(s) Oral every 12 hours  ondansetron Injectable 4 milliGRAM(s) IV Push every 8 hours PRN  QUEtiapine 400 milliGRAM(s) Oral at bedtime  risperiDONE   Tablet 3 milliGRAM(s) Oral at bedtime  risperiDONE   Tablet 2 milliGRAM(s) Oral daily  simvastatin 20 milliGRAM(s) Oral at bedtime  traZODone 100 milliGRAM(s) Oral at bedtime    -------------------------------------------------------------------------------------------  Cardiovascular Diagnostic Testing:      -------------------------------------------------------------------------------------------  Assessment and Plan:   67M with schizophrenia, DM, hypothyroidism, HTN, HLD, tobacco use presenting from a nursing home with AMS and reported CP, noted to be in rapid afib, spont converted to normal sinus. Also with ischemic changes on EKG, w/ Q waves inferiorly and troponemia, now chest pain free. Patient presentation fits with demand ischemia 2/2 afib with RVR vs NSTEMI.    1. AFib - noted to be in AFib w/ RVR (unclear if new, not on BB or AC at home), spontaneously converted to NSR and rate controlled. Started on metop and AC. [id number] is 3 (age, HTN, DM), HAS-BLED is 2.  2. NSTEMI - Pt had reported CP at the nursing home but by eval in the hospitalization was CP free. Trop nopted to be 3400 > 3200 and EKG showed Q waves in the inferior leads.     Recommendations:  - continue w/ ASA, plavix  - continue w/ heparin gtt for 48hr  - please change simvastatin to atorva 80mg qd  - obtain TTE  - continue metop 25mg BID  - please contact pts family to find proxy   - continue losartan    *** Recommendations are preliminary until cosigned by the attending.    Alberto Saunders MD  Cardiology Fellow    For all New Consults and Questions:  [email or web address]   Login: [facility]

## 2023-10-10 LAB
ANION GAP SERPL CALC-SCNC: 13 MMOL/L — SIGNIFICANT CHANGE UP (ref 5–17)
ANION GAP SERPL CALC-SCNC: 15 MMOL/L — SIGNIFICANT CHANGE UP (ref 5–17)
APTT BLD: 61.2 SEC — HIGH (ref 24.5–35.6)
BUN SERPL-MCNC: 18 MG/DL — SIGNIFICANT CHANGE UP (ref 7–23)
BUN SERPL-MCNC: 18 MG/DL — SIGNIFICANT CHANGE UP (ref 7–23)
CALCIUM SERPL-MCNC: 9.1 MG/DL — SIGNIFICANT CHANGE UP (ref 8.4–10.5)
CALCIUM SERPL-MCNC: 9.1 MG/DL — SIGNIFICANT CHANGE UP (ref 8.4–10.5)
CHLORIDE SERPL-SCNC: 92 MMOL/L — LOW (ref 96–108)
CHLORIDE SERPL-SCNC: 94 MMOL/L — LOW (ref 96–108)
CO2 SERPL-SCNC: 19 MMOL/L — LOW (ref 22–31)
CO2 SERPL-SCNC: 22 MMOL/L — SIGNIFICANT CHANGE UP (ref 22–31)
CREAT SERPL-MCNC: 0.98 MG/DL — SIGNIFICANT CHANGE UP (ref 0.5–1.3)
CREAT SERPL-MCNC: 1.15 MG/DL — SIGNIFICANT CHANGE UP (ref 0.5–1.3)
CRP SERPL-MCNC: 22 MG/L — HIGH (ref 0–4)
EGFR: 70 ML/MIN/1.73M2 — SIGNIFICANT CHANGE UP
EGFR: 85 ML/MIN/1.73M2 — SIGNIFICANT CHANGE UP
ERYTHROCYTE [SEDIMENTATION RATE] IN BLOOD: 31 MM/HR — HIGH (ref 0–20)
GLUCOSE BLDC GLUCOMTR-MCNC: 149 MG/DL — HIGH (ref 70–99)
GLUCOSE BLDC GLUCOMTR-MCNC: 182 MG/DL — HIGH (ref 70–99)
GLUCOSE SERPL-MCNC: 160 MG/DL — HIGH (ref 70–99)
GLUCOSE SERPL-MCNC: 236 MG/DL — HIGH (ref 70–99)
HCT VFR BLD CALC: 35.6 % — LOW (ref 39–50)
HGB BLD-MCNC: 12.1 G/DL — LOW (ref 13–17)
MCHC RBC-ENTMCNC: 30.4 PG — SIGNIFICANT CHANGE UP (ref 27–34)
MCHC RBC-ENTMCNC: 34 GM/DL — SIGNIFICANT CHANGE UP (ref 32–36)
MCV RBC AUTO: 89.4 FL — SIGNIFICANT CHANGE UP (ref 80–100)
MRSA PCR RESULT.: SIGNIFICANT CHANGE UP
NRBC # BLD: 0 /100 WBCS — SIGNIFICANT CHANGE UP (ref 0–0)
PLATELET # BLD AUTO: 234 K/UL — SIGNIFICANT CHANGE UP (ref 150–400)
POTASSIUM SERPL-MCNC: 4.4 MMOL/L — SIGNIFICANT CHANGE UP (ref 3.5–5.3)
POTASSIUM SERPL-MCNC: 4.6 MMOL/L — SIGNIFICANT CHANGE UP (ref 3.5–5.3)
POTASSIUM SERPL-SCNC: 4.4 MMOL/L — SIGNIFICANT CHANGE UP (ref 3.5–5.3)
POTASSIUM SERPL-SCNC: 4.6 MMOL/L — SIGNIFICANT CHANGE UP (ref 3.5–5.3)
RBC # BLD: 3.98 M/UL — LOW (ref 4.2–5.8)
RBC # FLD: 13.4 % — SIGNIFICANT CHANGE UP (ref 10.3–14.5)
S AUREUS DNA NOSE QL NAA+PROBE: DETECTED
SODIUM SERPL-SCNC: 127 MMOL/L — LOW (ref 135–145)
SODIUM SERPL-SCNC: 128 MMOL/L — LOW (ref 135–145)
TROPONIN T, HIGH SENSITIVITY RESULT: 2990 NG/L — HIGH (ref 0–51)
WBC # BLD: 5.79 K/UL — SIGNIFICANT CHANGE UP (ref 3.8–10.5)
WBC # FLD AUTO: 5.79 K/UL — SIGNIFICANT CHANGE UP (ref 3.8–10.5)

## 2023-10-10 PROCEDURE — 99233 SBSQ HOSP IP/OBS HIGH 50: CPT

## 2023-10-10 RX ORDER — HEPARIN SODIUM 5000 [USP'U]/ML
4000 INJECTION INTRAVENOUS; SUBCUTANEOUS EVERY 6 HOURS
Refills: 0 | Status: DISCONTINUED | OUTPATIENT
Start: 2023-10-10 | End: 2023-10-10

## 2023-10-10 RX ORDER — APIXABAN 2.5 MG/1
5 TABLET, FILM COATED ORAL EVERY 12 HOURS
Refills: 0 | Status: DISCONTINUED | OUTPATIENT
Start: 2023-10-10 | End: 2023-10-10

## 2023-10-10 RX ORDER — APIXABAN 2.5 MG/1
5 TABLET, FILM COATED ORAL
Refills: 0 | Status: DISCONTINUED | OUTPATIENT
Start: 2023-10-10 | End: 2023-10-12

## 2023-10-10 RX ORDER — HEPARIN SODIUM 5000 [USP'U]/ML
700 INJECTION INTRAVENOUS; SUBCUTANEOUS
Qty: 25000 | Refills: 0 | Status: DISCONTINUED | OUTPATIENT
Start: 2023-10-10 | End: 2023-10-10

## 2023-10-10 RX ORDER — CHLORHEXIDINE GLUCONATE 213 G/1000ML
1 SOLUTION TOPICAL
Refills: 0 | Status: DISCONTINUED | OUTPATIENT
Start: 2023-10-10 | End: 2023-10-12

## 2023-10-10 RX ORDER — APIXABAN 2.5 MG/1
5 TABLET, FILM COATED ORAL
Refills: 0 | Status: DISCONTINUED | OUTPATIENT
Start: 2023-10-10 | End: 2023-10-10

## 2023-10-10 RX ORDER — SODIUM CHLORIDE 9 MG/ML
1 INJECTION INTRAMUSCULAR; INTRAVENOUS; SUBCUTANEOUS
Refills: 0 | Status: DISCONTINUED | OUTPATIENT
Start: 2023-10-10 | End: 2023-10-12

## 2023-10-10 RX ADMIN — Medication 2 MILLIGRAM(S): at 17:21

## 2023-10-10 RX ADMIN — HEPARIN SODIUM 1000 UNIT(S)/HR: 5000 INJECTION INTRAVENOUS; SUBCUTANEOUS at 07:27

## 2023-10-10 RX ADMIN — ATORVASTATIN CALCIUM 80 MILLIGRAM(S): 80 TABLET, FILM COATED ORAL at 21:26

## 2023-10-10 RX ADMIN — Medication 100 MILLIGRAM(S): at 21:26

## 2023-10-10 RX ADMIN — DIVALPROEX SODIUM 1500 MILLIGRAM(S): 500 TABLET, DELAYED RELEASE ORAL at 11:06

## 2023-10-10 RX ADMIN — LOSARTAN POTASSIUM 50 MILLIGRAM(S): 100 TABLET, FILM COATED ORAL at 05:33

## 2023-10-10 RX ADMIN — HEPARIN SODIUM 1000 UNIT(S)/HR: 5000 INJECTION INTRAVENOUS; SUBCUTANEOUS at 03:49

## 2023-10-10 RX ADMIN — Medication 25 MILLIGRAM(S): at 17:21

## 2023-10-10 RX ADMIN — Medication 25 MILLIGRAM(S): at 05:33

## 2023-10-10 RX ADMIN — RISPERIDONE 2 MILLIGRAM(S): 4 TABLET ORAL at 11:06

## 2023-10-10 RX ADMIN — Medication 2 MILLIGRAM(S): at 05:33

## 2023-10-10 RX ADMIN — Medication 88 MICROGRAM(S): at 05:33

## 2023-10-10 RX ADMIN — CHLORHEXIDINE GLUCONATE 1 APPLICATION(S): 213 SOLUTION TOPICAL at 12:37

## 2023-10-10 RX ADMIN — QUETIAPINE FUMARATE 400 MILLIGRAM(S): 200 TABLET, FILM COATED ORAL at 21:25

## 2023-10-10 RX ADMIN — APIXABAN 5 MILLIGRAM(S): 2.5 TABLET, FILM COATED ORAL at 22:49

## 2023-10-10 RX ADMIN — SODIUM CHLORIDE 1 GRAM(S): 9 INJECTION INTRAMUSCULAR; INTRAVENOUS; SUBCUTANEOUS at 17:21

## 2023-10-10 RX ADMIN — APIXABAN 5 MILLIGRAM(S): 2.5 TABLET, FILM COATED ORAL at 11:04

## 2023-10-10 RX ADMIN — RISPERIDONE 3 MILLIGRAM(S): 4 TABLET ORAL at 21:27

## 2023-10-10 RX ADMIN — Medication 81 MILLIGRAM(S): at 11:05

## 2023-10-10 NOTE — PATIENT PROFILE ADULT - FALL HARM RISK - HARM RISK INTERVENTIONS

## 2023-10-10 NOTE — PROGRESS NOTE ADULT - SUBJECTIVE AND OBJECTIVE BOX
Cardiology Progress Note  ------------------------------------------------------------------------------------------    SUBJECTIVE/EVENTS::  - Tele:   - NAEO    -------------------------------------------------------------------------------------------  ROS:  CV: chest pain (-), palpitation (-), orthopnea (-), PND (-), edema (-)  PULM: SOB (-), cough (-), wheezing (-), hemoptysis (-).   CONST: fever (-), chills (-) or fatigue (-)  GI: abdominal distension (-), abdominal pain (-) , nausea/vomiting (-), hematemesis, (-), melena (-), hematochezia (-)  : dysuria (-), frequency (-), hematuria (-).   NEURO: numbness (-), weakness (-), dizziness (-)  MSK: myalgia (-), joint pain (-)   SKIN: itching (-), rash (-)  HEENT:  visual changes (-); vertigo or throat pain (-);  neck stiffness (-)   Psych: change in mood (-), anxiety (-), depression (-)     All other review of systems is negative unless indicated above.   -------------------------------------------------------------------------------------------  VS:  T(F): 97.4 (10-10), Max: 97.8 (10-)  HR: 71 (10-10) (69 - 89)  BP: 130/80 (10-10) (124/82 - 174/100)  RR: 18 (10-10)  SpO2: 95% (10-10)  I&O's Summary    09 Oct 2023 07:01  -  10 Oct 2023 06:18  --------------------------------------------------------  IN: 900 mL / OUT: 3100 mL / NET: -2200 mL      ------------------------------------------------------------------------------------------  PHYSICAL EXAM:  Pt refusing physical  GEN: NAD, sitting in bed    -------------------------------------------------------------------------------------------  LABS:                          11.7   6.25  )-----------( 226      ( 09 Oct 2023 04:50 )             34.5     10-    133<L>  |  97  |  21  ----------------------------<  154<H>  4.6   |  21<L>  |  1.09    Ca    9.3      09 Oct 2023 06:47  Phos  2.8     10-08    TPro  7.0  /  Alb  3.6  /  TBili  0.2  /  DBili  x   /  AST  58<H>  /  ALT  19  /  AlkPhos  86  10-09    PT/INR - ( 08 Oct 2023 15:55 )   PT: 10.4 sec;   INR: 0.99 ratio         PTT - ( 10 Oct 2023 02:58 )  PTT:61.2 sec  CARDIAC MARKERS ( 09 Oct 2023 17:46 )  3278 ng/L / x     / x     / x     / x     / x      CARDIAC MARKERS ( 08 Oct 2023 22:23 )  3298 ng/L / x     / x     / x     / x     / x      CARDIAC MARKERS ( 08 Oct 2023 15:55 )  3403 ng/L / x     / x     / 622 U/L / x     / x      CARDIAC MARKERS ( 08 Oct 2023 06:47 )  x     / x     / x     / 951 U/L / x     / x      CARDIAC MARKERS ( 07 Oct 2023 14:00 )  x     / x     / x     / 90 U/L / x     / x          Total Cholesterol: 214  LDL: --  HDL: 42  T        -------------------------------------------------------------------------------------------  Meds:  acetaminophen     Tablet .. 650 milliGRAM(s) Oral every 6 hours PRN  aluminum hydroxide/magnesium hydroxide/simethicone Suspension 30 milliLiter(s) Oral every 4 hours PRN  aspirin enteric coated 81 milliGRAM(s) Oral daily  atorvastatin 80 milliGRAM(s) Oral at bedtime  benztropine 2 milliGRAM(s) Oral two times a day  clopidogrel Tablet 75 milliGRAM(s) Oral daily  dextrose 5%. 1000 milliLiter(s) IV Continuous <Continuous>  dextrose 5%. 1000 milliLiter(s) IV Continuous <Continuous>  dextrose 50% Injectable 25 Gram(s) IV Push once  dextrose 50% Injectable 25 Gram(s) IV Push once  dextrose 50% Injectable 12.5 Gram(s) IV Push once  dextrose Oral Gel 15 Gram(s) Oral once PRN  divalproex ER 1500 milliGRAM(s) Oral daily  glucagon  Injectable 1 milliGRAM(s) IntraMuscular once  heparin   Injectable 5600 Unit(s) IV Push every 6 hours PRN  heparin  Infusion. 700 Unit(s)/Hr IV Continuous <Continuous>  insulin lispro (ADMELOG) corrective regimen sliding scale   SubCutaneous three times a day before meals  levothyroxine 88 MICROGram(s) Oral daily  losartan 50 milliGRAM(s) Oral daily  melatonin 3 milliGRAM(s) Oral at bedtime PRN  metoprolol tartrate 25 milliGRAM(s) Oral every 12 hours  ondansetron Injectable 4 milliGRAM(s) IV Push every 8 hours PRN  QUEtiapine 400 milliGRAM(s) Oral at bedtime  risperiDONE   Tablet 3 milliGRAM(s) Oral at bedtime  risperiDONE   Tablet 2 milliGRAM(s) Oral daily  traZODone 100 milliGRAM(s) Oral at bedtime    -------------------------------------------------------------------------------------------  Cardiovascular Diagnostic Testing:  TTE 10/9     CONCLUSIONS:      1. Technically difficult image quality.   2. Left ventricular cavity is small. Left ventricular systolic function is hyperdynamic. There are no regional wall motion abnormalities seen.    ________________________________________________________________________________________  FINDINGS:     Left Ventricle:  The left ventricular cavity is small. Left ventricular systolic function is hyperdynamic with an ejection fraction visually estimated at >75%. There are no regional wall motion abnormalities seen.     Aortic Valve:  The aortic valve is tricuspid with normal leaflet excursion.     Mitral Valve:  There is calcification of the mitral valve annulus. There is trace mitral regurgitation.     Pulmonic Valve:  The pulmonic valve was not well visualized.      -------------------------------------------------------------------------------------------  Assessment and Plan:   67M with schizophrenia, DM, hypothyroidism, HTN, HLD, tobacco use presenting from a nursing home with AMS and reported CP, noted to be in rapid afib, spont converted to normal sinus. Also with ischemic changes on EKG, w/ Q waves inferiorly and troponemia, now chest pain free. Patient presentation fits with demand ischemia 2/2 afib with RVR vs NSTEMI.    1. AFib - noted to be in AFib w/ RVR (unclear if new, not on BB or AC at home), spontaneously converted to NSR and rate controlled. Started on metop and AC. [id number] is 3 (age, HTN, DM), HAS-BLED is 2.  2. NSTEMI - Pt had reported CP at the nursing home but by eval in the hospitalization was CP free. Trop nopted to be 3400 > 3200 and EKG showed Q waves in the inferior leads. TTE w/o any wall motion abnormalities, showing 75% EF and hyperdynamic LV systolic fx however tech difficult. Picture is less c/w type 1 NSTEMI given TTE findings. Could pursue further cardiac imaging w/ CMR however pt unlikely to tolerate.    Recommendations:  - continue w/ ASA, plavix  - continue w/ heparin gtt for 48hr  - continue atrovastatin  - continue metop 25mg BID  - continue home losartan  - could consider CMR to assess for myocardial injury, however pt unable to tolerate lying still/flat for longer period of time, would discuss w/ family    *** Recommendations are preliminary until cosigned by the attending.    Alberto Saunders MD  Cardiology Fellow    For all New Consults and Questions:  [email or web address]   Login: [employer] Cardiology Progress Note  ------------------------------------------------------------------------------------------    SUBJECTIVE/EVENTS::  - Tele: No events  - NAEO    -------------------------------------------------------------------------------------------  ROS:  CV: chest pain (-), palpitation (-), orthopnea (-), PND (-), edema (-)  PULM: SOB (-), cough (-), wheezing (-), hemoptysis (-).   CONST: fever (-), chills (-) or fatigue (-)  GI: abdominal distension (-), abdominal pain (-) , nausea/vomiting (-), hematemesis, (-), melena (-), hematochezia (-)  : dysuria (-), frequency (-), hematuria (-).   NEURO: numbness (-), weakness (-), dizziness (-)  MSK: myalgia (-), joint pain (-)   SKIN: itching (-), rash (-)  HEENT:  visual changes (-); vertigo or throat pain (-);  neck stiffness (-)   Psych: change in mood (-), anxiety (-), depression (-)     All other review of systems is negative unless indicated above.   -------------------------------------------------------------------------------------------  VS:  T(F): 97.4 (10-10), Max: 97.8 (10-)  HR: 71 (10-10) (69 - 89)  BP: 130/80 (10-10) (124/82 - 174/100)  RR: 18 (10-10)  SpO2: 95% (10-10)  I&O's Summary    09 Oct 2023 07:01  -  10 Oct 2023 06:18  --------------------------------------------------------  IN: 900 mL / OUT: 3100 mL / NET: -2200 mL      ------------------------------------------------------------------------------------------  PHYSICAL EXAM:  Pt refusing physical but from observation  Grossly: NAD, laying in bed, denies CP, no focal neuro deficits, breathing appears comfortable      -------------------------------------------------------------------------------------------  LABS:                          11.7   6.25  )-----------( 226      ( 09 Oct 2023 04:50 )             34.5     10-09    133<L>  |  97  |  21  ----------------------------<  154<H>  4.6   |  21<L>  |  1.09    Ca    9.3      09 Oct 2023 06:47  Phos  2.8     1008    TPro  7.0  /  Alb  3.6  /  TBili  0.2  /  DBili  x   /  AST  58<H>  /  ALT  19  /  AlkPhos  86  10    PT/INR - ( 08 Oct 2023 15:55 )   PT: 10.4 sec;   INR: 0.99 ratio         PTT - ( 10 Oct 2023 02:58 )  PTT:61.2 sec  CARDIAC MARKERS ( 09 Oct 2023 17:46 )  3278 ng/L / x     / x     / x     / x     / x      CARDIAC MARKERS ( 08 Oct 2023 22:23 )  3298 ng/L / x     / x     / x     / x     / x      CARDIAC MARKERS ( 08 Oct 2023 15:55 )  3403 ng/L / x     / x     / 622 U/L / x     / x      CARDIAC MARKERS ( 08 Oct 2023 06:47 )  x     / x     / x     / 951 U/L / x     / x      CARDIAC MARKERS ( 07 Oct 2023 14:00 )  x     / x     / x     / 90 U/L / x     / x          Total Cholesterol: 214  LDL: --  HDL: 42  T    -------------------------------------------------------------------------------------------  Meds:  acetaminophen     Tablet .. 650 milliGRAM(s) Oral every 6 hours PRN  aluminum hydroxide/magnesium hydroxide/simethicone Suspension 30 milliLiter(s) Oral every 4 hours PRN  aspirin enteric coated 81 milliGRAM(s) Oral daily  atorvastatin 80 milliGRAM(s) Oral at bedtime  benztropine 2 milliGRAM(s) Oral two times a day  clopidogrel Tablet 75 milliGRAM(s) Oral daily  dextrose 5%. 1000 milliLiter(s) IV Continuous <Continuous>  dextrose 5%. 1000 milliLiter(s) IV Continuous <Continuous>  dextrose 50% Injectable 25 Gram(s) IV Push once  dextrose 50% Injectable 25 Gram(s) IV Push once  dextrose 50% Injectable 12.5 Gram(s) IV Push once  dextrose Oral Gel 15 Gram(s) Oral once PRN  divalproex ER 1500 milliGRAM(s) Oral daily  glucagon  Injectable 1 milliGRAM(s) IntraMuscular once  heparin   Injectable 5600 Unit(s) IV Push every 6 hours PRN  heparin  Infusion. 700 Unit(s)/Hr IV Continuous <Continuous>  insulin lispro (ADMELOG) corrective regimen sliding scale   SubCutaneous three times a day before meals  levothyroxine 88 MICROGram(s) Oral daily  losartan 50 milliGRAM(s) Oral daily  melatonin 3 milliGRAM(s) Oral at bedtime PRN  metoprolol tartrate 25 milliGRAM(s) Oral every 12 hours  ondansetron Injectable 4 milliGRAM(s) IV Push every 8 hours PRN  QUEtiapine 400 milliGRAM(s) Oral at bedtime  risperiDONE   Tablet 3 milliGRAM(s) Oral at bedtime  risperiDONE   Tablet 2 milliGRAM(s) Oral daily  traZODone 100 milliGRAM(s) Oral at bedtime    -------------------------------------------------------------------------------------------  Cardiovascular Diagnostic Testing:  TTE 10/9     CONCLUSIONS:      1. Technically difficult image quality.   2. Left ventricular cavity is small. Left ventricular systolic function is hyperdynamic. There are no regional wall motion abnormalities seen.    ________________________________________________________________________________________  FINDINGS:     Left Ventricle:  The left ventricular cavity is small. Left ventricular systolic function is hyperdynamic with an ejection fraction visually estimated at >75%. There are no regional wall motion abnormalities seen.     Aortic Valve:  The aortic valve is tricuspid with normal leaflet excursion.     Mitral Valve:  There is calcification of the mitral valve annulus. There is trace mitral regurgitation.     Pulmonic Valve:  The pulmonic valve was not well visualized.    -------------------------------------------------------------------------------------------  Assessment and Plan:   67M with schizophrenia, DM, hypothyroidism, HTN, HLD, tobacco use presenting from a nursing home with AMS and reported CP, noted to be in rapid afib, spont converted to normal sinus. Also with ischemic changes on EKG, w/ Q waves inferiorly and troponemia, now chest pain free. Patient presentation fits with demand ischemia 2/2 afib with RVR vs NSTEMI.    1. AFib - noted to be in AFib w/ RVR (unclear if new, not on BB or AC at home), spontaneously converted to NSR and rate controlled. Started on metop and AC. [id number] is 3 (age, HTN, DM), HAS-BLED is 2.  2. NSTEMI - Pt had reported CP at the nursing home but by eval in the hospitalization was CP free. Trop noted to be 3400 > 3200 and EKG showed Q waves in the inferior leads. TTE w/o any wall motion abnormalities, showing 75% EF and hyperdynamic LV systolic fx however tech difficult. Picture is less c/w type 1 NSTEMI given TTE findings. Could pursue further cardiac imaging w/ CMR however pt unlikely to tolerate.    Recommendations:  - continue w/ ASA, plavix  - continue w/ heparin gtt for 48hr  - continue atorvastatin  - continue metop 25mg BID, can consolidate to metop succinate 50mg qd  - continue home losartan  - could consider CMR to assess for myocardial injury, however pt unable to tolerate lying still/flat for longer period of time, would discuss w/ family    *** Recommendations are preliminary until cosigned by the attending.    Alberto Saunders MD  Cardiology Fellow    For all New Consults and Questions:  [email or web address]   Login: [employer] Cardiology Progress Note  ------------------------------------------------------------------------------------------    SUBJECTIVE/EVENTS::  - Tele: No events  - NAEO    -------------------------------------------------------------------------------------------  ROS:  CV: chest pain (-), palpitation (-), orthopnea (-), PND (-), edema (-)  PULM: SOB (-), cough (-), wheezing (-), hemoptysis (-).   CONST: fever (-), chills (-) or fatigue (-)  GI: abdominal distension (-), abdominal pain (-) , nausea/vomiting (-), hematemesis, (-), melena (-), hematochezia (-)  : dysuria (-), frequency (-), hematuria (-).   NEURO: numbness (-), weakness (-), dizziness (-)  MSK: myalgia (-), joint pain (-)   SKIN: itching (-), rash (-)  HEENT:  visual changes (-); vertigo or throat pain (-);  neck stiffness (-)   Psych: change in mood (-), anxiety (-), depression (-)     All other review of systems is negative unless indicated above.   -------------------------------------------------------------------------------------------  VS:  T(F): 97.4 (10-10), Max: 97.8 (10-)  HR: 71 (10-10) (69 - 89)  BP: 130/80 (10-10) (124/82 - 174/100)  RR: 18 (10-10)  SpO2: 95% (10-10)  I&O's Summary    09 Oct 2023 07:01  -  10 Oct 2023 06:18  --------------------------------------------------------  IN: 900 mL / OUT: 3100 mL / NET: -2200 mL      ------------------------------------------------------------------------------------------  PHYSICAL EXAM:  Pt refusing physical but from observation  Grossly: NAD, laying in bed, denies CP, no focal neuro deficits, breathing appears comfortable      -------------------------------------------------------------------------------------------  LABS:                          11.7   6.25  )-----------( 226      ( 09 Oct 2023 04:50 )             34.5     10-09    133<L>  |  97  |  21  ----------------------------<  154<H>  4.6   |  21<L>  |  1.09    Ca    9.3      09 Oct 2023 06:47  Phos  2.8     1008    TPro  7.0  /  Alb  3.6  /  TBili  0.2  /  DBili  x   /  AST  58<H>  /  ALT  19  /  AlkPhos  86  10    PT/INR - ( 08 Oct 2023 15:55 )   PT: 10.4 sec;   INR: 0.99 ratio         PTT - ( 10 Oct 2023 02:58 )  PTT:61.2 sec  CARDIAC MARKERS ( 09 Oct 2023 17:46 )  3278 ng/L / x     / x     / x     / x     / x      CARDIAC MARKERS ( 08 Oct 2023 22:23 )  3298 ng/L / x     / x     / x     / x     / x      CARDIAC MARKERS ( 08 Oct 2023 15:55 )  3403 ng/L / x     / x     / 622 U/L / x     / x      CARDIAC MARKERS ( 08 Oct 2023 06:47 )  x     / x     / x     / 951 U/L / x     / x      CARDIAC MARKERS ( 07 Oct 2023 14:00 )  x     / x     / x     / 90 U/L / x     / x          Total Cholesterol: 214  LDL: --  HDL: 42  T    -------------------------------------------------------------------------------------------  Meds:  acetaminophen     Tablet .. 650 milliGRAM(s) Oral every 6 hours PRN  aluminum hydroxide/magnesium hydroxide/simethicone Suspension 30 milliLiter(s) Oral every 4 hours PRN  aspirin enteric coated 81 milliGRAM(s) Oral daily  atorvastatin 80 milliGRAM(s) Oral at bedtime  benztropine 2 milliGRAM(s) Oral two times a day  clopidogrel Tablet 75 milliGRAM(s) Oral daily  dextrose 5%. 1000 milliLiter(s) IV Continuous <Continuous>  dextrose 5%. 1000 milliLiter(s) IV Continuous <Continuous>  dextrose 50% Injectable 25 Gram(s) IV Push once  dextrose 50% Injectable 25 Gram(s) IV Push once  dextrose 50% Injectable 12.5 Gram(s) IV Push once  dextrose Oral Gel 15 Gram(s) Oral once PRN  divalproex ER 1500 milliGRAM(s) Oral daily  glucagon  Injectable 1 milliGRAM(s) IntraMuscular once  heparin   Injectable 5600 Unit(s) IV Push every 6 hours PRN  heparin  Infusion. 700 Unit(s)/Hr IV Continuous <Continuous>  insulin lispro (ADMELOG) corrective regimen sliding scale   SubCutaneous three times a day before meals  levothyroxine 88 MICROGram(s) Oral daily  losartan 50 milliGRAM(s) Oral daily  melatonin 3 milliGRAM(s) Oral at bedtime PRN  metoprolol tartrate 25 milliGRAM(s) Oral every 12 hours  ondansetron Injectable 4 milliGRAM(s) IV Push every 8 hours PRN  QUEtiapine 400 milliGRAM(s) Oral at bedtime  risperiDONE   Tablet 3 milliGRAM(s) Oral at bedtime  risperiDONE   Tablet 2 milliGRAM(s) Oral daily  traZODone 100 milliGRAM(s) Oral at bedtime    -------------------------------------------------------------------------------------------  Cardiovascular Diagnostic Testing:  TTE 10/9     CONCLUSIONS:      1. Technically difficult image quality.   2. Left ventricular cavity is small. Left ventricular systolic function is hyperdynamic. There are no regional wall motion abnormalities seen.    ________________________________________________________________________________________  FINDINGS:     Left Ventricle:  The left ventricular cavity is small. Left ventricular systolic function is hyperdynamic with an ejection fraction visually estimated at >75%. There are no regional wall motion abnormalities seen.     Aortic Valve:  The aortic valve is tricuspid with normal leaflet excursion.     Mitral Valve:  There is calcification of the mitral valve annulus. There is trace mitral regurgitation.     Pulmonic Valve:  The pulmonic valve was not well visualized.    -------------------------------------------------------------------------------------------  Assessment and Plan:   67M with schizophrenia, DM, hypothyroidism, HTN, HLD, tobacco use presenting from a nursing home with AMS and reported CP, noted to be in rapid afib, spont converted to normal sinus. Also with ischemic changes on EKG, w/ Q waves inferiorly and troponemia, now chest pain free. Patient presentation fits with demand ischemia 2/2 afib with RVR vs NSTEMI.    1. AFib - noted to be in AFib w/ RVR (unclear if new, not on BB or AC at home), spontaneously converted to NSR and rate controlled. Started on metop and AC. [id number] is 3 (age, HTN, DM), HAS-BLED is 2.  2. NSTEMI - Pt had reported CP at the nursing home but by eval in the hospitalization was CP free. Trop noted to be 3400 > 3200 and EKG showed Q waves in the inferior leads. TTE w/o any wall motion abnormalities, showing 75% EF and hyperdynamic LV systolic fx however tech difficult. Picture is less c/w type 1 NSTEMI given TTE findings. Could pursue further cardiac imaging w/ CMR however pt unlikely to tolerate    Recommendations:  - continue w/ ASA  - please stop plavix  - switch heparin to apixaban 5mg BID for AFib  - please continue to get trop qd to assess if there if there is continued damage  - please get ESR/CRP  - continue atorvastatin  - continue metop 25mg BID, can consolidate to metop succinate 50mg qd  - continue home losartan  - could consider CMR to assess for myocardial injury, however pt unable to tolerate lying still/flat for longer period of time, would discuss w/ family    *** Recommendations are preliminary until cosigned by the attending.    Alberto Saunders MD  Cardiology Fellow    For all New Consults and Questions:  [email or web address]   Login: [email or web address]

## 2023-10-10 NOTE — PROGRESS NOTE ADULT - ASSESSMENT
_________________________________________________________________________________________  ========>>  M E D I C A L   A T T E N D I N G    F O L L O W  U P  N O T E  <<=========  -----------------------------------------------------------------------------------------------------    - Patient seen and examined by me earlier today.   - In summary,  RM HORTON is a 67y year old man admitted with NSTEMI  - Patient today overall doing ok, comfortable, eating well, CP free     ==================>> REVIEW OF SYSTEM <<=================    limited as patient poor historian and not very cooperative   denies pain or discomfort..     ==================>> PHYSICAL EXAM <<=================    GEN: A&O X 1-2 , NAD , comfortable, pleasant, calm , poor historian, disoriented   limited otherwise as patient not allowing examination        ( Note written / Date of service 10-10-23 ( This is certified to be the same as "ENTERED" date above ( for billing purposes)))    ==================>> MEDICATIONS <<====================    apixaban 5 milliGRAM(s) Oral <User Schedule>  aspirin enteric coated 81 milliGRAM(s) Oral daily  atorvastatin 80 milliGRAM(s) Oral at bedtime  benztropine 2 milliGRAM(s) Oral two times a day  dextrose 5%. 1000 milliLiter(s) IV Continuous <Continuous>  dextrose 5%. 1000 milliLiter(s) IV Continuous <Continuous>  dextrose 50% Injectable 25 Gram(s) IV Push once  dextrose 50% Injectable 25 Gram(s) IV Push once  dextrose 50% Injectable 12.5 Gram(s) IV Push once  divalproex ER 1500 milliGRAM(s) Oral daily  glucagon  Injectable 1 milliGRAM(s) IntraMuscular once  insulin lispro (ADMELOG) corrective regimen sliding scale   SubCutaneous three times a day before meals  levothyroxine 88 MICROGram(s) Oral daily  losartan 50 milliGRAM(s) Oral daily  metoprolol tartrate 25 milliGRAM(s) Oral every 12 hours  QUEtiapine 400 milliGRAM(s) Oral at bedtime  risperiDONE   Tablet 3 milliGRAM(s) Oral at bedtime  risperiDONE   Tablet 2 milliGRAM(s) Oral daily  sodium chloride 1 Gram(s) Oral two times a day  traZODone 100 milliGRAM(s) Oral at bedtime    MEDICATIONS  (PRN):  acetaminophen     Tablet .. 650 milliGRAM(s) Oral every 6 hours PRN Temp greater or equal to 38C (100.4F), Mild Pain (1 - 3)  aluminum hydroxide/magnesium hydroxide/simethicone Suspension 30 milliLiter(s) Oral every 4 hours PRN Dyspepsia  dextrose Oral Gel 15 Gram(s) Oral once PRN Blood Glucose LESS THAN 70 milliGRAM(s)/deciliter  melatonin 3 milliGRAM(s) Oral at bedtime PRN Insomnia  ondansetron Injectable 4 milliGRAM(s) IV Push every 8 hours PRN Nausea and/or Vomiting    ___________  Active diet:  Diet, Regular:   Consistent Carbohydrate Evening Snack (CSTCHOSN)  DASH/TLC Sodium & Cholesterol Restricted (DASH)  ___________________    ==================>> VITAL SIGNS <<==================    Height (cm): 165.1  Weight (kg): 90.7  BMI (kg/m2): 33.3  Vital Signs Last 24 HrsT(C): 36.3 (10-10-23 @ 11:20)  T(F): 97.3 (10-10-23 @ 11:20), Max: 97.8 (10-09-23 @ 16:37)  HR: 82 (10-10-23 @ 11:20) (66 - 89)  BP: 116/66 (10-10-23 @ 11:20)  RR: 18 (10-10-23 @ 11:20) (18 - 19)  SpO2: 96% (10-10-23 @ 11:20) (94% - 97%)      CAPILLARY BLOOD GLUCOSE      POCT Blood Glucose.: 149 mg/dL (10 Oct 2023 08:11)  POCT Blood Glucose.: 169 mg/dL (09 Oct 2023 21:14)  POCT Blood Glucose.: 215 mg/dL (09 Oct 2023 16:25)     ==================>> LAB AND IMAGING <<==================                        12.1   5.79  )-----------( 234      ( 10 Oct 2023 06:23 )             35.6        10-10    128<L>  |  94<L>  |  18  ----------------------------<  160<H>  4.4   |  19<L>  |  1.15    Ca    9.1      10 Oct 2023 06:23    TPro  7.0  /  Alb  3.6  /  TBili  0.2  /  DBili  x   /  AST  58<H>  /  ALT  19  /  AlkPhos  86  10-09    WBC count:   5.79 <<== ,  6.25 <<== ,  6.32 <<== ,  4.88 <<== ,  6.09 <<== ,  8.60 <<==   Hemoglobin:   12.1 <<==,  11.7 <<==,  11.8 <<==,  11.9 <<==,  11.8 <<==,  11.8 <<==  platelets:  234 <==, 226 <==, 215 <==, 210 <==, 228 <==, 239 <==, 237 <==    Creatinine:  1.15  <<==, 1.09  <<==, 0.86  <<==, 0.87  <<==, 1.63  <<==  Sodium:   128  <==, 133  <==, 136  <==, 133  <==, 134  <==       AST:          58 <== , 98 <== , 197 <== , 18 <==      ALT:        19  <== , 23  <== , 33  <== , 19  <==      AP:        86  <=, 95  <=, 85  <=, 82  <=     Bili:        0.2  <=, 0.1  <=, 0.2  <=, 0.2  <=    ____________________________    M I C R O B I O L O G Y :    Culture - Urine (collected 07 Oct 2023 15:13)  Source: Catheterized Catheterized  Final Report (09 Oct 2023 07:44):    <10,000 CFU/mL Normal Urogenital Yris    Culture - Blood (collected 07 Oct 2023 09:52)  Source: .Blood Blood  Preliminary Report (09 Oct 2023 14:02):    No growth at 48 Hours    Culture - Blood (collected 07 Oct 2023 09:52)  Source: .Blood Blood  Preliminary Report (09 Oct 2023 14:02):    No growth at 48 Hours      SARS-CoV-2: NotDetec (10-07-23 @ 12:14)      < from: TTE W or WO Ultrasound Enhancing Agent (10.09.23 @ 08:01) >  CONCLUSIONS:    1. Technically difficult image quality.   2. Left ventricular cavity is small. Left ventricular systolic function is hyperdynamic. There are no regional wall motion abnormalities seen.  < end of copied text >  ___________________________________________________________________________________  ===============>>  A S S E S S M E N T   A N D   P L A N <<===============  ------------------------------------------------------------------------------------------    · Assessment	  Patient is a 66yo Male with past medical history of schizophrenia, DM, hypothyroidism, HLD, tobacco use presented to [facility] from a nursing home with AMS,   Per records, patient had complained of some chest pain at the nursing home. Patient is a very poor historian. At OSH, patient was noted to be in afib w/ RVR. Initial labs showed lactate 3.9 -> 3, trop I 208 -> <25K, . EKG showed Afib w/ , Q wave in III, ST depressions in V4-V6, II, aVF. Patient was given cardizem IV. Repeat EKG showed sinus rhythm with HR 76, Q wave in III, KRISSY in III, aVF. Case was discussed with interventionalist, patient given ASA, heparin gtt, was transferred to [facility].  At [facility], patient reports no chest pain or discomfort. EKG on arrival shows Q waves in II, III, aVF, no ST segment changes. Patient in sinus, HR 82. Labs showing hsTrop 3403. Lactate 1.1.  Patient seen and eroded by cardiology, and admitted to medicine for further management        Problem/Plan - 1:  ·  Problem: NSTEMI (non-ST elevation myocardial infarction).   ·  Plan: Cardiology evaluation appreciated  per cardiology no interventions / aggressive treatment planed  continuo medical management / optimization per carido    antiplatelets, statin..   Monitor vitals, labs  Echocardiogram as above     Problem/Plan - 2:  ·  Problem: Atrial fibrillation.   heparin drip >> stopped : to start Eliquis  patient has been in sinus here..   monitor on telemetry.    Problem/Plan - 3:  ·  Problem: Schizophrenia.   ·  Plan: continue multiple medications as listed   Pharmacy evaluation for reconciliation and review of the medications appreciated   psychiatry valuation as needed  Continuous observation.    Problem/Plan - 4:  ·  Problem: Diabetes.   ·  Plan: Hold oral medications  insulin sliding scale   monitor finger sticks  A1c. 7    Problem/Plan - 5:  ·  Problem: Hypothyroidism.   ·  Plan: Continue home medications and monitor.  TSH. 3    Problem/Plan - 6:  ·  Problem: HLD (hyperlipidemia).   ·  Plan: statin  lipid profile noted    statin changed to Atorva as recom    Problem/Plan - 7  ·  Problem:  hyponatremia  per investigation patient with chronic hyponatremia, and on salt tablets at the facility..   salt tabs resumed  fee fluid restrict  patient taking good PO  monitor    hopefully DC planing next 48 hrs if stable. .   --------------------------------------------  Case discussed with med team/ staff  ___________________________  MEL Contreras D.O.  Pager: [phone]

## 2023-10-11 LAB
GLUCOSE BLDC GLUCOMTR-MCNC: 134 MG/DL — HIGH (ref 70–99)
GLUCOSE BLDC GLUCOMTR-MCNC: 136 MG/DL — HIGH (ref 70–99)
GLUCOSE BLDC GLUCOMTR-MCNC: 142 MG/DL — HIGH (ref 70–99)
GLUCOSE BLDC GLUCOMTR-MCNC: 157 MG/DL — HIGH (ref 70–99)
GLUCOSE BLDC GLUCOMTR-MCNC: 229 MG/DL — HIGH (ref 70–99)

## 2023-10-11 PROCEDURE — 99233 SBSQ HOSP IP/OBS HIGH 50: CPT

## 2023-10-11 RX ORDER — CLOPIDOGREL BISULFATE 75 MG/1
75 TABLET, FILM COATED ORAL DAILY
Refills: 0 | Status: DISCONTINUED | OUTPATIENT
Start: 2023-10-12 | End: 2023-10-12

## 2023-10-11 RX ORDER — CLOPIDOGREL BISULFATE 75 MG/1
75 TABLET, FILM COATED ORAL DAILY
Refills: 0 | Status: DISCONTINUED | OUTPATIENT
Start: 2023-10-11 | End: 2023-10-11

## 2023-10-11 RX ADMIN — QUETIAPINE FUMARATE 400 MILLIGRAM(S): 200 TABLET, FILM COATED ORAL at 21:31

## 2023-10-11 RX ADMIN — Medication 2 MILLIGRAM(S): at 16:46

## 2023-10-11 RX ADMIN — LOSARTAN POTASSIUM 50 MILLIGRAM(S): 100 TABLET, FILM COATED ORAL at 16:38

## 2023-10-11 RX ADMIN — RISPERIDONE 2 MILLIGRAM(S): 4 TABLET ORAL at 16:36

## 2023-10-11 RX ADMIN — Medication 100 MILLIGRAM(S): at 21:31

## 2023-10-11 RX ADMIN — RISPERIDONE 3 MILLIGRAM(S): 4 TABLET ORAL at 21:32

## 2023-10-11 RX ADMIN — DIVALPROEX SODIUM 1500 MILLIGRAM(S): 500 TABLET, DELAYED RELEASE ORAL at 16:36

## 2023-10-11 RX ADMIN — Medication 25 MILLIGRAM(S): at 16:46

## 2023-10-11 RX ADMIN — SODIUM CHLORIDE 1 GRAM(S): 9 INJECTION INTRAMUSCULAR; INTRAVENOUS; SUBCUTANEOUS at 16:46

## 2023-10-11 RX ADMIN — ATORVASTATIN CALCIUM 80 MILLIGRAM(S): 80 TABLET, FILM COATED ORAL at 21:31

## 2023-10-11 RX ADMIN — APIXABAN 5 MILLIGRAM(S): 2.5 TABLET, FILM COATED ORAL at 16:36

## 2023-10-11 NOTE — PROGRESS NOTE ADULT - SUBJECTIVE AND OBJECTIVE BOX
Cardiology Progress Note  ------------------------------------------------------------------------------------------    SUBJECTIVE/EVENTS::  - Tele: No events  - NAEO, y/d was unable to tolerate r/p TTE    -------------------------------------------------------------------------------------------  ROS:  CV: chest pain (-), palpitation (-), orthopnea (-), PND (-), edema (-)  PULM: SOB (-), cough (-), wheezing (-), hemoptysis (-).   CONST: fever (-), chills (-) or fatigue (-)  GI: abdominal distension (-), abdominal pain (-) , nausea/vomiting (-), hematemesis, (-), melena (-), hematochezia (-)  : dysuria (-), frequency (-), hematuria (-).   NEURO: numbness (-), weakness (-), dizziness (-)  MSK: myalgia (-), joint pain (-)   SKIN: itching (-), rash (-)  HEENT:  visual changes (-); vertigo or throat pain (-);  neck stiffness (-)   Psych: change in mood (-), anxiety (-), depression (-)     All other review of systems is negative unless indicated above.   -------------------------------------------------------------------------------------------  VS:  T(F): 97.7 (10-10), Max: 97.7 (10-10)  HR: 69 (10-10) (66 - 84)  BP: 145/78 (10-10) (116/66 - 172/88)  RR: 18 (10-10)  SpO2: 96% (10-10)  I&O's Summary    09 Oct 2023 07:01  -  10 Oct 2023 07:00  --------------------------------------------------------  IN: 1400 mL / OUT: 3100 mL / NET: -1700 mL    10 Oct 2023 07:01  -  11 Oct 2023 06:33  --------------------------------------------------------  IN: 2200 mL / OUT: 4725 mL / NET: -2525 mL      ------------------------------------------------------------------------------------------  PHYSICAL EXAM:  Pt refusing physical but from observation  Grossly: NAD, laying in bed, denies CP, no focal neuro deficits, breathing appears comfortable    -------------------------------------------------------------------------------------------  LABS:                          12.1   5.79  )-----------( 234      ( 10 Oct 2023 06:23 )             35.6     10-10    127<L>  |  92<L>  |  18  ----------------------------<  236<H>  4.6   |  22  |  0.98    Ca    9.1      10 Oct 2023 11:39    TPro  7.0  /  Alb  3.6  /  TBili  0.2  /  DBili  x   /  AST  58<H>  /  ALT  19  /  AlkPhos  86  10-09    PTT - ( 10 Oct 2023 02:58 )  PTT:61.2 sec  CARDIAC MARKERS ( 10 Oct 2023 11:39 )  2990 ng/L / x     / x     / x     / x     / x      CARDIAC MARKERS ( 09 Oct 2023 17:46 )  3278 ng/L / x     / x     / x     / x     / x      CARDIAC MARKERS ( 08 Oct 2023 22:23 )  3298 ng/L / x     / x     / x     / x     / x      CARDIAC MARKERS ( 08 Oct 2023 15:55 )  3403 ng/L / x     / x     / 622 U/L / x     / x      CARDIAC MARKERS ( 08 Oct 2023 06:47 )  x     / x     / x     / 951 U/L / x     / x          Total Cholesterol: 214  LDL: --  HDL: 42  T        -------------------------------------------------------------------------------------------  Meds:  acetaminophen     Tablet .. 650 milliGRAM(s) Oral every 6 hours PRN  aluminum hydroxide/magnesium hydroxide/simethicone Suspension 30 milliLiter(s) Oral every 4 hours PRN  apixaban 5 milliGRAM(s) Oral <User Schedule>  aspirin enteric coated 81 milliGRAM(s) Oral daily  atorvastatin 80 milliGRAM(s) Oral at bedtime  benztropine 2 milliGRAM(s) Oral two times a day  chlorhexidine 2% Cloths 1 Application(s) Topical <User Schedule>  dextrose 5%. 1000 milliLiter(s) IV Continuous <Continuous>  dextrose 5%. 1000 milliLiter(s) IV Continuous <Continuous>  dextrose 50% Injectable 25 Gram(s) IV Push once  dextrose 50% Injectable 12.5 Gram(s) IV Push once  dextrose 50% Injectable 25 Gram(s) IV Push once  dextrose Oral Gel 15 Gram(s) Oral once PRN  divalproex ER 1500 milliGRAM(s) Oral daily  glucagon  Injectable 1 milliGRAM(s) IntraMuscular once  insulin lispro (ADMELOG) corrective regimen sliding scale   SubCutaneous three times a day before meals  levothyroxine 88 MICROGram(s) Oral daily  losartan 50 milliGRAM(s) Oral daily  melatonin 3 milliGRAM(s) Oral at bedtime PRN  metoprolol tartrate 25 milliGRAM(s) Oral every 12 hours  ondansetron Injectable 4 milliGRAM(s) IV Push every 8 hours PRN  QUEtiapine 400 milliGRAM(s) Oral at bedtime  risperiDONE   Tablet 2 milliGRAM(s) Oral daily  risperiDONE   Tablet 3 milliGRAM(s) Oral at bedtime  sodium chloride 1 Gram(s) Oral two times a day  traZODone 100 milliGRAM(s) Oral at bedtime    -------------------------------------------------------------------------------------------  Cardiovascular Diagnostic Testing:  TTE 10/9     CONCLUSIONS:      1. Technically difficult image quality.   2. Left ventricular cavity is small. Left ventricular systolic function is hyperdynamic. There are no regional wall motion abnormalities seen.    ________________________________________________________________________________________  FINDINGS:     Left Ventricle:  The left ventricular cavity is small. Left ventricular systolic function is hyperdynamic with an ejection fraction visually estimated at >75%. There are no regional wall motion abnormalities seen.     Aortic Valve:  The aortic valve is tricuspid with normal leaflet excursion.     Mitral Valve:  There is calcification of the mitral valve annulus. There is trace mitral regurgitation.     Pulmonic Valve:  The pulmonic valve was not well visualized.    -------------------------------------------------------------------------------------------  Assessment and Plan:   67M with schizophrenia, DM, hypothyroidism, HTN, HLD, tobacco use presenting from a nursing home with AMS and reported CP, noted to be in rapid afib, spont converted to normal sinus. Also with ischemic changes on EKG, w/ Q waves inferiorly and troponemia, now chest pain free. Patient presentation fits with demand ischemia 2/2 afib with RVR vs NSTEMI.    1. AFib - noted to be in AFib w/ RVR (unclear if new, not on BB or AC at home), spontaneously converted to NSR and rate controlled. Started on metop and AC. [id number] is 3 (age, HTN, DM), HAS-BLED is 2.  2. NSTEMI - Pt had reported CP at the nursing home but by eval in the hospitalization was CP free. Trop noted to be 3400 > 3200 and EKG showed Q waves in the inferior leads. TTE w/o any wall motion abnormalities, showing 75% EF and hyperdynamic LV systolic fx however tech difficult. Picture is less c/w type 1 NSTEMI given TTE findings. Could pursue further cardiac imaging w/ CMR however pt unlikely to tolerate.     Recommendations:  - continue w/ ASA  - continue apixaban 5mg BID for AFib  - please continue to get trop qd to assess if there if there is continued damage  - please get ESR/CRP  - continue atorvastatin  - continue metop succinate 50mg qd  - continue home losartan 50mg  - could consider CMR to assess for myocardial injury, however pt unable to tolerate lying still/flat for longer period of time, would discuss w/ family    *** Recommendations are preliminary until cosigned by the attending.    Alberto Saunders MD  Cardiology Fellow    For all New Consults and Questions:  [email or web address]   Login: [email or web address] Cardiology Progress Note  ------------------------------------------------------------------------------------------    SUBJECTIVE/EVENTS::  - Tele: No events  - NAEO, y/d was unable to tolerate r/p TTE    -------------------------------------------------------------------------------------------  ROS:  CV: chest pain (-), palpitation (-), orthopnea (-), PND (-), edema (-)  PULM: SOB (-), cough (-), wheezing (-), hemoptysis (-).   CONST: fever (-), chills (-) or fatigue (-)  GI: abdominal distension (-), abdominal pain (-) , nausea/vomiting (-), hematemesis, (-), melena (-), hematochezia (-)  : dysuria (-), frequency (-), hematuria (-).   NEURO: numbness (-), weakness (-), dizziness (-)  MSK: myalgia (-), joint pain (-)   SKIN: itching (-), rash (-)  HEENT:  visual changes (-); vertigo or throat pain (-);  neck stiffness (-)   Psych: change in mood (-), anxiety (-), depression (-)     All other review of systems is negative unless indicated above.   -------------------------------------------------------------------------------------------  VS:  T(F): 97.7 (10-10), Max: 97.7 (10-10)  HR: 69 (10-10) (66 - 84)  BP: 145/78 (10-10) (116/66 - 172/88)  RR: 18 (10-10)  SpO2: 96% (10-10)  I&O's Summary    09 Oct 2023 07:01  -  10 Oct 2023 07:00  --------------------------------------------------------  IN: 1400 mL / OUT: 3100 mL / NET: -1700 mL    10 Oct 2023 07:01  -  11 Oct 2023 06:33  --------------------------------------------------------  IN: 2200 mL / OUT: 4725 mL / NET: -2525 mL      ------------------------------------------------------------------------------------------  PHYSICAL EXAM:  Pt refusing physical but from observation  Grossly: NAD, laying in bed, denies CP, no focal neuro deficits, breathing appears comfortable    -------------------------------------------------------------------------------------------  LABS:                          12.1   5.79  )-----------( 234      ( 10 Oct 2023 06:23 )             35.6     10-10    127<L>  |  92<L>  |  18  ----------------------------<  236<H>  4.6   |  22  |  0.98    Ca    9.1      10 Oct 2023 11:39    TPro  7.0  /  Alb  3.6  /  TBili  0.2  /  DBili  x   /  AST  58<H>  /  ALT  19  /  AlkPhos  86  10-09    PTT - ( 10 Oct 2023 02:58 )  PTT:61.2 sec  CARDIAC MARKERS ( 10 Oct 2023 11:39 )  2990 ng/L / x     / x     / x     / x     / x      CARDIAC MARKERS ( 09 Oct 2023 17:46 )  3278 ng/L / x     / x     / x     / x     / x      CARDIAC MARKERS ( 08 Oct 2023 22:23 )  3298 ng/L / x     / x     / x     / x     / x      CARDIAC MARKERS ( 08 Oct 2023 15:55 )  3403 ng/L / x     / x     / 622 U/L / x     / x      CARDIAC MARKERS ( 08 Oct 2023 06:47 )  x     / x     / x     / 951 U/L / x     / x          Total Cholesterol: 214  LDL: --  HDL: 42  T        -------------------------------------------------------------------------------------------  Meds:  acetaminophen     Tablet .. 650 milliGRAM(s) Oral every 6 hours PRN  aluminum hydroxide/magnesium hydroxide/simethicone Suspension 30 milliLiter(s) Oral every 4 hours PRN  apixaban 5 milliGRAM(s) Oral <User Schedule>  aspirin enteric coated 81 milliGRAM(s) Oral daily  atorvastatin 80 milliGRAM(s) Oral at bedtime  benztropine 2 milliGRAM(s) Oral two times a day  chlorhexidine 2% Cloths 1 Application(s) Topical <User Schedule>  dextrose 5%. 1000 milliLiter(s) IV Continuous <Continuous>  dextrose 5%. 1000 milliLiter(s) IV Continuous <Continuous>  dextrose 50% Injectable 25 Gram(s) IV Push once  dextrose 50% Injectable 12.5 Gram(s) IV Push once  dextrose 50% Injectable 25 Gram(s) IV Push once  dextrose Oral Gel 15 Gram(s) Oral once PRN  divalproex ER 1500 milliGRAM(s) Oral daily  glucagon  Injectable 1 milliGRAM(s) IntraMuscular once  insulin lispro (ADMELOG) corrective regimen sliding scale   SubCutaneous three times a day before meals  levothyroxine 88 MICROGram(s) Oral daily  losartan 50 milliGRAM(s) Oral daily  melatonin 3 milliGRAM(s) Oral at bedtime PRN  metoprolol tartrate 25 milliGRAM(s) Oral every 12 hours  ondansetron Injectable 4 milliGRAM(s) IV Push every 8 hours PRN  QUEtiapine 400 milliGRAM(s) Oral at bedtime  risperiDONE   Tablet 2 milliGRAM(s) Oral daily  risperiDONE   Tablet 3 milliGRAM(s) Oral at bedtime  sodium chloride 1 Gram(s) Oral two times a day  traZODone 100 milliGRAM(s) Oral at bedtime    -------------------------------------------------------------------------------------------  Cardiovascular Diagnostic Testing:  TTE 10/9     CONCLUSIONS:      1. Technically difficult image quality.   2. Left ventricular cavity is small. Left ventricular systolic function is hyperdynamic. There are no regional wall motion abnormalities seen.    ________________________________________________________________________________________  FINDINGS:     Left Ventricle:  The left ventricular cavity is small. Left ventricular systolic function is hyperdynamic with an ejection fraction visually estimated at >75%. There are no regional wall motion abnormalities seen.     Aortic Valve:  The aortic valve is tricuspid with normal leaflet excursion.     Mitral Valve:  There is calcification of the mitral valve annulus. There is trace mitral regurgitation.     Pulmonic Valve:  The pulmonic valve was not well visualized.    -------------------------------------------------------------------------------------------  Assessment and Plan:   67M with schizophrenia, DM, hypothyroidism, HTN, HLD, tobacco use presenting from a nursing home with AMS and reported CP, noted to be in rapid afib, spont converted to normal sinus. Also with ischemic changes on EKG, w/ Q waves inferiorly and troponemia, now chest pain free. Patient presentation fits with demand ischemia 2/2 afib with RVR vs NSTEMI. Overall picture unclear as unable to get additional testing. Tx'ed medically as type 1 NSTEMI.    1. AFib - noted to be in AFib w/ RVR (unclear if new, not on BB or AC at home), spontaneously converted to NSR and rate controlled. Started on metop and AC. [id number] is 3 (age, HTN, DM), HAS-BLED is 2.  2. NSTEMI - Pt had reported CP at the nursing home but by eval in the hospitalization was CP free. Trop noted to be 3400 > 3200 and EKG showed Q waves in the inferior leads. TTE w/o any wall motion abnormalities, showing 75% EF and hyperdynamic LV systolic fx however tech difficult. Picture is less c/w type 1 NSTEMI given TTE findings, however pt unable to tolerate full TTE so unclear. Could pursue further cardiac imaging w/ CMR however pt unlikely to tolerate. Dioscussed w/ pts brother who is understanding and in agreement. Ultimately plan to tx medically as type 1 NSTEMI as we cannot get imaging.    Recommendations:  - please stop ASA, and start plavix 75mg qd indefinitely  - continue apixaban 5mg BID for AFib  - continue atorvastatin  - continue metop succinate 50mg qd  - continue home losartan 50mg  - will need cardiology f/up within 2w of DC    *** Recommendations are preliminary until cosigned by the attending.    Alberto Saunders MD  Cardiology Fellow    For all New Consults and Questions:  [email or web address]   Login: [email or web address] Cardiology Progress Note  ------------------------------------------------------------------------------------------    SUBJECTIVE/EVENTS::  - Tele: No events  - NAEO, y/d was unable to tolerate r/p TTE    -------------------------------------------------------------------------------------------  ROS:  CV: chest pain (-), palpitation (-), orthopnea (-), PND (-), edema (-)  PULM: SOB (-), cough (-), wheezing (-), hemoptysis (-).   CONST: fever (-), chills (-) or fatigue (-)  GI: abdominal distension (-), abdominal pain (-) , nausea/vomiting (-), hematemesis, (-), melena (-), hematochezia (-)  : dysuria (-), frequency (-), hematuria (-).   NEURO: numbness (-), weakness (-), dizziness (-)  MSK: myalgia (-), joint pain (-)   SKIN: itching (-), rash (-)  HEENT:  visual changes (-); vertigo or throat pain (-);  neck stiffness (-)   Psych: change in mood (-), anxiety (-), depression (-)     All other review of systems is negative unless indicated above.   -------------------------------------------------------------------------------------------  VS:  T(F): 97.7 (10-10), Max: 97.7 (10-10)  HR: 69 (10-10) (66 - 84)  BP: 145/78 (10-10) (116/66 - 172/88)  RR: 18 (10-10)  SpO2: 96% (10-10)  I&O's Summary    09 Oct 2023 07:01  -  10 Oct 2023 07:00  --------------------------------------------------------  IN: 1400 mL / OUT: 3100 mL / NET: -1700 mL    10 Oct 2023 07:01  -  11 Oct 2023 06:33  --------------------------------------------------------  IN: 2200 mL / OUT: 4725 mL / NET: -2525 mL      ------------------------------------------------------------------------------------------  PHYSICAL EXAM:  Pt refusing physical but from observation  Grossly: NAD, laying in bed, denies CP, no focal neuro deficits, breathing appears comfortable    -------------------------------------------------------------------------------------------  LABS:                          12.1   5.79  )-----------( 234      ( 10 Oct 2023 06:23 )             35.6     10-10    127<L>  |  92<L>  |  18  ----------------------------<  236<H>  4.6   |  22  |  0.98    Ca    9.1      10 Oct 2023 11:39    TPro  7.0  /  Alb  3.6  /  TBili  0.2  /  DBili  x   /  AST  58<H>  /  ALT  19  /  AlkPhos  86  10-09    PTT - ( 10 Oct 2023 02:58 )  PTT:61.2 sec  CARDIAC MARKERS ( 10 Oct 2023 11:39 )  2990 ng/L / x     / x     / x     / x     / x      CARDIAC MARKERS ( 09 Oct 2023 17:46 )  3278 ng/L / x     / x     / x     / x     / x      CARDIAC MARKERS ( 08 Oct 2023 22:23 )  3298 ng/L / x     / x     / x     / x     / x      CARDIAC MARKERS ( 08 Oct 2023 15:55 )  3403 ng/L / x     / x     / 622 U/L / x     / x      CARDIAC MARKERS ( 08 Oct 2023 06:47 )  x     / x     / x     / 951 U/L / x     / x          Total Cholesterol: 214  LDL: --  HDL: 42  T        -------------------------------------------------------------------------------------------  Meds:  acetaminophen     Tablet .. 650 milliGRAM(s) Oral every 6 hours PRN  aluminum hydroxide/magnesium hydroxide/simethicone Suspension 30 milliLiter(s) Oral every 4 hours PRN  apixaban 5 milliGRAM(s) Oral <User Schedule>  aspirin enteric coated 81 milliGRAM(s) Oral daily  atorvastatin 80 milliGRAM(s) Oral at bedtime  benztropine 2 milliGRAM(s) Oral two times a day  chlorhexidine 2% Cloths 1 Application(s) Topical <User Schedule>  dextrose 5%. 1000 milliLiter(s) IV Continuous <Continuous>  dextrose 5%. 1000 milliLiter(s) IV Continuous <Continuous>  dextrose 50% Injectable 25 Gram(s) IV Push once  dextrose 50% Injectable 12.5 Gram(s) IV Push once  dextrose 50% Injectable 25 Gram(s) IV Push once  dextrose Oral Gel 15 Gram(s) Oral once PRN  divalproex ER 1500 milliGRAM(s) Oral daily  glucagon  Injectable 1 milliGRAM(s) IntraMuscular once  insulin lispro (ADMELOG) corrective regimen sliding scale   SubCutaneous three times a day before meals  levothyroxine 88 MICROGram(s) Oral daily  losartan 50 milliGRAM(s) Oral daily  melatonin 3 milliGRAM(s) Oral at bedtime PRN  metoprolol tartrate 25 milliGRAM(s) Oral every 12 hours  ondansetron Injectable 4 milliGRAM(s) IV Push every 8 hours PRN  QUEtiapine 400 milliGRAM(s) Oral at bedtime  risperiDONE   Tablet 2 milliGRAM(s) Oral daily  risperiDONE   Tablet 3 milliGRAM(s) Oral at bedtime  sodium chloride 1 Gram(s) Oral two times a day  traZODone 100 milliGRAM(s) Oral at bedtime    -------------------------------------------------------------------------------------------  Cardiovascular Diagnostic Testing:  TTE 10/9     CONCLUSIONS:      1. Technically difficult image quality.   2. Left ventricular cavity is small. Left ventricular systolic function is hyperdynamic. There are no regional wall motion abnormalities seen.    ________________________________________________________________________________________  FINDINGS:     Left Ventricle:  The left ventricular cavity is small. Left ventricular systolic function is hyperdynamic with an ejection fraction visually estimated at >75%. There are no regional wall motion abnormalities seen.     Aortic Valve:  The aortic valve is tricuspid with normal leaflet excursion.     Mitral Valve:  There is calcification of the mitral valve annulus. There is trace mitral regurgitation.     Pulmonic Valve:  The pulmonic valve was not well visualized.    -------------------------------------------------------------------------------------------  Assessment and Plan:   67M with schizophrenia, DM, hypothyroidism, HTN, HLD, tobacco use presenting from a nursing home with AMS and reported CP, noted to be in rapid afib, spont converted to normal sinus. Also with ischemic changes on EKG, w/ Q waves inferiorly and troponemia, now chest pain free. Patient presentation fits with demand ischemia 2/2 afib with RVR vs NSTEMI. Overall picture unclear as unable to get additional testing. Tx'ed medically as type 1 NSTEMI.    1. AFib - noted to be in AFib w/ RVR (unclear if new, not on BB or AC at home), spontaneously converted to NSR and rate controlled. Started on metop and AC. [id number] is 3 (age, HTN, DM), HAS-BLED is 2.  2. NSTEMI - Pt had reported CP at the nursing home but by eval in the hospitalization was CP free. Trop noted to be 3400 > 3200 and EKG showed Q waves in the inferior leads. TTE w/o any wall motion abnormalities, showing 75% EF and hyperdynamic LV systolic fx however tech difficult. Picture is less c/w type 1 NSTEMI given TTE findings, however pt unable to tolerate full TTE so unclear. Could pursue further cardiac imaging w/ CMR however pt unlikely to tolerate. Dioscussed w/ pts brother who is understanding and in agreement. Ultimately plan to tx medically as type 1 NSTEMI as we cannot get imaging.    Recommendations:  - please stop ASA, and start plavix 75mg qd indefinitely  - continue apixaban 5mg BID for AFib  - continue atorvastatin  - continue metop succinate 50mg qd  - continue home losartan 50mg  - will need cardiology f/up within 2w of DC, near his home or associated with his nursing home      final cardiology recommendations above, please call with questions    *** Recommendations are preliminary until cosigned by the attending.    Alberto Saunders MD  Cardiology Fellow    For all New Consults and Questions:  [email or web address]   Login: [email or web address]

## 2023-10-11 NOTE — PROGRESS NOTE ADULT - ASSESSMENT
_________________________________________________________________________________________  ========>>  M E D I C A L   A T T E N D I N G    F O L L O W  U P  N O T E  <<=========  -----------------------------------------------------------------------------------------------------    - Patient seen and examined by me earlier today.   - In summary,  RM HORTON is a 67y year old man admitted with NSTEMI  - Patient today overall doing ok, comfortable, eating well, CP free     ==================>> REVIEW OF SYSTEM <<=================    limited as patient poor historian and not very cooperative   denies pain or discomfort..     ==================>> PHYSICAL EXAM <<=================    GEN: A&O X 1-2 , NAD , comfortable, pleasant, calm , poor historian, disoriented   limited otherwise as patient not allowing examination         ( Note written / Date of service 10-11-23 ( This is certified to be the same as "ENTERED" date above ( for billing purposes)))    ==================>> MEDICATIONS <<====================    apixaban 5 milliGRAM(s) Oral <User Schedule>  atorvastatin 80 milliGRAM(s) Oral at bedtime  benztropine 2 milliGRAM(s) Oral two times a day  chlorhexidine 2% Cloths 1 Application(s) Topical <User Schedule>  dextrose 5%. 1000 milliLiter(s) IV Continuous <Continuous>  dextrose 5%. 1000 milliLiter(s) IV Continuous <Continuous>  dextrose 50% Injectable 25 Gram(s) IV Push once  dextrose 50% Injectable 12.5 Gram(s) IV Push once  dextrose 50% Injectable 25 Gram(s) IV Push once  divalproex ER 1500 milliGRAM(s) Oral daily  glucagon  Injectable 1 milliGRAM(s) IntraMuscular once  insulin lispro (ADMELOG) corrective regimen sliding scale   SubCutaneous three times a day before meals  levothyroxine 88 MICROGram(s) Oral daily  losartan 50 milliGRAM(s) Oral daily  metoprolol tartrate 25 milliGRAM(s) Oral every 12 hours  QUEtiapine 400 milliGRAM(s) Oral at bedtime  risperiDONE   Tablet 3 milliGRAM(s) Oral at bedtime  risperiDONE   Tablet 2 milliGRAM(s) Oral daily  sodium chloride 1 Gram(s) Oral two times a day  traZODone 100 milliGRAM(s) Oral at bedtime    MEDICATIONS  (PRN):  acetaminophen     Tablet .. 650 milliGRAM(s) Oral every 6 hours PRN Temp greater or equal to 38C (100.4F), Mild Pain (1 - 3)  aluminum hydroxide/magnesium hydroxide/simethicone Suspension 30 milliLiter(s) Oral every 4 hours PRN Dyspepsia  dextrose Oral Gel 15 Gram(s) Oral once PRN Blood Glucose LESS THAN 70 milliGRAM(s)/deciliter  melatonin 3 milliGRAM(s) Oral at bedtime PRN Insomnia  ondansetron Injectable 4 milliGRAM(s) IV Push every 8 hours PRN Nausea and/or Vomiting    ___________  Active diet:  Diet, Regular:   Consistent Carbohydrate Evening Snack (CSTCHOSN)  DASH/TLC Sodium & Cholesterol Restricted (DASH)  ___________________    ==================>> VITAL SIGNS <<==================    Vital Signs Last 24 HrsT(C): 36.8 (10-11-23 @ 11:53)  T(F): 98.3 (10-11-23 @ 11:53), Max: 98.4 (10-11-23 @ 09:29)  HR: 90 (10-11-23 @ 16:35) (69 - 90)  BP: 161/99 (10-11-23 @ 16:35)  RR: 18 (10-11-23 @ 16:35) (18 - 18)  SpO2: 96% (10-11-23 @ 16:35) (94% - 97%)      CAPILLARY BLOOD GLUCOSE      POCT Blood Glucose.: 157 mg/dL (11 Oct 2023 12:16)  POCT Blood Glucose.: 142 mg/dL (11 Oct 2023 07:52)     ==================>> LAB AND IMAGING <<==================                        12.1   5.79  )-----------( 234      ( 10 Oct 2023 06:23 )             35.6        10-10    127<L>  |  92<L>  |  18  ----------------------------<  236<H>  4.6   |  22  |  0.98    Ca    9.1      10 Oct 2023 11:39      WBC count:   5.79 <<== ,  6.25 <<== ,  6.32 <<== ,  4.88 <<== ,  6.09 <<== ,  8.60 <<==   Hemoglobin:   12.1 <<==,  11.7 <<==,  11.8 <<==,  11.9 <<==,  11.8 <<==,  11.8 <<==  platelets:  234 <==, 226 <==, 215 <==, 210 <==, 228 <==, 239 <==, 237 <==    Creatinine:  0.98  <<==, 1.15  <<==, 1.09  <<==, 0.86  <<==, 0.87  <<==, 1.63  <<==  Sodium:   127  <==, 128  <==, 133  <==, 136  <==, 133  <==, 134  <==       AST:          58 <== , 98 <== , 197 <== , 18 <==      ALT:        19  <== , 23  <== , 33  <== , 19  <==      AP:        86  <=, 95  <=, 85  <=, 82  <=     Bili:        0.2  <=, 0.1  <=, 0.2  <=, 0.2  <=    ____________________________    M I C R O B I O L O G Y :    Culture - Urine (collected 07 Oct 2023 15:13)  Source: Catheterized Catheterized  Final Report (09 Oct 2023 07:44):    <10,000 CFU/mL Normal Urogenital Yris    Culture - Blood (collected 07 Oct 2023 09:52)  Source: .Blood Blood  Preliminary Report (11 Oct 2023 14:01):    No growth at 4 days    Culture - Blood (collected 07 Oct 2023 09:52)  Source: .Blood Blood  Preliminary Report (11 Oct 2023 14:01):    No growth at 4 days        SARS-CoV-2: NotDetec (10-07-23 @ 12:14)      < from: TTE W or WO Ultrasound Enhancing Agent (10.09.23 @ 08:01) >  CONCLUSIONS:    1. Technically difficult image quality.   2. Left ventricular cavity is small. Left ventricular systolic function is hyperdynamic. There are no regional wall motion abnormalities seen.  < end of copied text >  ___________________________________________________________________________________  ===============>>  A S S E S S M E N T   A N D   P L A N <<===============  ------------------------------------------------------------------------------------------    · Assessment	  Patient is a 66yo Male with past medical history of schizophrenia, DM, hypothyroidism, HLD, tobacco use presented to [facility] from a nursing home with AMS,   Per records, patient had complained of some chest pain at the nursing home. Patient is a very poor historian. At OSH, patient was noted to be in afib w/ RVR. Initial labs showed lactate 3.9 -> 3, trop I 208 -> <25K, . EKG showed Afib w/ , Q wave in III, ST depressions in V4-V6, II, aVF. Patient was given cardizem IV. Repeat EKG showed sinus rhythm with HR 76, Q wave in III, KRISSY in III, aVF. Case was discussed with interventionalist, patient given ASA, heparin gtt, was transferred to [facility].  At [facility], patient reports no chest pain or discomfort. EKG on arrival shows Q waves in II, III, aVF, no ST segment changes. Patient in sinus, HR 82. Labs showing hsTrop 3403. Lactate 1.1.  Patient seen and eroded by cardiology, and admitted to medicine for further management        Problem/Plan - 1:  ·  Problem: NSTEMI (non-ST elevation myocardial infarction).   ·  Plan: Cardiology evaluation appreciated  per cardiology no interventions / aggressive treatment planed  continuo medical management / optimization per carido    antiplatelets, statin..   Monitor vitals, labs  Echocardiogram as above     Problem/Plan - 2:  ·  Problem: Atrial fibrillation.   heparin drip >> stopped : to start Eliquis  patient has been in sinus here..   monitor on telemetry.    Problem/Plan - 3:  ·  Problem: Schizophrenia.   ·  Plan: continue multiple medications as listed   Pharmacy evaluation for reconciliation and review of the medications appreciated   psychiatry valuation as needed  Continuous observation.    Problem/Plan - 4:  ·  Problem: Diabetes.   ·  Plan: Hold oral medications  insulin sliding scale   monitor finger sticks  A1c. 7    Problem/Plan - 5:  ·  Problem: Hypothyroidism.   ·  Plan: Continue home medications and monitor.  TSH. 3    Problem/Plan - 6:  ·  Problem: HLD (hyperlipidemia).   ·  Plan: statin  lipid profile noted    statin changed to Atorva as recom    Problem/Plan - 7  ·  Problem:  hyponatremia  per investigation patient with chronic hyponatremia, and on salt tablets at the facility..   salt tabs resumed  fee fluid restrict  patient taking good PO  monitor    hopefully DC planing next 48 hrs if stable. .   --------------------------------------------  Case discussed with med team/ staff  ___________________________  H. DILLON Contreras.  Pager: [phone]       _________________________________________________________________________________________  ========>>  M E D I C A L   A T T E N D I N G    F O L L O W  U P  N O T E  <<=========  -----------------------------------------------------------------------------------------------------    - Patient seen and examined by me earlier today.   - In summary,  MR HORTON is a 67y year old man admitted with NSTEMI  - Patient today overall doing ok, comfortable, eating well, CP free     ==================>> REVIEW OF SYSTEM <<=================    limited as patient poor historian and not cooperative   denies pain or discomfort..   Asking me to get out of the room    ==================>> PHYSICAL EXAM <<=================    GEN: A&O X 1-2 , NAD , comfortable, pleasant, calm , poor historian, disoriented   limited otherwise as patient not allowing examination         ( Note written / Date of service 10-11-23 ( This is certified to be the same as "ENTERED" date above ( for billing purposes)))    ==================>> MEDICATIONS <<====================    apixaban 5 milliGRAM(s) Oral <User Schedule>  atorvastatin 80 milliGRAM(s) Oral at bedtime  benztropine 2 milliGRAM(s) Oral two times a day  chlorhexidine 2% Cloths 1 Application(s) Topical <User Schedule>  dextrose 5%. 1000 milliLiter(s) IV Continuous <Continuous>  dextrose 5%. 1000 milliLiter(s) IV Continuous <Continuous>  dextrose 50% Injectable 25 Gram(s) IV Push once  dextrose 50% Injectable 12.5 Gram(s) IV Push once  dextrose 50% Injectable 25 Gram(s) IV Push once  divalproex ER 1500 milliGRAM(s) Oral daily  glucagon  Injectable 1 milliGRAM(s) IntraMuscular once  insulin lispro (ADMELOG) corrective regimen sliding scale   SubCutaneous three times a day before meals  levothyroxine 88 MICROGram(s) Oral daily  losartan 50 milliGRAM(s) Oral daily  metoprolol tartrate 25 milliGRAM(s) Oral every 12 hours  QUEtiapine 400 milliGRAM(s) Oral at bedtime  risperiDONE   Tablet 3 milliGRAM(s) Oral at bedtime  risperiDONE   Tablet 2 milliGRAM(s) Oral daily  sodium chloride 1 Gram(s) Oral two times a day  traZODone 100 milliGRAM(s) Oral at bedtime    MEDICATIONS  (PRN):  acetaminophen     Tablet .. 650 milliGRAM(s) Oral every 6 hours PRN Temp greater or equal to 38C (100.4F), Mild Pain (1 - 3)  aluminum hydroxide/magnesium hydroxide/simethicone Suspension 30 milliLiter(s) Oral every 4 hours PRN Dyspepsia  dextrose Oral Gel 15 Gram(s) Oral once PRN Blood Glucose LESS THAN 70 milliGRAM(s)/deciliter  melatonin 3 milliGRAM(s) Oral at bedtime PRN Insomnia  ondansetron Injectable 4 milliGRAM(s) IV Push every 8 hours PRN Nausea and/or Vomiting    ___________  Active diet:  Diet, Regular:   Consistent Carbohydrate Evening Snack (CSTCHOSN)  DASH/TLC Sodium & Cholesterol Restricted (DASH)  ___________________    ==================>> VITAL SIGNS <<==================    Vital Signs Last 24 HrsT(C): 36.8 (10-11-23 @ 11:53)  T(F): 98.3 (10-11-23 @ 11:53), Max: 98.4 (10-11-23 @ 09:29)  HR: 90 (10-11-23 @ 16:35) (69 - 90)  BP: 161/99 (10-11-23 @ 16:35)  RR: 18 (10-11-23 @ 16:35) (18 - 18)  SpO2: 96% (10-11-23 @ 16:35) (94% - 97%)      CAPILLARY BLOOD GLUCOSE  POCT Blood Glucose.: 157 mg/dL (11 Oct 2023 12:16)  POCT Blood Glucose.: 142 mg/dL (11 Oct 2023 07:52)     ==================>> LAB AND IMAGING <<==================                        12.1   5.79  )-----------( 234      ( 10 Oct 2023 06:23 )             35.6        10-10    127<L>  |  92<L>  |  18  ----------------------------<  236<H>  4.6   |  22  |  0.98    Ca    9.1      10 Oct 2023 11:39      WBC count:   5.79 <<== ,  6.25 <<== ,  6.32 <<== ,  4.88 <<== ,  6.09 <<== ,  8.60 <<==   Hemoglobin:   12.1 <<==,  11.7 <<==,  11.8 <<==,  11.9 <<==,  11.8 <<==,  11.8 <<==  platelets:  234 <==, 226 <==, 215 <==, 210 <==, 228 <==, 239 <==, 237 <==    Creatinine:  0.98  <<==, 1.15  <<==, 1.09  <<==, 0.86  <<==, 0.87  <<==, 1.63  <<==  Sodium:   127  <==, 128  <==, 133  <==, 136  <==, 133  <==, 134  <==       AST:          58 <== , 98 <== , 197 <== , 18 <==      ALT:        19  <== , 23  <== , 33  <== , 19  <==      AP:        86  <=, 95  <=, 85  <=, 82  <=     Bili:        0.2  <=, 0.1  <=, 0.2  <=, 0.2  <=    ____________________________    M I C R O B I O L O G Y :    Culture - Urine (collected 07 Oct 2023 15:13)  Source: Catheterized Catheterized  Final Report (09 Oct 2023 07:44):    <10,000 CFU/mL Normal Urogenital Yris    Culture - Blood (collected 07 Oct 2023 09:52)  Source: .Blood Blood  Preliminary Report (11 Oct 2023 14:01):    No growth at 4 days    Culture - Blood (collected 07 Oct 2023 09:52)  Source: .Blood Blood  Preliminary Report (11 Oct 2023 14:01):    No growth at 4 days        SARS-CoV-2: NotDetec (10-07-23 @ 12:14)      < from: TTE W or WO Ultrasound Enhancing Agent (10.09.23 @ 08:01) >  CONCLUSIONS:    1. Technically difficult image quality.   2. Left ventricular cavity is small. Left ventricular systolic function is hyperdynamic. There are no regional wall motion abnormalities seen.  < end of copied text >  ___________________________________________________________________________________  ===============>>  A S S E S S M E N T   A N D   P L A N <<===============  ------------------------------------------------------------------------------------------    · Assessment	  Patient is a 66yo Male with past medical history of schizophrenia, DM, hypothyroidism, HLD, tobacco use presented to [facility] from a nursing home with AMS,   Per records, patient had complained of some chest pain at the nursing home. Patient is a very poor historian. At OSH, patient was noted to be in afib w/ RVR. Initial labs showed lactate 3.9 -> 3, trop I 208 -> <25K, . EKG showed Afib w/ , Q wave in III, ST depressions in V4-V6, II, aVF. Patient was given cardizem IV. Repeat EKG showed sinus rhythm with HR 76, Q wave in III, KRISSY in III, aVF. Case was discussed with interventionalist, patient given ASA, heparin gtt, was transferred to [facility].  At [facility], patient reports no chest pain or discomfort. EKG on arrival shows Q waves in II, III, aVF, no ST segment changes. Patient in sinus, HR 82. Labs showing hsTrop 3403. Lactate 1.1.  Patient seen and eroded by cardiology, and admitted to medicine for further management        Problem/Plan - 1:  ·  Problem: NSTEMI (non-ST elevation myocardial infarction).   ·  Plan: Cardiology evaluation appreciated  per cardiology no interventions / aggressive treatment planed  continuo medical management / optimization per carido    antiplatelets, statin..   Monitor vitals, labs  Echocardiogram as above     Problem/Plan - 2:  ·  Problem: Atrial fibrillation.   heparin drip >> stopped : to start Eliquis  patient has been in sinus here..   monitor on telemetry.    Problem/Plan - 3:  ·  Problem: Schizophrenia.   ·  Plan: continue multiple medications as listed   Pharmacy evaluation for reconciliation and review of the medications appreciated   psychiatry valuation as needed  Continuous observation.    As per report patient is not very compliant with medications at all times    Problem/Plan - 4:  ·  Problem: Diabetes.   ·  Plan: Hold oral medications  insulin sliding scale   monitor finger sticks  A1c. 7    Problem/Plan - 5:  ·  Problem: Hypothyroidism.   ·  Plan: Continue home medications and monitor.  TSH. 3    Problem/Plan - 6:  ·  Problem: HLD (hyperlipidemia).   ·  Plan: statin  lipid profile noted    statin changed to Atorva as recom    Problem/Plan - 7  ·  Problem:  hyponatremia  per investigation patient with chronic hyponatremia, and on salt tablets at the facility..   salt tabs resumed  fee fluid restrict  patient taking good PO  monitor  Sodium:   127  <==, 128  <==, 133  <==, 136  <==, 133  <==, 134  <==    hopefully DC planing next 48 hrs if stable. . Pending improved sodium levels    --------------------------------------------  Case discussed with med team/ staff  ___________________________  MEL Contreras D.O.  Pager: [phone]

## 2023-10-12 ENCOUNTER — TRANSCRIPTION ENCOUNTER (OUTPATIENT)
Age: 68
End: 2023-10-12

## 2023-10-12 VITALS
RESPIRATION RATE: 17 BRPM | HEART RATE: 71 BPM | TEMPERATURE: 98 F | DIASTOLIC BLOOD PRESSURE: 84 MMHG | OXYGEN SATURATION: 96 % | SYSTOLIC BLOOD PRESSURE: 149 MMHG

## 2023-10-12 LAB
ANION GAP SERPL CALC-SCNC: 16 MMOL/L — SIGNIFICANT CHANGE UP (ref 5–17)
BUN SERPL-MCNC: 24 MG/DL — HIGH (ref 7–23)
CALCIUM SERPL-MCNC: 9.7 MG/DL — SIGNIFICANT CHANGE UP (ref 8.4–10.5)
CHLORIDE SERPL-SCNC: 94 MMOL/L — LOW (ref 96–108)
CO2 SERPL-SCNC: 20 MMOL/L — LOW (ref 22–31)
CREAT SERPL-MCNC: 1.16 MG/DL — SIGNIFICANT CHANGE UP (ref 0.5–1.3)
CULTURE RESULTS: SIGNIFICANT CHANGE UP
CULTURE RESULTS: SIGNIFICANT CHANGE UP
EGFR: 69 ML/MIN/1.73M2 — SIGNIFICANT CHANGE UP
GLUCOSE BLDC GLUCOMTR-MCNC: 137 MG/DL — HIGH (ref 70–99)
GLUCOSE BLDC GLUCOMTR-MCNC: 149 MG/DL — HIGH (ref 70–99)
GLUCOSE BLDC GLUCOMTR-MCNC: 159 MG/DL — HIGH (ref 70–99)
GLUCOSE SERPL-MCNC: 148 MG/DL — HIGH (ref 70–99)
HCT VFR BLD CALC: 36.9 % — LOW (ref 39–50)
HGB BLD-MCNC: 12.6 G/DL — LOW (ref 13–17)
MCHC RBC-ENTMCNC: 30.7 PG — SIGNIFICANT CHANGE UP (ref 27–34)
MCHC RBC-ENTMCNC: 34.1 GM/DL — SIGNIFICANT CHANGE UP (ref 32–36)
MCV RBC AUTO: 90 FL — SIGNIFICANT CHANGE UP (ref 80–100)
NRBC # BLD: 0 /100 WBCS — SIGNIFICANT CHANGE UP (ref 0–0)
PLATELET # BLD AUTO: 292 K/UL — SIGNIFICANT CHANGE UP (ref 150–400)
POTASSIUM SERPL-MCNC: 5.3 MMOL/L — SIGNIFICANT CHANGE UP (ref 3.5–5.3)
POTASSIUM SERPL-SCNC: 5.3 MMOL/L — SIGNIFICANT CHANGE UP (ref 3.5–5.3)
RBC # BLD: 4.1 M/UL — LOW (ref 4.2–5.8)
RBC # FLD: 13.6 % — SIGNIFICANT CHANGE UP (ref 10.3–14.5)
SODIUM SERPL-SCNC: 130 MMOL/L — LOW (ref 135–145)
SPECIMEN SOURCE: SIGNIFICANT CHANGE UP
SPECIMEN SOURCE: SIGNIFICANT CHANGE UP
WBC # BLD: 5.12 K/UL — SIGNIFICANT CHANGE UP (ref 3.8–10.5)
WBC # FLD AUTO: 5.12 K/UL — SIGNIFICANT CHANGE UP (ref 3.8–10.5)

## 2023-10-12 PROCEDURE — 85014 HEMATOCRIT: CPT

## 2023-10-12 PROCEDURE — 84295 ASSAY OF SERUM SODIUM: CPT

## 2023-10-12 PROCEDURE — 85652 RBC SED RATE AUTOMATED: CPT

## 2023-10-12 PROCEDURE — 85610 PROTHROMBIN TIME: CPT

## 2023-10-12 PROCEDURE — 82550 ASSAY OF CK (CPK): CPT

## 2023-10-12 PROCEDURE — 99285 EMERGENCY DEPT VISIT HI MDM: CPT

## 2023-10-12 PROCEDURE — 83605 ASSAY OF LACTIC ACID: CPT

## 2023-10-12 PROCEDURE — 82803 BLOOD GASES ANY COMBINATION: CPT

## 2023-10-12 PROCEDURE — 93308 TTE F-UP OR LMTD: CPT

## 2023-10-12 PROCEDURE — 85025 COMPLETE CBC W/AUTO DIFF WBC: CPT

## 2023-10-12 PROCEDURE — 85018 HEMOGLOBIN: CPT

## 2023-10-12 PROCEDURE — 82947 ASSAY GLUCOSE BLOOD QUANT: CPT

## 2023-10-12 PROCEDURE — 85730 THROMBOPLASTIN TIME PARTIAL: CPT

## 2023-10-12 PROCEDURE — 86140 C-REACTIVE PROTEIN: CPT

## 2023-10-12 PROCEDURE — 96374 THER/PROPH/DIAG INJ IV PUSH: CPT

## 2023-10-12 PROCEDURE — 80048 BASIC METABOLIC PNL TOTAL CA: CPT

## 2023-10-12 PROCEDURE — 84132 ASSAY OF SERUM POTASSIUM: CPT

## 2023-10-12 PROCEDURE — 85027 COMPLETE CBC AUTOMATED: CPT

## 2023-10-12 PROCEDURE — 93321 DOPPLER ECHO F-UP/LMTD STD: CPT

## 2023-10-12 PROCEDURE — 82962 GLUCOSE BLOOD TEST: CPT

## 2023-10-12 PROCEDURE — 87640 STAPH A DNA AMP PROBE: CPT

## 2023-10-12 PROCEDURE — 82330 ASSAY OF CALCIUM: CPT

## 2023-10-12 PROCEDURE — 36415 COLL VENOUS BLD VENIPUNCTURE: CPT

## 2023-10-12 PROCEDURE — 82435 ASSAY OF BLOOD CHLORIDE: CPT

## 2023-10-12 PROCEDURE — 87641 MR-STAPH DNA AMP PROBE: CPT

## 2023-10-12 PROCEDURE — 84443 ASSAY THYROID STIM HORMONE: CPT

## 2023-10-12 PROCEDURE — 80053 COMPREHEN METABOLIC PANEL: CPT

## 2023-10-12 PROCEDURE — 84484 ASSAY OF TROPONIN QUANT: CPT

## 2023-10-12 PROCEDURE — 80061 LIPID PANEL: CPT

## 2023-10-12 PROCEDURE — 83880 ASSAY OF NATRIURETIC PEPTIDE: CPT

## 2023-10-12 RX ORDER — METOPROLOL TARTRATE 50 MG
1 TABLET ORAL
Qty: 60 | Refills: 0
Start: 2023-10-12 | End: 2023-11-10

## 2023-10-12 RX ORDER — APIXABAN 2.5 MG/1
1 TABLET, FILM COATED ORAL
Qty: 0 | Refills: 0 | DISCHARGE
Start: 2023-10-12

## 2023-10-12 RX ORDER — CLOPIDOGREL BISULFATE 75 MG/1
1 TABLET, FILM COATED ORAL
Qty: 30 | Refills: 0
Start: 2023-10-12 | End: 2023-11-10

## 2023-10-12 RX ADMIN — CLOPIDOGREL BISULFATE 75 MILLIGRAM(S): 75 TABLET, FILM COATED ORAL at 11:53

## 2023-10-12 RX ADMIN — APIXABAN 5 MILLIGRAM(S): 2.5 TABLET, FILM COATED ORAL at 11:52

## 2023-10-12 RX ADMIN — Medication 88 MICROGRAM(S): at 05:45

## 2023-10-12 RX ADMIN — RISPERIDONE 2 MILLIGRAM(S): 4 TABLET ORAL at 11:53

## 2023-10-12 RX ADMIN — Medication 2 MILLIGRAM(S): at 18:08

## 2023-10-12 RX ADMIN — RISPERIDONE 3 MILLIGRAM(S): 4 TABLET ORAL at 21:51

## 2023-10-12 RX ADMIN — ATORVASTATIN CALCIUM 80 MILLIGRAM(S): 80 TABLET, FILM COATED ORAL at 21:51

## 2023-10-12 RX ADMIN — Medication 1: at 08:08

## 2023-10-12 RX ADMIN — APIXABAN 5 MILLIGRAM(S): 2.5 TABLET, FILM COATED ORAL at 21:51

## 2023-10-12 RX ADMIN — SODIUM CHLORIDE 1 GRAM(S): 9 INJECTION INTRAMUSCULAR; INTRAVENOUS; SUBCUTANEOUS at 05:46

## 2023-10-12 RX ADMIN — APIXABAN 5 MILLIGRAM(S): 2.5 TABLET, FILM COATED ORAL at 00:18

## 2023-10-12 RX ADMIN — QUETIAPINE FUMARATE 400 MILLIGRAM(S): 200 TABLET, FILM COATED ORAL at 21:51

## 2023-10-12 RX ADMIN — Medication 25 MILLIGRAM(S): at 05:46

## 2023-10-12 RX ADMIN — LOSARTAN POTASSIUM 50 MILLIGRAM(S): 100 TABLET, FILM COATED ORAL at 05:45

## 2023-10-12 RX ADMIN — Medication 100 MILLIGRAM(S): at 21:51

## 2023-10-12 RX ADMIN — SODIUM CHLORIDE 1 GRAM(S): 9 INJECTION INTRAMUSCULAR; INTRAVENOUS; SUBCUTANEOUS at 18:08

## 2023-10-12 RX ADMIN — DIVALPROEX SODIUM 1500 MILLIGRAM(S): 500 TABLET, DELAYED RELEASE ORAL at 11:53

## 2023-10-12 RX ADMIN — Medication 2 MILLIGRAM(S): at 05:46

## 2023-10-12 RX ADMIN — Medication 25 MILLIGRAM(S): at 18:08

## 2023-10-12 RX ADMIN — CHLORHEXIDINE GLUCONATE 1 APPLICATION(S): 213 SOLUTION TOPICAL at 05:46

## 2023-10-12 NOTE — DISCHARGE NOTE PROVIDER - HOSPITAL COURSE
HPI:  Patient is a 68yo Male with past medical history of schizophrenia, DM, hypothyroidism, HLD, tobacco use presented to [facility] from a nursing home with AMS,   Per records, patient had complained of some chest pain at the nursing home. Patient is a very poor historian. At OSH, patient was noted to be in afib w/ RVR. Initial labs showed lactate 3.9 -> 3, trop I 208 -> <25K, . EKG showed Afib w/ , Q wave in III, ST depressions in V4-V6, II, aVF. Patient was given cardizem IV. Repeat EKG showed sinus rhythm with HR 76, Q wave in III, KRISSY in III, aVF. Case was discussed with interventionalist, patient given ASA, heparin gtt, was transferred to [facility].  At [facility], patient reports no chest pain or discomfort. EKG on arrival shows Q waves in II, III, aVF, no ST segment changes. Patient in sinus, HR 82. Labs showing hsTrop 3403. Lactate 1.1.  Patient seen and eroded by cardiology, and admitted to medicine for further management  (08 Oct 2023 21:30)    Hospital Course: Patient was initially started on heparin gtt for Atrial fibrillation, later transitioned to Eliquis. Per cardiology no interventions or aggressive treatment planned. TTE performed on 10.09.23 was a technically difficult image quality. Revealed left ventricular cavity is small. Left ventricular systolic function is hyperdynamic. No regional wall motion abnormalities seen. During hospital stay - lab work was notable for hyponatremia - per investigation patient with chronic hyponatremia, and on salt tablets at group home facility - salt tabs resumed inpatient with free fluid restriction. - with improvement in sodium level. Discharge/Dispo/Med rec discussed with attending  ____. Patient medically cleared for discharge ____ with outpatient follow up       Important Medication Changes:    Active or Pending Issues Requiring Follow Up:  - Follow up with PCP within 1 week    Advanced Directives   [x] Full Code [ ] DNR [ ] Hospice    Discharge Diagnoses:    HPI:  Patient is a 68yo Male with past medical history of schizophrenia, DM, hypothyroidism, HLD, tobacco use presented to [facility] from a nursing home with AMS,   Per records, patient had complained of some chest pain at the nursing home. Patient is a very poor historian. At OSH, patient was noted to be in afib w/ RVR. Initial labs showed lactate 3.9 -> 3, trop I 208 -> <25K, . EKG showed Afib w/ , Q wave in III, ST depressions in V4-V6, II, aVF. Patient was given cardizem IV. Repeat EKG showed sinus rhythm with HR 76, Q wave in III, KRISSY in III, aVF. Case was discussed with interventionalist, patient given ASA, heparin gtt, was transferred to [facility].  At [facility], patient reports no chest pain or discomfort. EKG on arrival shows Q waves in II, III, aVF, no ST segment changes. Patient in sinus, HR 82. Labs showing hsTrop 3403. Lactate 1.1.  Patient seen and eroded by cardiology, and admitted to medicine for further management  (08 Oct 2023 21:30)    Hospital Course: Patient was initially started on heparin gtt for Atrial fibrillation, later transitioned to Eliquis. Per cardiology no interventions or aggressive treatment planned. TTE performed on 10.09.23 was a technically difficult image quality. Revealed left ventricular cavity is small. Left ventricular systolic function is hyperdynamic. No regional wall motion abnormalities seen. During hospital stay - lab work was notable for hyponatremia - per investigation patient with chronic hyponatremia, and on salt tablets at group home facility - salt tabs resumed inpatient with free fluid restriction. - with improvement in sodium level. Discharge/Dispo/Med rec discussed with medicine attending. Patient medically cleared for discharge with outpatient follow up       Important Medication Changes:    Active or Pending Issues Requiring Follow Up:  - Follow up with PCP within 1 week    Advanced Directives   [x] Full Code [ ] DNR [ ] Hospice    Discharge Diagnoses:   NSTEMI  Afib  Hyponatremia  Schizophrenia  Diabetes

## 2023-10-12 NOTE — DISCHARGE NOTE NURSING/CASE MANAGEMENT/SOCIAL WORK - PATIENT PORTAL LINK FT
You can access the FollowMyHealth Patient Portal offered by [facility] by registering at the following website: [email or web address]. By joining [facility]’s FollowMyHealth portal, you will also be able to view your health information using other applications (apps) compatible with our system.

## 2023-10-12 NOTE — DISCHARGE NOTE PROVIDER - NSDCCAREPROVSEEN_GEN_ALL_CORE_FT
Nickie Contreras Nickie Coe  [facility] Medicine      [ Greater than 35 min spent for discharge services.   MEL Contreras ]       ( Note written / Date of service is the same as last day of patient stay  in the hospital ( for billing purposes)))

## 2023-10-12 NOTE — DISCHARGE NOTE PROVIDER - NSDCMRMEDTOKEN_GEN_ALL_CORE_FT
benztropine 2 mg oral tablet: 1 tab(s) orally 2 times a day  ClearLax oral powder for reconstitution: 17 gram(s) orally once a day  Depakote  mg oral tablet, extended release: 3 tab(s) orally once a day  levothyroxine 88 mcg (0.088 mg) oral tablet: 1 tab(s) orally once a day  losartan 50 mg oral tablet: 1 tab(s) orally once a day  magnesium oxide 400 mg oral tablet: 1 tab(s) orally 2 times a day  metFORMIN 1000 mg oral tablet: 1 tab(s) orally 2 times a day  nystatin 100,000 units/g topical cream: Apply 1 applicatorful by topical route 2 times per day as needed after cleanse with soap and water, apply to inner groin.  QUEtiapine 400 mg oral tablet: 1 tab(s) orally once a day (at bedtime)  RisperDAL 2 mg oral tablet: 1 tab(s) orally once a day  RisperDAL 3 mg oral tablet: 1 tab(s) orally once a day (at bedtime)  simvastatin 20 mg oral tablet: 1 tab(s) orally once a day (at bedtime)  sodium chloride 1 g oral tablet: 1 tablet orally 2 times a day  traZODone 100 mg oral tablet: 1 tab(s) orally once a day (at bedtime)   apixaban 5 mg oral tablet: 1 tab(s) orally 2 times a day  benztropine 2 mg oral tablet: 1 tab(s) orally 2 times a day  Depakote  mg oral tablet, extended release: 3 tab(s) orally once a day  levothyroxine 88 mcg (0.088 mg) oral tablet: 1 tab(s) orally once a day  losartan 50 mg oral tablet: 1 tab(s) orally once a day  metFORMIN 1000 mg oral tablet: 1 tab(s) orally 2 times a day  QUEtiapine 400 mg oral tablet: 1 tab(s) orally once a day (at bedtime)  RisperDAL 2 mg oral tablet: 1 tab(s) orally once a day  RisperDAL 3 mg oral tablet: 1 tab(s) orally once a day (at bedtime)  simvastatin 20 mg oral tablet: 1 tab(s) orally once a day (at bedtime)  sodium chloride 1 g oral tablet: 1 tablet orally 2 times a day  traZODone 100 mg oral tablet: 1 tab(s) orally once a day (at bedtime)   apixaban 5 mg oral tablet: 1 tab(s) orally 2 times a day  atorvastatin 80 mg oral tablet: 1 tab(s) orally once a day (at bedtime)  benztropine 2 mg oral tablet: 1 tab(s) orally 2 times a day  Depakote  mg oral tablet, extended release: 3 tab(s) orally once a day  levothyroxine 88 mcg (0.088 mg) oral tablet: 1 tab(s) orally once a day  losartan 50 mg oral tablet: 1 tab(s) orally once a day  metFORMIN 1000 mg oral tablet: 1 tab(s) orally 2 times a day  QUEtiapine 400 mg oral tablet: 1 tab(s) orally once a day (at bedtime)  RisperDAL 2 mg oral tablet: 1 tab(s) orally once a day  RisperDAL 3 mg oral tablet: 1 tab(s) orally once a day (at bedtime)  sodium chloride 1 g oral tablet: 1 tablet orally 2 times a day  traZODone 100 mg oral tablet: 1 tab(s) orally once a day (at bedtime)

## 2023-10-12 NOTE — DISCHARGE NOTE NURSING/CASE MANAGEMENT/SOCIAL WORK - NSDCPEFALRISK_GEN_ALL_CORE
For information on Fall & Injury Prevention, visit: [email or web address] OR  [email or web address] OR  https://www.cdc.gov/steadi/patient.html

## 2023-10-12 NOTE — PROGRESS NOTE ADULT - ASSESSMENT
M E D I C A L   A T T E N D I N G    F O L L O W    U P   N O T E  (10-12-23 )                                     ------------------------------------------------------------------------------------------------    patient evaluated by me, case discussed with team, chart, medications, and physical exam reviewed, labs / tests  and vitals reviewed by me, as bellow.   Patient is stable for discharge today.   Patient to follow up with  MD at facility   See discharge document for full note.  [Greater than 35 min spent for these services. ]          ( Note written / Date of service 10-12-23 ( This is certified to be the same as "ENTERED" date above ( for billing purposes)))    ==================>> MEDICATIONS <<====================    apixaban 5 milliGRAM(s) Oral <User Schedule>  atorvastatin 80 milliGRAM(s) Oral at bedtime  benztropine 2 milliGRAM(s) Oral two times a day  chlorhexidine 2% Cloths 1 Application(s) Topical <User Schedule>  clopidogrel Tablet 75 milliGRAM(s) Oral daily  dextrose 5%. 1000 milliLiter(s) IV Continuous <Continuous>  dextrose 5%. 1000 milliLiter(s) IV Continuous <Continuous>  dextrose 50% Injectable 25 Gram(s) IV Push once  dextrose 50% Injectable 12.5 Gram(s) IV Push once  dextrose 50% Injectable 25 Gram(s) IV Push once  divalproex ER 1500 milliGRAM(s) Oral daily  glucagon  Injectable 1 milliGRAM(s) IntraMuscular once  insulin lispro (ADMELOG) corrective regimen sliding scale   SubCutaneous three times a day before meals  levothyroxine 88 MICROGram(s) Oral daily  losartan 50 milliGRAM(s) Oral daily  metoprolol tartrate 25 milliGRAM(s) Oral every 12 hours  QUEtiapine 400 milliGRAM(s) Oral at bedtime  risperiDONE   Tablet 3 milliGRAM(s) Oral at bedtime  risperiDONE   Tablet 2 milliGRAM(s) Oral daily  sodium chloride 1 Gram(s) Oral two times a day  traZODone 100 milliGRAM(s) Oral at bedtime    MEDICATIONS  (PRN):  acetaminophen     Tablet .. 650 milliGRAM(s) Oral every 6 hours PRN Temp greater or equal to 38C (100.4F), Mild Pain (1 - 3)  aluminum hydroxide/magnesium hydroxide/simethicone Suspension 30 milliLiter(s) Oral every 4 hours PRN Dyspepsia  dextrose Oral Gel 15 Gram(s) Oral once PRN Blood Glucose LESS THAN 70 milliGRAM(s)/deciliter  melatonin 3 milliGRAM(s) Oral at bedtime PRN Insomnia  ondansetron Injectable 4 milliGRAM(s) IV Push every 8 hours PRN Nausea and/or Vomiting    ___________  Active diet:  Diet, Regular:   Consistent Carbohydrate Evening Snack (CSTCHOSN)  DASH/TLC Sodium & Cholesterol Restricted (DASH)  ___________________    ==================>> VITAL SIGNS <<==================    T(C): 36.6 (10-12-23 @ 10:51), Max: 36.7 (10-11-23 @ 21:28)  HR: 68 (10-12-23 @ 10:51) (67 - 90)  BP: 118/81 (10-12-23 @ 10:51) (118/81 - 161/99)  RR: 16 (10-12-23 @ 10:51) (16 - 18)  SpO2: 97% (10-12-23 @ 10:51) (95% - 98%)     CAPILLARY BLOOD GLUCOSE  POCT Blood Glucose.: 149 mg/dL (12 Oct 2023 11:49)  POCT Blood Glucose.: 159 mg/dL (12 Oct 2023 07:34)  POCT Blood Glucose.: 229 mg/dL (11 Oct 2023 21:34)  POCT Blood Glucose.: 134 mg/dL (11 Oct 2023 17:16)    I&O's Summary    11 Oct 2023 07:01  -  12 Oct 2023 07:00  --------------------------------------------------------  IN: 1070 mL / OUT: 2500 mL / NET: -1430 mL    12 Oct 2023 07:01  -  12 Oct 2023 13:00  --------------------------------------------------------  IN: 300 mL / OUT: 400 mL / NET: -100 mL       ==================>> LAB AND IMAGING <<==================                        12.6   5.12  )-----------( 292      ( 12 Oct 2023 09:42 )             36.9        10-12    130<L>  |  94<L>  |  24<H>  ----------------------------<  148<H>  5.3   |  20<L>  |  1.16    Ca    9.7      12 Oct 2023 09:42     Urinalysis Basic - ( 12 Oct 2023 09:42 )  Color: x / Appearance: x / SG: x / pH: x  Gluc: 148 mg/dL / Ketone: x  / Bili: x / Urobili: x   Blood: x / Protein: x / Nitrite: x   Leuk Esterase: x / RBC: x / WBC x   Sq Epi: x / Non Sq Epi: x / Bacteria: x    TSH:      3.25   (10-09-23)           Lipid profile:  (10-09-23)     Total: 214     LDL  : (p)     HDL  :42     TG   :301     HgA1C:   (10-08-23)          (10-08-23)      7.1    WBC count:   5.12 <<== ,  5.79 <<== ,  6.25 <<== ,  6.32 <<== ,  4.88 <<== ,  6.09 <<==   Hemoglobin:   12.6 <<==,  12.1 <<==,  11.7 <<==,  11.8 <<==,  11.9 <<==,  11.8 <<==  platelets:  292 <==, 234 <==, 226 <==, 215 <==, 210 <==, 228 <==, 239 <==    Creatinine:  1.16  <<==, 0.98  <<==, 1.15  <<==, 1.09  <<==, 0.86  <<==, 0.87  <<==  Sodium:   130  <==, 127  <==, 128  <==, 133  <==, 136  <==, 133  <==, 134  <==       AST:          58 <== , 98 <== , 197 <== , 18 <==      ALT:        19  <== , 23  <== , 33  <== , 19  <==      AP:        86  <=, 95  <=, 85  <=, 82  <=     Bili:        0.2  <=, 0.1  <=, 0.2  <=, 0.2  <=        ( Note written / Date of service 10-12-23 ( This is certified to be the same as "ENTERED" date above ( for billing Purposes )))

## 2023-10-12 NOTE — PROGRESS NOTE ADULT - REASON FOR ADMISSION
Non-ST elevation MI

## 2023-10-12 NOTE — PROGRESS NOTE ADULT - PROVIDER SPECIALTY LIST ADULT
Cardiology
Internal Medicine
Cardiology
Internal Medicine
Cardiology
Internal Medicine
Internal Medicine

## 2023-10-12 NOTE — DISCHARGE NOTE PROVIDER - NSDCCPCAREPLAN_GEN_ALL_CORE_FT
PRINCIPAL DISCHARGE DIAGNOSIS  Diagnosis: NSTEMI (non-ST elevation myocardial infarction)  Assessment and Plan of Treatment: You were evaluated by the cardiology - recommended to continue medical management.      SECONDARY DISCHARGE DIAGNOSES  Diagnosis: Atrial fibrillation  Assessment and Plan of Treatment: Atrial fibrillation is a common heart rhythm problem which increases the risk of stroke and heat attack.  It helps if you control your blood pressure, avoid alcohol, cut down on caffeine, get treatment for your thyroid if it is overactive, and perform moderate exercise in consultation with your Primary Care Provider.  Call your doctor if you experience chest tightness/pain, lightheadedness, loss of consciousness, shortness of breath (especially with exercise), feel your heart racing or beating unusually, frequent or abnormal bleeding.  It is important to take all your heart medications as prescribed.       PRINCIPAL DISCHARGE DIAGNOSIS  Diagnosis: NSTEMI (non-ST elevation myocardial infarction)  Assessment and Plan of Treatment: You were evaluated by the cardiology - recommended to continue medical management.      SECONDARY DISCHARGE DIAGNOSES  Diagnosis: Schizophrenia  Assessment and Plan of Treatment: Continue to take your medications as prescribed.    Diagnosis: Atrial fibrillation  Assessment and Plan of Treatment: Continue to take your Eliquis and metoprolol as prescribed.  Atrial fibrillation is a common heart rhythm problem which increases the risk of stroke and heat attack.  It helps if you control your blood pressure, avoid alcohol, cut down on caffeine, get treatment for your thyroid if it is overactive, and perform moderate exercise in consultation with your Primary Care Provider.  Call your doctor if you experience chest tightness/pain, lightheadedness, loss of consciousness, shortness of breath (especially with exercise), feel your heart racing or beating unusually, frequent or abnormal bleeding.  It is important to take all your heart medications as prescribed.      Diagnosis: Hyponatremia  Assessment and Plan of Treatment: Continue to take your salt tabs as prescribed.

## 2024-03-03 ENCOUNTER — EMERGENCY (EMERGENCY)
Facility: HOSPITAL | Age: 69
LOS: 1 days | Discharge: NURSING, REHAB OR LONG-TERM CARE | End: 2024-03-03
Attending: EMERGENCY MEDICINE | Admitting: EMERGENCY MEDICINE
Payer: MEDICAID

## 2024-03-03 VITALS
OXYGEN SATURATION: 97 % | DIASTOLIC BLOOD PRESSURE: 76 MMHG | TEMPERATURE: 97 F | RESPIRATION RATE: 18 BRPM | SYSTOLIC BLOOD PRESSURE: 140 MMHG | HEART RATE: 66 BPM

## 2024-03-03 VITALS
RESPIRATION RATE: 17 BRPM | DIASTOLIC BLOOD PRESSURE: 77 MMHG | WEIGHT: 164.91 LBS | TEMPERATURE: 97 F | HEIGHT: 61 IN | HEART RATE: 55 BPM | SYSTOLIC BLOOD PRESSURE: 130 MMHG | OXYGEN SATURATION: 96 %

## 2024-03-03 LAB
ALBUMIN SERPL ELPH-MCNC: 3.2 G/DL — LOW (ref 3.3–5)
ALP SERPL-CCNC: 83 U/L — SIGNIFICANT CHANGE UP (ref 30–120)
ALT FLD-CCNC: 16 U/L — SIGNIFICANT CHANGE UP (ref 10–60)
ANION GAP SERPL CALC-SCNC: 7 MMOL/L — SIGNIFICANT CHANGE UP (ref 5–17)
APTT BLD: 41.3 SEC — HIGH (ref 24.5–35.6)
AST SERPL-CCNC: 18 U/L — SIGNIFICANT CHANGE UP (ref 10–40)
BASOPHILS # BLD AUTO: 0.04 K/UL — SIGNIFICANT CHANGE UP (ref 0–0.2)
BASOPHILS NFR BLD AUTO: 1 % — SIGNIFICANT CHANGE UP (ref 0–2)
BILIRUB SERPL-MCNC: 0.3 MG/DL — SIGNIFICANT CHANGE UP (ref 0.2–1.2)
BUN SERPL-MCNC: 17 MG/DL — SIGNIFICANT CHANGE UP (ref 7–23)
CALCIUM SERPL-MCNC: 9.4 MG/DL — SIGNIFICANT CHANGE UP (ref 8.4–10.5)
CHLORIDE SERPL-SCNC: 100 MMOL/L — SIGNIFICANT CHANGE UP (ref 96–108)
CO2 SERPL-SCNC: 26 MMOL/L — SIGNIFICANT CHANGE UP (ref 22–31)
CREAT SERPL-MCNC: 1.31 MG/DL — HIGH (ref 0.5–1.3)
EGFR: 59 ML/MIN/1.73M2 — LOW
EOSINOPHIL # BLD AUTO: 0.14 K/UL — SIGNIFICANT CHANGE UP (ref 0–0.5)
EOSINOPHIL NFR BLD AUTO: 3.4 % — SIGNIFICANT CHANGE UP (ref 0–6)
FLUAV AG NPH QL: SIGNIFICANT CHANGE UP
FLUBV AG NPH QL: SIGNIFICANT CHANGE UP
GLUCOSE SERPL-MCNC: 149 MG/DL — HIGH (ref 70–99)
HCT VFR BLD CALC: 34.5 % — LOW (ref 39–50)
HGB BLD-MCNC: 11.6 G/DL — LOW (ref 13–17)
IMM GRANULOCYTES NFR BLD AUTO: 0.5 % — SIGNIFICANT CHANGE UP (ref 0–0.9)
INR BLD: 1.25 RATIO — HIGH (ref 0.85–1.18)
LYMPHOCYTES # BLD AUTO: 0.97 K/UL — LOW (ref 1–3.3)
LYMPHOCYTES # BLD AUTO: 23.7 % — SIGNIFICANT CHANGE UP (ref 13–44)
MCHC RBC-ENTMCNC: 30.7 PG — SIGNIFICANT CHANGE UP (ref 27–34)
MCHC RBC-ENTMCNC: 33.6 GM/DL — SIGNIFICANT CHANGE UP (ref 32–36)
MCV RBC AUTO: 91.3 FL — SIGNIFICANT CHANGE UP (ref 80–100)
MONOCYTES # BLD AUTO: 0.66 K/UL — SIGNIFICANT CHANGE UP (ref 0–0.9)
MONOCYTES NFR BLD AUTO: 16.1 % — HIGH (ref 2–14)
NEUTROPHILS # BLD AUTO: 2.26 K/UL — SIGNIFICANT CHANGE UP (ref 1.8–7.4)
NEUTROPHILS NFR BLD AUTO: 55.3 % — SIGNIFICANT CHANGE UP (ref 43–77)
NRBC # BLD: 0 /100 WBCS — SIGNIFICANT CHANGE UP (ref 0–0)
PLATELET # BLD AUTO: 200 K/UL — SIGNIFICANT CHANGE UP (ref 150–400)
POTASSIUM SERPL-MCNC: 4.7 MMOL/L — SIGNIFICANT CHANGE UP (ref 3.5–5.3)
POTASSIUM SERPL-SCNC: 4.7 MMOL/L — SIGNIFICANT CHANGE UP (ref 3.5–5.3)
PROT SERPL-MCNC: 7.3 G/DL — SIGNIFICANT CHANGE UP (ref 6–8.3)
PROTHROM AB SERPL-ACNC: 13.5 SEC — HIGH (ref 9.5–13)
RBC # BLD: 3.78 M/UL — LOW (ref 4.2–5.8)
RBC # FLD: 14 % — SIGNIFICANT CHANGE UP (ref 10.3–14.5)
RSV RNA NPH QL NAA+NON-PROBE: SIGNIFICANT CHANGE UP
SARS-COV-2 RNA SPEC QL NAA+PROBE: SIGNIFICANT CHANGE UP
SODIUM SERPL-SCNC: 133 MMOL/L — LOW (ref 135–145)
VALPROATE SERPL-MCNC: 56 UG/ML — SIGNIFICANT CHANGE UP (ref 50–100)
WBC # BLD: 4.09 K/UL — SIGNIFICANT CHANGE UP (ref 3.8–10.5)
WBC # FLD AUTO: 4.09 K/UL — SIGNIFICANT CHANGE UP (ref 3.8–10.5)

## 2024-03-03 PROCEDURE — 99285 EMERGENCY DEPT VISIT HI MDM: CPT

## 2024-03-03 PROCEDURE — 85025 COMPLETE CBC W/AUTO DIFF WBC: CPT

## 2024-03-03 PROCEDURE — 99285 EMERGENCY DEPT VISIT HI MDM: CPT | Mod: 25

## 2024-03-03 PROCEDURE — 70450 CT HEAD/BRAIN W/O DYE: CPT | Mod: 26,MC

## 2024-03-03 PROCEDURE — 87637 SARSCOV2&INF A&B&RSV AMP PRB: CPT

## 2024-03-03 PROCEDURE — 71045 X-RAY EXAM CHEST 1 VIEW: CPT | Mod: 26

## 2024-03-03 PROCEDURE — 80053 COMPREHEN METABOLIC PANEL: CPT

## 2024-03-03 PROCEDURE — 93005 ELECTROCARDIOGRAM TRACING: CPT

## 2024-03-03 PROCEDURE — 85730 THROMBOPLASTIN TIME PARTIAL: CPT

## 2024-03-03 PROCEDURE — 85610 PROTHROMBIN TIME: CPT

## 2024-03-03 PROCEDURE — 70450 CT HEAD/BRAIN W/O DYE: CPT | Mod: MC

## 2024-03-03 PROCEDURE — 93010 ELECTROCARDIOGRAM REPORT: CPT

## 2024-03-03 PROCEDURE — 36415 COLL VENOUS BLD VENIPUNCTURE: CPT

## 2024-03-03 PROCEDURE — 71045 X-RAY EXAM CHEST 1 VIEW: CPT

## 2024-03-03 PROCEDURE — 80164 ASSAY DIPROPYLACETIC ACD TOT: CPT

## 2024-03-03 RX ORDER — SODIUM CHLORIDE 9 MG/ML
1000 INJECTION INTRAMUSCULAR; INTRAVENOUS; SUBCUTANEOUS ONCE
Refills: 0 | Status: COMPLETED | OUTPATIENT
Start: 2024-03-03 | End: 2024-03-03

## 2024-03-03 RX ADMIN — SODIUM CHLORIDE 1000 MILLILITER(S): 9 INJECTION INTRAMUSCULAR; INTRAVENOUS; SUBCUTANEOUS at 10:08

## 2024-03-03 NOTE — ED PROVIDER NOTE - NSFOLLOWUPINSTRUCTIONS_ED_ALL_ED_FT
Schizophrenia    WHAT YOU NEED TO KNOW:    What is schizophrenia? Schizophrenia is a chronic mental disorder that affects your emotions, thoughts, and behavior.    What increases my risk for schizophrenia? Healthcare providers do not exactly know what causes schizophrenia. Providers believe that it is caused by brain chemicals not being balanced. The following may increase your risk:    A family history of schizophrenia    Being male or younger than 30 years    Exposure to certain toxins or a virus before you were born that affected how your brain developed    Use of certain mind-altering drugs when you were an adolescent or young adult  What are the signs and symptoms of schizophrenia? Stress, lack of sleep, or alcohol or drug use may trigger symptoms. This is called a psychotic episode. Signs and symptoms may come and go, or get worse over time:    Delusions are false ideas. You may believe that someone is spying on you, or that you are someone famous.    Hallucinations are things you see, hear, feel, taste, or smell that seem real but are not.    Disordered thinking and speech is moving from one subject to another in a way that does not make sense. You may make up your own words or sounds.    Disorganized or abnormal movement can include a lot of movement or movement that is not necessary.    Lack of drive or initiative includes avoiding family and friends, not keeping jobs, and not being able to function well.  How is schizophrenia diagnosed? Your healthcare provider will examine you. Your provider will ask if you have a history of psychological trauma, such as physical, sexual, or mental abuse. Your provider will ask if you were given the care that you needed when you needed it. Tell your provider if you have a history of alcohol or drug use. Your provider will ask if you want to hurt or kill yourself or others. Tell your provider about your hobbies and goals, the people in your life who support you, and how you feel about treatment. The answers to these questions help healthcare providers plan your treatment.    How is schizophrenia treated? Treatment usually includes both medicines and therapy:    Medicines may be used to decrease psychotic episodes, severe agitation, or depression. Some medicines may help you feel more calm during psychotic episodes.    Therapy is used to help you and your family learn to manage schizophrenia. You will learn how to handle symptoms such as hallucinations and delusions. You may learn ways to interact with other people more effectively. Therapy may also be used to place you in a job that fits your skills to encourage independence and self-confidence.  What can I do to manage schizophrenia? The following may help you prevent or manage psychotic episodes:    Do not stop taking your medicines. Tell your healthcare provider or psychiatrist if you have any problems with or questions about your medicines.    Do not stop your therapies. Tell your provider or psychiatrist if you are not comfortable or have questions about your therapies.    Get regular sleep. Try to get 6 to 8 hours of sleep each night. Tell your provider or therapist if you are not able to sleep, or if you are sleeping too much.    Do not drink alcohol or use cannabis (marijuana). Alcohol interacts with medicine used to treat schizophrenia. Marijuana can trigger or worsen a psychotic episode.  How do I find support and more information?    [employer] [place] [employer]  [address]  [place], [place]  Phone: 1-550.758.8831  Phone: 1-978.187.6185  Web Address: http://www.serena.[employer]  [employer] [place] [employer] ([facility]), Office of Science Policy, Planning, and Communications  [address], [facility] [address]  [place], [id number]  Phone: 1-568.830.8951  Phone: 1-651.561.9638  Web Address: http://www.[facility].gov/  Call your local emergency number (911 in the ) or have someone call if:    You think or talk about killing yourself or someone else.    When should I call my doctor or therapist or have someone call?    You are having signs or symptoms of schizophrenia.    You are not able to sleep well, or are sleeping more than usual.    You cannot eat or are eating more than usual.    You have questions or concerns about your condition or care.  CARE AGREEMENT:    You have the right to help plan your care. Learn about your health condition and how it may be treated. Discuss treatment options with your healthcare providers to decide what care you want to receive. You always have the right to refuse treatment.

## 2024-03-03 NOTE — ED ADULT NURSE NOTE - CHIEF COMPLAINT QUOTE
67 y/o male presents aox2 via corinne garcía from [facility] for ams eval. EMS report that [facility] staff noticed patient not being himself, pt noted mildly uncooperative.

## 2024-03-03 NOTE — ED PROVIDER NOTE - OBJECTIVE STATEMENT
68-year-old male with history of schizophrenia brought by ambulance from [facility] for evaluation of AMS today.  Staff states patient was not acting himself and was wandering and taking other peoples food.  Patient unable to give any history denies any symptoms when asked.  Patient states he is hungry and asking for a banana and a cup of coffee. His PCP is Dr. Oneill.

## 2024-03-03 NOTE — ED ADULT NURSE NOTE - CAS EDN DISCHARGE ASSESSMENT
Normal vision: sees adequately in most situations; can see medication labels, newsprint Patient baseline mental status

## 2024-03-03 NOTE — ED PROVIDER NOTE - CLINICAL SUMMARY MEDICAL DECISION MAKING FREE TEXT BOX
68-year-old male with history of schizophrenia brought by ambulance from [facility] for evaluation of AMS today.  Staff states patient was not acting himself and was wandering and taking other peoples food.  Patient unable to give any history denies any symptoms when asked.  Patient states he is hungry and asking for a banana and a cup of coffee. His PCP is Dr. Oneill.    Physical exam is unrevealing and patient appears to be at baseline.  Plan is we will get labs urine EKG CT brain and if normal we will discharge back to nursing home with PCP follow-up.

## 2024-03-03 NOTE — ED PROVIDER NOTE - PROVIDER TOKENS
Appointment has been scheduled   PROVIDER:[TOKEN:[[id number]],FOLLOWUP:[Urgent],ESTABLISHEDPATIENT:[T]]

## 2024-03-03 NOTE — ED ADULT TRIAGE NOTE - CHIEF COMPLAINT QUOTE
69 y/o male presents aox2 via corinne garcía from [facility] for ams eval. EMS report that csh staff noticed patient not being himself, pt noted mildly uncooperative.

## 2024-03-03 NOTE — ED PROVIDER NOTE - CARE PROVIDER_API CALL
Murtaza Oneill  Internal Medicine  [address] [place] [address]  Phone: [phone]  Fax: [phone]  Established Patient  Follow Up Time: Urgent

## 2024-03-03 NOTE — ED PROVIDER NOTE - PATIENT PORTAL LINK FT
You can access the FollowMyHealth Patient Portal offered by [facility] by registering at the following website: [email or web address]. By joining [employer]’s FollowMyHealth portal, you will also be able to view your health information using other applications (apps) compatible with our system.

## 2024-03-03 NOTE — ED ADULT NURSE REASSESSMENT NOTE - NS ED NURSE REASSESS COMMENT FT1
called [facility] and made aware pt to be d'c/d. spoke with mesha and willing to take back no concerns

## 2024-03-03 NOTE — ED ADULT NURSE NOTE - OBJECTIVE STATEMENT
sent in for altered mental status per triage. pt was walking around touching peoples food. pt confused. pt  skin warm and dry no resp distress lungs clear and equal b/l ascultation abd soft non tender + bs

## 2024-03-03 NOTE — ED ADULT NURSE NOTE - NSFALLUNIVINTERV_ED_ALL_ED
Bed/Stretcher in lowest position, wheels locked, appropriate side rails in place/Call bell, personal items and telephone in reach/Instruct patient to call for assistance before getting out of bed/chair/stretcher/Non-slip footwear applied when patient is off stretcher/[place] to call system/Physically safe environment - no spills, clutter or unnecessary equipment/Purposeful proactive rounding/Room/bathroom lighting operational, light cord in reach

## 2024-03-11 RX ORDER — QUETIAPINE FUMARATE 200 MG/1
1 TABLET, FILM COATED ORAL
Refills: 0 | DISCHARGE

## 2024-03-11 RX ORDER — NYSTATIN CREAM 100000 [USP'U]/G
0 CREAM TOPICAL
Refills: 0 | DISCHARGE

## 2024-03-11 RX ORDER — BENZTROPINE MESYLATE 1 MG
1 TABLET ORAL
Refills: 0 | DISCHARGE

## 2024-03-11 RX ORDER — SIMVASTATIN 20 MG/1
1 TABLET, FILM COATED ORAL
Refills: 0 | DISCHARGE

## 2024-03-11 RX ORDER — ATORVASTATIN CALCIUM 80 MG/1
1 TABLET, FILM COATED ORAL
Refills: 0 | DISCHARGE

## 2024-03-11 RX ORDER — MAGNESIUM OXIDE 400 MG ORAL TABLET 241.3 MG
1 TABLET ORAL
Refills: 0 | DISCHARGE

## 2024-03-11 RX ORDER — LEVOTHYROXINE SODIUM 125 MCG
1 TABLET ORAL
Refills: 0 | DISCHARGE

## 2024-03-11 RX ORDER — ATORVASTATIN CALCIUM 80 MG/1
1 TABLET, FILM COATED ORAL
Qty: 0 | Refills: 0 | DISCHARGE

## 2024-03-11 RX ORDER — APIXABAN 2.5 MG/1
1 TABLET, FILM COATED ORAL
Refills: 0 | DISCHARGE

## 2024-03-11 RX ORDER — DIVALPROEX SODIUM 500 MG/1
1 TABLET, DELAYED RELEASE ORAL
Refills: 0 | DISCHARGE

## 2024-03-11 RX ORDER — DIVALPROEX SODIUM 500 MG/1
3 TABLET, DELAYED RELEASE ORAL
Refills: 0 | DISCHARGE

## 2024-03-11 RX ORDER — SODIUM CHLORIDE 9 MG/ML
1 INJECTION INTRAMUSCULAR; INTRAVENOUS; SUBCUTANEOUS
Refills: 0 | DISCHARGE

## 2024-03-11 RX ORDER — RISPERIDONE 4 MG/1
1 TABLET ORAL
Refills: 0 | DISCHARGE

## 2024-03-11 RX ORDER — LOSARTAN POTASSIUM 100 MG/1
1 TABLET, FILM COATED ORAL
Refills: 0 | DISCHARGE

## 2024-03-11 RX ORDER — METFORMIN HYDROCHLORIDE 850 MG/1
1 TABLET ORAL
Refills: 0 | DISCHARGE

## 2024-03-11 RX ORDER — TRAZODONE HCL 50 MG
1 TABLET ORAL
Refills: 0 | DISCHARGE

## 2024-03-11 RX ORDER — POLYETHYLENE GLYCOL 3350 17 G/17G
17 POWDER, FOR SOLUTION ORAL
Refills: 0 | DISCHARGE

## 2024-03-11 RX ORDER — CLOPIDOGREL BISULFATE 75 MG/1
1 TABLET, FILM COATED ORAL
Refills: 0 | DISCHARGE

## 2024-04-27 ENCOUNTER — EMERGENCY (EMERGENCY)
Facility: HOSPITAL | Age: 69
LOS: 1 days | Discharge: NURSING, REHAB OR LONG-TERM CARE | End: 2024-04-27
Attending: EMERGENCY MEDICINE | Admitting: EMERGENCY MEDICINE
Payer: SELF-PAY

## 2024-04-27 VITALS
WEIGHT: 197.98 LBS | TEMPERATURE: 98 F | OXYGEN SATURATION: 99 % | DIASTOLIC BLOOD PRESSURE: 98 MMHG | SYSTOLIC BLOOD PRESSURE: 179 MMHG | RESPIRATION RATE: 20 BRPM | HEART RATE: 72 BPM | HEIGHT: 65 IN

## 2024-04-27 VITALS
DIASTOLIC BLOOD PRESSURE: 76 MMHG | HEART RATE: 84 BPM | TEMPERATURE: 98 F | SYSTOLIC BLOOD PRESSURE: 143 MMHG | OXYGEN SATURATION: 97 % | RESPIRATION RATE: 20 BRPM

## 2024-04-27 PROCEDURE — 30901 CONTROL OF NOSEBLEED: CPT

## 2024-04-27 PROCEDURE — 30903 CONTROL OF NOSEBLEED: CPT | Mod: LT

## 2024-04-27 PROCEDURE — 99282 EMERGENCY DEPT VISIT SF MDM: CPT

## 2024-04-27 PROCEDURE — 99284 EMERGENCY DEPT VISIT MOD MDM: CPT | Mod: 25

## 2024-04-27 RX ORDER — OXYMETAZOLINE HYDROCHLORIDE 0.5 MG/ML
2 SPRAY NASAL ONCE
Refills: 0 | Status: COMPLETED | OUTPATIENT
Start: 2024-04-27 | End: 2024-04-27

## 2024-04-27 RX ADMIN — OXYMETAZOLINE HYDROCHLORIDE 2 SPRAY(S): 0.5 SPRAY NASAL at 22:17

## 2024-04-27 NOTE — ED PROVIDER NOTE - NSFOLLOWUPINSTRUCTIONS_ED_ALL_ED_FT
FOLLOW UP WITH ENT FOR PACKING REMOVAL IN 3 DAYS      Nosebleed, Adult  A nosebleed is when blood comes out of the nose. Nosebleeds are common. Usually, they are not a sign of a serious condition.    Nosebleeds can happen if a blood vessel in your nose starts to bleed or if the lining of your nose (mucous membrane) cracks. They are commonly caused by:  Allergies.  Colds.  Picking your nose.  Blowing your nose too hard.  An injury from sticking an object into your nose or getting hit in the nose.  Dry or cold air.  Less common causes of nosebleeds include:  Toxic fumes.  Something abnormal in the nose or in the air-filled spaces in the bones of the face (sinuses).  Growths in the nose, such as polyps.  Blood thinners or conditions that cause blood to clot slowly.  Certain illnesses or procedures that irritate or dry out the nasal passages.  Follow these instructions at home:  When you have a nosebleed:      Sit down and tilt your head slightly forward.  Use a clean towel or tissue to pinch your nostrils under the bony part of your nose. After 5 minutes, let go of your nose and see if bleeding starts again. Do not release pressure before that time. If there is still bleeding, repeat the pinching and holding for 5 minutes or until the bleeding stops.  Do not place tissues or gauze in the nose to stop the bleeding.  Avoid lying down and avoid tilting your head backward. That may make blood collect in the throat and cause gagging or coughing.  Use a nasal spray decongestant to help with a nosebleed as told by your health care provider.  After a nosebleed:    Avoid blowing your nose or sniffing for a number of hours.  Avoid straining, lifting, or bending at the waist for several days. You may go back to other normal activities as you are able.  If you are taking aspirin or blood thinners and you have nosebleeds, talk to your health care provider. These medicines make bleeding more likely.  Ask your health care provider if you should stop taking the medicines or if you should adjust the dose.  Do not stop taking medicines that your health care provider has recommended unless he or she tells you to stop taking them.  If your nosebleed was caused by dry mucous membranes, use over-the-counter saline nasal spray or gel and a humidifier as told by your health care provider. This will keep the mucous membranes moist and allow them to heal. If you need to use one of these products:  Choose one that is water-soluble.  Use only as much as you need and use it only as often as needed.  Do not lie down right after you use it.  If you get nosebleeds often, talk with your health care provider about medical treatments. Options may include:  Nasal cautery. This treatment stops and prevents nosebleeds by using a chemical swab or electrical device to lightly burn tiny blood vessels inside the nose.  Nasal packing. A gauze or other material is placed in the nose to keep constant pressure on the bleeding area.  Contact a health care provider if you:  Have a fever.  Get nosebleeds often or more often than usual.  Bruise very easily.  Have a nosebleed from having something stuck in your nose.  Have bleeding in your mouth.  Vomit or cough up brown material.  Have a nosebleed after you start a new medicine.  Get help right away if:  You have a nosebleed after a fall or a head injury.  Your nosebleed does not go away after 20 minutes.  You feel dizzy or weak.  You have unusual bleeding from other parts of your body.  You have unusual bruising on other parts of your body.  You become sweaty.  You vomit blood.  Summary  A nosebleed is when blood comes out of the nose. Common causes include allergies, an injury to the nose, or cold or dry air.  Initial treatment includes applying pressure for 5 minutes.  Moisturizing the nose with saline nasal spray or gel after a nosebleed may help prevent future bleeding.  Get help right away if your nosebleed does not go away after 20 minutes.  This information is not intended to replace advice given to you by your health care provider. Make sure you discuss any questions you have with your health care provider.

## 2024-04-27 NOTE — ED ADULT NURSE REASSESSMENT NOTE - NS ED NURSE REASSESS COMMENT FT1
patient calm as per Md patient can eat, nurse feed the patient , discharge as order , awaiting for transportation

## 2024-04-27 NOTE — ED PROVIDER NOTE - OBJECTIVE STATEMENT
68y M URI from [facility], pt has been having intermittent nosebleed 68y M URI from [facility], pt has been having intermittent nosebleed since about 4pm 68y M URI from [facility], pt has been having intermittent nosebleed since about 4pm, pt reports picking a scab, no other complaints at this time, bleeding is slow at this time from the left side only, on eliquis

## 2024-04-27 NOTE — ED ADULT NURSE NOTE - NSFALLHARMRISKINTERV_ED_ALL_ED
Assistance OOB with selected safe patient handling equipment if applicable/Communicate risk of Fall with Harm to all staff, patient, and family/Provide visual cue: red socks, yellow wristband, yellow gown, etc/Reinforce activity limits and safety measures with patient and family/Bed in lowest position, wheels locked, appropriate side rails in place/Call bell, personal items and telephone in reach/Instruct patient to call for assistance before getting out of bed/chair/stretcher/Non-slip footwear applied when patient is off stretcher/[place] to call system/Physically safe environment - no spills, clutter or unnecessary equipment/Purposeful Proactive Rounding/Room/bathroom lighting operational, light cord in reach

## 2024-04-27 NOTE — ED PROVIDER NOTE - CARE PROVIDER_API CALL
Josemanuel Gallegos  Otolaryngology  [address]  [place] [address]  Phone: [phone]  Fax: [phone]  Follow Up Time: 1-3 Days

## 2024-04-27 NOTE — ED ADULT NURSE NOTE - NSICDXPASTMEDICALHX_GEN_ALL_CORE_FT
PAST MEDICAL HISTORY:  Anxiety     DM (diabetes mellitus)     Hyperlipidemia     Hypothyroid     Hypothyroidism     Schizophrenia     Schizophrenia

## 2024-04-27 NOTE — ED ADULT NURSE NOTE - OBJECTIVE STATEMENT
patient A&Ox3 in no acute distress as per EMS , at the change of shift , patient was found with nose bleed at the NH unknown if fall, trace of blood mouth, patient with nose bleed at this time

## 2024-04-27 NOTE — ED PROVIDER NOTE - PATIENT PORTAL LINK FT
You can access the FollowMyHealth Patient Portal offered by [facility] by registering at the following website: [email or web address]. By joining [facility]’s FollowMyHealth portal, you will also be able to view your health information using other applications (apps) compatible with our system.

## 2024-04-28 PROBLEM — E03.9 HYPOTHYROIDISM, UNSPECIFIED: Chronic | Status: ACTIVE | Noted: 2021-12-20

## 2024-04-28 PROBLEM — F20.9 SCHIZOPHRENIA, UNSPECIFIED: Chronic | Status: ACTIVE | Noted: 2021-12-20

## 2024-04-28 PROBLEM — F41.9 ANXIETY DISORDER, UNSPECIFIED: Chronic | Status: ACTIVE | Noted: 2021-12-20

## 2024-04-28 PROBLEM — E11.9 TYPE 2 DIABETES MELLITUS WITHOUT COMPLICATIONS: Chronic | Status: ACTIVE | Noted: 2021-12-20

## 2024-05-01 DIAGNOSIS — R04.0 EPISTAXIS: ICD-10-CM

## 2024-10-31 ENCOUNTER — INPATIENT (INPATIENT)
Facility: HOSPITAL | Age: 69
LOS: 3 days | Discharge: NURSING, REHAB OR LONG-TERM CARE | DRG: 872 | End: 2024-11-04
Attending: INTERNAL MEDICINE | Admitting: INTERNAL MEDICINE
Payer: COMMERCIAL

## 2024-10-31 VITALS
WEIGHT: 179.9 LBS | DIASTOLIC BLOOD PRESSURE: 50 MMHG | TEMPERATURE: 98 F | RESPIRATION RATE: 18 BRPM | HEART RATE: 84 BPM | SYSTOLIC BLOOD PRESSURE: 77 MMHG | OXYGEN SATURATION: 100 % | HEIGHT: 63 IN

## 2024-10-31 LAB
ALBUMIN SERPL ELPH-MCNC: 2.3 G/DL — LOW (ref 3.3–5)
ALT FLD-CCNC: 16 U/L — SIGNIFICANT CHANGE UP (ref 12–78)
ANION GAP SERPL CALC-SCNC: 12 MMOL/L — SIGNIFICANT CHANGE UP (ref 5–17)
APTT BLD: 30 SEC — SIGNIFICANT CHANGE UP (ref 24.5–35.6)
AST SERPL-CCNC: 17 U/L — SIGNIFICANT CHANGE UP (ref 15–37)
BUN SERPL-MCNC: 47 MG/DL — HIGH (ref 7–23)
CALCIUM SERPL-MCNC: 8.3 MG/DL — LOW (ref 8.5–10.1)
CHLORIDE SERPL-SCNC: 103 MMOL/L — SIGNIFICANT CHANGE UP (ref 96–108)
CO2 SERPL-SCNC: 23 MMOL/L — SIGNIFICANT CHANGE UP (ref 22–31)
CREAT SERPL-MCNC: 1.3 MG/DL — SIGNIFICANT CHANGE UP (ref 0.5–1.3)
EGFR: 60 ML/MIN/1.73M2 — SIGNIFICANT CHANGE UP
GLUCOSE SERPL-MCNC: 95 MG/DL — SIGNIFICANT CHANGE UP (ref 70–99)
HCT VFR BLD CALC: 15.4 % — CRITICAL LOW (ref 39–50)
HGB BLD-MCNC: 5.1 G/DL — CRITICAL LOW (ref 13–17)
INR BLD: 1.19 RATIO — HIGH (ref 0.85–1.16)
MCHC RBC-ENTMCNC: 31.7 PG — SIGNIFICANT CHANGE UP (ref 27–34)
MCHC RBC-ENTMCNC: 33.1 G/DL — SIGNIFICANT CHANGE UP (ref 32–36)
MCV RBC AUTO: 95.7 FL — SIGNIFICANT CHANGE UP (ref 80–100)
PLATELET # BLD AUTO: 162 K/UL — SIGNIFICANT CHANGE UP (ref 150–400)
POTASSIUM SERPL-MCNC: 4.9 MMOL/L — SIGNIFICANT CHANGE UP (ref 3.5–5.3)
POTASSIUM SERPL-SCNC: 4.9 MMOL/L — SIGNIFICANT CHANGE UP (ref 3.5–5.3)
PROTHROM AB SERPL-ACNC: 13.9 SEC — HIGH (ref 9.9–13.4)
RBC # BLD: 1.61 M/UL — LOW (ref 4.2–5.8)
RBC # FLD: 16.6 % — HIGH (ref 10.3–14.5)
SODIUM SERPL-SCNC: 138 MMOL/L — SIGNIFICANT CHANGE UP (ref 135–145)
WBC # BLD: 5.83 K/UL — SIGNIFICANT CHANGE UP (ref 3.8–10.5)
WBC # FLD AUTO: 5.83 K/UL — SIGNIFICANT CHANGE UP (ref 3.8–10.5)

## 2024-10-31 PROCEDURE — 71045 X-RAY EXAM CHEST 1 VIEW: CPT | Mod: 26

## 2024-10-31 PROCEDURE — 99053 MED SERV 10PM-8AM 24 HR FAC: CPT

## 2024-10-31 PROCEDURE — 99291 CRITICAL CARE FIRST HOUR: CPT

## 2024-10-31 PROCEDURE — 93010 ELECTROCARDIOGRAM REPORT: CPT

## 2024-10-31 RX ORDER — PIPERACILLIN AND TAZOBACTAM .5; 4 G/20ML; G/20ML
3.38 INJECTION, POWDER, LYOPHILIZED, FOR SOLUTION INTRAVENOUS ONCE
Refills: 0 | Status: COMPLETED | OUTPATIENT
Start: 2024-10-31 | End: 2024-10-31

## 2024-10-31 RX ORDER — SODIUM CHLORIDE 9 MG/ML
2000 INJECTION, SOLUTION INTRAMUSCULAR; INTRAVENOUS; SUBCUTANEOUS ONCE
Refills: 0 | Status: COMPLETED | OUTPATIENT
Start: 2024-10-31 | End: 2024-10-31

## 2024-10-31 RX ORDER — PANTOPRAZOLE SODIUM 40 MG/1
8 TABLET, DELAYED RELEASE ORAL
Qty: 80 | Refills: 0 | Status: DISCONTINUED | OUTPATIENT
Start: 2024-10-31 | End: 2024-11-01

## 2024-10-31 RX ORDER — PANTOPRAZOLE SODIUM 40 MG/1
80 TABLET, DELAYED RELEASE ORAL ONCE
Refills: 0 | Status: COMPLETED | OUTPATIENT
Start: 2024-10-31 | End: 2024-10-31

## 2024-10-31 RX ORDER — VANCOMYCIN HYDROCHLORIDE 50 MG/ML
1000 KIT ORAL ONCE
Refills: 0 | Status: COMPLETED | OUTPATIENT
Start: 2024-10-31 | End: 2024-11-01

## 2024-10-31 RX ADMIN — SODIUM CHLORIDE 2000 MILLILITER(S): 9 INJECTION, SOLUTION INTRAMUSCULAR; INTRAVENOUS; SUBCUTANEOUS at 23:47

## 2024-10-31 RX ADMIN — PIPERACILLIN AND TAZOBACTAM 200 GRAM(S): .5; 4 INJECTION, POWDER, LYOPHILIZED, FOR SOLUTION INTRAVENOUS at 23:46

## 2024-10-31 NOTE — ED PROVIDER NOTE - PHYSICAL EXAMINATION
Gen: awake, responsive to pain, nonverbal  HEENT: normal conjunctiva, oral mucosa moist  Lung: CTAB, no respiratory distress, on NC  CV: RRR  Abd: soft, NT, ND, no guarding, no rigidity, no rebound tenderness  Rectal: no blood in rectal vault, brown stool  MSK: no visible deformities, 2+ pulses in all extremities  Skin: Warm, well perfused, no rash, no leg swelling Gen: awake, responsive to pain, nonverbal  HEENT: conjunctival pallor, oral mucosa moist  Lung: CTAB, no respiratory distress, on NC  CV: RRR  Abd: soft, NT, ND, no guarding, no rigidity, no rebound tenderness  Rectal: no blood in rectal vault, brown stool  MSK: no visible deformities, 2+ pulses in all extremities  Skin: Warm, pale skin, no leg swelling

## 2024-10-31 NOTE — ED PROVIDER NOTE - CARE PLAN
Principal Discharge DX:	Hemorrhagic shock   1 Principal Discharge DX:	Hemorrhagic shock  Secondary Diagnosis:	GI bleed

## 2024-10-31 NOTE — ED ADULT TRIAGE NOTE - CHIEF COMPLAINT QUOTE
Patient brought by ambulance from [facility] as reported had abnormal labs and low blood pressure HGB of 6 received on O2 via NC

## 2024-10-31 NOTE — ED PROVIDER NOTE - CLINICAL SUMMARY MEDICAL DECISION MAKING FREE TEXT BOX
68M PMH NSTEMI, schizophrenia, bipolar d/o, HTN, hypoNa BIBEMS from [facility] for anemia to 6 and hypotension. VS and exam as above. ECG nondiagnostic  DDx includes but not limited to: possibly due to anemia vs sepsis. Will also eval for intracranial injury, HUBERT, lyte derangements, pna, UTI  Plan: ct's, ivf, labs, reassess symptoms    See Progress Notes for further updates on ED Course

## 2024-10-31 NOTE — ED PROVIDER NOTE - PROGRESS NOTE DETAILS
Hb 5.1, will order 2u prbc and protonix pt still hypotensive despite IVF; charge RN called blood bank for emergent release of 2u prbc. Also ordered for protonix and will reverse pt's eliquis with darrell. DIMITRIS paged; awaiting call back I called pt's brother James Orellana who is listed as his emergency contact but no answer on either cell or home numbers; was attempting to give update and get consent for blood transfusion. [phone] and [phone]; left  with call back # pt's brother called back and he consented to prbc transfusion; consent placed in chart. He was updated thus far about results pt currently receiving prbc's. MAP is 68. I spoke to ICU PA who will come see pt in ED pt's BP improving with first unit prbc; 2nd unit now being started. spoke to ICU PA who accepted admission to ICU. no call back from Dr. Broderick for admission. UR paging again Dr. Broderick called back who accepted pt for ICU admission under Dr. Choi Dr. Broderick called back who accepted pt for ICU admission under Dr. Choi. informed ED RN so she can give report to ICU Nurse pt's BP improving with first unit prbc; 2nd unit now being started. spoke to ICU PA who accepted admission to ICU. Dr. Patel group already paged for admission; awaiting call back Dr. Broderick called back who accepted pt for ICU admission under Dr. Choi. informed ED RN so she can give report to ICU Nurse. I called pt's brother to give update but no answer pt currently receiving prbc's. MAP is 68. I spoke to ICU PA who will come see pt in ED. Pt's mental status has improved and he is awake

## 2024-10-31 NOTE — ED PROVIDER NOTE - CRITICAL CARE ATTENDING CONTRIBUTION TO CARE
I have personally provided the amount of critical care time documented below, excluding time spent on separate procedures.

## 2024-10-31 NOTE — ED PROVIDER NOTE - OBJECTIVE STATEMENT
68M PMH NSTEMI, schizophrenia, bipolar d/o, HTN, hypoNa BIBEMS from [facility] for anemia to 6 and hypotension. Pt's O2 sat was 90% on RA and was placed on NC. Per EMS, facility states pt was altered from baseline as well. Was noted to by hypotensive to sBP 70's    Pt has different MRN: [id number] (B/L Hb noted to be close to 11)

## 2024-11-01 DIAGNOSIS — G20.C PARKINSONISM, UNSPECIFIED: ICD-10-CM

## 2024-11-01 DIAGNOSIS — R79.89 OTHER SPECIFIED ABNORMAL FINDINGS OF BLOOD CHEMISTRY: ICD-10-CM

## 2024-11-01 DIAGNOSIS — E03.9 HYPOTHYROIDISM, UNSPECIFIED: ICD-10-CM

## 2024-11-01 DIAGNOSIS — E11.9 TYPE 2 DIABETES MELLITUS WITHOUT COMPLICATIONS: ICD-10-CM

## 2024-11-01 DIAGNOSIS — Z29.9 ENCOUNTER FOR PROPHYLACTIC MEASURES, UNSPECIFIED: ICD-10-CM

## 2024-11-01 DIAGNOSIS — N17.9 ACUTE KIDNEY FAILURE, UNSPECIFIED: ICD-10-CM

## 2024-11-01 DIAGNOSIS — R57.8 OTHER SHOCK: ICD-10-CM

## 2024-11-01 DIAGNOSIS — I25.10 ATHEROSCLEROTIC HEART DISEASE OF NATIVE CORONARY ARTERY WITHOUT ANGINA PECTORIS: ICD-10-CM

## 2024-11-01 DIAGNOSIS — D64.9 ANEMIA, UNSPECIFIED: ICD-10-CM

## 2024-11-01 DIAGNOSIS — F20.9 SCHIZOPHRENIA, UNSPECIFIED: ICD-10-CM

## 2024-11-01 DIAGNOSIS — I48.91 UNSPECIFIED ATRIAL FIBRILLATION: ICD-10-CM

## 2024-11-01 LAB
ALBUMIN SERPL ELPH-MCNC: 2.4 G/DL — LOW (ref 3.3–5)
ALBUMIN SERPL ELPH-MCNC: 2.5 G/DL — LOW (ref 3.3–5)
ALP SERPL-CCNC: 51 U/L — SIGNIFICANT CHANGE UP (ref 40–120)
ALP SERPL-CCNC: 52 U/L — SIGNIFICANT CHANGE UP (ref 40–120)
ALP SERPL-CCNC: 53 U/L — SIGNIFICANT CHANGE UP (ref 40–120)
ALT FLD-CCNC: 16 U/L — SIGNIFICANT CHANGE UP (ref 12–78)
ALT FLD-CCNC: 16 U/L — SIGNIFICANT CHANGE UP (ref 12–78)
AMMONIA BLD-MCNC: 30 UMOL/L — SIGNIFICANT CHANGE UP (ref 11–32)
ANION GAP SERPL CALC-SCNC: 8 MMOL/L — SIGNIFICANT CHANGE UP (ref 5–17)
ANION GAP SERPL CALC-SCNC: 9 MMOL/L — SIGNIFICANT CHANGE UP (ref 5–17)
APPEARANCE UR: CLEAR — SIGNIFICANT CHANGE UP
AST SERPL-CCNC: 19 U/L — SIGNIFICANT CHANGE UP (ref 15–37)
AST SERPL-CCNC: 20 U/L — SIGNIFICANT CHANGE UP (ref 15–37)
BASOPHILS # BLD AUTO: 0.02 K/UL — SIGNIFICANT CHANGE UP (ref 0–0.2)
BASOPHILS # BLD AUTO: 0.02 K/UL — SIGNIFICANT CHANGE UP (ref 0–0.2)
BASOPHILS # BLD AUTO: 0.04 K/UL — SIGNIFICANT CHANGE UP (ref 0–0.2)
BASOPHILS NFR BLD AUTO: 0.3 % — SIGNIFICANT CHANGE UP (ref 0–2)
BASOPHILS NFR BLD AUTO: 0.3 % — SIGNIFICANT CHANGE UP (ref 0–2)
BASOPHILS NFR BLD AUTO: 0.6 % — SIGNIFICANT CHANGE UP (ref 0–2)
BILIRUB SERPL-MCNC: 0.2 MG/DL — SIGNIFICANT CHANGE UP (ref 0.2–1.2)
BILIRUB SERPL-MCNC: 0.6 MG/DL — SIGNIFICANT CHANGE UP (ref 0.2–1.2)
BILIRUB SERPL-MCNC: 0.6 MG/DL — SIGNIFICANT CHANGE UP (ref 0.2–1.2)
BILIRUB UR-MCNC: NEGATIVE — SIGNIFICANT CHANGE UP
BUN SERPL-MCNC: 34 MG/DL — HIGH (ref 7–23)
BUN SERPL-MCNC: 35 MG/DL — HIGH (ref 7–23)
CALCIUM SERPL-MCNC: 7.9 MG/DL — LOW (ref 8.5–10.1)
CALCIUM SERPL-MCNC: 8.2 MG/DL — LOW (ref 8.5–10.1)
CHLORIDE SERPL-SCNC: 105 MMOL/L — SIGNIFICANT CHANGE UP (ref 96–108)
CHLORIDE SERPL-SCNC: 107 MMOL/L — SIGNIFICANT CHANGE UP (ref 96–108)
CHOLEST SERPL-MCNC: 89 MG/DL — SIGNIFICANT CHANGE UP
CK SERPL-CCNC: 73 U/L — SIGNIFICANT CHANGE UP (ref 26–308)
CO2 SERPL-SCNC: 24 MMOL/L — SIGNIFICANT CHANGE UP (ref 22–31)
CO2 SERPL-SCNC: 24 MMOL/L — SIGNIFICANT CHANGE UP (ref 22–31)
COLOR SPEC: YELLOW — SIGNIFICANT CHANGE UP
CREAT ?TM UR-MCNC: 21 MG/DL — SIGNIFICANT CHANGE UP
CREAT SERPL-MCNC: 0.92 MG/DL — SIGNIFICANT CHANGE UP (ref 0.5–1.3)
CREAT SERPL-MCNC: 1.1 MG/DL — SIGNIFICANT CHANGE UP (ref 0.5–1.3)
DIFF PNL FLD: NEGATIVE — SIGNIFICANT CHANGE UP
EGFR: 73 ML/MIN/1.73M2 — SIGNIFICANT CHANGE UP
EGFR: 91 ML/MIN/1.73M2 — SIGNIFICANT CHANGE UP
EOSINOPHIL # BLD AUTO: 0.04 K/UL — SIGNIFICANT CHANGE UP (ref 0–0.5)
EOSINOPHIL # BLD AUTO: 0.04 K/UL — SIGNIFICANT CHANGE UP (ref 0–0.5)
EOSINOPHIL # BLD AUTO: 0.06 K/UL — SIGNIFICANT CHANGE UP (ref 0–0.5)
EOSINOPHIL NFR BLD AUTO: 0.6 % — SIGNIFICANT CHANGE UP (ref 0–6)
EOSINOPHIL NFR BLD AUTO: 0.7 % — SIGNIFICANT CHANGE UP (ref 0–6)
EOSINOPHIL NFR BLD AUTO: 0.9 % — SIGNIFICANT CHANGE UP (ref 0–6)
FERRITIN SERPL-MCNC: 49 NG/ML — SIGNIFICANT CHANGE UP (ref 30–400)
GLUCOSE SERPL-MCNC: 102 MG/DL — HIGH (ref 70–99)
GLUCOSE SERPL-MCNC: 96 MG/DL — SIGNIFICANT CHANGE UP (ref 70–99)
GLUCOSE UR QL: NEGATIVE MG/DL — SIGNIFICANT CHANGE UP
HCT VFR BLD CALC: 24 % — LOW (ref 39–50)
HCT VFR BLD CALC: 26.4 % — LOW (ref 39–50)
HDLC SERPL-MCNC: 23 MG/DL — LOW
HGB BLD-MCNC: 8.1 G/DL — LOW (ref 13–17)
HGB BLD-MCNC: 9.3 G/DL — LOW (ref 13–17)
IMM GRANULOCYTES NFR BLD AUTO: 1.9 % — HIGH (ref 0–0.9)
IMM GRANULOCYTES NFR BLD AUTO: 2.9 % — HIGH (ref 0–0.9)
IMM GRANULOCYTES NFR BLD AUTO: 4.3 % — HIGH (ref 0–0.9)
IRON SATN MFR SERPL: 103 UG/DL — SIGNIFICANT CHANGE UP (ref 45–165)
IRON SATN MFR SERPL: 37 % — SIGNIFICANT CHANGE UP (ref 16–55)
KETONES UR-MCNC: NEGATIVE MG/DL — SIGNIFICANT CHANGE UP
LACTATE SERPL-SCNC: 3.4 MMOL/L — HIGH (ref 0.7–2)
LACTATE SERPL-SCNC: 4.3 MMOL/L — CRITICAL HIGH (ref 0.7–2)
LDH SERPL L TO P-CCNC: 149 U/L — SIGNIFICANT CHANGE UP (ref 50–242)
LEUKOCYTE ESTERASE UR-ACNC: NEGATIVE — SIGNIFICANT CHANGE UP
LIPID PNL WITH DIRECT LDL SERPL: 26 MG/DL — SIGNIFICANT CHANGE UP
LYMPHOCYTES # BLD AUTO: 0.84 K/UL — LOW (ref 1–3.3)
LYMPHOCYTES # BLD AUTO: 0.98 K/UL — LOW (ref 1–3.3)
LYMPHOCYTES # BLD AUTO: 1.16 K/UL — SIGNIFICANT CHANGE UP (ref 1–3.3)
LYMPHOCYTES # BLD AUTO: 12.1 % — LOW (ref 13–44)
LYMPHOCYTES # BLD AUTO: 13.7 % — SIGNIFICANT CHANGE UP (ref 13–44)
LYMPHOCYTES # BLD AUTO: 19.9 % — SIGNIFICANT CHANGE UP (ref 13–44)
MAGNESIUM SERPL-MCNC: 1.7 MG/DL — SIGNIFICANT CHANGE UP (ref 1.6–2.6)
MAGNESIUM SERPL-MCNC: 2 MG/DL — SIGNIFICANT CHANGE UP (ref 1.6–2.6)
MCHC RBC-ENTMCNC: 31 PG — SIGNIFICANT CHANGE UP (ref 27–34)
MCHC RBC-ENTMCNC: 31.1 PG — SIGNIFICANT CHANGE UP (ref 27–34)
MCHC RBC-ENTMCNC: 33.8 G/DL — SIGNIFICANT CHANGE UP (ref 32–36)
MCHC RBC-ENTMCNC: 35.2 G/DL — SIGNIFICANT CHANGE UP (ref 32–36)
MCV RBC AUTO: 88.3 FL — SIGNIFICANT CHANGE UP (ref 80–100)
MCV RBC AUTO: 92 FL — SIGNIFICANT CHANGE UP (ref 80–100)
MONOCYTES # BLD AUTO: 0.51 K/UL — SIGNIFICANT CHANGE UP (ref 0–0.9)
MONOCYTES # BLD AUTO: 0.58 K/UL — SIGNIFICANT CHANGE UP (ref 0–0.9)
MONOCYTES # BLD AUTO: 0.73 K/UL — SIGNIFICANT CHANGE UP (ref 0–0.9)
MONOCYTES NFR BLD AUTO: 10.5 % — SIGNIFICANT CHANGE UP (ref 2–14)
MONOCYTES NFR BLD AUTO: 8.1 % — SIGNIFICANT CHANGE UP (ref 2–14)
MONOCYTES NFR BLD AUTO: 8.7 % — SIGNIFICANT CHANGE UP (ref 2–14)
MRSA PCR RESULT.: SIGNIFICANT CHANGE UP
NEUTROPHILS # BLD AUTO: 3.99 K/UL — SIGNIFICANT CHANGE UP (ref 1.8–7.4)
NEUTROPHILS # BLD AUTO: 5 K/UL — SIGNIFICANT CHANGE UP (ref 1.8–7.4)
NEUTROPHILS # BLD AUTO: 5.34 K/UL — SIGNIFICANT CHANGE UP (ref 1.8–7.4)
NEUTROPHILS NFR BLD AUTO: 68.5 % — SIGNIFICANT CHANGE UP (ref 43–77)
NEUTROPHILS NFR BLD AUTO: 71.6 % — SIGNIFICANT CHANGE UP (ref 43–77)
NEUTROPHILS NFR BLD AUTO: 74.4 % — SIGNIFICANT CHANGE UP (ref 43–77)
NITRITE UR-MCNC: NEGATIVE — SIGNIFICANT CHANGE UP
NON HDL CHOLESTEROL: 66 MG/DL — SIGNIFICANT CHANGE UP
NRBC # BLD: 1 /100 WBCS — HIGH (ref 0–0)
NRBC # BLD: 1 /100 WBCS — HIGH (ref 0–0)
NRBC # BLD: 2 /100 WBCS — HIGH (ref 0–0)
NT-PROBNP SERPL-SCNC: 470 PG/ML — HIGH (ref 0–125)
OB PNL STL: NEGATIVE — SIGNIFICANT CHANGE UP
OSMOLALITY SERPL: 289 MOSMOL/KG — SIGNIFICANT CHANGE UP (ref 280–301)
OSMOLALITY UR: 324 MOSM/KG — SIGNIFICANT CHANGE UP (ref 50–1200)
PH UR: 5 — SIGNIFICANT CHANGE UP (ref 5–8)
PHOSPHATE SERPL-MCNC: 3.1 MG/DL — SIGNIFICANT CHANGE UP (ref 2.5–4.5)
PLATELET # BLD AUTO: 137 K/UL — LOW (ref 150–400)
PLATELET # BLD AUTO: 142 K/UL — LOW (ref 150–400)
POTASSIUM SERPL-MCNC: 4 MMOL/L — SIGNIFICANT CHANGE UP (ref 3.5–5.3)
POTASSIUM SERPL-MCNC: 4.6 MMOL/L — SIGNIFICANT CHANGE UP (ref 3.5–5.3)
POTASSIUM SERPL-SCNC: 4 MMOL/L — SIGNIFICANT CHANGE UP (ref 3.5–5.3)
POTASSIUM SERPL-SCNC: 4.6 MMOL/L — SIGNIFICANT CHANGE UP (ref 3.5–5.3)
PROCALCITONIN SERPL-MCNC: 0.04 NG/ML — SIGNIFICANT CHANGE UP
PROCALCITONIN SERPL-MCNC: 0.07 NG/ML — SIGNIFICANT CHANGE UP
PROT SERPL-MCNC: 5.2 G/DL — LOW (ref 6–8.3)
PROT SERPL-MCNC: 5.3 G/DL — LOW (ref 6–8.3)
PROT SERPL-MCNC: 5.5 G/DL — LOW (ref 6–8.3)
PROT UR-MCNC: NEGATIVE MG/DL — SIGNIFICANT CHANGE UP
RAPID RVP RESULT: SIGNIFICANT CHANGE UP
RBC # BLD: 2.61 M/UL — LOW (ref 4.2–5.8)
RBC # BLD: 2.99 M/UL — LOW (ref 4.2–5.8)
RBC # FLD: 16.6 % — HIGH (ref 10.3–14.5)
RBC # FLD: 17.1 % — HIGH (ref 10.3–14.5)
S AUREUS DNA NOSE QL NAA+PROBE: DETECTED
SARS-COV-2 RNA SPEC QL NAA+PROBE: SIGNIFICANT CHANGE UP
SODIUM SERPL-SCNC: 138 MMOL/L — SIGNIFICANT CHANGE UP (ref 135–145)
SODIUM SERPL-SCNC: 139 MMOL/L — SIGNIFICANT CHANGE UP (ref 135–145)
SODIUM UR-SCNC: 59 MMOL/L — SIGNIFICANT CHANGE UP
SP GR SPEC: 1.02 — SIGNIFICANT CHANGE UP (ref 1–1.03)
TIBC SERPL-MCNC: 280 UG/DL — SIGNIFICANT CHANGE UP (ref 220–430)
TRANSFERRIN SERPL-MCNC: 222 MG/DL — SIGNIFICANT CHANGE UP (ref 200–360)
TRIGL SERPL-MCNC: 267 MG/DL — HIGH
TROPONIN I, HIGH SENSITIVITY RESULT: 34.5 NG/L — SIGNIFICANT CHANGE UP
TROPONIN I, HIGH SENSITIVITY RESULT: 52.2 NG/L — SIGNIFICANT CHANGE UP
TROPONIN I, HIGH SENSITIVITY RESULT: 54.6 NG/L — SIGNIFICANT CHANGE UP
TSH SERPL-MCNC: 3.65 UIU/ML — SIGNIFICANT CHANGE UP (ref 0.36–3.74)
UIBC SERPL-MCNC: 177 UG/DL — SIGNIFICANT CHANGE UP (ref 110–370)
UROBILINOGEN FLD QL: 1 MG/DL — SIGNIFICANT CHANGE UP (ref 0.2–1)
WBC # BLD: 6.97 K/UL — SIGNIFICANT CHANGE UP (ref 3.8–10.5)
WBC # BLD: 7.17 K/UL — SIGNIFICANT CHANGE UP (ref 3.8–10.5)
WBC # FLD AUTO: 6.97 K/UL — SIGNIFICANT CHANGE UP (ref 3.8–10.5)
WBC # FLD AUTO: 7.17 K/UL — SIGNIFICANT CHANGE UP (ref 3.8–10.5)

## 2024-11-01 PROCEDURE — 70450 CT HEAD/BRAIN W/O DYE: CPT | Mod: 26,MC

## 2024-11-01 PROCEDURE — 99233 SBSQ HOSP IP/OBS HIGH 50: CPT | Mod: GC

## 2024-11-01 PROCEDURE — 74177 CT ABD & PELVIS W/CONTRAST: CPT | Mod: 26,MC

## 2024-11-01 PROCEDURE — 71260 CT THORAX DX C+: CPT | Mod: 26,MC

## 2024-11-01 RX ORDER — INSULIN LISPRO 100/ML
VIAL (ML) SUBCUTANEOUS EVERY 6 HOURS
Refills: 0 | Status: DISCONTINUED | OUTPATIENT
Start: 2024-11-01 | End: 2024-11-02

## 2024-11-01 RX ORDER — RISPERIDONE 3 MG/1
4 TABLET, FILM COATED ORAL
Refills: 0 | Status: DISCONTINUED | OUTPATIENT
Start: 2024-11-01 | End: 2024-11-04

## 2024-11-01 RX ORDER — CHLORHEXIDINE GLUCONATE 40 MG/ML
1 SOLUTION TOPICAL
Refills: 0 | Status: DISCONTINUED | OUTPATIENT
Start: 2024-11-01 | End: 2024-11-04

## 2024-11-01 RX ORDER — PROTHROMBIN COMPLEX CONCENTRATE (HUMAN) 25.5; 16.5; 24; 22; 22; 26 [IU]/ML; [IU]/ML; [IU]/ML; [IU]/ML; [IU]/ML; [IU]/ML
4000 POWDER, FOR SOLUTION INTRAVENOUS ONCE
Refills: 0 | Status: COMPLETED | OUTPATIENT
Start: 2024-11-01 | End: 2024-11-01

## 2024-11-01 RX ORDER — PHENYLEPHRINE HCL IN 0.9% NACL 20MG/250ML
500 PLASTIC BAG, INJECTION (ML) INTRAVENOUS ONCE
Refills: 0 | Status: COMPLETED | OUTPATIENT
Start: 2024-11-01 | End: 2024-11-01

## 2024-11-01 RX ORDER — PANTOPRAZOLE SODIUM 40 MG/1
40 TABLET, DELAYED RELEASE ORAL EVERY 12 HOURS
Refills: 0 | Status: DISCONTINUED | OUTPATIENT
Start: 2024-11-01 | End: 2024-11-04

## 2024-11-01 RX ORDER — QUETIAPINE FUMARATE 200 MG/1
400 TABLET ORAL AT BEDTIME
Refills: 0 | Status: DISCONTINUED | OUTPATIENT
Start: 2024-11-02 | End: 2024-11-04

## 2024-11-01 RX ORDER — METOPROLOL TARTRATE 50 MG
25 TABLET ORAL
Refills: 0 | Status: DISCONTINUED | OUTPATIENT
Start: 2024-11-01 | End: 2024-11-04

## 2024-11-01 RX ORDER — LEVOTHYROXINE SODIUM 88 MCG
88 TABLET ORAL DAILY
Refills: 0 | Status: DISCONTINUED | OUTPATIENT
Start: 2024-11-01 | End: 2024-11-04

## 2024-11-01 RX ORDER — DESMOPRESSIN ACETATE 4 UG/ML
24 INJECTION INTRAVENOUS ONCE
Refills: 0 | Status: COMPLETED | OUTPATIENT
Start: 2024-11-01 | End: 2024-11-01

## 2024-11-01 RX ORDER — TRAZODONE HYDROCHLORIDE 100 MG/1
100 TABLET ORAL AT BEDTIME
Refills: 0 | Status: DISCONTINUED | OUTPATIENT
Start: 2024-11-01 | End: 2024-11-04

## 2024-11-01 RX ORDER — NOREPINEPHRINE BITARTRATE 1 MG/ML
0.05 INJECTION, SOLUTION, CONCENTRATE INTRAVENOUS
Qty: 8 | Refills: 0 | Status: DISCONTINUED | OUTPATIENT
Start: 2024-11-01 | End: 2024-11-01

## 2024-11-01 RX ORDER — BENZTROPINE MESYLATE 0.5 MG
1 TABLET ORAL
Refills: 0 | Status: DISCONTINUED | OUTPATIENT
Start: 2024-11-01 | End: 2024-11-01

## 2024-11-01 RX ORDER — DIVALPROEX SODIUM 250 MG/1
500 TABLET, FILM COATED, EXTENDED RELEASE ORAL DAILY
Refills: 0 | Status: DISCONTINUED | OUTPATIENT
Start: 2024-11-01 | End: 2024-11-01

## 2024-11-01 RX ORDER — DIVALPROEX SODIUM 250 MG/1
1500 TABLET, FILM COATED, EXTENDED RELEASE ORAL
Refills: 0 | Status: DISCONTINUED | OUTPATIENT
Start: 2024-11-01 | End: 2024-11-04

## 2024-11-01 RX ORDER — GLUCAGON INJECTION, SOLUTION 1 MG/.2ML
1 INJECTION, SOLUTION SUBCUTANEOUS ONCE
Refills: 0 | Status: DISCONTINUED | OUTPATIENT
Start: 2024-11-01 | End: 2024-11-04

## 2024-11-01 RX ORDER — DEXMEDETOMIDINE HYDROCHLORIDE 400 UG/100ML
0.2 INJECTION, SOLUTION INTRAVENOUS
Qty: 200 | Refills: 0 | Status: DISCONTINUED | OUTPATIENT
Start: 2024-11-01 | End: 2024-11-01

## 2024-11-01 RX ORDER — BENZTROPINE MESYLATE 0.5 MG
2 TABLET ORAL
Refills: 0 | Status: DISCONTINUED | OUTPATIENT
Start: 2024-11-01 | End: 2024-11-04

## 2024-11-01 RX ADMIN — Medication 1 MILLIGRAM(S): at 05:02

## 2024-11-01 RX ADMIN — DESMOPRESSIN ACETATE 224 MICROGRAM(S): 4 INJECTION INTRAVENOUS at 04:56

## 2024-11-01 RX ADMIN — NOREPINEPHRINE BITARTRATE 7.93 MICROGRAM(S)/KG/MIN: 1 INJECTION, SOLUTION, CONCENTRATE INTRAVENOUS at 05:44

## 2024-11-01 RX ADMIN — DEXMEDETOMIDINE HYDROCHLORIDE 4.08 MICROGRAM(S)/KG/HR: 400 INJECTION, SOLUTION INTRAVENOUS at 04:39

## 2024-11-01 RX ADMIN — PANTOPRAZOLE SODIUM 40 MILLIGRAM(S): 40 TABLET, DELAYED RELEASE ORAL at 05:01

## 2024-11-01 RX ADMIN — PANTOPRAZOLE SODIUM 10 MG/HR: 40 TABLET, DELAYED RELEASE ORAL at 01:40

## 2024-11-01 RX ADMIN — VANCOMYCIN HYDROCHLORIDE 250 MILLIGRAM(S): KIT at 04:40

## 2024-11-01 RX ADMIN — PROTHROMBIN COMPLEX CONCENTRATE (HUMAN) 400 INTERNATIONAL UNIT(S): 25.5; 16.5; 24; 22; 22; 26 POWDER, FOR SOLUTION INTRAVENOUS at 00:59

## 2024-11-01 RX ADMIN — Medication 2 MILLIGRAM(S): at 16:42

## 2024-11-01 RX ADMIN — RISPERIDONE 4 MILLIGRAM(S): 3 TABLET, FILM COATED ORAL at 16:44

## 2024-11-01 RX ADMIN — CHLORHEXIDINE GLUCONATE 1 APPLICATION(S): 40 SOLUTION TOPICAL at 05:02

## 2024-11-01 RX ADMIN — PANTOPRAZOLE SODIUM 40 MILLIGRAM(S): 40 TABLET, DELAYED RELEASE ORAL at 16:42

## 2024-11-01 RX ADMIN — Medication 25 MILLIGRAM(S): at 16:42

## 2024-11-01 RX ADMIN — Medication 500 MICROGRAM(S): at 06:19

## 2024-11-01 RX ADMIN — Medication 88 MICROGRAM(S): at 05:01

## 2024-11-01 RX ADMIN — RISPERIDONE 4 MILLIGRAM(S): 3 TABLET, FILM COATED ORAL at 05:02

## 2024-11-01 RX ADMIN — TRAZODONE HYDROCHLORIDE 100 MILLIGRAM(S): 100 TABLET ORAL at 21:58

## 2024-11-01 RX ADMIN — PANTOPRAZOLE SODIUM 80 MILLIGRAM(S): 40 TABLET, DELAYED RELEASE ORAL at 03:05

## 2024-11-01 RX ADMIN — DIVALPROEX SODIUM 1500 MILLIGRAM(S): 250 TABLET, FILM COATED, EXTENDED RELEASE ORAL at 10:43

## 2024-11-01 RX ADMIN — Medication 80 MILLIGRAM(S): at 21:59

## 2024-11-01 RX ADMIN — Medication 1: at 06:04

## 2024-11-01 NOTE — H&P ADULT - HISTORY OF PRESENT ILLNESS
68M PMH NSTEMI, schizophrenia, bipolar d/o, HTN, hypoNa BIBEMS from [facility] for anemia to 6 and hypotension. Pt's O2 sat was 90% on RA and was placed on NC. Per EMS, facility states pt was altered from baseline as well. Was noted to by hypotensive to sBP 70's  	Pt has different MRN: [id number] (B/L Hb noted to be close to 11) 68 MALE with  PMH NSTEMI, schizophrenia, bipolar d/o, HTN, hypoNa BIBEMS from [facility] for anemia to 6 and hypotension. Pt's O2 sat was 90% on RA and was placed on NC. Per EMS, facility states pt was altered from baseline as well. Was noted to by hypotensive to sBP 70's  	Pt has different MRN: [id number] (B/L Hb noted to be close to 11) Admitted to ICU -  presenting with severe anemia and hypotension, lactic acidosis.  Improved after Kcentra and total 3 PRBCs, requiring levophed for brief period.  Unclear source of bleeding.    --bipolar disorder, continue home depakote, risperadone, benztropine  --shock now resolved, off levophed following 3rd PRBC  afib controlled, hold BB for now, no AC in setting of anemia and possible bleed  continue statin  f/u echo  --stable respiratory status  --mild HUBERT resolved  lactic acidosis improved  --no signs of GIB, and FOBT neg, clear liquids for now and continue PPI bid  --no infectious concerns, f/u Cx  --macrocytic anemia, s/p 3 PRBCs with appropriate response,

## 2024-11-01 NOTE — ED ADULT NURSE NOTE - CHIEF COMPLAINT QUOTE
Patient brought by ambulance from [facility] as reported had abnormal labs and low blood pressure HGB of 6 received on O2 via NC

## 2024-11-01 NOTE — PROVIDER CONTACT NOTE (EICU) - RECOMMENDATIONS
Transfuse 2 unit prbc  PPI BID or PPI Drip  gi eval  stool ocult blood  GI eval   IVF   trend lactic acid level

## 2024-11-01 NOTE — CONSULT NOTE ADULT - SUBJECTIVE AND OBJECTIVE BOX
[facility]  Brad Us PA-C  [address] [place] [id number]  [phone]      Chief Complaint:  Patient is a 68y old  Male who presents with a chief complaint of anemia and hypotension (2024 10:53)      HPI:  68 MALE with  PMH NSTEMI, schizophrenia, bipolar d/o, HTN, hypoNa BIBEMS from [facility] for anemia to 6 and hypotension. Pt's O2 sat was 90% on RA and was placed on NC. Per EMS, facility states pt was altered from baseline as well. Was noted to by hypotensive to sBP 70's    INTERVAL HPI  Pt s/e in ICU. He is a poor historian and thus was unable to obtain meaningful history. Charts reviewed.  Pt had hgb 5.1 at admission. FOBT negative. S/p kcentra and blood transfusions. Currently hgb improved to 9.3  No known recent GI bleeding      Allergies:  lithium (Rash)  clozapine (Rash)      Medications:  atorvastatin 80 milliGRAM(s) Oral at bedtime  benztropine 2 milliGRAM(s) Oral two times a day  chlorhexidine 2% Cloths 1 Application(s) Topical <User Schedule>  dexMEDEtomidine Infusion 0.2 MICROgram(s)/kG/Hr IV Continuous <Continuous>  dextrose 5%. 1000 milliLiter(s) IV Continuous <Continuous>  dextrose 5%. 1000 milliLiter(s) IV Continuous <Continuous>  dextrose 50% Injectable 25 Gram(s) IV Push once  dextrose 50% Injectable 12.5 Gram(s) IV Push once  dextrose 50% Injectable 25 Gram(s) IV Push once  dextrose Oral Gel 15 Gram(s) Oral once PRN  divalproex ER 1500 milliGRAM(s) Oral <User Schedule>  glucagon  Injectable 1 milliGRAM(s) IntraMuscular once  insulin lispro (ADMELOG) corrective regimen sliding scale   SubCutaneous every 6 hours  levothyroxine 88 MICROGram(s) Oral daily  pantoprazole  Injectable 40 milliGRAM(s) IV Push every 12 hours  risperiDONE   Tablet 4 milliGRAM(s) Oral two times a day  traZODone 100 milliGRAM(s) Oral at bedtime      PMHX/PSHX:  Parkinsonism    Schizophrenia    Hyponatremia    NSTEMI (non-ST elevation myocardial infarction)    Bipolar disorder    DM (diabetes mellitus)    Dementia    HTN (hypertension)    Chronic CHF    MDD (major depressive disorder)    Hypothyroidism    HLD (hyperlipidemia)    Hypothyroidism    Constipation    Folate deficiency    Constipation    H/O candidiasis    Violent behavior    Atrial fibrillation    Hyperkalemia    Epistaxis    DM (diabetes mellitus)        ROS:   Unable to obtain since pt is a poor historian    PHYSICAL EXAM:   Vital Signs:  Vital Signs Last 24 Hrs  T(C): 36.3 (2024 11:46), Max: 36.7 (31 Oct 2024 23:25)  T(F): 97.4 (2024 11:46), Max: 98.1 (31 Oct 2024 23:25)  HR: 77 (2024 14:00) (73 - 91)  BP: 158/74 (2024 14:00) (63/37 - 186/84)  BP(mean): 107 (2024 14:00) (46 - 121)  RR: 23 (2024 14:00) (14 - 42)  SpO2: 100% (2024 14:00) (92% - 100%)    Parameters below as of 2024 04:06  Patient On (Oxygen Delivery Method): room air      Daily Height in cm: 160.02 (2024 04:06)    Daily Weight in k.6 (2024 04:32)    GENERAL:  Appears stated age  HEENT:  NC/AT  CHEST:  Full & symmetric excursion  HEART:  Regular rhythm  ABDOMEN:  Soft, non-tender, non-distended  EXTEREMITIES:  no cyanosis, clubbing or edema  SKIN:  No rash  NEURO:  Alert    LABS:                        9.3    6.97  )-----------( 142      ( 2024 10:12 )             26.4     11    138  |  105  |  35[H]  ----------------------------<  102[H]  4.0   |  24  |  0.92    Ca    7.9[L]      2024 10:12  Phos  3.1       Mg     1.7         TPro  5.5[L]  /  Alb  2.5[L]  /  TBili  0.6  /  DBili  x   /  AST  19  /  ALT  16  /  AlkPhos  52  11-    LIVER FUNCTIONS - ( 2024 10:12 )  Alb: 2.5 g/dL / Pro: 5.5 g/dL / ALK PHOS: 52 U/L / ALT: 16 U/L / AST: 19 U/L / GGT: x           PT/INR - ( 31 Oct 2024 23:30 )   PT: 13.9 sec;   INR: 1.19 ratio         PTT - ( 31 Oct 2024 23:30 )  PTT:30.0 sec  Urinalysis Basic - ( 2024 10:12 )    Color: x / Appearance: x / SG: x / pH: x  Gluc: 102 mg/dL / Ketone: x  / Bili: x / Urobili: x   Blood: x / Protein: x / Nitrite: x   Leuk Esterase: x / RBC: x / WBC x   Sq Epi: x / Non Sq Epi: x / Bacteria: x      [id number]      Imaging:  < from: CT Abdomen and Pelvis w/ IV Cont (24 @ 00:43) >    ACC: [id number] EXAM:  CT ABDOMEN AND PELVIS IC   ORDERED BY: MICHELLE NOLASCO     ACC: [id number] EXAM:  CT CHEST IC   ORDERED BY: MICHELLE NOLASCO     PROCEDURE DATE:  2024          INTERPRETATION:  CLINICAL INFORMATION: Anemic.    COMPARISON: None.    CONTRAST/COMPLICATIONS:  IV Contrast: Omnipaque 350  90 cc administered   10 cc discarded  Oral Contrast: NONE  Complications: None reported at time of study completion    PROCEDURE:  CT of the Chest, Abdomen and Pelvis was performed.  Sagittal and coronal reformats were performed.    FINDINGS:  CHEST:  LUNGS AND LARGE AIRWAYS: Patent central airways. Mild dependent   atelectasis. No specific diffuse groundglass interstitial densities could   represent the stigmata of an incomplete inspiratory effort, however,   developing pulmonary interstitial edema, an ongoing nonspecific   pneumonitis, or other pathologies cannot be completely excluded. No   evidence of consolidative pneumonia. No suspicious lung nodules. No   evidence of a pneumothorax.  PLEURA: Small/trace right pleural effusion and associated compressive   atelectasis.  VESSELS: Aortic calcifications. Coronary artery calcifications.  HEART: Heart size is normal. No pericardial effusion.  MEDIASTINUM AND JESUS: No lymphadenopathy.  CHEST WALL AND LOWER NECK: Within normal limits.    ABDOMEN AND PELVIS:  LIVER: Within normal limits.  BILE DUCTS: Normal caliber.  GALLBLADDER: Within normal limits.  SPLEEN: Within normal limits.  PANCREAS: Within normal limits.  ADRENALS: Within normal limits.  KIDNEYS/URETERS: Within normal limits.    BLADDER: Within normal limits.  REPRODUCTIVE ORGANS: Prostate is enlarged.    BOWEL: No bowel obstruction. Appendix is normal. Moderate-to-severe   constipation.  PERITONEUM/RETROPERITONEUM: Within normal limits.  VESSELS: Atherosclerotic changes.  LYMPH NODES: No lymphadenopathy.  ABDOMINAL WALL: Within normal limits.  BONES: Degenerative changes.    IMPRESSION:  1.  No acute abnormality detected.  2.  Nonspecific appearance of the lungs may bedue to an incomplete   inspiratory effort, however, other etiologies cannot be completely   excluded.  3.  Small right pleural effusion.    --- End of Report ---       MINDY CASTLE MD; Attending Radiologist  This document has been electronically signed. 2024 12:55AM    < end of copied text >

## 2024-11-01 NOTE — PROGRESS NOTE ADULT - SUBJECTIVE AND OBJECTIVE BOX
INTERVAL HPI/OVERNIGHT EVENTS: No acute overnight events occurred.    SUBJECTIVE: Seen and examined pt at bedside. Has no acute complaints at this time.    Review of Systems:  Constitutional: No fever, chills, fatigue  Neuro: No headache, numbness, weakness  Resp: No cough, wheezing, shortness of breath  CVS: No chest pain, palpitations, leg swelling  GI: No abdominal pain, nausea, vomiting, diarrhea   : No dysuria, frequency, incontinence  Skin: No itching, burning, rashes, or lesions   Msk: No joint pain or swelling  Psych: No depression, anxiety, mood swings    ICU Vital Signs Last 24 Hrs  T(C): 35.8 (01 Nov 2024 07:42), Max: 36.7 (31 Oct 2024 23:25)  T(F): 96.5 (01 Nov 2024 07:42), Max: 98.1 (31 Oct 2024 23:25)  HR: 85 (01 Nov 2024 07:00) (73 - 91)  BP: 130/63 (01 Nov 2024 07:00) (63/37 - 151/73)  BP(mean): 90 (01 Nov 2024 07:00) (46 - 105)  ABP: --  ABP(mean): --  RR: 27 (01 Nov 2024 07:00) (14 - 42)  SpO2: 100% (01 Nov 2024 07:00) (92% - 100%)    O2 Parameters below as of 01 Nov 2024 04:06  Patient On (Oxygen Delivery Method): room air              10-31-24 @ 07:01  -  11-01-24 @ 07:00  --------------------------------------------------------  IN: 792 mL / OUT: 850 mL / NET: -58 mL        CAPILLARY BLOOD GLUCOSE      POCT Blood Glucose.: 155 mg/dL (01 Nov 2024 06:02)      I&O's Summary    31 Oct 2024 07:01  -  01 Nov 2024 07:00  --------------------------------------------------------  IN: 792 mL / OUT: 850 mL / NET: -58 mL        PHYSICAL EXAM:  General: NAD  Neurology: awake and alert  HEENT: NC/AT  Respiratory: CTA b/l, no rales or rhonchi noted  Cardiovascular: RRR, normal S1S2, no M/R/G  Abdomen: soft, NT/ND, +BS, no palpable masses  Extremities: WWP, no clubbing, cyanosis, or edema  Skin: warm/dry      Meds:    norepinephrine Infusion IV Continuous    dextrose 50% Injectable IV Push  dextrose 50% Injectable IV Push  dextrose 50% Injectable IV Push  dextrose Oral Gel Oral PRN  glucagon  Injectable IntraMuscular  insulin lispro (ADMELOG) corrective regimen sliding scale SubCutaneous  levothyroxine Oral      benztropine Oral  dexMEDEtomidine Infusion IV Continuous  divalproex ER Oral  risperiDONE   Tablet Oral  traZODone Oral        pantoprazole  Injectable IV Push      dextrose 5%. IV Continuous  dextrose 5%. IV Continuous      chlorhexidine 2% Cloths Topical                              8.1    7.17  )-----------( 137      ( 01 Nov 2024 04:52 )             24.0       11-01    139  |  107  |  34[H]  ----------------------------<  96  4.6   |  24  |  1.10    Ca    8.2[L]      01 Nov 2024 04:52  Phos  3.1     11-01  Mg     2.0     11-01    TPro  5.3[L]  /  Alb  2.4[L]  /  TBili  0.6  /  DBili  0.2  /  AST  20  /  ALT  16  /  AlkPhos  53  11-01    Lactate 3.4           11-01 @ 04:52    Lactate 4.3           10-31 @ 23:30          PT/INR - ( 31 Oct 2024 23:30 )   PT: 13.9 sec;   INR: 1.19 ratio         PTT - ( 31 Oct 2024 23:30 )  PTT:30.0 sec  Urinalysis Basic - ( 01 Nov 2024 04:52 )    Color: x / Appearance: x / SG: x / pH: x  Gluc: 96 mg/dL / Ketone: x  / Bili: x / Urobili: x   Blood: x / Protein: x / Nitrite: x   Leuk Esterase: x / RBC: x / WBC x   Sq Epi: x / Non Sq Epi: x / Bacteria: x                  Radiology:    Bedside Ultrasound:    Tubes/Lines:      GLOBAL ISSUE/BEST PRACTICE:  Analgesia:  Sedation:  HOB elevation: Y  Stress ulcer prophylaxis:  VTE prophylaxis:  Glycemic control:  Nutrition:    CODE STATUS:        BRIEF HOSPITAL COURSE: 68M with PMHx of Afib on Eliquis, NSTEMI, schizophrenia, BP D/o, HTN, HypoNA BIBEMS from [facility] for Hypotension d/t anemia of 6. In the ED pt found to be hypotensive to low 80s/50s given 2L of IVF, Eliquis reversed with KCentra, Anemic with hgb of 5.1, Lactate of 4, pan scanned without any acute source of bleed. GI consulted and ICU consulted for the above.      INTERVAL HPI/OVERNIGHT EVENTS: Patient received 3 units of pRBCs in total overnight. He was also placed on levophed Drip for hemodynamic support due to continued hypotension.     SUBJECTIVE: Seen and examined pt at bedside. Patient difficult to get history from due to mental status. He is alert and awake, able to follow commands but is A&Ox0. He states he has no acute complaints. Pt off levophed drip and hemodynamically stable as of 9 AM this morning. No bowel movements yet.    Review of Systems:  Constitutional: No fever, chills, fatigue  Neuro: No headache, numbness, weakness  Resp: No cough, wheezing, shortness of breath  CVS: No chest pain, palpitations, leg swelling  GI: No abdominal pain, nausea, vomiting, diarrhea   : No dysuria, frequency, incontinence  Skin: No itching, burning, rashes, or lesions   Msk: No joint pain or swelling  Psych: No depression, anxiety, mood swings    ICU Vital Signs Last 24 Hrs  T(C): 35.8 (01 Nov 2024 07:42), Max: 36.7 (31 Oct 2024 23:25)  T(F): 96.5 (01 Nov 2024 07:42), Max: 98.1 (31 Oct 2024 23:25)  HR: 85 (01 Nov 2024 07:00) (73 - 91)  BP: 130/63 (01 Nov 2024 07:00) (63/37 - 151/73)  BP(mean): 90 (01 Nov 2024 07:00) (46 - 105)  ABP: --  ABP(mean): --  RR: 27 (01 Nov 2024 07:00) (14 - 42)  SpO2: 100% (01 Nov 2024 07:00) (92% - 100%)    O2 Parameters below as of 01 Nov 2024 04:06  Patient On (Oxygen Delivery Method): room air              10-31-24 @ 07:01  -  11-01-24 @ 07:00  --------------------------------------------------------  IN: 792 mL / OUT: 850 mL / NET: -58 mL        CAPILLARY BLOOD GLUCOSE      POCT Blood Glucose.: 155 mg/dL (01 Nov 2024 06:02)      I&O's Summary    31 Oct 2024 07:01  -  01 Nov 2024 07:00  --------------------------------------------------------  IN: 792 mL / OUT: 850 mL / NET: -58 mL        PHYSICAL EXAM:  General: NAD  Neurology: awake and alert. able to follow commands. A&Ox0  HEENT: NC/AT  Respiratory: CTA b/l, no rales or rhonchi noted  Cardiovascular: RRR, normal S1S2, no M/R/G  Abdomen: soft, NT/ND, +BS, no palpable masses  Extremities:  no clubbing, cyanosis, edema or ecchymoses of LE b/l  Skin: warm/dry      Meds:    norepinephrine Infusion IV Continuous    dextrose 50% Injectable IV Push  dextrose 50% Injectable IV Push  dextrose 50% Injectable IV Push  dextrose Oral Gel Oral PRN  glucagon  Injectable IntraMuscular  insulin lispro (ADMELOG) corrective regimen sliding scale SubCutaneous  levothyroxine Oral      benztropine Oral  dexMEDEtomidine Infusion IV Continuous  divalproex ER Oral  risperiDONE   Tablet Oral  traZODone Oral        pantoprazole  Injectable IV Push      dextrose 5%. IV Continuous  dextrose 5%. IV Continuous      chlorhexidine 2% Cloths Topical                              8.1    7.17  )-----------( 137      ( 01 Nov 2024 04:52 )             24.0       11-01    139  |  107  |  34[H]  ----------------------------<  96  4.6   |  24  |  1.10    Ca    8.2[L]      01 Nov 2024 04:52  Phos  3.1     11-01  Mg     2.0     11-01    TPro  5.3[L]  /  Alb  2.4[L]  /  TBili  0.6  /  DBili  0.2  /  AST  20  /  ALT  16  /  AlkPhos  53  11-01    Lactate 3.4           11-01 @ 04:52    Lactate 4.3           10-31 @ 23:30          PT/INR - ( 31 Oct 2024 23:30 )   PT: 13.9 sec;   INR: 1.19 ratio         PTT - ( 31 Oct 2024 23:30 )  PTT:30.0 sec  Urinalysis Basic - ( 01 Nov 2024 04:52 )    Color: x / Appearance: x / SG: x / pH: x  Gluc: 96 mg/dL / Ketone: x  / Bili: x / Urobili: x   Blood: x / Protein: x / Nitrite: x   Leuk Esterase: x / RBC: x / WBC x   Sq Epi: x / Non Sq Epi: x / Bacteria: x                  Radiology:  < from: CT Abdomen and Pelvis w/ IV Cont (11.01.24 @ 00:43) >  ACC: [id number] EXAM:  CT ABDOMEN AND PELVIS IC   ORDERED BY: MICHELLE NOLASCO     ACC: [id number] EXAM:  CT CHEST IC   ORDERED BY: MICHELLE NOLASCO     PROCEDURE DATE:  11/01/2024          INTERPRETATION:  CLINICAL INFORMATION: Anemic.    COMPARISON: None.    CONTRAST/COMPLICATIONS:  IV Contrast: Omnipaque 350  90 cc administered   10 cc discarded  Oral Contrast: NONE  Complications: None reported at time of study completion    PROCEDURE:  CT of the Chest, Abdomen and Pelvis was performed.  Sagittal and coronal reformats were performed.    FINDINGS:  CHEST:  LUNGS AND LARGE AIRWAYS: Patent central airways. Mild dependent   atelectasis. No specific diffuse groundglass interstitial densities could   represent the stigmata of an incomplete inspiratory effort, however,   developing pulmonary interstitial edema, an ongoing nonspecific   pneumonitis, or other pathologies cannot be completely excluded. No   evidence of consolidative pneumonia. No suspicious lung nodules. No   evidence of a pneumothorax.  PLEURA: Small/trace right pleural effusion and associated compressive   atelectasis.  VESSELS: Aortic calcifications. Coronary artery calcifications.  HEART: Heart size is normal. No pericardial effusion.  MEDIASTINUM AND JESUS: No lymphadenopathy.  CHEST WALL AND LOWER NECK: Within normal limits.    ABDOMEN AND PELVIS:  LIVER: Within normal limits.  BILE DUCTS: Normal caliber.  GALLBLADDER: Within normal limits.  SPLEEN: Within normal limits.  PANCREAS: Within normal limits.  ADRENALS: Within normal limits.  KIDNEYS/URETERS: Within normal limits.    BLADDER: Within normal limits.  REPRODUCTIVE ORGANS: Prostate is enlarged.    BOWEL: No bowel obstruction. Appendix is normal. Moderate-to-severe   constipation.  PERITONEUM/RETROPERITONEUM: Within normal limits.  VESSELS: Atherosclerotic changes.  LYMPH NODES: No lymphadenopathy.  ABDOMINAL WALL: Within normal limits.  BONES: Degenerative changes.    IMPRESSION:  1.  No acute abnormality detected.  2.  Nonspecific appearance of the lungs may bedue to an incomplete   inspiratory effort, however, other etiologies cannot be completely   excluded.  3.  Small right pleural effusion.        --- End of Report ---             MINDY CASTLE MD; Attending Radiologist  This document has been electronically signed. Nov 1 2024 12:55AM    < end of copied text >        GLOBAL ISSUE/BEST PRACTICE:  Analgesia: N  Sedation: N  HOB elevation: Y  Stress ulcer prophylaxis: IV protonix BID  VTE prophylaxis: SCDs  Glycemic control: ISS  Nutrition: Clear Liquid    CODE STATUS: Full Code       BRIEF HOSPITAL COURSE: 68M with PMHx of Afib on Eliquis, NSTEMI, schizophrenia, BP D/o, HTN, HypoNA BIBEMS from [facility] for Hypotension d/t anemia of 6. In the ED pt found to be hypotensive to low 80s/50s given 2L of IVF, Eliquis reversed with KCentra, Anemic with hgb of 5.1, Lactate of 4, pan scanned without any acute source of bleed. GI consulted and ICU consulted for the above.      INTERVAL HPI/OVERNIGHT EVENTS: Patient received 3 units of pRBCs in total overnight  . He was also placed on levophed Drip for hemodynamic support due to continued hypotension.   Hb trend 5.1 > 2 PRBCs > 8.1 > 1 PRBC >    SUBJECTIVE: Seen and examined pt at bedside. Patient difficult to get history from due to mental status. He is alert and awake, able to follow commands but is A&Ox0. He states he has no acute complaints. Pt off levophed drip and hemodynamically stable as of 9 AM this morning. No bowel movements yet.        ICU Vital Signs Last 24 Hrs  T(C): 35.8 (01 Nov 2024 07:42), Max: 36.7 (31 Oct 2024 23:25)  T(F): 96.5 (01 Nov 2024 07:42), Max: 98.1 (31 Oct 2024 23:25)  HR: 85 (01 Nov 2024 07:00) (73 - 91)  BP: 130/63 (01 Nov 2024 07:00) (63/37 - 151/73)  BP(mean): 90 (01 Nov 2024 07:00) (46 - 105)  ABP: --  ABP(mean): --  RR: 27 (01 Nov 2024 07:00) (14 - 42)  SpO2: 100% (01 Nov 2024 07:00) (92% - 100%)    O2 Parameters below as of 01 Nov 2024 04:06  Patient On (Oxygen Delivery Method): room air              10-31-24 @ 07:01  -  11-01-24 @ 07:00  --------------------------------------------------------  IN: 792 mL / OUT: 850 mL / NET: -58 mL        CAPILLARY BLOOD GLUCOSE      POCT Blood Glucose.: 155 mg/dL (01 Nov 2024 06:02)      I&O's Summary    31 Oct 2024 07:01  -  01 Nov 2024 07:00  --------------------------------------------------------  IN: 792 mL / OUT: 850 mL / NET: -58 mL        PHYSICAL EXAM:  General: NAD  Neurology: awake and alert. able to follow commands. A&Ox0  HEENT: NC/AT  Respiratory: CTA b/l, no rales or rhonchi noted  Cardiovascular: RRR, normal S1S2, no M/R/G  Abdomen: soft, NT/ND, +BS, no palpable masses  Extremities:  no clubbing, cyanosis, edema or ecchymoses of LE b/l  Skin: warm/dry      Meds:    norepinephrine Infusion IV Continuous    dextrose 50% Injectable IV Push  dextrose 50% Injectable IV Push  dextrose 50% Injectable IV Push  dextrose Oral Gel Oral PRN  glucagon  Injectable IntraMuscular  insulin lispro (ADMELOG) corrective regimen sliding scale SubCutaneous  levothyroxine Oral      benztropine Oral  dexMEDEtomidine Infusion IV Continuous  divalproex ER Oral  risperiDONE   Tablet Oral  traZODone Oral        pantoprazole  Injectable IV Push      dextrose 5%. IV Continuous  dextrose 5%. IV Continuous      chlorhexidine 2% Cloths Topical                              8.1    7.17  )-----------( 137      ( 01 Nov 2024 04:52 )             24.0       11-01    139  |  107  |  34[H]  ----------------------------<  96  4.6   |  24  |  1.10    Ca    8.2[L]      01 Nov 2024 04:52  Phos  3.1     11-01  Mg     2.0     11-01    TPro  5.3[L]  /  Alb  2.4[L]  /  TBili  0.6  /  DBili  0.2  /  AST  20  /  ALT  16  /  AlkPhos  53  11-01    Lactate 3.4           11-01 @ 04:52    Lactate 4.3           10-31 @ 23:30          PT/INR - ( 31 Oct 2024 23:30 )   PT: 13.9 sec;   INR: 1.19 ratio         PTT - ( 31 Oct 2024 23:30 )  PTT:30.0 sec  Urinalysis Basic - ( 01 Nov 2024 04:52 )    Color: x / Appearance: x / SG: x / pH: x  Gluc: 96 mg/dL / Ketone: x  / Bili: x / Urobili: x   Blood: x / Protein: x / Nitrite: x   Leuk Esterase: x / RBC: x / WBC x   Sq Epi: x / Non Sq Epi: x / Bacteria: x                  Radiology:  < from: CT Abdomen and Pelvis w/ IV Cont (11.01.24 @ 00:43) >  ACC: [id number] EXAM:  CT ABDOMEN AND PELVIS IC   ORDERED BY: MICHELLE NOLASCO     ACC: [id number] EXAM:  CT CHEST IC   ORDERED BY: MICHELLE NOLASCO     PROCEDURE DATE:  11/01/2024          INTERPRETATION:  CLINICAL INFORMATION: Anemic.    COMPARISON: None.    CONTRAST/COMPLICATIONS:  IV Contrast: Omnipaque 350  90 cc administered   10 cc discarded  Oral Contrast: NONE  Complications: None reported at time of study completion    PROCEDURE:  CT of the Chest, Abdomen and Pelvis was performed.  Sagittal and coronal reformats were performed.    FINDINGS:  CHEST:  LUNGS AND LARGE AIRWAYS: Patent central airways. Mild dependent   atelectasis. No specific diffuse groundglass interstitial densities could   represent the stigmata of an incomplete inspiratory effort, however,   developing pulmonary interstitial edema, an ongoing nonspecific   pneumonitis, or other pathologies cannot be completely excluded. No   evidence of consolidative pneumonia. No suspicious lung nodules. No   evidence of a pneumothorax.  PLEURA: Small/trace right pleural effusion and associated compressive   atelectasis.  VESSELS: Aortic calcifications. Coronary artery calcifications.  HEART: Heart size is normal. No pericardial effusion.  MEDIASTINUM AND JESUS: No lymphadenopathy.  CHEST WALL AND LOWER NECK: Within normal limits.    ABDOMEN AND PELVIS:  LIVER: Within normal limits.  BILE DUCTS: Normal caliber.  GALLBLADDER: Within normal limits.  SPLEEN: Within normal limits.  PANCREAS: Within normal limits.  ADRENALS: Within normal limits.  KIDNEYS/URETERS: Within normal limits.    BLADDER: Within normal limits.  REPRODUCTIVE ORGANS: Prostate is enlarged.    BOWEL: No bowel obstruction. Appendix is normal. Moderate-to-severe   constipation.  PERITONEUM/RETROPERITONEUM: Within normal limits.  VESSELS: Atherosclerotic changes.  LYMPH NODES: No lymphadenopathy.  ABDOMINAL WALL: Within normal limits.  BONES: Degenerative changes.    IMPRESSION:  1.  No acute abnormality detected.  2.  Nonspecific appearance of the lungs may bedue to an incomplete   inspiratory effort, however, other etiologies cannot be completely   excluded.  3.  Small right pleural effusion.        --- End of Report ---             MINDY CASTLE MD; Attending Radiologist  This document has been electronically signed. Nov 1 2024 12:55AM    < end of copied text >        GLOBAL ISSUE/BEST PRACTICE:  Analgesia: N  Sedation: N  HOB elevation: Y  Stress ulcer prophylaxis: IV protonix BID  VTE prophylaxis: SCDs  Glycemic control: ISS  Nutrition: Clear Liquid    CODE STATUS: Full Code

## 2024-11-01 NOTE — CONSULT NOTE ADULT - SUBJECTIVE AND OBJECTIVE BOX
Date of entry of this note is equal to date of services rendered      BRIEF HOSPITAL COURSE: 68M with PMHx of Afib on Eliquis, NSTEMI, schizophrenia, BP D/o, HTN, HypoNA BIBEMS from [facility] for Hypotension d/t anemia of 6. In the ED pt found to be hypotensive to low 80s/50s given 2L of IVF, Eliquis reversed with KCentra, Anemic with hgb of 5.1, Lactate of 4, pan scanned without any acute findings for cause of ABLA. GI consulted and ICU consulted for the above.    Pt has different MRN: [id number]     INTERVAL EVENTS:   - First unit of PRBC running wide open now, pt pale and lethargic. Need good IV Access.  - 2nd unit ordered, will order 3rd to be ready T&C  - Stat repeat labs  - Pending GI consult      PAST MEDICAL & SURGICAL HISTORY:    Allergies    clozapine (Rash)  lithium (Rash)    Intolerances    FAMILY HISTORY:      REVIEW OF SYSTEMS:     [X] A ten-point review of systems was otherwise negative except as noted.        Medications:  vancomycin  IVPB. 1000 milliGRAM(s) IV Intermittent once    pantoprazole  Injectable 80 milliGRAM(s) IV Push Once  pantoprazole Infusion 8 mG/Hr IV Continuous <Continuous>            ICU Vital Signs Last 24 Hrs  T(C): 36.7 (31 Oct 2024 23:25), Max: 36.7 (31 Oct 2024 23:25)  T(F): 98.1 (31 Oct 2024 23:25), Max: 98.1 (31 Oct 2024 23:25)  HR: 80 (01 Nov 2024 01:05) (80 - 84)  BP: 92/55 (01 Nov 2024 01:05) (77/50 - 92/55)  BP(mean): --  ABP: --  ABP(mean): --  RR: 18 (01 Nov 2024 01:05) (18 - 18)  SpO2: 95% (01 Nov 2024 01:05) (95% - 100%)    O2 Parameters below as of 01 Nov 2024 01:05  Patient On (Oxygen Delivery Method): nasal cannula  O2 Flow (L/min): 2      Vital Signs Last 24 Hrs  T(C): 36.7 (31 Oct 2024 23:25), Max: 36.7 (31 Oct 2024 23:25)  T(F): 98.1 (31 Oct 2024 23:25), Max: 98.1 (31 Oct 2024 23:25)  HR: 80 (01 Nov 2024 01:05) (80 - 84)  BP: 92/55 (01 Nov 2024 01:05) (77/50 - 92/55)  BP(mean): --  RR: 18 (01 Nov 2024 01:05) (18 - 18)  SpO2: 95% (01 Nov 2024 01:05) (95% - 100%)    Parameters below as of 01 Nov 2024 01:05  Patient On (Oxygen Delivery Method): nasal cannula  O2 Flow (L/min): 2      I&O's Detail        LABS:                        5.1    5.83  )-----------( 162      ( 31 Oct 2024 23:30 )             15.4     10-31    138  |  103  |  47[H]  ----------------------------<  95  4.9   |  23  |  1.30    Ca    8.3[L]      31 Oct 2024 23:30    TPro  5.2[L]  /  Alb  2.3[L]  /  TBili  0.2  /  DBili  x   /  AST  17  /  ALT  16  /  AlkPhos  51  10-31        CAPILLARY BLOOD GLUCOSE      PT/INR - ( 31 Oct 2024 23:30 )   PT: 13.9 sec;   INR: 1.19 ratio         PTT - ( 31 Oct 2024 23:30 )  PTT:30.0 sec  Urinalysis Basic - ( 31 Oct 2024 23:30 )    Color: x / Appearance: x / SG: x / pH: x  Gluc: 95 mg/dL / Ketone: x  / Bili: x / Urobili: x   Blood: x / Protein: x / Nitrite: x   Leuk Esterase: x / RBC: x / WBC x   Sq Epi: x / Non Sq Epi: x / Bacteria: x    CULTURES:      PHYSICAL EXAM:  General: Ill appearing, pale, lethargic  HEENT: Pupils equal, reactive to light.  Symmetric.  PULM: Clear to auscultation bilaterally  CVS: Regular rate and rhythm  ABD: Soft, distended, nontender  EXT: mild edema, nontender, Warm   NEURO: lethargic but answering questions appropriately      RADIOLOGY: ***        CRITICAL CARE TIME SPENT:  45 minutes of critical care time spent providing medical care for patient's acute illness/conditions that impairs at least one vital organ system and/or poses a high risk of imminent or life threatening deterioration in the patient's condition. It includes time spent evaluating and treating the patient's acute illness as well as time spent reviewing labs, radiology, discussing goals of care with patient and/or patient's family, and discussing the case with a multidisciplinary team, in an effort to prevent further life threatening deterioration or end organ damage. This time is independent of any procedures performed.              Date of entry of this note is equal to date of services rendered      BRIEF HOSPITAL COURSE: 68M with PMHx of Afib on Eliquis, NSTEMI, schizophrenia, BP D/o, HTN, HypoNA BIBEMS from [facility] for Hypotension d/t anemia of 6. In the ED pt found to be hypotensive to low 80s/50s given 2L of IVF, Eliquis reversed with KCentra, Anemic with hgb of 5.1, Lactate of 4, pan scanned without any acute source of bleed. GI consulted and ICU consulted for the above.    Pt has different MRN: [id number]     INTERVAL EVENTS:   - First unit of PRBC running wide open now, pt pale and lethargic. Needs good IV Access.  - 2nd unit ordered, will order 3rd to be ready T&C  - Stat repeat labs  - Pending GI consult      PAST MEDICAL & SURGICAL HISTORY:    Allergies    clozapine (Rash)  lithium (Rash)    Intolerances    FAMILY HISTORY:      REVIEW OF SYSTEMS:     [X] A ten-point review of systems was otherwise negative except as noted.        Medications:  vancomycin  IVPB. 1000 milliGRAM(s) IV Intermittent once    pantoprazole  Injectable 80 milliGRAM(s) IV Push Once  pantoprazole Infusion 8 mG/Hr IV Continuous <Continuous>            ICU Vital Signs Last 24 Hrs  T(C): 36.7 (31 Oct 2024 23:25), Max: 36.7 (31 Oct 2024 23:25)  T(F): 98.1 (31 Oct 2024 23:25), Max: 98.1 (31 Oct 2024 23:25)  HR: 80 (01 Nov 2024 01:05) (80 - 84)  BP: 92/55 (01 Nov 2024 01:05) (77/50 - 92/55)  BP(mean): --  ABP: --  ABP(mean): --  RR: 18 (01 Nov 2024 01:05) (18 - 18)  SpO2: 95% (01 Nov 2024 01:05) (95% - 100%)    O2 Parameters below as of 01 Nov 2024 01:05  Patient On (Oxygen Delivery Method): nasal cannula  O2 Flow (L/min): 2      Vital Signs Last 24 Hrs  T(C): 36.7 (31 Oct 2024 23:25), Max: 36.7 (31 Oct 2024 23:25)  T(F): 98.1 (31 Oct 2024 23:25), Max: 98.1 (31 Oct 2024 23:25)  HR: 80 (01 Nov 2024 01:05) (80 - 84)  BP: 92/55 (01 Nov 2024 01:05) (77/50 - 92/55)  BP(mean): --  RR: 18 (01 Nov 2024 01:05) (18 - 18)  SpO2: 95% (01 Nov 2024 01:05) (95% - 100%)    Parameters below as of 01 Nov 2024 01:05  Patient On (Oxygen Delivery Method): nasal cannula  O2 Flow (L/min): 2      I&O's Detail        LABS:                        5.1    5.83  )-----------( 162      ( 31 Oct 2024 23:30 )             15.4     10-31    138  |  103  |  47[H]  ----------------------------<  95  4.9   |  23  |  1.30    Ca    8.3[L]      31 Oct 2024 23:30    TPro  5.2[L]  /  Alb  2.3[L]  /  TBili  0.2  /  DBili  x   /  AST  17  /  ALT  16  /  AlkPhos  51  10-31        CAPILLARY BLOOD GLUCOSE      PT/INR - ( 31 Oct 2024 23:30 )   PT: 13.9 sec;   INR: 1.19 ratio         PTT - ( 31 Oct 2024 23:30 )  PTT:30.0 sec  Urinalysis Basic - ( 31 Oct 2024 23:30 )    Color: x / Appearance: x / SG: x / pH: x  Gluc: 95 mg/dL / Ketone: x  / Bili: x / Urobili: x   Blood: x / Protein: x / Nitrite: x   Leuk Esterase: x / RBC: x / WBC x   Sq Epi: x / Non Sq Epi: x / Bacteria: x    CULTURES:      PHYSICAL EXAM:  General: Ill appearing, pale, lethargic  HEENT: Pupils equal, reactive to light.  Symmetric.  PULM: Clear to auscultation bilaterally  CVS: Regular rate and rhythm  ABD: Soft, distended, nontender  EXT: mild edema, nontender, Warm   NEURO: lethargic but answering questions appropriately      RADIOLOGY:       CRITICAL CARE TIME SPENT:  45 minutes of critical care time spent providing medical care for patient's acute illness/conditions that impairs at least one vital organ system and/or poses a high risk of imminent or life threatening deterioration in the patient's condition. It includes time spent evaluating and treating the patient's acute illness as well as time spent reviewing labs, radiology, discussing goals of care with patient and/or patient's family, and discussing the case with a multidisciplinary team, in an effort to prevent further life threatening deterioration or end organ damage. This time is independent of any procedures performed.

## 2024-11-01 NOTE — PROVIDER CONTACT NOTE (EICU) - BACKGROUND
68 year old male from [facility] with afib  on Eliquis, CAD with h/o NSTEMI on asa/Plavix x 1 year ago,  Who p/w Hypotension from [facility]  In ED found to have hg 5 and hypotensive, started on ppi drip  s/p   kcentra. now receiving first unit of blood

## 2024-11-01 NOTE — CONSULT NOTE ADULT - ASSESSMENT
67 yo man from [facility], hx NSTEMI, schizoprenica, A fib on Eliquis, HTN, hyponatremia, adm for hypotension SMP 70s and Hgb 6  In ER, BP low 80s/50s, CBC wbc 5.83 hgb 5.1 hct 15.4 plts 162 mcv 95.7  BUN/Cr 47/1.3  stool occult blood negative  Given 2L fluid, 3u PRBC, adm to ICU  post transfusion CBC today first lab Hgb 8.1, second lab Hgb 9.3  Pt appear comfortable sitting up in bed  No gross sign bleed at this time  CT head negative  CT c/a/p-mild dep atelectasis, sm right efusion    -?new onset acute anemia, no gross bleeds noted on presnation.in hospital  -appropriate increase of Hgb w the transfusion  -no signs of hemolysis  -TSH normal  -iron studies were sent, will follow up, will also order B12, folate  -follow serial CBC    thank you, will follow w you 67 yo man from [facility], hx NSTEMI, schizoprenica, A fib on Eliquis, HTN, hyponatremia, adm for hypotension SMP 70s and Hgb 6  In ER, BP low 80s/50s, CBC wbc 5.83 hgb 5.1 hct 15.4 plts 162 mcv 95.7  BUN/Cr 47/1.3  stool occult blood negative  Given 2L fluid, 3u PRBC, Kcentra,, adm to ICU  post transfusion CBC today first lab Hgb 8.1, second lab Hgb 9.3  Pt appear comfortable sitting up in bed  No gross sign bleed at this time  CT head negative  CT c/a/p-mild dep atelectasis, sm right efusion    -?new onset acute anemia, no gross bleeds noted on presnation.in hospital  -appropriate increase of Hgb w the transfusion  -no signs of hemolysis  -TSH normal  -iron studies were sent, will follow up, will also order B12, folate  -follow serial CBC    thank you, will follow w you

## 2024-11-01 NOTE — CONSULT NOTE ADULT - SUBJECTIVE AND OBJECTIVE BOX
CHIEF COMPLAINT/ REASON FOR VISIT  .. Patient was seen to address the  issue listed under PROBLEM LIST which is located toward bottom of this note     RM HORTON    [facility] ICU1 07A    Allergies    lithium (Rash)  clozapine (Rash)    Intolerances        PAST MEDICAL & SURGICAL HISTORY:      FAMILY HISTORY:      Home Medications:  atorvastatin 80 mg oral tablet: 1 tab(s) orally once a day (at bedtime) (01 Nov 2024 08:44)  benztropine 1 mg oral tablet: 2 tab(s) orally 2 times a day (01 Nov 2024 08:44)  clopidogrel 75 mg oral tablet: 1 tab(s) orally once a day (01 Nov 2024 08:44)  divalproex sodium 500 mg oral tablet, extended release: 3 tab(s) orally once a day (01 Nov 2024 08:44)  Eliquis 2.5 mg oral tablet: 1 tab(s) orally 2 times a day (01 Nov 2024 08:44)  levothyroxine 88 mcg (0.088 mg) oral tablet: 1 tab(s) orally once a day (01 Nov 2024 08:44)  losartan 50 mg oral tablet: 1 tab(s) orally once a day (01 Nov 2024 08:44)  metFORMIN 500 mg oral tablet: 2 tab(s) orally 2 times a day (01 Nov 2024 08:44)  metoprolol tartrate 25 mg oral tablet: 1 tab(s) orally 2 times a day (01 Nov 2024 08:44)  QUEtiapine 400 mg oral tablet: 1 tab(s) orally once a day (at bedtime) (01 Nov 2024 08:44)  risperiDONE 4 mg oral tablet: 1 tab(s) orally 2 times a day (01 Nov 2024 08:44)  sodium chloride: 1,000 milligram(s) orally 3 times a day (01 Nov 2024 08:44)  traZODone 100 mg oral tablet: 1 tab(s) orally once a day (at bedtime) (01 Nov 2024 08:44)      MEDICATIONS  (STANDING):  benztropine 1 milliGRAM(s) Oral two times a day  chlorhexidine 2% Cloths 1 Application(s) Topical <User Schedule>  dexMEDEtomidine Infusion 0.2 MICROgram(s)/kG/Hr (4.08 mL/Hr) IV Continuous <Continuous>  dextrose 5%. 1000 milliLiter(s) (100 mL/Hr) IV Continuous <Continuous>  dextrose 5%. 1000 milliLiter(s) (50 mL/Hr) IV Continuous <Continuous>  dextrose 50% Injectable 25 Gram(s) IV Push once  dextrose 50% Injectable 12.5 Gram(s) IV Push once  dextrose 50% Injectable 25 Gram(s) IV Push once  divalproex  milliGRAM(s) Oral daily  glucagon  Injectable 1 milliGRAM(s) IntraMuscular once  insulin lispro (ADMELOG) corrective regimen sliding scale   SubCutaneous every 6 hours  levothyroxine 88 MICROGram(s) Oral daily  norepinephrine Infusion 0.05 MICROgram(s)/kG/Min (7.93 mL/Hr) IV Continuous <Continuous>  pantoprazole  Injectable 40 milliGRAM(s) IV Push every 12 hours  risperiDONE   Tablet 4 milliGRAM(s) Oral two times a day  traZODone 100 milliGRAM(s) Oral at bedtime    MEDICATIONS  (PRN):  dextrose Oral Gel 15 Gram(s) Oral once PRN Blood Glucose LESS THAN 70 milliGRAM(s)/deciliter      Diet, NPO:   Except Medications (11-01-24 @ 04:32) [Active]          Vital Signs Last 24 Hrs  T(C): 35.8 (01 Nov 2024 07:42), Max: 36.7 (31 Oct 2024 23:25)  T(F): 96.5 (01 Nov 2024 07:42), Max: 98.1 (31 Oct 2024 23:25)  HR: 87 (01 Nov 2024 08:46) (73 - 91)  BP: 135/62 (01 Nov 2024 08:35) (63/37 - 151/73)  BP(mean): 90 (01 Nov 2024 08:35) (46 - 105)  RR: 39 (01 Nov 2024 08:46) (14 - 42)  SpO2: 99% (01 Nov 2024 08:46) (92% - 100%)    Parameters below as of 01 Nov 2024 04:06  Patient On (Oxygen Delivery Method): room air          10-31-24 @ 07:01  -  11-01-24 @ 07:00  --------------------------------------------------------  IN: 792 mL / OUT: 850 mL / NET: -58 mL              LABS:                        8.1    7.17  )-----------( 137      ( 01 Nov 2024 04:52 )             24.0     11-01    139  |  107  |  34[H]  ----------------------------<  96  4.6   |  24  |  1.10    Ca    8.2[L]      01 Nov 2024 04:52  Phos  3.1     11-01  Mg     2.0     11-01    TPro  5.3[L]  /  Alb  2.4[L]  /  TBili  0.6  /  DBili  0.2  /  AST  20  /  ALT  16  /  AlkPhos  53  11-01    PT/INR - ( 31 Oct 2024 23:30 )   PT: 13.9 sec;   INR: 1.19 ratio         PTT - ( 31 Oct 2024 23:30 )  PTT:30.0 sec  Urinalysis Basic - ( 01 Nov 2024 04:52 )    Color: x / Appearance: x / SG: x / pH: x  Gluc: 96 mg/dL / Ketone: x  / Bili: x / Urobili: x   Blood: x / Protein: x / Nitrite: x   Leuk Esterase: x / RBC: x / WBC x   Sq Epi: x / Non Sq Epi: x / Bacteria: x            WBC:  WBC Count: 7.17 K/uL (11-01 @ 04:52)  WBC Count: 5.83 K/uL (10-31 @ 23:30)      MICROBIOLOGY:  RECENT CULTURES:              PT/INR - ( 31 Oct 2024 23:30 )   PT: 13.9 sec;   INR: 1.19 ratio         PTT - ( 31 Oct 2024 23:30 )  PTT:30.0 sec    Sodium:  Sodium: 139 mmol/L (11-01 @ 04:52)  Sodium: 138 mmol/L (10-31 @ 23:30)      1.10 mg/dL 11-01 @ 04:52  1.30 mg/dL 10-31 @ 23:30      Hemoglobin:  Hemoglobin: 8.1 g/dL (11-01 @ 04:52)  Hemoglobin: 5.1 g/dL (10-31 @ 23:30)      Platelets: Platelet Count - Automated: 137 K/uL (11-01 @ 04:52)  Platelet Count - Automated: 162 K/uL (10-31 @ 23:30)      LIVER FUNCTIONS - ( 01 Nov 2024 04:52 )  Alb: 2.4 g/dL / Pro: 5.3 g/dL / ALK PHOS: 53 U/L / ALT: 16 U/L / AST: 20 U/L / GGT: x             Urinalysis Basic - ( 01 Nov 2024 04:52 )    Color: x / Appearance: x / SG: x / pH: x  Gluc: 96 mg/dL / Ketone: x  / Bili: x / Urobili: x   Blood: x / Protein: x / Nitrite: x   Leuk Esterase: x / RBC: x / WBC x   Sq Epi: x / Non Sq Epi: x / Bacteria: x        RADIOLOGY & ADDITIONAL STUDIES:      MICROBIOLOGY:  RECENT CULTURES:

## 2024-11-01 NOTE — H&P ADULT - NSHPLABSRESULTS_GEN_ALL_CORE
< from: CT Abdomen and Pelvis w/ IV Cont (11.01.24 @ 00:43) >      INTERPRETATION:  CLINICAL INFORMATION: Anemic.    COMPARISON: None.    CONTRAST/COMPLICATIONS:  IV Contrast: Omnipaque 350  90 cc administered   10 cc discarded  Oral Contrast: NONE  Complications: None reported at time of study completion    PROCEDURE:  CT of the Chest, Abdomen and Pelvis was performed.  Sagittal and coronal reformats were performed.    FINDINGS:  CHEST:  LUNGS AND LARGE AIRWAYS: Patent central airways. Mild dependent   atelectasis. No specific diffuse groundglass interstitial densities could   represent the stigmata of an incomplete inspiratory effort, however,   developing pulmonary interstitial edema, an ongoing nonspecific   pneumonitis, or other pathologies cannot be completely excluded. No   evidence of consolidative pneumonia. No suspicious lung nodules. No   evidence of a pneumothorax.  PLEURA: Small/trace right pleural effusion and associated compressive   atelectasis.  VESSELS: Aortic calcifications. Coronary artery calcifications.  HEART: Heart size is normal. No pericardial effusion.  MEDIASTINUM AND JESUS: No lymphadenopathy.  CHEST WALL AND LOWER NECK: Within normal limits.    ABDOMEN AND PELVIS:  LIVER: Within normal limits.  BILE DUCTS: Normal caliber.  GALLBLADDER: Within normal limits.  SPLEEN: Within normal limits.  PANCREAS: Within normal limits.  ADRENALS: Within normal limits.  KIDNEYS/URETERS: Within normal limits.    BLADDER: Within normal limits.  REPRODUCTIVE ORGANS: Prostate is enlarged.    BOWEL: No bowel obstruction. Appendix is normal. Moderate-to-severe   constipation.  PERITONEUM/RETROPERITONEUM: Within normal limits.  VESSELS: Atherosclerotic changes.  LYMPH NODES: No lymphadenopathy.  ABDOMINAL WALL: Within normal limits.  BONES: Degenerative changes.    IMPRESSION:  1.  No acute abnormality detected.  2.  Nonspecific appearance of the lungs may bedue to an incomplete   inspiratory effort, however, other etiologies cannot be completely   excluded.  3.  Small right pleural effusion.    < end of copied text >    < from: CT Head No Cont (11.01.24 @ 00:43) >      IMPRESSION:  No acute intracranial hemorrhage, mass effect, ormidline shift.    < end of copied text > < from: CT Abdomen and Pelvis w/ IV Cont (11.01.24 @ 00:43) >      INTERPRETATION:  CLINICAL INFORMATION: Anemic.    COMPARISON: None.    CONTRAST/COMPLICATIONS:  IV Contrast: Omnipaque 350  90 cc administered   10 cc discarded  Oral Contrast: NONE  Complications: None reported at time of study completion    PROCEDURE:  CT of the Chest, Abdomen and Pelvis was performed.  Sagittal and coronal reformats were performed.    FINDINGS:  CHEST:  LUNGS AND LARGE AIRWAYS: Patent central airways. Mild dependent   atelectasis. No specific diffuse groundglass interstitial densities could   represent the stigmata of an incomplete inspiratory effort, however,   developing pulmonary interstitial edema, an ongoing nonspecific   pneumonitis, or other pathologies cannot be completely excluded. No   evidence of consolidative pneumonia. No suspicious lung nodules. No   evidence of a pneumothorax.  PLEURA: Small/trace right pleural effusion and associated compressive   atelectasis.  VESSELS: Aortic calcifications. Coronary artery calcifications.  HEART: Heart size is normal. No pericardial effusion.  MEDIASTINUM AND JESUS: No lymphadenopathy.  CHEST WALL AND LOWER NECK: Within normal limits.    ABDOMEN AND PELVIS:  LIVER: Within normal limits.  BILE DUCTS: Normal caliber.  GALLBLADDER: Within normal limits.  SPLEEN: Within normal limits.  PANCREAS: Within normal limits.  ADRENALS: Within normal limits.  KIDNEYS/URETERS: Within normal limits.    BLADDER: Within normal limits.  REPRODUCTIVE ORGANS: Prostate is enlarged.    BOWEL: No bowel obstruction. Appendix is normal. Moderate-to-severe   constipation.  PERITONEUM/RETROPERITONEUM: Within normal limits.  VESSELS: Atherosclerotic changes.  LYMPH NODES: No lymphadenopathy.  ABDOMINAL WALL: Within normal limits.  BONES: Degenerative changes.    IMPRESSION:  1.  No acute abnormality detected.  2.  Nonspecific appearance of the lungs may bedue to an incomplete   inspiratory effort, however, other etiologies cannot be completely   excluded.  3.  Small right pleural effusion.    < end of copied text >    < from: CT Head No Cont (11.01.24 @ 00:43) >      IMPRESSION:  No acute intracranial hemorrhage, mass effect, or midline shift.    < end of copied text >

## 2024-11-01 NOTE — PROGRESS NOTE ADULT - ASSESSMENT
68M with PMHx of Afib on Eliquis, NSTEMI, schizophrenia, BP D/o, HTN, HypoNA BIBEMS from [facility] for Hypotension d/t anemia of 6. In the ED pt found to be hypotensive to low 80s/50s given 2L of IVF, Eliquis reversed with KCentra, Anemic with hgb of 5.1, Lactate of 4, pan scanned without any acute findings for cause of ABLA. GI consulted and ICU consulted for the above.      ABLA  Hypotension 2/2  ?GI Bleed  Lactic acidosis    HUBERT    NEURO:  -  Alert this AM able to respond to basic questions  - Seems to be A&O x0 / refusing to answer questions  - CTH negative.   - F/u ammonia level   - s/p precedex drip  - On Home depakote, risperidone, benztropine for hx of schizophrenia    CV:  -  Hypotensive suspect d/t ABLA /hemorrhagic shock  - Receiving 3 uPRBC with some improvement of BP.   - Placed on Levophed gtt overnight now off and hemodynamically stable  - H/o of Afib but in NSR now. Holding AC for potential bleed  - TTE ordered. 2 additional u T&C if shock index worsens would need large bore access.  - continue home atorvastatin    PULM:   - no acute issues on RA spo2>92  - CT chest imaging with some septal thickening v ground glass. No evidence of bleed.    GI:   - Unknown source of bleed, occult neg though suspect GI source.   - CT A/P : No acute pathology or source of bleed seen  - Started on Protonix gtt will switch to 40mg IV BID.  - No h/o cirrhosis found, LFTs okay.     RENAL:   - HUBERT with BUN/CR ratio >20:1, s/p 2L of IVF in the ED.   - Electrolytes wnl.   - Monitor and replete lytes as needed  - Cr downtrending today from 1.3 to 1.1.     ID:  -  WBC normal, afebrile  - Lactate of 4.3 downtrending to 3.4   - CT imaging unrevealing for acute infectious source.   - S/p Vanco x1 and Zosyn x1. Afebrile.   - f/u MRSA/ RVP  - UA negative   - f/u blood cx's  - Monitor off further abx at this time.     ENDO:   - H/o DM will start ISS, with POCT for euglycemia       HEME:   -Hgb of 5.1, Eliquis reversed with KCentra, on ASA/Plavix   - BUN 20:1 Cr s/p DDAVP for uremic bleeding  - Last CBC after 2 units pRBC showed improvement of H/H to 8.1 / 24. f/u repeat CBC s/p 3 units showed further improvement to 9.3/26.4  - CT C/A/P did not show a source for bleeding  - F/u iron Studies  - F/u LDH  - Continue to monitor daily CBC and hemodynamics      ETHICS/DISPO:  - Pt critically ill requiring ICU level of care.  - Full Code

## 2024-11-01 NOTE — ED ADULT NURSE NOTE - ISOLATION TYPE:
This is a new medical issue for him.  He hasn't seen a physician in years.  His blood pressure today 146/104.  He does mention that he is nervous about being here today.  BP was manually rechecked by myself with a reading of 152/104.  He denies any chest pain, palpitations or headaches.   None

## 2024-11-01 NOTE — PHARMACOTHERAPY INTERVENTION NOTE - COMMENTS
Medication reconciliation completed on admission with [facility] eMAR. Updated medication list in OMR/prescription writer, discussed with ICU attending Dr. Sanchez and recommended adjusting benztropine and Depakote ER doses to match home dose as well as re-start patient on atorvastatin. Additionally recommended VPA level for tomorrow. MD agreed and orders adjusted.     Home Medications:  atorvastatin 80 mg oral tablet: 1 tab(s) orally once a day (at bedtime) (01 Nov 2024 08:44)  benztropine 1 mg oral tablet: 2 tab(s) orally 2 times a day (01 Nov 2024 08:44)  clopidogrel 75 mg oral tablet: 1 tab(s) orally once a day (01 Nov 2024 08:44)  divalproex sodium 500 mg oral tablet, extended release: 3 tab(s) orally once a day (01 Nov 2024 08:44)  Eliquis 2.5 mg oral tablet: 1 tab(s) orally 2 times a day (01 Nov 2024 08:44)  levothyroxine 88 mcg (0.088 mg) oral tablet: 1 tab(s) orally once a day (01 Nov 2024 08:44)  losartan 50 mg oral tablet: 1 tab(s) orally once a day (01 Nov 2024 08:44)  metFORMIN 500 mg oral tablet: 2 tab(s) orally 2 times a day (01 Nov 2024 08:44)  metoprolol tartrate 25 mg oral tablet: 1 tab(s) orally 2 times a day (01 Nov 2024 08:44)  QUEtiapine 400 mg oral tablet: 1 tab(s) orally once a day (at bedtime) (01 Nov 2024 08:44)  risperiDONE 4 mg oral tablet: 1 tab(s) orally 2 times a day (01 Nov 2024 08:44)  sodium chloride: 1,000 milligram(s) orally 3 times a day (01 Nov 2024 08:44)  traZODone 100 mg oral tablet: 1 tab(s) orally once a day (at bedtime) (01 Nov 2024 08:44)   Medication reconciliation completed on admission with [facility] eMAR. Updated medication list in OMR/prescription writer, discussed with ICU attending Dr. Sanchez and recommended adjusting benztropine and Depakote ER doses to match home dose as well as re-start patient on atorvastatin. Additionally recommended VPA level for tomorrow 0500 for close monitoring of therapeutic levels. MD agreed and orders adjusted.     Home Medications:  atorvastatin 80 mg oral tablet: 1 tab(s) orally once a day (at bedtime) (01 Nov 2024 08:44)  benztropine 1 mg oral tablet: 2 tab(s) orally 2 times a day (01 Nov 2024 08:44)  clopidogrel 75 mg oral tablet: 1 tab(s) orally once a day (01 Nov 2024 08:44)  divalproex sodium 500 mg oral tablet, extended release: 3 tab(s) orally once a day (01 Nov 2024 08:44)  Eliquis 2.5 mg oral tablet: 1 tab(s) orally 2 times a day (01 Nov 2024 08:44)  levothyroxine 88 mcg (0.088 mg) oral tablet: 1 tab(s) orally once a day (01 Nov 2024 08:44)  losartan 50 mg oral tablet: 1 tab(s) orally once a day (01 Nov 2024 08:44)  metFORMIN 500 mg oral tablet: 2 tab(s) orally 2 times a day (01 Nov 2024 08:44)  metoprolol tartrate 25 mg oral tablet: 1 tab(s) orally 2 times a day (01 Nov 2024 08:44)  QUEtiapine 400 mg oral tablet: 1 tab(s) orally once a day (at bedtime) (01 Nov 2024 08:44)  risperiDONE 4 mg oral tablet: 1 tab(s) orally 2 times a day (01 Nov 2024 08:44)  sodium chloride: 1,000 milligram(s) orally 3 times a day (01 Nov 2024 08:44)  traZODone 100 mg oral tablet: 1 tab(s) orally once a day (at bedtime) (01 Nov 2024 08:44)

## 2024-11-01 NOTE — CONSULT NOTE ADULT - ASSESSMENT
Initial evaluation/Pulmonary Critical Care consultation requested by DR CLEANING  on 11/1/2024 from Dr Charly Webster    Patient examined chart reviewed    HOSPITAL ADMISSION   PATIENT CAME  FROM (if information available)      REASON FOR VISIT  .. Management of problems listed below      ROS  . Patient unable to give ROS     PHYSICAL EXAM    HEENT Unremarkable  atraumatic   RESP Fair air entry  Harsh breath sound   CARDIAC S1 S2 No S3     NO JVD    ABDOMEN No hepatosplenomegaly   PEDAL EDEMA present No calf tenderness  NO rash       XXXXXXXXXXXXXXXXXXXXXXXXXXXXX  BEST PRACTICE ISSUES.  . HOB ELEVATN.    .... Yes  . DIET.   .... 11/1/2024 npo   . IV FLUIDS.  ....   . DVT PPLX.    .... 11/1/2024 no pharmac pplx as arrived with v low hb ro abla   . STRESS ULCER PPLX.   .... 11/1/2024 protonix 40   . INFECTION PPLX.   .... 11/1/2024 chlorhexidine 2%     XXXXXXXXXXXXXXXXXXXXXXXXX  GENERAL DATA .   GOC.    ..    ICU STAY.    .. 11/1/2024   COVID.   ..   ALLGY.   ..   clozapine lithium     WT.   ..  11/1/2024 84  BMI.  .. 11/1/2024 bmi 33   ISOLATION.        XXXXXXXXXXXXXXXXXXXXXXX  VITALS/GAS EXCHANGE/DRIPS  ABGS.   .  VS/ PO/IO/ VENT/ DRIPS.   11/1/2024 83 120/60        CHIEF COMPLAINT.   . 11/1/2024 Patient brought by ambulance from [facility] as reported had abnormal labs and low blood pressure HGB of 6 received on O2 via NC  OVERALL PRESENTATION.  . 11/1/2024 68M with PMHx of Afib on Eliquis, NSTEMI, schizophrenia, BP D/o, HTN, HypoNA BIBEMS from [facility] for Hypotension d/t anemia of 6. In the ED pt found to be hypotensive to low 80s/50s given 2L of IVF, Eliquis reversed with KCentra, Anemic with hgb of 5.1, Lactate of 4, pan scanned without any acute source of bleed  . Pt was on dexmedetomidine drip earlier     PMH.     PAST HOSPITAL STAYS .   .  HOME MEDS.    TRANSFSION  . 11/1/2024 3 u prbc     XXXXXXXXXXXXXXXXXXXXXXXXXXXXXXXXXXX  PROBLEM ASSESSMENT RECOMMENDATIONS.  RESP.   .... Target po 90-95%    INFECTION.  . SEPSIS   .... w 10/31- 11/1 w 5.8-6.9   .... pr 11/1/2024 pr (-)   .... ua 11/1/2024 ua n (-)   .... CT cap 11/1/2024   ........ no ac abn   ........ small r pl effs   ........ non specific diffuse ground glass is densities ro pie or nonsp pneumonitis   .... rvp 11/1/2024 (-)   .... No strong susp for infection     CARDIAC.  . LACTICEMIA   .... la 10/31-11/1 la 4.3 - 3.4  .... restore euvolemia and monitor     . A fib on Apixaba   .... 11/1/2024 apixaba held because of abla     . CAD   .... HO NSTEMI 2023 On asa plavx   .... Trop 10/31-11/1 Tr 34-52   .... 11/1/2024 APA JORGE held because of gi bl    HEMAT.  . ANEMIA   .... Hb 10/31-11/1 Hb 5.1-9.3   .... Plt 10/31 plt 162   .... monitor target Hb 7 (+)     . COAGULOPATHY  .... 11/1/2024 Given Kcentra     GI.  RENAL.  .... Na 11/1/2024 Na 139   .... K 11/1/2024 K 4.6   .... CO2 11/1/2024 co2 24   .... AG 11/1/2024 ag 8   .... Alb   .... Cr 11/1/2024 Cr 1.1   .... monitor     ENDO.  . HYPOTHYROID  .... 11/1/2024 levoxyl 88     . DM  ... 11/1/2024 sl scale     NEURO.  . PSYCH PROBLEMS   .... 11/1/2024 Depakote 1500   .... 11/1/2024 trazodone 100     DISCUSSIONS.    XXXXXXXXXXXXXXXXXXXXXX   SUMMARY  CC.   . 11/1/2024 low bp  . 11/1/2024 Hb 6  . 11/1/2024 hypoxia  PROBLEMS .  . HYPOXEMIA   . LACTICEMIA 10/31-11/1 la 4.3 - 3.4  . A fib ON APIXABA  . CAD 10/31-11/1 Tr 34-52   . SEVERE ANEMIA 10/31-11/1 Hb 5.1-9.3   . TRANSFUSION 11/1/2024 3 u   . APIXABA  .... 11/1/2024 Given Kcentra     [facility].  . Hytn   . Afib on Eliquis,   . NSTEMI,   . Hyponna  . schizophrenia,       TIME SPENT.  . Over 55  minutes aggregate care time spent on encounter; activities included   direct patient care, counseling and/or coordinating care reviewing notes, lab data/ imaging , discussion with multidisciplinary team/ patient  /family and explaining in detail risks, benefits, alternatives  of the recommendations     CLIFFORD ABDALLA 67 m 11/1/2024 1955

## 2024-11-01 NOTE — H&P ADULT - TIME BILLING
55minutes spent on this visit, 50% visit time spent in care co-ordination with other attendings and counselling patient ,writing admission orders ( see complete and current orders and order section) ,requesting necessary consults ,informing family about status & plan of care .I have discussed care plan with [facility] wellness/admitting /nursing   department ,outpatient PCP , hospital consultants , ER physician & med staff .

## 2024-11-01 NOTE — PROGRESS NOTE ADULT - ATTENDING COMMENTS
69 yo man with Hx afib on eliquis, NSTEMI, bipolar disorder presenting with severe anemia and hypotension, lactic acidosis.  Improved after Kcentra and total 3 PRBCs, requiring levophed for brief period.  Unclear source of bleeding.    --bipolar disorder, continue home depakote, risperadone, benztropine  --shock now resolved, off levophed following 3rd PRBC  afib controlled, hold BB for now, no AC in setting of anemia and possible bleed  continue statin  f/u echo  --stable respiratory status  --mild HUBERT resolved  lactic acidosis improved  --no signs of GIB, and FOBT neg, clear liquids for now and continue PPI bid  --no infectious concerns, f/u Cx  --anemia, s/p 3 PRBCs without clear cause, f/u iron studies 69 yo man with Hx afib on eliquis, NSTEMI, bipolar disorder presenting with severe anemia and hypotension, lactic acidosis.  Improved after Kcentra and total 3 PRBCs, requiring levophed for brief period.  Unclear source of bleeding.    --bipolar disorder, continue home depakote, risperadone, benztropine  --shock now resolved, off levophed following 3rd PRBC  afib controlled, hold BB for now, no AC in setting of anemia and possible bleed  continue statin  f/u echo  --stable respiratory status  --mild HUBERT resolved  lactic acidosis improved  --no signs of GIB, and FOBT neg, clear liquids for now and continue PPI bid  --no infectious concerns, f/u Cx  --macrocytic anemia, s/p 3 PRBCs with appropriate response,  no clear cause, no evidence hemolysis on labs

## 2024-11-01 NOTE — INPATIENT CERTIFICATION FOR MEDICARE PATIENTS - NS ICMP CERT SIG IND
I certify as stated above. Consent 2/Introductory Paragraph: Mohs surgery was explained to the patient and consent was obtained. The risks, benefits and alternatives to therapy were discussed in detail. Specifically, the risks of infection, scarring, bleeding, prolonged wound healing, incomplete removal, allergy to anesthesia, nerve injury and recurrence were addressed. Prior to the procedure, the treatment site was clearly identified and confirmed by the patient using mirror when possible. All components of Universal Protocol/PAUSE Rule completed.

## 2024-11-01 NOTE — CONSULT NOTE ADULT - ASSESSMENT
Acute anemia  FOBT negative    Unclear etiology of anemia  No overt GI bleeding  No RP bleed on imaging  PPI for ppx  Monitor CBC  Transfuse prn  Follow up iron studies  No immediate plan for endoscopic evaluation, monitor on conservative management  Will follow    I reviewed the overnight course of events on the unit, re-confirming the patient history. I discussed the care with the patient  Differential diagnosis and plan of care discussed with patient after the evaluation  35 minutes spent on total encounter of which more than fifty percent of the encounter was spent counseling and/or coordinating care by the attending physician.

## 2024-11-01 NOTE — H&P ADULT - BIRTH SEX
Discharge Planning Assessment  [facility]     Patient Name: Maxim Carvajal  MRN: [id number]  Today's Date: 2/23/2021    Admit Date: 2/2/2021    Discharge Needs Assessment    No documentation.       Discharge Plan     Row Name 02/23/21 1227       Plan    Plan Comments  Liz/INGE and I spoke with the patient regarding his discharge and options and he would like for more referrals to be placed today so that he can review them and make a decision. I informed him that [facility] can accept today and they had already accepted. Will call the the facilities to see if they can accept to a medicaid bed. CANDICE Gutiérrez Rn, CCP    Row Name 02/23/21 1110       Plan    Plan Comments  The patient transferred to [facility] from [address] [place] [address] on 2/22/21. The patient has a bed at [facility] if the family is agreeable. Called Ingrid Thompson to verify next step in discharge. CANDICE Gutiérrez Rn, CCP.        Continued Care and Services - Admitted Since 2/2/2021     Destination     Service Provider Request Status Selected Services Address Phone Fax Patient Preferred    [facility]  Accepted N/A [address] [facility] [address] [phone] --    [facility]  [facility]A [address] [facility] [address] [phone] --    [facility]  Pending - Request Sent N/A [address] [facility] [address] [phone] --    [facility]  Pending - Request Sent N/A [address] [facility] [address] [phone] --    [facility]  Pending - Request Sent N/A [address] [place] [address] [phone] --    [facility]  Pending - Request Sent N/A [address] [facility] [id number] [phone] --    [facility] [place]  Pending - Request Sent N/A [address] [facility] [facility] [place] [address] [phone] --    [facility]IGNATURE  [facility]  Pending - Request Sent N/A [address] [facility] [facility] [address] [phone] --    [facility]  Pending - Request Sent N/A [address] [facility] [address] [phone] --    [facility]  Pending - Request Sent N/A [address] [facility] [id number] [phone] --    [facility]  Pending - Request Sent N/A [address] [facility] [address] [phone] --    [facility] [place]  Pending - Request Sent N/A [address] , [facility] [address] [phone] --    [facility]  Pending - Request Sent N/A [address] [facility] [address] [phone] --    [facility]  Pending - Request Sent N/A [address] [facility] [address] [phone] --    [facility]  Pending - Request Sent N/A [address] [facility] [address] [phone] --    [facility]  Pending - Request Sent N/A [address] [place] [address] [phone] --    [facility]  Pending - Request Sent N/A [address] [facility] [address] [phone] --    [facility]  Pending - No Request Sent N/A [address] [place] [address] [phone] --    [facility]  Declined  pt does not wish to return N/A [address] [facility] [place] [address] [phone] --    [facility]  Declined  cost of care N/A [address] [facility] [address] [phone] --    [facility]  Declined  No Bed Available N/A [address] [facility] [address] [phone] --    [facility]  Declined  Unable to Meet  Patient Needs N/A [address] , [facility] [address] [phone] --    [facility]  Declined  Unable to Meet Patient Needs N/A [address] , [facility] [id number] [phone] --     [facility]  Declined  no quad program N/A [address] [facility] [id number] [phone] -- --    [facility]  Declined  discharged 01/29  not able to re admit N/A [address] [facility] [facility] [address] [phone] -- --    [facility]  Declined N/A [address] [facility] [address] [phone] -- --    [facility]  Declined N/A [address] [facility] [address] [phone] -- --       Internal Comment last updated by Margarette Garcia, RN 2/17/2021 1153    Dionicio states patient is more appropriate for subacute and delcines               [facility]  Declined  No Medicaid Bed Available N/A [address] [facility] [address] [phone] --    [facility]  Declined  No Medicaid Bed Available N/A [address] [facility] [address] [phone] --    [facility] [place]  Declined  Patient Insurance Not Accepted N/A [address] [facility] [id number] [phone] --    Critical access hospital & REHAB  Declined  No Medicaid Bed Available N/A [address] [facility] [id number] [phone] --              Expected Discharge Date and Time     Expected Discharge Date Expected Discharge Time    Feb 22, 2021         Demographic Summary    No documentation.       Functional Status    No documentation.       Psychosocial    No documentation.       Abuse/Neglect    No documentation.       Legal    No documentation.       Substance Abuse    No documentation.       Patient Forms    No documentation.           Lise Gutiérrez, RN     Male

## 2024-11-01 NOTE — ED ADULT NURSE REASSESSMENT NOTE - NS ED NURSE REASSESS COMMENT FT1
Patient is alert to self, able to express needs. speech is incoherent, patient ignores when asking questions. No signs of pain. patient is on cardiac monitor - 96% on RA. Post blood transfusion 2 units given - tolerated well. Meds given as ordered - tolerated well. Patient is NPO. Skin is intact. safety measures in place. patient transported to ICU.

## 2024-11-01 NOTE — ED ADULT NURSE NOTE - NSFALLHARMRISKINTERV_ED_ALL_ED
no
Assistance OOB with selected safe patient handling equipment if applicable/Assistance with ambulation/Communicate risk of Fall with Harm to all staff, patient, and family/Encourage patient to sit up slowly, dangle for a short time, stand at bedside before walking/Orthostatic vital signs/Provide visual cue: red socks, yellow wristband, yellow gown, etc/Reinforce activity limits and safety measures with patient and family/Bed in lowest position, wheels locked, appropriate side rails in place/Call bell, personal items and telephone in reach/Instruct patient to call for assistance before getting out of bed/chair/stretcher/Non-slip footwear applied when patient is off stretcher/[place] to call system/Physically safe environment - no spills, clutter or unnecessary equipment/Purposeful Proactive Rounding/Room/bathroom lighting operational, light cord in reach

## 2024-11-01 NOTE — H&P ADULT - ASSESSMENT
68 MALE with  PMH NSTEMI, schizophrenia, bipolar d/o, HTN, hypoNa BIBEMS from [facility] for anemia to 6 and hypotension. Pt's O2 sat was 90% on RA and was placed on NC. Per EMS, facility states pt was altered from baseline as well. Was noted to by hypotensive to sBP 70's  	Pt has different MRN: [id number] (B/L Hb noted to be close to 11) Admitted to ICU -  presenting with severe anemia and hypotension, lactic acidosis.  Improved after Kcentra and total 3 PRBCs, requiring levophed for brief period.  Unclear source of bleeding.  --bipolar disorder, continue home depakote, risperadone, benztropine  --shock now resolved, off levophed following 3rd PRBC  afib controlled, hold BB for now, no AC in setting of anemia and possible bleed  continue statin  f/u echo  --stable respiratory status  --mild HUBERT resolved BUN/Cr 47/1.3  stool occult blood negative  Given 2L fluid, 3u PRBC, adm to ICU  post transfusion CBC today first lab Hgb 8.1, second lab Hgb 9.3  Pt appear comfortable sitting up in bed  No gross sign bleed at this time  CT head negative  CT c/a/p-mild dep atelectasis, sm right efusion  -?new onset acute anemia, no gross bleeds noted on presnation.in hospital  -appropriate increase of Hgb w the transfusion  -no signs of hemolysis  -TSH normal  -iron studies were sent, will follow up, will also order B12, folate  -follow serial CBC

## 2024-11-01 NOTE — CONSULT NOTE ADULT - SUBJECTIVE AND OBJECTIVE BOX
All records reviewed.    HPI:  69 yo man from [facility], hx NSTEMI, schizoprenica, A fib on Eliquis, HTN, hyponatremia, adm for hypotension SMP 70s and Hgb 6  In ER, BP low 80s/50s, CBC wbc 5.83 hgb 5.1 hct 15.4 plts 162 mcv 95.7  BUN/Cr 47/1.3  stool occult blood negative  Given 2L fluid, 3u PRBC, adm to ICU  post transfusion CBC today first lab Hgb 8.1, second lab Hgb 9.3  Pt appear comfortable sitting up in bed  No gross sign bleed at this time  CT head negative  CT c/a/p-mild dep atelectasis, sm right efusion        PAST MEDICAL & SURGICAL HISTORY:      Review of System:  comfortable, does not give complaints    MEDICATIONS  (STANDING):  atorvastatin 80 milliGRAM(s) Oral at bedtime  benztropine 2 milliGRAM(s) Oral two times a day  chlorhexidine 2% Cloths 1 Application(s) Topical <User Schedule>  dexMEDEtomidine Infusion 0.2 MICROgram(s)/kG/Hr (4.08 mL/Hr) IV Continuous <Continuous>  dextrose 5%. 1000 milliLiter(s) (100 mL/Hr) IV Continuous <Continuous>  dextrose 5%. 1000 milliLiter(s) (50 mL/Hr) IV Continuous <Continuous>  dextrose 50% Injectable 25 Gram(s) IV Push once  dextrose 50% Injectable 12.5 Gram(s) IV Push once  dextrose 50% Injectable 25 Gram(s) IV Push once  divalproex ER 1500 milliGRAM(s) Oral <User Schedule>  glucagon  Injectable 1 milliGRAM(s) IntraMuscular once  insulin lispro (ADMELOG) corrective regimen sliding scale   SubCutaneous every 6 hours  levothyroxine 88 MICROGram(s) Oral daily  pantoprazole  Injectable 40 milliGRAM(s) IV Push every 12 hours  risperiDONE   Tablet 4 milliGRAM(s) Oral two times a day  traZODone 100 milliGRAM(s) Oral at bedtime    MEDICATIONS  (PRN):  dextrose Oral Gel 15 Gram(s) Oral once PRN Blood Glucose LESS THAN 70 milliGRAM(s)/deciliter      Allergies    lithium (Rash)  clozapine (Rash)    Intolerances        SOCIAL HISTORY:  unable to give    FAMILY HISTORY:  unable to give      Vital Signs Last 24 Hrs  T(C): 36.3 (01 Nov 2024 11:46), Max: 36.7 (31 Oct 2024 23:25)  T(F): 97.4 (01 Nov 2024 11:46), Max: 98.1 (31 Oct 2024 23:25)  HR: 84 (01 Nov 2024 09:45) (73 - 91)  BP: 139/68 (01 Nov 2024 09:30) (63/37 - 151/73)  BP(mean): 98 (01 Nov 2024 09:30) (46 - 105)  RR: 25 (01 Nov 2024 09:45) (14 - 42)  SpO2: 100% (01 Nov 2024 09:45) (92% - 100%)    Parameters below as of 01 Nov 2024 04:06  Patient On (Oxygen Delivery Method): room air        PHYSICAL EXAM:      General: in no acute distress  Eyes:sclera anicteric, pupils equal, EOMI  ENMT:buccal mucosa moist  Neck:supple, trachea midline  Lungs:clear, no wheeze/rhonchi  Cardiovascular:regular rate and rhythm, S1 S2  Abdomen:soft, nontender, no organomegaly present, bowel sounds normal  Neurological:alert and acknowledges speaker but does not answer or follow commands, Cranial Nerves II-XII grossly intact  Skin:no increased ecchymosis/petechiae/purpura  Lymph Nodes:no palpable cervical/supraclavicular lymph nodes enlargements  Extremities:no cyanosis/clubbing/edema        LABS:                        9.3    6.97  )-----------( 142      ( 01 Nov 2024 10:12 )             26.4     11-01 @ 10:12  WBC6.97  RBC2.99 Hgb9.3 Hct26.4  MCV88.3  [id number]  Auto Tqufig35.6 Band-- Auto Lymph12.1 Atypical Lymph-- Reactive Lymph-- Auto Mono10.5 Auto Eos0.9 Auto Baso0.6        11-01 @ 04:52  WBC7.17  RBC2.61 Hgb8.1 Hct24.0  MCV92.0  [id number]  Auto Nhjvpe70.4 Band-- Auto Lymph13.7 Atypical Lymph-- Reactive Lymph-- Auto Mono8.1 Auto Eos0.6 Auto Baso0.3        10-31 @ 23:30  WBC5.83  RBC1.61 Hgb5.1 Hct15.4  MCV95.7  Hgca916  Auto Zurmvh27.5 Band-- Auto Lymph19.9 Atypical Lymph-- Reactive Lymph-- Auto Mono8.7 Auto Eos0.7 Auto Baso0.3          11-01    138  |  105  |  35[H]  ----------------------------<  102[H]  4.0   |  24  |  0.92    Ca    7.9[L]      01 Nov 2024 10:12  Phos  3.1     11-01  Mg     1.7     11-01    TPro  5.5[L]  /  Alb  2.5[L]  /  TBili  0.6  /  DBili  x   /  AST  19  /  ALT  16  /  AlkPhos  52  11-01    10-31 @ 23:30  PT13.9 INR1.19  PTT30.0    Urinalysis Basic - ( 01 Nov 2024 10:12 )    Color: x / Appearance: x / SG: x / pH: x  Gluc: 102 mg/dL / Ketone: x  / Bili: x / Urobili: x   Blood: x / Protein: x / Nitrite: x   Leuk Esterase: x / RBC: x / WBC x   Sq Epi: x / Non Sq Epi: x / Bacteria: x        PERIPHERAL BLOOD SMEAR REVIEW:      RADIOLOGY & ADDITIONAL STUDIES: All records reviewed.    HPI:  67 yo man from [facility], hx NSTEMI, schizoprenica, A fib on Eliquis, HTN, hyponatremia, adm for hypotension SMP 70s and Hgb 6  In ER, BP low 80s/50s, CBC wbc 5.83 hgb 5.1 hct 15.4 plts 162 mcv 95.7  BUN/Cr 47/1.3  stool occult blood negative  Given 2L fluid, 3u PRBC, Kcentra, adm to ICU  post transfusion CBC today first lab Hgb 8.1, second lab Hgb 9.3  Pt appear comfortable sitting up in bed  No gross sign bleed at this time  CT head negative  CT c/a/p-mild dep atelectasis, sm right efusion        PAST MEDICAL & SURGICAL HISTORY:      Review of System:  comfortable, does not give complaints    MEDICATIONS  (STANDING):  atorvastatin 80 milliGRAM(s) Oral at bedtime  benztropine 2 milliGRAM(s) Oral two times a day  chlorhexidine 2% Cloths 1 Application(s) Topical <User Schedule>  dexMEDEtomidine Infusion 0.2 MICROgram(s)/kG/Hr (4.08 mL/Hr) IV Continuous <Continuous>  dextrose 5%. 1000 milliLiter(s) (100 mL/Hr) IV Continuous <Continuous>  dextrose 5%. 1000 milliLiter(s) (50 mL/Hr) IV Continuous <Continuous>  dextrose 50% Injectable 25 Gram(s) IV Push once  dextrose 50% Injectable 12.5 Gram(s) IV Push once  dextrose 50% Injectable 25 Gram(s) IV Push once  divalproex ER 1500 milliGRAM(s) Oral <User Schedule>  glucagon  Injectable 1 milliGRAM(s) IntraMuscular once  insulin lispro (ADMELOG) corrective regimen sliding scale   SubCutaneous every 6 hours  levothyroxine 88 MICROGram(s) Oral daily  pantoprazole  Injectable 40 milliGRAM(s) IV Push every 12 hours  risperiDONE   Tablet 4 milliGRAM(s) Oral two times a day  traZODone 100 milliGRAM(s) Oral at bedtime    MEDICATIONS  (PRN):  dextrose Oral Gel 15 Gram(s) Oral once PRN Blood Glucose LESS THAN 70 milliGRAM(s)/deciliter      Allergies    lithium (Rash)  clozapine (Rash)    Intolerances        SOCIAL HISTORY:  unable to give    FAMILY HISTORY:  unable to give      Vital Signs Last 24 Hrs  T(C): 36.3 (01 Nov 2024 11:46), Max: 36.7 (31 Oct 2024 23:25)  T(F): 97.4 (01 Nov 2024 11:46), Max: 98.1 (31 Oct 2024 23:25)  HR: 84 (01 Nov 2024 09:45) (73 - 91)  BP: 139/68 (01 Nov 2024 09:30) (63/37 - 151/73)  BP(mean): 98 (01 Nov 2024 09:30) (46 - 105)  RR: 25 (01 Nov 2024 09:45) (14 - 42)  SpO2: 100% (01 Nov 2024 09:45) (92% - 100%)    Parameters below as of 01 Nov 2024 04:06  Patient On (Oxygen Delivery Method): room air        PHYSICAL EXAM:      General: in no acute distress  Eyes:sclera anicteric, pupils equal, EOMI  ENMT:buccal mucosa moist  Neck:supple, trachea midline  Lungs:clear, no wheeze/rhonchi  Cardiovascular:regular rate and rhythm, S1 S2  Abdomen:soft, nontender, no organomegaly present, bowel sounds normal  Neurological:alert and acknowledges speaker but does not answer or follow commands, Cranial Nerves II-XII grossly intact  Skin:no increased ecchymosis/petechiae/purpura  Lymph Nodes:no palpable cervical/supraclavicular lymph nodes enlargements  Extremities:no cyanosis/clubbing/edema        LABS:                        9.3    6.97  )-----------( 142      ( 01 Nov 2024 10:12 )             26.4     11-01 @ 10:12  WBC6.97  RBC2.99 Hgb9.3 Hct26.4  MCV88.3  [id number]  Auto Souvtn24.6 Band-- Auto Lymph12.1 Atypical Lymph-- Reactive Lymph-- Auto Mono10.5 Auto Eos0.9 Auto Baso0.6        11-01 @ 04:52  WBC7.17  RBC2.61 Hgb8.1 Hct24.0  MCV92.0  [id number]  Auto Wlxgmx73.4 Band-- Auto Lymph13.7 Atypical Lymph-- Reactive Lymph-- Auto Mono8.1 Auto Eos0.6 Auto Baso0.3        10-31 @ 23:30  WBC5.83  RBC1.61 Hgb5.1 Hct15.4  MCV95.7  Emss230  Auto Jleglf17.5 Band-- Auto Lymph19.9 Atypical Lymph-- Reactive Lymph-- Auto Mono8.7 Auto Eos0.7 Auto Baso0.3          11-01    138  |  105  |  35[H]  ----------------------------<  102[H]  4.0   |  24  |  0.92    Ca    7.9[L]      01 Nov 2024 10:12  Phos  3.1     11-01  Mg     1.7     11-01    TPro  5.5[L]  /  Alb  2.5[L]  /  TBili  0.6  /  DBili  x   /  AST  19  /  ALT  16  /  AlkPhos  52  11-01    10-31 @ 23:30  PT13.9 INR1.19  PTT30.0    Urinalysis Basic - ( 01 Nov 2024 10:12 )    Color: x / Appearance: x / SG: x / pH: x  Gluc: 102 mg/dL / Ketone: x  / Bili: x / Urobili: x   Blood: x / Protein: x / Nitrite: x   Leuk Esterase: x / RBC: x / WBC x   Sq Epi: x / Non Sq Epi: x / Bacteria: x        PERIPHERAL BLOOD SMEAR REVIEW:      RADIOLOGY & ADDITIONAL STUDIES:

## 2024-11-01 NOTE — H&P ADULT - NSICDXPASTMEDICALHX_GEN_ALL_CORE_FT
PAST MEDICAL HISTORY:  Atrial fibrillation     Bipolar disorder     Chronic CHF     Constipation     Constipation     Dementia     DM (diabetes mellitus)     DM (diabetes mellitus)     Epistaxis     Folate deficiency     H/O candidiasis     HLD (hyperlipidemia)     HTN (hypertension)     Hyperkalemia     Hyponatremia     Hypothyroidism     Hypothyroidism     MDD (major depressive disorder)     NSTEMI (non-ST elevation myocardial infarction)     Parkinsonism     Schizophrenia     Violent behavior

## 2024-11-01 NOTE — CONSULT NOTE ADULT - SUBJECTIVE AND OBJECTIVE BOX
[facility]     Patient is a 68y old  Male who presents with a chief complaint of     HPI:      HPI:    68y Male for Cardiology Consult    PAST MEDICAL & SURGICAL HISTORY:      FAMILY HISTORY:      SOCIAL HISTORY:   Alcohol:  Smoking:    Allergies    lithium (Rash)  clozapine (Rash)    Intolerances        MEDICATIONS  (STANDING):  benztropine 1 milliGRAM(s) Oral two times a day  chlorhexidine 2% Cloths 1 Application(s) Topical <User Schedule>  dexMEDEtomidine Infusion 0.2 MICROgram(s)/kG/Hr (4.08 mL/Hr) IV Continuous <Continuous>  dextrose 5%. 1000 milliLiter(s) (100 mL/Hr) IV Continuous <Continuous>  dextrose 5%. 1000 milliLiter(s) (50 mL/Hr) IV Continuous <Continuous>  dextrose 50% Injectable 25 Gram(s) IV Push once  dextrose 50% Injectable 12.5 Gram(s) IV Push once  dextrose 50% Injectable 25 Gram(s) IV Push once  divalproex  milliGRAM(s) Oral daily  glucagon  Injectable 1 milliGRAM(s) IntraMuscular once  insulin lispro (ADMELOG) corrective regimen sliding scale   SubCutaneous every 6 hours  levothyroxine 88 MICROGram(s) Oral daily  norepinephrine Infusion 0.05 MICROgram(s)/kG/Min (7.93 mL/Hr) IV Continuous <Continuous>  pantoprazole  Injectable 40 milliGRAM(s) IV Push every 12 hours  risperiDONE   Tablet 4 milliGRAM(s) Oral two times a day  traZODone 100 milliGRAM(s) Oral at bedtime    MEDICATIONS  (PRN):  dextrose Oral Gel 15 Gram(s) Oral once PRN Blood Glucose LESS THAN 70 milliGRAM(s)/deciliter      REVIEW OF SYSTEMS:  CONSTITUTIONAL: No fever, weight loss, chills, shakes, or fat  RESPIRATORY: No cough, wheezing, hemoptysis, or shortness of breath  CARDIOVASCULAR: No chest pain, dyspnea, palpitations, dizziness, syncope, paroxysmal nocturnal dyspnea, orthopnea, or arm or leg swelling  GASTROINTESTINAL: No abdominal  or epigastric pain, nausea, vomiting, hematemesis, diarrhea, constipation, melena or bright red bloo  NEUROLOGICAL: No headaches, memory loss, slurred speech, limb weakness, loss of strength, numbness, or tremors  SKIN: No itching, burning, rashes, or lesions  ENDOCRINE: No heat or cold intolerance, or hair loss  MUSCULOSKELETAL: No joint pain or swelling, muscle, back, or extremity pain  HEME/LYMPH: No easy bruising or bleeding gums  ALLERY AND IMMUNOLOGIC: No hives or rash.    Vital Signs Last 24 Hrs  T(C): 36.5 (2024 04:32), Max: 36.7 (31 Oct 2024 23:25)  T(F): 97.7 (2024 04:32), Max: 98.1 (31 Oct 2024 23:25)  HR: 78 (2024 05:00) (78 - 91)  BP: 107/57 (2024 05:00) (77/50 - 144/71)  BP(mean): 73 (2024 05:00) (73 - 99)  RR: 20 (2024 05:00) (17 - 26)  SpO2: 100% (2024 05:00) (95% - 100%)    Parameters below as of 2024 04:06  Patient On (Oxygen Delivery Method): room air        PHYSICAL EXAM:  HEAD:  Atraumatic, Normocephalic  EYES: EOMI, PERRLA, conjunctiva and sclera clear  NECK: Supple and normal thyroid.  No JVD or carotid bruit.   HEART: S1, S2 regular , 1/6 soft ejection systolic murmur in mitral area , no thrill and no gallops .  PULMONARY: Bilateral vesicular breathing , few scattered ronchi and few scattered rales are present .  ABDOMEN: Soft nontender and positive bowl sounds   EXTREMITIES:  No clubbing, cyanosis, or pedal  edema  NEUROLOGICAL: AAOX3 , no focal deficit .    LABS:                        8.1    7.17  )-----------( 137      ( 2024 04:52 )             24.0     10    138  |  103  |  47[H]  ----------------------------<  95  4.9   |  23  |  1.30    Ca    8.3[L]      31 Oct 2024 23:30  Mg     2.0     11-    TPro  x   /  Alb  x   /  TBili  x   /  DBili  0.2  /  AST  x   /  ALT  x   /  AlkPhos  x   11        PT/INR - ( 31 Oct 2024 23:30 )   PT: 13.9 sec;   INR: 1.19 ratio         PTT - ( 31 Oct 2024 23:30 )  PTT:30.0 sec  Urinalysis Basic - ( 2024 03:30 )    Color: Yellow / Appearance: Clear / S.022 / pH: x  Gluc: x / Ketone: Negative mg/dL  / Bili: Negative / Urobili: 1.0 mg/dL   Blood: x / Protein: Negative mg/dL / Nitrite: Negative   Leuk Esterase: Negative / RBC: x / WBC x   Sq Epi: x / Non Sq Epi: x / Bacteria: x      BNP      EKG:  ECHO:  IMAGING:    Assessment and Plan :     Will continue to follow during hospital course and give further recommendations as needed . Thanks for your referral . if any questions please contact me at office ([phone] [phone])  SHARITA BYRNE MD [place] [address]  [address]  OFFICE : [phone]  CELL : [phone]  CARDIOLOGY CONSULT :         HPI:· Chief Complaint: The patient is a 68y Male complaining of abnormal lab result.  · HPI Objective Statement: 68M PMH NSTEMI, schizophrenia, bipolar d/o, HTN, hypoNa BIBEMS from [facility] for anemia to 6 and hypotension. Pt's O2 sat was 90% on RA and was placed on NC. Per EMS, facility states pt was altered from baseline as well. Was noted to by hypotensive to sBP 70's . Patient very confused and unable to give any history . Most of history obtained from RN , PA and ICU  team . Patient lying comfortable but very confused and unable to answer questions .         PAST MEDICAL & SURGICAL HISTORY:      FAMILY HISTORY: Not Known       SOCIAL HISTORY:   Alcohol: Not known at this time .  Smoking: Not Known     Allergies    lithium (Rash)  clozapine (Rash)    Intolerances        MEDICATIONS  (STANDING):  benztropine 1 milliGRAM(s) Oral two times a day  chlorhexidine 2% Cloths 1 Application(s) Topical <User Schedule>  dexMEDEtomidine Infusion 0.2 MICROgram(s)/kG/Hr (4.08 mL/Hr) IV Continuous <Continuous>  dextrose 5%. 1000 milliLiter(s) (100 mL/Hr) IV Continuous <Continuous>  dextrose 5%. 1000 milliLiter(s) (50 mL/Hr) IV Continuous <Continuous>  dextrose 50% Injectable 25 Gram(s) IV Push once  dextrose 50% Injectable 12.5 Gram(s) IV Push once  dextrose 50% Injectable 25 Gram(s) IV Push once  divalproex  milliGRAM(s) Oral daily  glucagon  Injectable 1 milliGRAM(s) IntraMuscular once  insulin lispro (ADMELOG) corrective regimen sliding scale   SubCutaneous every 6 hours  levothyroxine 88 MICROGram(s) Oral daily  norepinephrine Infusion 0.05 MICROgram(s)/kG/Min (7.93 mL/Hr) IV Continuous <Continuous>  pantoprazole  Injectable 40 milliGRAM(s) IV Push every 12 hours  risperiDONE   Tablet 4 milliGRAM(s) Oral two times a day  traZODone 100 milliGRAM(s) Oral at bedtime    MEDICATIONS  (PRN):  dextrose Oral Gel 15 Gram(s) Oral once PRN Blood Glucose LESS THAN 70 milliGRAM(s)/deciliter      REVIEW OF SYSTEMS:  CONSTITUTIONAL: No fever   RESPIRATORY: No cough, wheezing, hemoptysis .  CARDIOVASCULAR: No chest pain, dyspnea, palpitations, dizziness, syncope, paroxysmal nocturnal dyspnea, orthopnea, or arm or leg swelling .  GASTROINTESTINAL: No abdominal  or epigastric pain, nausea, vomiting, hematemesis, diarrhea, constipation, melena or bright red blood .  NEUROLOGICAL: No headaches,  numbness, or tremors  SKIN: No itching, burning, rashes, or lesions  ENDOCRINE: No heat or cold intolerance, or hair loss  MUSCULOSKELETAL: Patient has mild arthritis .  HEME/LYMPH: No easy bruising or bleeding gums  ALLERGY AND IMMUNOLOGIC: No hives or rash.    Vital Signs Last 24 Hrs  T(C): 36.5 (2024 04:32), Max: 36.7 (31 Oct 2024 23:25)  T(F): 97.7 (2024 04:32), Max: 98.1 (31 Oct 2024 23:25)  HR: 78 (2024 05:00) (78 - 91)  BP: 107/57 (2024 05:00) (77/50 - 144/71)  BP(mean): 73 (2024 05:00) (73 - 99)  RR: 20 (2024 05:00) (17 - 26)  SpO2: 100% (2024 05:00) (95% - 100%)    Parameters below as of 2024 04:06  Patient On (Oxygen Delivery Method): room air        PHYSICAL EXAM:  HEAD:  Atraumatic, Normocephalic  EYES: EOMI, PERRLA, conjunctiva and sclera clear  NECK: Supple and normal thyroid.  No JVD or carotid bruit.   HEART: S1, S2 regular , 1/6 soft ejection systolic murmur in mitral area , no thrill and no gallops .  PULMONARY: Bilateral vesicular breathing , few scattered rhonchi and few scattered rales are present .  ABDOMEN: Soft nontender and positive bowl sounds   EXTREMITIES:  No clubbing, cyanosis, or pedal  edema  NEUROLOGICAL: AA and confused  , no focal deficit .  Skin : No rashes .  Musculoskeletal : No joint swellings .    LABS:                        8.1    7.17  )-----------( 137      ( 2024 04:52 )             24.0     10-31    138  |  103  |  47[H]  ----------------------------<  95  4.9   |  23  |  1.30    Ca    8.3[L]      31 Oct 2024 23:30  Mg     2.0         TPro  x   /  Alb  x   /  TBili  x   /  DBili  0.2  /  AST  x   /  ALT  x   /  AlkPhos  x           PT/INR - ( 31 Oct 2024 23:30 )   PT: 13.9 sec;   INR: 1.19 ratio         PTT - ( 31 Oct 2024 23:30 )  PTT:30.0 sec  Urinalysis Basic - ( 2024 03:30 )    Color: Yellow / Appearance: Clear / S.022 / pH: x  Gluc: x / Ketone: Negative mg/dL  / Bili: Negative / Urobili: 1.0 mg/dL   Blood: x / Protein: Negative mg/dL / Nitrite: Negative   Leuk Esterase: Negative / RBC: x / WBC x   Sq Epi: x / Non Sq Epi: x / Bacteria: x            EKG: NSR , non specific ST-T changes .     Assessment and Plan :   68M PMH NSTEMI, schizophrenia, bipolar d/o, HTN, hypoNa BIBEMS from [facility] for anemia to 6 and hypotension. Pt's O2 sat was 90% on RA and was placed on NC. Per EMS, facility states pt was altered from baseline as well. Was noted to by hypotensive to sBP 70's . Patient very confused and unable to give any history . Most of history obtained from RN , PA and ICU  team . Patient lying comfortable but very confused and unable to answer questions . Will like to R/O GI bleeding as cause of severe anemia . Continue I/V fluids as tolerated for hypotension . Troponin I negative so far . Will send further Troponin I levels to see further trending of Troponin I levels . Will also like to R/O sepsis . Will also recommend  ammonia levels for metabolic encephalopathy . Will get echocardiogram done . Monitor hemoglobin and electrolytes . Patient prognosis guarded . Patient condition critical . Continue antipsychotic medications as tolerated .   Will continue to follow during hospital course and give further recommendations as needed . Thanks for your referral . if any questions please contact me at office ([phone] cell [phone])  SHARITA BYRNE MD [place] [address]  [address]  OFFICE : [phone]  CELL : [phone]  CARDIOLOGY CONSULT :         HPI:· Chief Complaint: The patient is a 68y Male complaining of abnormal lab result.  · HPI Objective Statement: 68M PMH NSTEMI, schizophrenia, bipolar d/o, HTN, hypoNa BIBEMS from [facility] for anemia to 6 and hypotension. Pt's O2 sat was 90% on RA and was placed on NC. Per EMS, facility states pt was altered from baseline as well. Was noted to by hypotensive to sBP 70's . Patient very confused and unable to give any history . Most of history obtained from RN , PA and ICU  team . Patient lying comfortable but very confused and unable to answer questions .         PAST MEDICAL & SURGICAL HISTORY:      FAMILY HISTORY: Not Known       SOCIAL HISTORY:   Alcohol: Not known at this time .  Smoking: Not Known     Allergies    lithium (Rash)  clozapine (Rash)    Intolerances        MEDICATIONS  (STANDING):  benztropine 1 milliGRAM(s) Oral two times a day  chlorhexidine 2% Cloths 1 Application(s) Topical <User Schedule>  dexMEDEtomidine Infusion 0.2 MICROgram(s)/kG/Hr (4.08 mL/Hr) IV Continuous <Continuous>  dextrose 5%. 1000 milliLiter(s) (100 mL/Hr) IV Continuous <Continuous>  dextrose 5%. 1000 milliLiter(s) (50 mL/Hr) IV Continuous <Continuous>  dextrose 50% Injectable 25 Gram(s) IV Push once  dextrose 50% Injectable 12.5 Gram(s) IV Push once  dextrose 50% Injectable 25 Gram(s) IV Push once  divalproex  milliGRAM(s) Oral daily  glucagon  Injectable 1 milliGRAM(s) IntraMuscular once  insulin lispro (ADMELOG) corrective regimen sliding scale   SubCutaneous every 6 hours  levothyroxine 88 MICROGram(s) Oral daily  norepinephrine Infusion 0.05 MICROgram(s)/kG/Min (7.93 mL/Hr) IV Continuous <Continuous>  pantoprazole  Injectable 40 milliGRAM(s) IV Push every 12 hours  risperiDONE   Tablet 4 milliGRAM(s) Oral two times a day  traZODone 100 milliGRAM(s) Oral at bedtime    MEDICATIONS  (PRN):  dextrose Oral Gel 15 Gram(s) Oral once PRN Blood Glucose LESS THAN 70 milliGRAM(s)/deciliter      REVIEW OF SYSTEMS:  CONSTITUTIONAL: No fever   RESPIRATORY: No cough, wheezing, hemoptysis .  CARDIOVASCULAR: No chest pain, dyspnea, palpitations, dizziness, syncope, paroxysmal nocturnal dyspnea, orthopnea, or arm or leg swelling .  GASTROINTESTINAL: No abdominal  or epigastric pain, nausea, vomiting, hematemesis, diarrhea, constipation, melena or bright red blood .  NEUROLOGICAL: No headaches,  numbness, or tremors  SKIN: No itching, burning, rashes, or lesions  ENDOCRINE: No heat or cold intolerance, or hair loss  MUSCULOSKELETAL: Patient has mild arthritis .  HEME/LYMPH: No easy bruising or bleeding gums  ALLERGY AND IMMUNOLOGIC: No hives or rash.    Vital Signs Last 24 Hrs  T(C): 36.5 (2024 04:32), Max: 36.7 (31 Oct 2024 23:25)  T(F): 97.7 (2024 04:32), Max: 98.1 (31 Oct 2024 23:25)  HR: 78 (2024 05:00) (78 - 91)  BP: 107/57 (2024 05:00) (77/50 - 144/71)  BP(mean): 73 (2024 05:00) (73 - 99)  RR: 20 (2024 05:00) (17 - 26)  SpO2: 100% (2024 05:00) (95% - 100%)    Parameters below as of 2024 04:06  Patient On (Oxygen Delivery Method): room air        PHYSICAL EXAM:  HEAD:  Atraumatic, Normocephalic  EYES: EOMI, PERRLA, conjunctiva and sclera clear  NECK: Supple and normal thyroid.  No JVD or carotid bruit.   HEART: S1, S2 regular , 1/6 soft ejection systolic murmur in mitral area , no thrill and no gallops .  PULMONARY: Bilateral vesicular breathing , few scattered rhonchi and few scattered rales are present .  ABDOMEN: Soft nontender and positive bowl sounds   EXTREMITIES:  No clubbing, cyanosis, or pedal  edema  NEUROLOGICAL: AA and confused  , no focal deficit .  Skin : No rashes .  Musculoskeletal : No joint swellings .    LABS:                        8.1    7.17  )-----------( 137      ( 2024 04:52 )             24.0     10-31    138  |  103  |  47[H]  ----------------------------<  95  4.9   |  23  |  1.30    Ca    8.3[L]      31 Oct 2024 23:30  Mg     2.0         TPro  x   /  Alb  x   /  TBili  x   /  DBili  0.2  /  AST  x   /  ALT  x   /  AlkPhos  x           PT/INR - ( 31 Oct 2024 23:30 )   PT: 13.9 sec;   INR: 1.19 ratio         PTT - ( 31 Oct 2024 23:30 )  PTT:30.0 sec  Urinalysis Basic - ( 2024 03:30 )    Color: Yellow / Appearance: Clear / S.022 / pH: x  Gluc: x / Ketone: Negative mg/dL  / Bili: Negative / Urobili: 1.0 mg/dL   Blood: x / Protein: Negative mg/dL / Nitrite: Negative   Leuk Esterase: Negative / RBC: x / WBC x   Sq Epi: x / Non Sq Epi: x / Bacteria: x            EKG: NSR , non specific ST-T changes .     Assessment and Plan :   68M PMH NSTEMI, schizophrenia, bipolar d/o, HTN, hypoNa BIBEMS from [facility] for anemia to 6 and hypotension. Pt's O2 sat was 90% on RA and was placed on NC. Per EMS, facility states pt was altered from baseline as well. Was noted to by hypotensive to sBP 70's . Patient very confused and unable to give any history . Most of history obtained from RN , PA and ICU  team . Patient lying comfortable but very confused and unable to answer questions . Will like to R/O GI bleeding as cause of severe anemia . Continue I/V fluids as tolerated for hypotension . Troponin I negative so far . Will send further Troponin I levels to see further trending of Troponin I levels . Will also like to R/O sepsis . Will also recommend  ammonia levels for metabolic encephalopathy . Will get echocardiogram done . Monitor hemoglobin and electrolytes . Patient prognosis guarded . Patient condition critical . Continue antipsychotic medications as tolerated . Once patient BP gets stable , patient cardiac wise cleared for EGD and colonoscopy . Benefits of procedures outweigh the risks .   Will continue to follow during hospital course and give further recommendations as needed . Thanks for your referral . if any questions please contact me at office ([phone] cell [phone])

## 2024-11-01 NOTE — CONSULT NOTE ADULT - ASSESSMENT
68M with PMHx of Afib on Eliquis, NSTEMI, schizophrenia, BP D/o, HTN, HypoNA BIBEMS from [facility] for Hypotension d/t anemia of 6. In the ED pt found to be hypotensive to low 80s/50s given 2L of IVF, Eliquis reversed with KCentra, Anemic with hgb of 5.1, Lactate of 4, pan scanned without any acute findings for cause of ABLA. GI consulted and ICU consulted for the above.      ABLA  Shock d/t   GI Bleed?  Lactic acidosis  68M with PMHx of Afib on Eliquis, NSTEMI, schizophrenia, BP D/o, HTN, HypoNA BIBEMS from [facility] for Hypotension d/t anemia of 6. In the ED pt found to be hypotensive to low 80s/50s given 2L of IVF, Eliquis reversed with KCentra, Anemic with hgb of 5.1, Lactate of 4, pan scanned without any acute findings for cause of ABLA. GI consulted and ICU consulted for the above.      ABLA  Shock d/t  ?GI Bleed  Lactic acidosis    HUBERT    NEURO: Lethargic but responding to basic questions, mostly Tam[place] speaking? CTH negative.   CV: Hypotensive suspect d/t ABLA /hemorrhagic shock? Receiving 2nd uPRBC now, BP soft but improving. H/o of Afib but in NSR now. TTE ordered. 2 additional u T&C if shock index worsens would need large bore access.  PULM: no acute issues On 2L NC titrating for spo2>92, though CT chest imaging with some septal thickening v ground glass?  GI: Unknown source of bleed, occult neg though suspect GI source. Pt is severely pale receiving 2nd uPRBC now, 3/4th ordered ready T&C. GI consulted. Started on Protonix gtt. No h/o cirrhosis found, LFTs okay.   RENAL: HUBERT with BUN/CR ratio >20:1, s/p 2L of IVF in the ED. Electrolytes wnl. Ordered stat repeat labs.   ID: WBC normal, afebrile, though Lactate of 4.3, CT imaging unrevealing for acute infectious source. S/p Vanco x1 and Zosyn x1. Afebrile. MRSA/ RVP, UA ordered. Monitor off further abx at this time.   ENDO: H/o DM will start ISS, with POCT for euglycemia   HEME: Hgb of 5.1, Eliquis reversed with KCentra, on ASA/Plavix and with BUN 20:1 Cr will give DDAVP for uremic bleeding          DISPO: Pt critically ill requiring ICU level of care. Full Code as per NH papers, tried to contact brother who is point of contact without answer (James Orellana [phone])    This patient requires a high level of MDM for support of one or more organ systems with a probability of deterioration in his/her condition    CRITICAL CARE TIME SPENT: 53 minutes of critical care time spent providing medical care for patient's acute illness/conditions that impairs at least one vital organ system and/or poses a high risk of imminent or life threatening deterioration in the patient's condition. It includes time spent evaluating and treating the patient's acute illness as well as time spent reviewing labs, radiology, discussing goals of care with patient and/or patient's family, and discussing the case with a multidisciplinary team, in an effort to prevent further life threatening deterioration or end organ damage. This time is independent of any procedures performed.   68M with PMHx of Afib on Eliquis, NSTEMI, schizophrenia, BP D/o, HTN, HypoNA BIBEMS from [facility] for Hypotension d/t anemia of 6. In the ED pt found to be hypotensive to low 80s/50s given 2L of IVF, Eliquis reversed with KCentra, Anemic with hgb of 5.1, Lactate of 4, pan scanned without any acute findings for cause of ABLA. GI consulted and ICU consulted for the above.      ABLA  Shock d/t  ?GI Bleed  Lactic acidosis    HUBRET      NEURO: Lethargic but responding to basic questions, mostly Guamanian speaking? CTH negative.   CV: Hypotensive suspect d/t ABLA /hemorrhagic shock? Receiving 2nd uPRBC now, BP soft but improving. H/o of Afib but in NSR now. TTE ordered. 2 additional u T&C if shock index worsens would need large bore access.  PULM: no acute issues On 2L NC titrating for spo2>92, though CT chest imaging with some septal thickening v ground glass?  GI: Unknown source of bleed, occult neg though suspect GI source. Pt is severely pale receiving 2nd uPRBC now, 3/4th ordered ready T&C. GI consulted. Started on Protonix gtt will switch to 40mg BID. No h/o cirrhosis found, LFTs okay.   RENAL: HUBERT with BUN/CR ratio >20:1, s/p 2L of IVF in the ED. Electrolytes wnl. Ordered stat repeat labs.   ID: WBC normal, afebrile, though Lactate of 4.3, CT imaging unrevealing for acute infectious source. S/p Vanco x1 and Zosyn x1. Afebrile. MRSA/ RVP, UA ordered. Monitor off further abx at this time.   ENDO: H/o DM will start ISS, with POCT for euglycemia   HEME: Hgb of 5.1, Eliquis reversed with KCentra, on ASA/Plavix and with BUN 20:1 Cr will give DDAVP for uremic bleeding        DISPO: Pt critically ill requiring ICU level of care. Full Code as per NH papers, tried to contact brother who is point of contact without answer (James Orellana [phone])    This patient requires a high level of MDM for support of one or more organ systems with a probability of deterioration in his/her condition    CRITICAL CARE TIME SPENT: 53 minutes of critical care time spent providing medical care for patient's acute illness/conditions that impairs at least one vital organ system and/or poses a high risk of imminent or life threatening deterioration in the patient's condition. It includes time spent evaluating and treating the patient's acute illness as well as time spent reviewing labs, radiology, discussing goals of care with patient and/or patient's family, and discussing the case with a multidisciplinary team, in an effort to prevent further life threatening deterioration or end organ damage. This time is independent of any procedures performed.   68M with PMHx of Afib on Eliquis, NSTEMI, schizophrenia, BP D/o, HTN, HypoNA BIBEMS from [facility] for Hypotension d/t anemia of 6. In the ED pt found to be hypotensive to low 80s/50s given 2L of IVF, Eliquis reversed with KCentra, Anemic with hgb of 5.1, Lactate of 4, pan scanned without any acute findings for cause of ABLA. GI consulted and ICU consulted for the above.      ABLA  Hypotension 2/2  ?GI Bleed  Lactic acidosis    HUBERT      NEURO: Lethargic but responding to basic questions, mostly Colombian speaking? CTH negative.   CV: Hypotensive suspect d/t ABLA /hemorrhagic shock? Receiving 2nd uPRBC now, BP soft but improving. H/o of Afib but in NSR now. TTE ordered. 2 additional u T&C if shock index worsens would need large bore access.  PULM: no acute issues On 2L NC titrating for spo2>92, though CT chest imaging with some septal thickening v ground glass?  GI: Unknown source of bleed, occult neg though suspect GI source. Pt is severely pale receiving 2nd uPRBC now, 3/4th ordered ready T&C. GI consulted. Started on Protonix gtt will switch to 40mg BID. No h/o cirrhosis found, LFTs okay.   RENAL: HUBERT with BUN/CR ratio >20:1, s/p 2L of IVF in the ED. Electrolytes wnl. Ordered stat repeat labs.   ID: WBC normal, afebrile, though Lactate of 4.3, CT imaging unrevealing for acute infectious source. S/p Vanco x1 and Zosyn x1. Afebrile. MRSA/ RVP, UA ordered. Monitor off further abx at this time.   ENDO: H/o DM will start ISS, with POCT for euglycemia   HEME: Hgb of 5.1, Eliquis reversed with KCentra, on ASA/Plavix and with BUN 20:1 Cr will give DDAVP for uremic bleeding        DISPO: Pt critically ill requiring ICU level of care. Full Code as per NH papers, tried to contact brother who is point of contact without answer (James Orellana [phone])    This patient requires a high level of MDM for support of one or more organ systems with a probability of deterioration in his/her condition    CRITICAL CARE TIME SPENT: 53 minutes of critical care time spent providing medical care for patient's acute illness/conditions that impairs at least one vital organ system and/or poses a high risk of imminent or life threatening deterioration in the patient's condition. It includes time spent evaluating and treating the patient's acute illness as well as time spent reviewing labs, radiology, discussing goals of care with patient and/or patient's family, and discussing the case with a multidisciplinary team, in an effort to prevent further life threatening deterioration or end organ damage. This time is independent of any procedures performed.

## 2024-11-01 NOTE — PATIENT PROFILE ADULT - FALL HARM RISK - HARM RISK INTERVENTIONS
Assistance with ambulation/Assistance OOB with selected safe patient handling equipment/Communicate Risk of Fall with Harm to all staff/Reinforce activity limits and safety measures with patient and family/Review medications for side effects contributing to fall risk/Sit up slowly, dangle for a short time, stand at bedside before walking/Tailored Fall Risk Interventions/Toileting schedule using arm’s reach rule for commode and bathroom/Visual Cue: Yellow wristband and red socks/Bed in lowest position, wheels locked, appropriate side rails in place/Call bell, personal items and telephone in reach/Instruct patient to call for assistance before getting out of bed or chair/Non-slip footwear when patient is out of bed/[place] to call system/Physically safe environment - no spills, clutter or unnecessary equipment/Purposeful Proactive Rounding/Room/bathroom lighting operational, light cord in reach

## 2024-11-02 LAB
A1C WITH ESTIMATED AVERAGE GLUCOSE RESULT: 5.5 % — SIGNIFICANT CHANGE UP (ref 4–5.6)
ALBUMIN SERPL ELPH-MCNC: 2.5 G/DL — LOW (ref 3.3–5)
ALP SERPL-CCNC: 61 U/L — SIGNIFICANT CHANGE UP (ref 40–120)
ALT FLD-CCNC: 16 U/L — SIGNIFICANT CHANGE UP (ref 12–78)
ANION GAP SERPL CALC-SCNC: 5 MMOL/L — SIGNIFICANT CHANGE UP (ref 5–17)
AST SERPL-CCNC: 26 U/L — SIGNIFICANT CHANGE UP (ref 15–37)
BILIRUB SERPL-MCNC: 0.3 MG/DL — SIGNIFICANT CHANGE UP (ref 0.2–1.2)
BUN SERPL-MCNC: 20 MG/DL — SIGNIFICANT CHANGE UP (ref 7–23)
CALCIUM SERPL-MCNC: 8.6 MG/DL — SIGNIFICANT CHANGE UP (ref 8.5–10.1)
CHLORIDE SERPL-SCNC: 100 MMOL/L — SIGNIFICANT CHANGE UP (ref 96–108)
CO2 SERPL-SCNC: 26 MMOL/L — SIGNIFICANT CHANGE UP (ref 22–31)
CREAT SERPL-MCNC: 0.72 MG/DL — SIGNIFICANT CHANGE UP (ref 0.5–1.3)
EGFR: 100 ML/MIN/1.73M2 — SIGNIFICANT CHANGE UP
ESTIMATED AVERAGE GLUCOSE: 111 MG/DL — SIGNIFICANT CHANGE UP (ref 68–114)
FOLATE SERPL-MCNC: 9.4 NG/ML — SIGNIFICANT CHANGE UP
GLUCOSE SERPL-MCNC: 96 MG/DL — SIGNIFICANT CHANGE UP (ref 70–99)
HCT VFR BLD CALC: 24.8 % — LOW (ref 39–50)
HGB BLD-MCNC: 8.8 G/DL — LOW (ref 13–17)
LACTATE SERPL-SCNC: 1.1 MMOL/L — SIGNIFICANT CHANGE UP (ref 0.7–2)
MAGNESIUM SERPL-MCNC: 1.8 MG/DL — SIGNIFICANT CHANGE UP (ref 1.6–2.6)
MCHC RBC-ENTMCNC: 31.2 PG — SIGNIFICANT CHANGE UP (ref 27–34)
MCHC RBC-ENTMCNC: 35.5 G/DL — SIGNIFICANT CHANGE UP (ref 32–36)
MCV RBC AUTO: 87.9 FL — SIGNIFICANT CHANGE UP (ref 80–100)
NRBC # BLD: 1 /100 WBCS — HIGH (ref 0–0)
PHOSPHATE SERPL-MCNC: 2.9 MG/DL — SIGNIFICANT CHANGE UP (ref 2.5–4.5)
PLATELET # BLD AUTO: 141 K/UL — LOW (ref 150–400)
POTASSIUM SERPL-MCNC: 4.1 MMOL/L — SIGNIFICANT CHANGE UP (ref 3.5–5.3)
POTASSIUM SERPL-SCNC: 4.1 MMOL/L — SIGNIFICANT CHANGE UP (ref 3.5–5.3)
PROT SERPL-MCNC: 5.4 G/DL — LOW (ref 6–8.3)
RBC # BLD: 2.82 M/UL — LOW (ref 4.2–5.8)
RBC # FLD: 16.9 % — HIGH (ref 10.3–14.5)
SODIUM SERPL-SCNC: 131 MMOL/L — LOW (ref 135–145)
VALPROATE SERPL-MCNC: 72 UG/ML — SIGNIFICANT CHANGE UP (ref 50–100)
VIT B12 SERPL-MCNC: 513 PG/ML — SIGNIFICANT CHANGE UP (ref 232–1245)
WBC # BLD: 5.28 K/UL — SIGNIFICANT CHANGE UP (ref 3.8–10.5)
WBC # FLD AUTO: 5.28 K/UL — SIGNIFICANT CHANGE UP (ref 3.8–10.5)

## 2024-11-02 PROCEDURE — 99232 SBSQ HOSP IP/OBS MODERATE 35: CPT | Mod: GC

## 2024-11-02 RX ORDER — ENOXAPARIN SODIUM 40MG/0.4ML
40 SYRINGE (ML) SUBCUTANEOUS EVERY 24 HOURS
Refills: 0 | Status: DISCONTINUED | OUTPATIENT
Start: 2024-11-02 | End: 2024-11-04

## 2024-11-02 RX ORDER — MUPIROCIN 20 MG/G
1 OINTMENT TOPICAL
Refills: 0 | Status: DISCONTINUED | OUTPATIENT
Start: 2024-11-02 | End: 2024-11-04

## 2024-11-02 RX ORDER — INSULIN LISPRO 100/ML
VIAL (ML) SUBCUTANEOUS
Refills: 0 | Status: DISCONTINUED | OUTPATIENT
Start: 2024-11-02 | End: 2024-11-04

## 2024-11-02 RX ADMIN — PANTOPRAZOLE SODIUM 40 MILLIGRAM(S): 40 TABLET, DELAYED RELEASE ORAL at 05:36

## 2024-11-02 RX ADMIN — Medication 40 MILLIGRAM(S): at 12:48

## 2024-11-02 RX ADMIN — MUPIROCIN 1 APPLICATION(S): 20 OINTMENT TOPICAL at 18:27

## 2024-11-02 RX ADMIN — DIVALPROEX SODIUM 1500 MILLIGRAM(S): 250 TABLET, FILM COATED, EXTENDED RELEASE ORAL at 08:49

## 2024-11-02 RX ADMIN — Medication 80 MILLIGRAM(S): at 21:13

## 2024-11-02 RX ADMIN — CHLORHEXIDINE GLUCONATE 1 APPLICATION(S): 40 SOLUTION TOPICAL at 05:35

## 2024-11-02 RX ADMIN — TRAZODONE HYDROCHLORIDE 100 MILLIGRAM(S): 100 TABLET ORAL at 21:13

## 2024-11-02 RX ADMIN — RISPERIDONE 4 MILLIGRAM(S): 3 TABLET, FILM COATED ORAL at 05:37

## 2024-11-02 RX ADMIN — Medication 25 MILLIGRAM(S): at 18:27

## 2024-11-02 RX ADMIN — Medication 88 MICROGRAM(S): at 05:36

## 2024-11-02 RX ADMIN — Medication 2 MILLIGRAM(S): at 05:36

## 2024-11-02 RX ADMIN — PANTOPRAZOLE SODIUM 40 MILLIGRAM(S): 40 TABLET, DELAYED RELEASE ORAL at 18:27

## 2024-11-02 RX ADMIN — QUETIAPINE FUMARATE 400 MILLIGRAM(S): 200 TABLET ORAL at 21:14

## 2024-11-02 RX ADMIN — RISPERIDONE 4 MILLIGRAM(S): 3 TABLET, FILM COATED ORAL at 18:27

## 2024-11-02 RX ADMIN — Medication 2 MILLIGRAM(S): at 18:27

## 2024-11-02 RX ADMIN — Medication 25 MILLIGRAM(S): at 05:36

## 2024-11-02 NOTE — PROGRESS NOTE ADULT - ASSESSMENT
67 yo man from [facility], hx NSTEMI, schizoprenica, A fib on Eliquis, HTN, hyponatremia, adm for hypotension SMP 70s and Hgb 6  In ER, BP low 80s/50s, CBC wbc 5.83 hgb 5.1 hct 15.4 plts 162 mcv 95.7  BUN/Cr 47/1.3  stool occult blood negative  Given 2L fluid, 3u PRBC, Kcentra,, adm to ICU  post transfusion CBC today first lab Hgb 8.1, second lab Hgb 9.3  CT head negative  CT c/a/p-mild dep atelectasis, sm right efusion    -?new onset acute anemia, no gross bleeds noted on presnation.in hospital--?previous bleed  -Hgb sl lower today at 8.8 but still in appropriate range for appropriate post transfusion level  -iron studies not c/w iron deficiency  -B12 and folate still pending  -continue follow serial CBC  -  -

## 2024-11-02 NOTE — PROGRESS NOTE ADULT - SUBJECTIVE AND OBJECTIVE BOX
CHIEF COMPLAINT/ REASON FOR VISIT  .. Patient was seen to address the  issue listed under PROBLEM LIST which is located toward bottom of this note     [facility] HORTON    [facility] ICU1 07A    Allergies    lithium (Rash)  clozapine (Rash)    Intolerances        PAST MEDICAL & SURGICAL HISTORY:  Parkinsonism      Schizophrenia      Hyponatremia      NSTEMI (non-ST elevation myocardial infarction)      Bipolar disorder      DM (diabetes mellitus)      Dementia      HTN (hypertension)      Chronic CHF      MDD (major depressive disorder)      Hypothyroidism      HLD (hyperlipidemia)      Hypothyroidism      Constipation      Folate deficiency      Constipation      H/O candidiasis      Violent behavior      Atrial fibrillation      Hyperkalemia      Epistaxis      DM (diabetes mellitus)          FAMILY HISTORY:      Home Medications:  atorvastatin 80 mg oral tablet: 1 tab(s) orally once a day (at bedtime) (01 Nov 2024 08:44)  benztropine 1 mg oral tablet: 2 tab(s) orally 2 times a day (01 Nov 2024 08:44)  clopidogrel 75 mg oral tablet: 1 tab(s) orally once a day (01 Nov 2024 08:44)  divalproex sodium 500 mg oral tablet, extended release: 3 tab(s) orally once a day (01 Nov 2024 08:44)  Eliquis 2.5 mg oral tablet: 1 tab(s) orally 2 times a day (01 Nov 2024 08:44)  levothyroxine 88 mcg (0.088 mg) oral tablet: 1 tab(s) orally once a day (01 Nov 2024 08:44)  losartan 50 mg oral tablet: 1 tab(s) orally once a day (01 Nov 2024 08:44)  metFORMIN 500 mg oral tablet: 2 tab(s) orally 2 times a day (01 Nov 2024 08:44)  metoprolol tartrate 25 mg oral tablet: 1 tab(s) orally 2 times a day (01 Nov 2024 08:44)  QUEtiapine 400 mg oral tablet: 1 tab(s) orally once a day (at bedtime) (01 Nov 2024 08:44)  risperiDONE 4 mg oral tablet: 1 tab(s) orally 2 times a day (01 Nov 2024 08:44)  sodium chloride: 1,000 milligram(s) orally 3 times a day (01 Nov 2024 08:44)  traZODone 100 mg oral tablet: 1 tab(s) orally once a day (at bedtime) (01 Nov 2024 08:44)      MEDICATIONS  (STANDING):  atorvastatin 80 milliGRAM(s) Oral at bedtime  benztropine 2 milliGRAM(s) Oral two times a day  chlorhexidine 2% Cloths 1 Application(s) Topical <User Schedule>  dextrose 5%. 1000 milliLiter(s) (50 mL/Hr) IV Continuous <Continuous>  dextrose 5%. 1000 milliLiter(s) (100 mL/Hr) IV Continuous <Continuous>  dextrose 50% Injectable 25 Gram(s) IV Push once  dextrose 50% Injectable 25 Gram(s) IV Push once  dextrose 50% Injectable 12.5 Gram(s) IV Push once  divalproex ER 1500 milliGRAM(s) Oral <User Schedule>  glucagon  Injectable 1 milliGRAM(s) IntraMuscular once  insulin lispro (ADMELOG) corrective regimen sliding scale   SubCutaneous every 6 hours  levothyroxine 88 MICROGram(s) Oral daily  metoprolol tartrate 25 milliGRAM(s) Oral two times a day  mupirocin 2% Nasal 1 Application(s) Both Nostrils two times a day  pantoprazole  Injectable 40 milliGRAM(s) IV Push every 12 hours  QUEtiapine 400 milliGRAM(s) Oral at bedtime  risperiDONE   Tablet 4 milliGRAM(s) Oral two times a day  traZODone 100 milliGRAM(s) Oral at bedtime    MEDICATIONS  (PRN):  dextrose Oral Gel 15 Gram(s) Oral once PRN Blood Glucose LESS THAN 70 milliGRAM(s)/deciliter      Diet, NPO:   Except Medications (11-01-24 @ 04:32) [Active]          Vital Signs Last 24 Hrs  T(C): 36.4 (02 Nov 2024 07:46), Max: 36.9 (02 Nov 2024 00:07)  T(F): 97.5 (02 Nov 2024 07:46), Max: 98.5 (02 Nov 2024 00:07)  HR: 62 (02 Nov 2024 08:00) (52 - 89)  BP: 159/76 (02 Nov 2024 08:00) (69/43 - 189/84)  BP(mean): 109 (02 Nov 2024 08:00) (51 - 122)  RR: 23 (02 Nov 2024 08:00) (14 - 35)  SpO2: 100% (02 Nov 2024 08:00) (93% - 100%)    Parameters below as of 02 Nov 2024 08:00  Patient On (Oxygen Delivery Method): room air          11-01-24 @ 07:01  -  11-02-24 @ 07:00  --------------------------------------------------------  IN: 754 mL / OUT: 1475 mL / NET: -721 mL    11-02-24 @ 08:01  -  11-02-24 @ 10:34  --------------------------------------------------------  IN: 0 mL / OUT: 300 mL / NET: -300 mL              LABS:                        8.8    5.28  )-----------( 141      ( 02 Nov 2024 05:31 )             24.8     11-02    131[L]  |  100  |  20  ----------------------------<  96  4.1   |  26  |  0.72    Ca    8.6      02 Nov 2024 05:31  Phos  2.9     11-02  Mg     1.8     11-02    TPro  5.4[L]  /  Alb  2.5[L]  /  TBili  0.3  /  DBili  x   /  AST  26  /  ALT  16  /  AlkPhos  61  11-02    PT/INR - ( 31 Oct 2024 23:30 )   PT: 13.9 sec;   INR: 1.19 ratio         PTT - ( 31 Oct 2024 23:30 )  PTT:30.0 sec  Urinalysis Basic - ( 02 Nov 2024 05:31 )    Color: x / Appearance: x / SG: x / pH: x  Gluc: 96 mg/dL / Ketone: x  / Bili: x / Urobili: x   Blood: x / Protein: x / Nitrite: x   Leuk Esterase: x / RBC: x / WBC x   Sq Epi: x / Non Sq Epi: x / Bacteria: x            WBC:  WBC Count: 5.28 K/uL (11-02 @ 05:31)  WBC Count: 6.97 K/uL (11-01 @ 10:12)  WBC Count: 7.17 K/uL (11-01 @ 04:52)  WBC Count: 5.83 K/uL (10-31 @ 23:30)      MICROBIOLOGY:  RECENT CULTURES:  10-31 .Blood BLOOD XXXX XXXX   No growth at 24 hours    10-31 .Blood BLOOD XXXX XXXX   No growth at 24 hours                PT/INR - ( 31 Oct 2024 23:30 )   PT: 13.9 sec;   INR: 1.19 ratio         PTT - ( 31 Oct 2024 23:30 )  PTT:30.0 sec    Sodium:  Sodium: 131 mmol/L (11-02 @ 05:31)  Sodium: 138 mmol/L (11-01 @ 10:12)  Sodium: 139 mmol/L (11-01 @ 04:52)  Sodium: 138 mmol/L (10-31 @ 23:30)      0.72 mg/dL 11-02 @ 05:31  0.92 mg/dL 11-01 @ 10:12  1.10 mg/dL 11-01 @ 04:52  1.30 mg/dL 10-31 @ 23:30      Hemoglobin:  Hemoglobin: 8.8 g/dL (11-02 @ 05:31)  Hemoglobin: 9.3 g/dL (11-01 @ 10:12)  Hemoglobin: 8.1 g/dL (11-01 @ 04:52)  Hemoglobin: 5.1 g/dL (10-31 @ 23:30)      Platelets: Platelet Count - Automated: 141 K/uL (11-02 @ 05:31)  Platelet Count - Automated: 142 K/uL (11-01 @ 10:12)  Platelet Count - Automated: 137 K/uL (11-01 @ 04:52)  Platelet Count - Automated: 162 K/uL (10-31 @ 23:30)      LIVER FUNCTIONS - ( 02 Nov 2024 05:31 )  Alb: 2.5 g/dL / Pro: 5.4 g/dL / ALK PHOS: 61 U/L / ALT: 16 U/L / AST: 26 U/L / GGT: x             Urinalysis Basic - ( 02 Nov 2024 05:31 )    Color: x / Appearance: x / SG: x / pH: x  Gluc: 96 mg/dL / Ketone: x  / Bili: x / Urobili: x   Blood: x / Protein: x / Nitrite: x   Leuk Esterase: x / RBC: x / WBC x   Sq Epi: x / Non Sq Epi: x / Bacteria: x        RADIOLOGY & ADDITIONAL STUDIES:      MICROBIOLOGY:  RECENT CULTURES:  10-31 .Blood BLOOD XXXX XXXX   No growth at 24 hours    10-31 .Blood BLOOD XXXX XXXX   No growth at 24 hours

## 2024-11-02 NOTE — PROGRESS NOTE ADULT - SUBJECTIVE AND OBJECTIVE BOX
All interim records and events noted.    awake responsive in bed, denies pain  no gross bleeds noted      MEDICATIONS  (STANDING):  atorvastatin 80 milliGRAM(s) Oral at bedtime  benztropine 2 milliGRAM(s) Oral two times a day  chlorhexidine 2% Cloths 1 Application(s) Topical <User Schedule>  dextrose 5%. 1000 milliLiter(s) (100 mL/Hr) IV Continuous <Continuous>  dextrose 5%. 1000 milliLiter(s) (50 mL/Hr) IV Continuous <Continuous>  dextrose 50% Injectable 25 Gram(s) IV Push once  dextrose 50% Injectable 12.5 Gram(s) IV Push once  dextrose 50% Injectable 25 Gram(s) IV Push once  divalproex ER 1500 milliGRAM(s) Oral <User Schedule>  glucagon  Injectable 1 milliGRAM(s) IntraMuscular once  insulin lispro (ADMELOG) corrective regimen sliding scale   SubCutaneous every 6 hours  levothyroxine 88 MICROGram(s) Oral daily  metoprolol tartrate 25 milliGRAM(s) Oral two times a day  mupirocin 2% Nasal 1 Application(s) Both Nostrils two times a day  pantoprazole  Injectable 40 milliGRAM(s) IV Push every 12 hours  QUEtiapine 400 milliGRAM(s) Oral at bedtime  risperiDONE   Tablet 4 milliGRAM(s) Oral two times a day  traZODone 100 milliGRAM(s) Oral at bedtime    MEDICATIONS  (PRN):  dextrose Oral Gel 15 Gram(s) Oral once PRN Blood Glucose LESS THAN 70 milliGRAM(s)/deciliter      Vital Signs Last 24 Hrs  T(C): 36.4 (02 Nov 2024 07:46), Max: 36.9 (02 Nov 2024 00:07)  T(F): 97.5 (02 Nov 2024 07:46), Max: 98.5 (02 Nov 2024 00:07)  HR: 59 (02 Nov 2024 10:00) (52 - 84)  BP: 140/63 (02 Nov 2024 10:00) (69/43 - 189/84)  BP(mean): 91 (02 Nov 2024 10:00) (51 - 132)  RR: 22 (02 Nov 2024 10:00) (14 - 35)  SpO2: 100% (02 Nov 2024 10:00) (93% - 100%)    Parameters below as of 02 Nov 2024 08:00  Patient On (Oxygen Delivery Method): room air        PHYSICAL EXAM  General: in no acute distress  Head: atraumatic, normocephalic  ENT: sclera anicteric, buccal mucosa moist  Neck: supple, trachea midline,  CV: S1 S2, regular rate and rhythm  Lungs: clear to auscultation, no wheezes/rhonchi  Abdomen: soft, nontender, bowel sounds present, no palpable masses  Extrem: no clubbing/cyanosis/edema  Skin: no significant increased ecchymosis/petechiae  Neuro: alert and responsive  no focal deficits      LABS:    Iron with Total Binding Capacity (11.01.24 @ 04:52)   Iron - Total Binding Capacity.: 280 ug/dL  % Saturation, Iron: 37 %  Iron Total: 103 ug/dL  Unsaturated Iron Binding Capacity: 177 ug/dLFerritin (11.01.24 @ 04:52)   Ferritin: 49 ng/mL                 8.8    5.28  )-----------( 141      ( 11-02 @ 05:31 )             24.8                9.3    6.97  )-----------( 142      ( 11-01 @ 10:12 )             26.4                8.1    7.17  )-----------( 137      ( 11-01 @ 04:52 )             24.0                5.1    5.83  )-----------( 162      ( 10-31 @ 23:30 )             15.4       11-02    131[L]  |  100  |  20  ----------------------------<  96  4.1   |  26  |  0.72    Ca    8.6      02 Nov 2024 05:31  Phos  2.9     11-02  Mg     1.8     11-02    TPro  5.4[L]  /  Alb  2.5[L]  /  TBili  0.3  /  DBili  x   /  AST  26  /  ALT  16  /  AlkPhos  61  11-02    10-31 @ 23:30  PT13.9 INR1.19  PTT30.0      RADIOLOGY & ADDITIONAL STUDIES:    IMPRESSION/RECOMMENDATIONS:

## 2024-11-02 NOTE — PROGRESS NOTE ADULT - ASSESSMENT
REASON FOR VISIT  .. Management of problems listed below      ROS  . Patient unable to give ROS     PHYSICAL EXAM    HEENT Unremarkable  atraumatic   RESP Fair air entry  Harsh breath sound   CARDIAC S1 S2 No S3     NO JVD    ABDOMEN No hepatosplenomegaly   PEDAL EDEMA present No calf tenderness  NO rash       [id number]  BEST PRACTICE ISSUES.  . HOB ELEVATN.    .... Yes  . DIET.   ... 11/2/2024 cons carb   .... 11/1/2024 npo   . IV FLUIDS.  ....   . DVT PPLX.    .... 11/2/2024 lvnx 40   .... 11/1/2024 no pharmac pplx as arrived with v low hb ro abla   . STRESS ULCER PPLX.   .... 11/1/2024 protonix 40   . INFECTION PPLX.   .... 11/1/2024 chlorhexidine 2%   .... 11/2/2024 mupirocin 2% (sa pcr )     [id number]  GENERAL DATA .   [facility].    ..    ICU STAY.    .. 11/1-11/2/2024   COVID.   ..   ALLGY.   ..   clozapine lithium     WT.   ..  11/1/2024 84  BMI.  .. 11/1/2024 bmi 33   ISOLATION.        [id number]  VITALS/GAS EXCHANGE/DRIPS  ABGS.   .  VS/ PO/IO/ VENT/ DRIPS.   11/2/2024 afeb 82 150/70   11/2/2024 ra 100%     CHIEF COMPLAINT.   . 11/1/2024 Patient brought by ambulance from [facility] as reported had abnormal labs and low blood pressure HGB of 6 received on O2 via NC  OVERALL PRESENTATION.  . 11/1/2024 68M with PMHx of Afib on Eliquis, NSTEMI, schizophrenia, BP D/o, HTN, HypoNA BIBEMS from [facility] for Hypotension d/t anemia of 6. In the ED pt found to be hypotensive to low 80s/50s given 2L of IVF, Eliquis reversed with KCentra, Anemic with hgb of 5.1, Lactate of 4, pan scanned without any acute source of bleed  . Pt was on dexmedetomidine drip earlier     PMH.     PAST HOSPITAL STAYS .   .  HOME MEDS.    TRANSFSION  . 11/1/2024 3 u prbc     [id number]  PROBLEM ASSESSMENT RECOMMENDATIONS.  RESP.   .... Target po 90-95%    INFECTION.  . SEPSIS   .... w 10/31- 11/1 w 5.8-6.9   .... pr 11/1/2024 pr (-)   .... ua 11/1/2024 ua n (-)   .... CT cap 11/1/2024   ........ no ac abn   ........ small r pl effs   ........ non specific diffuse ground glass is densities ro pie or nonsp pneumonitis   .... rvp 11/1/2024 (-)   .... No strong susp for infection     CARDIAC.  . LACTICEMIA   .... la 10/31-11/1 la 4.3 - 3.4  .... restore euvolemia and monitor     . A fib on Apixaba   .... 11/1/2024 apixaba held because of abla     . CAD   .... HO NSTEMI 2023 On asa plavx   .... Trop 10/31-11/1 Tr 34-52   .... 11/1/2024 APA JORGE held because of gi bl  .... 11/1 ATORVASTAT 80     . CHF  .... pbnp 11/1 pbnp 470-     HEMAT.  . ANEMIA   .... Hb 10/31-11/1- 11/2 Hb 5.1-9.3 - 8.8   .... Plt 10/31 plt 162   .... monitor target Hb 7 (+)     . COAGULOPATHY  .... 11/1/2024 Given Kcentra     GI.  RENAL.  .... Na 11/1-11/2/2024 Na 139 - 131   .... K 11/1/2024 K 4.6   .... CO2 11/1/2024 co2 24   .... AG 11/1/2024 ag 8   .... Alb   .... Cr 11/1/2024 Cr 1.1   .... monitor     ENDO.  . HYPOTHYROID  .... 11/1/2024 levoxyl 88     . DM  ... 11/1/2024 sl scale     NEURO.  . PSYCH PROBLEMS   .... 11/1 quetiapine 400   .... 11/1/2024 Depakote 1500   .... 11/1/2024 trazodone 100   .... 11/1 BENZTROPINE 2.2     DISCUSSIONS.    [id number]   SUMMARY  CC.   . 11/1/2024 low bp  . 11/1/2024 Hb 6  . 11/1/2024 hypoxia  PROBLEMS .  . HYPOXEMIA   . LACTICEMIA 10/31-11/1 la 4.3 - 3.4  . A fib ON APIXABA  .... 11/1 taken off apixa   . CAD 10/31-11/1 Tr 34-52   . SEVERE ANEMIA 10/31-11/1 Hb 5.1-9.3   . TRANSFUSION 11/1/2024 3 u   . APIXABA  .... 11/1/2024 Given Kcentra   . HYPONATREMIA   .... 11/2/2024 Na 131     PMH.  . Hytn   . Afib on Eliquis,   . NSTEMI,   . Hyponna  . schizophrenia,       TIME SPENT.  . Over 36 minutes aggregate care time spent on encounter; activities included   direct patient care, counseling and/or coordinating care reviewing notes, lab data/ imaging , discussion with multidisciplinary team/ patient  /family and explaining in detail risks, benefits, alternatives  of the recommendations     CLIFFORD ABDALLA 67 m 11/1/2024 1955

## 2024-11-02 NOTE — PROGRESS NOTE ADULT - ASSESSMENT
68 MALE with  PMH NSTEMI, schizophrenia, bipolar d/o, HTN, hypoNa BIBEMS from [facility] for anemia to 6 and hypotension. Pt's O2 sat was 90% on RA and was placed on NC. Per EMS, facility states pt was altered from baseline as well. Was noted to by hypotensive to sBP 70's  	Pt has different MRN: [id number] (B/L Hb noted to be close to 11) Admitted to ICU -  presenting with severe anemia and hypotension, lactic acidosis.  Improved after Kcentra and total 3 PRBCs, requiring levophed for brief period.  Unclear source of bleeding.  --bipolar disorder, continue home depakote, risperadone, benztropine  --shock now resolved, off levophed following 3rd PRBC  afib controlled, hold BB for now, no AC in setting of anemia and possible bleed  continue statin  f/u echo  --stable respiratory status  --mild HUBERT resolved BUN/Cr 47/1.3  stool occult blood negative  Given 2L fluid, 3u PRBC, adm to ICU  post transfusion CBC today first lab Hgb 8.1, second lab Hgb 9.3  Pt appear comfortable sitting up in bed  No gross sign bleed at this time  CT head negative  CT c/a/p-mild dep atelectasis, sm right efusion  -?new onset acute anemia, no gross bleeds noted on presnation.in hospital  -appropriate increase of Hgb w the transfusion  -no signs of hemolysis  -TSH normal  -iron studies were sent, will follow up, will also order B12, folate  -follow serial CBC

## 2024-11-02 NOTE — PROGRESS NOTE ADULT - ASSESSMENT
68M with PMHx of Afib on Eliquis, NSTEMI, schizophrenia, BP D/o, HTN, HypoNA BIBEMS from [facility] for Hypotension d/t anemia of 6. In the ED pt found to be hypotensive to low 80s/50s given 2L of IVF, Eliquis reversed with KCentra, Anemic with hgb of 5.1, Lactate of 4, pan scanned without any acute findings for cause of ABLA. GI consulted and ICU consulted for the above.      ABLA  Hypotension 2/2  ?GI Bleed  Lactic acidosis    HUBERT    NEURO:  -  Alert this AM able to respond to basic questions  - Seems to be A&O x0 / refusing to answer questions  - CTH negative.   - ammonia 30, WNL  - On Home depakote, risperidone, benztropine for hx of schizophrenia    CV:  -  Hypotensive suspect d/t ABLA /hemorrhagic shock  - Receiving 3 uPRBC with some improvement of BP, repeat HH stable  - H/o of Afib but in NSR now. Holding AC for potential bleed  - continue home atorvastatin  - F/u TTE official read  - lopressor 25 BID    PULM:   - no acute issues on RA spo2>92  - CT chest imaging with some septal thickening v ground glass. No evidence of bleed.    GI:   - Unknown source of bleed, occult neg though suspect GI source.   - CT A/P : No acute pathology or source of bleed seen  - protonix  - No h/o cirrhosis found, LFTs okay.     RENAL:   - HUBERT with BUN/CR ratio >20:1, s/p 2L of IVF in the ED.   - Electrolytes wnl.   - Monitor and replete lytes as needed  - Cr downtrending     ID:  -  WBC normal, afebrile  - Lactate of 4.3 downtrending to 3.4   - CT imaging unrevealing for acute infectious source.   - S/p Vanco x1 and Zosyn x1. Afebrile.   -RVP neg  - MRSA PCR pos, mupirocin started  - UA negative   - BCx NGTD  - Monitor off further abx at this time.     ENDO:   - H/o DM will start ISS, with POCT for euglycemia       HEME:   -Hgb of 5.1, Eliquis reversed with KCentra, on ASA/Plavix   - BUN 20:1 Cr s/p DDAVP for uremic bleeding  -3 U PRBC given, HH stable  - CT C/A/P did not show a source for bleeding  - F/u iron Studies  - F/u LDH  - Continue to monitor daily CBC and hemodynamics      ETHICS/DISPO:  - Pt critically ill requiring ICU level of care.  - Full Code 68M with PMHx of Afib on Eliquis, NSTEMI, schizophrenia, BP D/o, HTN, HypoNA BIBEMS from [facility] for Hypotension d/t anemia of 6. In the ED pt found to be hypotensive to low 80s/50s given 2L of IVF, Eliquis reversed with KCentra, Anemic with hgb of 5.1, Lactate of 4, pan scanned without any acute findings for cause of ABLA. GI consulted and ICU consulted for the above.      ABLA  Hypotension 2/2  ?GI Bleed  Lactic acidosis    HUBERT    NEURO:  -  Alert this AM able to respond to basic questions  - Seems to be A&O x0 / refusing to answer questions  - CTH negative.   - ammonia 30, WNL  - On Home depakote, risperidone, benztropine for hx of schizophrenia    CV:  -  Hypotensive suspect d/t ABLA /hemorrhagic shock  - Receiving 3 uPRBC with some improvement of BP, repeat HH stable  - H/o of Afib but in NSR now.   - lovenox for DVT prophylaxis. further decision regarding resumption of home eliquis and plavix pending clinical course  - continue home atorvastatin  - F/u TTE official read  - lopressor 25 BID    PULM:   - no acute issues on RA spo2>92  - CT chest imaging with some septal thickening v ground glass. No evidence of bleed.    GI:   - Unknown source of bleed, occult neg though suspect GI source.   - CT A/P : No acute pathology or source of bleed seen  - protonix  - No h/o cirrhosis found, LFTs okay.   - DASH diet     RENAL:   - HUBERT with BUN/CR ratio >20:1, s/p 2L of IVF in the ED.   - Electrolytes wnl.   - Monitor and replete lytes as needed  - Cr downtrending     ID:  -  WBC normal, afebrile  - Lactate of 4.3 downtrending to 3.4   - CT imaging unrevealing for acute infectious source.   - S/p Vanco x1 and Zosyn x1. Afebrile.   -RVP neg  - MRSA PCR pos, mupirocin started  - UA negative   - BCx NGTD  - Monitor off further abx at this time.     ENDO:   - H/o DM will start ISS, with POCT for euglycemia       HEME:   -Hgb of 5.1, Eliquis reversed with KCentra, on ASA/Plavix   - BUN 20:1 Cr s/p DDAVP for uremic bleeding  -3 U PRBC given, HH stable  - CT C/A/P did not show a source for bleeding  - Iron studies WNL  - F/u LDH  - Continue to monitor daily CBC and hemodynamics      ETHICS/DISPO:  - Pt critically ill requiring ICU level of care.  - Full Code 68M with PMHx of Afib on Eliquis, NSTEMI, schizophrenia, BP D/o, HTN, HypoNA BIBEMS from [facility] for Hypotension d/t anemia of 6. In the ED pt found to be hypotensive to low 80s/50s given 2L of IVF, Eliquis reversed with KCentra, Anemic with hgb of 5.1, Lactate of 4, pan scanned without any acute findings for cause of ABLA. GI consulted and ICU consulted for the above.      ABLA  Hypotension 2/2  ?GI Bleed  Lactic acidosis    HUBERT    NEURO:  -  Alert this AM able to respond to basic questions  - Seems to be A&O x0 / refusing to answer questions  - CTH negative.   - ammonia 30, WNL  - On Home depakote, risperidone, benztropine for hx of schizophrenia    CV:  -  Hypotensive suspect d/t ABLA /hemorrhagic shock  - Receiving 3 uPRBC with some improvement of BP, repeat HH stable  - H/o of Afib but in NSR now.   - lovenox for DVT prophylaxis. further decision regarding resumption of home eliquis and plavix pending clinical course  - continue home atorvastatin  - F/u TTE official read  - lopressor 25 BID  - plan for cardiac cath on Monday    PULM:   - no acute issues on RA spo2>92  - CT chest imaging with some septal thickening v ground glass. No evidence of bleed.    GI:   - Unknown source of bleed, occult neg though suspect GI source.   - CT A/P : No acute pathology or source of bleed seen  - protonix  - No h/o cirrhosis found, LFTs okay.   - DASH diet     RENAL:   - HUBERT with BUN/CR ratio >20:1, s/p 2L of IVF in the ED.   - Electrolytes wnl.   - Monitor and replete lytes as needed  - Cr downtrending     ID:  -  WBC normal, afebrile  - Lactate of 4.3 downtrending to 3.4   - CT imaging unrevealing for acute infectious source.   - S/p Vanco x1 and Zosyn x1. Afebrile.   -RVP neg  - MRSA PCR pos, mupirocin started  - UA negative   - BCx NGTD  - Monitor off further abx at this time.     ENDO:   - H/o DM will start ISS, with POCT for euglycemia       HEME:   -Hgb of 5.1, Eliquis reversed with KCentra, on ASA/Plavix   - BUN 20:1 Cr s/p DDAVP for uremic bleeding  -3 U PRBC given, HH stable  - CT C/A/P did not show a source for bleeding  - Iron studies WNL  - F/u LDH  - Continue to monitor daily CBC and hemodynamics      ETHICS/DISPO:  - Pt critically ill requiring ICU level of care.  - Full Code 68M with PMHx of Afib on Eliquis, NSTEMI, schizophrenia, BP D/o, HTN, HypoNA BIBEMS from [facility] for Hypotension d/t anemia of 6. In the ED pt found to be hypotensive to low 80s/50s given 2L of IVF, Eliquis reversed with KCentra, Anemic with hgb of 5.1, Lactate of 4, pan scanned without any acute findings for cause of ABLA. GI consulted and ICU consulted for the above.      ABLA  Hypotension 2/2  ?GI Bleed  Lactic acidosis    HUBERT    NEURO:  -  Alert this AM able to respond to basic questions  - Seems to be A&O x0 / refusing to answer questions  - CTH negative.   - ammonia 30, WNL  - On Home depakote, risperidone, benztropine for hx of schizophrenia    CV:  -  Hypotensive suspect d/t ABLA /hemorrhagic shock  - Receiving 3 uPRBC with some improvement of BP, repeat HH stable  - H/o of Afib but in NSR now.   - lovenox for DVT prophylaxis. further decision regarding resumption of home eliquis and plavix pending clinical course  - continue home atorvastatin  - F/u TTE official read  - lopressor 25 BID    PULM:   - no acute issues on RA spo2>92  - CT chest imaging with some septal thickening v ground glass. No evidence of bleed.    GI:   - Unknown source of bleed, occult neg though suspect GI source.   - CT A/P : No acute pathology or source of bleed seen  - protonix  - No h/o cirrhosis found, LFTs okay.   - DASH diet     RENAL:   - HUBERT with BUN/CR ratio >20:1, s/p 2L of IVF in the ED.   - Electrolytes wnl.   - Monitor and replete lytes as needed  - Cr downtrending     ID:  -  WBC normal, afebrile  - Lactate of 4.3 downtrending to 3.4   - CT imaging unrevealing for acute infectious source.   - S/p Vanco x1 and Zosyn x1. Afebrile.   -RVP neg  - MRSA PCR pos, mupirocin started  - UA negative   - BCx NGTD  - Monitor off further abx at this time.     ENDO:   - H/o DM will start ISS, with POCT for euglycemia       HEME:   -Hgb of 5.1, Eliquis reversed with KCentra, on ASA/Plavix   - BUN 20:1 Cr s/p DDAVP for uremic bleeding  -3 U PRBC given, HH stable  - CT C/A/P did not show a source for bleeding  - Iron studies WNL  - F/u LDH  - Continue to monitor daily CBC and hemodynamics      ETHICS/DISPO:  - Pt critically ill requiring ICU level of care.  - Full Code

## 2024-11-02 NOTE — PROGRESS NOTE ADULT - ATTENDING COMMENTS
69 yo man with Hx afib on eliquis, NSTEMI, bipolar disorder presenting with severe anemia and hypotension, lactic acidosis.  Improved after Kcentra and total 3 PRBCs, requiring levophed for brief period.  Unclear source of bleeding but no evidence of hemolysis.    --bipolar disorder, continue home depakote, risperadone, benztropine  --afib controlled, continue lopressor  hold plavix and eliquis in setting of acute anemia  f/u echo  --stable respiratory status  --normal renal function  --no signs of GIB and FOBT neg  advance diet  PPI bid for now  --no infectious concerns, f/u Cx  --normocytic anemia, s/p 3 PRBCs with appropriate response  no clear cause, no evidence hemolysis on labs, normal B12 and folate   will start DVT ppx with lovenox  if Hb remains stable will need to address when to restart plavix and full AC  --stable for floor

## 2024-11-02 NOTE — PROGRESS NOTE ADULT - SUBJECTIVE AND OBJECTIVE BOX
INTERVAL HPI/OVERNIGHT EVENTS: No acute overnight events occurred.    SUBJECTIVE: Seen and examined pt at bedside. Has no acute complaints at this time.    Review of Systems:  Constitutional: No fever, chills, fatigue  Neuro: No headache, numbness, weakness  Resp: No cough, wheezing, shortness of breath  CVS: No chest pain, palpitations, leg swelling  GI: No abdominal pain, nausea, vomiting, diarrhea   : No dysuria, frequency, incontinence  Skin: No itching, burning, rashes, or lesions   Msk: No joint pain or swelling  Psych: No depression, anxiety, mood swings    ICU Vital Signs Last 24 Hrs  T(C): 36.4 (02 Nov 2024 07:46), Max: 36.9 (02 Nov 2024 00:07)  T(F): 97.5 (02 Nov 2024 07:46), Max: 98.5 (02 Nov 2024 00:07)  HR: 59 (02 Nov 2024 10:00) (52 - 84)  BP: 140/63 (02 Nov 2024 10:00) (69/43 - 189/84)  BP(mean): 91 (02 Nov 2024 10:00) (51 - 132)  ABP: --  ABP(mean): --  RR: 22 (02 Nov 2024 10:00) (14 - 35)  SpO2: 100% (02 Nov 2024 10:00) (93% - 100%)    O2 Parameters below as of 02 Nov 2024 08:00  Patient On (Oxygen Delivery Method): room air              11-01-24 @ 07:01  -  11-02-24 @ 07:00  --------------------------------------------------------  IN: 754 mL / OUT: 1475 mL / NET: -721 mL    11-02-24 @ 08:01  -  11-02-24 @ 11:46  --------------------------------------------------------  IN: 240 mL / OUT: 500 mL / NET: -260 mL        CAPILLARY BLOOD GLUCOSE      POCT Blood Glucose.: 102 mg/dL (02 Nov 2024 11:19)      I&O's Summary    01 Nov 2024 07:01  -  02 Nov 2024 07:00  --------------------------------------------------------  IN: 754 mL / OUT: 1475 mL / NET: -721 mL    02 Nov 2024 08:01  -  02 Nov 2024 11:46  --------------------------------------------------------  IN: 240 mL / OUT: 500 mL / NET: -260 mL        PHYSICAL EXAM:  General: NAD  Neurology: awake and alert. able to follow commands. A&Ox0  HEENT: NC/AT  Respiratory: CTA b/l, no rales or rhonchi noted  Cardiovascular: RRR, normal S1S2, no M/R/G  Abdomen: soft, NT/ND, +BS, no palpable masses  Extremities:  no clubbing, cyanosis, edema or ecchymoses of LE b/l  Skin: warm/dry    Meds:    metoprolol tartrate Oral    atorvastatin Oral  dextrose 50% Injectable IV Push  dextrose 50% Injectable IV Push  dextrose 50% Injectable IV Push  dextrose Oral Gel Oral PRN  glucagon  Injectable IntraMuscular  insulin lispro (ADMELOG) corrective regimen sliding scale SubCutaneous  levothyroxine Oral      benztropine Oral  divalproex ER Oral  QUEtiapine Oral  risperiDONE   Tablet Oral  traZODone Oral        pantoprazole  Injectable IV Push      dextrose 5%. IV Continuous  dextrose 5%. IV Continuous      chlorhexidine 2% Cloths Topical  mupirocin 2% Nasal Both Nostrils                              8.8    5.28  )-----------( 141      ( 02 Nov 2024 05:31 )             24.8       11-02    131[L]  |  100  |  20  ----------------------------<  96  4.1   |  26  |  0.72    Ca    8.6      02 Nov 2024 05:31  Phos  2.9     11-02  Mg     1.8     11-02    TPro  5.4[L]  /  Alb  2.5[L]  /  TBili  0.3  /  DBili  x   /  AST  26  /  ALT  16  /  AlkPhos  61  11-02    Lactate 1.1           11-02 @ 05:31          PT/INR - ( 31 Oct 2024 23:30 )   PT: 13.9 sec;   INR: 1.19 ratio         PTT - ( 31 Oct 2024 23:30 )  PTT:30.0 sec  Urinalysis Basic - ( 02 Nov 2024 05:31 )    Color: x / Appearance: x / SG: x / pH: x  Gluc: 96 mg/dL / Ketone: x  / Bili: x / Urobili: x   Blood: x / Protein: x / Nitrite: x   Leuk Esterase: x / RBC: x / WBC x   Sq Epi: x / Non Sq Epi: x / Bacteria: x      .Blood BLOOD   No growth at 24 hours -- 10-31 @ 23:30  .Blood BLOOD   No growth at 24 hours -- 10-31 @ 23:25      Rapid RVP Result: NotDetec (11-01 @ 06:30)          Radiology:    Bedside Ultrasound:    Tubes/Lines:      GLOBAL ISSUE/BEST PRACTICE:  Analgesia:  Sedation:  HOB elevation: Y  Stress ulcer prophylaxis:  VTE prophylaxis:  Glycemic control:  Nutrition:    CODE STATUS:        INTERVAL HPI/OVERNIGHT EVENTS: No acute overnight events occurred.    SUBJECTIVE: Seen and examined pt at bedside. Has no acute complaints at this time.    Review of Systems:  Constitutional: No fever, chills, fatigue  Neuro: No headache, numbness, weakness  Resp: No cough, wheezing, shortness of breath  CVS: No chest pain, palpitations, leg swelling  GI: No abdominal pain, nausea, vomiting, diarrhea   : No dysuria, frequency, incontinence  Skin: No itching, burning, rashes, or lesions   Msk: No joint pain or swelling  Psych: No depression, anxiety, mood swings    ICU Vital Signs Last 24 Hrs  T(C): 36.4 (02 Nov 2024 07:46), Max: 36.9 (02 Nov 2024 00:07)  T(F): 97.5 (02 Nov 2024 07:46), Max: 98.5 (02 Nov 2024 00:07)  HR: 59 (02 Nov 2024 10:00) (52 - 84)  BP: 140/63 (02 Nov 2024 10:00) (69/43 - 189/84)  BP(mean): 91 (02 Nov 2024 10:00) (51 - 132)  ABP: --  ABP(mean): --  RR: 22 (02 Nov 2024 10:00) (14 - 35)  SpO2: 100% (02 Nov 2024 10:00) (93% - 100%)    O2 Parameters below as of 02 Nov 2024 08:00  Patient On (Oxygen Delivery Method): room air              11-01-24 @ 07:01  -  11-02-24 @ 07:00  --------------------------------------------------------  IN: 754 mL / OUT: 1475 mL / NET: -721 mL    11-02-24 @ 08:01  -  11-02-24 @ 11:46  --------------------------------------------------------  IN: 240 mL / OUT: 500 mL / NET: -260 mL        CAPILLARY BLOOD GLUCOSE      POCT Blood Glucose.: 102 mg/dL (02 Nov 2024 11:19)      I&O's Summary    01 Nov 2024 07:01  -  02 Nov 2024 07:00  --------------------------------------------------------  IN: 754 mL / OUT: 1475 mL / NET: -721 mL    02 Nov 2024 08:01  -  02 Nov 2024 11:46  --------------------------------------------------------  IN: 240 mL / OUT: 500 mL / NET: -260 mL        PHYSICAL EXAM:  General: NAD  Neurology: awake and alert. able to follow commands. A&Ox0  HEENT: NC/AT  Respiratory: CTA b/l, no rales or rhonchi noted  Cardiovascular: RRR, normal S1S2, no M/R/G  Abdomen: soft, NT/ND, +BS, no palpable masses  Extremities:  no clubbing, cyanosis, edema or ecchymoses of LE b/l. BLLE NTTP. Calves NTTP BL, compartments soft and compressible   Skin: warm/dry    Meds:    metoprolol tartrate Oral    atorvastatin Oral  dextrose 50% Injectable IV Push  dextrose 50% Injectable IV Push  dextrose 50% Injectable IV Push  dextrose Oral Gel Oral PRN  glucagon  Injectable IntraMuscular  insulin lispro (ADMELOG) corrective regimen sliding scale SubCutaneous  levothyroxine Oral      benztropine Oral  divalproex ER Oral  QUEtiapine Oral  risperiDONE   Tablet Oral  traZODone Oral        pantoprazole  Injectable IV Push      dextrose 5%. IV Continuous  dextrose 5%. IV Continuous      chlorhexidine 2% Cloths Topical  mupirocin 2% Nasal Both Nostrils                              8.8    5.28  )-----------( 141      ( 02 Nov 2024 05:31 )             24.8       11-02    131[L]  |  100  |  20  ----------------------------<  96  4.1   |  26  |  0.72    Ca    8.6      02 Nov 2024 05:31  Phos  2.9     11-02  Mg     1.8     11-02    TPro  5.4[L]  /  Alb  2.5[L]  /  TBili  0.3  /  DBili  x   /  AST  26  /  ALT  16  /  AlkPhos  61  11-02    Lactate 1.1           11-02 @ 05:31          PT/INR - ( 31 Oct 2024 23:30 )   PT: 13.9 sec;   INR: 1.19 ratio         PTT - ( 31 Oct 2024 23:30 )  PTT:30.0 sec  Urinalysis Basic - ( 02 Nov 2024 05:31 )    Color: x / Appearance: x / SG: x / pH: x  Gluc: 96 mg/dL / Ketone: x  / Bili: x / Urobili: x   Blood: x / Protein: x / Nitrite: x   Leuk Esterase: x / RBC: x / WBC x   Sq Epi: x / Non Sq Epi: x / Bacteria: x      .Blood BLOOD   No growth at 24 hours -- 10-31 @ 23:30  .Blood BLOOD   No growth at 24 hours -- 10-31 @ 23:25      Rapid RVP Result: NotDetec (11-01 @ 06:30)          Radiology:  < from: CT Abdomen and Pelvis w/ IV Cont (11.01.24 @ 00:43) >  ACC: [id number] EXAM:  CT ABDOMEN AND PELVIS IC   ORDERED BY: MICHELLE NOLASCO     ACC: [id number] EXAM:  CT CHEST IC   ORDERED BY: MICHELLE NOLASCO     PROCEDURE DATE:  11/01/2024          INTERPRETATION:  CLINICAL INFORMATION: Anemic.    COMPARISON: None.    CONTRAST/COMPLICATIONS:  IV Contrast: Omnipaque 350  90 cc administered   10 cc discarded  Oral Contrast: NONE  Complications: None reported at time of study completion    PROCEDURE:  CT of the Chest, Abdomen and Pelvis was performed.  Sagittal and coronal reformats were performed.    FINDINGS:  CHEST:  LUNGS AND LARGE AIRWAYS: Patent central airways. Mild dependent   atelectasis. No specific diffuse groundglass interstitial densities could   represent the stigmata of an incomplete inspiratory effort, however,   developing pulmonary interstitial edema, an ongoing nonspecific   pneumonitis, or other pathologies cannot be completely excluded. No   evidence of consolidative pneumonia. No suspicious lung nodules. No   evidence of a pneumothorax.  PLEURA: Small/trace right pleural effusion and associated compressive   atelectasis.  VESSELS: Aortic calcifications. Coronary artery calcifications.  HEART: Heart size is normal. No pericardial effusion.  MEDIASTINUM AND JESUS: No lymphadenopathy.  CHEST WALL AND LOWER NECK: Within normal limits.    ABDOMEN AND PELVIS:  LIVER: Within normal limits.  BILE DUCTS: Normal caliber.  GALLBLADDER: Within normal limits.  SPLEEN: Within normal limits.  PANCREAS: Within normal limits.  ADRENALS: Within normal limits.  KIDNEYS/URETERS: Within normal limits.    BLADDER: Within normal limits.  REPRODUCTIVE ORGANS: Prostate is enlarged.    BOWEL: No bowel obstruction. Appendix is normal. Moderate-to-severe   constipation.  PERITONEUM/RETROPERITONEUM: Within normal limits.  VESSELS: Atherosclerotic changes.  LYMPH NODES: No lymphadenopathy.  ABDOMINAL WALL: Within normal limits.  BONES: Degenerative changes.    IMPRESSION:  1.  No acute abnormality detected.  2.  Nonspecific appearance of the lungs may bedue to an incomplete   inspiratory effort, however, other etiologies cannot be completely   excluded.  3.  Small right pleural effusion.        --- End of Report ---             MINDY CASTLE MD; Attending Radiologist  This document has been electronically signed. Nov 1 2024 12:55AM    < end of copied text >      GLOBAL ISSUE/BEST PRACTICE:  Analgesia: N  Sedation: N  HOB elevation: Y  Stress ulcer prophylaxis: protonix  VTE prophylaxis: lovenox 40  Glycemic control: ISS  Nutrition: DASH diet    CODE STATUS: Full Code       INTERVAL HPI/OVERNIGHT EVENTS: No acute overnight events occurred.    SUBJECTIVE: Seen and examined pt at bedside. Has no acute complaints at this time.        ICU Vital Signs Last 24 Hrs  T(C): 36.4 (02 Nov 2024 07:46), Max: 36.9 (02 Nov 2024 00:07)  T(F): 97.5 (02 Nov 2024 07:46), Max: 98.5 (02 Nov 2024 00:07)  HR: 59 (02 Nov 2024 10:00) (52 - 84)  BP: 140/63 (02 Nov 2024 10:00) (69/43 - 189/84)  BP(mean): 91 (02 Nov 2024 10:00) (51 - 132)  ABP: --  ABP(mean): --  RR: 22 (02 Nov 2024 10:00) (14 - 35)  SpO2: 100% (02 Nov 2024 10:00) (93% - 100%)    O2 Parameters below as of 02 Nov 2024 08:00  Patient On (Oxygen Delivery Method): room air              11-01-24 @ 07:01  -  11-02-24 @ 07:00  --------------------------------------------------------  IN: 754 mL / OUT: 1475 mL / NET: -721 mL    11-02-24 @ 08:01  -  11-02-24 @ 11:46  --------------------------------------------------------  IN: 240 mL / OUT: 500 mL / NET: -260 mL        CAPILLARY BLOOD GLUCOSE      POCT Blood Glucose.: 102 mg/dL (02 Nov 2024 11:19)      I&O's Summary    01 Nov 2024 07:01  -  02 Nov 2024 07:00  --------------------------------------------------------  IN: 754 mL / OUT: 1475 mL / NET: -721 mL    02 Nov 2024 08:01  -  02 Nov 2024 11:46  --------------------------------------------------------  IN: 240 mL / OUT: 500 mL / NET: -260 mL        PHYSICAL EXAM:  General: NAD  Neurology: awake and alert. able to follow commands  HEENT: NC/AT  Respiratory: CTA b/l, no rales or rhonchi noted  Cardiovascular: RRR, normal S1S2, no M/R/G  Abdomen: soft, NT/ND, +BS, no palpable masses  Extremities:  no clubbing, cyanosis, edema or ecchymoses of LE b/l. BLLE NTTP. Calves NTTP BL, compartments soft and compressible   Skin: warm/dry    Meds:    metoprolol tartrate Oral    atorvastatin Oral  dextrose 50% Injectable IV Push  dextrose 50% Injectable IV Push  dextrose 50% Injectable IV Push  dextrose Oral Gel Oral PRN  glucagon  Injectable IntraMuscular  insulin lispro (ADMELOG) corrective regimen sliding scale SubCutaneous  levothyroxine Oral      benztropine Oral  divalproex ER Oral  QUEtiapine Oral  risperiDONE   Tablet Oral  traZODone Oral        pantoprazole  Injectable IV Push      dextrose 5%. IV Continuous  dextrose 5%. IV Continuous      chlorhexidine 2% Cloths Topical  mupirocin 2% Nasal Both Nostrils                              8.8    5.28  )-----------( 141      ( 02 Nov 2024 05:31 )             24.8       11-02    131[L]  |  100  |  20  ----------------------------<  96  4.1   |  26  |  0.72    Ca    8.6      02 Nov 2024 05:31  Phos  2.9     11-02  Mg     1.8     11-02    TPro  5.4[L]  /  Alb  2.5[L]  /  TBili  0.3  /  DBili  x   /  AST  26  /  ALT  16  /  AlkPhos  61  11-02    Lactate 1.1           11-02 @ 05:31          PT/INR - ( 31 Oct 2024 23:30 )   PT: 13.9 sec;   INR: 1.19 ratio         PTT - ( 31 Oct 2024 23:30 )  PTT:30.0 sec  Urinalysis Basic - ( 02 Nov 2024 05:31 )    Color: x / Appearance: x / SG: x / pH: x  Gluc: 96 mg/dL / Ketone: x  / Bili: x / Urobili: x   Blood: x / Protein: x / Nitrite: x   Leuk Esterase: x / RBC: x / WBC x   Sq Epi: x / Non Sq Epi: x / Bacteria: x      .Blood BLOOD   No growth at 24 hours -- 10-31 @ 23:30  .Blood BLOOD   No growth at 24 hours -- 10-31 @ 23:25      Rapid RVP Result: NotDetec (11-01 @ 06:30)          Radiology:  < from: CT Abdomen and Pelvis w/ IV Cont (11.01.24 @ 00:43) >  ACC: [id number] EXAM:  CT ABDOMEN AND PELVIS IC   ORDERED BY: MICHELLE NOLASCO     ACC: [id number] EXAM:  CT CHEST IC   ORDERED BY: MICHELLE NOLASCO     PROCEDURE DATE:  11/01/2024          INTERPRETATION:  CLINICAL INFORMATION: Anemic.    COMPARISON: None.    CONTRAST/COMPLICATIONS:  IV Contrast: Omnipaque 350  90 cc administered   10 cc discarded  Oral Contrast: NONE  Complications: None reported at time of study completion    PROCEDURE:  CT of the Chest, Abdomen and Pelvis was performed.  Sagittal and coronal reformats were performed.    FINDINGS:  CHEST:  LUNGS AND LARGE AIRWAYS: Patent central airways. Mild dependent   atelectasis. No specific diffuse groundglass interstitial densities could   represent the stigmata of an incomplete inspiratory effort, however,   developing pulmonary interstitial edema, an ongoing nonspecific   pneumonitis, or other pathologies cannot be completely excluded. No   evidence of consolidative pneumonia. No suspicious lung nodules. No   evidence of a pneumothorax.  PLEURA: Small/trace right pleural effusion and associated compressive   atelectasis.  VESSELS: Aortic calcifications. Coronary artery calcifications.  HEART: Heart size is normal. No pericardial effusion.  MEDIASTINUM AND JESUS: No lymphadenopathy.  CHEST WALL AND LOWER NECK: Within normal limits.    ABDOMEN AND PELVIS:  LIVER: Within normal limits.  BILE DUCTS: Normal caliber.  GALLBLADDER: Within normal limits.  SPLEEN: Within normal limits.  PANCREAS: Within normal limits.  ADRENALS: Within normal limits.  KIDNEYS/URETERS: Within normal limits.    BLADDER: Within normal limits.  REPRODUCTIVE ORGANS: Prostate is enlarged.    BOWEL: No bowel obstruction. Appendix is normal. Moderate-to-severe   constipation.  PERITONEUM/RETROPERITONEUM: Within normal limits.  VESSELS: Atherosclerotic changes.  LYMPH NODES: No lymphadenopathy.  ABDOMINAL WALL: Within normal limits.  BONES: Degenerative changes.    IMPRESSION:  1.  No acute abnormality detected.  2.  Nonspecific appearance of the lungs may bedue to an incomplete   inspiratory effort, however, other etiologies cannot be completely   excluded.  3.  Small right pleural effusion.        --- End of Report ---             MINYD CASTLE MD; Attending Radiologist  This document has been electronically signed. Nov 1 2024 12:55AM    < end of copied text >        GLOBAL ISSUE/BEST PRACTICE:  Analgesia: N  Sedation: N  HOB elevation: Y  Stress ulcer prophylaxis: protonix  VTE prophylaxis: lovenox 40  Glycemic control: ISS  Nutrition: DASH diet    CODE STATUS: Full Code

## 2024-11-02 NOTE — PROGRESS NOTE ADULT - SUBJECTIVE AND OBJECTIVE BOX
[facility]  Brad Us PA-C  [facility] [place] [address]  [phone]      INTERVAL HPI/ OVERNIGHT EVENTS:  Pt s/e in ICU. He is a poor historian and thus was unable to obtain meaningful history  Denies having abdominal pain        Allergies:  lithium (Rash)  clozapine (Rash)      Medications:  MEDICATIONS  (STANDING):  atorvastatin 80 milliGRAM(s) Oral at bedtime  benztropine 2 milliGRAM(s) Oral two times a day  chlorhexidine 2% Cloths 1 Application(s) Topical <User Schedule>  dextrose 5%. 1000 milliLiter(s) (100 mL/Hr) IV Continuous <Continuous>  dextrose 5%. 1000 milliLiter(s) (50 mL/Hr) IV Continuous <Continuous>  dextrose 50% Injectable 25 Gram(s) IV Push once  dextrose 50% Injectable 12.5 Gram(s) IV Push once  dextrose 50% Injectable 25 Gram(s) IV Push once  divalproex ER 1500 milliGRAM(s) Oral <User Schedule>  glucagon  Injectable 1 milliGRAM(s) IntraMuscular once  insulin lispro (ADMELOG) corrective regimen sliding scale   SubCutaneous every 6 hours  levothyroxine 88 MICROGram(s) Oral daily  metoprolol tartrate 25 milliGRAM(s) Oral two times a day  mupirocin 2% Nasal 1 Application(s) Both Nostrils two times a day  pantoprazole  Injectable 40 milliGRAM(s) IV Push every 12 hours  QUEtiapine 400 milliGRAM(s) Oral at bedtime  risperiDONE   Tablet 4 milliGRAM(s) Oral two times a day  traZODone 100 milliGRAM(s) Oral at bedtime    MEDICATIONS  (PRN):  dextrose Oral Gel 15 Gram(s) Oral once PRN Blood Glucose LESS THAN 70 milliGRAM(s)/deciliter        PMHX/PSHX:  Parkinsonism    Schizophrenia    Hyponatremia    NSTEMI (non-ST elevation myocardial infarction)    Bipolar disorder    DM (diabetes mellitus)    Dementia    HTN (hypertension)    Chronic CHF    MDD (major depressive disorder)    Hypothyroidism    HLD (hyperlipidemia)    Hypothyroidism    Constipation    Folate deficiency    Constipation    H/O candidiasis    Violent behavior    Atrial fibrillation    Hyperkalemia    Epistaxis    DM (diabetes mellitus)        ROS:   Unable to obtain since pt is a poor historian    PHYSICAL EXAM:   Vital Signs Last 24 Hrs  T(C): 36.4 (2024 07:46), Max: 36.9 (2024 00:07)  T(F): 97.5 (2024 07:46), Max: 98.5 (2024 00:07)  HR: 62 (2024 08:00) (52 - 89)  BP: 159/76 (2024 08:00) (69/43 - 189/84)  BP(mean): 109 (2024 08:00) (51 - 122)  RR: 23 (2024 08:00) (14 - 35)  SpO2: 100% (2024 08:00) (93% - 100%)    Parameters below as of 2024 08:00  Patient On (Oxygen Delivery Method): room air          Daily Height in cm: 160.02 (2024 04:06)    Daily Weight in k.6 (2024 04:32)    GENERAL:  Appears stated age  HEENT:  NC/AT  CHEST:  Full & symmetric excursion  HEART:  Regular rhythm  ABDOMEN:  Soft, non-tender, non-distended  EXTEREMITIES:  no cyanosis, clubbing or edema  SKIN:  No rash  NEURO:  Alert    LABS:                                 8.8    5.28  )-----------( 141      ( 2024 05:31 )             24.8     11-02    131[L]  |  100  |  20  ----------------------------<  96  4.1   |  26  |  0.72    Ca    8.6      2024 05:31  Phos  2.9     11-02  Mg     1.8     11-02    TPro  5.4[L]  /  Alb  2.5[L]  /  TBili  0.3  /  DBili  x   /  AST  26  /  ALT  16  /  AlkPhos  61  11-02  LIVER FUNCTIONS - ( 2024 05:31 )  Alb: 2.5 g/dL / Pro: 5.4 g/dL / ALK PHOS: 61 U/L / ALT: 16 U/L / AST: 26 U/L / GGT: x                PT/INR - ( 31 Oct 2024 23:30 )   PT: 13.9 sec;   INR: 1.19 ratio         PTT - ( 31 Oct 2024 23:30 )  PTT:30.0 sec  Urinalysis Basic - ( 2024 10:12 )    Color: x / Appearance: x / SG: x / pH: x  Gluc: 102 mg/dL / Ketone: x  / Bili: x / Urobili: x   Blood: x / Protein: x / Nitrite: x   Leuk Esterase: x / RBC: x / WBC x   Sq Epi: x / Non Sq Epi: x / Bacteria: x      [id number]      Imaging:  < from: CT Abdomen and Pelvis w/ IV Cont (24 @ 00:43) >    ACC: [id number] EXAM:  CT ABDOMEN AND PELVIS IC   ORDERED BY: MICHELLE NOLASCO     ACC: [id number] EXAM:  CT CHEST IC   ORDERED BY: MICHELLE NOLASCO     PROCEDURE DATE:  2024          INTERPRETATION:  CLINICAL INFORMATION: Anemic.    COMPARISON: None.    CONTRAST/COMPLICATIONS:  IV Contrast: Omnipaque 350  90 cc administered   10 cc discarded  Oral Contrast: NONE  Complications: None reported at time of study completion    PROCEDURE:  CT of the Chest, Abdomen and Pelvis was performed.  Sagittal and coronal reformats were performed.    FINDINGS:  CHEST:  LUNGS AND LARGE AIRWAYS: Patent central airways. Mild dependent   atelectasis. No specific diffuse groundglass interstitial densities could   represent the stigmata of an incomplete inspiratory effort, however,   developing pulmonary interstitial edema, an ongoing nonspecific   pneumonitis, or other pathologies cannot be completely excluded. No   evidence of consolidative pneumonia. No suspicious lung nodules. No   evidence of a pneumothorax.  PLEURA: Small/trace right pleural effusion and associated compressive   atelectasis.  VESSELS: Aortic calcifications. Coronary artery calcifications.  HEART: Heart size is normal. No pericardial effusion.  MEDIASTINUM AND JESUS: No lymphadenopathy.  CHEST WALL AND LOWER NECK: Within normal limits.    ABDOMEN AND PELVIS:  LIVER: Within normal limits.  BILE DUCTS: Normal caliber.  GALLBLADDER: Within normal limits.  SPLEEN: Within normal limits.  PANCREAS: Within normal limits.  ADRENALS: Within normal limits.  KIDNEYS/URETERS: Within normal limits.    BLADDER: Within normal limits.  REPRODUCTIVE ORGANS: Prostate is enlarged.    BOWEL: No bowel obstruction. Appendix is normal. Moderate-to-severe   constipation.  PERITONEUM/RETROPERITONEUM: Within normal limits.  VESSELS: Atherosclerotic changes.  LYMPH NODES: No lymphadenopathy.  ABDOMINAL WALL: Within normal limits.  BONES: Degenerative changes.    IMPRESSION:  1.  No acute abnormality detected.  2.  Nonspecific appearance of the lungs may bedue to an incomplete   inspiratory effort, however, other etiologies cannot be completely   excluded.  3.  Small right pleural effusion.    --- End of Report ---       MINDY CASTLE MD; Attending Radiologist  This document has been electronically signed. 2024 12:55AM    < end of copied text >

## 2024-11-02 NOTE — PROGRESS NOTE ADULT - SUBJECTIVE AND OBJECTIVE BOX
PROGRESS NOTE  Patient is a 68y old  Male who presents with a chief complaint of anemia and hypotension (01 Nov 2024 15:20)    Chart and available morning labs /imaging are reviewed electronically , urgent issues addressed . More information  is being added upon completion of rounds , when more information is collected and management discussed with consultants , medical staff and social service/case management on the floor   OVERNIGHT      HPI:  68 MALE with  PMH NSTEMI, schizophrenia, bipolar d/o, HTN, hypoNa BIBEMS from [facility] for anemia to 6 and hypotension. Pt's O2 sat was 90% on RA and was placed on NC. Per EMS, facility states pt was altered from baseline as well. Was noted to by hypotensive to sBP 70's  	Pt has different MRN: [id number] (B/L Hb noted to be close to 11) Admitted to ICU -  presenting with severe anemia and hypotension, lactic acidosis.  Improved after Kcentra and total 3 PRBCs, requiring levophed for brief period.  Unclear source of bleeding.    --bipolar disorder, continue home depakote, risperadone, benztropine  --shock now resolved, off levophed following 3rd PRBC  afib controlled, hold BB for now, no AC in setting of anemia and possible bleed  continue statin  f/u echo  --stable respiratory status  --mild HUBERT resolved  lactic acidosis improved  --no signs of GIB, and FOBT neg, clear liquids for now and continue PPI bid  --no infectious concerns, f/u Cx  --macrocytic anemia, s/p 3 PRBCs with appropriate response, (01 Nov 2024 10:53)    PAST MEDICAL & SURGICAL HISTORY:  Parkinsonism      Schizophrenia      Hyponatremia      NSTEMI (non-ST elevation myocardial infarction)      Bipolar disorder      DM (diabetes mellitus)      Dementia      HTN (hypertension)      Chronic CHF      MDD (major depressive disorder)      Hypothyroidism      HLD (hyperlipidemia)      Hypothyroidism      Constipation      Folate deficiency      Constipation      H/O candidiasis      Violent behavior      Atrial fibrillation      Hyperkalemia      Epistaxis      DM (diabetes mellitus)          MEDICATIONS  (STANDING):  atorvastatin 80 milliGRAM(s) Oral at bedtime  benztropine 2 milliGRAM(s) Oral two times a day  chlorhexidine 2% Cloths 1 Application(s) Topical <User Schedule>  dextrose 5%. 1000 milliLiter(s) (100 mL/Hr) IV Continuous <Continuous>  dextrose 5%. 1000 milliLiter(s) (50 mL/Hr) IV Continuous <Continuous>  dextrose 50% Injectable 25 Gram(s) IV Push once  dextrose 50% Injectable 25 Gram(s) IV Push once  dextrose 50% Injectable 12.5 Gram(s) IV Push once  divalproex ER 1500 milliGRAM(s) Oral <User Schedule>  glucagon  Injectable 1 milliGRAM(s) IntraMuscular once  insulin lispro (ADMELOG) corrective regimen sliding scale   SubCutaneous every 6 hours  levothyroxine 88 MICROGram(s) Oral daily  metoprolol tartrate 25 milliGRAM(s) Oral two times a day  mupirocin 2% Nasal 1 Application(s) Both Nostrils two times a day  pantoprazole  Injectable 40 milliGRAM(s) IV Push every 12 hours  QUEtiapine 400 milliGRAM(s) Oral at bedtime  risperiDONE   Tablet 4 milliGRAM(s) Oral two times a day  traZODone 100 milliGRAM(s) Oral at bedtime    MEDICATIONS  (PRN):  dextrose Oral Gel 15 Gram(s) Oral once PRN Blood Glucose LESS THAN 70 milliGRAM(s)/deciliter      OBJECTIVE    T(C): 36.4 (11-02-24 @ 07:46), Max: 36.9 (11-02-24 @ 00:07)  HR: 62 (11-02-24 @ 08:00) (52 - 89)  BP: 159/76 (11-02-24 @ 08:00) (69/43 - 189/84)  RR: 23 (11-02-24 @ 08:00) (14 - 36)  SpO2: 100% (11-02-24 @ 08:00) (93% - 100%)  Wt(kg): --  I&O's Summary    01 Nov 2024 07:01  -  02 Nov 2024 07:00  --------------------------------------------------------  IN: 754 mL / OUT: 1475 mL / NET: -721 mL    02 Nov 2024 08:01  -  02 Nov 2024 09:08  --------------------------------------------------------  IN: 0 mL / OUT: 300 mL / NET: -300 mL          REVIEW OF SYSTEMS:  CONSTITUTIONAL: No fever, weight loss, or fatigue  EYES: No eye pain, visual disturbances, or discharge  ENMT:   No sinus or throat pain  NECK: No pain or stiffness  RESPIRATORY: No cough, wheezing, chills or hemoptysis; No shortness of breath  CARDIOVASCULAR: No chest pain, palpitations, dizziness, or leg swelling  GASTROINTESTINAL: No abdominal pain. No nausea, vomiting; No diarrhea or constipation. No melena or hematochezia.  GENITOURINARY: No dysuria, frequency, hematuria, or incontinence  NEUROLOGICAL: No headaches, memory loss, loss of strength, numbness, or tremors  SKIN: No itching, burning, rashes, or lesions   MUSCULOSKELETAL: No joint pain or swelling; No muscle, back, or extremity pain    PHYSICAL EXAM:  Appearance: NAD. VS past 24 hrs -as above   HEENT:   Moist oral mucosa. Conjunctiva clear b/l.   Neck : supple  Respiratory: Lungs CTAB.  Gastrointestinal:  Soft, nontender. No rebound. No rigidity. BS present	  Cardiovascular: RRR ,S1S2 present  Neurologic: Non-focal. Moving all extremities.  Extremities: No edema. No erythema. No calf tenderness.  Skin: No rashes, No ecchymoses, No cyanosis.	  wounds ,skin lesions-See skin assesment flow sheet   LABS:                        8.8    5.28  )-----------( 141      ( 02 Nov 2024 05:31 )             24.8     11-02    131[L]  |  100  |  20  ----------------------------<  96  4.1   |  26  |  0.72    Ca    8.6      02 Nov 2024 05:31  Phos  2.9     11-02  Mg     1.8     11-02    TPro  5.4[L]  /  Alb  2.5[L]  /  TBili  0.3  /  DBili  x   /  AST  26  /  ALT  16  /  AlkPhos  61  11-02    CAPILLARY BLOOD GLUCOSE      POCT Blood Glucose.: 90 mg/dL (02 Nov 2024 05:32)  POCT Blood Glucose.: 98 mg/dL (01 Nov 2024 23:37)  POCT Blood Glucose.: 100 mg/dL (01 Nov 2024 14:41)    PT/INR - ( 31 Oct 2024 23:30 )   PT: 13.9 sec;   INR: 1.19 ratio         PTT - ( 31 Oct 2024 23:30 )  PTT:30.0 sec  Urinalysis Basic - ( 02 Nov 2024 05:31 )    Color: x / Appearance: x / SG: x / pH: x  Gluc: 96 mg/dL / Ketone: x  / Bili: x / Urobili: x   Blood: x / Protein: x / Nitrite: x   Leuk Esterase: x / RBC: x / WBC x   Sq Epi: x / Non Sq Epi: x / Bacteria: x        Urinalysis with Rflx Culture (collected 01 Nov 2024 03:30)    Culture - Blood (collected 31 Oct 2024 23:30)  Source: .Blood BLOOD  Preliminary Report (02 Nov 2024 07:01):    No growth at 24 hours    Culture - Blood (collected 31 Oct 2024 23:25)  Source: .Blood BLOOD  Preliminary Report (02 Nov 2024 07:01):    No growth at 24 hours      RADIOLOGY & ADDITIONAL TESTS:   reviewed elctronically  ASSESSMENT/PLAN: 	     PROGRESS NOTE  Patient is a 68y old  Male who presents with a chief complaint of anemia and hypotension (01 Nov 2024 15:20)    Chart and available morning labs /imaging are reviewed electronically , urgent issues addressed . More information  is being added upon completion of rounds , when more information is collected and management discussed with consultants , medical staff and social service/case management on the floor   OVERNIGHT    No new issues reported by medical staff . All above noted Patient is resting in a bed comfortably .Confused ,poor mentation .No distress noted   HPI:  68 MALE with  PMH NSTEMI, schizophrenia, bipolar d/o, HTN, hypoNa BIBEMS from [facility] for anemia to 6 and hypotension. Pt's O2 sat was 90% on RA and was placed on NC. Per EMS, facility states pt was altered from baseline as well. Was noted to by hypotensive to sBP 70's  	Pt has different MRN: [id number] (B/L Hb noted to be close to 11) Admitted to ICU -  presenting with severe anemia and hypotension, lactic acidosis.  Improved after Kcentra and total 3 PRBCs, requiring levophed for brief period.  Unclear source of bleeding.    --bipolar disorder, continue home depakote, risperadone, benztropine  --shock now resolved, off levophed following 3rd PRBC  afib controlled, hold BB for now, no AC in setting of anemia and possible bleed  continue statin  f/u echo  --stable respiratory status  --mild HUBERT resolved  lactic acidosis improved  --no signs of GIB, and FOBT neg, clear liquids for now and continue PPI bid  --no infectious concerns, f/u Cx  --macrocytic anemia, s/p 3 PRBCs with appropriate response, (01 Nov 2024 10:53)    PAST MEDICAL & SURGICAL HISTORY:  Parkinsonism      Schizophrenia      Hyponatremia      NSTEMI (non-ST elevation myocardial infarction)      Bipolar disorder      DM (diabetes mellitus)      Dementia      HTN (hypertension)      Chronic CHF      MDD (major depressive disorder)      Hypothyroidism      HLD (hyperlipidemia)      Hypothyroidism      Constipation      Folate deficiency      Constipation      H/O candidiasis      Violent behavior      Atrial fibrillation      Hyperkalemia      Epistaxis      DM (diabetes mellitus)          MEDICATIONS  (STANDING):  atorvastatin 80 milliGRAM(s) Oral at bedtime  benztropine 2 milliGRAM(s) Oral two times a day  chlorhexidine 2% Cloths 1 Application(s) Topical <User Schedule>  dextrose 5%. 1000 milliLiter(s) (100 mL/Hr) IV Continuous <Continuous>  dextrose 5%. 1000 milliLiter(s) (50 mL/Hr) IV Continuous <Continuous>  dextrose 50% Injectable 25 Gram(s) IV Push once  dextrose 50% Injectable 25 Gram(s) IV Push once  dextrose 50% Injectable 12.5 Gram(s) IV Push once  divalproex ER 1500 milliGRAM(s) Oral <User Schedule>  glucagon  Injectable 1 milliGRAM(s) IntraMuscular once  insulin lispro (ADMELOG) corrective regimen sliding scale   SubCutaneous every 6 hours  levothyroxine 88 MICROGram(s) Oral daily  metoprolol tartrate 25 milliGRAM(s) Oral two times a day  mupirocin 2% Nasal 1 Application(s) Both Nostrils two times a day  pantoprazole  Injectable 40 milliGRAM(s) IV Push every 12 hours  QUEtiapine 400 milliGRAM(s) Oral at bedtime  risperiDONE   Tablet 4 milliGRAM(s) Oral two times a day  traZODone 100 milliGRAM(s) Oral at bedtime    MEDICATIONS  (PRN):  dextrose Oral Gel 15 Gram(s) Oral once PRN Blood Glucose LESS THAN 70 milliGRAM(s)/deciliter      OBJECTIVE    T(C): 36.4 (11-02-24 @ 07:46), Max: 36.9 (11-02-24 @ 00:07)  HR: 62 (11-02-24 @ 08:00) (52 - 89)  BP: 159/76 (11-02-24 @ 08:00) (69/43 - 189/84)  RR: 23 (11-02-24 @ 08:00) (14 - 36)  SpO2: 100% (11-02-24 @ 08:00) (93% - 100%)  Wt(kg): --  I&O's Summary    01 Nov 2024 07:01  -  02 Nov 2024 07:00  --------------------------------------------------------  IN: 754 mL / OUT: 1475 mL / NET: -721 mL    02 Nov 2024 08:01  -  02 Nov 2024 09:08  --------------------------------------------------------  IN: 0 mL / OUT: 300 mL / NET: -300 mL          REVIEW OF SYSTEMS:  CONSTITUTIONAL: No fever, weight loss, or fatigue  EYES: No eye pain, visual disturbances, or discharge  ENMT:   No sinus or throat pain  NECK: No pain or stiffness  RESPIRATORY: No cough, wheezing, chills or hemoptysis; No shortness of breath  CARDIOVASCULAR: No chest pain, palpitations, dizziness, or leg swelling  GASTROINTESTINAL: No abdominal pain. No nausea, vomiting; No diarrhea or constipation. No melena or hematochezia.  GENITOURINARY: No dysuria, frequency, hematuria, or incontinence  NEUROLOGICAL: No headaches, memory loss, loss of strength, numbness, or tremors  SKIN: No itching, burning, rashes, or lesions   MUSCULOSKELETAL: No joint pain or swelling; No muscle, back, or extremity pain    PHYSICAL EXAM:  Appearance: NAD. VS past 24 hrs -as above   HEENT:   Moist oral mucosa. Conjunctiva clear b/l.   Neck : supple  Respiratory: Lungs CTAB.  Gastrointestinal:  Soft, nontender. No rebound. No rigidity. BS present	  Cardiovascular: RRR ,S1S2 present  Neurologic: Non-focal. Moving all extremities.  Extremities: No edema. No erythema. No calf tenderness.  Skin: No rashes, No ecchymoses, No cyanosis.	  wounds ,skin lesions-See skin assesment flow sheet   LABS:                        8.8    5.28  )-----------( 141      ( 02 Nov 2024 05:31 )             24.8     11-02    131[L]  |  100  |  20  ----------------------------<  96  4.1   |  26  |  0.72    Ca    8.6      02 Nov 2024 05:31  Phos  2.9     11-02  Mg     1.8     11-02    TPro  5.4[L]  /  Alb  2.5[L]  /  TBili  0.3  /  DBili  x   /  AST  26  /  ALT  16  /  AlkPhos  61  11-02    CAPILLARY BLOOD GLUCOSE      POCT Blood Glucose.: 90 mg/dL (02 Nov 2024 05:32)  POCT Blood Glucose.: 98 mg/dL (01 Nov 2024 23:37)  POCT Blood Glucose.: 100 mg/dL (01 Nov 2024 14:41)    PT/INR - ( 31 Oct 2024 23:30 )   PT: 13.9 sec;   INR: 1.19 ratio         PTT - ( 31 Oct 2024 23:30 )  PTT:30.0 sec  Urinalysis Basic - ( 02 Nov 2024 05:31 )    Color: x / Appearance: x / SG: x / pH: x  Gluc: 96 mg/dL / Ketone: x  / Bili: x / Urobili: x   Blood: x / Protein: x / Nitrite: x   Leuk Esterase: x / RBC: x / WBC x   Sq Epi: x / Non Sq Epi: x / Bacteria: x        Urinalysis with Rflx Culture (collected 01 Nov 2024 03:30)    Culture - Blood (collected 31 Oct 2024 23:30)  Source: .Blood BLOOD  Preliminary Report (02 Nov 2024 07:01):    No growth at 24 hours    Culture - Blood (collected 31 Oct 2024 23:25)  Source: .Blood BLOOD  Preliminary Report (02 Nov 2024 07:01):    No growth at 24 hours      RADIOLOGY & ADDITIONAL TESTS:   reviewed elctronically  ASSESSMENT/PLAN: 	  25 minutes aggregate time was spent on this visit, 50% visit time spent in care co-ordination with other attendings and counselling patient .I have discussed care plan with patient / HCP/family member ,who expressed understanding of problems treatment and their effect and side effects, alternatives in details. I have asked if they have any questions and concerns and appropriately addressed them to best of my ability.

## 2024-11-03 LAB
ALBUMIN SERPL ELPH-MCNC: 2.4 G/DL — LOW (ref 3.3–5)
ALP SERPL-CCNC: 65 U/L — SIGNIFICANT CHANGE UP (ref 40–120)
ALT FLD-CCNC: 18 U/L — SIGNIFICANT CHANGE UP (ref 12–78)
ANION GAP SERPL CALC-SCNC: 7 MMOL/L — SIGNIFICANT CHANGE UP (ref 5–17)
AST SERPL-CCNC: 27 U/L — SIGNIFICANT CHANGE UP (ref 15–37)
BILIRUB SERPL-MCNC: 0.3 MG/DL — SIGNIFICANT CHANGE UP (ref 0.2–1.2)
BUN SERPL-MCNC: 9 MG/DL — SIGNIFICANT CHANGE UP (ref 7–23)
CALCIUM SERPL-MCNC: 8.5 MG/DL — SIGNIFICANT CHANGE UP (ref 8.5–10.1)
CHLORIDE SERPL-SCNC: 92 MMOL/L — LOW (ref 96–108)
CO2 SERPL-SCNC: 24 MMOL/L — SIGNIFICANT CHANGE UP (ref 22–31)
CREAT SERPL-MCNC: 0.59 MG/DL — SIGNIFICANT CHANGE UP (ref 0.5–1.3)
EGFR: 106 ML/MIN/1.73M2 — SIGNIFICANT CHANGE UP
GLUCOSE SERPL-MCNC: 115 MG/DL — HIGH (ref 70–99)
HCT VFR BLD CALC: 25.2 % — LOW (ref 39–50)
HGB BLD-MCNC: 9 G/DL — LOW (ref 13–17)
MAGNESIUM SERPL-MCNC: 1.6 MG/DL — SIGNIFICANT CHANGE UP (ref 1.6–2.6)
MCHC RBC-ENTMCNC: 30.8 PG — SIGNIFICANT CHANGE UP (ref 27–34)
MCHC RBC-ENTMCNC: 35.7 G/DL — SIGNIFICANT CHANGE UP (ref 32–36)
MCV RBC AUTO: 86.3 FL — SIGNIFICANT CHANGE UP (ref 80–100)
NRBC # BLD: 0 /100 WBCS — SIGNIFICANT CHANGE UP (ref 0–0)
PHOSPHATE SERPL-MCNC: 3.3 MG/DL — SIGNIFICANT CHANGE UP (ref 2.5–4.5)
PLATELET # BLD AUTO: 139 K/UL — LOW (ref 150–400)
POTASSIUM SERPL-MCNC: 3.9 MMOL/L — SIGNIFICANT CHANGE UP (ref 3.5–5.3)
POTASSIUM SERPL-SCNC: 3.9 MMOL/L — SIGNIFICANT CHANGE UP (ref 3.5–5.3)
PROT SERPL-MCNC: 5.3 G/DL — LOW (ref 6–8.3)
RBC # BLD: 2.92 M/UL — LOW (ref 4.2–5.8)
RBC # FLD: 15.9 % — HIGH (ref 10.3–14.5)
SODIUM SERPL-SCNC: 123 MMOL/L — LOW (ref 135–145)
WBC # BLD: 5.37 K/UL — SIGNIFICANT CHANGE UP (ref 3.8–10.5)
WBC # FLD AUTO: 5.37 K/UL — SIGNIFICANT CHANGE UP (ref 3.8–10.5)

## 2024-11-03 PROCEDURE — 99233 SBSQ HOSP IP/OBS HIGH 50: CPT

## 2024-11-03 RX ORDER — SODIUM CHLORIDE 9 MG/ML
1 INJECTION, SOLUTION INTRAMUSCULAR; INTRAVENOUS; SUBCUTANEOUS THREE TIMES A DAY
Refills: 0 | Status: DISCONTINUED | OUTPATIENT
Start: 2024-11-03 | End: 2024-11-04

## 2024-11-03 RX ORDER — UREA 15 G
15 POWDER IN PACKET (EA) ORAL
Refills: 0 | Status: DISCONTINUED | OUTPATIENT
Start: 2024-11-03 | End: 2024-11-04

## 2024-11-03 RX ADMIN — CHLORHEXIDINE GLUCONATE 1 APPLICATION(S): 40 SOLUTION TOPICAL at 05:15

## 2024-11-03 RX ADMIN — PANTOPRAZOLE SODIUM 40 MILLIGRAM(S): 40 TABLET, DELAYED RELEASE ORAL at 05:15

## 2024-11-03 RX ADMIN — Medication 15 GRAM(S): at 17:26

## 2024-11-03 RX ADMIN — Medication 2 MILLIGRAM(S): at 05:15

## 2024-11-03 RX ADMIN — Medication 25 MILLIGRAM(S): at 05:15

## 2024-11-03 RX ADMIN — Medication 80 MILLIGRAM(S): at 22:07

## 2024-11-03 RX ADMIN — QUETIAPINE FUMARATE 400 MILLIGRAM(S): 200 TABLET ORAL at 22:08

## 2024-11-03 RX ADMIN — Medication 50 MILLILITER(S): at 12:17

## 2024-11-03 RX ADMIN — RISPERIDONE 4 MILLIGRAM(S): 3 TABLET, FILM COATED ORAL at 17:26

## 2024-11-03 RX ADMIN — RISPERIDONE 4 MILLIGRAM(S): 3 TABLET, FILM COATED ORAL at 05:15

## 2024-11-03 RX ADMIN — TRAZODONE HYDROCHLORIDE 100 MILLIGRAM(S): 100 TABLET ORAL at 22:07

## 2024-11-03 RX ADMIN — MUPIROCIN 1 APPLICATION(S): 20 OINTMENT TOPICAL at 17:27

## 2024-11-03 RX ADMIN — Medication 2 MILLIGRAM(S): at 17:26

## 2024-11-03 RX ADMIN — MUPIROCIN 1 APPLICATION(S): 20 OINTMENT TOPICAL at 05:15

## 2024-11-03 RX ADMIN — Medication 1: at 12:36

## 2024-11-03 RX ADMIN — SODIUM CHLORIDE 1 GRAM(S): 9 INJECTION, SOLUTION INTRAMUSCULAR; INTRAVENOUS; SUBCUTANEOUS at 13:40

## 2024-11-03 RX ADMIN — Medication 88 MICROGRAM(S): at 05:15

## 2024-11-03 RX ADMIN — SODIUM CHLORIDE 1 GRAM(S): 9 INJECTION, SOLUTION INTRAMUSCULAR; INTRAVENOUS; SUBCUTANEOUS at 22:08

## 2024-11-03 RX ADMIN — PANTOPRAZOLE SODIUM 40 MILLIGRAM(S): 40 TABLET, DELAYED RELEASE ORAL at 17:26

## 2024-11-03 RX ADMIN — Medication 25 MILLIGRAM(S): at 17:27

## 2024-11-03 RX ADMIN — DIVALPROEX SODIUM 1500 MILLIGRAM(S): 250 TABLET, FILM COATED, EXTENDED RELEASE ORAL at 09:22

## 2024-11-03 RX ADMIN — Medication 40 MILLIGRAM(S): at 12:18

## 2024-11-03 NOTE — PROGRESS NOTE ADULT - ASSESSMENT
REASON FOR VISIT  .. Management of problems listed below      ROS  . Patient unable to give ROS     PHYSICAL EXAM    HEENT Unremarkable  atraumatic   RESP Fair air entry  Harsh breath sound   CARDIAC S1 S2 No S3     NO JVD    ABDOMEN No hepatosplenomegaly   PEDAL EDEMA present No calf tenderness  NO rash       [id number]  BEST PRACTICE ISSUES.  . HOB ELEVATN.    .... Yes  . DIET.   ... 11/2/2024 cons carb   .... 11/1/2024 npo   . IV FLUIDS.  ....   . DVT PPLX.    .... 11/2/2024 lvnx 40   .... 11/1/2024 no pharmac pplx as arrived with v low hb ro abla   . STRESS ULCER PPLX.   .... 11/1/2024 protonix 40   . INFECTION PPLX.   .... 11/1/2024 chlorhexidine 2%   .... 11/2/2024 mupirocin 2% (sa pcr )     [id number]  GENERAL DATA .   [facility].    ..    ICU STAY.    .. 11/1-11/2/2024   COVID.   ..   ALLGY.   ..   clozapine lithium     WT.   ..  11/1/2024 84  BMI.  .. 11/1/2024 bmi 33   ISOLATION.        [id number]  VITALS/GAS EXCHANGE/DRIPS  ABGS.   .  VS/ PO/IO/ VENT/ DRIPS.   11/3/2024 afeb 67 130/80   11/3/2024 ra 98%     CHIEF COMPLAINT.   . 11/1/2024 Patient brought by ambulance from [facility] as reported had abnormal labs and low blood pressure HGB of 6 received on O2 via NC    OVERALL PRESENTATION.  . 11/1/2024 68M with PMHx of Afib on Eliquis, NSTEMI, schizophrenia, BP D/o, HTN, HypoNA BIBEMS from [facility] for Hypotension d/t anemia of 6. In the ED pt found to be hypotensive to low 80s/50s given 2L of IVF, Eliquis reversed with KCentra, Anemic with hgb of 5.1, Lactate of 4, pan scanned without any acute source of bleed  . Pt was on dexmedetomidine drip earlier     PMH.     PAST HOSPITAL STAYS .   .  HOME MEDS.    TRANSFSION  . 11/1/2024 3 u prbc     [id number]  PROBLEM ASSESSMENT RECOMMENDATIONS.  RESP.   .... Target po 90-95%    INFECTION.  . SEPSIS   .... w 10/31- 11/1 w 5.8-6.9   .... pr 11/1/2024 pr (-)   .... ua 11/1/2024 ua n (-)   .... CT cap 11/1/2024   ........ no ac abn   ........ small r pl effs   ........ non specific diffuse ground glass is densities ro pie or nonsp pneumonitis   .... rvp 11/1/2024 (-)   .... No strong susp for infection     CARDIAC.  . LACTICEMIA   .... la 10/31-11/1 la 4.3 - 3.4  .... restore euvolemia and monitor     . A fib on Apixaba   .... 11/1 metoprolol 25.2   .... 11/1/2024 apixaba held because of abla     . CAD   .... HO NSTEMI 2023 On asa plavx   .... Trop 10/31-11/1 Tr 34-52   .... 11/1/2024 APA JORGE held because of gi bl  .... 11/1 ATORVASTAT 80     . CHF  .... pbnp 11/1 pbnp 470-     HEMAT.  . ANEMIA   .... Hb 10/31-11/1- 11/2-11/3 Hb 5.1-9.3 - 8.8 - 9  .... Plt 10/31 plt 162   .... monitor target Hb 7 (+)     . COAGULOPATHY  .... 11/1/2024 Given Kcentra     GI.  RENAL.  .... K 11/1/2024 K 4.6   .... CO2 11/1/2024 co2 24   .... AG 11/1/2024 ag 8   .... Alb   .... Cr 11/1/2024 Cr 1.1   .... monitor     . HYPONATREMIA  .... Na 11/1-11/2-11/3/2024 Na 139 - 131 - 123  .... 11/3/2024 nacl 1.3   .... 11/3/2024 urea 15.2       ENDO.  . HYPOTHYROID  .... 11/1/2024 levoxyl 88     . DM  ... 11/1/2024 sl scale     NEURO.  . PSYCH PROBLEMS   .... 11/1 quetiapine 400   .... 11/1/2024 Depakote 1500   .... 11/1/2024 trazodone 100   .... 11/1 BENZTROPINE 2.2     DISCUSSIONS.    XXXXXXXXXXXXXXXXXXXXXX   SUMMARY  CC.   . 11/1/2024 low bp  . 11/1/2024 Hb 6  . 11/1/2024 hypoxia  PROBLEMS .  . HYPOXEMIA   . LACTICEMIA 10/31-11/1 la 4.3 - 3.4  . A fib ON APIXABA  .... 11/1 taken off apixa   . CAD 10/31-11/1 Tr 34-52   . SEVERE ANEMIA 10/31-11/1 Hb 5.1-9.3   . TRANSFUSION 11/1/2024 3 u   . APIXABA  .... 11/1/2024 Given Kcentra   . HYPONATREMIA   .... Na 11/1-11/2-11/3/2024 Na 139 - 131 - 123  ....  nacl 1.3 11/3/2024  .... urea 15.2 11/3/2024     PMH.  . Hytn   . Afib on Eliquis,   . NSTEMI,   . Hyponna  . schizophrenia,       TIME SPENT.  . Over 36 minutes aggregate care time spent on encounter; activities included   direct patient care, counseling and/or coordinating care reviewing notes, lab data/ imaging , discussion with multidisciplinary team/ patient  /family and explaining in detail risks, benefits, alternatives  of the recommendations     CLIFFORD ABDALLA 67 m 11/1/2024 1955

## 2024-11-03 NOTE — PROGRESS NOTE ADULT - ASSESSMENT
68 MALE with  PMH NSTEMI, schizophrenia, bipolar d/o, HTN, hypoNa BIBEMS from [facility] for anemia to 6 and hypotension. Pt's O2 sat was 90% on RA and was placed on NC. Per EMS, facility states pt was altered from baseline as well. Was noted to by hypotensive to sBP 70's  	Pt has different MRN: [id number] (B/L Hb noted to be close to 11) Admitted to ICU -  presenting with severe anemia and hypotension, lactic acidosis.  Improved after Kcentra and total 3 PRBCs, requiring levophed for brief period.  Unclear source of bleeding.  --bipolar disorder, continue home depakote, risperadone, benztropine  --shock now resolved, off levophed following 3rd PRBC  afib controlled, hold BB for now, no AC in setting of anemia and possible bleed  continue statin  f/u echo  --stable respiratory status  --mild HUBERT resolved BUN/Cr 47/1.3  stool occult blood negative  Given 2L fluid, 3u PRBC, adm to ICU  post transfusion CBC today first lab Hgb 8.1, second lab Hgb 9.3  Pt appear comfortable sitting up in bed  No gross sign bleed at this time  CT head negative  CT c/a/p-mild dep atelectasis, sm right efusion  -?new onset acute anemia, no gross bleeds noted on presnation.in hospital  -appropriate increase of Hgb w the transfusion  -no signs of hemolysis  -TSH normal  -iron studies were sent, will follow up, will also order B12, folate  -follow serial CBC 68 MALE with  PMH NSTEMI, schizophrenia, bipolar d/o, HTN, hypoNa BIBEMS from [facility] for anemia to 6 and hypotension. Pt's O2 sat was 90% on RA and was placed on NC. Per EMS, facility states pt was altered from baseline as well. Was noted to by hypotensive to sBP 70's  	Pt has different MRN: [id number] (B/L Hb noted to be close to 11) Admitted to ICU -  presenting with severe anemia and hypotension, lactic acidosis.  Improved after Kcentra and total 3 PRBCs, requiring levophed for brief period.  Unclear source of bleeding.  --bipolar disorder, continue home depakote, risperadone, benztropine  --shock now resolved, off levophed following 3rd PRBC  afib controlled, hold BB for now, no AC in setting of anemia and possible bleed  continue statin  f/u echo  --stable respiratory status  --mild HUBERT resolved BUN/Cr 47/1.3  stool occult blood negative  Given 2L fluid, 3u PRBC, adm to ICU  post transfusion CBC today first lab Hgb 8.1, second lab Hgb 9.3  Pt appear comfortable sitting up in bed  No gross sign bleed at this time  CT head negative  CT c/a/p-mild dep atelectasis, sm right efusion  -?new onset acute anemia, no gross bleeds noted on presnation.in hospital  -appropriate increase of Hgb w the transfusion  -no signs of hemolysis  -TSH normal  -iron studies were sent, will follow up, will also order B12, folate  -follow serial CBC- hyponatremia na 123 - sodium addsed

## 2024-11-03 NOTE — PROGRESS NOTE ADULT - SUBJECTIVE AND OBJECTIVE BOX
All interim records and events noted.    now transferred out of ICU  resting comfortablly      MEDICATIONS  (STANDING):  atorvastatin 80 milliGRAM(s) Oral at bedtime  benztropine 2 milliGRAM(s) Oral two times a day  chlorhexidine 2% Cloths 1 Application(s) Topical <User Schedule>  dextrose 5%. 1000 milliLiter(s) (100 mL/Hr) IV Continuous <Continuous>  dextrose 5%. 1000 milliLiter(s) (50 mL/Hr) IV Continuous <Continuous>  dextrose 50% Injectable 25 Gram(s) IV Push once  dextrose 50% Injectable 25 Gram(s) IV Push once  dextrose 50% Injectable 12.5 Gram(s) IV Push once  divalproex ER 1500 milliGRAM(s) Oral <User Schedule>  enoxaparin Injectable 40 milliGRAM(s) SubCutaneous every 24 hours  glucagon  Injectable 1 milliGRAM(s) IntraMuscular once  insulin lispro (ADMELOG) corrective regimen sliding scale   SubCutaneous three times a day before meals  levothyroxine 88 MICROGram(s) Oral daily  metoprolol tartrate 25 milliGRAM(s) Oral two times a day  mupirocin 2% Nasal 1 Application(s) Both Nostrils two times a day  pantoprazole  Injectable 40 milliGRAM(s) IV Push every 12 hours  QUEtiapine 400 milliGRAM(s) Oral at bedtime  risperiDONE   Tablet 4 milliGRAM(s) Oral two times a day  traZODone 100 milliGRAM(s) Oral at bedtime    MEDICATIONS  (PRN):  dextrose Oral Gel 15 Gram(s) Oral once PRN Blood Glucose LESS THAN 70 milliGRAM(s)/deciliter      Vital Signs Last 24 Hrs  T(C): 36.3 (03 Nov 2024 05:10), Max: 36.4 (02 Nov 2024 11:51)  T(F): 97.4 (03 Nov 2024 05:10), Max: 97.6 (02 Nov 2024 11:51)  HR: 77 (03 Nov 2024 05:10) (63 - 79)  BP: 137/75 (03 Nov 2024 05:10) (137/75 - 169/77)  BP(mean): 111 (02 Nov 2024 13:00) (100 - 111)  RR: 18 (03 Nov 2024 05:10) (18 - 22)  SpO2: 94% (03 Nov 2024 05:10) (94% - 100%)    Parameters below as of 03 Nov 2024 05:10  Patient On (Oxygen Delivery Method): room air        PHYSICAL EXAM  General: in no acute distress  Head: atraumatic, normocephali  Neck: supple, trachea midline  CV: S1 S2, regular rate and rhythm  Lungs: clear to auscultation, no wheezes/rhonchi  Abdomen: soft, nontender, bowel sounds present, no palpable masses  Extrem: no clubbing/cyanosis/edema  Skin: no significant increased ecchymosis/petechiae        LABS:    Iron with Total Binding Capacity (11.01.24 @ 04:52)   Iron - Total Binding Capacity.: 280 ug/dL  % Saturation, Iron: 37 %  Iron Total: 103 ug/dL  Unsaturated Iron Binding Capacity: 177 ug/dLFerritin (11.01.24 @ 04:52)   Ferritin: 49 ng/mLVitamin B12, Serum in AM (11.02.24 @ 05:31)   Vitamin B12, Serum: 513 pg/mLFolate, Serum in AM (11.02.24 @ 05:31)   Folate, Serum: 9.4: Moderate hemolysis. Results may be falsely elevated and should be   interpreted with caution. ng/mL                     9.0    5.37  )-----------( 139      ( 11-03 @ 08:30 )             25.2                8.8    5.28  )-----------( 141      ( 11-02 @ 05:31 )             24.8                9.3    6.97  )-----------( 142      ( 11-01 @ 10:12 )             26.4                8.1    7.17  )-----------( 137      ( 11-01 @ 04:52 )             24.0                5.1    5.83  )-----------( 162      ( 10-31 @ 23:30 )             15.4       11-03    123[L]  |  92[L]  |  9   ----------------------------<  115[H]  3.9   |  24  |  0.59    Ca    8.5      03 Nov 2024 08:30  Phos  3.3     11-03  Mg     1.6     11-03    TPro  5.3[L]  /  Alb  2.4[L]  /  TBili  0.3  /  DBili  x   /  AST  27  /  ALT  18  /  AlkPhos  65  11-03    10-31 @ 23:30  PT13.9 INR1.19  PTT30.0      RADIOLOGY & ADDITIONAL STUDIES:    IMPRESSION/RECOMMENDATIONS:

## 2024-11-03 NOTE — PROGRESS NOTE ADULT - ASSESSMENT
Acute anemia  FOBT negative    Unclear etiology of anemia  No overt GI bleeding  No RP bleed on imaging  PPI for ppx  Monitor CBC  Transfuse prn   iron studies noted  No immediate plan for endoscopic evaluation, monitor on conservative management  Will follow    I reviewed the overnight course of events on the unit, re-confirming the patient history. I discussed the care with the patient  Differential diagnosis and plan of care discussed with patient after the evaluation  35 minutes spent on total encounter of which more than fifty percent of the encounter was spent counseling and/or coordinating care by the attending physician.

## 2024-11-03 NOTE — PROGRESS NOTE ADULT - SUBJECTIVE AND OBJECTIVE BOX
[facility]  Brad Us PA-C  [address] [place] [address]  [phone]      INTERVAL HPI/ OVERNIGHT EVENTS:  Pt s/e, falling asleep  D/w RN pt tolerated breakfast no n/v reported  hgb noted       Allergies:  lithium (Rash)  clozapine (Rash)      Medications:  MEDICATIONS  (STANDING):  atorvastatin 80 milliGRAM(s) Oral at bedtime  benztropine 2 milliGRAM(s) Oral two times a day  chlorhexidine 2% Cloths 1 Application(s) Topical <User Schedule>  dextrose 5% + sodium chloride 0.9%. 1000 milliLiter(s) (50 mL/Hr) IV Continuous <Continuous>  dextrose 5%. 1000 milliLiter(s) (50 mL/Hr) IV Continuous <Continuous>  dextrose 5%. 1000 milliLiter(s) (100 mL/Hr) IV Continuous <Continuous>  dextrose 50% Injectable 25 Gram(s) IV Push once  dextrose 50% Injectable 12.5 Gram(s) IV Push once  dextrose 50% Injectable 25 Gram(s) IV Push once  divalproex ER 1500 milliGRAM(s) Oral <User Schedule>  enoxaparin Injectable 40 milliGRAM(s) SubCutaneous every 24 hours  glucagon  Injectable 1 milliGRAM(s) IntraMuscular once  insulin lispro (ADMELOG) corrective regimen sliding scale   SubCutaneous three times a day before meals  levothyroxine 88 MICROGram(s) Oral daily  metoprolol tartrate 25 milliGRAM(s) Oral two times a day  mupirocin 2% Nasal 1 Application(s) Both Nostrils two times a day  pantoprazole  Injectable 40 milliGRAM(s) IV Push every 12 hours  QUEtiapine 400 milliGRAM(s) Oral at bedtime  risperiDONE   Tablet 4 milliGRAM(s) Oral two times a day  sodium chloride 1 Gram(s) Oral three times a day  traZODone 100 milliGRAM(s) Oral at bedtime    MEDICATIONS  (PRN):  dextrose Oral Gel 15 Gram(s) Oral once PRN Blood Glucose LESS THAN 70 milliGRAM(s)/deciliter      PMHX/PSHX:  Parkinsonism    Schizophrenia    Hyponatremia    NSTEMI (non-ST elevation myocardial infarction)    Bipolar disorder    DM (diabetes mellitus)    Dementia    HTN (hypertension)    Chronic CHF    MDD (major depressive disorder)    Hypothyroidism    HLD (hyperlipidemia)    Hypothyroidism    Constipation    Folate deficiency    Constipation    H/O candidiasis    Violent behavior    Atrial fibrillation    Hyperkalemia    Epistaxis    DM (diabetes mellitus)        ROS:   Unable to obtain since pt is a poor historian    PHYSICAL EXAM:   Vital Signs Last 24 Hrs  T(C): 36.4 (2024 12:01), Max: 36.4 (2024 13:46)  T(F): 97.5 (2024 12:01), Max: 97.5 (2024 13:46)  HR: 67 (2024 12:01) (67 - 79)  BP: 138/86 (2024 12:01) (137/75 - 169/77)  BP(mean): 111 (2024 13:00) (111 - 111)  RR: 18 (2024 12:01) (18 - 19)  SpO2: 98% (2024 12:01) (94% - 100%)    Parameters below as of 2024 12:01  Patient On (Oxygen Delivery Method): room air      Daily Height in cm: 160.02 (2024 04:06)    Daily Weight in k.6 (2024 04:32)    GENERAL:  Appears stated age  HEENT:  NC/AT  CHEST:  Full & symmetric excursion  HEART:  Regular rhythm  ABDOMEN:  Soft, non-tender, non-distended  EXTEREMITIES:  no cyanosis, clubbing or edema  SKIN:  No rash  NEURO:  Alert    LABS:                                          9.0    5.37  )-----------( 139      ( 2024 08:30 )             25.2     11-03    123[L]  |  92[L]  |  9   ----------------------------<  115[H]  3.9   |  24  |  0.59    Ca    8.5      2024 08:30  Phos  3.3     11-03  Mg     1.6     11-03    TPro  5.3[L]  /  Alb  2.4[L]  /  TBili  0.3  /  DBili  x   /  AST  27  /  ALT  18  /  AlkPhos  65  11-03      LIVER FUNCTIONS - ( 2024 05:31 )  Alb: 2.5 g/dL / Pro: 5.4 g/dL / ALK PHOS: 61 U/L / ALT: 16 U/L / AST: 26 U/L / GGT: x                PT/INR - ( 31 Oct 2024 23:30 )   PT: 13.9 sec;   INR: 1.19 ratio         PTT - ( 31 Oct 2024 23:30 )  PTT:30.0 sec  Urinalysis Basic - ( 2024 10:12 )    Color: x / Appearance: x / SG: x / pH: x  Gluc: 102 mg/dL / Ketone: x  / Bili: x / Urobili: x   Blood: x / Protein: x / Nitrite: x   Leuk Esterase: x / RBC: x / WBC x   Sq Epi: x / Non Sq Epi: x / Bacteria: x      [id number]      Imaging:  < from: CT Abdomen and Pelvis w/ IV Cont (24 @ 00:43) >    ACC: [id number] EXAM:  CT ABDOMEN AND PELVIS IC   ORDERED BY: MICHELLE NOLASCO     ACC: [id number] EXAM:  CT CHEST IC   ORDERED BY: MICHELLE NOLASCO     PROCEDURE DATE:  2024          INTERPRETATION:  CLINICAL INFORMATION: Anemic.    COMPARISON: None.    CONTRAST/COMPLICATIONS:  IV Contrast: Omnipaque 350  90 cc administered   10 cc discarded  Oral Contrast: NONE  Complications: None reported at time of study completion    PROCEDURE:  CT of the Chest, Abdomen and Pelvis was performed.  Sagittal and coronal reformats were performed.    FINDINGS:  CHEST:  LUNGS AND LARGE AIRWAYS: Patent central airways. Mild dependent   atelectasis. No specific diffuse groundglass interstitial densities could   represent the stigmata of an incomplete inspiratory effort, however,   developing pulmonary interstitial edema, an ongoing nonspecific   pneumonitis, or other pathologies cannot be completely excluded. No   evidence of consolidative pneumonia. No suspicious lung nodules. No   evidence of a pneumothorax.  PLEURA: Small/trace right pleural effusion and associated compressive   atelectasis.  VESSELS: Aortic calcifications. Coronary artery calcifications.  HEART: Heart size is normal. No pericardial effusion.  MEDIASTINUM AND JESUS: No lymphadenopathy.  CHEST WALL AND LOWER NECK: Within normal limits.    ABDOMEN AND PELVIS:  LIVER: Within normal limits.  BILE DUCTS: Normal caliber.  GALLBLADDER: Within normal limits.  SPLEEN: Within normal limits.  PANCREAS: Within normal limits.  ADRENALS: Within normal limits.  KIDNEYS/URETERS: Within normal limits.    BLADDER: Within normal limits.  REPRODUCTIVE ORGANS: Prostate is enlarged.    BOWEL: No bowel obstruction. Appendix is normal. Moderate-to-severe   constipation.  PERITONEUM/RETROPERITONEUM: Within normal limits.  VESSELS: Atherosclerotic changes.  LYMPH NODES: No lymphadenopathy.  ABDOMINAL WALL: Within normal limits.  BONES: Degenerative changes.    IMPRESSION:  1.  No acute abnormality detected.  2.  Nonspecific appearance of the lungs may bedue to an incomplete   inspiratory effort, however, other etiologies cannot be completely   excluded.  3.  Small right pleural effusion.    --- End of Report ---       MINDY CASTLE MD; Attending Radiologist  This document has been electronically signed. 2024 12:55AM    < end of copied text >

## 2024-11-03 NOTE — CONSULT NOTE ADULT - CONSULT REQUESTED DATE/TIME
01-Nov-2024
01-Nov-2024 02:07
01-Nov-2024 13:18
01-Nov-2024 05:41
03-Nov-2024 13:00
01-Nov-2024 15:21

## 2024-11-03 NOTE — PROGRESS NOTE ADULT - SUBJECTIVE AND OBJECTIVE BOX
CHIEF COMPLAINT/ REASON FOR VISIT  .. Patient was seen to address the  issue listed under PROBLEM LIST which is located toward bottom of this note     RM HORTON    PLV 3WES 363 D1    Allergies    lithium (Rash)  clozapine (Rash)    Intolerances        PAST MEDICAL & SURGICAL HISTORY:  Parkinsonism      Schizophrenia      Hyponatremia      NSTEMI (non-ST elevation myocardial infarction)      Bipolar disorder      DM (diabetes mellitus)      Dementia      HTN (hypertension)      Chronic CHF      MDD (major depressive disorder)      Hypothyroidism      HLD (hyperlipidemia)      Hypothyroidism      Constipation      Folate deficiency      Constipation      H/O candidiasis      Violent behavior      Atrial fibrillation      Hyperkalemia      Epistaxis      DM (diabetes mellitus)          FAMILY HISTORY:      Home Medications:  atorvastatin 80 mg oral tablet: 1 tab(s) orally once a day (at bedtime) (01 Nov 2024 08:44)  benztropine 1 mg oral tablet: 2 tab(s) orally 2 times a day (01 Nov 2024 08:44)  clopidogrel 75 mg oral tablet: 1 tab(s) orally once a day (01 Nov 2024 08:44)  divalproex sodium 500 mg oral tablet, extended release: 3 tab(s) orally once a day (01 Nov 2024 08:44)  Eliquis 2.5 mg oral tablet: 1 tab(s) orally 2 times a day (01 Nov 2024 08:44)  levothyroxine 88 mcg (0.088 mg) oral tablet: 1 tab(s) orally once a day (01 Nov 2024 08:44)  losartan 50 mg oral tablet: 1 tab(s) orally once a day (01 Nov 2024 08:44)  metFORMIN 500 mg oral tablet: 2 tab(s) orally 2 times a day (01 Nov 2024 08:44)  metoprolol tartrate 25 mg oral tablet: 1 tab(s) orally 2 times a day (01 Nov 2024 08:44)  QUEtiapine 400 mg oral tablet: 1 tab(s) orally once a day (at bedtime) (01 Nov 2024 08:44)  risperiDONE 4 mg oral tablet: 1 tab(s) orally 2 times a day (01 Nov 2024 08:44)  sodium chloride: 1,000 milligram(s) orally 3 times a day (01 Nov 2024 08:44)  traZODone 100 mg oral tablet: 1 tab(s) orally once a day (at bedtime) (01 Nov 2024 08:44)      MEDICATIONS  (STANDING):  atorvastatin 80 milliGRAM(s) Oral at bedtime  benztropine 2 milliGRAM(s) Oral two times a day  chlorhexidine 2% Cloths 1 Application(s) Topical <User Schedule>  dextrose 5%. 1000 milliLiter(s) (100 mL/Hr) IV Continuous <Continuous>  dextrose 5%. 1000 milliLiter(s) (50 mL/Hr) IV Continuous <Continuous>  dextrose 50% Injectable 25 Gram(s) IV Push once  dextrose 50% Injectable 25 Gram(s) IV Push once  dextrose 50% Injectable 12.5 Gram(s) IV Push once  divalproex ER 1500 milliGRAM(s) Oral <User Schedule>  enoxaparin Injectable 40 milliGRAM(s) SubCutaneous every 24 hours  glucagon  Injectable 1 milliGRAM(s) IntraMuscular once  insulin lispro (ADMELOG) corrective regimen sliding scale   SubCutaneous three times a day before meals  levothyroxine 88 MICROGram(s) Oral daily  metoprolol tartrate 25 milliGRAM(s) Oral two times a day  mupirocin 2% Nasal 1 Application(s) Both Nostrils two times a day  pantoprazole  Injectable 40 milliGRAM(s) IV Push every 12 hours  QUEtiapine 400 milliGRAM(s) Oral at bedtime  risperiDONE   Tablet 4 milliGRAM(s) Oral two times a day  traZODone 100 milliGRAM(s) Oral at bedtime    MEDICATIONS  (PRN):  dextrose Oral Gel 15 Gram(s) Oral once PRN Blood Glucose LESS THAN 70 milliGRAM(s)/deciliter      Diet, Consistent Carbohydrate w/Evening Snack:   DASH/TLC Sodium & Cholesterol Restricted (11-02-24 @ 15:48) [Active]          Vital Signs Last 24 Hrs  T(C): 36.3 (03 Nov 2024 05:10), Max: 36.4 (02 Nov 2024 11:51)  T(F): 97.4 (03 Nov 2024 05:10), Max: 97.6 (02 Nov 2024 11:51)  HR: 77 (03 Nov 2024 05:10) (59 - 79)  BP: 137/75 (03 Nov 2024 05:10) (137/75 - 169/77)  BP(mean): 111 (02 Nov 2024 13:00) (91 - 111)  RR: 18 (03 Nov 2024 05:10) (18 - 22)  SpO2: 94% (03 Nov 2024 05:10) (94% - 100%)    Parameters below as of 03 Nov 2024 05:10  Patient On (Oxygen Delivery Method): room air          11-02-24 @ 08:01  -  11-03-24 @ 07:00  --------------------------------------------------------  IN: 580 mL / OUT: 1800 mL / NET: -1220 mL              LABS:                        9.0    5.37  )-----------( 139      ( 03 Nov 2024 08:30 )             25.2     11-02    131[L]  |  100  |  20  ----------------------------<  96  4.1   |  26  |  0.72    Ca    8.6      02 Nov 2024 05:31  Phos  3.3     11-03  Mg     1.6     11-03    TPro  5.4[L]  /  Alb  2.5[L]  /  TBili  0.3  /  DBili  x   /  AST  26  /  ALT  16  /  AlkPhos  61  11-02      Urinalysis Basic - ( 02 Nov 2024 05:31 )    Color: x / Appearance: x / SG: x / pH: x  Gluc: 96 mg/dL / Ketone: x  / Bili: x / Urobili: x   Blood: x / Protein: x / Nitrite: x   Leuk Esterase: x / RBC: x / WBC x   Sq Epi: x / Non Sq Epi: x / Bacteria: x            WBC:  WBC Count: 5.37 K/uL (11-03 @ 08:30)  WBC Count: 5.28 K/uL (11-02 @ 05:31)  WBC Count: 6.97 K/uL (11-01 @ 10:12)  WBC Count: 7.17 K/uL (11-01 @ 04:52)  WBC Count: 5.83 K/uL (10-31 @ 23:30)      MICROBIOLOGY:  RECENT CULTURES:  10-31 .Blood BLOOD XXXX XXXX   No growth at 48 Hours    10-31 .Blood BLOOD XXXX XXXX   No growth at 48 Hours                    Sodium:  Sodium: 131 mmol/L (11-02 @ 05:31)  Sodium: 138 mmol/L (11-01 @ 10:12)  Sodium: 139 mmol/L (11-01 @ 04:52)  Sodium: 138 mmol/L (10-31 @ 23:30)      0.72 mg/dL 11-02 @ 05:31  0.92 mg/dL 11-01 @ 10:12  1.10 mg/dL 11-01 @ 04:52  1.30 mg/dL 10-31 @ 23:30      Hemoglobin:  Hemoglobin: 9.0 g/dL (11-03 @ 08:30)  Hemoglobin: 8.8 g/dL (11-02 @ 05:31)  Hemoglobin: 9.3 g/dL (11-01 @ 10:12)  Hemoglobin: 8.1 g/dL (11-01 @ 04:52)  Hemoglobin: 5.1 g/dL (10-31 @ 23:30)      Platelets: Platelet Count - Automated: 139 K/uL (11-03 @ 08:30)  Platelet Count - Automated: 141 K/uL (11-02 @ 05:31)  Platelet Count - Automated: 142 K/uL (11-01 @ 10:12)  Platelet Count - Automated: 137 K/uL (11-01 @ 04:52)  Platelet Count - Automated: 162 K/uL (10-31 @ 23:30)      LIVER FUNCTIONS - ( 02 Nov 2024 05:31 )  Alb: 2.5 g/dL / Pro: 5.4 g/dL / ALK PHOS: 61 U/L / ALT: 16 U/L / AST: 26 U/L / GGT: x             Urinalysis Basic - ( 02 Nov 2024 05:31 )    Color: x / Appearance: x / SG: x / pH: x  Gluc: 96 mg/dL / Ketone: x  / Bili: x / Urobili: x   Blood: x / Protein: x / Nitrite: x   Leuk Esterase: x / RBC: x / WBC x   Sq Epi: x / Non Sq Epi: x / Bacteria: x        RADIOLOGY & ADDITIONAL STUDIES:      MICROBIOLOGY:  RECENT CULTURES:  10-31 .Blood BLOOD XXXX XXXX   No growth at 48 Hours    10-31 .Blood BLOOD XXXX XXXX   No growth at 48 Hours

## 2024-11-03 NOTE — CONSULT NOTE ADULT - ASSESSMENT
HPI:  68 MALE with  PMH NSTEMI, schizophrenia, bipolar d/o, HTN, hypoNa BIBEMS from [facility] for anemia to 6 and hypotension. Pt's O2 sat was 90% on RA and was placed on NC. Per EMS, facility states pt was altered from baseline as well. Was noted to by hypotensive to sBP 70's  	Pt has different MRN: [id number] (B/L Hb noted to be close to 11) Admitted to ICU -  presenting with severe anemia and hypotension, lactic acidosis.  Improved after Kcentra and total 3 PRBCs, requiring levophed for brief period.  Unclear source of bleeding.    --bipolar disorder, continue home depakote, risperadone, benztropine  --shock now resolved, off levophed following 3rd PRBC  afib controlled, hold BB for now, no AC in setting of anemia and possible bleed  continue statin  f/u echo  --stable respiratory status  --mild HUBERT resolved  lactic acidosis improved  --no signs of GIB, and FOBT neg, clear liquids for now and continue PPI bid  --no infectious concerns, f/u Cx  --macrocytic anemia, s/p 3 PRBCs with appropriate response, (01 Nov 2024 10:53)        hyponatremia  will check ua , urine osmolality , urine sodium , urine uric acid , serum sodium , serum osmolality , serum uric acid , f/u with hyponatremia work up , f/u with bmp , monitor i and o        ACUTE RENAL FAILURE:   Serum creatinine is improved   There is no progression . No uremic symptoms        pos  evidence of anemia .  Fluid status stable.  Will continue to avoid nephrotoxic drugs.  Patient remains asymptomatic.   Continue current therapy.

## 2024-11-03 NOTE — PROGRESS NOTE ADULT - SUBJECTIVE AND OBJECTIVE BOX
Patient is a 68y Male with a known history of :  Severe anemia [D64.9]    Hemorrhagic shock [R57.8]    HUBERT (acute kidney injury) [N17.9]    Schizophrenia [F20.9]    Parkinsonism [G20.C]    CAD (coronary artery disease) [I25.10]    Hypothyroidism [E03.9]    Atrial fibrillation [I48.91]    Hypothyroidism [E03.9]    Bipolar disorder [F31.9]    DM (diabetes mellitus) [E11.9]    Prophylactic measure [Z29.9]    Elevated lactic acid level [R79.89]      HPI:  68 MALE with  PMH NSTEMI, schizophrenia, bipolar d/o, HTN, hypoNa BIBEMS from [facility] for anemia to 6 and hypotension. Pt's O2 sat was 90% on RA and was placed on NC. Per EMS, facility states pt was altered from baseline as well. Was noted to by hypotensive to sBP 70's  	Pt has different MRN: [id number] (B/L Hb noted to be close to 11) Admitted to ICU -  presenting with severe anemia and hypotension, lactic acidosis.  Improved after Kcentra and total 3 PRBCs, requiring levophed for brief period.  Unclear source of bleeding.    --bipolar disorder, continue home depakote, risperadone, benztropine  --shock now resolved, off levophed following 3rd PRBC  afib controlled, hold BB for now, no AC in setting of anemia and possible bleed  continue statin  f/u echo  --stable respiratory status  --mild HUBERT resolved  lactic acidosis improved  --no signs of GIB, and FOBT neg, clear liquids for now and continue PPI bid  --no infectious concerns, f/u Cx  --macrocytic anemia, s/p 3 PRBCs with appropriate response, (01 Nov 2024 10:53)      REVIEW OF SYSTEMS:    CONSTITUTIONAL: No fever, weight loss, or fatigue  EYES: No eye pain, visual disturbances, or discharge  ENMT:  No difficulty hearing, tinnitus, vertigo; No sinus or throat pain  NECK: No pain or stiffness  BREASTS: No pain, masses, or nipple discharge  RESPIRATORY: No cough, wheezing, chills or hemoptysis; No shortness of breath  CARDIOVASCULAR: No chest pain, palpitations, dizziness, or leg swelling  GASTROINTESTINAL: No abdominal or epigastric pain. No nausea, vomiting, or hematemesis; No diarrhea or constipation. No melena or hematochezia.  GENITOURINARY: No dysuria, frequency, hematuria, or incontinence  NEUROLOGICAL: No headaches, memory loss, loss of strength, numbness, or tremors  SKIN: No itching, burning, rashes, or lesions   LYMPH NODES: No enlarged glands  ENDOCRINE: No heat or cold intolerance; No hair loss  MUSCULOSKELETAL: No joint pain or swelling; No muscle, back, or extremity pain  PSYCHIATRIC: No depression, anxiety, mood swings, or difficulty sleeping  HEME/LYMPH: No easy bruising, or bleeding gums  ALLERGY AND IMMUNOLOGIC: No hives or eczema    MEDICATIONS  (STANDING):  atorvastatin 80 milliGRAM(s) Oral at bedtime  benztropine 2 milliGRAM(s) Oral two times a day  chlorhexidine 2% Cloths 1 Application(s) Topical <User Schedule>  dextrose 5%. 1000 milliLiter(s) (100 mL/Hr) IV Continuous <Continuous>  dextrose 5%. 1000 milliLiter(s) (50 mL/Hr) IV Continuous <Continuous>  dextrose 50% Injectable 25 Gram(s) IV Push once  dextrose 50% Injectable 25 Gram(s) IV Push once  dextrose 50% Injectable 12.5 Gram(s) IV Push once  divalproex ER 1500 milliGRAM(s) Oral <User Schedule>  enoxaparin Injectable 40 milliGRAM(s) SubCutaneous every 24 hours  glucagon  Injectable 1 milliGRAM(s) IntraMuscular once  insulin lispro (ADMELOG) corrective regimen sliding scale   SubCutaneous three times a day before meals  levothyroxine 88 MICROGram(s) Oral daily  metoprolol tartrate 25 milliGRAM(s) Oral two times a day  mupirocin 2% Nasal 1 Application(s) Both Nostrils two times a day  pantoprazole  Injectable 40 milliGRAM(s) IV Push every 12 hours  QUEtiapine 400 milliGRAM(s) Oral at bedtime  risperiDONE   Tablet 4 milliGRAM(s) Oral two times a day  traZODone 100 milliGRAM(s) Oral at bedtime    MEDICATIONS  (PRN):  dextrose Oral Gel 15 Gram(s) Oral once PRN Blood Glucose LESS THAN 70 milliGRAM(s)/deciliter      ALLERGIES: lithium (Rash)  clozapine (Rash)      FAMILY HISTORY:      PHYSICAL EXAMINATION:  -----------------------------  TIRA): 36.3 (11-03-24 @ 05:10), Max: 36.4 (11-02-24 @ 11:51)  HR: 77 (11-03-24 @ 05:10) (69 - 79)  BP: 137/75 (11-03-24 @ 05:10) (137/75 - 169/77)  RR: 18 (11-03-24 @ 05:10) (18 - 21)  SpO2: 94% (11-03-24 @ 05:10) (94% - 100%)  Wt(kg): --    11-02 @ 08:01  -  11-03 @ 07:00  --------------------------------------------------------  IN:    Oral Fluid: 580 mL  Total IN: 580 mL    OUT:    Voided (mL): 1800 mL  Total OUT: 1800 mL    Total NET: -1220 mL            VITALS  T(C): 36.3 (11-03-24 @ 05:10), Max: 36.4 (11-02-24 @ 11:51)  HR: 77 (11-03-24 @ 05:10) (69 - 79)  BP: 137/75 (11-03-24 @ 05:10) (137/75 - 169/77)  RR: 18 (11-03-24 @ 05:10) (18 - 21)  SpO2: 94% (11-03-24 @ 05:10) (94% - 100%)    Constitutional: well developed, normal appearance, well groomed, well nourished, no deformities and no acute distress.   Eyes: the conjunctiva exhibited no abnormalities and the eyelids demonstrated no xanthelasmas.   HEENT: normal oral mucosa, no oral pallor and no oral cyanosis.   Neck: normal jugular venous A waves present, normal jugular venous V waves present and no jugular venous chung A waves.   Pulmonary: no respiratory distress, normal respiratory rhythm and effort, no accessory muscle use and lungs were clear to auscultation bilaterally.   Cardiovascular: heart rate and rhythm were normal, normal S1 and S2 and no murmur, gallop, rub, heave or thrill are present.   Abdomen: soft, non-tender, no hepato-splenomegaly and no abdominal mass palpated.   Musculoskeletal: the gait could not be assessed..   Extremities: no clubbing of the fingernails, no localized cyanosis, no petechial hemorrhages and no ischemic changes.   Skin: normal skin color and pigmentation, no rash, no venous stasis, no skin lesions, no skin ulcer and no xanthoma was observed.   Psychiatric: oriented to person, place, and time, the affect was normal, the mood was normal and not feeling anxious.     LABS:   --------  11-03    123[L]  |  92[L]  |  9   ----------------------------<  115[H]  3.9   |  24  |  0.59    Ca    8.5      03 Nov 2024 08:30  Phos  3.3     11-03  Mg     1.6     11-03    TPro  5.3[L]  /  Alb  2.4[L]  /  TBili  0.3  /  DBili  x   /  AST  27  /  ALT  18  /  AlkPhos  65  11-03                         9.0    5.37  )-----------( 139      ( 03 Nov 2024 08:30 )             25.2                 RADIOLOGY:  -----------------    ECG:     ECHO:

## 2024-11-03 NOTE — PROGRESS NOTE ADULT - ASSESSMENT
69 yo man from [facility], hx NSTEMI, schizoprenica, A fib on Eliquis, HTN, hyponatremia, adm for hypotension SMP 70s and Hgb 6  In ER, BP low 80s/50s, CBC wbc 5.83 hgb 5.1 hct 15.4 plts 162 mcv 95.7  BUN/Cr 47/1.3  stool occult blood negative  Given 2L fluid, 3u PRBC, Kcentra,, adm to ICU  post transfusion CBC today first lab Hgb 8.1, second lab Hgb 9.3  CT head negative  CT c/a/p-mild dep atelectasis, sm right efusion    -?new onset acute anemia, no gross bleeds noted on presnation.in hospital--?previous bleed  -CT scans stool occult blood no bleeds noted. Seen by GI  -iron studies, B12 folate all not deficient  -Hgb remains stable since the initial PRBC tx on admission  -no overt primary heme pathology noted  -can restart anticoag(wa on Eliquis for A fib as outpatient) as per cardiology    will sign off for now, please call if any questions

## 2024-11-03 NOTE — CONSULT NOTE ADULT - SUBJECTIVE AND OBJECTIVE BOX
Patient is a 68y Male whom presented to the hospital with hyponatremia     PAST MEDICAL & SURGICAL HISTORY:  Parkinsonism      Schizophrenia      Hyponatremia      NSTEMI (non-ST elevation myocardial infarction)      Bipolar disorder      DM (diabetes mellitus)      Dementia      HTN (hypertension)      Chronic CHF      MDD (major depressive disorder)      Hypothyroidism      HLD (hyperlipidemia)      Hypothyroidism      Constipation      Folate deficiency      Constipation      H/O candidiasis      Violent behavior      Atrial fibrillation      Hyperkalemia      Epistaxis      DM (diabetes mellitus)          MEDICATIONS  (STANDING):  atorvastatin 80 milliGRAM(s) Oral at bedtime  benztropine 2 milliGRAM(s) Oral two times a day  chlorhexidine 2% Cloths 1 Application(s) Topical <User Schedule>  dextrose 5% + sodium chloride 0.9%. 1000 milliLiter(s) (50 mL/Hr) IV Continuous <Continuous>  dextrose 5%. 1000 milliLiter(s) (50 mL/Hr) IV Continuous <Continuous>  dextrose 5%. 1000 milliLiter(s) (100 mL/Hr) IV Continuous <Continuous>  dextrose 50% Injectable 25 Gram(s) IV Push once  dextrose 50% Injectable 25 Gram(s) IV Push once  dextrose 50% Injectable 12.5 Gram(s) IV Push once  divalproex ER 1500 milliGRAM(s) Oral <User Schedule>  enoxaparin Injectable 40 milliGRAM(s) SubCutaneous every 24 hours  glucagon  Injectable 1 milliGRAM(s) IntraMuscular once  insulin lispro (ADMELOG) corrective regimen sliding scale   SubCutaneous three times a day before meals  levothyroxine 88 MICROGram(s) Oral daily  metoprolol tartrate 25 milliGRAM(s) Oral two times a day  mupirocin 2% Nasal 1 Application(s) Both Nostrils two times a day  pantoprazole  Injectable 40 milliGRAM(s) IV Push every 12 hours  QUEtiapine 400 milliGRAM(s) Oral at bedtime  risperiDONE   Tablet 4 milliGRAM(s) Oral two times a day  sodium chloride 1 Gram(s) Oral three times a day  traZODone 100 milliGRAM(s) Oral at bedtime      Allergies    lithium (Rash)  clozapine (Rash)    Intolerances        SOCIAL HISTORY:  Denies ETOh,Smoking,     FAMILY HISTORY:      REVIEW OF SYSTEMS:    CONSTITUTIONAL: No weakness, fevers or chills  EYES/ENT: No visual changes;  no throat pain   NECK: No pain or stiffness  RESPIRATORY: No cough, wheezing, hemoptysis; No shortness of breath  CARDIOVASCULAR: No chest pain or palpitations  GASTROINTESTINAL: No abdominal or epigastric pain. No nausea, vomiting,     No diarrhea or constipation. No melena   GENITOURINARY: No dysuria, frequency or hematuria  NEUROLOGICAL: No numbness or weakness  SKIN: dry      VITAL:  T(C): , Max: 36.4 (11-02-24 @ 13:46)  T(F): , Max: 97.5 (11-02-24 @ 13:46)  HR: 67 (11-03-24 @ 12:01)  BP: 138/86 (11-03-24 @ 12:01)  BP(mean): --  RR: 18 (11-03-24 @ 12:01)  SpO2: 98% (11-03-24 @ 12:01)  Wt(kg): --    I and O's:    11-02 @ 08:01  -  11-03 @ 07:00  --------------------------------------------------------  IN: 580 mL / OUT: 1800 mL / NET: -1220 mL          PHYSICAL EXAM:    Constitutional: NAD  HEENT: conjunctive   clear   Neck:  No JVD  Respiratory: CTAB  Cardiovascular: S1 and S2  Gastrointestinal: BS+, soft, NT/ND  Extremities: No peripheral edema  Neurological: A/O x 3, no focal deficits  Psychiatric: Normal mood, normal affect  : No Michele  Skin: No rashes  Access: Not applicable    LABS:                        9.0    5.37  )-----------( 139      ( 03 Nov 2024 08:30 )             25.2     11-03    123[L]  |  92[L]  |  9   ----------------------------<  115[H]  3.9   |  24  |  0.59    Ca    8.5      03 Nov 2024 08:30  Phos  3.3     11-03  Mg     1.6     11-03    TPro  5.3[L]  /  Alb  2.4[L]  /  TBili  0.3  /  DBili  x   /  AST  27  /  ALT  18  /  AlkPhos  65  11-03      Urine Studies:  Urinalysis Basic - ( 03 Nov 2024 08:30 )    Color: x / Appearance: x / SG: x / pH: x  Gluc: 115 mg/dL / Ketone: x  / Bili: x / Urobili: x   Blood: x / Protein: x / Nitrite: x   Leuk Esterase: x / RBC: x / WBC x   Sq Epi: x / Non Sq Epi: x / Bacteria: x            RADIOLOGY & ADDITIONAL STUDIES:

## 2024-11-04 ENCOUNTER — TRANSCRIPTION ENCOUNTER (OUTPATIENT)
Age: 69
End: 2024-11-04

## 2024-11-04 VITALS — WEIGHT: 186.51 LBS

## 2024-11-04 LAB
ALBUMIN SERPL ELPH-MCNC: 2.8 G/DL — LOW (ref 3.3–5)
ALP SERPL-CCNC: 77 U/L — SIGNIFICANT CHANGE UP (ref 40–120)
ALT FLD-CCNC: 25 U/L — SIGNIFICANT CHANGE UP (ref 12–78)
ANION GAP SERPL CALC-SCNC: 4 MMOL/L — LOW (ref 5–17)
AST SERPL-CCNC: 29 U/L — SIGNIFICANT CHANGE UP (ref 15–37)
BILIRUB SERPL-MCNC: 0.2 MG/DL — SIGNIFICANT CHANGE UP (ref 0.2–1.2)
BUN SERPL-MCNC: 15 MG/DL — SIGNIFICANT CHANGE UP (ref 7–23)
CALCIUM SERPL-MCNC: 9.4 MG/DL — SIGNIFICANT CHANGE UP (ref 8.5–10.1)
CHLORIDE SERPL-SCNC: 103 MMOL/L — SIGNIFICANT CHANGE UP (ref 96–108)
CO2 SERPL-SCNC: 27 MMOL/L — SIGNIFICANT CHANGE UP (ref 22–31)
CREAT SERPL-MCNC: 0.83 MG/DL — SIGNIFICANT CHANGE UP (ref 0.5–1.3)
EGFR: 95 ML/MIN/1.73M2 — SIGNIFICANT CHANGE UP
GLUCOSE SERPL-MCNC: 112 MG/DL — HIGH (ref 70–99)
HCT VFR BLD CALC: 28.3 % — LOW (ref 39–50)
HGB BLD-MCNC: 10.1 G/DL — LOW (ref 13–17)
MCHC RBC-ENTMCNC: 31.5 PG — SIGNIFICANT CHANGE UP (ref 27–34)
MCHC RBC-ENTMCNC: 35.7 G/DL — SIGNIFICANT CHANGE UP (ref 32–36)
MCV RBC AUTO: 88.2 FL — SIGNIFICANT CHANGE UP (ref 80–100)
NRBC # BLD: 0 /100 WBCS — SIGNIFICANT CHANGE UP (ref 0–0)
OSMOLALITY SERPL: 282 MOSMOL/KG — SIGNIFICANT CHANGE UP (ref 280–301)
PLATELET # BLD AUTO: 171 K/UL — SIGNIFICANT CHANGE UP (ref 150–400)
POTASSIUM SERPL-MCNC: 4.1 MMOL/L — SIGNIFICANT CHANGE UP (ref 3.5–5.3)
POTASSIUM SERPL-SCNC: 4.1 MMOL/L — SIGNIFICANT CHANGE UP (ref 3.5–5.3)
PROT SERPL-MCNC: 6.2 G/DL — SIGNIFICANT CHANGE UP (ref 6–8.3)
RBC # BLD: 3.21 M/UL — LOW (ref 4.2–5.8)
RBC # FLD: 16.8 % — HIGH (ref 10.3–14.5)
SODIUM SERPL-SCNC: 134 MMOL/L — LOW (ref 135–145)
WBC # BLD: 4.01 K/UL — SIGNIFICANT CHANGE UP (ref 3.8–10.5)
WBC # FLD AUTO: 4.01 K/UL — SIGNIFICANT CHANGE UP (ref 3.8–10.5)

## 2024-11-04 PROCEDURE — 99291 CRITICAL CARE FIRST HOUR: CPT

## 2024-11-04 PROCEDURE — 83036 HEMOGLOBIN GLYCOSYLATED A1C: CPT

## 2024-11-04 PROCEDURE — 82272 OCCULT BLD FECES 1-3 TESTS: CPT

## 2024-11-04 PROCEDURE — 71260 CT THORAX DX C+: CPT | Mod: MC

## 2024-11-04 PROCEDURE — 83540 ASSAY OF IRON: CPT

## 2024-11-04 PROCEDURE — 82140 ASSAY OF AMMONIA: CPT

## 2024-11-04 PROCEDURE — 71045 X-RAY EXAM CHEST 1 VIEW: CPT

## 2024-11-04 PROCEDURE — 83550 IRON BINDING TEST: CPT

## 2024-11-04 PROCEDURE — 86923 COMPATIBILITY TEST ELECTRIC: CPT

## 2024-11-04 PROCEDURE — 86900 BLOOD TYPING SEROLOGIC ABO: CPT

## 2024-11-04 PROCEDURE — 85025 COMPLETE CBC W/AUTO DIFF WBC: CPT

## 2024-11-04 PROCEDURE — 0225U NFCT DS DNA&RNA 21 SARSCOV2: CPT

## 2024-11-04 PROCEDURE — 82728 ASSAY OF FERRITIN: CPT

## 2024-11-04 PROCEDURE — 83935 ASSAY OF URINE OSMOLALITY: CPT

## 2024-11-04 PROCEDURE — 83930 ASSAY OF BLOOD OSMOLALITY: CPT

## 2024-11-04 PROCEDURE — P9016: CPT

## 2024-11-04 PROCEDURE — 82248 BILIRUBIN DIRECT: CPT

## 2024-11-04 PROCEDURE — 96375 TX/PRO/DX INJ NEW DRUG ADDON: CPT

## 2024-11-04 PROCEDURE — 74177 CT ABD & PELVIS W/CONTRAST: CPT | Mod: MC

## 2024-11-04 PROCEDURE — 84145 PROCALCITONIN (PCT): CPT

## 2024-11-04 PROCEDURE — 85730 THROMBOPLASTIN TIME PARTIAL: CPT

## 2024-11-04 PROCEDURE — 85610 PROTHROMBIN TIME: CPT

## 2024-11-04 PROCEDURE — 84466 ASSAY OF TRANSFERRIN: CPT

## 2024-11-04 PROCEDURE — 85027 COMPLETE CBC AUTOMATED: CPT

## 2024-11-04 PROCEDURE — 87641 MR-STAPH DNA AMP PROBE: CPT

## 2024-11-04 PROCEDURE — 96374 THER/PROPH/DIAG INJ IV PUSH: CPT

## 2024-11-04 PROCEDURE — 84484 ASSAY OF TROPONIN QUANT: CPT

## 2024-11-04 PROCEDURE — 36430 TRANSFUSION BLD/BLD COMPNT: CPT

## 2024-11-04 PROCEDURE — 83615 LACTATE (LD) (LDH) ENZYME: CPT

## 2024-11-04 PROCEDURE — 83605 ASSAY OF LACTIC ACID: CPT

## 2024-11-04 PROCEDURE — 86850 RBC ANTIBODY SCREEN: CPT

## 2024-11-04 PROCEDURE — 82570 ASSAY OF URINE CREATININE: CPT

## 2024-11-04 PROCEDURE — 84443 ASSAY THYROID STIM HORMONE: CPT

## 2024-11-04 PROCEDURE — 85385 FIBRINOGEN ANTIGEN: CPT

## 2024-11-04 PROCEDURE — 36415 COLL VENOUS BLD VENIPUNCTURE: CPT

## 2024-11-04 PROCEDURE — 93005 ELECTROCARDIOGRAM TRACING: CPT

## 2024-11-04 PROCEDURE — 82550 ASSAY OF CK (CPK): CPT

## 2024-11-04 PROCEDURE — 80053 COMPREHEN METABOLIC PANEL: CPT

## 2024-11-04 PROCEDURE — 80164 ASSAY DIPROPYLACETIC ACD TOT: CPT

## 2024-11-04 PROCEDURE — 81003 URINALYSIS AUTO W/O SCOPE: CPT

## 2024-11-04 PROCEDURE — 70450 CT HEAD/BRAIN W/O DYE: CPT | Mod: MC

## 2024-11-04 PROCEDURE — 82746 ASSAY OF FOLIC ACID SERUM: CPT

## 2024-11-04 PROCEDURE — 80061 LIPID PANEL: CPT

## 2024-11-04 PROCEDURE — 82607 VITAMIN B-12: CPT

## 2024-11-04 PROCEDURE — 83880 ASSAY OF NATRIURETIC PEPTIDE: CPT

## 2024-11-04 PROCEDURE — 87040 BLOOD CULTURE FOR BACTERIA: CPT

## 2024-11-04 PROCEDURE — 84100 ASSAY OF PHOSPHORUS: CPT

## 2024-11-04 PROCEDURE — 82962 GLUCOSE BLOOD TEST: CPT

## 2024-11-04 PROCEDURE — 84300 ASSAY OF URINE SODIUM: CPT

## 2024-11-04 PROCEDURE — 86901 BLOOD TYPING SEROLOGIC RH(D): CPT

## 2024-11-04 PROCEDURE — 83735 ASSAY OF MAGNESIUM: CPT

## 2024-11-04 PROCEDURE — 87640 STAPH A DNA AMP PROBE: CPT

## 2024-11-04 RX ORDER — LEVOTHYROXINE SODIUM 88 MCG
1 TABLET ORAL
Qty: 0 | Refills: 0 | DISCHARGE
Start: 2024-11-04

## 2024-11-04 RX ORDER — METFORMIN HYDROCHLORIDE 500 MG/1
2 TABLET, EXTENDED RELEASE ORAL
Refills: 0 | DISCHARGE

## 2024-11-04 RX ORDER — BENZTROPINE MESYLATE 0.5 MG
2 TABLET ORAL
Refills: 0 | DISCHARGE

## 2024-11-04 RX ORDER — SODIUM CHLORIDE 9 MG/ML
1000 INJECTION, SOLUTION INTRAMUSCULAR; INTRAVENOUS; SUBCUTANEOUS
Refills: 0 | Status: DISCONTINUED | OUTPATIENT
Start: 2024-11-04 | End: 2024-11-04

## 2024-11-04 RX ORDER — PANTOPRAZOLE SODIUM 40 MG/1
40 TABLET, DELAYED RELEASE ORAL
Qty: 0 | Refills: 0 | DISCHARGE
Start: 2024-11-04

## 2024-11-04 RX ORDER — APIXABAN 5 MG/1
1 TABLET, FILM COATED ORAL
Qty: 0 | Refills: 0 | DISCHARGE
Start: 2024-11-04

## 2024-11-04 RX ORDER — LEVOTHYROXINE SODIUM 88 MCG
1 TABLET ORAL
Refills: 0 | DISCHARGE

## 2024-11-04 RX ORDER — PANTOPRAZOLE SODIUM 40 MG/1
1 TABLET, DELAYED RELEASE ORAL
Qty: 0 | Refills: 0 | DISCHARGE
Start: 2024-11-04

## 2024-11-04 RX ORDER — LOSARTAN POTASSIUM 25 MG/1
1 TABLET ORAL
Refills: 0 | DISCHARGE

## 2024-11-04 RX ORDER — ASPIRIN/MAG CARB/ALUMINUM AMIN 325 MG
1 TABLET ORAL
Qty: 0 | Refills: 0 | DISCHARGE
Start: 2024-11-04

## 2024-11-04 RX ORDER — METOPROLOL TARTRATE 50 MG
1 TABLET ORAL
Refills: 0 | DISCHARGE

## 2024-11-04 RX ORDER — CLOPIDOGREL 75 MG/1
1 TABLET ORAL
Refills: 0 | DISCHARGE

## 2024-11-04 RX ORDER — TRAZODONE HYDROCHLORIDE 100 MG/1
1 TABLET ORAL
Refills: 0 | DISCHARGE

## 2024-11-04 RX ORDER — DIVALPROEX SODIUM 250 MG/1
3 TABLET, FILM COATED, EXTENDED RELEASE ORAL
Qty: 0 | Refills: 0 | DISCHARGE
Start: 2024-11-04

## 2024-11-04 RX ORDER — UREA 15 G
1 POWDER IN PACKET (EA) ORAL
Qty: 0 | Refills: 0 | DISCHARGE
Start: 2024-11-04

## 2024-11-04 RX ORDER — DIVALPROEX SODIUM 250 MG/1
3 TABLET, FILM COATED, EXTENDED RELEASE ORAL
Refills: 0 | DISCHARGE

## 2024-11-04 RX ADMIN — SODIUM CHLORIDE 1 GRAM(S): 9 INJECTION, SOLUTION INTRAMUSCULAR; INTRAVENOUS; SUBCUTANEOUS at 13:35

## 2024-11-04 RX ADMIN — Medication 2 MILLIGRAM(S): at 05:55

## 2024-11-04 RX ADMIN — CHLORHEXIDINE GLUCONATE 1 APPLICATION(S): 40 SOLUTION TOPICAL at 05:59

## 2024-11-04 RX ADMIN — Medication 25 MILLIGRAM(S): at 05:55

## 2024-11-04 RX ADMIN — Medication 1: at 12:15

## 2024-11-04 RX ADMIN — Medication 40 MILLIGRAM(S): at 11:55

## 2024-11-04 RX ADMIN — Medication 88 MICROGRAM(S): at 05:55

## 2024-11-04 RX ADMIN — Medication 15 GRAM(S): at 05:57

## 2024-11-04 RX ADMIN — MUPIROCIN 1 APPLICATION(S): 20 OINTMENT TOPICAL at 05:55

## 2024-11-04 RX ADMIN — RISPERIDONE 4 MILLIGRAM(S): 3 TABLET, FILM COATED ORAL at 05:57

## 2024-11-04 RX ADMIN — DIVALPROEX SODIUM 1500 MILLIGRAM(S): 250 TABLET, FILM COATED, EXTENDED RELEASE ORAL at 09:33

## 2024-11-04 RX ADMIN — PANTOPRAZOLE SODIUM 40 MILLIGRAM(S): 40 TABLET, DELAYED RELEASE ORAL at 05:56

## 2024-11-04 RX ADMIN — SODIUM CHLORIDE 1 GRAM(S): 9 INJECTION, SOLUTION INTRAMUSCULAR; INTRAVENOUS; SUBCUTANEOUS at 05:55

## 2024-11-04 NOTE — DISCHARGE NOTE PROVIDER - NSDCMRMEDTOKEN_GEN_ALL_CORE_FT
apixaban 2.5 mg oral tablet: 1 tab(s) orally 2 times a day monitor for signs of bleeding , CBC every 2 days x 1 week  aspirin 81 mg oral delayed release tablet: 1 tab(s) orally once a day  atorvastatin 80 mg oral tablet: 1 tab(s) orally once a day (at bedtime)  benztropine 2 mg oral tablet: 1 tab(s) orally 2 times a day  divalproex sodium 500 mg oral delayed release tablet: 3 tab(s) orally once a day RESUME PREADMISSION SCHEDULE ,NO CHANGES  docusate sodium 100 mg oral tablet: 2 tab(s) orally 2 times a day  Eliquis 2.5 mg oral tablet: 1 tab(s) orally 2 times a day  famotidine 20 mg oral tablet: 1 tab(s) orally once a day (in the morning)  folic acid 1 mg oral tablet: 1 tab(s) orally once a day (in the morning)  Haldol Decanoate 100 mg/mL intramuscular solution: 4 milliliter(s) intramuscular every 4 weeks  Last dose 8/6/2020  levothyroxine 88 mcg (0.088 mg) oral tablet: 1 tab(s) orally once a day  LORazepam 0.5 mg oral tablet: 1 tab(s) orally once a day (in the morning)  losartan 50 mg oral tablet: 1 tab(s) orally once a day  metFORMIN 1000 mg oral tablet: 1 tab(s) orally 2 times a day  nystatin 100,000 units/g topical powder: Apply topically to affected area 2 times a day  pantoprazole 40 mg oral delayed release tablet: 1 tab(s) orally 2 times a day  QUEtiapine 400 mg oral tablet: 1 tab(s) orally once a day (at bedtime)  RisperDAL 4 mg oral tablet: 1 tab(s) orally once a day  risperiDONE 4 mg oral tablet: 1 tab(s) orally 2 times a day  sodium chloride: 1,000 milligram(s) orally 2 times a day x 5 days  traZODone 100 mg oral tablet: 1 tab(s) orally once a day (at bedtime)  urea 15 g oral powder for reconstitution: 1 packet(s) orally 2 times a day x 5 days  Vascepa 1 g oral capsule: 1 cap(s) orally 2 times a day  Vitamin D2 50,000 intl units (1.25 mg) oral capsule: 1 cap(s) orally every 2 weeks  Next dose due 8/15/2020  ziprasidone 40 mg oral capsule: 1 cap(s) orally 2 times a day  Total dose 120mg orally 2 times a day  ziprasidone 80 mg oral capsule: 1 cap(s) orally 2 times a day  Total dose 120mg orally 2 times a day

## 2024-11-04 NOTE — DIETITIAN INITIAL EVALUATION ADULT - REASON FOR ADMISSION
Other shock  per H&P: · Assessment	  68 MALE with  PMH NSTEMI, schizophrenia, bipolar d/o, HTN, hypoNa BIBEMS from [facility] for anemia to 6 and hypotension. Pt's O2 sat was 90% on RA and was placed on NC. Per EMS, facility states pt was altered from baseline as well. Was noted to by hypotensive to sBP 70's  	Pt has different MRN: [id number] (B/L Hb noted to be close to 11) Admitted to ICU -  presenting with severe anemia and hypotension, lactic acidosis.  Improved after Kcentra and total 3 PRBCs, requiring levophed for brief period.  Unclear source of bleeding.  --bipolar disorder, continue home depakote, risperadone, benztropine  --shock now resolved, off levophed following 3rd PRBC  afib controlled, hold BB for now, no AC in setting of anemia and possible bleed  continue statin  f/u echo  --stable respiratory status  --mild HUBERT resolved BUN/Cr 47/1.3

## 2024-11-04 NOTE — PATIENT CHOICE NOTE. - NSPTCHOICESTATE_GEN_ALL_CORE

## 2024-11-04 NOTE — DISCHARGE NOTE NURSING/CASE MANAGEMENT/SOCIAL WORK - PATIENT PORTAL LINK FT
You can access the FollowMyHealth Patient Portal offered by [facility] by registering at the following website: [email or web address]. By joining [employer]’s FollowMyHealth portal, you will also be able to view your health information using other applications (apps) compatible with our system.

## 2024-11-04 NOTE — DISCHARGE NOTE PROVIDER - HOSPITAL COURSE
· Assessment    68 MALE with  PMH NSTEMI, schizophrenia, bipolar d/o, HTN, hypoNa BIBEMS from [facility] for anemia to 6 and hypotension. Pt's O2 sat was 90% on RA and was placed on NC. Per EMS, facility states pt was altered from baseline as well. Was noted to by hypotensive to sBP 70's  	Pt has different MRN: [id number] (B/L Hb noted to be close to 11) Admitted to ICU -  presenting with severe anemia and hypotension, lactic acidosis.  Improved after Kcentra and total 3 PRBCs, requiring levophed for brief period.  Unclear source of bleeding.  --bipolar disorder, continue home depakote, risperadone, benztropine  --shock now resolved, off levophed following 3rd PRBC  afib controlled, hold BB for now, no AC in setting of anemia and possible bleed  continue statin  f/u echo  --stable respiratory status  --mild HUBERT resolved BUN/Cr 47/1.3  stool occult blood negative  Given 2L fluid, 3u PRBC, adm to ICU  post transfusion CBC today first lab Hgb 8.1, second lab Hgb 9.3  Pt appear comfortable sitting up in bed  No gross sign bleed at this time  CT head negative  CT c/a/p-mild dep atelectasis, sm right efusion  -?new onset acute anemia, no gross bleeds noted on presnation.in hospital  -appropriate increase of Hgb w the transfusion  -no signs of hemolysis  -TSH normal  -iron studies were sent, will follow up, will also order B12, folate  -follow serial CBC      Problem/Plan - 1:  ·  Problem: Severe anemia.     Problem/Plan - 2:  ·  Problem: Hemorrhagic shock.     Problem/Plan - 3:  ·  Problem: HUBERT (acute kidney injury).     Problem/Plan - 4:  ·  Problem: Elevated lactic acid level.     Problem/Plan - 5:  ·  Problem: Atrial fibrillation.     Problem/Plan - 6:  ·  Problem: Hypothyroidism.     Problem/Plan - 7:  ·  Problem: Parkinsonism.     Problem/Plan - 8:  ·  Problem: CAD (coronary artery disease).     Problem/Plan - 9:  ·  Problem: DM (diabetes mellitus).     Problem/Plan - 10:  ·  Problem: Bipolar disorder.     Problem/Plan - 11:  ·  Problem: Schizophrenia.     Problem/Plan - 12:  ·  Problem: Prophylactic measure.

## 2024-11-04 NOTE — DISCHARGE NOTE NURSING/CASE MANAGEMENT/SOCIAL WORK - FINANCIAL ASSISTANCE
[facility] provides services at a reduced cost to those who are determined to be eligible through [facility]’s financial assistance program. Information regarding [facility]’s financial assistance program can be found by going to [email or web address] or by calling [phone].

## 2024-11-04 NOTE — DIETITIAN INITIAL EVALUATION ADULT - PERTINENT MEDS FT
MEDICATIONS  (STANDING):  atorvastatin 80 milliGRAM(s) Oral at bedtime  benztropine 2 milliGRAM(s) Oral two times a day  chlorhexidine 2% Cloths 1 Application(s) Topical <User Schedule>  dextrose 5%. 1000 milliLiter(s) (100 mL/Hr) IV Continuous <Continuous>  dextrose 5%. 1000 milliLiter(s) (50 mL/Hr) IV Continuous <Continuous>  dextrose 50% Injectable 12.5 Gram(s) IV Push once  dextrose 50% Injectable 25 Gram(s) IV Push once  dextrose 50% Injectable 25 Gram(s) IV Push once  divalproex ER 1500 milliGRAM(s) Oral <User Schedule>  enoxaparin Injectable 40 milliGRAM(s) SubCutaneous every 24 hours  glucagon  Injectable 1 milliGRAM(s) IntraMuscular once  insulin lispro (ADMELOG) corrective regimen sliding scale   SubCutaneous three times a day before meals  levothyroxine 88 MICROGram(s) Oral daily  metoprolol tartrate 25 milliGRAM(s) Oral two times a day  mupirocin 2% Nasal 1 Application(s) Both Nostrils two times a day  pantoprazole  Injectable 40 milliGRAM(s) IV Push every 12 hours  QUEtiapine 400 milliGRAM(s) Oral at bedtime  risperiDONE   Tablet 4 milliGRAM(s) Oral two times a day  sodium chloride 1 Gram(s) Oral three times a day  traZODone 100 milliGRAM(s) Oral at bedtime  urea Oral Powder 15 Gram(s) Oral two times a day    MEDICATIONS  (PRN):  dextrose Oral Gel 15 Gram(s) Oral once PRN Blood Glucose LESS THAN 70 milliGRAM(s)/deciliter

## 2024-11-04 NOTE — CARE COORDINATION ASSESSMENT. - NSCAREPROVIDERS_GEN_ALL_CORE_FT
CARE PROVIDERS:  Accepting Physician: Karin Choi  Access Services: Angela Zapata  Administration: Monica Hayden  Administration: Petr Guaman  Administration: Robel Garcia  Admitting: Karin Choi  Attending: Karin Choi  Consultant: Charly Webster  Consultant: Lety Carpio  Consultant: Josue Pleitez  Consultant: Mary Jane Shaffer  Consultant: Allen Youssef  Consultant: Tammy Rosario  Consultant: Isabella Hickey  Consultant: Carmen Abbott  Consultant: Cecilia Kim  Consultant: Reginaldo Broderick  Covering Team: Caren Moreno  Covering Team: Nik Barksdale  ED Attending: Nichloas Vigil  ED Nurse: Shi Vaz  Nurse: Darcy Shirley  Nurse: Krupa Ledesma  Ordered: ADM, User  Ordered: Doctor, Unknown  Outpatient Provider: Pahlavan, Mohsen  Outpatient Provider: Pahlavan, Mohsen  PCA/Nursing Assistant: Sheila Blue  Primary Team: Dorie Galloway  Primary Team: Arminda Sanchez  Primary Team: Lissette Flores  Registered Dietitian: María Schaffer  Registered Dietitian: Martha Charles  : Dorie Gibson

## 2024-11-04 NOTE — PROGRESS NOTE ADULT - ASSESSMENT
REASON FOR VISIT  .. Management of problems listed below        REVIEW OF SYMPTOMS   Able to give ROS  Yes     RELIABILITY +/-   CONSTITUTIONAL Weakness Yes    ENDOCRINE  No heat or cold intolerance    ALLERGY No hives  Sore throat No stridor  RESP Shortness of breath YES   NEURO New weakness No   CARDIAC   Palpitations No         PHYSICAL EXAM    HEENT Unremarkable  atraumatic   RESP Fair air entry  Harsh breath sound   CARDIAC S1 S2 No S3     NO JVD    ABDOMEN No hepatosplenomegaly   PEDAL EDEMA present No calf tenderness  NO rash       [id number]  BEST PRACTICE ISSUES.  . HOB ELEVATN.    .... Yes  . DIET.   ... 11/2/2024 cons carb   .... 11/1/2024 npo   . IV FLUIDS.  ....   . DVT PPLX.    .... 11/2/2024 lvnx 40   .... 11/1/2024 no pharmac pplx as arrived with v low hb ro abla   . STRESS ULCER PPLX.   .... 11/1/2024 protonix 40   . INFECTION PPLX.   .... 11/1/2024 chlorhexidine 2%   .... 11/2/2024 mupirocin 2% (sa pcr )     XXXXXXXXXXXXXXXXXXXXXXXXX  GENERAL DATA .   GOC.    ..    ICU STAY.    .. 11/1-11/2/2024   COVID.   ..   ALLGY.   ..   clozapine lithium     WT.   ..  11/1/2024 84  BMI.  .. 11/1/2024 bmi 33   ISOLATION.        CHIEF COMPLAINT.   . 11/1/2024 Patient brought by ambulance from [facility] as reported had abnormal labs and low blood pressure HGB of 6 received on O2 via NC    OVERALL PRESENTATION.  . 11/1/2024 68M with PMHx of Afib on Eliquis, NSTEMI, schizophrenia, BP D/o, HTN, HypoNA BIBEMS from [facility] for Hypotension d/t anemia of 6. In the ED pt found to be hypotensive to low 80s/50s given 2L of IVF, Eliquis reversed with KCentra, Anemic with hgb of 5.1, Lactate of 4, pan scanned without any acute source of bleed  . Pt was on dexmedetomidine drip earlier     PMH.     PAST HOSPITAL STAYS .   .  HOME MEDS.    TRANSFSION  . 11/1/2024 3 u prbc     XXXX[id number]  PROBLEM ASSESSMENT RECOMMENDATIONS.  RESP.   .... Target po 90-95%    INFECTION.  . SEPSIS   .... w 10/31- 11/1 w 5.8-6.9   .... pr 11/1/2024 pr (-)   .... ua 11/1/2024 ua n (-)   .... CT cap 11/1/2024   ........ no ac abn   ........ small r pl effs   ........ non specific diffuse ground glass is densities ro pie or nonsp pneumonitis   .... rvp 11/1/2024 (-)   .... No strong susp for infection     CARDIAC.  . LACTICEMIA   .... la 10/31-11/1 la 4.3 - 3.4  .... restore euvolemia and monitor     . A fib on Apixaba   .... 11/1 metoprolol 25.2   .... 11/1/2024 apixaba held because of abla     . CAD   .... HO NSTEMI 2023 On asa plavx   .... Trop 10/31-11/1 Tr 34-52   .... 11/1/2024 APA JORGE held because of gi bl  .... 11/1 ATORVASTAT 80     . CHF  .... pbnp 11/1 pbnp 470-     HEMAT.  . ANEMIA   .... Hb 10/31-11/1- 11/2-11/3 Hb 5.1-9.3 - 8.8 - 9  .... Plt 10/31 plt 162   .... monitor target Hb 7 (+)     . COAGULOPATHY  .... 11/1/2024 Given Kcentra     GI.  RENAL.  .... K 11/1/2024 K 4.6   .... CO2 11/1/2024 co2 24   .... AG 11/1/2024 ag 8   .... Alb   .... Cr 11/1/2024 Cr 1.1   .... monitor     . HYPONATREMIA  .... Na 11/1-11/2-11/3/2024 Na 139 - 131 - 123  .... 11/3/2024 nacl 1.3   .... 11/3/2024 urea 15.2       ENDO.  . HYPOTHYROID  .... 11/1/2024 levoxyl 88     . DM  ... 11/1/2024 sl scale     NEURO.  . PSYCH PROBLEMS   .... 11/1 quetiapine 400   .... 11/1/2024 Depakote 1500   .... 11/1/2024 trazodone 100   .... 11/1 BENZTROPINE 2.2     DISCUSSIONS.    XXXXXXXXXXXXXXXXXXXXXX   SUMMARY  CC.   . 11/1/2024 low bp  . 11/1/2024 Hb 6  . 11/1/2024 hypoxia  PROBLEMS .  . HYPOXEMIA   . LACTICEMIA 10/31-11/1 la 4.3 - 3.4  . A fib ON APIXABA  .... 11/1 taken off apixa   . CAD 10/31-11/1 Tr 34-52   . SEVERE ANEMIA 10/31-11/1 Hb 5.1-9.3   . TRANSFUSION 11/1/2024 3 u   . APIXABA  .... 11/1/2024 Given Kcentra   . HYPONATREMIA   .... Na 11/1-11/2-11/3/2024 Na 139 - 131 - 123  ....  nacl 1.3 11/3/2024  .... urea 15.2 11/3/2024     PMH.  . Hytn   . Afib on Eliquis,   . NSTEMI,   . Hyponna  . schizophrenia,     TIME SPENT.  . Over 36 minutes aggregate care time spent on encounter; activities included   direct patient care, counseling and/or coordinating care reviewing notes, lab data/ imaging , discussion with multidisciplinary team/ patient  /family and explaining in detail risks, benefits, alternatives  of the recommendations     CLIFFORD ABDALLA 67 m 11/1/2024 1955

## 2024-11-04 NOTE — PROGRESS NOTE ADULT - REASON FOR ADMISSION
anemia and hypotension

## 2024-11-04 NOTE — PROGRESS NOTE ADULT - SUBJECTIVE AND OBJECTIVE BOX
[facility]  Brad Us PA-C  [address] [place] [address]  [phone]      INTERVAL HPI/OVERNIGHT EVENTS:  Pt s/e  Hgb stable  No reported overt GIB    MEDICATIONS  (STANDING):  atorvastatin 80 milliGRAM(s) Oral at bedtime  benztropine 2 milliGRAM(s) Oral two times a day  chlorhexidine 2% Cloths 1 Application(s) Topical <User Schedule>  dextrose 5%. 1000 milliLiter(s) (100 mL/Hr) IV Continuous <Continuous>  dextrose 5%. 1000 milliLiter(s) (50 mL/Hr) IV Continuous <Continuous>  dextrose 50% Injectable 12.5 Gram(s) IV Push once  dextrose 50% Injectable 25 Gram(s) IV Push once  dextrose 50% Injectable 25 Gram(s) IV Push once  divalproex ER 1500 milliGRAM(s) Oral <User Schedule>  enoxaparin Injectable 40 milliGRAM(s) SubCutaneous every 24 hours  glucagon  Injectable 1 milliGRAM(s) IntraMuscular once  insulin lispro (ADMELOG) corrective regimen sliding scale   SubCutaneous three times a day before meals  levothyroxine 88 MICROGram(s) Oral daily  metoprolol tartrate 25 milliGRAM(s) Oral two times a day  mupirocin 2% Nasal 1 Application(s) Both Nostrils two times a day  pantoprazole  Injectable 40 milliGRAM(s) IV Push every 12 hours  QUEtiapine 400 milliGRAM(s) Oral at bedtime  risperiDONE   Tablet 4 milliGRAM(s) Oral two times a day  sodium chloride 1 Gram(s) Oral three times a day  traZODone 100 milliGRAM(s) Oral at bedtime  urea Oral Powder 15 Gram(s) Oral two times a day    MEDICATIONS  (PRN):  dextrose Oral Gel 15 Gram(s) Oral once PRN Blood Glucose LESS THAN 70 milliGRAM(s)/deciliter      Allergies    clozapine (Unknown)  lithium (Rash)  clozapine (Rash)  aspartate (Unknown)  lithium (Unknown)      PHYSICAL EXAM:   Vital Signs:  Vital Signs Last 24 Hrs  T(C): 36.5 (2024 11:12), Max: 36.5 (2024 11:12)  T(F): 97.7 (:12), Max: 97.7 (:)  HR: 80 (2024 11:12) (80 - 93)  BP: 161/82 (2024 11:12) (127/76 - 161/82)  BP(mean): --  RR: 18 (:12) (18 - 94)  SpO2: 94% (:12) (94% - 96%)    Parameters below as of :12  Patient On (Oxygen Delivery Method): room air      Daily     Daily Weight in k.6 (2024 05:45)    GENERAL:  Appears stated age  HEENT:  NC/AT  CHEST:  Full & symmetric excursion  HEART:  Regular rhythm  ABDOMEN:  Soft, non-tender, non-distended  EXTEREMITIES:  no cyanosis  SKIN:  No rash  NEURO:  Alert      LABS:                        10.1   4.01  )-----------( 171      ( 2024 07:45 )             28.3     11-04    134[L]  |  103  |  15  ----------------------------<  112[H]  4.1   |  27  |  0.83    Ca    9.4      2024 07:45  Phos  3.3     11-03  Mg     1.6     11-03    TPro  6.2  /  Alb  2.8[L]  /  TBili  0.2  /  DBili  x   /  AST  29  /  ALT  25  /  AlkPhos  77  11-04      Urinalysis Basic - ( 2024 07:45 )    Color: x / Appearance: x / SG: x / pH: x  Gluc: 112 mg/dL / Ketone: x  / Bili: x / Urobili: x   Blood: x / Protein: x / Nitrite: x   Leuk Esterase: x / RBC: x / WBC x   Sq Epi: x / Non Sq Epi: x / Bacteria: x

## 2024-11-04 NOTE — DIETITIAN INITIAL EVALUATION ADULT - PERTINENT LABORATORY DATA
11-04    134[L]  |  103  |  15  ----------------------------<  112[H]  4.1   |  27  |  0.83    Ca    9.4      04 Nov 2024 07:45  Phos  3.3     11-03  Mg     1.6     11-03    TPro  6.2  /  Alb  2.8[L]  /  TBili  0.2  /  DBili  x   /  AST  29  /  ALT  25  /  AlkPhos  77  11-04  POCT Blood Glucose.: 111 mg/dL (11-04-24 @ 08:12)  A1C with Estimated Average Glucose Result: 5.5 % (11-02-24 @ 05:31)

## 2024-11-04 NOTE — PROGRESS NOTE ADULT - SUBJECTIVE AND OBJECTIVE BOX
PROGRESS NOTE  Patient is a 68y old  Male who presents with a chief complaint of anemia and hypotension (04 Nov 2024 12:47)  Chart and available morning labs /imaging are reviewed electronically , urgent issues addressed . More information  is being added upon completion of rounds , when more information is collected and management discussed with consultants , medical staff and social service/case management on the floor     OVERNIGHT  No new issues reported by medical staff . All above noted Patient is resting in a bed comfortably .Confused ,poor mentation .No distress noted     HPI:  68 MALE with  PMH NSTEMI, schizophrenia, bipolar d/o, HTN, hypoNa BIBEMS from [facility] for anemia to 6 and hypotension. Pt's O2 sat was 90% on RA and was placed on NC. Per EMS, facility states pt was altered from baseline as well. Was noted to by hypotensive to sBP 70's  	Pt has different MRN: [id number] (B/L Hb noted to be close to 11) Admitted to ICU -  presenting with severe anemia and hypotension, lactic acidosis.  Improved after Kcentra and total 3 PRBCs, requiring levophed for brief period.  Unclear source of bleeding.    --bipolar disorder, continue home depakote, risperadone, benztropine  --shock now resolved, off levophed following 3rd PRBC  afib controlled, hold BB for now, no AC in setting of anemia and possible bleed  continue statin  f/u echo  --stable respiratory status  --mild HUBERT resolved  lactic acidosis improved  --no signs of GIB, and FOBT neg, clear liquids for now and continue PPI bid  --no infectious concerns, f/u Cx  --macrocytic anemia, s/p 3 PRBCs with appropriate response, (01 Nov 2024 10:53)    PAST MEDICAL & SURGICAL HISTORY:  Schizophrenia      Hypothyroidism      Hyperlipidemia      Schizophrenia      Hypothyroid      Anxiety      DM (diabetes mellitus)      Parkinsonism      Schizophrenia      Hyponatremia      NSTEMI (non-ST elevation myocardial infarction)      Bipolar disorder      DM (diabetes mellitus)      Dementia      HTN (hypertension)      Chronic CHF      MDD (major depressive disorder)      Hypothyroidism      HLD (hyperlipidemia)      Hypothyroidism      Constipation      Folate deficiency      Constipation      H/O candidiasis      Violent behavior      Atrial fibrillation      Hyperkalemia      Epistaxis      DM (diabetes mellitus)      No significant past surgical history          MEDICATIONS  (STANDING):  atorvastatin 80 milliGRAM(s) Oral at bedtime  benztropine 2 milliGRAM(s) Oral two times a day  chlorhexidine 2% Cloths 1 Application(s) Topical <User Schedule>  dextrose 5%. 1000 milliLiter(s) (50 mL/Hr) IV Continuous <Continuous>  dextrose 5%. 1000 milliLiter(s) (100 mL/Hr) IV Continuous <Continuous>  dextrose 50% Injectable 12.5 Gram(s) IV Push once  dextrose 50% Injectable 25 Gram(s) IV Push once  dextrose 50% Injectable 25 Gram(s) IV Push once  divalproex ER 1500 milliGRAM(s) Oral <User Schedule>  enoxaparin Injectable 40 milliGRAM(s) SubCutaneous every 24 hours  glucagon  Injectable 1 milliGRAM(s) IntraMuscular once  insulin lispro (ADMELOG) corrective regimen sliding scale   SubCutaneous three times a day before meals  levothyroxine 88 MICROGram(s) Oral daily  metoprolol tartrate 25 milliGRAM(s) Oral two times a day  mupirocin 2% Nasal 1 Application(s) Both Nostrils two times a day  pantoprazole  Injectable 40 milliGRAM(s) IV Push every 12 hours  QUEtiapine 400 milliGRAM(s) Oral at bedtime  risperiDONE   Tablet 4 milliGRAM(s) Oral two times a day  sodium chloride 1 Gram(s) Oral three times a day  traZODone 100 milliGRAM(s) Oral at bedtime  urea Oral Powder 15 Gram(s) Oral two times a day    MEDICATIONS  (PRN):  dextrose Oral Gel 15 Gram(s) Oral once PRN Blood Glucose LESS THAN 70 milliGRAM(s)/deciliter      OBJECTIVE    T(C): 36.5 (11-04-24 @ 11:12), Max: 36.5 (11-04-24 @ 11:12)  HR: 80 (11-04-24 @ 11:12) (80 - 93)  BP: 161/82 (11-04-24 @ 11:12) (127/76 - 161/82)  RR: 18 (11-04-24 @ 11:12) (18 - 94)  SpO2: 94% (11-04-24 @ 11:12) (94% - 96%)  Wt(kg): --  I&O's Summary    03 Nov 2024 07:01  -  04 Nov 2024 07:00  --------------------------------------------------------  IN: 0 mL / OUT: 1600 mL / NET: -1600 mL          REVIEW OF SYSTEMS:  CONSTITUTIONAL: No fever, weight loss, or fatigue  EYES: No eye pain, visual disturbances, or discharge  ENMT:   No sinus or throat pain  NECK: No pain or stiffness  RESPIRATORY: No cough, wheezing, chills or hemoptysis; No shortness of breath  CARDIOVASCULAR: No chest pain, palpitations, dizziness, or leg swelling  GASTROINTESTINAL: No abdominal pain. No nausea, vomiting; No diarrhea or constipation. No melena or hematochezia.  GENITOURINARY: No dysuria, frequency, hematuria, or incontinence  NEUROLOGICAL: No headaches, memory loss, loss of strength, numbness, or tremors  SKIN: No itching, burning, rashes, or lesions   MUSCULOSKELETAL: No joint pain or swelling; No muscle, back, or extremity pain    PHYSICAL EXAM:  Appearance: NAD. VS past 24 hrs -as above   HEENT:   Moist oral mucosa. Conjunctiva clear b/l.   Neck : supple  Respiratory: Lungs CTAB.  Gastrointestinal:  Soft, nontender. No rebound. No rigidity. BS present	  Cardiovascular: RRR ,S1S2 present  Neurologic: Non-focal. Moving all extremities.  Extremities: No edema. No erythema. No calf tenderness.  Skin: No rashes, No ecchymoses, No cyanosis.	  wounds ,skin lesions-See skin assesment flow sheet   LABS:                        10.1   4.01  )-----------( 171      ( 04 Nov 2024 07:45 )             28.3     11-04    134[L]  |  103  |  15  ----------------------------<  112[H]  4.1   |  27  |  0.83    Ca    9.4      04 Nov 2024 07:45  Phos  3.3     11-03  Mg     1.6     11-03    TPro  6.2  /  Alb  2.8[L]  /  TBili  0.2  /  DBili  x   /  AST  29  /  ALT  25  /  AlkPhos  77  11-04    CAPILLARY BLOOD GLUCOSE      POCT Blood Glucose.: 170 mg/dL (04 Nov 2024 12:07)  POCT Blood Glucose.: 111 mg/dL (04 Nov 2024 08:12)  POCT Blood Glucose.: 128 mg/dL (03 Nov 2024 21:13)  POCT Blood Glucose.: 136 mg/dL (03 Nov 2024 17:12)      Urinalysis Basic - ( 04 Nov 2024 07:45 )    Color: x / Appearance: x / SG: x / pH: x  Gluc: 112 mg/dL / Ketone: x  / Bili: x / Urobili: x   Blood: x / Protein: x / Nitrite: x   Leuk Esterase: x / RBC: x / WBC x   Sq Epi: x / Non Sq Epi: x / Bacteria: x        Urinalysis with Rflx Culture (collected 01 Nov 2024 03:30)    Culture - Blood (collected 31 Oct 2024 23:30)  Source: .Blood BLOOD  Preliminary Report (04 Nov 2024 07:01):    No growth at 72 Hours    Culture - Blood (collected 31 Oct 2024 23:25)  Source: .Blood BLOOD  Preliminary Report (04 Nov 2024 07:01):    No growth at 72 Hours      RADIOLOGY & ADDITIONAL TESTS:   reviewed elctronically  ASSESSMENT/PLAN: 	    Patient was seen and examined on a day of discharge . Plan of care , discharge medications and recommendations discussed with consultants and clearance for discharge obtained .Social service , case management  and medical staff are aware of plan. Family is notified. Discharge summary  is  prepared electronically-see separate document prepared by me .55minutes spent on this visit, 50% visit time spent in care co-ordination with other attendings and counselling patient  I have discussed care plan with patient and HCP,expressed understanding of problems treatment and their effect and side effects, alternatives in detail,I have asked if they have any questions and concerns and appropriately addressed them to best of my ability

## 2024-11-04 NOTE — PROGRESS NOTE ADULT - PROVIDER SPECIALTY LIST ADULT
Critical Care
Gastroenterology
Heme/Onc
Pulmonology
Critical Care
Gastroenterology
Pulmonology
Pulmonology
Gastroenterology
Heme/Onc
Hospitalist
Internal Medicine
Hospitalist

## 2024-11-04 NOTE — DIETITIAN INITIAL EVALUATION ADULT - NSFNSADHERENCEPTAFT_GEN_A_CORE
per transfer records (patient a resident of [facility]), he was receiving a ground consistency, thin, no concentrated sweets diet

## 2024-11-04 NOTE — DISCHARGE NOTE NURSING/CASE MANAGEMENT/SOCIAL WORK - NSDCPEFALRISK_GEN_ALL_CORE
For information on Fall & Injury Prevention, visit: [email or web address] OR  [email or web address] OR  https://www.cdc.gov/steadi/patient.html

## 2024-11-04 NOTE — DISCHARGE NOTE PROVIDER - NSDCCPCAREPLAN_GEN_ALL_CORE_FT
PRINCIPAL DISCHARGE DIAGNOSIS  Diagnosis: Hemorrhagic shock  Assessment and Plan of Treatment: suspected , but no clear source of blood loss      SECONDARY DISCHARGE DIAGNOSES  Diagnosis: GI bleed  Assessment and Plan of Treatment: SUSPECTED -but FOBT NEGATIVE  , no plan for immedicate endoscopic samy as per GI DR Morton and Dr Damon , followup in the office May resume eliquis as per hematologist Dr DESOUZA    Diagnosis: Severe anemia  Assessment and Plan of Treatment: iron defficiency IMPORVED , H/H STABLE , no clear source of GIB or any blood loss    Diagnosis: HUBERT (acute kidney injury)  Assessment and Plan of Treatment:     Diagnosis: Atrial fibrillation  Assessment and Plan of Treatment:     Diagnosis: Parkinsonism  Assessment and Plan of Treatment:     Diagnosis: Hypothyroidism  Assessment and Plan of Treatment:     Diagnosis: CAD (coronary artery disease)  Assessment and Plan of Treatment:     Diagnosis: DM (diabetes mellitus)  Assessment and Plan of Treatment:     Diagnosis: Bipolar disorder  Assessment and Plan of Treatment:     Diagnosis: Elevated lactic acid level  Assessment and Plan of Treatment:     Diagnosis: Schizophrenia  Assessment and Plan of Treatment:

## 2024-11-04 NOTE — DISCHARGE NOTE PROVIDER - CARE PROVIDER_API CALL
Josue Pleitez  Gastroenterology  [address] [place] [address]  Phone: [phone]  Fax: [phone]  Follow Up Time: 1 week    Lety Carpio  Medical Oncology  [address] [facility], [address] [address]  Phone: [phone]  Fax: [phone]  Follow Up Time: 2 weeks

## 2024-11-04 NOTE — CARE COORDINATION ASSESSMENT. - NSPASTMEDSURGHISTORY_GEN_ALL_CORE_FT
PAST MEDICAL & SURGICAL HISTORY:  Hyperlipidemia      Hypothyroidism      Schizophrenia      No significant past surgical history      DM (diabetes mellitus)      Anxiety      Hypothyroid      Schizophrenia      DM (diabetes mellitus)      Epistaxis      Hyperkalemia      Atrial fibrillation      Violent behavior      H/O candidiasis      Constipation      Folate deficiency      Constipation      Hypothyroidism      HLD (hyperlipidemia)      Hypothyroidism      MDD (major depressive disorder)      Chronic CHF      HTN (hypertension)      Dementia      DM (diabetes mellitus)      Bipolar disorder      NSTEMI (non-ST elevation myocardial infarction)      Hyponatremia      Schizophrenia      Parkinsonism

## 2024-11-04 NOTE — DIETITIAN INITIAL EVALUATION ADULT - OTHER INFO
Patient seen for nutrition assessment. Limited weight and nutrition history at this time, patient appears confused at time of interview (psychiatric history noted, bipolar, schizophrenia), making patient a poor historian. Comprehensive chart review including previous RD assessment from 2019. Current admission weight 186.5 pounds, weight per transfer records documented as 188 pounds, weight from 2019 RD assessment 181 pounds, making weight appear to be stable for > 5 years. Due to confusion, diet education unable to be provided at this time. Noted DASH restriction removed and fluid restriction (1000 ml) added to address hyponatremia. Noted HbA1c of 5.5% this admission (hx of DM noted). COntinue to monitor blood glucose levels, at this time would hold off adding consistent CHO restriction.

## 2024-11-04 NOTE — SOCIAL WORK PROGRESS NOTE - NSSWPROGRESSNOTE_GEN_ALL_CORE
DARYL spoke with MD who is clearing patient for DC, [facility] accepting pt back today and brother James made aware of DC. Ambulnz arranged for 3pm - pts medicaid remains inactive, medicaid office aware, awaiting recert by SNF-bill back to SW office for payment. SW to remain available.

## 2024-11-04 NOTE — CARE COORDINATION ASSESSMENT. - OTHER PERTINENT DISCHARGE PLANNING INFORMATION:
SW met with this pt and spoke with pts brother- discussed name role elos and dc planning. Pt from [facility], has been there x4 years, confirmed with admissions that pt is theirs and will return on DC. Anticipate pt returning upon DC as per brother as well. AS per medicaid office medicaid is inactive, they are working with the facility on correcting that. SW to follow for safe dc and emotional support.

## 2024-11-04 NOTE — DIETITIAN INITIAL EVALUATION ADULT - OTHER CALCULATIONS
adjusted body weight (148 pounds) used to calculate estimated energy needs 2/2 BMI > 30  fluid needs deferred to team as patient now on fluid restriction

## 2024-11-04 NOTE — PROGRESS NOTE ADULT - SUBJECTIVE AND OBJECTIVE BOX
CHIEF COMPLAINT/ REASON FOR VISIT  .. Patient was seen to address the  issue listed under PROBLEM LIST which is located toward bottom of this note     RM HORTON    PLV 3WES [id number] D1    Allergies    clozapine (Unknown)  lithium (Rash)  clozapine (Rash)  aspartate (Unknown)  lithium (Unknown)    Intolerances        PAST MEDICAL & SURGICAL HISTORY:  Schizophrenia      Hypothyroidism      Hyperlipidemia      Schizophrenia      Hypothyroid      Anxiety      DM (diabetes mellitus)      Parkinsonism      Schizophrenia      Hyponatremia      NSTEMI (non-ST elevation myocardial infarction)      Bipolar disorder      DM (diabetes mellitus)      Dementia      HTN (hypertension)      Chronic CHF      MDD (major depressive disorder)      Hypothyroidism      HLD (hyperlipidemia)      Hypothyroidism      Constipation      Folate deficiency      Constipation      H/O candidiasis      Violent behavior      Atrial fibrillation      Hyperkalemia      Epistaxis      DM (diabetes mellitus)      No significant past surgical history          FAMILY HISTORY:  Family history unknown        Home Medications:  Aspirin Enteric Coated 81 mg oral delayed release tablet: 1 tab(s) orally once a day (11 Aug 2020 17:19)  atorvastatin 80 mg oral tablet: 1 tab(s) orally once a day (at bedtime) (11 Mar 2024 10:54)  atorvastatin 80 mg oral tablet: 1 tab(s) orally once a day (at bedtime) (04 Nov 2024 06:08)  benztropine 1 mg oral tablet: 2 tab(s) orally 2 times a day (04 Nov 2024 06:08)  benztropine 1 mg oral tablet: 1 tab(s) orally 2 times a day (11 Aug 2020 17:19)  benztropine 2 mg oral tablet: 1 tab(s) orally 2 times a day (11 Mar 2024 10:54)  clopidogrel 75 mg oral tablet: 1 tab(s) orally once a day (04 Nov 2024 06:08)  Depakote  mg oral tablet, extended release: 1 tab(s) orally once a day (11 Mar 2024 10:54)  divalproex sodium 500 mg oral delayed release tablet: 3 tab(s) (1500mg) orally 2 times a day (11 Aug 2020 17:19)  divalproex sodium 500 mg oral tablet, extended release: 3 tab(s) orally once a day (04 Nov 2024 06:08)  docusate sodium 100 mg oral tablet: 2 tab(s) orally 2 times a day (13 Aug 2020 19:04)  Eliquis 2.5 mg oral tablet: 1 tab(s) orally 2 times a day (04 Nov 2024 06:08)  Eliquis 5 mg oral tablet: 1 tab(s) orally once a day (11 Mar 2024 10:54)  famotidine 20 mg oral tablet: 1 tab(s) orally once a day (in the morning) (11 Aug 2020 17:19)  folic acid 1 mg oral tablet: 1 tab(s) orally once a day (in the morning) (11 Aug 2020 17:19)  Haldol Decanoate 100 mg/mL intramuscular solution: 4 milliliter(s) intramuscular every 4 weeks  Last dose 8/6/2020 (11 Aug 2020 17:19)  Levothroid 88 mcg (0.088 mg) oral tablet: 1 tab(s) orally once a day (11 Mar 2024 10:54)  levothyroxine 88 mcg (0.088 mg) oral tablet: 1 tab(s) orally once a day (04 Nov 2024 06:08)  levothyroxine 88 mcg (0.088 mg) oral tablet: 1 tab(s) orally once a day (in the morning) (11 Aug 2020 17:19)  LORazepam 0.5 mg oral tablet: 1 tab(s) orally once a day (in the morning) (11 Aug 2020 17:19)  losartan 50 mg oral tablet: 1 tab(s) orally once a day (04 Nov 2024 06:08)  losartan 50 mg oral tablet: 1 tab(s) orally once a day (11 Mar 2024 10:54)  metFORMIN 1000 mg oral tablet: 1 tab(s) orally 2 times a day (11 Mar 2024 10:54)  metFORMIN 500 mg oral tablet: 2 tab(s) orally 2 times a day (04 Nov 2024 06:08)  metoprolol tartrate 25 mg oral tablet: 1 tab(s) orally 2 times a day (04 Nov 2024 06:08)  nystatin 100,000 units/g topical powder: Apply topically to affected area 2 times a day (11 Aug 2020 17:19)  Plavix 75 mg oral tablet: 1 tab(s) orally once a day (11 Mar 2024 10:54)  QUEtiapine 300 mg oral tablet: 1 tab(s) orally once a day (at bedtime) (11 Mar 2024 10:54)  QUEtiapine 400 mg oral tablet: 1 tab(s) orally once a day (at bedtime) (04 Nov 2024 06:08)  RisperDAL 4 mg oral tablet: 1 tab(s) orally once a day (11 Mar 2024 10:54)  risperiDONE 4 mg oral tablet: 1 tab(s) orally 2 times a day (04 Nov 2024 06:08)  simvastatin 20 mg oral tablet: 1 tab(s) orally once a day (at bedtime) (11 Aug 2020 17:19)  sodium chloride: 1,000 milligram(s) orally 3 times a day (04 Nov 2024 06:08)  traZODone 100 mg oral tablet: 1 tab(s) orally once a day (at bedtime) (11 Mar 2024 10:54)  traZODone 100 mg oral tablet: 1 tab(s) orally once a day (at bedtime) (04 Nov 2024 06:08)  Vascepa 1 g oral capsule: 1 cap(s) orally 2 times a day (11 Aug 2020 17:19)  Vitamin D2 50,000 intl units (1.25 mg) oral capsule: 1 cap(s) orally every 2 weeks  Next dose due 8/15/2020 (11 Aug 2020 17:19)  ziprasidone 40 mg oral capsule: 1 cap(s) orally 2 times a day  Total dose 120mg orally 2 times a day (11 Aug 2020 17:19)  ziprasidone 80 mg oral capsule: 1 cap(s) orally 2 times a day  Total dose 120mg orally 2 times a day (11 Aug 2020 17:19)      MEDICATIONS  (STANDING):  atorvastatin 80 milliGRAM(s) Oral at bedtime  benztropine 2 milliGRAM(s) Oral two times a day  chlorhexidine 2% Cloths 1 Application(s) Topical <User Schedule>  dextrose 5%. 1000 milliLiter(s) (100 mL/Hr) IV Continuous <Continuous>  dextrose 5%. 1000 milliLiter(s) (50 mL/Hr) IV Continuous <Continuous>  dextrose 50% Injectable 12.5 Gram(s) IV Push once  dextrose 50% Injectable 25 Gram(s) IV Push once  dextrose 50% Injectable 25 Gram(s) IV Push once  divalproex ER 1500 milliGRAM(s) Oral <User Schedule>  enoxaparin Injectable 40 milliGRAM(s) SubCutaneous every 24 hours  glucagon  Injectable 1 milliGRAM(s) IntraMuscular once  insulin lispro (ADMELOG) corrective regimen sliding scale   SubCutaneous three times a day before meals  levothyroxine 88 MICROGram(s) Oral daily  metoprolol tartrate 25 milliGRAM(s) Oral two times a day  mupirocin 2% Nasal 1 Application(s) Both Nostrils two times a day  pantoprazole  Injectable 40 milliGRAM(s) IV Push every 12 hours  QUEtiapine 400 milliGRAM(s) Oral at bedtime  risperiDONE   Tablet 4 milliGRAM(s) Oral two times a day  sodium chloride 1 Gram(s) Oral three times a day  traZODone 100 milliGRAM(s) Oral at bedtime  urea Oral Powder 15 Gram(s) Oral two times a day    MEDICATIONS  (PRN):  dextrose Oral Gel 15 Gram(s) Oral once PRN Blood Glucose LESS THAN 70 milliGRAM(s)/deciliter      Diet, Regular:   1000mL Fluid Restriction ([id number]) (11-03-24 @ 13:04) [Active]          Vital Signs Last 24 Hrs  T(C): 36.4 (04 Nov 2024 05:45), Max: 36.4 (03 Nov 2024 12:01)  T(F): 97.6 (04 Nov 2024 05:45), Max: 97.6 (04 Nov 2024 05:45)  HR: 93 (04 Nov 2024 05:45) (67 - 93)  BP: 127/76 (04 Nov 2024 05:45) (127/76 - 155/84)  BP(mean): --  RR: 94 (04 Nov 2024 05:45) (18 - 94)  SpO2: 96% (03 Nov 2024 20:46) (96% - 98%)    Parameters below as of 04 Nov 2024 05:45  Patient On (Oxygen Delivery Method): room air          11-03-24 @ 07:01  -  11-04-24 @ 07:00  --------------------------------------------------------  IN: 0 mL / OUT: 1600 mL / NET: -1600 mL              LABS:                        10.1   4.01  )-----------( 171      ( 04 Nov 2024 07:45 )             28.3     11-03    123[L]  |  92[L]  |  9   ----------------------------<  115[H]  3.9   |  24  |  0.59    Ca    8.5      03 Nov 2024 08:30  Phos  3.3     11-03  Mg     1.6     11-03    TPro  5.3[L]  /  Alb  2.4[L]  /  TBili  0.3  /  DBili  x   /  AST  27  /  ALT  18  /  AlkPhos  65  11-03      Urinalysis Basic - ( 03 Nov 2024 08:30 )    Color: x / Appearance: x / SG: x / pH: x  Gluc: 115 mg/dL / Ketone: x  / Bili: x / Urobili: x   Blood: x / Protein: x / Nitrite: x   Leuk Esterase: x / RBC: x / WBC x   Sq Epi: x / Non Sq Epi: x / Bacteria: x            WBC:  WBC Count: 4.01 K/uL (11-04 @ 07:45)  WBC Count: 5.37 K/uL (11-03 @ 08:30)  WBC Count: 5.28 K/uL (11-02 @ 05:31)  WBC Count: 6.97 K/uL (11-01 @ 10:12)  WBC Count: 7.17 K/uL (11-01 @ 04:52)  WBC Count: 5.83 K/uL (10-31 @ 23:30)      MICROBIOLOGY:  RECENT CULTURES:  10-31 .Blood BLOOD XXXX XXXX   No growth at 72 Hours    10-31 .Blood BLOOD XXXX XXXX   No growth at 72 Hours                    Sodium:  Sodium: 123 mmol/L (11-03 @ 08:30)  Sodium: 131 mmol/L (11-02 @ 05:31)  Sodium: 138 mmol/L (11-01 @ 10:12)  Sodium: 139 mmol/L (11-01 @ 04:52)  Sodium: 138 mmol/L (10-31 @ 23:30)      0.59 mg/dL 11-03 @ 08:30  0.72 mg/dL 11-02 @ 05:31  0.92 mg/dL 11-01 @ 10:12  1.10 mg/dL 11-01 @ 04:52  1.30 mg/dL 10-31 @ 23:30      Hemoglobin:  Hemoglobin: 10.1 g/dL (11-04 @ 07:45)  Hemoglobin: 9.0 g/dL (11-03 @ 08:30)  Hemoglobin: 8.8 g/dL (11-02 @ 05:31)  Hemoglobin: 9.3 g/dL (11-01 @ 10:12)  Hemoglobin: 8.1 g/dL (11-01 @ 04:52)  Hemoglobin: 5.1 g/dL (10-31 @ 23:30)      Platelets: Platelet Count - Automated: 171 K/uL (11-04 @ 07:45)  Platelet Count - Automated: 139 K/uL (11-03 @ 08:30)  Platelet Count - Automated: 141 K/uL (11-02 @ 05:31)  Platelet Count - Automated: 142 K/uL (11-01 @ 10:12)  Platelet Count - Automated: 137 K/uL (11-01 @ 04:52)  Platelet Count - Automated: 162 K/uL (10-31 @ 23:30)      LIVER FUNCTIONS - ( 03 Nov 2024 08:30 )  Alb: 2.4 g/dL / Pro: 5.3 g/dL / ALK PHOS: 65 U/L / ALT: 18 U/L / AST: 27 U/L / GGT: x             Urinalysis Basic - ( 03 Nov 2024 08:30 )    Color: x / Appearance: x / SG: x / pH: x  Gluc: 115 mg/dL / Ketone: x  / Bili: x / Urobili: x   Blood: x / Protein: x / Nitrite: x   Leuk Esterase: x / RBC: x / WBC x   Sq Epi: x / Non Sq Epi: x / Bacteria: x        RADIOLOGY & ADDITIONAL STUDIES:      MICROBIOLOGY:  RECENT CULTURES:  10-31 .Blood BLOOD XXXX XXXX   No growth at 72 Hours    10-31 .Blood BLOOD XXXX XXXX   No growth at 72 Hours

## 2024-11-05 LAB — FIBRINOGEN AG PPP IA-MCNC: 226 MG/DL — LOW (ref 233–496)

## 2024-11-13 ENCOUNTER — INPATIENT (INPATIENT)
Facility: HOSPITAL | Age: 69
LOS: 1 days | Discharge: HOME | DRG: 812 | End: 2024-11-15
Attending: HOSPITALIST | Admitting: HOSPITALIST
Payer: SELF-PAY

## 2024-11-13 VITALS
OXYGEN SATURATION: 98 % | SYSTOLIC BLOOD PRESSURE: 98 MMHG | WEIGHT: 169.98 LBS | TEMPERATURE: 97 F | HEART RATE: 74 BPM | HEIGHT: 63 IN | RESPIRATION RATE: 18 BRPM | DIASTOLIC BLOOD PRESSURE: 52 MMHG

## 2024-11-13 DIAGNOSIS — D64.9 ANEMIA, UNSPECIFIED: ICD-10-CM

## 2024-11-13 PROBLEM — I50.9 HEART FAILURE, UNSPECIFIED: Chronic | Status: ACTIVE | Noted: 2024-11-01

## 2024-11-13 PROBLEM — E11.9 TYPE 2 DIABETES MELLITUS WITHOUT COMPLICATIONS: Chronic | Status: ACTIVE | Noted: 2024-11-01

## 2024-11-13 PROBLEM — Z86.19 PERSONAL HISTORY OF OTHER INFECTIOUS AND PARASITIC DISEASES: Chronic | Status: ACTIVE | Noted: 2024-11-01

## 2024-11-13 PROBLEM — K59.00 CONSTIPATION, UNSPECIFIED: Chronic | Status: ACTIVE | Noted: 2024-11-01

## 2024-11-13 PROBLEM — R04.0 EPISTAXIS: Chronic | Status: ACTIVE | Noted: 2024-11-01

## 2024-11-13 PROBLEM — E03.9 HYPOTHYROIDISM, UNSPECIFIED: Chronic | Status: ACTIVE | Noted: 2024-11-01

## 2024-11-13 PROBLEM — E78.5 HYPERLIPIDEMIA, UNSPECIFIED: Chronic | Status: ACTIVE | Noted: 2024-11-01

## 2024-11-13 PROBLEM — F20.9 SCHIZOPHRENIA, UNSPECIFIED: Chronic | Status: ACTIVE | Noted: 2024-11-01

## 2024-11-13 PROBLEM — E87.5 HYPERKALEMIA: Chronic | Status: ACTIVE | Noted: 2024-11-01

## 2024-11-13 PROBLEM — E87.1 HYPO-OSMOLALITY AND HYPONATREMIA: Chronic | Status: ACTIVE | Noted: 2024-11-01

## 2024-11-13 PROBLEM — I48.91 UNSPECIFIED ATRIAL FIBRILLATION: Chronic | Status: ACTIVE | Noted: 2024-11-01

## 2024-11-13 PROBLEM — E53.8 DEFICIENCY OF OTHER SPECIFIED B GROUP VITAMINS: Chronic | Status: ACTIVE | Noted: 2024-11-01

## 2024-11-13 PROBLEM — I10 ESSENTIAL (PRIMARY) HYPERTENSION: Chronic | Status: ACTIVE | Noted: 2024-11-01

## 2024-11-13 PROBLEM — F03.90 UNSPECIFIED DEMENTIA, UNSPECIFIED SEVERITY, WITHOUT BEHAVIORAL DISTURBANCE, PSYCHOTIC DISTURBANCE, MOOD DISTURBANCE, AND ANXIETY: Chronic | Status: ACTIVE | Noted: 2024-11-01

## 2024-11-13 PROBLEM — I21.4 NON-ST ELEVATION (NSTEMI) MYOCARDIAL INFARCTION: Chronic | Status: ACTIVE | Noted: 2024-11-01

## 2024-11-13 PROBLEM — R45.6 VIOLENT BEHAVIOR: Chronic | Status: ACTIVE | Noted: 2024-11-01

## 2024-11-13 PROBLEM — G20.C PARKINSONISM, UNSPECIFIED: Chronic | Status: ACTIVE | Noted: 2024-11-01

## 2024-11-13 PROBLEM — F32.9 MAJOR DEPRESSIVE DISORDER, SINGLE EPISODE, UNSPECIFIED: Chronic | Status: ACTIVE | Noted: 2024-11-01

## 2024-11-13 LAB
ALBUMIN SERPL ELPH-MCNC: 2.3 G/DL — LOW (ref 3.3–5)
ALP SERPL-CCNC: 56 U/L — SIGNIFICANT CHANGE UP (ref 30–120)
ALT FLD-CCNC: 11 U/L — SIGNIFICANT CHANGE UP (ref 10–60)
ANION GAP SERPL CALC-SCNC: 7 MMOL/L — SIGNIFICANT CHANGE UP (ref 5–17)
ANION GAP SERPL CALC-SCNC: 7 MMOL/L — SIGNIFICANT CHANGE UP (ref 5–17)
APPEARANCE UR: CLEAR — SIGNIFICANT CHANGE UP
APTT BLD: 30.2 SEC — SIGNIFICANT CHANGE UP (ref 24.5–35.6)
AST SERPL-CCNC: 14 U/L — SIGNIFICANT CHANGE UP (ref 10–40)
BASOPHILS # BLD AUTO: 0.01 K/UL — SIGNIFICANT CHANGE UP (ref 0–0.2)
BASOPHILS NFR BLD AUTO: 0.2 % — SIGNIFICANT CHANGE UP (ref 0–2)
BILIRUB SERPL-MCNC: 0.2 MG/DL — SIGNIFICANT CHANGE UP (ref 0.2–1.2)
BILIRUB UR-MCNC: NEGATIVE — SIGNIFICANT CHANGE UP
BLD GP AB SCN SERPL QL: SIGNIFICANT CHANGE UP
BUN SERPL-MCNC: 32 MG/DL — HIGH (ref 7–23)
BUN SERPL-MCNC: 39 MG/DL — HIGH (ref 7–23)
CALCIUM SERPL-MCNC: 8.9 MG/DL — SIGNIFICANT CHANGE UP (ref 8.4–10.5)
CALCIUM SERPL-MCNC: 9.1 MG/DL — SIGNIFICANT CHANGE UP (ref 8.4–10.5)
CHLORIDE SERPL-SCNC: 106 MMOL/L — SIGNIFICANT CHANGE UP (ref 96–108)
CHLORIDE SERPL-SCNC: 107 MMOL/L — SIGNIFICANT CHANGE UP (ref 96–108)
CO2 SERPL-SCNC: 27 MMOL/L — SIGNIFICANT CHANGE UP (ref 22–31)
CO2 SERPL-SCNC: 27 MMOL/L — SIGNIFICANT CHANGE UP (ref 22–31)
COLOR SPEC: YELLOW — SIGNIFICANT CHANGE UP
CREAT SERPL-MCNC: 1.12 MG/DL — SIGNIFICANT CHANGE UP (ref 0.5–1.3)
CREAT SERPL-MCNC: 1.6 MG/DL — HIGH (ref 0.5–1.3)
DIFF PNL FLD: NEGATIVE — SIGNIFICANT CHANGE UP
EGFR: 47 ML/MIN/1.73M2 — LOW
EGFR: 72 ML/MIN/1.73M2 — SIGNIFICANT CHANGE UP
EOSINOPHIL # BLD AUTO: 0.14 K/UL — SIGNIFICANT CHANGE UP (ref 0–0.5)
EOSINOPHIL NFR BLD AUTO: 2.8 % — SIGNIFICANT CHANGE UP (ref 0–6)
GLUCOSE BLDC GLUCOMTR-MCNC: 147 MG/DL — HIGH (ref 70–99)
GLUCOSE SERPL-MCNC: 114 MG/DL — HIGH (ref 70–99)
GLUCOSE SERPL-MCNC: 90 MG/DL — SIGNIFICANT CHANGE UP (ref 70–99)
GLUCOSE UR QL: NEGATIVE MG/DL — SIGNIFICANT CHANGE UP
HCT VFR BLD CALC: 16.1 % — CRITICAL LOW (ref 39–50)
HCT VFR BLD CALC: 23.6 % — LOW (ref 39–50)
HGB BLD-MCNC: 5.1 G/DL — CRITICAL LOW (ref 13–17)
HGB BLD-MCNC: 7.7 G/DL — LOW (ref 13–17)
IMM GRANULOCYTES NFR BLD AUTO: 2.8 % — HIGH (ref 0–0.9)
INR BLD: 0.93 RATIO — SIGNIFICANT CHANGE UP (ref 0.85–1.16)
KETONES UR-MCNC: NEGATIVE MG/DL — SIGNIFICANT CHANGE UP
LEUKOCYTE ESTERASE UR-ACNC: NEGATIVE — SIGNIFICANT CHANGE UP
LYMPHOCYTES # BLD AUTO: 0.62 K/UL — LOW (ref 1–3.3)
LYMPHOCYTES # BLD AUTO: 12.2 % — LOW (ref 13–44)
MCHC RBC-ENTMCNC: 29.8 PG — SIGNIFICANT CHANGE UP (ref 27–34)
MCHC RBC-ENTMCNC: 31.5 PG — SIGNIFICANT CHANGE UP (ref 27–34)
MCHC RBC-ENTMCNC: 31.7 G/DL — LOW (ref 32–36)
MCHC RBC-ENTMCNC: 32.6 G/DL — SIGNIFICANT CHANGE UP (ref 32–36)
MCV RBC AUTO: 91.5 FL — SIGNIFICANT CHANGE UP (ref 80–100)
MCV RBC AUTO: 99.4 FL — SIGNIFICANT CHANGE UP (ref 80–100)
MONOCYTES # BLD AUTO: 0.67 K/UL — SIGNIFICANT CHANGE UP (ref 0–0.9)
MONOCYTES NFR BLD AUTO: 13.2 % — SIGNIFICANT CHANGE UP (ref 2–14)
NEUTROPHILS # BLD AUTO: 3.49 K/UL — SIGNIFICANT CHANGE UP (ref 1.8–7.4)
NEUTROPHILS NFR BLD AUTO: 68.8 % — SIGNIFICANT CHANGE UP (ref 43–77)
NITRITE UR-MCNC: NEGATIVE — SIGNIFICANT CHANGE UP
NRBC # BLD: 1 /100 WBCS — HIGH (ref 0–0)
NRBC # BLD: 2 /100 WBCS — HIGH (ref 0–0)
NT-PROBNP SERPL-SCNC: 338 PG/ML — HIGH (ref 0–125)
OB PNL STL: POSITIVE
PH UR: 5.5 — SIGNIFICANT CHANGE UP (ref 5–8)
PLATELET # BLD AUTO: 190 K/UL — SIGNIFICANT CHANGE UP (ref 150–400)
PLATELET # BLD AUTO: 201 K/UL — SIGNIFICANT CHANGE UP (ref 150–400)
POTASSIUM SERPL-MCNC: 4.3 MMOL/L — SIGNIFICANT CHANGE UP (ref 3.5–5.3)
POTASSIUM SERPL-MCNC: 5.8 MMOL/L — HIGH (ref 3.5–5.3)
POTASSIUM SERPL-SCNC: 4.3 MMOL/L — SIGNIFICANT CHANGE UP (ref 3.5–5.3)
POTASSIUM SERPL-SCNC: 5.8 MMOL/L — HIGH (ref 3.5–5.3)
PROT SERPL-MCNC: 5.6 G/DL — LOW (ref 6–8.3)
PROT UR-MCNC: NEGATIVE MG/DL — SIGNIFICANT CHANGE UP
PROTHROM AB SERPL-ACNC: 11 SEC — SIGNIFICANT CHANGE UP (ref 9.9–13.4)
RBC # BLD: 1.62 M/UL — LOW (ref 4.2–5.8)
RBC # BLD: 2.58 M/UL — LOW (ref 4.2–5.8)
RBC # FLD: 18.1 % — HIGH (ref 10.3–14.5)
RBC # FLD: 18.5 % — HIGH (ref 10.3–14.5)
SODIUM SERPL-SCNC: 140 MMOL/L — SIGNIFICANT CHANGE UP (ref 135–145)
SODIUM SERPL-SCNC: 141 MMOL/L — SIGNIFICANT CHANGE UP (ref 135–145)
SP GR SPEC: 1.01 — SIGNIFICANT CHANGE UP (ref 1–1.03)
TROPONIN I, HIGH SENSITIVITY RESULT: 9.8 NG/L — SIGNIFICANT CHANGE UP
UROBILINOGEN FLD QL: 1 MG/DL — SIGNIFICANT CHANGE UP (ref 0.2–1)
VALPROATE SERPL-MCNC: 76 UG/ML — SIGNIFICANT CHANGE UP (ref 50–100)
WBC # BLD: 4.76 K/UL — SIGNIFICANT CHANGE UP (ref 3.8–10.5)
WBC # BLD: 5.07 K/UL — SIGNIFICANT CHANGE UP (ref 3.8–10.5)
WBC # FLD AUTO: 4.76 K/UL — SIGNIFICANT CHANGE UP (ref 3.8–10.5)
WBC # FLD AUTO: 5.07 K/UL — SIGNIFICANT CHANGE UP (ref 3.8–10.5)

## 2024-11-13 PROCEDURE — 99291 CRITICAL CARE FIRST HOUR: CPT

## 2024-11-13 PROCEDURE — 71045 X-RAY EXAM CHEST 1 VIEW: CPT | Mod: 26

## 2024-11-13 PROCEDURE — 93010 ELECTROCARDIOGRAM REPORT: CPT

## 2024-11-13 PROCEDURE — 99223 1ST HOSP IP/OBS HIGH 75: CPT | Mod: 25

## 2024-11-13 RX ORDER — RISPERIDONE 3 MG/1
4 TABLET, FILM COATED ORAL
Refills: 0 | Status: DISCONTINUED | OUTPATIENT
Start: 2024-11-13 | End: 2024-11-15

## 2024-11-13 RX ORDER — QUETIAPINE FUMARATE 200 MG/1
400 TABLET ORAL AT BEDTIME
Refills: 0 | Status: DISCONTINUED | OUTPATIENT
Start: 2024-11-13 | End: 2024-11-15

## 2024-11-13 RX ORDER — PANTOPRAZOLE SODIUM 40 MG/1
40 TABLET, DELAYED RELEASE ORAL ONCE
Refills: 0 | Status: COMPLETED | OUTPATIENT
Start: 2024-11-13 | End: 2024-11-13

## 2024-11-13 RX ORDER — SODIUM CHLORIDE 9 MG/ML
1000 INJECTION, SOLUTION INTRAMUSCULAR; INTRAVENOUS; SUBCUTANEOUS
Qty: 0 | Refills: 0 | DISCHARGE

## 2024-11-13 RX ORDER — SODIUM CHLORIDE 9 MG/ML
1000 INJECTION, SOLUTION INTRAMUSCULAR; INTRAVENOUS; SUBCUTANEOUS ONCE
Refills: 0 | Status: COMPLETED | OUTPATIENT
Start: 2024-11-13 | End: 2024-11-13

## 2024-11-13 RX ORDER — INSULIN LISPRO 100/ML
VIAL (ML) SUBCUTANEOUS EVERY 6 HOURS
Refills: 0 | Status: DISCONTINUED | OUTPATIENT
Start: 2024-11-13 | End: 2024-11-15

## 2024-11-13 RX ORDER — PROTHROMBIN COMPLEX CONCENTRATE (HUMAN) 25.5; 16.5; 24; 22; 22; 26 [IU]/ML; [IU]/ML; [IU]/ML; [IU]/ML; [IU]/ML; [IU]/ML
4000 POWDER, FOR SOLUTION INTRAVENOUS ONCE
Refills: 0 | Status: DISCONTINUED | OUTPATIENT
Start: 2024-11-13 | End: 2024-11-13

## 2024-11-13 RX ORDER — LEVOTHYROXINE SODIUM 88 MCG
40 TABLET ORAL AT BEDTIME
Refills: 0 | Status: DISCONTINUED | OUTPATIENT
Start: 2024-11-13 | End: 2024-11-14

## 2024-11-13 RX ORDER — DIVALPROEX SODIUM 250 MG/1
1500 TABLET, FILM COATED, EXTENDED RELEASE ORAL DAILY
Refills: 0 | Status: DISCONTINUED | OUTPATIENT
Start: 2024-11-13 | End: 2024-11-15

## 2024-11-13 RX ORDER — TRAZODONE HYDROCHLORIDE 100 MG/1
100 TABLET ORAL AT BEDTIME
Refills: 0 | Status: DISCONTINUED | OUTPATIENT
Start: 2024-11-13 | End: 2024-11-15

## 2024-11-13 RX ORDER — APIXABAN 5 MG/1
1 TABLET, FILM COATED ORAL
Refills: 0 | DISCHARGE

## 2024-11-13 RX ORDER — PANTOPRAZOLE SODIUM 40 MG/1
40 TABLET, DELAYED RELEASE ORAL
Refills: 0 | Status: DISCONTINUED | OUTPATIENT
Start: 2024-11-13 | End: 2024-11-15

## 2024-11-13 RX ORDER — RISPERIDONE 3 MG/1
1 TABLET, FILM COATED ORAL
Refills: 0 | DISCHARGE

## 2024-11-13 RX ORDER — INSULIN GLARGINE,HUM.REC.ANLOG 100/ML
5 VIAL (ML) SUBCUTANEOUS ONCE
Refills: 0 | Status: COMPLETED | OUTPATIENT
Start: 2024-11-13 | End: 2024-11-13

## 2024-11-13 RX ORDER — GLUCAGON INJECTION, SOLUTION 1 MG/.2ML
1 INJECTION, SOLUTION SUBCUTANEOUS ONCE
Refills: 0 | Status: DISCONTINUED | OUTPATIENT
Start: 2024-11-13 | End: 2024-11-15

## 2024-11-13 RX ORDER — INSULIN LISPRO 100/ML
VIAL (ML) SUBCUTANEOUS EVERY 6 HOURS
Refills: 0 | Status: DISCONTINUED | OUTPATIENT
Start: 2024-11-13 | End: 2024-11-13

## 2024-11-13 RX ORDER — CALCIUM GLUCONATE 98 MG/ML
2 INJECTION, SOLUTION INTRAVENOUS ONCE
Refills: 0 | Status: COMPLETED | OUTPATIENT
Start: 2024-11-13 | End: 2024-11-13

## 2024-11-13 RX ORDER — INSULIN LISPRO 100/ML
VIAL (ML) SUBCUTANEOUS
Refills: 0 | Status: DISCONTINUED | OUTPATIENT
Start: 2024-11-13 | End: 2024-11-13

## 2024-11-13 RX ORDER — PROTHROMBIN COMPLEX CONCENTRATE (HUMAN) 25.5; 16.5; 24; 22; 22; 26 [IU]/ML; [IU]/ML; [IU]/ML; [IU]/ML; [IU]/ML; [IU]/ML
2000 POWDER, FOR SOLUTION INTRAVENOUS ONCE
Refills: 0 | Status: COMPLETED | OUTPATIENT
Start: 2024-11-13 | End: 2024-11-13

## 2024-11-13 RX ORDER — BENZTROPINE MESYLATE 0.5 MG
2 TABLET ORAL
Refills: 0 | DISCHARGE

## 2024-11-13 RX ORDER — CHLORHEXIDINE GLUCONATE 40 MG/ML
1 SOLUTION TOPICAL
Refills: 0 | Status: DISCONTINUED | OUTPATIENT
Start: 2024-11-13 | End: 2024-11-15

## 2024-11-13 RX ADMIN — PANTOPRAZOLE SODIUM 40 MILLIGRAM(S): 40 TABLET, DELAYED RELEASE ORAL at 18:24

## 2024-11-13 RX ADMIN — QUETIAPINE FUMARATE 400 MILLIGRAM(S): 200 TABLET ORAL at 21:34

## 2024-11-13 RX ADMIN — CALCIUM GLUCONATE 200 GRAM(S): 98 INJECTION, SOLUTION INTRAVENOUS at 12:05

## 2024-11-13 RX ADMIN — PANTOPRAZOLE SODIUM 40 MILLIGRAM(S): 40 TABLET, DELAYED RELEASE ORAL at 09:09

## 2024-11-13 RX ADMIN — PROTHROMBIN COMPLEX CONCENTRATE (HUMAN) 400 INTERNATIONAL UNIT(S): 25.5; 16.5; 24; 22; 22; 26 POWDER, FOR SOLUTION INTRAVENOUS at 12:49

## 2024-11-13 RX ADMIN — Medication 75 MILLILITER(S): at 18:25

## 2024-11-13 RX ADMIN — Medication 40 MICROGRAM(S): at 21:36

## 2024-11-13 RX ADMIN — SODIUM CHLORIDE 1000 MILLILITER(S): 9 INJECTION, SOLUTION INTRAMUSCULAR; INTRAVENOUS; SUBCUTANEOUS at 09:09

## 2024-11-13 RX ADMIN — DIVALPROEX SODIUM 1500 MILLIGRAM(S): 250 TABLET, FILM COATED, EXTENDED RELEASE ORAL at 21:34

## 2024-11-13 RX ADMIN — Medication 40 MILLIGRAM(S): at 21:34

## 2024-11-13 RX ADMIN — TRAZODONE HYDROCHLORIDE 100 MILLIGRAM(S): 100 TABLET ORAL at 21:35

## 2024-11-13 RX ADMIN — SODIUM CHLORIDE 1000 MILLILITER(S): 9 INJECTION, SOLUTION INTRAMUSCULAR; INTRAVENOUS; SUBCUTANEOUS at 10:09

## 2024-11-13 RX ADMIN — Medication 5 UNIT(S): at 12:27

## 2024-11-13 NOTE — CONSULT NOTE ADULT - SUBJECTIVE AND OBJECTIVE BOX
[facility]    Josue Pricilla Watkins NP    [address] [place] [address] [phone]      Chief Complaint:  Patient is a 68y old  Male who presents with a chief complaint of anemia, GI bleed (2024 15:30)      HPI:  ****Collateral information obtained from notes as patient is a poor historian.  He kept on asking for something to drink.  When asked if he knows where he is or why he is in the hospital, he kept on saying "ok."  Could not elicit specific HPI or ROS.***    68M with [facility] with hx of schizophrenia/bipolar, hx of NSTEMI, hx of hyponatremia, recent admission to [facility] for anemia  (needed ICU, transfusion, kcentra, and brief period of levophed but no source was identified) who presents from [facility] again for anemia.  Found to have outpatient hgb of 6.  Also was noted to be hypotensive as well.  Brought here for further evaluation.   In the ED, patient's triage vitals were  BP 98/52  HR 74, RR 18, 98% on NC, T 97F.  Hgb was 5.1 with +occult blood.  Also noted to have hyperkalemia and HUBERT with Cr 1.6 (discharged at 0.83).  Patient ordered for 2 units.  Patient admitted to SPCU for further evaluation and management.  Currently patient has no pain or SOB. No light-headedness or dizziness    Allergies:  lithium (Rash)  clozapine (Unknown)  aspartate (Unknown)  lithium (Unknown)  clozapine (Rash)      Medications:  atorvastatin 40 milliGRAM(s) Oral at bedtime  chlorhexidine 2% Cloths 1 Application(s) Topical <User Schedule>  dextrose 5%. 1000 milliLiter(s) IV Continuous <Continuous>  dextrose 5%. 1000 milliLiter(s) IV Continuous <Continuous>  dextrose 50% Injectable 25 Gram(s) IV Push once  dextrose 50% Injectable 12.5 Gram(s) IV Push once  dextrose 50% Injectable 25 Gram(s) IV Push once  dextrose Oral Gel 15 Gram(s) Oral once PRN  divalproex ER 1500 milliGRAM(s) Oral daily  glucagon  Injectable 1 milliGRAM(s) IntraMuscular once  insulin lispro (ADMELOG) corrective regimen sliding scale   SubCutaneous every 6 hours  lactated ringers. 1000 milliLiter(s) IV Continuous <Continuous>  levothyroxine Injectable 40 MICROGram(s) IV Push at bedtime  pantoprazole  Injectable 40 milliGRAM(s) IV Push two times a day  QUEtiapine 400 milliGRAM(s) Oral at bedtime  risperiDONE   Tablet 4 milliGRAM(s) Oral two times a day  traZODone 100 milliGRAM(s) Oral at bedtime      PMHX/PSHX:  Schizophrenia    Hypothyroidism    Hyperlipidemia    Schizophrenia    Hypothyroid    Anxiety    DM (diabetes mellitus)    Parkinsonism    Schizophrenia    Hyponatremia    NSTEMI (non-ST elevation myocardial infarction)    Bipolar disorder    DM (diabetes mellitus)    Dementia    HTN (hypertension)    Chronic CHF    MDD (major depressive disorder)    Hypothyroidism    HLD (hyperlipidemia)    Hypothyroidism    Constipation    Folate deficiency    Constipation    H/O candidiasis    Violent behavior    Atrial fibrillation    Hyperkalemia    Epistaxis    DM (diabetes mellitus)    No significant past surgical history    No significant past surgical history        Family history:  No pertinent family history in first degree relatives    Family history unknown    No pertinent family history in first degree relatives        Social History:     ROS:     General:  No wt loss, fevers, chills, night sweats, fatigue,   Eyes:  Good vision, no reported pain  ENT:  No sore throat, pain, runny nose, dysphagia  CV:  No pain, palpitations, hypo/hypertension  Resp:  No dyspnea, cough, tachypnea, wheezing  GI:  No pain, No nausea, No vomiting, No diarrhea, No constipation, No weight loss, No fever, No pruritis, No rectal bleeding, No tarry stools, No dysphagia,  :  No pain, bleeding, incontinence, nocturia  Muscle:  No pain, weakness  Neuro:  No weakness, tingling, memory problems  Psych:  No fatigue, insomnia, mood problems, depression  Endocrine:  No polyuria, polydipsia, cold/heat intolerance  Heme:  No petechiae, ecchymosis, easy bruisability  Skin:  No rash, tattoos, scars, edema      PHYSICAL EXAM:   Vital Signs:  Vital Signs Last 24 Hrs  T(C): 36.4 (2024 14:00), Max: 36.4 (2024 14:00)  T(F): 97.5 (2024 14:00), Max: 97.5 (2024 14:00)  HR: 83 (2024 15:00) (70 - 83)  BP: 125/71 (2024 15:00) (93/55 - 131/57)  BP(mean): 86 (2024 15:00) (69 - 86)  RR: 25 (2024 15:00) (18 - 25)  SpO2: 100% (2024 15:00) (94% - 100%)    Parameters below as of 2024 13:41  Patient On (Oxygen Delivery Method): nasal cannula  O2 Flow (L/min): 2    Daily Height in cm: 160.02 (2024 08:39)    Daily     GENERAL:     pale appearing male in NAD  HEAD:     atraumatic, normocephalic  EYES:     EOMI, pale conjunctiva  RESPIRATORY:     clear to auscultation bilaterally, no rales or rhonchi or wheezing or rubs  CARDIOVASCULAR:     regular rate and rhythm, no murmurs or rubs or gallops  GASTROINTESTINAL:     soft, nontender, nondistended, bowel sounds present  EXTREMITIES:     no clubbing or cyanosis or edema  MUSCULOSKELETAL:     no joint pain or swelling or deformities  NERVOUS SYSTEM:    moves all 4s  PSYCH:     awake and responsive  LABS:                        5.1    5.07  )-----------( 201      ( 2024 09:04 )             16.1     11-13    140  |  106  |  39[H]  ----------------------------<  114[H]  5.8[H]   |  27  |  1.60[H]    Ca    8.9      2024 09:04    TPro  5.6[L]  /  Alb  2.3[L]  /  TBili  0.2  /  DBili  x   /  AST  14  /  ALT  11  /  AlkPhos  56  11-13    LIVER FUNCTIONS - ( 2024 09:04 )  Alb: 2.3 g/dL / Pro: 5.6 g/dL / ALK PHOS: 56 U/L / ALT: 11 U/L / AST: 14 U/L / GGT: x           PT/INR - ( 2024 09:04 )   PT: 11.0 sec;   INR: 0.93 ratio         PTT - ( 2024 09:04 )  PTT:30.2 sec  Urinalysis Basic - ( 2024 11:35 )    Color: Yellow / Appearance: Clear / S.011 / pH: x  Gluc: x / Ketone: Negative mg/dL  / Bili: Negative / Urobili: 1.0 mg/dL   Blood: x / Protein: Negative mg/dL / Nitrite: Negative   Leuk Esterase: Negative / RBC: x / WBC x   Sq Epi: x / Non Sq Epi: x / Bacteria: x          Imaging:

## 2024-11-13 NOTE — PROVIDER CONTACT NOTE (EICU) - BACKGROUND
68M w schizophrenia, HTN, Afib on AC. Recent admission to [facility] for ABLA, GIB and hemorrhagic shock. HD stable in HD w/ BP ~. Labs revealed hgb 5.1. Received 1L IVF and PPI. ICU consulted for admission

## 2024-11-13 NOTE — CONSULT NOTE ADULT - ASSESSMENT
INCOMPLETE NOTE.  Documentation in Progress  PT SEEN AND EVALUATED.   FULL/ADDITIONAL RECOMMENDATIONS TO FOLLOW   ***************************************************************      [ASSESSMENT and  PLAN]    D62.0 Anemia due to blood loss  D63.8 Anemia due to chronic disease  K92.2 Gastrointestinal bleeding  D50.0 Likely iron deficiency anemia    69 yo man from [facility], hx NSTEMI, schizoprenica, A fib on Eliquis, HTN, hyponatremia, adm for hypotension SMP 70s and Hgb 6  Patient found to have anemia with hemoglobin 5.1 g/dL.  FOBT positive  Status post 2 unit PRBC transfusion  Admit to S PCU.    In Oct 2024 BP low 80s/50s, CBC wbc 5.83 hgb 5.1 hct 15.4 plts 162 mcv 95.7  BUN/Cr 47/1.3    stool occult blood negative  Given 2L fluid, 3u PRBC, Kcentra,, adm to ICU  post transfusion CBC today first lab Hgb 8.1, second lab Hgb 9.3  CT head negative  CT c/a/p-mild dep atelectasis, sm right effusion    Anemia due to blood loss    Prior workup 1mo ago  appropriate increase of Hgb w the transfusion  no signs of hemolysis  TSH normal  iron studies and B12, folate, Showed iron deficiency with adequate B12 and folate levels  ESR elevated at 31.        COMORBID  Schizophrenia [F20.9]    Hypothyroidism [E03.9]    Hyperlipidemia [E78.5]    Schizophrenia [F20.9]    Hypothyroid [E03.9]    Anxiety [F41.9]    DM (diabetes mellitus) [E11.9]    Parkinsonism [G20.C]    Schizophrenia [F20.9]    Hyponatremia [E87.1]    NSTEMI (non-ST elevation myocardial infarction) [I21.4]    Bipolar disorder [F31.9]    DM (diabetes mellitus) [E11.9]    Dementia [F03.90]    HTN (hypertension) [I10]    Chronic CHF [I50.9]    MDD (major depressive disorder) [F32.9]    Hypothyroidism [E03.9]    HLD (hyperlipidemia) [E78.5]    Hypothyroidism [E03.9]    Constipation [K59.00]    Folate deficiency [E53.8]    Constipation [K59.00]    H/O candidiasis [Z86.19]    Violent behavior [R45.6]    Atrial fibrillation [I48.91]    Hyperkalemia [E87.5]    Epistaxis [R04.0]    DM (diabetes mellitus) [E11.9]          RECOMMENDATIONS    Anemia  HOLD transfusion,   Follow CBC     Transfuse PRBC as clinically indicated.      Transfuse PRBC if Hgb <7.0 or if symptomatic.   Check Anemia studies.      Ferritin, Iron studies     B12, Folate     ESR, CRP     Check FOBT     Cosnider GI eval if FOBT+    DVT Prophylaxis    [ ]  Pt on anti-coagulation as above      [ ] AC Contraindicated. Recommend SCD     [ ] SQ Lovenox or SQ heparin      Discussed plan of care with patient in detail. Opportunity given for questions and discussion.   Pt expressed understanding of the treatment plan. Risks, benefits and alternatives discussed in detail.   Questions or concerns all addressed and answered to their satisfaction, and in lay terms.       Discussed with  xxxxxxx.    Discussed with  xxxxxxx.    Discussed with  xxxxxxx.    > xxxxxx minutes spent in direct patient care, examining and counseling patient,  reviewing  the notes, lab data/ imaging , discussion with multidisciplinary team.     Thank you for consulting us.        [ASSESSMENT and  PLAN]    D62.0 Anemia due to blood loss  D63.8 Anemia due to chronic disease  K92.2 Gastrointestinal bleeding  D50.0 Likely iron deficiency anemia    67 yo man from [facility], hx NSTEMI, schizoprenica, A fib on Eliquis, HTN, hyponatremia, adm for hypotension SMP 70s and Hgb 6  Patient found to have anemia with hemoglobin 5.1 g/dL.  FOBT positive  Status post 2 unit PRBC transfusion  Admit to S PCU.    In Oct 2024 BP low 80s/50s, CBC wbc 5.83 hgb 5.1 hct 15.4 plts 162 mcv 95.7  BUN/Cr 47/1.3    stool occult blood negative  Given 2L fluid, 3u PRBC, Kcentra,, adm to ICU  post transfusion CBC today first lab Hgb 8.1, second lab Hgb 9.3  CT head negative  CT c/a/p-mild dep atelectasis, sm right effusion    Anemia due to blood loss    Prior workup 1mo ago  appropriate increase of Hgb w the transfusion  no signs of hemolysis  TSH normal  iron studies and B12, folate, Showed iron deficiency with adequate B12 and folate levels  ESR elevated at 31.        COMORBID  Schizophrenia [F20.9]  Hypothyroidism [E03.9]  Hyperlipidemia [E78.5]  Schizophrenia [F20.9]  Hypothyroid [E03.9]  Anxiety [F41.9]  DM (diabetes mellitus) [E11.9]  Parkinsonism [G20.C]  Schizophrenia [F20.9]  Hyponatremia [E87.1]  NSTEMI (non-ST elevation myocardial infarction) [I21.4]  Bipolar disorder [F31.9]  DM (diabetes mellitus) [E11.9]  Dementia [F03.90]  HTN (hypertension) [I10]  Chronic CHF [I50.9]  MDD (major depressive disorder) [F32.9]  Hypothyroidism [E03.9]  HLD (hyperlipidemia) [E78.5]  Hypothyroidism [E03.9]  Constipation [K59.00]  Folate deficiency [E53.8]  Constipation [K59.00]  H/O candidiasis [Z86.19]  Violent behavior [R45.6]  Atrial fibrillation [I48.91]  Hyperkalemia [E87.5]  Epistaxis [R04.0]  DM (diabetes mellitus) [E11.9]        RECOMMENDATIONS    Fe Def Anemia / GIB / Anemia due to hemorrhage / ACD  Follow CBC     Transfuse PRBC as clinically indicated.      Transfuse PRBC if Hgb <7.0 or if symptomatic.   Hold AC    Followup Anemia studies.      Ferritin, Iron studies     B12, Folate     ESR, CRP     GI eval    DVT Prophylaxis    AC Contraindicated. Recommend SCD      Discussed plan of care with patient in detail. Opportunity given for questions and discussion.   Pt expressed understanding of the treatment plan. Risks, benefits and alternatives discussed in detail.   Questions or concerns all addressed and answered to their satisfaction, and in lay terms.        > 51  minutes spent in direct patient care, examining and counseling patient,  reviewing  the notes, lab data/ imaging , discussion with multidisciplinary team.     Thank you for consulting us.

## 2024-11-13 NOTE — ED PROVIDER NOTE - OBJECTIVE STATEMENT
68 MALE with  PMH NSTEMI, schizophrenia, bipolar d/o, HTN, hypoNa BIBEMS from [facility] for anemia  and hypotension today. Patient was admitted to [facility] last week for same and required transfusion and pressors for time until his BP and hemoglobin stabilized.  No clear source of anemia was found.  GI workup and hematology workup was all negative.  Patient is on Eliquis for chronic A-fib.  Patient is unable to give much history and denies any symptoms when asked.

## 2024-11-13 NOTE — ED ADULT NURSE REASSESSMENT NOTE - NS ED NURSE REASSESS COMMENT FT1
received patient at 11:00 from off-going RN, patient resting comfortably in stretcher with cardiac monitor/ in place. VS as documented. PRBCs in progress as ordered, tolerating well at this time. SPCU ELLEN Lopes at bedside for admission eval.

## 2024-11-13 NOTE — ED ADULT TRIAGE NOTE - CHIEF COMPLAINT QUOTE
sent from [facility], full code, recent admission to [facility]/blood transfusion, sent here for low blood pressure

## 2024-11-13 NOTE — PROVIDER CONTACT NOTE (EICU) - RECOMMENDATIONS
- HD stable at this time  - would give Kcentra for reversal of eliquis   - 2 pRBC ordered  - ensure good IVF  - GI consult  - continue PPI  - can hold off on CTA unless pt starts bleeding   - admit to SPCU - discussed w/ ICU ELLEN Lopes

## 2024-11-13 NOTE — PHARMACOTHERAPY INTERVENTION NOTE - COMMENTS
Upon discussion of 4-PCC dosing with ICU team, will dose at lower end of dosing range (25units/kg-50units/kg) at 25 units for 2000 units since patient at higher risk of thromboembolic complications ([id number] score 3)

## 2024-11-13 NOTE — CONSULT NOTE ADULT - SUBJECTIVE AND OBJECTIVE BOX
Patient is a 68y old  Male who presents with a chief complaint of     BRIEF HOSPITAL COURSE:  68 MALE with  PMH NSTEMI type 1 treated with plavix, schizophrenia, bipolar d/o, HTN, afib on eliquis, DM2 on metformin, BIBEMS for hypotension. Patient is poor historian and AOx1 at baseline has no complaints at time of evaluation. Spoke with [facility] who state that he received am medications including Eliquis and metformin. Patient recently admitted to [place] on  for similar episode requiring ICU admission found to have no active signs of bleeding and negative hematology w/u. In ED SBP 90's and labs show hgb 5 with positive fecal occult stool without any major bleeding episode noted by nursing staff. Ordered for 1u PRBC, and Kcentra for Eliquis reversal with repeat CBC at 4pm, K 5.8 ordered for insulin and calcium gluconate.          PAST MEDICAL & SURGICAL HISTORY:  Schizophrenia      Hypothyroidism      Hyperlipidemia      Schizophrenia      Hypothyroid      Anxiety      DM (diabetes mellitus)      Parkinsonism      Schizophrenia      Hyponatremia      NSTEMI (non-ST elevation myocardial infarction)      Bipolar disorder      DM (diabetes mellitus)      Dementia      HTN (hypertension)      Chronic CHF      MDD (major depressive disorder)      Hypothyroidism      HLD (hyperlipidemia)      Hypothyroidism      Constipation      Folate deficiency      Constipation      H/O candidiasis      Violent behavior      Atrial fibrillation      Hyperkalemia      Epistaxis      DM (diabetes mellitus)          Review of Systems:  [X] A ten-point review of systems was otherwise negative except as noted.    Medications:    prothrombin complex concentrate IVPB (KCENTRA) 4000 International Unit(s) IV Intermittent once    pantoprazole  Injectable 40 milliGRAM(s) IV Push two times a day      dextrose 50% Injectable 12.5 Gram(s) IV Push once  dextrose 50% Injectable 25 Gram(s) IV Push once  dextrose 50% Injectable 25 Gram(s) IV Push once  dextrose Oral Gel 15 Gram(s) Oral once PRN  glucagon  Injectable 1 milliGRAM(s) IntraMuscular once  insulin glargine Injectable (LANTUS) 5 Unit(s) SubCutaneous once  insulin lispro (ADMELOG) corrective regimen sliding scale   SubCutaneous three times a day before meals  levothyroxine Injectable 40 MICROGram(s) IV Push at bedtime    dextrose 5%. 1000 milliLiter(s) IV Continuous <Continuous>  dextrose 5%. 1000 milliLiter(s) IV Continuous <Continuous>      chlorhexidine 2% Cloths 1 Application(s) Topical <User Schedule>            ICU Vital Signs Last 24 Hrs  T(C): 36.1 (2024 08:39), Max: 36.1 (2024 08:39)  T(F): 97 (2024 08:39), Max: 97 (2024 08:39)  HR: 76 (2024 11:15) (70 - 77)  BP: 96/52 (2024 11:15) (93/55 - 107/59)  BP(mean): 69 (2024 11:15) (69 - 69)  ABP: --  ABP(mean): --  RR: 18 (2024 11:15) (18 - 20)  SpO2: 97% (2024 11:15) (94% - 100%)    O2 Parameters below as of 2024 11:15  Patient On (Oxygen Delivery Method): room air                I&O's Detail        LABS:                        5.1    5.07  )-----------( 201      ( 2024 09:04 )             16.1     11-13    140  |  106  |  39[H]  ----------------------------<  114[H]  5.8[H]   |  27  |  1.60[H]    Ca    8.9      2024 09:04    TPro  5.6[L]  /  Alb  2.3[L]  /  TBili  0.2  /  DBili  x   /  AST  14  /  ALT  11  /  AlkPhos  56  11-13          CAPILLARY BLOOD GLUCOSE        PT/INR - ( 2024 09:04 )   PT: 11.0 sec;   INR: 0.93 ratio         PTT - ( 2024 09:04 )  PTT:30.2 sec  Urinalysis Basic - ( 2024 11:35 )    Color: Yellow / Appearance: Clear / S.011 / pH: x  Gluc: x / Ketone: Negative mg/dL  / Bili: Negative / Urobili: 1.0 mg/dL   Blood: x / Protein: Negative mg/dL / Nitrite: Negative   Leuk Esterase: Negative / RBC: x / WBC x   Sq Epi: x / Non Sq Epi: x / Bacteria: x      CULTURES:        Physical Examination:    General: No acute distress. Alert, interactive, nonfocal    HEENT: Pupils equal, reactive to light.  Symmetric.    PULM: Clear to auscultation bilaterally, no significant sputum production    CVS: Regular rate and rhythm, no murmurs, rubs, or gallops    ABD: Soft, nondistended, nontender, normoactive bowel sounds, no masses    EXT: No edema, nontender    SKIN: Warm and well perfused, no rashes noted.    NEURO: A&Ox1, strength 5/5 all extremities, cranial nerves grossly intact, no focal deficits        RADIOLOGY:   < from: Xray Chest 1 View AP/PA (11.24 @ 09:20) >    IMPRESSION: No focal infiltrate or congestion given the portable   technique. Heart is within normal limits in its transthoracic diameter.   Regional osseous structures appropriate for age    --- End of Report ---    < end of copied text >

## 2024-11-13 NOTE — ED PROVIDER NOTE - NSICDXPASTMEDICALHX_GEN_ALL_CORE_FT
PAST MEDICAL HISTORY:  Anxiety     Atrial fibrillation     Bipolar disorder     Chronic CHF     Constipation     Constipation     Dementia     DM (diabetes mellitus)     DM (diabetes mellitus)     DM (diabetes mellitus)     Epistaxis     Folate deficiency     H/O candidiasis     HLD (hyperlipidemia)     HTN (hypertension)     Hyperkalemia     Hyperlipidemia     Hyponatremia     Hypothyroid     Hypothyroidism     Hypothyroidism     Hypothyroidism     MDD (major depressive disorder)     NSTEMI (non-ST elevation myocardial infarction)     Parkinsonism     Schizophrenia     Schizophrenia     Schizophrenia     Violent behavior

## 2024-11-13 NOTE — PROVIDER CONTACT NOTE (EICU) - ASSESSMENT
Telehealth evaluation precludes physical exam. Pt to be evaluated via telehealth services once arrives to ICU

## 2024-11-13 NOTE — ED PROVIDER NOTE - CLINICAL SUMMARY MEDICAL DECISION MAKING FREE TEXT BOX
68 MALE with  PMH NSTEMI, schizophrenia, bipolar d/o, HTN, hypoNa BIBEMS from [facility] for anemia  and hypotension today. Patient was admitted to [facility] last week for same and required transfusion and pressors for time until his BP and hemoglobin stabilized.  No clear source of anemia was found.  GI workup and hematology workup was all negative.  Patient is on Eliquis for chronic A-fib.  Patient is unable to give much history and denies any symptoms when asked.    Symptomatic anemia of unknown etiology.  Plan - anemia workup, transfuse, admit.

## 2024-11-13 NOTE — ED ADULT NURSE NOTE - OBJECTIVE STATEMENT
patient has c/o low Patient is brought in by EMS from nursing home, patient has c/o low bp since this morning and possible GI bleed, aaox3 but appears very weak and pale, ekg is done and cardiac monitor placed. Labs drawn & sent results pending. cardiac monitor placed on pt and tele tech aware. comfort measure provided, callbell within reach, reinforced surrounding and plan of care, plan of care ongoing

## 2024-11-13 NOTE — CONSULT NOTE ADULT - ASSESSMENT
Initial evaluation/Pulmonary Critical Care consultation requested by DR CLEANING  on 11/13/2024  from Dr Charly Webster    Patient examined chart reviewed    HOSPITAL ADMISSION   PATIENT CAME  FROM (if information available)      REASON FOR VISIT  .. Management of problems listed below      ROS  . Patient unable to give ROS     PHYSICAL EXAM    HEENT Unremarkable  atraumatic   RESP Fair air entry  Harsh breath sound   CARDIAC S1 S2 No S3     NO JVD    ABDOMEN No hepatosplenomegaly   PEDAL EDEMA present No calf tenderness  NO rash       [id number]  BEST PRACTICE ISSUES.  . HOB ELEVATN.    .... Yes  . DIET.   ....   . IV FLUIDS.  .... 11/13/2024 lr 75   . DVT PPLX.    ....   . STRESS ULCER PPLX.   .... 11/13/2024 protonix 40.2   . INFECTION PPLX.   .... 11/13/2024 chlorhexidine 2%     [id number]  GENERAL DATA .   GOC.    ..    ICU STAY.    .. no   COVID.   ..   ALLGY.   ..     clozapine lithium   WT.   ..  11/13/2024 96   BMI.  .. 11/13/2024 37  ISOLATION.        [id number]  VITALS/GAS EXCHANGE/DRIPS  ABGS.   .  VS/ PO/IO/ VENT/ DRIPS.   11/13/2024 74 98/52        CHIEF COMPLAINT.   . 11/13/2024 sent from [facility], full code, recent admission to [place]/blood transfusion, sent here for low blood pressure    OVERALL PRESENTATION.  . 11/13/2024 68 MALE with  PMH NSTEMI, schizophrenia, bipolar d/o, HTN, hypoNa BIBEMS from [facility] for anemia  and hypotension today. Patient was admitted to [facility] last week for same and required transfusion and pressors for time until his BP and hemoglobin stabilized.  No clear source of anemia was found.  GI workup and hematology workup was all negative.  Patient is on Eliquis for chronic A-fib.  Patient is unable to give much history and denies any symptoms when asked.    RECENT ADMISSION  . A fib ON APIXABA  .... 11/1 taken off apixa   . CAD 10/31-11/1 Tr 34-52   . SEVERE ANEMIA 10/31-11/1 Hb 5.1-9.3   . TRANSFUSION 11/1/2024 3 u   . APIXABA  .... 11/1/2024 Given Kcentra .     PAST HOSPITAL STAYS .   .  HOME MEDS.    [id number]  PROBLEM ASSESSMENT RECOMMENDATIONS.  RESP.   .... Target po 90-95%    INFECTION.  .... w 11/13/2024 w 5   .... cxr 11/13/2024 napd   .... no active infection suspected     CARDIAC.  .... Trop 11/13/2024 tr 9.8   .... pbnp 11/13/2024 pbnp 338   .... cxr 11/13/2024 napd  .... 11/13/2024 atorvastat 40   .... target map 65 (+)   .... cardiac status is stable      HEMAT.  . ANEMIA   .... Hb 11/13/2024 Hb 5.1   .... Plt  11/13/2024 plt 201  .... inr 11/13/2024 inr .93   .... 11/13/2024 Anemia need to exclude gi bl   .... 11/13/2024 suggets ctabd to exclude RP bleed   .... 11/13/2024 Transfuse to Hb 7 (+)     . APIXABA  .... 11/13/2024 12p given kcentra 4000 u     . TRANSFUSION  .... 11/13/2024 2 u     GI.  . GI bleed   . SOB 11/13/2024 SOB (+)   .... 11/13/2024 ppi  .... 11/13/2024GI eval     RENAL.  . HUBERT   .... Na 11/13/2024 Na 140   .... CO2 11/13/2024 CO2 27   .... AG 11/13/2024 ag 7  .... Alb 11/13/2024 alb 2.3   .... Cr 11/13/2024 Cr 1.6   .... monitor     . HYPERKALEMIA   .... K 11/13/2024 K 5.8    ENDO.  . HYPOTHYROID  .... 11/13/2024 levoxyl 40 iv    . DM  .... 11/13 sl scale     NEURO.  .... 11/13/2024 depakote 1500   .... 11/13/2024 quetiapine 400   .... 11/13/2024 risperdone 4.2   .... 11/13/2024 trazodone 100     XXXXXXXXXXXXXXXXXXXXXX   SUMMARY  CC.   . 11/13/2024 LO BP   PROBLEMS .  . LHYPOTENSION  . A fib ON APIXABA  .... 11/13/2024 apixaba stoppd because of gi bl   . GI BLEED  11/13/2024 SOB (+)   . APIXABA  .... 11/13/2024 12p given kcentra 4000 u   . TRANSFUSION  .... 11/13/2024 2 u   . HYPERKALEMIA 11/13/2024 K 5.8   . HUBERT  11/13/2024 Cr 1.6      PMH.  . Hytn   . Afib on Eliquis,   . NSTEMI,   . Hyponna  . schizophrenia,     TIME SPENT.  . Over 55   minutes aggregate critical care time spent on encounter; activities included   direct patient care, counseling and/or coordinating care reviewing notes, lab data/ imaging , discussion with multidisciplinary team/ patient  /family and explaining in detail risks, benefits, alternatives  of the recommendations       CLIFFORD ABDALLA 67 m 11/13/2024 1955

## 2024-11-13 NOTE — H&P ADULT - NSHPPHYSICALEXAM_GEN_ALL_CORE
PHYSICAL EXAM:  Vital Signs Last 24 Hrs  T(C): 36.4 (13 Nov 2024 14:00), Max: 36.4 (13 Nov 2024 14:00)  T(F): 97.5 (13 Nov 2024 14:00), Max: 97.5 (13 Nov 2024 14:00)  HR: 83 (13 Nov 2024 15:00) (70 - 83)  BP: 125/71 (13 Nov 2024 15:00) (93/55 - 131/57)  BP(mean): 86 (13 Nov 2024 15:00) (69 - 86)  RR: 25 (13 Nov 2024 15:00) (18 - 25)  SpO2: 100% (13 Nov 2024 15:00) (94% - 100%)    Parameters below as of 13 Nov 2024 13:41  Patient On (Oxygen Delivery Method): nasal cannula  O2 Flow (L/min): 2      GENERAL:     pale appearing male in NAD  HEAD:     atraumatic, normocephalic  EYES:     EOMI, pale conjunctiva  RESPIRATORY:     clear to auscultation bilaterally, no rales or rhonchi or wheezing or rubs  CARDIOVASCULAR:     regular rate and rhythm, no murmurs or rubs or gallops  GASTROINTESTINAL:     soft, nontender, nondistended, bowel sounds present  EXTREMITIES:     no clubbing or cyanosis or edema  MUSCULOSKELETAL:     no joint pain or swelling or deformities  NERVOUS SYSTEM:    moves all 4s  PSYCH:     awake and responsive

## 2024-11-13 NOTE — CONSULT NOTE ADULT - SUBJECTIVE AND OBJECTIVE BOX
INCOMPLETE NOTE.  Documentation in Progress  PT SEEN AND EVALUATED.   FULL/ADDITIONAL RECOMMENDATIONS TO FOLLOW   ***************************************************************    Patient is a 68y old  Male who presents with a chief complaint of anemia, GI bleed (2024 18:48)    HPI:  ****Collateral information obtained from notes as patient is a poor historian.  He kept on asking for something to drink.  When asked if he knows where he is or why he is in the hospital, he kept on saying "ok."  Could not elicit specific HPI or ROS.***    68M with [facility] with hx of schizophrenia/bipolar, hx of NSTEMI, hx of hyponatremia, recent admission to [facility] for anemia  (needed ICU, transfusion, kcentra, and brief period of levophed but no source was identified) who presents from [facility] again for anemia.  Found to have outpatient hgb of 6.  Also was noted to be hypotensive as well.  Brought here for further evaluation.   In the ED, patient's triage vitals were  BP 98/52  HR 74, RR 18, 98% on NC, T 97F.  Hgb was 5.1 with +occult blood.  Also noted to have hyperkalemia and HUBERT with Cr 1.6 (discharged at 0.83).  Patient ordered for 2 units.  Patient admitted to [facility]U for further evaluation and management.  Currently patient has no pain or SOB. No light-headedness or dizziness.   (2024 15:30)    PAST MEDICAL & SURGICAL HISTORY:  Schizophrenia      Hypothyroidism      Hyperlipidemia      Schizophrenia      Hypothyroid      Anxiety      DM (diabetes mellitus)      Parkinsonism      Schizophrenia      Hyponatremia      NSTEMI (non-ST elevation myocardial infarction)      Bipolar disorder      DM (diabetes mellitus)      Dementia      HTN (hypertension)      Chronic CHF      MDD (major depressive disorder)      Hypothyroidism      HLD (hyperlipidemia)      Hypothyroidism      Constipation      Folate deficiency      Constipation      H/O candidiasis      Violent behavior      Atrial fibrillation      Hyperkalemia      Epistaxis      DM (diabetes mellitus)         HEALTH ISSUES - PROBLEM Dx:        Anemia [D64.9]    Schizophrenia [F20.9]    Hypothyroidism [E03.9]    Hyperlipidemia [E78.5]    Schizophrenia [F20.9]    Hypothyroid [E03.9]    Anxiety [F41.9]    DM (diabetes mellitus) [E11.9]    Parkinsonism [G20.C]    Schizophrenia [F20.9]    Hyponatremia [E87.1]    NSTEMI (non-ST elevation myocardial infarction) [I21.4]    Bipolar disorder [F31.9]    DM (diabetes mellitus) [E11.9]    Dementia [F03.90]    HTN (hypertension) [I10]    Chronic CHF [I50.9]    MDD (major depressive disorder) [F32.9]    Hypothyroidism [E03.9]    HLD (hyperlipidemia) [E78.5]    Hypothyroidism [E03.9]    Constipation [K59.00]    Folate deficiency [E53.8]    Constipation [K59.00]    H/O candidiasis [Z86.19]    Violent behavior [R45.6]    Atrial fibrillation [I48.91]    Hyperkalemia [E87.5]    Epistaxis [R04.0]    DM (diabetes mellitus) [E11.9]    No significant past surgical history [[id number]]    No significant past surgical history [[id number]]      FAMILY HISTORY:      [SOCIAL HISTORY: ]     smoking:  none currently     EtOH:  none currently     illicit drugs:  none currently     occupation:  Retired     marital status:       Other:       [ALLERGIES/INTOLERANCES:]  Allergies    lithium (Rash)  clozapine (Unknown)  aspartate (Unknown)  lithium (Unknown)  clozapine (Rash)    Intolerances        [MEDICATIONS]  MEDICATIONS  (STANDING):  atorvastatin 40 milliGRAM(s) Oral at bedtime  chlorhexidine 2% Cloths 1 Application(s) Topical <User Schedule>  dextrose 5%. 1000 milliLiter(s) (100 mL/Hr) IV Continuous <Continuous>  dextrose 5%. 1000 milliLiter(s) (50 mL/Hr) IV Continuous <Continuous>  dextrose 50% Injectable 25 Gram(s) IV Push once  dextrose 50% Injectable 12.5 Gram(s) IV Push once  dextrose 50% Injectable 25 Gram(s) IV Push once  divalproex ER 1500 milliGRAM(s) Oral daily  glucagon  Injectable 1 milliGRAM(s) IntraMuscular once  insulin lispro (ADMELOG) corrective regimen sliding scale   SubCutaneous every 6 hours  lactated ringers. 1000 milliLiter(s) (75 mL/Hr) IV Continuous <Continuous>  levothyroxine Injectable 40 MICROGram(s) IV Push at bedtime  pantoprazole  Injectable 40 milliGRAM(s) IV Push two times a day  QUEtiapine 400 milliGRAM(s) Oral at bedtime  risperiDONE   Tablet 4 milliGRAM(s) Oral two times a day  traZODone 100 milliGRAM(s) Oral at bedtime    MEDICATIONS  (PRN):  dextrose Oral Gel 15 Gram(s) Oral once PRN Blood Glucose LESS THAN 70 milliGRAM(s)/deciliter      [REVIEW OF SYSTEMS: ]  CONSTITUTIONAL: normal, no fever, no shakes, no chills   EYES: No eye pain, no visual disturbances, no discharge  ENMT:  no discharge  NECK: No pain, no stiffness  BREASTS: No pain, no masses, no nipple discharge  RESPIRATORY: No cough, no wheezing, no chills, no hemoptysis; No shortness of breath  CARDIOVASCULAR: No chest pain, no palpitations, no dizziness, no leg swelling  GASTROINTESTINAL: No abdominal, no epigastric pain. No nausea, no vomiting, no hematemesis; No diarrhea , no constipation. No melena, no hematochezia.  GENITOURINARY: No dysuria, no frequency, no hematuria, no incontinence  NEUROLOGICAL: No headaches, no memory loss, no loss of strength, no numbness, no tremors  SKIN: No itching, no burning, no rashes, no lesions   LYMPH NODES: No enlarged glands  ENDOCRINE: No heat or cold intolerance; No hair loss  MUSCULOSKELETAL: No joint pain or swelling; No muscle, no back, no extremity pain  PSYCHIATRIC: No depression, no anxiety, no mood swings, no difficulty sleeping  HEME/LYMPH: No easy bruising, no bleeding gums    [VITALS SIGNS 24hrs]  Vital Signs Last 24 Hrs  T(C): 36.4 (2024 14:00), Max: 36.4 (2024 14:00)  T(F): 97.5 (2024 14:00), Max: 97.5 (2024 14:00)  HR: 93 (2024 18:00) (70 - 93)  BP: 149/76 (2024 18:00) (93/55 - 156/77)  BP(mean): 97 (2024 18:00) (69 - 101)  RR: 18 (2024 18:00) (18 - 25)  SpO2: 100% (2024 18:00) (94% - 100%)    Parameters below as of 2024 13:41  Patient On (Oxygen Delivery Method): nasal cannula  O2 Flow (L/min): 2    Daily Height in cm: 160.02 (2024 08:39)    Daily     I&O's Summary    2024 07:01  -  2024 19:47  --------------------------------------------------------  IN: 300 mL / OUT: 600 mL / NET: -300 mL        [PHYSICAL EXAM]  GEN:   HEENT: normocephalic and atraumatic. EOMI. PERRL.    NECK: Supple.  No lymphadenopathy   LUNGS: Clear to auscultation.  HEART: S1S2 Regular rate and rhythm, no MRG  ABDOMEN: Soft, nontender, and nondistended.  Positive bowel sounds.    : No CVA tenderness  EXTREMITIES: Without edema.  NEUROLOGIC: grossly intact.  PSYCHIATRIC: Appropriate affect .  SKIN: No rash     [LABS: ]                        5.1    5.07  )-----------( 201      ( 2024 09:04 )             16.1     CBC Full  -  ( 2024 09:04 )  WBC Count : 5.07 K/uL  RBC Count : 1.62 M/uL  Hemoglobin : 5.1 g/dL  Hematocrit : 16.1 %  Platelet Count - Automated : 201 K/uL  Mean Cell Volume : 99.4 fL  Mean Cell Hemoglobin : 31.5 pg  Mean Cell Hemoglobin Concentration : 31.7 g/dL  Auto Neutrophil # : 3.49 K/uL  Auto Lymphocyte # : 0.62 K/uL  Auto Monocyte # : 0.67 K/uL  Auto Eosinophil # : 0.14 K/uL  Auto Basophil # : 0.01 K/uL  Auto Neutrophil % : 68.8 %  Auto Lymphocyte % : 12.2 %  Auto Monocyte % : 13.2 %  Auto Eosinophil % : 2.8 %  Auto Basophil % : 0.2 %        140  |  106  |  39[H]  ----------------------------<  114[H]  5.8[H]   |  27  |  1.60[H]    Ca    8.9      2024 09:04    TPro  5.6[L]  /  Alb  2.3[L]  /  TBili  0.2  /  DBili  x   /  AST  14  /  ALT  11  /  AlkPhos  56      PT/INR - ( 2024 09:04 )   PT: 11.0 sec;   INR: 0.93 ratio         PTT - ( 2024 09:04 )  PTT:30.2 sec  LIVER FUNCTIONS - ( 2024 09:04 )  Alb: 2.3 g/dL / Pro: 5.6 g/dL / ALK PHOS: 56 U/L / ALT: 11 U/L / AST: 14 U/L / GGT: x               Urinalysis Basic - ( 2024 11:35 )    Color: Yellow / Appearance: Clear / S.011 / pH: x  Gluc: x / Ketone: Negative mg/dL  / Bili: Negative / Urobili: 1.0 mg/dL   Blood: x / Protein: Negative mg/dL / Nitrite: Negative   Leuk Esterase: Negative / RBC: x / WBC x   Sq Epi: x / Non Sq Epi: x / Bacteria: x          CBC TREND (5 Days)  WBC Count: 5.07 K/uL ( @ 09:04)    Hemoglobin: 5.1 g/dL ( @ 09:04)    Hematocrit: 16.1 % ( @ 09:04)    Platelet Count - Automated: 201 K/uL ( @ 09:04)        Ferritin: 49 ng/mL ( @ 04:52)    Iron Total: 103 ug/dL ( @ 04:52)     Vitamin B12, Serum: 513 pg/mL ( @ 05:31)     Folate, Serum: 9.4 ng/mL ( @ 05:31)     Sedimentation Rate, Erythrocyte: 31 mm/hr (10-10 @ 11:33)                           [MICROBIOLOGY /  VIROLOGY:]  SARS-CoV-2: NotDetec (2024 06:30)      Urinalysis with Rflx Culture (collected 24 @ 11:35)        Urinalysis with Rflx Culture (collected 2024 11:35)        [PATHOLOGY]       [RADIOLOGY & ADDITIONAL STUDIES:]      INCOMPLETE NOTE.  Documentation in Progress  PT SEEN AND EVALUATED.   FULL/ADDITIONAL RECOMMENDATIONS TO FOLLOW   ***************************************************************    Patient is a 68y old  Male who presents with a chief complaint of anemia, GI bleed (2024 18:48)    HPI:  ****Collateral information obtained from notes as patient is a poor historian.  He kept on asking for something to drink.  When asked if he knows where he is or why he is in the hospital, he kept on saying "ok."  Could not elicit specific HPI or ROS.***    68M with [facility] with hx of schizophrenia/bipolar, hx of NSTEMI, hx of hyponatremia, recent admission to [facility] for anemia  (needed ICU, transfusion, kcentra, and brief period of levophed but no source was identified) who presents from [facility] again for anemia.  Found to have outpatient hgb of 6.  Also was noted to be hypotensive as well.  Brought here for further evaluation.   In the ED, patient's triage vitals were  BP 98/52  HR 74, RR 18, 98% on NC, T 97F.  Hgb was 5.1 with +occult blood.  Also noted to have hyperkalemia and HUBERT with Cr 1.6 (discharged at 0.83).  Patient ordered for 2 units.  Patient admitted to [facility] for further evaluation and management.  Currently patient has no pain or SOB. No light-headedness or dizziness.   (2024 15:30)    PAST MEDICAL & SURGICAL HISTORY:  Schizophrenia      Hypothyroidism      Hyperlipidemia      Schizophrenia      Hypothyroid      Anxiety      DM (diabetes mellitus)      Parkinsonism      Schizophrenia      Hyponatremia      NSTEMI (non-ST elevation myocardial infarction)      Bipolar disorder      DM (diabetes mellitus)      Dementia      HTN (hypertension)      Chronic CHF      MDD (major depressive disorder)      Hypothyroidism      HLD (hyperlipidemia)      Hypothyroidism      Constipation      Folate deficiency      Constipation      H/O candidiasis      Violent behavior      Atrial fibrillation      Hyperkalemia      Epistaxis      DM (diabetes mellitus)         HEALTH ISSUES - PROBLEM Dx:        Anemia [D64.9]    Schizophrenia [F20.9]    Hypothyroidism [E03.9]    Hyperlipidemia [E78.5]    Schizophrenia [F20.9]    Hypothyroid [E03.9]    Anxiety [F41.9]    DM (diabetes mellitus) [E11.9]    Parkinsonism [G20.C]    Schizophrenia [F20.9]    Hyponatremia [E87.1]    NSTEMI (non-ST elevation myocardial infarction) [I21.4]    Bipolar disorder [F31.9]    DM (diabetes mellitus) [E11.9]    Dementia [F03.90]    HTN (hypertension) [I10]    Chronic CHF [I50.9]    MDD (major depressive disorder) [F32.9]    Hypothyroidism [E03.9]    HLD (hyperlipidemia) [E78.5]    Hypothyroidism [E03.9]    Constipation [K59.00]    Folate deficiency [E53.8]    Constipation [K59.00]    H/O candidiasis [Z86.19]    Violent behavior [R45.6]    Atrial fibrillation [I48.91]    Hyperkalemia [E87.5]    Epistaxis [R04.0]    DM (diabetes mellitus) [E11.9]    No significant past surgical history [[id number]]    No significant past surgical history [[id number]]      FAMILY HISTORY:      [SOCIAL HISTORY: ]     smoking:  none currently     EtOH:  none currently     illicit drugs:  none currently     occupation:  Retired     marital status:       Other:       [ALLERGIES/INTOLERANCES:]  Allergies    lithium (Rash)  clozapine (Unknown)  aspartate (Unknown)  lithium (Unknown)  clozapine (Rash)    Intolerances        [MEDICATIONS]  MEDICATIONS  (STANDING):  atorvastatin 40 milliGRAM(s) Oral at bedtime  chlorhexidine 2% Cloths 1 Application(s) Topical <User Schedule>  dextrose 5%. 1000 milliLiter(s) (100 mL/Hr) IV Continuous <Continuous>  dextrose 5%. 1000 milliLiter(s) (50 mL/Hr) IV Continuous <Continuous>  dextrose 50% Injectable 25 Gram(s) IV Push once  dextrose 50% Injectable 12.5 Gram(s) IV Push once  dextrose 50% Injectable 25 Gram(s) IV Push once  divalproex ER 1500 milliGRAM(s) Oral daily  glucagon  Injectable 1 milliGRAM(s) IntraMuscular once  insulin lispro (ADMELOG) corrective regimen sliding scale   SubCutaneous every 6 hours  lactated ringers. 1000 milliLiter(s) (75 mL/Hr) IV Continuous <Continuous>  levothyroxine Injectable 40 MICROGram(s) IV Push at bedtime  pantoprazole  Injectable 40 milliGRAM(s) IV Push two times a day  QUEtiapine 400 milliGRAM(s) Oral at bedtime  risperiDONE   Tablet 4 milliGRAM(s) Oral two times a day  traZODone 100 milliGRAM(s) Oral at bedtime    MEDICATIONS  (PRN):  dextrose Oral Gel 15 Gram(s) Oral once PRN Blood Glucose LESS THAN 70 milliGRAM(s)/deciliter      [REVIEW OF SYSTEMS: ]  Per HPI  Unable obtain.  Patient unreliable historian    [VITALS SIGNS 24hrs]  Vital Signs Last 24 Hrs  T(C): 36.4 (2024 14:00), Max: 36.4 (2024 14:00)  T(F): 97.5 (2024 14:00), Max: 97.5 (2024 14:00)  HR: 93 (2024 18:00) (70 - 93)  BP: 149/76 (2024 18:00) (93/55 - 156/77)  BP(mean): 97 (2024 18:00) (69 - 101)  RR: 18 (2024 18:00) (18 - 25)  SpO2: 100% (2024 18:00) (94% - 100%)    Parameters below as of 2024 13:41  Patient On (Oxygen Delivery Method): nasal cannula  O2 Flow (L/min): 2    Daily Height in cm: 160.02 (2024 08:39)    Daily     I&O's Summary    2024 07:01  -  2024 19:47  --------------------------------------------------------  IN: 300 mL / OUT: 600 mL / NET: -300 mL        [PHYSICAL EXAM]  GEN:   HEENT: normocephalic and atraumatic. EOMI. PERRL.    NECK: Supple.  No lymphadenopathy   LUNGS: Clear to auscultation.  HEART: S1S2 Regular rate and rhythm, no MRG  ABDOMEN: Soft, nontender, and nondistended.  Positive bowel sounds.    : No CVA tenderness  EXTREMITIES: Without edema.  NEUROLOGIC: grossly intact.  PSYCHIATRIC: Appropriate affect .  SKIN: No rash     [LABS: ]                        5.1    5.07  )-----------( 201      ( 2024 09:04 )             16.1     CBC Full  -  ( 2024 09:04 )  WBC Count : 5.07 K/uL  RBC Count : 1.62 M/uL  Hemoglobin : 5.1 g/dL  Hematocrit : 16.1 %  Platelet Count - Automated : 201 K/uL  Mean Cell Volume : 99.4 fL  Mean Cell Hemoglobin : 31.5 pg  Mean Cell Hemoglobin Concentration : 31.7 g/dL  Auto Neutrophil # : 3.49 K/uL  Auto Lymphocyte # : 0.62 K/uL  Auto Monocyte # : 0.67 K/uL  Auto Eosinophil # : 0.14 K/uL  Auto Basophil # : 0.01 K/uL  Auto Neutrophil % : 68.8 %  Auto Lymphocyte % : 12.2 %  Auto Monocyte % : 13.2 %  Auto Eosinophil % : 2.8 %  Auto Basophil % : 0.2 %        140  |  106  |  39[H]  ----------------------------<  114[H]  5.8[H]   |  27  |  1.60[H]    Ca    8.9      2024 09:04    TPro  5.6[L]  /  Alb  2.3[L]  /  TBili  0.2  /  DBili  x   /  AST  14  /  ALT  11  /  AlkPhos  56  11-13    PT/INR - ( 2024 09:04 )   PT: 11.0 sec;   INR: 0.93 ratio         PTT - ( 2024 09:04 )  PTT:30.2 sec  LIVER FUNCTIONS - ( 2024 09:04 )  Alb: 2.3 g/dL / Pro: 5.6 g/dL / ALK PHOS: 56 U/L / ALT: 11 U/L / AST: 14 U/L / GGT: x               Urinalysis Basic - ( 2024 11:35 )    Color: Yellow / Appearance: Clear / S.011 / pH: x  Gluc: x / Ketone: Negative mg/dL  / Bili: Negative / Urobili: 1.0 mg/dL   Blood: x / Protein: Negative mg/dL / Nitrite: Negative   Leuk Esterase: Negative / RBC: x / WBC x   Sq Epi: x / Non Sq Epi: x / Bacteria: x          CBC TREND (5 Days)  WBC Count: 5.07 K/uL ( @ 09:04)    Hemoglobin: 5.1 g/dL ( 09:04)    Hematocrit: 16.1 % ( @ 09:04)    Platelet Count - Automated: 201 K/uL ( @ 09:04)        Ferritin: 49 ng/mL ( @ 04:52)    Iron Total: 103 ug/dL ( @ 04:52)     Vitamin B12, Serum: 513 pg/mL ( @ 05:31)     Folate, Serum: 9.4 ng/mL ( @ 05:31)     Sedimentation Rate, Erythrocyte: 31 mm/hr (10-10 @ 11:33)                           [MICROBIOLOGY /  VIROLOGY:]  SARS-CoV-2: NotDetec (2024 06:30)      Urinalysis with Rflx Culture (collected 24 @ 11:35)        Urinalysis with Rflx Culture (collected 2024 11:35)        [PATHOLOGY]       [RADIOLOGY & ADDITIONAL STUDIES:]      Patient is a 68y old  Male who presents with a chief complaint of anemia, GI bleed (2024 18:48)    HPI:  ****Collateral information obtained from notes as patient is a poor historian.  He kept on asking for something to drink.  When asked if he knows where he is or why he is in the hospital, he kept on saying "ok."  Could not elicit specific HPI or ROS.***    68M with [facility] with hx of schizophrenia/bipolar, hx of NSTEMI, hx of hyponatremia, recent admission to [facility] for anemia  (needed ICU, transfusion, kcentra, and brief period of levophed but no source was identified) who presents from [facility] again for anemia.  Found to have outpatient hgb of 6.  Also was noted to be hypotensive as well.  Brought here for further evaluation.   In the ED, patient's triage vitals were  BP 98/52  HR 74, RR 18, 98% on NC, T 97F.  Hgb was 5.1 with +occult blood.  Also noted to have hyperkalemia and HUBERT with Cr 1.6 (discharged at 0.83).  Patient ordered for 2 units.  Patient admitted to [facility] for further evaluation and management.  Currently patient has no pain or SOB. No light-headedness or dizziness.   (2024 15:30)    PAST MEDICAL & SURGICAL HISTORY:  Schizophrenia  Hypothyroidism  Hyperlipidemia  Schizophrenia  Hypothyroid  Anxiety  DM (diabetes mellitus)  Parkinsonism  Schizophrenia  Hyponatremia  NSTEMI (non-ST elevation myocardial infarction)  Bipolar disorder  DM (diabetes mellitus)  Dementia  HTN (hypertension)  Chronic CHF  MDD (major depressive disorder)  Hypothyroidism  HLD (hyperlipidemia)  Hypothyroidism  Constipation  Folate deficiency  Constipation  H/O candidiasis  Violent behavior  Atrial fibrillation  Hyperkalemia  Epistaxis  DM (diabetes mellitus)       HEALTH ISSUES - PROBLEM Dx:  Anemia [D64.9]  Schizophrenia [F20.9]  Hypothyroidism [E03.9]  Hyperlipidemia [E78.5]  Schizophrenia [F20.9]  Hypothyroid [E03.9]  Anxiety [F41.9]  DM (diabetes mellitus) [E11.9]  Parkinsonism [G20.C]  Schizophrenia [F20.9]  Hyponatremia [E87.1]  NSTEMI (non-ST elevation myocardial infarction) [I21.4]  Bipolar disorder [F31.9]  DM (diabetes mellitus) [E11.9]  Dementia [F03.90]  HTN (hypertension) [I10]  Chronic CHF [I50.9]  MDD (major depressive disorder) [F32.9]  Hypothyroidism [E03.9]  HLD (hyperlipidemia) [E78.5]  Hypothyroidism [E03.9]  Constipation [K59.00]  Folate deficiency [E53.8]  Constipation [K59.00]  H/O candidiasis [Z86.19]  Violent behavior [R45.6]  Atrial fibrillation [I48.91]  Hyperkalemia [E87.5]  Epistaxis [R04.0]  DM (diabetes mellitus) [E11.9]      FAMILY HISTORY:  .  Unable to obtain    [SOCIAL HISTORY: ]     smoking:  none currently     EtOH:  none currently     illicit drugs:  none currently     occupation:  Retired     marital status:       Other:       [ALLERGIES/INTOLERANCES:]  Allergies    lithium (Rash)  clozapine (Unknown)  aspartate (Unknown)  lithium (Unknown)  clozapine (Rash)    Intolerances        [MEDICATIONS]  MEDICATIONS  (STANDING):  atorvastatin 40 milliGRAM(s) Oral at bedtime  chlorhexidine 2% Cloths 1 Application(s) Topical <User Schedule>  dextrose 5%. 1000 milliLiter(s) (100 mL/Hr) IV Continuous <Continuous>  dextrose 5%. 1000 milliLiter(s) (50 mL/Hr) IV Continuous <Continuous>  dextrose 50% Injectable 25 Gram(s) IV Push once  dextrose 50% Injectable 12.5 Gram(s) IV Push once  dextrose 50% Injectable 25 Gram(s) IV Push once  divalproex ER 1500 milliGRAM(s) Oral daily  glucagon  Injectable 1 milliGRAM(s) IntraMuscular once  insulin lispro (ADMELOG) corrective regimen sliding scale   SubCutaneous every 6 hours  lactated ringers. 1000 milliLiter(s) (75 mL/Hr) IV Continuous <Continuous>  levothyroxine Injectable 40 MICROGram(s) IV Push at bedtime  pantoprazole  Injectable 40 milliGRAM(s) IV Push two times a day  QUEtiapine 400 milliGRAM(s) Oral at bedtime  risperiDONE   Tablet 4 milliGRAM(s) Oral two times a day  traZODone 100 milliGRAM(s) Oral at bedtime    MEDICATIONS  (PRN):  dextrose Oral Gel 15 Gram(s) Oral once PRN Blood Glucose LESS THAN 70 milliGRAM(s)/deciliter      [REVIEW OF SYSTEMS: ]  Per HPI  Unable obtain.  Patient unreliable historian    [VITALS SIGNS 24hrs]  Vital Signs Last 24 Hrs  T(C): 36.4 (2024 14:00), Max: 36.4 (2024 14:00)  T(F): 97.5 (2024 14:00), Max: 97.5 (2024 14:00)  HR: 93 (2024 18:00) (70 - 93)  BP: 149/76 (2024 18:00) (93/55 - 156/77)  BP(mean): 97 (2024 18:00) (69 - 101)  RR: 18 (2024 18:00) (18 - 25)  SpO2: 100% (2024 18:00) (94% - 100%)    Parameters below as of 2024 13:41  Patient On (Oxygen Delivery Method): nasal cannula  O2 Flow (L/min): 2    Daily Height in cm: 160.02 (2024 08:39)    Daily     I&O's Summary    2024 07:01  -  2024 19:47  --------------------------------------------------------  IN: 300 mL / OUT: 600 mL / NET: -300 mL        [PHYSICAL EXAM]  GEN: WD WD NAD  HEENT: normocephalic and atraumatic. EOMI. PERRL.    NECK: Supple.  No lymphadenopathy   LUNGS: Clear to auscultation.  HEART: S1S2 Regular rate and rhythm, no MRG  ABDOMEN: Soft, nontender, and nondistended.  Positive bowel sounds.    : No CVA tenderness  EXTREMITIES: Without edema.  SKIN: No rash     [LABS: ]                        5.1    5.07  )-----------( 201      ( 2024 09:04 )             16.1     CBC Full  -  ( 2024 09:04 )  WBC Count : 5.07 K/uL  RBC Count : 1.62 M/uL  Hemoglobin : 5.1 g/dL  Hematocrit : 16.1 %  Platelet Count - Automated : 201 K/uL  Mean Cell Volume : 99.4 fL  Mean Cell Hemoglobin : 31.5 pg  Mean Cell Hemoglobin Concentration : 31.7 g/dL  Auto Neutrophil # : 3.49 K/uL  Auto Lymphocyte # : 0.62 K/uL  Auto Monocyte # : 0.67 K/uL  Auto Eosinophil # : 0.14 K/uL  Auto Basophil # : 0.01 K/uL  Auto Neutrophil % : 68.8 %  Auto Lymphocyte % : 12.2 %  Auto Monocyte % : 13.2 %  Auto Eosinophil % : 2.8 %  Auto Basophil % : 0.2 %        140  |  106  |  39[H]  ----------------------------<  114[H]  5.8[H]   |  27  |  1.60[H]    Ca    8.9      2024 09:04    TPro  5.6[L]  /  Alb  2.3[L]  /  TBili  0.2  /  DBili  x   /  AST  14  /  ALT  11  /  AlkPhos  56      PT/INR - ( 2024 09:04 )   PT: 11.0 sec;   INR: 0.93 ratio         PTT - ( 2024 09:04 )  PTT:30.2 sec  LIVER FUNCTIONS - ( 2024 09:04 )  Alb: 2.3 g/dL / Pro: 5.6 g/dL / ALK PHOS: 56 U/L / ALT: 11 U/L / AST: 14 U/L / GGT: x               Urinalysis Basic - ( 2024 11:35 )    Color: Yellow / Appearance: Clear / S.011 / pH: x  Gluc: x / Ketone: Negative mg/dL  / Bili: Negative / Urobili: 1.0 mg/dL   Blood: x / Protein: Negative mg/dL / Nitrite: Negative   Leuk Esterase: Negative / RBC: x / WBC x   Sq Epi: x / Non Sq Epi: x / Bacteria: x          CBC TREND (5 Days)  WBC Count: 5.07 K/uL ( @ 09:04)    Hemoglobin: 5.1 g/dL ( @ 09:04)    Hematocrit: 16.1 % ( @ 09:04)    Platelet Count - Automated: 201 K/uL ( @ 09:04)        Ferritin: 49 ng/mL ( @ 04:52)    Iron Total: 103 ug/dL ( @ 04:52)     Vitamin B12, Serum: 513 pg/mL ( @ 05:31)     Folate, Serum: 9.4 ng/mL ( @ 05:31)     Sedimentation Rate, Erythrocyte: 31 mm/hr (10-10 @ 11:33)                           [MICROBIOLOGY /  VIROLOGY:]  SARS-CoV-2: NotDetec (2024 06:30)      Urinalysis with Rflx Culture (collected 24 @ 11:35)        Urinalysis with Rflx Culture (collected 2024 11:35)        [PATHOLOGY]       [RADIOLOGY & ADDITIONAL STUDIES:]

## 2024-11-13 NOTE — ED ADULT NURSE NOTE - CHIEF COMPLAINT QUOTE
sent from [facility], full code, recent admission to [place]/blood transfusion, sent here for low blood pressure

## 2024-11-13 NOTE — CONSULT NOTE ADULT - ASSESSMENT
The patient is a 68 year old male with a history of HTN, bipolar disorder, schizophrenia, presume CAD with prior NSTEMI, paroxysmal atrial fibrillation who presents with anemia and hypotension.    Plan:  - ECG with sinus rhythm; cannot exclude old inferior MI  - Echo 10/23 with hyperdynamic LV systolic function  - The patient has paroxysmal atrial fibrillation and was on apixaban  - He was additionally on aspirin and clopidogrel for prior NSTEMI  - However, given ongoing issues of anemia and concern for GI bleed, will discontinue all anticoagulation and antiplatelets. Risks outweigh benefits.  - At a later date, if hemoglobin remains stable, can consider resuming aspirin at that time  - The patient is a poor candidate for alternatives to anticoagulation (he is not a candidate for LAAO device implantation)  - Hold losartan due to hypotension, HUBERT, hyperkalemia  - Continue atorvastatin 40 mg daily  - Monitor hemoglobin  - Transfuse as needed

## 2024-11-13 NOTE — H&P ADULT - HISTORY OF PRESENT ILLNESS
****Collateral information obtained from notes as patient is a poor historian.  He kept on asking for something to drink.  When asked if he knows where he is or why he is in the hospital, he kept on saying "ok."  Could not elicit specific HPI or ROS.***    68M with [facility] with hx of schizophrenia/bipolar, hx of NSTEMI, hx of hyponatremia, recent admission to [facility] for anemia  (needed ICU, transfusion, kcentra, and brief period of levophed but no source was identified) who presents from [facility] again for anemia.  Found to have outpatient hgb of 6.  Also was noted to be hypotensive as well.  Brought here for further evaluation.   In the ED, patient's triage vitals were  BP 98/52  HR 74, RR 18, 98% on NC, T 97F.  Hgb was 5.1 with +occult blood.  Also noted to have hyperkalemia and HUBERT with Cr 1.6 (discharged at 0.83).  Patient ordered for 2 units.  Patient admitted to SPCU for further evaluation and management.  Currently patient has no pain or SOB. No light-headedness or dizziness.

## 2024-11-13 NOTE — H&P ADULT - NSHPLABSRESULTS_GEN_ALL_CORE
LABS:                            5.1[LL]  5.07  )-----------( 201      ( 2024 09:04 )             16.1[LL]    140    |  106    |  39[H]  ----------------------------<  114[H]    2024 09:04  5.8[H]   |  27     |  1.60[H]        Ca 8.9           2024 09:04        TPro  5.6[L]  /  Alb  2.3[L]  /  TBili  0.2    /  DBili  x      /  AST  14     /  ALT  11     /  AlkPhos  56     2024 09:04    PT/INR - ( 2024 09:04 )   PT: 11.0 ;   INR: 0.93          PTT - ( 2024 09:04 )  PTT:30.2     Urinalysis Basic - ( 2024 11:35 )    Color: Yellow / Appearance: Clear / S.011 / pH: x  Gluc: x / Ketone: Negative mg/dL  / Bili: Negative / Urobili: 1.0 mg/dL   Blood: x / Protein: Negative mg/dL / Nitrite: Negative   Leuk Esterase: Negative / RBC: x / WBC x   Sq Epi: x / Non Sq Epi: x / Bacteria: x        Troponin trend:  Troponin I, High Sensitivity Result: 9.8 ng/L (24 @ 09:04)      EKG:  NSR with q wave in III and small q wave in aVF  Radiology:  < from: Xray Chest 1 View AP/PA (24 @ 09:20) >    IMPRESSION: No focal infiltrate or congestion given the portable   technique. Heart is within normal limits in its transthoracic diameter.   Regional osseous structures appropriate for age    < end of copied text >

## 2024-11-13 NOTE — H&P ADULT - ASSESSMENT
68M with [facility] with hx of schizophrenia/bipolar, hx of NSTEMI, hx of hyponatremia, recent admission to [facility] for anemia  (needed ICU, transfusion, kcentra, and brief period of levophed but no source was identified) who presents from [facility] again for anemia.      Anemia, likely secondary to GI bleed  - admitted to [facility]  - to be transfused 2 units   - post-transfusion CBC  - NPO except meds (has psych disorder and will likely needs his meds)  - IV hydration  - transfuse for hgb <7 or symptomatic  - GI consult  - PPI   - active type and screen  - check anemia labs such as iron, vitamin B12, folate  - check TSH  - may need CTA of chest/abd/pelvis to evaluate for bleed (however, patient was on metformin and has worsening kidney function) -> may be able to get tomorrow    Schizophrenia/bipolar  - cont with benztropine  - cont with trazodone  - cont with risperidone   - cont wiht quetiapine  - cont with divalproex (valproic acid level wnl)    HTN/HLD/CAD  - hold  metoprolol  - hold losartan  - cont with atorvastatin  - hold plavix and eliquis for now while with active bleed    DM2 - not on insulin  - hold metformin  - start low dose insulin coverage scale with FS q6h while NPO    Hypothyroidism  - cont with levothyroxine (convert to IV)  - check TSH    Preventive measures  - hold AC  - use SCDs for now 68M with [facility] with hx of schizophrenia/bipolar, hx of NSTEMI, hx of hyponatremia, recent admission to [facility] for anemia  (needed ICU, transfusion, kcentra, and brief period of levophed but no source was identified) who presents from [facility] again for anemia.      Anemia, likely secondary to GI bleed  - admitted to SPCU  - to be transfused 2 units   - post-transfusion CBC  - serial CBC q6-12h for now  - NPO except meds (has psych disorder and will likely needs his meds)  - IV hydration  - transfuse for hgb <7 or symptomatic  - GI consult  - PPI   - active type and screen  - check anemia labs such as iron, vitamin B12, folate  - check TSH  - may need CTA of chest/abd/pelvis to evaluate for bleed (however, patient was on metformin and has worsening kidney function) -> may be able to get tomorrow    Schizophrenia/bipolar  - cont with benztropine  - cont with trazodone  - cont with risperidone   - cont wiht quetiapine  - cont with divalproex (valproic acid level wnl)    HTN/HLD/CAD  - hold  metoprolol  - hold losartan  - cont with atorvastatin  - hold plavix and eliquis for now while with active bleed    DM2 - not on insulin  - hold metformin  - start low dose insulin coverage scale with FS q6h while NPO    Hypothyroidism  - cont with levothyroxine (convert to IV)  - check TSH    Preventive measures  - hold AC  - use SCDs for now

## 2024-11-13 NOTE — PATIENT PROFILE ADULT - FUNCTIONAL ASSESSMENT - DAILY ACTIVITY 4.
In the bathroom.  POD 1 VATS.  HR 90s.  Cardizem for AF rate control.  3 CTs in place ~1000cc out post-op--resume heparin gtt when able vs resume Pradaxa.  Weight down today--consider resuming diuretics and ACE pending labs and BP.    Will check back tomorrow.    Lina Howard NP / Dr Loja covering today for Dr Villanueva    Visit Vitals  /64 (BP Location: LUE - Left upper extremity, Patient Position: Sitting)   Pulse 98   Temp 97.5 °F (36.4 °C) (Oral)   Resp 18   Ht 5' 10\" (1.778 m)   Wt 94.1 kg (207 lb 7.3 oz)   SpO2 93%   BMI 29.77 kg/m²     Weight    08/05/24 0846 08/07/24 0434 08/09/24 0514 08/10/24 0500   Weight: 99.2 kg (218 lb 11.1 oz) 95.3 kg (210 lb) 97.8 kg (215 lb 8 oz) 94.1 kg (207 lb 7.3 oz)         Recent Labs   Lab 08/09/24  0727 08/08/24  2037 08/08/24  1445 08/08/24  1058 08/08/24  0631 08/07/24  2226 08/07/24  1549 08/07/24  1048 08/06/24  1439 08/05/24  1006   WBC 9.5  --   --   --  8.9  --  10.3  --   --  8.6   HCT 41.2  --   --   --  42.5  --  40.8  --   --  40.3   HGB 12.9*  --   --   --  13.1  --  12.8*  --   --  12.5*     --   --   --  209  --  214  --   --  225   INR  --   --  1.2  --   --   --  1.3  --   --  1.4   PTT 31 51* 48* 52* 40*   < > 34*  --   --   --    SODIUM  --   --   --  137  --   --   --  136  --  138   POTASSIUM 4.3  --   --  3.7  --   --   --  3.9   < > 3.9   CHLORIDE  --   --   --  98  --   --   --  98  --  98   CO2  --   --   --  31  --   --   --  31  --  32   GLUCOSE  --   --   --  136*  --   --   --  153*  --  82   BUN  --   --   --  54*  --   --   --  45*  --  42*   CREATININE  --   --   --  1.75*  --   --   --  1.70*  --  1.44*   NTPROB  --   --   --   --   --   --   --   --   --  4,623*    < > = values in this interval not displayed.        other services as required for the substantive portion of this visit.  High risk medications we are managing/supervising: See list above    Images reviewed: full disclosure telemetry    CV problems well summarized above, as I amended/recommended. The JACK (acting as scribe) incorporated my modifications into the above note, unless she indicates otherwise.      As amended above with his son, primary care physician with whom I show many patients, at the bedside.  The chest tubes are draining copiously, the patient was up to a chair and comfortable.  There were rubs on both sides, soft.  Breathing was unlabored.  I did not need to make any medication adjustments.         UVALDO Loja MD, FACC     3 = A little assistance

## 2024-11-13 NOTE — CONSULT NOTE ADULT - ASSESSMENT
Anemia  GI Bleed    Recommendations:  - Please obtain post transfusion CBC   - NPO  - 2 large bore IV's  - PPI 40mg IV BID  - Trend CBC's. Transfuse Hb <7 PLT <50  - Active type and screen. Transfusion consent  - Monitor hemodynamics closely  - Monitor for overt GI bleeding  - Will determine role for EGD this admission pending clinical status  - Will follow with you       Med Peacock M.D.  [facility]  [phone]

## 2024-11-13 NOTE — CONSULT NOTE ADULT - ASSESSMENT
69yo M presenting with GIB after restarting anticoagulation likely mild GIB given lack of active bleeding episodes on admission and hemodynamically responsive to PRBC. High risk for bleed requiring ICU level of care.    #GIB   #Severe anemia  #Hypotension due to blood loss   #DM2   #Schizophremia   #Hypothyroid   #HUBERT      NEURO: Patient at baseline will continue oral antipsychotics until patient   CV: Hemodynamically stable responsive to colloids may require additional transfusion, in NSR holding Eliquis and Plavix. Maintain MAP >65 low threshold to start vasopressors if unable to maintain.      RESP: 100% on 2L NC, no signs of distress tirtrate of O2 as tolerated to maintain Spo2>94%  RENAL: HUBERT cr 1.6 due to intravascular depletion in setting of bleed continue IVF and colloids. Hyperkalemia given calcium gluconate and insulin repeat cbc at 4pm   GI: Likely GIB due to restarting anticoagulants, no signs of active bleed holding CTA due to low diagnostic value and high risk of adverse reaction due to metformin use. Started on protonix BID, keep NPO, consulted GI appreciate recs.     ENDO: DM2 started on ISS goal glucose 120-180. Hypothyroid on 88mg synthroid ordered for IV conversion check TSH in AM  ID: Afebrile WBC 5 no signs of active infection holding abx at this time   HEME: Holding eliquis and plavix in setting of GIB. Transfuse if Hgb <7 or signs of active bleed   DISPO: SPCU    Critical Care Time (EXCLUSIVE of any non bundled procedures) :  63 minutes were spent assessing the patient's presenting problems of acute illness that pose a high probability of life threatening  deterioration or end organ damage / dysfunction.  Medical desicion making includes initiation / continuation of plan or care review data/ labwork/ radiographic study, direct patient care bedside ,  discussions with  consultants regarding care,  evaluation and interpretation of vital signs, any necessary ventilator management,   NIV or BIPAP changes  or initiation,    discussions with multidisipliary team,  am or pm rounds, discussions of goals of care with patient and family all non-inclusive of procedures.    Date of entry of this note is equal to the date of services rendered    Plan discussed with Dr. Webster and IVAN

## 2024-11-13 NOTE — ED PROVIDER NOTE - ENMT, MLM
Airway patent, Nasal mucosa clear. Mouth with normal mucosa. Throat has no vesicles, no oropharyngeal exudates and uvula is midline.  Pale conjunctiva

## 2024-11-13 NOTE — CONSULT NOTE ADULT - SUBJECTIVE AND OBJECTIVE BOX
CHIEF COMPLAINT/ REASON FOR VISIT  .. Patient was seen to address the  issue listed under PROBLEM LIST which is located toward bottom of this note     [facility] HORTON    [facility]    Allergies    lithium (Rash)  clozapine (Unknown)  aspartate (Unknown)  lithium (Unknown)  clozapine (Rash)    Intolerances        PAST MEDICAL & SURGICAL HISTORY:  Schizophrenia      Hypothyroidism      Hyperlipidemia      Schizophrenia      Hypothyroid      Anxiety      DM (diabetes mellitus)      Parkinsonism      Schizophrenia      Hyponatremia      NSTEMI (non-ST elevation myocardial infarction)      Bipolar disorder      DM (diabetes mellitus)      Dementia      HTN (hypertension)      Chronic CHF      MDD (major depressive disorder)      Hypothyroidism      HLD (hyperlipidemia)      Hypothyroidism      Constipation      Folate deficiency      Constipation      H/O candidiasis      Violent behavior      Atrial fibrillation      Hyperkalemia      Epistaxis      DM (diabetes mellitus)          FAMILY HISTORY:      Home Medications:  apixaban 2.5 mg oral tablet: 1 tab(s) orally 2 times a day monitor for signs of bleeding , CBC every 2 days x 1 week (2024 08:54)  aspirin 81 mg oral delayed release tablet: 1 tab(s) orally once a day (2024 08:54)  atorvastatin 40 mg oral tablet: 1 tab(s) orally once a day (at bedtime) (2024 11:03)  benztropine 2 mg oral tablet: 1 tab(s) orally 2 times a day (2024 08:54)  divalproex sodium 500 mg oral delayed release tablet: 3 tab(s) orally once a day RESUME PREADMISSION SCHEDULE ,NO CHANGES (2024 08:54)  docusate sodium 100 mg oral tablet: 2 tab(s) orally 2 times a day (2024 08:54)  famotidine 20 mg oral tablet: 1 tab(s) orally once a day (in the morning) (2024 08:54)  folic acid 1 mg oral tablet: 1 tab(s) orally once a day (in the morning) (2024 08:54)  levothyroxine 88 mcg (0.088 mg) oral tablet: 1 tab(s) orally once a day (2024 08:54)  losartan 50 mg oral tablet: 1 tab(s) orally once a day (2024 08:54)  metFORMIN 1000 mg oral tablet: 1 tab(s) orally 2 times a day (2024 11:10)  metoprolol tartrate 25 mg oral tablet: 1 tab(s) orally 2 times a day (2024 11:15)  pantoprazole 40 mg oral delayed release tablet: 1 tab(s) orally 2 times a day (2024 08:54)  Plavix 75 mg oral tablet: 1 tab(s) orally (2024 11:03)  QUEtiapine 400 mg oral tablet: 1 tab(s) orally once a day (at bedtime) (2024 08:54)  RisperDAL 4 mg oral tablet: 1 tab(s) orally 2 times a day (2024 11:14)  traZODone 100 mg oral tablet: 1 tab(s) orally once a day (at bedtime) (2024 08:54)  Vascepa 1 g oral capsule: 1 cap(s) orally 2 times a day (2024 08:54)  Vitamin D2 50,000 intl units (1.25 mg) oral capsule: 1 cap(s) orally every 2 weeks  Next dose due 8/15/2020 (2024 08:54)      MEDICATIONS  (STANDING):  atorvastatin 40 milliGRAM(s) Oral at bedtime  chlorhexidine 2% Cloths 1 Application(s) Topical <User Schedule>  dextrose 5%. 1000 milliLiter(s) (50 mL/Hr) IV Continuous <Continuous>  dextrose 5%. 1000 milliLiter(s) (100 mL/Hr) IV Continuous <Continuous>  dextrose 50% Injectable 12.5 Gram(s) IV Push once  dextrose 50% Injectable 25 Gram(s) IV Push once  dextrose 50% Injectable 25 Gram(s) IV Push once  divalproex ER 1500 milliGRAM(s) Oral daily  glucagon  Injectable 1 milliGRAM(s) IntraMuscular once  insulin lispro (ADMELOG) corrective regimen sliding scale   SubCutaneous every 6 hours  lactated ringers. 1000 milliLiter(s) (75 mL/Hr) IV Continuous <Continuous>  levothyroxine Injectable 40 MICROGram(s) IV Push at bedtime  pantoprazole  Injectable 40 milliGRAM(s) IV Push two times a day  QUEtiapine 400 milliGRAM(s) Oral at bedtime  risperiDONE   Tablet 4 milliGRAM(s) Oral two times a day  traZODone 100 milliGRAM(s) Oral at bedtime    MEDICATIONS  (PRN):  dextrose Oral Gel 15 Gram(s) Oral once PRN Blood Glucose LESS THAN 70 milliGRAM(s)/deciliter              Vital Signs Last 24 Hrs  T(C): 36.4 (2024 14:00), Max: 36.4 (2024 14:00)  T(F): 97.5 (2024 14:00), Max: 97.5 (2024 14:00)  HR: 93 (2024 18:00) (70 - 93)  BP: 149/76 (2024 18:00) (93/55 - 156/77)  BP(mean): 97 (2024 18:00) (69 - 101)  RR: 18 (2024 18:00) (18 - 25)  SpO2: 100% (2024 18:00) (94% - 100%)    Parameters below as of 2024 13:41  Patient On (Oxygen Delivery Method): nasal cannula  O2 Flow (L/min): 2        24 @ 07:01  -  24 @ 18:49  --------------------------------------------------------  IN: 300 mL / OUT: 600 mL / NET: -300 mL              LABS:                        5.1    5.07  )-----------( 201      ( 2024 09:04 )             16.1     11-13    140  |  106  |  39[H]  ----------------------------<  114[H]  5.8[H]   |  27  |  1.60[H]    Ca    8.9      2024 09:04    TPro  5.6[L]  /  Alb  2.3[L]  /  TBili  0.2  /  DBili  x   /  AST  14  /  ALT  11  /  AlkPhos  56  11-13    PT/INR - ( 2024 09:04 )   PT: 11.0 sec;   INR: 0.93 ratio         PTT - ( 2024 09:04 )  PTT:30.2 sec  Urinalysis Basic - ( 2024 11:35 )    Color: Yellow / Appearance: Clear / S.011 / pH: x  Gluc: x / Ketone: Negative mg/dL  / Bili: Negative / Urobili: 1.0 mg/dL   Blood: x / Protein: Negative mg/dL / Nitrite: Negative   Leuk Esterase: Negative / RBC: x / WBC x   Sq Epi: x / Non Sq Epi: x / Bacteria: x            WBC:  WBC Count: 5.07 K/uL ( @ 09:04)      MICROBIOLOGY:  RECENT CULTURES:              PT/INR - ( 2024 09:04 )   PT: 11.0 sec;   INR: 0.93 ratio         PTT - ( 2024 09:04 )  PTT:30.2 sec    Sodium:  Sodium: 140 mmol/L ( @ 09:04)      1.60 mg/dL  @ 09:04      Hemoglobin:  Hemoglobin: 5.1 g/dL ( @ 09:04)      Platelets: Platelet Count - Automated: 201 K/uL ( @ 09:04)      LIVER FUNCTIONS - ( 2024 09:04 )  Alb: 2.3 g/dL / Pro: 5.6 g/dL / ALK PHOS: 56 U/L / ALT: 11 U/L / AST: 14 U/L / GGT: x             Urinalysis Basic - ( 2024 11:35 )    Color: Yellow / Appearance: Clear / S.011 / pH: x  Gluc: x / Ketone: Negative mg/dL  / Bili: Negative / Urobili: 1.0 mg/dL   Blood: x / Protein: Negative mg/dL / Nitrite: Negative   Leuk Esterase: Negative / RBC: x / WBC x   Sq Epi: x / Non Sq Epi: x / Bacteria: x        RADIOLOGY & ADDITIONAL STUDIES:      MICROBIOLOGY:  RECENT CULTURES:

## 2024-11-13 NOTE — CONSULT NOTE ADULT - SUBJECTIVE AND OBJECTIVE BOX
History of Present Illness: The patient is a 68 year old male with a history of HTN, bipolar disorder, schizophrenia, presume CAD with prior NSTEMI, paroxysmal atrial fibrillation who presents with anemia and hypotension. The patient is unable to provide additional history. He was recently admitted to [facility] for anemia with unclear source.    Past Medical/Surgical History:  HTN, bipolar disorder, schizophrenia, presume CAD with prior NSTEMI, paroxysmal atrial fibrillation    Medications:  Home Medications:  apixaban 2.5 mg oral tablet: 1 tab(s) orally 2 times a day monitor for signs of bleeding , CBC every 2 days x 1 week (13 Nov 2024 08:54)  aspirin 81 mg oral delayed release tablet: 1 tab(s) orally once a day (13 Nov 2024 08:54)  atorvastatin 40 mg oral tablet: 1 tab(s) orally once a day (at bedtime) (13 Nov 2024 08:52)  benztropine 2 mg oral tablet: 1 tab(s) orally 2 times a day (13 Nov 2024 08:54)  divalproex sodium 500 mg oral delayed release tablet: 3 tab(s) orally once a day RESUME PREADMISSION SCHEDULE ,NO CHANGES (13 Nov 2024 08:54)  docusate sodium 100 mg oral tablet: 2 tab(s) orally 2 times a day (13 Nov 2024 08:54)  Eliquis 2.5 mg oral tablet: 1 tab(s) orally 2 times a day (13 Nov 2024 08:54)  famotidine 20 mg oral tablet: 1 tab(s) orally once a day (in the morning) (13 Nov 2024 08:54)  folic acid 1 mg oral tablet: 1 tab(s) orally once a day (in the morning) (13 Nov 2024 08:54)  levothyroxine 88 mcg (0.088 mg) oral tablet: 1 tab(s) orally once a day (13 Nov 2024 08:54)  losartan 50 mg oral tablet: 1 tab(s) orally once a day (13 Nov 2024 08:54)  metFORMIN 1000 mg oral tablet: 1 tab(s) orally 2 times a day (13 Nov 2024 08:54)  pantoprazole 40 mg oral delayed release tablet: 1 tab(s) orally 2 times a day (13 Nov 2024 08:54)  Plavix 75 mg oral tablet: 1 tab(s) orally (13 Nov 2024 08:54)  QUEtiapine 400 mg oral tablet: 1 tab(s) orally once a day (at bedtime) (13 Nov 2024 08:54)  RisperDAL 4 mg oral tablet: 1 tab(s) orally once a day (13 Nov 2024 08:54)  traZODone 100 mg oral tablet: 1 tab(s) orally once a day (at bedtime) (13 Nov 2024 08:54)  Vascepa 1 g oral capsule: 1 cap(s) orally 2 times a day (13 Nov 2024 08:54)  Vitamin D2 50,000 intl units (1.25 mg) oral capsule: 1 cap(s) orally every 2 weeks  Next dose due 8/15/2020 (13 Nov 2024 08:54)      Family History: Non-contributory family history of premature cardiovascular atherosclerotic disease    Social History: No tobacco, alcohol or drug use    Review of Systems:  Unable to obtain    Physical Exam:  Vitals:        Vital Signs Last 24 Hrs  T(C): --  T(F): --  HR: 74 (13 Nov 2024 08:39) (74 - 74)  BP: 98/52 (13 Nov 2024 08:39) (98/52 - 98/52)  BP(mean): --  RR: 18 (13 Nov 2024 08:39) (18 - 18)  SpO2: 98% (13 Nov 2024 08:39) (98% - 98%)  General: NAD  HEENT: Pale conjunctiva  Neck: No JVD, no carotid bruit  Lungs: CTAB  CV: RRR, nl S1/S2, no M/R/G  Abdomen: S/NT/ND, +BS  Extremities: No LE edema, no cyanosis  Neuro: AAOx3, non-focal  Skin: No rash    Labs:                        5.1    5.07  )-----------( 201      ( 13 Nov 2024 09:04 )             16.1                   ECG/Telemetry: NSR, normal axis, small inferior Q waves

## 2024-11-13 NOTE — ED ADULT NURSE NOTE - NSFALLRISKINTERV_ED_ALL_ED

## 2024-11-14 LAB
ALBUMIN SERPL ELPH-MCNC: 2.3 G/DL — LOW (ref 3.3–5)
ALP SERPL-CCNC: 57 U/L — SIGNIFICANT CHANGE UP (ref 30–120)
ALT FLD-CCNC: 15 U/L — SIGNIFICANT CHANGE UP (ref 10–60)
ANION GAP SERPL CALC-SCNC: 10 MMOL/L — SIGNIFICANT CHANGE UP (ref 5–17)
AST SERPL-CCNC: 17 U/L — SIGNIFICANT CHANGE UP (ref 10–40)
BILIRUB SERPL-MCNC: 0.2 MG/DL — SIGNIFICANT CHANGE UP (ref 0.2–1.2)
BUN SERPL-MCNC: 19 MG/DL — SIGNIFICANT CHANGE UP (ref 7–23)
CALCIUM SERPL-MCNC: 8.9 MG/DL — SIGNIFICANT CHANGE UP (ref 8.4–10.5)
CHLORIDE SERPL-SCNC: 101 MMOL/L — SIGNIFICANT CHANGE UP (ref 96–108)
CO2 SERPL-SCNC: 26 MMOL/L — SIGNIFICANT CHANGE UP (ref 22–31)
CREAT SERPL-MCNC: 1.19 MG/DL — SIGNIFICANT CHANGE UP (ref 0.5–1.3)
EGFR: 67 ML/MIN/1.73M2 — SIGNIFICANT CHANGE UP
GLUCOSE BLDC GLUCOMTR-MCNC: 104 MG/DL — HIGH (ref 70–99)
GLUCOSE BLDC GLUCOMTR-MCNC: 120 MG/DL — HIGH (ref 70–99)
GLUCOSE BLDC GLUCOMTR-MCNC: 279 MG/DL — HIGH (ref 70–99)
GLUCOSE BLDC GLUCOMTR-MCNC: 95 MG/DL — SIGNIFICANT CHANGE UP (ref 70–99)
GLUCOSE SERPL-MCNC: 287 MG/DL — HIGH (ref 70–99)
HCT VFR BLD CALC: 27.6 % — LOW (ref 39–50)
HGB BLD-MCNC: 8.9 G/DL — LOW (ref 13–17)
MAGNESIUM SERPL-MCNC: 1.4 MG/DL — LOW (ref 1.6–2.6)
MCHC RBC-ENTMCNC: 28.7 PG — SIGNIFICANT CHANGE UP (ref 27–34)
MCHC RBC-ENTMCNC: 32.2 G/DL — SIGNIFICANT CHANGE UP (ref 32–36)
MCV RBC AUTO: 89 FL — SIGNIFICANT CHANGE UP (ref 80–100)
NRBC # BLD: 2 /100 WBCS — HIGH (ref 0–0)
PHOSPHATE SERPL-MCNC: 4 MG/DL — SIGNIFICANT CHANGE UP (ref 2.5–4.5)
PLATELET # BLD AUTO: 178 K/UL — SIGNIFICANT CHANGE UP (ref 150–400)
POTASSIUM SERPL-MCNC: 4.2 MMOL/L — SIGNIFICANT CHANGE UP (ref 3.5–5.3)
POTASSIUM SERPL-SCNC: 4.2 MMOL/L — SIGNIFICANT CHANGE UP (ref 3.5–5.3)
PROT SERPL-MCNC: 5.5 G/DL — LOW (ref 6–8.3)
RBC # BLD: 3.1 M/UL — LOW (ref 4.2–5.8)
RBC # FLD: 20.1 % — HIGH (ref 10.3–14.5)
SODIUM SERPL-SCNC: 137 MMOL/L — SIGNIFICANT CHANGE UP (ref 135–145)
WBC # BLD: 4.42 K/UL — SIGNIFICANT CHANGE UP (ref 3.8–10.5)
WBC # FLD AUTO: 4.42 K/UL — SIGNIFICANT CHANGE UP (ref 3.8–10.5)

## 2024-11-14 PROCEDURE — 74176 CT ABD & PELVIS W/O CONTRAST: CPT | Mod: 26

## 2024-11-14 PROCEDURE — 99233 SBSQ HOSP IP/OBS HIGH 50: CPT

## 2024-11-14 RX ORDER — IRON SUCROSE 20 MG/ML
100 INJECTION, SOLUTION INTRAVENOUS EVERY 24 HOURS
Refills: 0 | Status: DISCONTINUED | OUTPATIENT
Start: 2024-11-14 | End: 2024-11-15

## 2024-11-14 RX ORDER — MAGNESIUM SULFATE IN 0.9% NACL 2 G/50 ML
1 INTRAVENOUS SOLUTION, PIGGYBACK (ML) INTRAVENOUS ONCE
Refills: 0 | Status: COMPLETED | OUTPATIENT
Start: 2024-11-14 | End: 2024-11-14

## 2024-11-14 RX ORDER — LEVOTHYROXINE SODIUM 88 MCG
88 TABLET ORAL DAILY
Refills: 0 | Status: DISCONTINUED | OUTPATIENT
Start: 2024-11-14 | End: 2024-11-15

## 2024-11-14 RX ADMIN — Medication 1: at 12:31

## 2024-11-14 RX ADMIN — PANTOPRAZOLE SODIUM 40 MILLIGRAM(S): 40 TABLET, DELAYED RELEASE ORAL at 17:44

## 2024-11-14 RX ADMIN — QUETIAPINE FUMARATE 400 MILLIGRAM(S): 200 TABLET ORAL at 21:28

## 2024-11-14 RX ADMIN — Medication 125 MILLILITER(S): at 07:09

## 2024-11-14 RX ADMIN — Medication 100 GRAM(S): at 16:33

## 2024-11-14 RX ADMIN — IRON SUCROSE 210 MILLIGRAM(S): 20 INJECTION, SOLUTION INTRAVENOUS at 18:09

## 2024-11-14 RX ADMIN — PANTOPRAZOLE SODIUM 40 MILLIGRAM(S): 40 TABLET, DELAYED RELEASE ORAL at 05:03

## 2024-11-14 RX ADMIN — Medication 40 MILLIGRAM(S): at 21:28

## 2024-11-14 RX ADMIN — TRAZODONE HYDROCHLORIDE 100 MILLIGRAM(S): 100 TABLET ORAL at 21:28

## 2024-11-14 RX ADMIN — RISPERIDONE 4 MILLIGRAM(S): 3 TABLET, FILM COATED ORAL at 17:44

## 2024-11-14 RX ADMIN — RISPERIDONE 4 MILLIGRAM(S): 3 TABLET, FILM COATED ORAL at 05:03

## 2024-11-14 RX ADMIN — DIVALPROEX SODIUM 1500 MILLIGRAM(S): 250 TABLET, FILM COATED, EXTENDED RELEASE ORAL at 12:31

## 2024-11-14 NOTE — DIETITIAN INITIAL EVALUATION ADULT - PERTINENT MEDS FT
MEDICATIONS  (STANDING):  atorvastatin 40 milliGRAM(s) Oral at bedtime  chlorhexidine 2% Cloths 1 Application(s) Topical <User Schedule>  dextrose 5%. 1000 milliLiter(s) (50 mL/Hr) IV Continuous <Continuous>  dextrose 5%. 1000 milliLiter(s) (100 mL/Hr) IV Continuous <Continuous>  dextrose 50% Injectable 12.5 Gram(s) IV Push once  dextrose 50% Injectable 25 Gram(s) IV Push once  dextrose 50% Injectable 25 Gram(s) IV Push once  divalproex ER 1500 milliGRAM(s) Oral daily  glucagon  Injectable 1 milliGRAM(s) IntraMuscular once  insulin lispro (ADMELOG) corrective regimen sliding scale   SubCutaneous every 6 hours  lactated ringers. 1000 milliLiter(s) (125 mL/Hr) IV Continuous <Continuous>  levothyroxine 88 MICROGram(s) Oral daily  pantoprazole  Injectable 40 milliGRAM(s) IV Push two times a day  QUEtiapine 400 milliGRAM(s) Oral at bedtime  risperiDONE   Tablet 4 milliGRAM(s) Oral two times a day  traZODone 100 milliGRAM(s) Oral at bedtime    MEDICATIONS  (PRN):  dextrose Oral Gel 15 Gram(s) Oral once PRN Blood Glucose LESS THAN 70 milliGRAM(s)/deciliter

## 2024-11-14 NOTE — DIETITIAN INITIAL EVALUATION ADULT - ORAL INTAKE PTA/DIET HISTORY
Per transfer document, pt from [facility] was on a no concentrated sweets diet, ground texture, thin liquid consistency, provide plastic utensil at all meals. Unable to interview pt 2/2 confusion, agitation.

## 2024-11-14 NOTE — DIETITIAN INITIAL EVALUATION ADULT - ADD RECOMMEND
1. Continue with current diet order, diet advancement per team  2. Provide ONS: Ensure Clear 8oz (180 kcal, 8 g protein) tid  3. Encourage po intake as needed

## 2024-11-14 NOTE — CARE COORDINATION ASSESSMENT. - OTHER PERTINENT EMPLOYMENT/FINANCIAL INFORMATION
Pt's medicaid has lapsed however [facility] has submitted application in Sept and currently waiting for determination. Hospital Medicaid office also aware and following.

## 2024-11-14 NOTE — CARE COORDINATION ASSESSMENT. - ASSESSMENT CONCERNS TO BE ADDRESSED
High Dose Vitamin A Counseling: Side effects reviewed, pt to contact office should one occur. Azithromycin Pregnancy And Lactation Text: This medication is considered safe during pregnancy and is also secreted in breast milk. Topical Clindamycin Pregnancy And Lactation Text: This medication is Pregnancy Category B and is considered safe during pregnancy. It is unknown if it is excreted in breast milk. Sarecycline Counseling: Patient advised regarding possible photosensitivity and discoloration of the teeth, skin, lips, tongue and gums.  Patient instructed to avoid sunlight, if possible.  When exposed to sunlight, patients should wear protective clothing, sunglasses, and sunscreen.  The patient was instructed to call the office immediately if the following severe adverse effects occur:  hearing changes, easy bruising/bleeding, severe headache, or vision changes.  The patient verbalized understanding of the proper use and possible adverse effects of sarecycline.  All of the patient's questions and concerns were addressed. Tetracycline Counseling: Patient counseled regarding possible photosensitivity and increased risk for sunburn.  Patient instructed to avoid sunlight, if possible.  When exposed to sunlight, patients should wear protective clothing, sunglasses, and sunscreen.  The patient was instructed to call the office immediately if the following severe adverse effects occur:  hearing changes, easy bruising/bleeding, severe headache, or vision changes.  The patient verbalized understanding of the proper use and possible adverse effects of tetracycline.  All of the patient's questions and concerns were addressed. Patient understands to avoid pregnancy while on therapy due to potential birth defects. Birth Control Pills Pregnancy And Lactation Text: This medication should be avoided if pregnant and for the first 30 days post-partum. Tetracycline Pregnancy And Lactation Text: This medication is Pregnancy Category D and not consider safe during pregnancy. It is also excreted in breast milk. Dapsone Counseling: I discussed with the patient the risks of dapsone including but not limited to hemolytic anemia, agranulocytosis, rashes, methemoglobinemia, kidney failure, peripheral neuropathy, headaches, GI upset, and liver toxicity.  Patients who start dapsone require monitoring including baseline LFTs and weekly CBCs for the first month, then every month thereafter.  The patient verbalized understanding of the proper use and possible adverse effects of dapsone.  All of the patient's questions and concerns were addressed. Erythromycin Counseling:  I discussed with the patient the risks of erythromycin including but not limited to GI upset, allergic reaction, drug rash, diarrhea, increase in liver enzymes, and yeast infections. Topical Retinoid Pregnancy And Lactation Text: This medication is Pregnancy Category C. It is unknown if this medication is excreted in breast milk. Bactrim Counseling:  I discussed with the patient the risks of sulfa antibiotics including but not limited to GI upset, allergic reaction, drug rash, diarrhea, dizziness, photosensitivity, and yeast infections.  Rarely, more serious reactions can occur including but not limited to aplastic anemia, agranulocytosis, methemoglobinemia, blood dyscrasias, liver or kidney failure, lung infiltrates or desquamative/blistering drug rashes. Use Enhanced Medication Counseling?: No Topical Sulfur Applications Counseling: Topical Sulfur Counseling: Patient counseled that this medication may cause skin irritation or allergic reactions.  In the event of skin irritation, the patient was advised to reduce the amount of the drug applied or use it less frequently.   The patient verbalized understanding of the proper use and possible adverse effects of topical sulfur application.  All of the patient's questions and concerns were addressed. Benzoyl Peroxide Counseling: Patient counseled that medicine may cause skin irritation and bleach clothing.  In the event of skin irritation, the patient was advised to reduce the amount of the drug applied or use it less frequently.   The patient verbalized understanding of the proper use and possible adverse effects of benzoyl peroxide.  All of the patient's questions and concerns were addressed. Erythromycin Pregnancy And Lactation Text: This medication is Pregnancy Category B and is considered safe during pregnancy. It is also excreted in breast milk. Tazorac Counseling:  Patient advised that medication is irritating and drying.  Patient may need to apply sparingly and wash off after an hour before eventually leaving it on overnight.  The patient verbalized understanding of the proper use and possible adverse effects of tazorac.  All of the patient's questions and concerns were addressed. Detail Level: Detailed High Dose Vitamin A Pregnancy And Lactation Text: High dose vitamin A therapy is contraindicated during pregnancy and breast feeding. Tazorac Pregnancy And Lactation Text: This medication is not safe during pregnancy. It is unknown if this medication is excreted in breast milk. Minocycline Counseling: Patient advised regarding possible photosensitivity and discoloration of the teeth, skin, lips, tongue and gums.  Patient instructed to avoid sunlight, if possible.  When exposed to sunlight, patients should wear protective clothing, sunglasses, and sunscreen.  The patient was instructed to call the office immediately if the following severe adverse effects occur:  hearing changes, easy bruising/bleeding, severe headache, or vision changes.  The patient verbalized understanding of the proper use and possible adverse effects of minocycline.  All of the patient's questions and concerns were addressed. Bactrim Pregnancy And Lactation Text: This medication is Pregnancy Category D and is known to cause fetal risk.  It is also excreted in breast milk. Topical Sulfur Applications Pregnancy And Lactation Text: This medication is Pregnancy Category C and has an unknown safety profile during pregnancy. It is unknown if this topical medication is excreted in breast milk. Spironolactone Counseling: Patient advised regarding risks of diarrhea, abdominal pain, hyperkalemia, birth defects (for female patients), liver toxicity and renal toxicity. The patient may need blood work to monitor liver and kidney function and potassium levels while on therapy. The patient verbalized understanding of the proper use and possible adverse effects of spironolactone.  All of the patient's questions and concerns were addressed. Dapsone Pregnancy And Lactation Text: This medication is Pregnancy Category C and is not considered safe during pregnancy or breast feeding. Spironolactone Pregnancy And Lactation Text: This medication can cause feminization of the male fetus and should be avoided during pregnancy. The active metabolite is also found in breast milk. Benzoyl Peroxide Pregnancy And Lactation Text: This medication is Pregnancy Category C. It is unknown if benzoyl peroxide is excreted in breast milk. Isotretinoin Counseling: Patient should get monthly blood tests, not donate blood, not drive at night if vision affected, not share medication, and not undergo elective surgery for 6 months after tx completed. Side effects reviewed, pt to contact office should one occur. Birth Control Pills Counseling: Birth Control Pill Counseling: I discussed with the patient the potential side effects of OCPs including but not limited to increased risk of stroke, heart attack, thrombophlebitis, deep venous thrombosis, hepatic adenomas, breast changes, GI upset, headaches, and depression.  The patient verbalized understanding of the proper use and possible adverse effects of OCPs. All of the patient's questions and concerns were addressed. Doxycycline Counseling:  Patient counseled regarding possible photosensitivity and increased risk for sunburn.  Patient instructed to avoid sunlight, if possible.  When exposed to sunlight, patients should wear protective clothing, sunglasses, and sunscreen.  The patient was instructed to call the office immediately if the following severe adverse effects occur:  hearing changes, easy bruising/bleeding, severe headache, or vision changes.  The patient verbalized understanding of the proper use and possible adverse effects of doxycycline.  All of the patient's questions and concerns were addressed. care coordination/discharge planning Topical Retinoid counseling:  Patient advised to apply a pea-sized amount only at bedtime and wait 30 minutes after washing their face before applying.  If too drying, patient may add a non-comedogenic moisturizer. The patient verbalized understanding of the proper use and possible adverse effects of retinoids.  All of the patient's questions and concerns were addressed. Doxycycline Pregnancy And Lactation Text: This medication is Pregnancy Category D and not consider safe during pregnancy. It is also excreted in breast milk but is considered safe for shorter treatment courses. Isotretinoin Pregnancy And Lactation Text: This medication is Pregnancy Category X and is considered extremely dangerous during pregnancy. It is unknown if it is excreted in breast milk. Azithromycin Counseling:  I discussed with the patient the risks of azithromycin including but not limited to GI upset, allergic reaction, drug rash, diarrhea, and yeast infections. Topical Clindamycin Counseling: Patient counseled that this medication may cause skin irritation or allergic reactions.  In the event of skin irritation, the patient was advised to reduce the amount of the drug applied or use it less frequently.   The patient verbalized understanding of the proper use and possible adverse effects of clindamycin.  All of the patient's questions and concerns were addressed. Detail Level: Generalized Azelaic Acid Counseling: Patient counseled that medicine may cause skin irritation and to avoid applying near the eyes.  In the event of skin irritation, the patient was advised to reduce the amount of the drug applied or use it less frequently.   The patient verbalized understanding of the proper use and possible adverse effects of azelaic acid.  All of the patient's questions and concerns were addressed. Aklief counseling:  Patient advised to apply a pea-sized amount only at bedtime and wait 30 minutes after washing their face before applying.  If too drying, patient may add a non-comedogenic moisturizer.  The most commonly reported side effects including irritation, redness, scaling, dryness, stinging, burning, itching, and increased risk of sunburn.  The patient verbalized understanding of the proper use and possible adverse effects of retinoids.  All of the patient's questions and concerns were addressed. Winlevi Pregnancy And Lactation Text: This medication is considered safe during pregnancy and breastfeeding. Aklief Pregnancy And Lactation Text: It is unknown if this medication is safe to use during pregnancy.  It is unknown if this medication is excreted in breast milk.  Breastfeeding women should use the topical cream on the smallest area of the skin for the shortest time needed while breastfeeding.  Do not apply to nipple and areola. Azelaic Acid Pregnancy And Lactation Text: This medication is considered safe during pregnancy and breast feeding. Winlevi Counseling:  I discussed with the patient the risks of topical clascoterone including but not limited to erythema, scaling, itching, and stinging. Patient voiced their understanding.

## 2024-11-14 NOTE — CARE COORDINATION ASSESSMENT. - OTHER PERTINENT DISCHARGE PLANNING INFORMATION:
Pt is from [facility] LTC for past 4 years. Pt is alert but confused at times. Pt is a readmit from [facility]. Pt was at [facility] hospital form 11/1-11/4 due to anemia. Pt returned to [facility] and now presents to [place] ED and admitted for Anemia. Pt has hx of parkinsonism, dementia, schizophrenia, and bipolar. Pt currently has no insurance, hospital medicaid office aware and has been following pt since [facility] admit on 11/1. Per Medicaid office application was submitted by [facility] in Sept and additional documentation was recently submitted per medicaid's request. At this time pt application is still under review. SW to continue to follow and coordinate dc plan back to [facility] when stable. DARYL left message with pt's brother Scott Wharton at .

## 2024-11-14 NOTE — PROGRESS NOTE ADULT - ASSESSMENT
[ASSESSMENT and  PLAN]    D62.0 Anemia due to blood loss  D63.8 Anemia due to chronic disease  K92.2 Gastrointestinal bleeding  D50.0 Likely iron deficiency anemia    67 yo man from [facility], hx NSTEMI, schizoprenica, A fib on Eliquis, HTN, hyponatremia, adm for hypotension SMP 70s and Hgb 6  Patient found to have anemia with hemoglobin 5.1 g/dL.  FOBT positive  Status post 2 unit PRBC transfusion  Admit to S PCU.    In Oct 2024 BP low 80s/50s, CBC wbc 5.83 hgb 5.1 hct 15.4 plts 162 mcv 95.7  BUN/Cr 47/1.3    stool occult blood negative  Given 2L fluid, 3u PRBC, Kcentra,, adm to ICU  post transfusion CBC today first lab Hgb 8.1, second lab Hgb 9.3  CT head negative  CT c/a/p-mild dep atelectasis, sm right effusion    Anemia due to blood loss    Prior workup 1mo ago  appropriate increase of Hgb w the transfusion  no signs of hemolysis  TSH normal  iron studies and B12, folate, Showed iron deficiency with adequate B12 and folate levels  ESR elevated at 31.    has received 3 units PRBC since admission with improvement in hgb 8.9        RECOMMENDATIONS    transfuse as clinically indicated   ferritin 49    to treat with venofer 100mg IV daily x 3      continue to hold anticoagulation    continue GI evaluation

## 2024-11-14 NOTE — CARE COORDINATION ASSESSMENT. - NSCAREPROVIDERS_GEN_ALL_CORE_FT
CARE PROVIDERS:  Accepting Physician: Florentin Zuniga  Access Services: William Cool  Administration: Monica Hayden  Administration: Lissette Garcia  Administration: Zelalem Vance  Admitting: Florentin Zuniga  Attending: Florentin Zuniga  Case Management: Marta Taveras  Consultant: Venice Ochoa  Consultant: Charly Webster  Consultant: Nicolás Kaye  Consultant: Hon Cruz  Consultant: Med Peacock  Covering Team: Yg Hawkins  Covering Team: Florentin Zuniga  ED Attending: Samuel Moore  ED Nurse: Eugenia Cody  Nurse: Jenna Youssef  Nurse: Kala Osman  Nurse: Holly Combs  Outpatient Provider: Pahlavan, Mohsen  Outpatient Provider: Pahlavan, Mohsen  PCA/Nursing Assistant: Nga Chavira  Primary Team: Baljinder Marcus  Primary Team: Roosevelt Thibodeaux  Registered Dietitian: Jl Masters  Respiratory Therapy: Eugene Ceballos  : Monica Almendarez  Team: SY  Hospitalists, Team  Team: [place], Team  UR// Supp. Assoc.: Holly Grissom

## 2024-11-14 NOTE — CARE COORDINATION ASSESSMENT. - NSPASTMEDSURGHISTORY_GEN_ALL_CORE_FT
PAST MEDICAL & SURGICAL HISTORY:  Hyperlipidemia      Hypothyroidism      Schizophrenia      DM (diabetes mellitus)      Anxiety      Hypothyroid      Schizophrenia      DM (diabetes mellitus)      Epistaxis      Hyperkalemia      Atrial fibrillation      Violent behavior      H/O candidiasis      Constipation      Folate deficiency      Constipation      Hypothyroidism      HLD (hyperlipidemia)      Hypothyroidism      MDD (major depressive disorder)      Chronic CHF      HTN (hypertension)      Dementia      DM (diabetes mellitus)      Bipolar disorder      NSTEMI (non-ST elevation myocardial infarction)      Hyponatremia      Schizophrenia      Parkinsonism

## 2024-11-14 NOTE — PROGRESS NOTE ADULT - ASSESSMENT
67yo M presenting with GIB after restarting anticoagulation likely mild GIB given lack of active bleeding episodes on admission and hemodynamically responsive to PRBC. High risk for bleed requiring ICU level of care.    #GIB   #DM2   #Schizophremia   #Hypothyroid       NEURO: Patient at baseline will continue oral antipsychotics   CV: Hemodynamically stable responsive to colloids may require additional transfusion, in NSR holding Eliquis and Plavix.       RESP: 100% on 2L NC, no signs of distress tirtrate of O2 as tolerated to maintain Spo2>94%  RENAL: HUBERT improved cr 1.2 continue with oral hydration. Hyperkalemia resolved maintain K>4 and Mg >2   GI: Restarted on clear liquid diet increase as tolerated no signs of active bleed >24h, GI to follow outpatient unless new signs of active bleed. Continue protonix BID   ENDO: DM2 on ISS goal glucose 120-180. Hypothyroid on 88mg synthroid check TSH in AM  ID: Afebrile WBC 5 no signs of active infection holding abx at this time   HEME: Hgb stable this afternoon 8.9, Holding eliquis and plavix due to high risk of bleeds. Transfuse if Hgb <7 or signs of active bleed   DISPO: Downgrade to medicine     Care Time (EXCLUSIVE of any non bundled procedures) :  35 minutes were spent assessing the patient's presenting problems of acute illness.  Medical desicion making includes initiation / continuation of plan or care review data/ labwork/ radiographic study, direct patient care bedside ,  discussions with  consultants regarding care,  evaluation and interpretation of vital signs, any necessary ventilator management,   NIV or BIPAP changes  or initiation,    discussions with multidisipliary team,  am or pm rounds, discussions of goals of care with patient and family all non-inclusive of procedures.    Date of entry of this note is equal to the date of services rendered    Plan discussed with Dr. Webster and Dr. Zuniga

## 2024-11-14 NOTE — DIETITIAN INITIAL EVALUATION ADULT - PERTINENT LABORATORY DATA
11-13    141  |  107  |  32[H]  ----------------------------<  90  4.3   |  27  |  1.12    Ca    9.1      13 Nov 2024 19:45    TPro  5.6[L]  /  Alb  2.3[L]  /  TBili  0.2  /  DBili  x   /  AST  14  /  ALT  11  /  AlkPhos  56  11-13  POCT Blood Glucose.: 104 mg/dL (11-14-24 @ 06:55)  A1C with Estimated Average Glucose Result: 5.5 % (11-02-24 @ 05:31)

## 2024-11-14 NOTE — CARE COORDINATION ASSESSMENT. - REQUIRES REFERRAL TO THE FOLLOWING SERVICES
Medicaid office following due to no insurance. [facility] submitted for reinstatement of medicaid.

## 2024-11-14 NOTE — PROGRESS NOTE ADULT - ASSESSMENT
68M with CSH with hx of schizophrenia/bipolar, hx of NSTEMI, hx of hyponatremia, recent admission to [facility] for anemia  (needed ICU, transfusion, kcentra, and brief period of levophed but no source was identified) who presents from [facility] again for anemia.      Anemia, likely secondary to GI bleed  - was transfused 2 units -> 5.1 to 7.7 to 8.9 now  - hemodynamically stable, can downgrade from SPCU  - CBC daily now  - can start clear liquid diet   - IV hydration  - transfuse for hgb <7 or symptomatic  - GI consulted  - PPI   - active type and screen  - hematology consulted by Dr. Choi -> f/u anemia labs such as iron, vitamin B12, folate  - Dr. Webster requesting CT A/P to r/o RP bleed    Schizophrenia/bipolar  - cont with benztropine  - cont with trazodone  - cont with risperidone   - cont with quetiapine  - cont with divalproex (valproic acid level wnl)    HTN/HLD/CAD  - hold  metoprolol  - hold losartan  - cont with atorvastatin  - hold plavix and eliquis for now    DM2 - not on insulin  - hold metformin  - cont low dose insulin coverage scale with FS q6h while NPO    Hypothyroidism  - cont with levothyroxine (convert to IV)  - check TSH    Preventive measures  - hold AC  - use SCDs for now

## 2024-11-14 NOTE — CARE COORDINATION ASSESSMENT. - NS SW READMITTED REASON
Pt was at [facility] from 11/1-11/4 due to anemia. pt readmitted with anemia again./unable to assess

## 2024-11-14 NOTE — PROGRESS NOTE ADULT - ASSESSMENT
REASON FOR VISIT  .. Management of problems listed below      ROS  . Patient unable to give ROS     PHYSICAL EXAM    HEENT Unremarkable  atraumatic   RESP Fair air entry  Harsh breath sound   CARDIAC S1 S2 No S3     NO JVD    ABDOMEN No hepatosplenomegaly   PEDAL EDEMA present No calf tenderness  NO rash       XXXXXXXXXXXXXXXXXXXXXXXXXXXXX  BEST PRACTICE ISSUES.  . HOB ELEVATN.    .... Yes  . DIET.   .... 11/14/2024 clear liq   . IV FLUIDS.  .... 11/13/2024 lr 75   . DVT PPLX.    ....   . STRESS ULCER PPLX.   .... 11/13/2024 protonix 40.2   . INFECTION PPLX.   .... 11/13/2024 chlorhexidine 2%     XXXXXXXXXXXXXXXXXXXXXXXXX  GENERAL DATA .   [facility].    ..    ICU STAY.    .. 11/13-11/14   COVID.   ..   ALLGY.   ..     clozapine lithium   WT.   ..  11/13/2024 96   BMI.  .. 11/13/2024 37  ISOLATION.        XXXXXXXXXXXXXXXXXXXXXXX  VITALS/GAS EXCHANGE/DRIPS  ABGS.   .  VS/ PO/IO/ VENT/ DRIPS.  11/14/2024 afeb 88 130/70   11/14/2024 2l 100%      CHIEF COMPLAINT.   . 11/13/2024 sent from [facility], full code, recent admission to [place]/blood transfusion, sent here for low blood pressure    OVERALL PRESENTATION.  . 11/13/2024 68 MALE with  PMH NSTEMI, schizophrenia, bipolar d/o, HTN, hypoNa BIBEMS from [facility] for anemia  and hypotension today. Patient was admitted to [facility] last week for same and required transfusion and pressors for time until his BP and hemoglobin stabilized.  No clear source of anemia was found.  GI workup and hematology workup was all negative.  Patient is on Eliquis for chronic A-fib.  Patient is unable to give much history and denies any symptoms when asked.    RECENT ADMISSION  . A fib ON APIXABA  .... 11/1 taken off apixa   . CAD 10/31-11/1 Tr 34-52   . SEVERE ANEMIA 10/31-11/1 Hb 5.1-9.3   . TRANSFUSION 11/1/2024 3 u   . APIXABA  .... 11/1/2024 Given Kcentra .     PAST HOSPITAL STAYS .   .  HOME MEDS.    XXXXXXXXXXXXXXXXXXXXXXXXXXXXXXXXXXX  PROBLEM ASSESSMENT RECOMMENDATIONS.  RESP.   .... Target po 90-95%    INFECTION.  .... w 11/13/2024 w 5   .... cxr 11/13/2024 napd   .... no active infection suspected     CARDIAC.  .... Trop 11/13/2024 tr 9.8   .... pbnp 11/13/2024 pbnp 338   .... cxr 11/13/2024 napd  .... 11/13/2024 atorvastat 40   .... target map 65 (+)   .... cardiac status is stable      HEMAT.  . ANEMIA   .... Hb 11/13/2024 Hb 5.1   .... 11/13-11/14-11/14/2024 Hb 5.1 - 7.7-8.9   .... Plt  11/13/2024 plt 201  .... inr 11/13/2024 inr .93   .... 11/14/2024 ctap no rp bl  narroing distal sigmoid colon   .... 11/13/2024 Anemia need to exclude gi bl   .... 11/13/2024 suggets ctabd to exclude RP bleed   .... 11/13/2024 Transfuse to Hb 7 (+)     . APIXABA  .... 11/13/2024 12p given kcentra 4000 u     . TRANSFUSION  .... 11/13/2024 3 u     GI.  . GI bleed   . SOB 11/13/2024 SOB (+)   .... 11/13/2024 ppi  .... 11/13/2024GI eval     RENAL.  . HUBERT   .... Na 11/13/2024 Na 140   .... CO2 11/13/2024 CO2 27   .... AG 11/13/2024 ag 7  .... Alb 11/13/2024 alb 2.3   .... Cr 11/13/2024 Cr 1.6   .... monitor     . HYPERKALEMIA   .... K 11/13-11/14/2024 K 5.8- 4.2    . HYPOMAGNESEMIA   .... Mg 11/14/2024 Mg 1.4     ENDO.  . HYPOTHYROID  .... 11/14/2024 levoxyl 88   .... 11/13/2024 levoxyl 40 iv DCED    . DM  .... 11/13 sl scale     NEURO.  .... 11/13/2024 depakote 1500   .... 11/13/2024 quetiapine 400   .... 11/13/2024 risperdone 4.2   .... 11/13/2024 trazodone 100     XXXXXXXXXXXXXXXXXXXXXX   SUMMARY  CC.   . 11/13/2024 LO BP   PROBLEMS .  . LHYPOTENSION  . A fib ON APIXABA  .... 11/13/2024 apixaba stoppd because of gi bl   . GI BLEED  11/13/2024 SOB (+)   . FOCAL NARROWING DISTAL SIGMOID COLON 11/14/2024 CT   .... 11/14/2024 Recommend GI eval and followup   . APIXABA  .... 11/13/2024 12p given kcentra 4000 u   . ANEMIA   .... 11/13-11/14-11/14/2024 Hb 5.1 - 7.7-8.9   . TRANSFUSION  .... 11/13/2024 3 u   . HYPERKALEMIA 11/13/2024 K 5.8   . HUBERT  11/13/2024 Cr 1.6      PMH.  . Hytn   . Afib on Eliquis,   . NSTEMI,   . Hyponna  . schizophrenia,     TIME SPENT.  . Over 36 minutes aggregate care time spent on encounter; activities included   direct patient care, counseling and/or coordinating care reviewing notes, lab data/ imaging , discussion with multidisciplinary team/ patient  /family and explaining in detail risks, benefits, alternatives  of the recommendations       CLIFFORD ABDALLA 67 m 11/13/2024 1955       REASON FOR VISIT  .. Management of problems listed below      ROS  . Patient unable to give ROS     PHYSICAL EXAM    HEENT Unremarkable  atraumatic   RESP Fair air entry  Harsh breath sound   CARDIAC S1 S2 No S3     NO JVD    ABDOMEN No hepatosplenomegaly   PEDAL EDEMA present No calf tenderness  NO rash       XXXXXXXXXXXXXXXXXXXXXXXXXXXXX  BEST PRACTICE ISSUES.  . HOB ELEVATN.    .... Yes  . DIET.   .... 11/14/2024 clear liq   . IV FLUIDS.  .... 11/13/2024 lr 75   . DVT PPLX.    ....   . STRESS ULCER PPLX.   .... 11/13/2024 protonix 40.2   . INFECTION PPLX.   .... 11/13/2024 chlorhexidine 2%     XXXXXXXXXXXXXXXXXXXXXXXXX  GENERAL DATA .   [facility].    ..    ICU STAY.    .. 11/13-11/14   COVID.   ..   ALLGY.   ..     clozapine lithium   WT.   ..  11/13/2024 96   BMI.  .. 11/13/2024 37  ISOLATION.        XXXXXXXXXXXXXXXXXXXXXXX  VITALS/GAS EXCHANGE/DRIPS  ABGS.   .  VS/ PO/IO/ VENT/ DRIPS.  11/14/2024 afeb 88 130/70   11/14/2024 2l 100%      CHIEF COMPLAINT.   . 11/13/2024 sent from [facility], full code, recent admission to [place]/blood transfusion, sent here for low blood pressure    OVERALL PRESENTATION.  . 11/13/2024 68 MALE with  PMH NSTEMI, schizophrenia, bipolar d/o, HTN, hypoNa BIBEMS from [facility] for anemia  and hypotension today. Patient was admitted to [facility] last week for same and required transfusion and pressors for time until his BP and hemoglobin stabilized.  No clear source of anemia was found.  GI workup and hematology workup was all negative.  Patient is on Eliquis for chronic A-fib.  Patient is unable to give much history and denies any symptoms when asked.    RECENT ADMISSION  . A fib ON APIXABA  .... 11/1 taken off apixa   . CAD 10/31-11/1 Tr 34-52   . SEVERE ANEMIA 10/31-11/1 Hb 5.1-9.3   . TRANSFUSION 11/1/2024 3 u   . APIXABA  .... 11/1/2024 Given Kcentra .     PAST HOSPITAL STAYS .   .  HOME MEDS.    XXXXXXXXXXXXXXXXXXXXXXXXXXXXXXXXXXX  PROBLEM ASSESSMENT RECOMMENDATIONS.  RESP.   .... Target po 90-95%    INFECTION.  .... w 11/13/2024 w 5   .... cxr 11/13/2024 napd   .... no active infection suspected     CARDIAC.  .... Trop 11/13/2024 tr 9.8   .... pbnp 11/13/2024 pbnp 338   .... cxr 11/13/2024 napd  .... 11/13/2024 atorvastat 40   .... target map 65 (+)   .... cardiac status is stable      HEMAT.  . ANEMIA   .... Hb 11/13/2024 Hb 5.1   .... 11/13-11/14-11/14/2024 Hb 5.1 - 7.7-8.9   .... Plt  11/13/2024 plt 201  .... inr 11/13/2024 inr .93   .... 11/14/2024 ctap no rp bl  narroing distal sigmoid colon   .... 11/13/2024 Anemia need to exclude gi bl   .... 11/13/2024 suggets ctabd to exclude RP bleed   .... 11/13/2024 Transfuse to Hb 7 (+)     . APIXABA  .... 11/13/2024 12p given kcentra 4000 u     . TRANSFUSION  .... 11/13/2024 3 u     GI.  . GI bleed   . SOB 11/13/2024 SOB (+)   .... 11/13/2024 ppi  .... 11/13/2024GI eval     RENAL.  . HUBERT   .... Na 11/13/2024 Na 140   .... CO2 11/13/2024 CO2 27   .... AG 11/13/2024 ag 7  .... Alb 11/13/2024 alb 2.3   .... Cr 11/13/2024 Cr 1.6   .... monitor     . HYPERKALEMIA   .... K 11/13-11/14/2024 K 5.8- 4.2    . HYPOMAGNESEMIA   .... Mg 11/14/2024 Mg 1.4     ENDO.  . HYPOTHYROID  .... 11/14/2024 levoxyl 88   .... 11/13/2024 levoxyl 40 iv DCED    . DM  .... 11/13 sl scale     NEURO.  .... 11/13/2024 depakote 1500   .... 11/13/2024 quetiapine 400   .... 11/13/2024 risperdone 4.2   .... 11/13/2024 trazodone 100     XXXXXXXXXXXXXXXXXXXXXX   SUMMARY  CC.   . 11/13/2024 LO BP   PROBLEMS .  . LHYPOTENSION  . A fib ON APIXABA  .... 11/13/2024 apixaba stoppd because of gi bl   . GI BLEED  11/13/2024 SOB (+)   . FOCAL NARROWING DISTAL SIGMOID COLON 11/14/2024 CT   .... 11/14/2024 Recommend GI eval and followup   . APIXABA  .... 11/13/2024 12p given kcentra 4000 u   . ANEMIA   .... 11/13-11/14-11/14/2024 Hb 5.1 - 7.7-8.9   . TRANSFUSION  .... 11/13/2024 3 u   . HYPERKALEMIA 11/13/2024 K 5.8   . HUBERT  11/13/2024 Cr 1.6      PMH.  . Hytn   . Afib on Eliquis,   . NSTEMI,   . Hyponna  . schizophrenia,     TIME SPENT.  . Over 36 minutes aggregate critical care time spent on encounter; activities included   direct patient care, counseling and/or coordinating care reviewing notes, lab data/ imaging , discussion with multidisciplinary team/ patient  /family and explaining in detail risks, benefits, alternatives  of the recommendations       CLIFFORD ABDALLA 67 m 11/13/2024 1955

## 2024-11-14 NOTE — DIETITIAN INITIAL EVALUATION ADULT - REASON FOR ADMISSION
HPI:  ****Collateral information obtained from notes as patient is a poor historian.  He kept on asking for something to drink.  When asked if he knows where he is or why he is in the hospital, he kept on saying "ok."  Could not elicit specific HPI or ROS.***    68M with [facility] with hx of schizophrenia/bipolar, hx of NSTEMI, hx of hyponatremia, recent admission to [facility] for anemia  (needed ICU, transfusion, kcentra, and brief period of levophed but no source was identified) who presents from [facility] again for anemia.  Found to have outpatient hgb of 6.  Also was noted to be hypotensive as well.  Brought here for further evaluation.   In the ED, patient's triage vitals were  BP 98/52  HR 74, RR 18, 98% on NC, T 97F.  Hgb was 5.1 with +occult blood.  Also noted to have hyperkalemia and HUBERT with Cr 1.6 (discharged at 0.83).  Patient ordered for 2 units.  Patient admitted to [facility] for further evaluation and management.  Currently patient has no pain or SOB. No light-headedness or dizziness.   (13 Nov 2024 15:30)

## 2024-11-14 NOTE — DIETITIAN INITIAL EVALUATION ADULT - OTHER INFO
Visited patient in room, presently NPO, advanced to clear liquid diet this am with good tolerance. No reported n/v/d/c, no BM noted. NKFA. Per transfer paper .5#, current adm weight 213#, UBW is consist with appearance, will continue to monitor weight trends as able.     Pertinent medications/nutrition labs reviewed; FS  x24hr, a1c 5.5%, hgb 7.7; In house ordered for lispro sliding scale to aid in glucose management. Also receiving LR.     Education is not appropriate at this time. When able to advanced diet, rec CHO, diet texture/consistency per team/slp. Will reassess needs for ONS. RD to continue to monitor nutrition status per protocol.

## 2024-11-14 NOTE — PROGRESS NOTE ADULT - ASSESSMENT
Anemia  GI Bleed    Recommendations:  - Clear liquid diet  - 2 large bore IV's  - PPI 40mg IV BID  - Trend CBC's. Transfuse Hb <7 PLT <50  - Active type and screen. Transfusion consent  - Monitor hemodynamics closely  - Monitor for overt GI bleeding  - Will determine role for EGD this admission pending clinical status  - Will follow with you       Med Peacock M.D.  [facility]  [phone]

## 2024-11-15 ENCOUNTER — TRANSCRIPTION ENCOUNTER (OUTPATIENT)
Age: 69
End: 2024-11-15

## 2024-11-15 VITALS
SYSTOLIC BLOOD PRESSURE: 147 MMHG | RESPIRATION RATE: 21 BRPM | HEART RATE: 76 BPM | DIASTOLIC BLOOD PRESSURE: 87 MMHG | TEMPERATURE: 98 F | OXYGEN SATURATION: 94 %

## 2024-11-15 LAB
ALBUMIN SERPL ELPH-MCNC: 2.4 G/DL — LOW (ref 3.3–5)
ALP SERPL-CCNC: 62 U/L — SIGNIFICANT CHANGE UP (ref 30–120)
ALT FLD-CCNC: 11 U/L — SIGNIFICANT CHANGE UP (ref 10–60)
ANION GAP SERPL CALC-SCNC: 9 MMOL/L — SIGNIFICANT CHANGE UP (ref 5–17)
AST SERPL-CCNC: 14 U/L — SIGNIFICANT CHANGE UP (ref 10–40)
BASOPHILS # BLD AUTO: 0.01 K/UL — SIGNIFICANT CHANGE UP (ref 0–0.2)
BASOPHILS NFR BLD AUTO: 0.2 % — SIGNIFICANT CHANGE UP (ref 0–2)
BILIRUB SERPL-MCNC: 0.2 MG/DL — SIGNIFICANT CHANGE UP (ref 0.2–1.2)
BUN SERPL-MCNC: 10 MG/DL — SIGNIFICANT CHANGE UP (ref 7–23)
CALCIUM SERPL-MCNC: 9 MG/DL — SIGNIFICANT CHANGE UP (ref 8.4–10.5)
CHLORIDE SERPL-SCNC: 104 MMOL/L — SIGNIFICANT CHANGE UP (ref 96–108)
CO2 SERPL-SCNC: 28 MMOL/L — SIGNIFICANT CHANGE UP (ref 22–31)
CREAT SERPL-MCNC: 1.08 MG/DL — SIGNIFICANT CHANGE UP (ref 0.5–1.3)
EGFR: 75 ML/MIN/1.73M2 — SIGNIFICANT CHANGE UP
EOSINOPHIL # BLD AUTO: 0.11 K/UL — SIGNIFICANT CHANGE UP (ref 0–0.5)
EOSINOPHIL NFR BLD AUTO: 2.1 % — SIGNIFICANT CHANGE UP (ref 0–6)
FERRITIN SERPL-MCNC: 103 NG/ML — SIGNIFICANT CHANGE UP (ref 30–400)
FOLATE SERPL-MCNC: 9.2 NG/ML — SIGNIFICANT CHANGE UP
GLUCOSE BLDC GLUCOMTR-MCNC: 121 MG/DL — HIGH (ref 70–99)
GLUCOSE BLDC GLUCOMTR-MCNC: 152 MG/DL — HIGH (ref 70–99)
GLUCOSE BLDC GLUCOMTR-MCNC: 95 MG/DL — SIGNIFICANT CHANGE UP (ref 70–99)
GLUCOSE SERPL-MCNC: 110 MG/DL — HIGH (ref 70–99)
HCT VFR BLD CALC: 28.7 % — LOW (ref 39–50)
HGB BLD-MCNC: 9.3 G/DL — LOW (ref 13–17)
IMM GRANULOCYTES NFR BLD AUTO: 2.1 % — HIGH (ref 0–0.9)
IRON SATN MFR SERPL: 22 % — SIGNIFICANT CHANGE UP (ref 16–55)
IRON SATN MFR SERPL: 70 UG/DL — SIGNIFICANT CHANGE UP (ref 45–165)
LYMPHOCYTES # BLD AUTO: 0.72 K/UL — LOW (ref 1–3.3)
LYMPHOCYTES # BLD AUTO: 13.5 % — SIGNIFICANT CHANGE UP (ref 13–44)
MAGNESIUM SERPL-MCNC: 1.7 MG/DL — SIGNIFICANT CHANGE UP (ref 1.6–2.6)
MCHC RBC-ENTMCNC: 28.9 PG — SIGNIFICANT CHANGE UP (ref 27–34)
MCHC RBC-ENTMCNC: 32.4 G/DL — SIGNIFICANT CHANGE UP (ref 32–36)
MCV RBC AUTO: 89.1 FL — SIGNIFICANT CHANGE UP (ref 80–100)
MONOCYTES # BLD AUTO: 0.74 K/UL — SIGNIFICANT CHANGE UP (ref 0–0.9)
MONOCYTES NFR BLD AUTO: 13.8 % — SIGNIFICANT CHANGE UP (ref 2–14)
NEUTROPHILS # BLD AUTO: 3.66 K/UL — SIGNIFICANT CHANGE UP (ref 1.8–7.4)
NEUTROPHILS NFR BLD AUTO: 68.3 % — SIGNIFICANT CHANGE UP (ref 43–77)
NRBC # BLD: 0 /100 WBCS — SIGNIFICANT CHANGE UP (ref 0–0)
PHOSPHATE SERPL-MCNC: 4.5 MG/DL — SIGNIFICANT CHANGE UP (ref 2.5–4.5)
PLAT MORPH BLD: NORMAL — SIGNIFICANT CHANGE UP
PLATELET # BLD AUTO: 202 K/UL — SIGNIFICANT CHANGE UP (ref 150–400)
POTASSIUM SERPL-MCNC: 4.1 MMOL/L — SIGNIFICANT CHANGE UP (ref 3.5–5.3)
POTASSIUM SERPL-SCNC: 4.1 MMOL/L — SIGNIFICANT CHANGE UP (ref 3.5–5.3)
PROT SERPL-MCNC: 5.9 G/DL — LOW (ref 6–8.3)
RBC # BLD: 3.22 M/UL — LOW (ref 4.2–5.8)
RBC # FLD: 20.4 % — HIGH (ref 10.3–14.5)
RBC BLD AUTO: ABNORMAL
SODIUM SERPL-SCNC: 141 MMOL/L — SIGNIFICANT CHANGE UP (ref 135–145)
T4 AB SER-ACNC: 4.6 UG/DL — SIGNIFICANT CHANGE UP (ref 4.6–12)
TIBC SERPL-MCNC: 325 UG/DL — SIGNIFICANT CHANGE UP (ref 220–430)
TSH SERPL-MCNC: 1.56 UIU/ML — SIGNIFICANT CHANGE UP (ref 0.27–4.2)
UIBC SERPL-MCNC: 255 UG/DL — SIGNIFICANT CHANGE UP (ref 110–370)
VIT B12 SERPL-MCNC: 576 PG/ML — SIGNIFICANT CHANGE UP (ref 232–1245)
WBC # BLD: 5.35 K/UL — SIGNIFICANT CHANGE UP (ref 3.8–10.5)
WBC # FLD AUTO: 5.35 K/UL — SIGNIFICANT CHANGE UP (ref 3.8–10.5)

## 2024-11-15 PROCEDURE — 99239 HOSP IP/OBS DSCHRG MGMT >30: CPT

## 2024-11-15 RX ORDER — LEVOTHYROXINE SODIUM 88 MCG
1 TABLET ORAL
Qty: 0 | Refills: 0 | DISCHARGE
Start: 2024-11-15

## 2024-11-15 RX ORDER — METOPROLOL TARTRATE 50 MG
1 TABLET ORAL
Qty: 0 | Refills: 0 | DISCHARGE
Start: 2024-11-15

## 2024-11-15 RX ORDER — QUETIAPINE FUMARATE 200 MG/1
1 TABLET ORAL
Qty: 0 | Refills: 0 | DISCHARGE
Start: 2024-11-15

## 2024-11-15 RX ORDER — PANTOPRAZOLE SODIUM 40 MG/1
1 TABLET, DELAYED RELEASE ORAL
Qty: 60 | Refills: 0
Start: 2024-11-15 | End: 2024-12-14

## 2024-11-15 RX ORDER — RISPERIDONE 3 MG/1
1 TABLET, FILM COATED ORAL
Qty: 0 | Refills: 0 | DISCHARGE
Start: 2024-11-15

## 2024-11-15 RX ORDER — CLOPIDOGREL 75 MG/1
1 TABLET ORAL
Refills: 0 | DISCHARGE

## 2024-11-15 RX ORDER — METOPROLOL TARTRATE 50 MG
25 TABLET ORAL ONCE
Refills: 0 | Status: COMPLETED | OUTPATIENT
Start: 2024-11-15 | End: 2024-11-15

## 2024-11-15 RX ORDER — TRAZODONE HYDROCHLORIDE 100 MG/1
1 TABLET ORAL
Qty: 0 | Refills: 0 | DISCHARGE
Start: 2024-11-15

## 2024-11-15 RX ORDER — METOPROLOL TARTRATE 50 MG
1 TABLET ORAL
Refills: 0 | DISCHARGE

## 2024-11-15 RX ORDER — QUETIAPINE FUMARATE 200 MG/1
1 TABLET ORAL
Refills: 0 | DISCHARGE

## 2024-11-15 RX ORDER — DIVALPROEX SODIUM 250 MG/1
3 TABLET, FILM COATED, EXTENDED RELEASE ORAL
Qty: 0 | Refills: 0 | DISCHARGE
Start: 2024-11-15

## 2024-11-15 RX ADMIN — PANTOPRAZOLE SODIUM 40 MILLIGRAM(S): 40 TABLET, DELAYED RELEASE ORAL at 06:07

## 2024-11-15 RX ADMIN — Medication 25 MILLIGRAM(S): at 12:58

## 2024-11-15 RX ADMIN — Medication 88 MICROGRAM(S): at 06:07

## 2024-11-15 RX ADMIN — DIVALPROEX SODIUM 1500 MILLIGRAM(S): 250 TABLET, FILM COATED, EXTENDED RELEASE ORAL at 12:55

## 2024-11-15 RX ADMIN — RISPERIDONE 4 MILLIGRAM(S): 3 TABLET, FILM COATED ORAL at 06:07

## 2024-11-15 NOTE — PROGRESS NOTE ADULT - SUBJECTIVE AND OBJECTIVE BOX
CHIEF COMPLAINT/ REASON FOR VISIT  .. Patient was seen to address the  issue listed under PROBLEM LIST which is located toward bottom of this note     RM HORTON    [facility]    Allergies    lithium (Rash)  clozapine (Unknown)  aspartate (Unknown)  lithium (Unknown)  clozapine (Rash)    Intolerances        PAST MEDICAL & SURGICAL HISTORY:  Schizophrenia      Hypothyroidism      Hyperlipidemia      Schizophrenia      Hypothyroid      Anxiety      DM (diabetes mellitus)      Parkinsonism      Schizophrenia      Hyponatremia      NSTEMI (non-ST elevation myocardial infarction)      Bipolar disorder      DM (diabetes mellitus)      Dementia      HTN (hypertension)      Chronic CHF      MDD (major depressive disorder)      Hypothyroidism      HLD (hyperlipidemia)      Hypothyroidism      Constipation      Folate deficiency      Constipation      H/O candidiasis      Violent behavior      Atrial fibrillation      Hyperkalemia      Epistaxis      DM (diabetes mellitus)          FAMILY HISTORY:      Home Medications:  apixaban 2.5 mg oral tablet: 1 tab(s) orally 2 times a day monitor for signs of bleeding , CBC every 2 days x 1 week (13 Nov 2024 08:54)  aspirin 81 mg oral delayed release tablet: 1 tab(s) orally once a day (13 Nov 2024 08:54)  atorvastatin 40 mg oral tablet: 1 tab(s) orally once a day (at bedtime) (13 Nov 2024 11:03)  benztropine 2 mg oral tablet: 1 tab(s) orally 2 times a day (13 Nov 2024 08:54)  divalproex sodium 500 mg oral delayed release tablet: 3 tab(s) orally once a day RESUME PREADMISSION SCHEDULE ,NO CHANGES (13 Nov 2024 08:54)  docusate sodium 100 mg oral tablet: 2 tab(s) orally 2 times a day (13 Nov 2024 08:54)  famotidine 20 mg oral tablet: 1 tab(s) orally once a day (in the morning) (13 Nov 2024 08:54)  folic acid 1 mg oral tablet: 1 tab(s) orally once a day (in the morning) (13 Nov 2024 08:54)  levothyroxine 88 mcg (0.088 mg) oral tablet: 1 tab(s) orally once a day (13 Nov 2024 08:54)  losartan 50 mg oral tablet: 1 tab(s) orally once a day (13 Nov 2024 08:54)  metFORMIN 1000 mg oral tablet: 1 tab(s) orally 2 times a day (13 Nov 2024 11:10)  metoprolol tartrate 25 mg oral tablet: 1 tab(s) orally 2 times a day (13 Nov 2024 11:15)  pantoprazole 40 mg oral delayed release tablet: 1 tab(s) orally 2 times a day (13 Nov 2024 08:54)  Plavix 75 mg oral tablet: 1 tab(s) orally (13 Nov 2024 11:03)  QUEtiapine 400 mg oral tablet: 1 tab(s) orally once a day (at bedtime) (13 Nov 2024 08:54)  RisperDAL 4 mg oral tablet: 1 tab(s) orally 2 times a day (13 Nov 2024 11:14)  traZODone 100 mg oral tablet: 1 tab(s) orally once a day (at bedtime) (13 Nov 2024 08:54)  Vascepa 1 g oral capsule: 1 cap(s) orally 2 times a day (13 Nov 2024 08:54)  Vitamin D2 50,000 intl units (1.25 mg) oral capsule: 1 cap(s) orally every 2 weeks  Next dose due 8/15/2020 (13 Nov 2024 08:54)      MEDICATIONS  (STANDING):  atorvastatin 40 milliGRAM(s) Oral at bedtime  chlorhexidine 2% Cloths 1 Application(s) Topical <User Schedule>  dextrose 50% Injectable 12.5 Gram(s) IV Push once  dextrose 50% Injectable 25 Gram(s) IV Push once  dextrose 50% Injectable 25 Gram(s) IV Push once  divalproex ER 1500 milliGRAM(s) Oral daily  glucagon  Injectable 1 milliGRAM(s) IntraMuscular once  insulin lispro (ADMELOG) corrective regimen sliding scale   SubCutaneous every 6 hours  iron sucrose IVPB 100 milliGRAM(s) IV Intermittent every 24 hours  levothyroxine 88 MICROGram(s) Oral daily  pantoprazole  Injectable 40 milliGRAM(s) IV Push two times a day  QUEtiapine 400 milliGRAM(s) Oral at bedtime  risperiDONE   Tablet 4 milliGRAM(s) Oral two times a day  traZODone 100 milliGRAM(s) Oral at bedtime    MEDICATIONS  (PRN):  dextrose Oral Gel 15 Gram(s) Oral once PRN Blood Glucose LESS THAN 70 milliGRAM(s)/deciliter      Diet, Consistent Carbohydrate/No Snacks:   DASH/TLC Sodium & Cholesterol Restricted (11-15-24 @ 07:17) [Active]          Vital Signs Last 24 Hrs  T(C): 36.4 (15 Nov 2024 06:34), Max: 36.7 (14 Nov 2024 20:12)  T(F): 97.6 (15 Nov 2024 06:34), Max: 98 (14 Nov 2024 20:12)  HR: 87 (15 Nov 2024 06:34) (77 - 107)  BP: 128/82 (15 Nov 2024 06:34) (114/60 - 153/78)  BP(mean): 91 (14 Nov 2024 20:12) (74 - 96)  RR: 16 (15 Nov 2024 06:34) (14 - 21)  SpO2: 96% (15 Nov 2024 06:34) (96% - 100%)    Parameters below as of 15 Nov 2024 06:34  Patient On (Oxygen Delivery Method): room air          11-14-24 @ 07:01  -  11-15-24 @ 07:00  --------------------------------------------------------  IN: 1880 mL / OUT: 2400 mL / NET: -520 mL              LABS:                        9.3    5.35  )-----------( 202      ( 15 Nov 2024 06:00 )             28.7     11-15    141  |  104  |  10  ----------------------------<  110[H]  4.1   |  28  |  1.08    Ca    9.0      15 Nov 2024 06:00  Phos  4.5     11-15  Mg     1.7     11-15    TPro  5.9[L]  /  Alb  2.4[L]  /  TBili  0.2  /  DBili  x   /  AST  14  /  ALT  11  /  AlkPhos  62  11-15    PT/INR - ( 13 Nov 2024 09:04 )   PT: 11.0 sec;   INR: 0.93 ratio         PTT - ( 13 Nov 2024 09:04 )  PTT:30.2 sec  Urinalysis Basic - ( 15 Nov 2024 06:00 )    Color: x / Appearance: x / SG: x / pH: x  Gluc: 110 mg/dL / Ketone: x  / Bili: x / Urobili: x   Blood: x / Protein: x / Nitrite: x   Leuk Esterase: x / RBC: x / WBC x   Sq Epi: x / Non Sq Epi: x / Bacteria: x            WBC:  WBC Count: 5.35 K/uL (11-15 @ 06:00)  WBC Count: 4.42 K/uL (11-14 @ 12:00)  WBC Count: 4.76 K/uL (11-13 @ 18:38)  WBC Count: 5.07 K/uL (11-13 @ 09:04)      MICROBIOLOGY:  RECENT CULTURES:              PT/INR - ( 13 Nov 2024 09:04 )   PT: 11.0 sec;   INR: 0.93 ratio         PTT - ( 13 Nov 2024 09:04 )  PTT:30.2 sec    Sodium:  Sodium: 141 mmol/L (11-15 @ 06:00)  Sodium: 137 mmol/L (11-14 @ 12:00)  Sodium: 141 mmol/L (11-13 @ 19:45)  Sodium: 140 mmol/L (11-13 @ 09:04)      1.08 mg/dL 11-15 @ 06:00  1.19 mg/dL 11-14 @ 12:00  1.12 mg/dL 11-13 @ 19:45  1.60 mg/dL 11-13 @ 09:04      Hemoglobin:  Hemoglobin: 9.3 g/dL (11-15 @ 06:00)  Hemoglobin: 8.9 g/dL (11-14 @ 12:00)  Hemoglobin: 7.7 g/dL (11-13 @ 18:38)  Hemoglobin: 5.1 g/dL (11-13 @ 09:04)      Platelets: Platelet Count - Automated: 202 K/uL (11-15 @ 06:00)  Platelet Count - Automated: 178 K/uL (11-14 @ 12:00)  Platelet Count - Automated: 190 K/uL (11-13 @ 18:38)  Platelet Count - Automated: 201 K/uL (11-13 @ 09:04)      LIVER FUNCTIONS - ( 15 Nov 2024 06:00 )  Alb: 2.4 g/dL / Pro: 5.9 g/dL / ALK PHOS: 62 U/L / ALT: 11 U/L / AST: 14 U/L / GGT: x             Urinalysis Basic - ( 15 Nov 2024 06:00 )    Color: x / Appearance: x / SG: x / pH: x  Gluc: 110 mg/dL / Ketone: x  / Bili: x / Urobili: x   Blood: x / Protein: x / Nitrite: x   Leuk Esterase: x / RBC: x / WBC x   Sq Epi: x / Non Sq Epi: x / Bacteria: x        RADIOLOGY & ADDITIONAL STUDIES:      MICROBIOLOGY:  RECENT CULTURES:          
[facility]  Brad Kim PA-C  [address] [place] [address]  [phone]      INTERVAL HPI/OVERNIGHT EVENTS: no overt gi bleeding, hb responded to PRBC transfusion     MEDICATIONS  (STANDING):  atorvastatin 40 milliGRAM(s) Oral at bedtime  chlorhexidine 2% Cloths 1 Application(s) Topical <User Schedule>  dextrose 5%. 1000 milliLiter(s) (50 mL/Hr) IV Continuous <Continuous>  dextrose 5%. 1000 milliLiter(s) (100 mL/Hr) IV Continuous <Continuous>  dextrose 50% Injectable 12.5 Gram(s) IV Push once  dextrose 50% Injectable 25 Gram(s) IV Push once  dextrose 50% Injectable 25 Gram(s) IV Push once  divalproex ER 1500 milliGRAM(s) Oral daily  glucagon  Injectable 1 milliGRAM(s) IntraMuscular once  insulin lispro (ADMELOG) corrective regimen sliding scale   SubCutaneous every 6 hours  lactated ringers. 1000 milliLiter(s) (125 mL/Hr) IV Continuous <Continuous>  levothyroxine 88 MICROGram(s) Oral daily  pantoprazole  Injectable 40 milliGRAM(s) IV Push two times a day  QUEtiapine 400 milliGRAM(s) Oral at bedtime  risperiDONE   Tablet 4 milliGRAM(s) Oral two times a day  traZODone 100 milliGRAM(s) Oral at bedtime    MEDICATIONS  (PRN):  dextrose Oral Gel 15 Gram(s) Oral once PRN Blood Glucose LESS THAN 70 milliGRAM(s)/deciliter      Allergies    lithium (Rash)  clozapine (Unknown)  aspartate (Unknown)  lithium (Unknown)  clozapine (Rash)    Intolerances        ROS:   General:  No  fevers, chills, night sweats, fatigue,   Eyes:  Good vision, no reported pain  ENT:  No sore throat, pain, runny nose, dysphagia  CV:  No pain, palpitations, hypo/hypertension  Resp:  No dyspnea, cough, tachypnea, wheezing  GI:  No pain, No nausea, No vomiting, No diarrhea, No constipation, No weight loss, No fever, No pruritis, No rectal bleeding, No tarry stools, No dysphagia,  :  No pain, bleeding, incontinence, nocturia  Muscle:  No pain, weakness  Neuro:  No weakness, tingling, memory problems  Psych:  No fatigue, insomnia, mood problems, depression  Endocrine:  No polyuria, polydipsia, cold/heat intolerance  Heme:  No petechiae, ecchymosis, easy bruisability  Skin:  No rash, tattoos, scars, edema      PHYSICAL EXAM:   Vital Signs:  Vital Signs Last 24 Hrs  T(C): 36.6 (2024 08:22), Max: 36.6 (2024 21:41)  T(F): 97.8 (2024 08:22), Max: 97.8 (2024 21:41)  HR: 79 (2024 08:00) (70 - 97)  BP: 114/60 (:00) (93/52 - 172/82)  BP(mean): 74 (2024 08:00) (64 - 107)  RR: 15 (:) (12 - 25)  SpO2: 100% (2024 08:00) (94% - 100%)    Parameters below as of 2024 08:00  Patient On (Oxygen Delivery Method): nasal cannula  O2 Flow (L/min): 2    Daily     Daily Weight in k.5 (2024 04:57)    GENERAL:  Appears stated age,   HEENT:  NC/AT,    CHEST:  Full & symmetric excursion,   HEART:  Regular rhythm,  ABDOMEN:  Soft, non-tender, non-distended,  EXTEREMITIES:  no cyanosis  SKIN:  No rash  NEURO:  Alert,       LABS:                        7.7    4.76  )-----------( 190      ( 2024 18:38 )             23.6     11-13    141  |  107  |  32[H]  ----------------------------<  90  4.3   |  27  |  1.12    Ca    9.1      2024 19:45    TPro  5.6[L]  /  Alb  2.3[L]  /  TBili  0.2  /  DBili  x   /  AST  14  /  ALT  11  /  AlkPhos  56  11-13    PT/INR - ( 2024 09:04 )   PT: 11.0 sec;   INR: 0.93 ratio         PTT - ( 2024 09:04 )  PTT:30.2 sec  Urinalysis Basic - ( 2024 19:45 )    Color: x / Appearance: x / SG: x / pH: x  Gluc: 90 mg/dL / Ketone: x  / Bili: x / Urobili: x   Blood: x / Protein: x / Nitrite: x   Leuk Esterase: x / RBC: x / WBC x   Sq Epi: x / Non Sq Epi: x / Bacteria: x        RADIOLOGY & ADDITIONAL TESTS:  
Interval History:  no new complaints  being discharged today    Chart reviewed and events noted;   Overnight events:    MEDICATIONS  (STANDING):  atorvastatin 40 milliGRAM(s) Oral at bedtime  chlorhexidine 2% Cloths 1 Application(s) Topical <User Schedule>  dextrose 50% Injectable 12.5 Gram(s) IV Push once  dextrose 50% Injectable 25 Gram(s) IV Push once  dextrose 50% Injectable 25 Gram(s) IV Push once  divalproex ER 1500 milliGRAM(s) Oral daily  glucagon  Injectable 1 milliGRAM(s) IntraMuscular once  insulin lispro (ADMELOG) corrective regimen sliding scale   SubCutaneous every 6 hours  iron sucrose IVPB 100 milliGRAM(s) IV Intermittent every 24 hours  levothyroxine 88 MICROGram(s) Oral daily  pantoprazole  Injectable 40 milliGRAM(s) IV Push two times a day  QUEtiapine 400 milliGRAM(s) Oral at bedtime  risperiDONE   Tablet 4 milliGRAM(s) Oral two times a day  traZODone 100 milliGRAM(s) Oral at bedtime    MEDICATIONS  (PRN):  bisacodyl 5 milliGRAM(s) Oral every 12 hours PRN Constipation  dextrose Oral Gel 15 Gram(s) Oral once PRN Blood Glucose LESS THAN 70 milliGRAM(s)/deciliter      Vital Signs Last 24 Hrs  T(C): 36.6 (15 Nov 2024 08:06), Max: 36.7 (14 Nov 2024 20:12)  T(F): 97.8 (15 Nov 2024 08:06), Max: 98 (14 Nov 2024 20:12)  HR: 87 (15 Nov 2024 06:34) (83 - 107)  BP: 128/82 (15 Nov 2024 06:34) (123/75 - 149/72)  BP(mean): 91 (14 Nov 2024 20:12) (91 - 96)  RR: 16 (15 Nov 2024 06:34) (14 - 17)  SpO2: 96% (15 Nov 2024 06:34) (96% - 96%)    Parameters below as of 15 Nov 2024 06:34  Patient On (Oxygen Delivery Method): room air        PHYSICAL EXAM  General: adult in NAD, chronically ill appearing  HEENT: clear oropharynx, anicteric sclera, pink conjunctivae  Neck: supple  CV: normal S1S2 with no murmur rubs or gallops  Lungs: clear to auscultation, no wheezes, no rhales  Abdomen: soft non-tender non-distended, no hepato/splenomegaly  Ext: no clubbing cyanosis or edema  Skin: no rashes and no petichiae  Neuro: alert and oriented X3 no focal deficits      LABS:  CBC Full  -  ( 15 Nov 2024 06:00 )  WBC Count : 5.35 K/uL  RBC Count : 3.22 M/uL  Hemoglobin : 9.3 g/dL  Hematocrit : 28.7 %  Platelet Count - Automated : 202 K/uL  Mean Cell Volume : 89.1 fL  Mean Cell Hemoglobin : 28.9 pg  Mean Cell Hemoglobin Concentration : 32.4 g/dL  Auto Neutrophil # : 3.66 K/uL  Auto Lymphocyte # : 0.72 K/uL  Auto Monocyte # : 0.74 K/uL  Auto Eosinophil # : 0.11 K/uL  Auto Basophil # : 0.01 K/uL  Auto Neutrophil % : 68.3 %  Auto Lymphocyte % : 13.5 %  Auto Monocyte % : 13.8 %  Auto Eosinophil % : 2.1 %  Auto Basophil % : 0.2 %    11-15    141  |  104  |  10  ----------------------------<  110[H]  4.1   |  28  |  1.08    Ca    9.0      15 Nov 2024 06:00  Phos  4.5     11-15  Mg     1.7     11-15    TPro  5.9[L]  /  Alb  2.4[L]  /  TBili  0.2  /  DBili  x   /  AST  14  /  ALT  11  /  AlkPhos  62  11-15        fe studies      WBC trend  5.35 K/uL (11-15-24 @ 06:00)  4.42 K/uL (11-14-24 @ 12:00)  4.76 K/uL (11-13-24 @ 18:38)  5.07 K/uL (11-13-24 @ 09:04)      Hgb trend  9.3 g/dL (11-15-24 @ 06:00)  8.9 g/dL (11-14-24 @ 12:00)  7.7 g/dL (11-13-24 @ 18:38)  5.1 g/dL (11-13-24 @ 09:04)      plt trend  202 K/uL (11-15-24 @ 06:00)  178 K/uL (11-14-24 @ 12:00)  190 K/uL (11-13-24 @ 18:38)  201 K/uL (11-13-24 @ 09:04)        RADIOLOGY & ADDITIONAL STUDIES:    
Patient is a 68y old  Male who presents with a chief complaint of HPI:    INTERVAL HPI/OVERNIGHT EVENTS:  No major acute events overnight.  This afternoon, patient seen resting in bed.  Easily awaken.  However, patient just mumbles and was not clear in speech.  However, when asked if he has any complaints, SOB, nausea, pain, he said "I don't think so."      MEDICATIONS  (STANDING):  atorvastatin 40 milliGRAM(s) Oral at bedtime  chlorhexidine 2% Cloths 1 Application(s) Topical <User Schedule>  dextrose 5%. 1000 milliLiter(s) (50 mL/Hr) IV Continuous <Continuous>  dextrose 5%. 1000 milliLiter(s) (100 mL/Hr) IV Continuous <Continuous>  dextrose 50% Injectable 25 Gram(s) IV Push once  dextrose 50% Injectable 25 Gram(s) IV Push once  dextrose 50% Injectable 12.5 Gram(s) IV Push once  divalproex ER 1500 milliGRAM(s) Oral daily  glucagon  Injectable 1 milliGRAM(s) IntraMuscular once  insulin lispro (ADMELOG) corrective regimen sliding scale   SubCutaneous every 6 hours  lactated ringers. 1000 milliLiter(s) (125 mL/Hr) IV Continuous <Continuous>  levothyroxine 88 MICROGram(s) Oral daily  magnesium sulfate  IVPB 1 Gram(s) IV Intermittent once  pantoprazole  Injectable 40 milliGRAM(s) IV Push two times a day  QUEtiapine 400 milliGRAM(s) Oral at bedtime  risperiDONE   Tablet 4 milliGRAM(s) Oral two times a day  traZODone 100 milliGRAM(s) Oral at bedtime    MEDICATIONS  (PRN):  dextrose Oral Gel 15 Gram(s) Oral once PRN Blood Glucose LESS THAN 70 milliGRAM(s)/deciliter    Allergies  lithium (Rash)  clozapine (Unknown)  aspartate (Unknown)  lithium (Unknown)  clozapine (Rash)    ROS as above and reviewed and is otherwise negative    PHYSICAL EXAM:  Vital Signs Last 24 Hrs  T(C): 36.4 (14 Nov 2024 12:26), Max: 36.6 (13 Nov 2024 21:41)  T(F): 97.6 (14 Nov 2024 12:26), Max: 97.8 (13 Nov 2024 21:41)  HR: 107 (14 Nov 2024 13:00) (74 - 107)  BP: 149/72 (14 Nov 2024 13:00) (93/52 - 172/82)  BP(mean): 96 (14 Nov 2024 13:00) (64 - 107)  RR: 17 (14 Nov 2024 13:00) (12 - 25)  SpO2: 96% (14 Nov 2024 13:00) (96% - 100%)    Parameters below as of 14 Nov 2024 12:00  Patient On (Oxygen Delivery Method): nasal cannula  O2 Flow (L/min): 2    GENERAL:     less pale appearing male in NAD  HEAD:     atraumatic, normocephalic  EYES:     EOMI, less pale conjunctiva   RESPIRATORY:     clear to auscultation bilaterally, no rales or rhonchi or wheezing or rubs  CARDIOVASCULAR:     regular rate and rhythm, soft i/vi systolic murmur  GASTROINTESTINAL:     soft, nontender, nondistended, bowel sounds present  EXTREMITIES:     no clubbing or cyanosis or edema  MUSCULOSKELETAL:     no joint pain or swelling or deformities  NERVOUS SYSTEM:    moves all 4s  PSYCH:     sleeping but arousable    LABS:                        8.9    4.42  )-----------( 178      ( 14 Nov 2024 12:00 )             27.6     14 Nov 2024 12:00    137    |  101    |  19     ----------------------------<  287    4.2     |  26     |  1.19     Ca    8.9        14 Nov 2024 12:00  Phos  4.0       14 Nov 2024 12:00  Mg     1.4       14 Nov 2024 12:00    TPro  5.5    /  Alb  2.3    /  TBili  0.2    /  DBili  x      /  AST  17     /  ALT  15     /  AlkPhos  57     14 Nov 2024 12:00    PT/INR - ( 13 Nov 2024 09:04 )   PT: 11.0 sec;   INR: 0.93 ratio         PTT - ( 13 Nov 2024 09:04 )  PTT:30.2 sec  Urinalysis Basic - ( 14 Nov 2024 12:00 )    Color: x / Appearance: x / SG: x / pH: x  Gluc: 287 mg/dL / Ketone: x  / Bili: x / Urobili: x   Blood: x / Protein: x / Nitrite: x   Leuk Esterase: x / RBC: x / WBC x   Sq Epi: x / Non Sq Epi: x / Bacteria: x      CAPILLARY BLOOD GLUCOSE      POCT Blood Glucose.: 279 mg/dL (14 Nov 2024 12:22)  POCT Blood Glucose.: 104 mg/dL (14 Nov 2024 06:55)  POCT Blood Glucose.: 95 mg/dL (14 Nov 2024 00:29)    
Chief Complaint: Anemia    Interval Events: No events overnight.    Review of Systems:  General: No fevers, chills, weight gain  Skin: No rashes, color changes  Cardiovascular: No chest pain, orthopnea  Respiratory: No shortness of breath, cough  Gastrointestinal: No nausea, abdominal pain  Genitourinary: No incontinence, pain with urination  Musculoskeletal: No pain, swelling, decreased range of motion  Neurological: No headache, weakness  Psychiatric: No depression, anxiety  Endocrine: No weight gain, increased thirst  All other systems are comprehensively negative.    Physical Exam:  Vitals:        Vital Signs Last 24 Hrs  T(C): 36.6 (14 Nov 2024 08:22), Max: 36.6 (13 Nov 2024 21:41)  T(F): 97.8 (14 Nov 2024 08:22), Max: 97.8 (13 Nov 2024 21:41)  HR: 79 (14 Nov 2024 08:00) (72 - 97)  BP: 114/60 (14 Nov 2024 08:00) (93/52 - 172/82)  BP(mean): 74 (14 Nov 2024 08:00) (64 - 107)  RR: 15 (14 Nov 2024 08:00) (12 - 25)  SpO2: 100% (14 Nov 2024 08:00) (96% - 100%)  Parameters below as of 14 Nov 2024 08:00  Patient On (Oxygen Delivery Method): nasal cannula  O2 Flow (L/min): 2  General: NAD  HEENT: MMM  Neck: No JVD, no carotid bruit  Lungs: CTAB  CV: RRR, nl S1/S2, no M/R/G  Abdomen: S/NT/ND, +BS  Extremities: No LE edema, no cyanosis  Neuro: AAOx3, non-focal  Skin: No rash    Labs:                        7.7    4.76  )-----------( 190      ( 13 Nov 2024 18:38 )             23.6     11-13    141  |  107  |  32[H]  ----------------------------<  90  4.3   |  27  |  1.12    Ca    9.1      13 Nov 2024 19:45    TPro  5.6[L]  /  Alb  2.3[L]  /  TBili  0.2  /  DBili  x   /  AST  14  /  ALT  11  /  AlkPhos  56  11-13        PT/INR - ( 13 Nov 2024 09:04 )   PT: 11.0 sec;   INR: 0.93 ratio         PTT - ( 13 Nov 2024 09:04 )  PTT:30.2 sec    ECG/Telemetry: Sinus rhythm
     CHIEF COMPLAINT/ REASON FOR VISIT  .. Patient was seen to address the  issue listed under PROBLEM LIST which is located toward bottom of this note     RM HORTON    [facility]    Allergies    lithium (Rash)  clozapine (Unknown)  aspartate (Unknown)  lithium (Unknown)  clozapine (Rash)    Intolerances        PAST MEDICAL & SURGICAL HISTORY:  Schizophrenia      Hypothyroidism      Hyperlipidemia      Schizophrenia      Hypothyroid      Anxiety      DM (diabetes mellitus)      Parkinsonism      Schizophrenia      Hyponatremia      NSTEMI (non-ST elevation myocardial infarction)      Bipolar disorder      DM (diabetes mellitus)      Dementia      HTN (hypertension)      Chronic CHF      MDD (major depressive disorder)      Hypothyroidism      HLD (hyperlipidemia)      Hypothyroidism      Constipation      Folate deficiency      Constipation      H/O candidiasis      Violent behavior      Atrial fibrillation      Hyperkalemia      Epistaxis      DM (diabetes mellitus)          FAMILY HISTORY:      Home Medications:  apixaban 2.5 mg oral tablet: 1 tab(s) orally 2 times a day monitor for signs of bleeding , CBC every 2 days x 1 week (13 Nov 2024 08:54)  aspirin 81 mg oral delayed release tablet: 1 tab(s) orally once a day (13 Nov 2024 08:54)  atorvastatin 40 mg oral tablet: 1 tab(s) orally once a day (at bedtime) (13 Nov 2024 11:03)  benztropine 2 mg oral tablet: 1 tab(s) orally 2 times a day (13 Nov 2024 08:54)  divalproex sodium 500 mg oral delayed release tablet: 3 tab(s) orally once a day RESUME PREADMISSION SCHEDULE ,NO CHANGES (13 Nov 2024 08:54)  docusate sodium 100 mg oral tablet: 2 tab(s) orally 2 times a day (13 Nov 2024 08:54)  famotidine 20 mg oral tablet: 1 tab(s) orally once a day (in the morning) (13 Nov 2024 08:54)  folic acid 1 mg oral tablet: 1 tab(s) orally once a day (in the morning) (13 Nov 2024 08:54)  levothyroxine 88 mcg (0.088 mg) oral tablet: 1 tab(s) orally once a day (13 Nov 2024 08:54)  losartan 50 mg oral tablet: 1 tab(s) orally once a day (13 Nov 2024 08:54)  metFORMIN 1000 mg oral tablet: 1 tab(s) orally 2 times a day (13 Nov 2024 11:10)  metoprolol tartrate 25 mg oral tablet: 1 tab(s) orally 2 times a day (13 Nov 2024 11:15)  pantoprazole 40 mg oral delayed release tablet: 1 tab(s) orally 2 times a day (13 Nov 2024 08:54)  Plavix 75 mg oral tablet: 1 tab(s) orally (13 Nov 2024 11:03)  QUEtiapine 400 mg oral tablet: 1 tab(s) orally once a day (at bedtime) (13 Nov 2024 08:54)  RisperDAL 4 mg oral tablet: 1 tab(s) orally 2 times a day (13 Nov 2024 11:14)  traZODone 100 mg oral tablet: 1 tab(s) orally once a day (at bedtime) (13 Nov 2024 08:54)  Vascepa 1 g oral capsule: 1 cap(s) orally 2 times a day (13 Nov 2024 08:54)  Vitamin D2 50,000 intl units (1.25 mg) oral capsule: 1 cap(s) orally every 2 weeks  Next dose due 8/15/2020 (13 Nov 2024 08:54)      MEDICATIONS  (STANDING):  atorvastatin 40 milliGRAM(s) Oral at bedtime  chlorhexidine 2% Cloths 1 Application(s) Topical <User Schedule>  dextrose 5%. 1000 milliLiter(s) (100 mL/Hr) IV Continuous <Continuous>  dextrose 5%. 1000 milliLiter(s) (50 mL/Hr) IV Continuous <Continuous>  dextrose 50% Injectable 25 Gram(s) IV Push once  dextrose 50% Injectable 25 Gram(s) IV Push once  dextrose 50% Injectable 12.5 Gram(s) IV Push once  divalproex ER 1500 milliGRAM(s) Oral daily  glucagon  Injectable 1 milliGRAM(s) IntraMuscular once  insulin lispro (ADMELOG) corrective regimen sliding scale   SubCutaneous every 6 hours  lactated ringers. 1000 milliLiter(s) (125 mL/Hr) IV Continuous <Continuous>  levothyroxine 88 MICROGram(s) Oral daily  pantoprazole  Injectable 40 milliGRAM(s) IV Push two times a day  QUEtiapine 400 milliGRAM(s) Oral at bedtime  risperiDONE   Tablet 4 milliGRAM(s) Oral two times a day  traZODone 100 milliGRAM(s) Oral at bedtime    MEDICATIONS  (PRN):  dextrose Oral Gel 15 Gram(s) Oral once PRN Blood Glucose LESS THAN 70 milliGRAM(s)/deciliter      Diet, Clear Liquid (11-14-24 @ 09:05) [Active]          Vital Signs Last 24 Hrs  T(C): 36.6 (14 Nov 2024 08:22), Max: 36.6 (13 Nov 2024 21:41)  T(F): 97.8 (14 Nov 2024 08:22), Max: 97.8 (13 Nov 2024 21:41)  HR: 79 (14 Nov 2024 08:00) (72 - 97)  BP: 114/60 (14 Nov 2024 08:00) (93/52 - 172/82)  BP(mean): 74 (14 Nov 2024 08:00) (64 - 107)  RR: 15 (14 Nov 2024 08:00) (12 - 25)  SpO2: 100% (14 Nov 2024 08:00) (96% - 100%)    Parameters below as of 14 Nov 2024 08:00  Patient On (Oxygen Delivery Method): nasal cannula  O2 Flow (L/min): 2        11-13-24 @ 07:01  -  11-14-24 @ 07:00  --------------------------------------------------------  IN: 1687.5 mL / OUT: 1975 mL / NET: -287.5 mL    11-14-24 @ 07:01  -  11-14-24 @ 10:07  --------------------------------------------------------  IN: 0 mL / OUT: 500 mL / NET: -500 mL              LABS:                        7.7    4.76  )-----------( 190      ( 13 Nov 2024 18:38 )             23.6     11-13    141  |  107  |  32[H]  ----------------------------<  90  4.3   |  27  |  1.12    Ca    9.1      13 Nov 2024 19:45    TPro  5.6[L]  /  Alb  2.3[L]  /  TBili  0.2  /  DBili  x   /  AST  14  /  ALT  11  /  AlkPhos  56  11-13    PT/INR - ( 13 Nov 2024 09:04 )   PT: 11.0 sec;   INR: 0.93 ratio         PTT - ( 13 Nov 2024 09:04 )  PTT:30.2 sec  Urinalysis Basic - ( 13 Nov 2024 19:45 )    Color: x / Appearance: x / SG: x / pH: x  Gluc: 90 mg/dL / Ketone: x  / Bili: x / Urobili: x   Blood: x / Protein: x / Nitrite: x   Leuk Esterase: x / RBC: x / WBC x   Sq Epi: x / Non Sq Epi: x / Bacteria: x            WBC:  WBC Count: 4.76 K/uL (11-13 @ 18:38)  WBC Count: 5.07 K/uL (11-13 @ 09:04)      MICROBIOLOGY:  RECENT CULTURES:              PT/INR - ( 13 Nov 2024 09:04 )   PT: 11.0 sec;   INR: 0.93 ratio         PTT - ( 13 Nov 2024 09:04 )  PTT:30.2 sec    Sodium:  Sodium: 141 mmol/L (11-13 @ 19:45)  Sodium: 140 mmol/L (11-13 @ 09:04)      1.12 mg/dL 11-13 @ 19:45  1.60 mg/dL 11-13 @ 09:04      Hemoglobin:  Hemoglobin: 7.7 g/dL (11-13 @ 18:38)  Hemoglobin: 5.1 g/dL (11-13 @ 09:04)      Platelets: Platelet Count - Automated: 190 K/uL (11-13 @ 18:38)  Platelet Count - Automated: 201 K/uL (11-13 @ 09:04)      LIVER FUNCTIONS - ( 13 Nov 2024 09:04 )  Alb: 2.3 g/dL / Pro: 5.6 g/dL / ALK PHOS: 56 U/L / ALT: 11 U/L / AST: 14 U/L / GGT: x             Urinalysis Basic - ( 13 Nov 2024 19:45 )    Color: x / Appearance: x / SG: x / pH: x  Gluc: 90 mg/dL / Ketone: x  / Bili: x / Urobili: x   Blood: x / Protein: x / Nitrite: x   Leuk Esterase: x / RBC: x / WBC x   Sq Epi: x / Non Sq Epi: x / Bacteria: x        RADIOLOGY & ADDITIONAL STUDIES:      MICROBIOLOGY:  RECENT CULTURES:          
Chief Complaint: Anemia    Interval Events: No events overnight.    Review of Systems:  General: No fevers, chills, weight gain  Skin: No rashes, color changes  Cardiovascular: No chest pain, orthopnea  Respiratory: No shortness of breath, cough  Gastrointestinal: No nausea, abdominal pain  Genitourinary: No incontinence, pain with urination  Musculoskeletal: No pain, swelling, decreased range of motion  Neurological: No headache, weakness  Psychiatric: No depression, anxiety  Endocrine: No weight gain, increased thirst  All other systems are comprehensively negative.    Physical Exam:  Vital Signs Last 24 Hrs  T(C): 36.6 (15 Nov 2024 08:06), Max: 36.7 (14 Nov 2024 20:12)  T(F): 97.8 (15 Nov 2024 08:06), Max: 98 (14 Nov 2024 20:12)  HR: 87 (15 Nov 2024 06:34) (83 - 107)  BP: 128/82 (15 Nov 2024 06:34) (115/67 - 153/78)  BP(mean): 91 (14 Nov 2024 20:12) (83 - 96)  RR: 16 (15 Nov 2024 06:34) (14 - 19)  SpO2: 96% (15 Nov 2024 06:34) (96% - 100%)  Parameters below as of 15 Nov 2024 06:34  Patient On (Oxygen Delivery Method): room air  General: NAD  HEENT: MMM  Neck: No JVD, no carotid bruit  Lungs: CTAB  CV: RRR, nl S1/S2, no M/R/G  Abdomen: S/NT/ND, +BS  Extremities: No LE edema, no cyanosis  Neuro: AAOx3, non-focal  Skin: No rash    Labs:             11-15    141  |  104  |  10  ----------------------------<  110[H]  4.1   |  28  |  1.08    Ca    9.0      15 Nov 2024 06:00  Phos  4.5     11-15  Mg     1.7     11-15    TPro  5.9[L]  /  Alb  2.4[L]  /  TBili  0.2  /  DBili  x   /  AST  14  /  ALT  11  /  AlkPhos  62  11-15                        9.3    5.35  )-----------( 202      ( 15 Nov 2024 06:00 )             28.7       ECG/Telemetry: Sinus rhythm
Interval History:  had 1 bm not black.  solid  received 3 units PRBC  Chart reviewed and events noted;   Overnight events:    MEDICATIONS  (STANDING):  atorvastatin 40 milliGRAM(s) Oral at bedtime  chlorhexidine 2% Cloths 1 Application(s) Topical <User Schedule>  dextrose 5%. 1000 milliLiter(s) (50 mL/Hr) IV Continuous <Continuous>  dextrose 5%. 1000 milliLiter(s) (100 mL/Hr) IV Continuous <Continuous>  dextrose 50% Injectable 25 Gram(s) IV Push once  dextrose 50% Injectable 25 Gram(s) IV Push once  dextrose 50% Injectable 12.5 Gram(s) IV Push once  divalproex ER 1500 milliGRAM(s) Oral daily  glucagon  Injectable 1 milliGRAM(s) IntraMuscular once  insulin lispro (ADMELOG) corrective regimen sliding scale   SubCutaneous every 6 hours  iron sucrose IVPB 100 milliGRAM(s) IV Intermittent every 24 hours  lactated ringers. 1000 milliLiter(s) (125 mL/Hr) IV Continuous <Continuous>  levothyroxine 88 MICROGram(s) Oral daily  magnesium sulfate  IVPB 1 Gram(s) IV Intermittent once  pantoprazole  Injectable 40 milliGRAM(s) IV Push two times a day  QUEtiapine 400 milliGRAM(s) Oral at bedtime  risperiDONE   Tablet 4 milliGRAM(s) Oral two times a day  traZODone 100 milliGRAM(s) Oral at bedtime    MEDICATIONS  (PRN):  dextrose Oral Gel 15 Gram(s) Oral once PRN Blood Glucose LESS THAN 70 milliGRAM(s)/deciliter      Vital Signs Last 24 Hrs  T(C): 36.4 (14 Nov 2024 12:26), Max: 36.6 (13 Nov 2024 21:41)  T(F): 97.6 (14 Nov 2024 12:26), Max: 97.8 (13 Nov 2024 21:41)  HR: 107 (14 Nov 2024 13:00) (74 - 107)  BP: 149/72 (14 Nov 2024 13:00) (93/52 - 172/82)  BP(mean): 96 (14 Nov 2024 13:00) (64 - 107)  RR: 17 (14 Nov 2024 13:00) (12 - 21)  SpO2: 96% (14 Nov 2024 13:00) (96% - 100%)    Parameters below as of 14 Nov 2024 12:00  Patient On (Oxygen Delivery Method): nasal cannula  O2 Flow (L/min): 2      PHYSICAL EXAM  General: adult in NAD  HEENT: clear oropharynx, anicteric sclera, pink conjunctivae  Neck: supple  CV: normal S1S2 with no murmur rubs or gallops  Lungs: clear to auscultation, no wheezes, no rhales  Abdomen: soft non-tender non-distended, no hepato/splenomegaly  Ext: no clubbing cyanosis or edema  Skin: no rashes and no petichiae  Neuro: alert and oriented X3 no focal deficits      LABS:  CBC Full  -  ( 14 Nov 2024 12:00 )  WBC Count : 4.42 K/uL  RBC Count : 3.10 M/uL  Hemoglobin : 8.9 g/dL  Hematocrit : 27.6 %  Platelet Count - Automated : 178 K/uL  Mean Cell Volume : 89.0 fL  Mean Cell Hemoglobin : 28.7 pg  Mean Cell Hemoglobin Concentration : 32.2 g/dL  Auto Neutrophil # : x  Auto Lymphocyte # : x  Auto Monocyte # : x  Auto Eosinophil # : x  Auto Basophil # : x  Auto Neutrophil % : x  Auto Lymphocyte % : x  Auto Monocyte % : x  Auto Eosinophil % : x  Auto Basophil % : x    11-14    137  |  101  |  19  ----------------------------<  287[H]  4.2   |  26  |  1.19    Ca    8.9      14 Nov 2024 12:00  Phos  4.0     11-14  Mg     1.4     11-14    TPro  5.5[L]  /  Alb  2.3[L]  /  TBili  0.2  /  DBili  x   /  AST  17  /  ALT  15  /  AlkPhos  57  11-14    PT/INR - ( 13 Nov 2024 09:04 )   PT: 11.0 sec;   INR: 0.93 ratio         PTT - ( 13 Nov 2024 09:04 )  PTT:30.2 sec    fe studies      WBC trend  4.42 K/uL (11-14-24 @ 12:00)  4.76 K/uL (11-13-24 @ 18:38)  5.07 K/uL (11-13-24 @ 09:04)      Hgb trend  8.9 g/dL (11-14-24 @ 12:00)  7.7 g/dL (11-13-24 @ 18:38)  5.1 g/dL (11-13-24 @ 09:04)      plt trend  178 K/uL (11-14-24 @ 12:00)  190 K/uL (11-13-24 @ 18:38)  201 K/uL (11-13-24 @ 09:04)        RADIOLOGY & ADDITIONAL STUDIES:    
Patient is a 68y old  Male who presents with a chief complaint of GIB    BRIEF HOSPITAL COURSE:  68 MALE with  PMH NSTEMI type 1 treated with plavix, schizophrenia, bipolar d/o, HTN, afib on eliquis, DM2 on metformin, BIBEMS for hypotension. Patient is poor historian and AOx1 at baseline has no complaints at time of evaluation. Spoke with [facility] who state that he received am medications including Eliquis and metformin. Patient recently admitted to [place] on 11/01 for similar episode requiring ICU admission found to have no active signs of bleeding and negative hematology w/u. In ED SBP 90's and labs show hgb 5 with positive fecal occult stool without any major bleeding episode noted by nursing staff. Ordered for 1u PRBC, and Kcentra for Eliquis reversal with repeat CBC at 4pm, K 5.8 ordered for insulin and calcium gluconate.      PAST MEDICAL & SURGICAL HISTORY:  Schizophrenia      Hypothyroidism      Hyperlipidemia      Schizophrenia      Hypothyroid      Anxiety      DM (diabetes mellitus)      Parkinsonism      Schizophrenia      Hyponatremia      NSTEMI (non-ST elevation myocardial infarction)      Bipolar disorder      DM (diabetes mellitus)      Dementia      HTN (hypertension)      Chronic CHF      MDD (major depressive disorder)      Hypothyroidism      HLD (hyperlipidemia)      Hypothyroidism      Constipation      Folate deficiency      Constipation      H/O candidiasis      Violent behavior      Atrial fibrillation      Hyperkalemia      Epistaxis      DM (diabetes mellitus)          Review of Systems:  Due to altered mental status, subjective information were not able to be obtained from the patient. History was obtained, to the extent possible, from review of the chart and collateral sources of information.      Medications:        divalproex ER 1500 milliGRAM(s) Oral daily  QUEtiapine 400 milliGRAM(s) Oral at bedtime  risperiDONE   Tablet 4 milliGRAM(s) Oral two times a day  traZODone 100 milliGRAM(s) Oral at bedtime        pantoprazole  Injectable 40 milliGRAM(s) IV Push two times a day      atorvastatin 40 milliGRAM(s) Oral at bedtime  dextrose 50% Injectable 25 Gram(s) IV Push once  dextrose 50% Injectable 25 Gram(s) IV Push once  dextrose 50% Injectable 12.5 Gram(s) IV Push once  dextrose Oral Gel 15 Gram(s) Oral once PRN  glucagon  Injectable 1 milliGRAM(s) IntraMuscular once  insulin lispro (ADMELOG) corrective regimen sliding scale   SubCutaneous every 6 hours  levothyroxine 88 MICROGram(s) Oral daily    dextrose 5%. 1000 milliLiter(s) IV Continuous <Continuous>  dextrose 5%. 1000 milliLiter(s) IV Continuous <Continuous>  lactated ringers. 1000 milliLiter(s) IV Continuous <Continuous>  magnesium sulfate  IVPB 1 Gram(s) IV Intermittent once      chlorhexidine 2% Cloths 1 Application(s) Topical <User Schedule>            ICU Vital Signs Last 24 Hrs  T(C): 36.4 (14 Nov 2024 12:26), Max: 36.6 (13 Nov 2024 21:41)  T(F): 97.6 (14 Nov 2024 12:26), Max: 97.8 (13 Nov 2024 21:41)  HR: 107 (14 Nov 2024 13:00) (74 - 107)  BP: 149/72 (14 Nov 2024 13:00) (93/52 - 172/82)  BP(mean): 96 (14 Nov 2024 13:00) (64 - 107)  ABP: --  ABP(mean): --  RR: 17 (14 Nov 2024 13:00) (12 - 25)  SpO2: 96% (14 Nov 2024 13:00) (96% - 100%)    O2 Parameters below as of 14 Nov 2024 12:00  Patient On (Oxygen Delivery Method): nasal cannula  O2 Flow (L/min): 2              I&O's Detail    13 Nov 2024 07:01  -  14 Nov 2024 07:00  --------------------------------------------------------  IN:    Lactated Ringers: 1087.5 mL    PRBCs (Packed Red Blood Cells): 600 mL  Total IN: 1687.5 mL    OUT:    Voided (mL): 1975 mL  Total OUT: 1975 mL    Total NET: -287.5 mL      14 Nov 2024 07:01  -  14 Nov 2024 13:48  --------------------------------------------------------  IN:    Lactated Ringers: 250 mL    Oral Fluid: 300 mL  Total IN: 550 mL    OUT:    Voided (mL): 900 mL  Total OUT: 900 mL    Total NET: -350 mL            LABS:                        8.9    4.42  )-----------( 178      ( 14 Nov 2024 12:00 )             27.6     11-14    137  |  101  |  19  ----------------------------<  287[H]  4.2   |  26  |  1.19    Ca    8.9      14 Nov 2024 12:00  Phos  4.0     11-14  Mg     1.4     11-14    TPro  5.5[L]  /  Alb  2.3[L]  /  TBili  0.2  /  DBili  x   /  AST  17  /  ALT  15  /  AlkPhos  57  11-14          CAPILLARY BLOOD GLUCOSE      POCT Blood Glucose.: 279 mg/dL (14 Nov 2024 12:22)    PT/INR - ( 13 Nov 2024 09:04 )   PT: 11.0 sec;   INR: 0.93 ratio         PTT - ( 13 Nov 2024 09:04 )  PTT:30.2 sec  Urinalysis Basic - ( 14 Nov 2024 12:00 )    Color: x / Appearance: x / SG: x / pH: x  Gluc: 287 mg/dL / Ketone: x  / Bili: x / Urobili: x   Blood: x / Protein: x / Nitrite: x   Leuk Esterase: x / RBC: x / WBC x   Sq Epi: x / Non Sq Epi: x / Bacteria: x      CULTURES:        Physical Examination:    General: No acute distress. Alert, interactive, nonfocal    HEENT: Pupils equal, reactive to light.  Symmetric.    PULM: Clear to auscultation bilaterally, no significant sputum production    CVS: Regular rate and rhythm, no murmurs, rubs, or gallops    ABD: Soft, nondistended, nontender, normoactive bowel sounds, no masses    EXT: No edema, nontender    SKIN: Warm and well perfused, no rashes noted.    NEURO: A&Ox1, strength 5/5 all extremities, cranial nerves grossly intact, no focal deficits            RADIOLOGY:

## 2024-11-15 NOTE — DISCHARGE NOTE PROVIDER - HOSPITAL COURSE
Patient admitted on 11/13 for anemia.  FouHPI:  ****Collateral information obtained from notes as patient is a poor historian.  He kept on asking for something to drink.  When asked if he knows where he is or why he is in the hospital, he kept on saying "ok."  Could not elicit specific HPI or ROS.***    68M with [facility] with hx of schizophrenia/bipolar, hx of NSTEMI, hx of hyponatremia, recent admission to [facility] for anemia  (needed ICU, transfusion, kcentra, and brief period of levophed but no source was identified) who presents from [facility] again for anemia.  Found to have outpatient hgb of 6.  Also was noted to be hypotensive as well.  Brought here for further evaluation.   In the ED, patient's triage vitals were  BP 98/52  HR 74, RR 18, 98% on NC, T 97F.  Hgb was 5.1 with +occult blood.  Also noted to have hyperkalemia and HUBERT with Cr 1.6 (discharged at 0.83).  Patient ordered for 2 units.  Patient admitted to [facility] for further evaluation and management.  Currently patient has no pain or SOB. No light-headedness or dizziness.       Patient admitted on 11/13 for anemia.  Found to hgb of 6.  Also was noted to be hypotensive as well.  Brought here for further evaluation.   In the ED, patient's triage vitals were  BP 98/52  HR 74, RR 18, 98% on NC, T 97F.  Hgb was 5.1 with +occult blood.  Also noted to have hyperkalemia and HUBERT with Cr 1.6 (discharged at 0.83).  Patient ordered for 2 units.  Patient was admitted to [facility].  Patient's repeat hgb post transfusion was 7.7.  Subsequent hgb continued to rise without intervention, as high as 9.3 on discharge.  Patient was hemodynamically stable throughout and BP remained stable.  GI was consulted and did not think he needed any inpatient interventions and can follow up was an outpatient.

## 2024-11-15 NOTE — PROGRESS NOTE ADULT - REASON FOR ADMISSION
anemia, GI bleed

## 2024-11-15 NOTE — DISCHARGE NOTE NURSING/CASE MANAGEMENT/SOCIAL WORK - PATIENT PORTAL LINK FT
You can access the FollowMyHealth Patient Portal offered by [facility] by registering at the following website: [email or web address]. By joining [facility]’s FollowMyHealth portal, you will also be able to view your health information using other applications (apps) compatible with our system.

## 2024-11-15 NOTE — SOCIAL WORK PROGRESS NOTE - NSSWPROGRESSNOTE_GEN_ALL_CORE
Pt will be discharged today back to [facility] at 1pm [facility].  Ambulance trip will be billed back to hospital as per DARYL Hickman manager.   SW spoke to patients brother Alis who is in agreement with plan.

## 2024-11-15 NOTE — PROGRESS NOTE ADULT - ASSESSMENT
REASON FOR VISIT  .. Management of problems listed below      ROS  . Patient unable to give ROS     PHYSICAL EXAM    HEENT Unremarkable  atraumatic   RESP Fair air entry  Harsh breath sound   CARDIAC S1 S2 No S3     NO JVD    ABDOMEN No hepatosplenomegaly   PEDAL EDEMA present No calf tenderness  NO rash       [id number]  BEST PRACTICE ISSUES.  . HOB ELEVATN.    .... Yes  . DIET.   .... 11/14/2024 clear liq   . IV FLUIDS.  .... 11/13/2024 lr 75   . DVT PPLX.    ....   . STRESS ULCER PPLX.   .... 11/13/2024 protonix 40.2   . INFECTION PPLX.   .... 11/13/2024 chlorhexidine 2%     [id number]  GENERAL DATA .   [facility].    ..    ICU STAY.    .. 11/13-11/14   COVID.   ..   ALLGY.   ..     clozapine lithium   WT.   ..  11/13/2024 96   BMI.  .. 11/13/2024 37  ISOLATION.        [id number]  VITALS/GAS EXCHANGE/DRIPS  ABGS.   .  VS/ PO/IO/ VENT/ DRIPS.  11/15/2024 afeb 68 160/80   11/15/2024 ra 98%    CHIEF COMPLAINT.   . 11/13/2024 sent from [facility], full code, recent admission to [place]/blood transfusion, sent here for low blood pressure    OVERALL PRESENTATION.  . 11/13/2024 68 MALE with  PMH NSTEMI, schizophrenia, bipolar d/o, HTN, hypoNa BIBEMS from [facility] for anemia  and hypotension today. Patient was admitted to [facility] last week for same and required transfusion and pressors for time until his BP and hemoglobin stabilized.  No clear source of anemia was found.  GI workup and hematology workup was all negative.  Patient is on Eliquis for chronic A-fib.  Patient is unable to give much history and denies any symptoms when asked.    RECENT ADMISSION  . A fib ON APIXABA  .... 11/1 taken off apixa   . CAD 10/31-11/1 Tr 34-52   . SEVERE ANEMIA 10/31-11/1 Hb 5.1-9.3   . TRANSFUSION 11/1/2024 3 u   . APIXABA  .... 11/1/2024 Given Kcentra .     PAST HOSPITAL STAYS .   .  HOME MEDS.    XXXX[id number]  PROBLEM ASSESSMENT RECOMMENDATIONS.  RESP.   .... Target po 90-95%    INFECTION.  .... w 11/13/2024 w 5   .... cxr 11/13/2024 napd   .... no active infection suspected     CARDIAC.  .... Trop 11/13/2024 tr 9.8   .... pbnp 11/13/2024 pbnp 338   .... cxr 11/13/2024 napd  .... 11/13/2024 atorvastat 40   .... target map 65 (+)   .... cardiac status is stable      HEMAT.  . ANEMIA   .... Hb 11/13/2024 Hb 5.1   .... 11/13-11/14-11/14-11/15/2024 Hb 5.1 - 7.7-8.9 - 9.3   .... Plt  11/13/2024 plt 201  .... inr 11/13/2024 inr .93   .... 11/14/2024 ctap no rp bl  narroing distal sigmoid colon   .... 11/13/2024 Anemia need to exclude gi bl   .... 11/13/2024 suggets ctabd to exclude RP bleed   .... 11/13/2024 Transfuse to Hb 7 (+)     . APIXABA  .... 11/13/2024 12p given kcentra 4000 u     . TRANSFUSION  .... 11/13/2024 3 u     GI.  . GI bleed   . SOB 11/13/2024 SOB (+)   .... 11/13/2024 ppi  .... 11/13/2024GI eval     RENAL.  . HUBERT   .... Na 11/13/2024 Na 140   .... CO2 11/13/2024 CO2 27   .... AG 11/13/2024 ag 7  .... Alb 11/13/2024 alb 2.3   .... Cr 11/13/2024 Cr 1.6   .... monitor     . HYPERKALEMIA   .... K 11/13-11/14/2024 K 5.8- 4.2    . HYPOMAGNESEMIA   .... Mg 11/14/2024 Mg 1.4     ENDO.  . HYPOTHYROID  .... 11/14/2024 levoxyl 88   .... 11/13/2024 levoxyl 40 iv DCED    . DM  .... 11/13 sl scale     NEURO.  .... 11/13/2024 depakote 1500   .... 11/13/2024 quetiapine 400   .... 11/13/2024 risperdone 4.2   .... 11/13/2024 trazodone 100     XXXXXXXXXXXXXXXXXXXXXX   SUMMARY  CC.   . 11/13/2024 LO BP   PROBLEMS .  . LHYPOTENSION  . A fib ON APIXABA  .... 11/13/2024 apixaba stoppd because of gi bl   . GI BLEED  11/13/2024 SOB (+)   . FOCAL NARROWING DISTAL SIGMOID COLON 11/14/2024 CT   .... 11/14/2024 Recommend GI eval and followup   . APIXABA  .... 11/13/2024 12p given kcentra 4000 u   . ANEMIA   .... 11/13-11/14-11/14/2024 Hb 5.1 - 7.7-8.9   . TRANSFUSION  .... 11/13/2024 3 u   . HYPERKALEMIA 11/13/2024 K 5.8   . HUBERT  11/13/2024 Cr 1.6      PMH.  . Hytn   . Afib on Eliquis,   . NSTEMI,   . Hyponna  . schizophrenia,     TIME SPENT.  . Over 36 minutes aggregate critical care time spent on encounter; activities included   direct patient care, counseling and/or coordinating care reviewing notes, lab data/ imaging , discussion with multidisciplinary team/ patient  /family and explaining in detail risks, benefits, alternatives  of the recommendations       CLIFFORD ABDALLA 67 m 11/13/2024 1955

## 2024-11-15 NOTE — PROGRESS NOTE ADULT - ASSESSMENT
The patient is a 68 year old male with a history of HTN, bipolar disorder, schizophrenia, presume CAD with prior NSTEMI, paroxysmal atrial fibrillation who presents with anemia and hypotension.    Plan:  - ECG with sinus rhythm; cannot exclude old inferior MI  - Echo 10/23 with hyperdynamic LV systolic function  - The patient has paroxysmal atrial fibrillation and was on apixaban  - He was additionally on aspirin and clopidogrel for prior NSTEMI  - However, given ongoing issues of anemia and concern for GI bleed, will discontinue all anticoagulation and antiplatelets. Risks outweigh benefits.  - At a later date, if hemoglobin remains stable, can consider resuming aspirin at that time  - The patient is a poor candidate for alternatives to anticoagulation (he is not a candidate for LAAO device implantation)  - HUBERT resolved - can resume losartan if BPs trend up  - Continue atorvastatin 40 mg daily  - Monitor hemoglobin  - Transfuse as needed

## 2024-11-15 NOTE — DISCHARGE NOTE PROVIDER - NSDCCPCAREPLAN_GEN_ALL_CORE_FT
PRINCIPAL DISCHARGE DIAGNOSIS  Diagnosis: Symptomatic anemia  Assessment and Plan of Treatment: You were diagnosed with anemia, low blood count.  You were given blood transfusions and your blood count stablized.  Your blood pressure and the rest of your vitals were stable.  You did not exhibit any symptoms.  It was possible that you may have had an underlying GI bleed but you did not require an interventions since your blood count improved and remained stable.  PLEASE return to the ED if you experience any worsening symptoms such as dizziness, light-headedness, shortness of breath, weakness, confusion, chest pain.  PLEASE follow up with the GI doctor within a month for further management.  PLEASE continue taking pantoprazole 40mg twice a day.     PRINCIPAL DISCHARGE DIAGNOSIS  Diagnosis: Symptomatic anemia  Assessment and Plan of Treatment: You were diagnosed with anemia, low blood count.  You were given blood transfusions and your blood count stablized.  Your blood pressure and the rest of your vitals were stable.  You did not exhibit any symptoms.  It was possible that you may have had an underlying GI bleed but you did not require an interventions since your blood count improved and remained stable.  PLEASE return to the ED if you experience any worsening symptoms such as dizziness, light-headedness, shortness of breath, weakness, confusion, chest pain.  PLEASE follow up with the GI doctor within a month for further management.  PLEASE continue taking pantoprazole 40mg twice a day.      SECONDARY DISCHARGE DIAGNOSES  Diagnosis: Abnormal finding on CT scan  Assessment and Plan of Treatment: You were found to have focal narrowing of the distal sigmoid colon which may be related to peristalsis. However, it is recommend to have a colonoscopy to exclude underlying malignancy. Therefore PLEASE follow up with the GI doctor for further evaluation.

## 2024-11-15 NOTE — PROGRESS NOTE ADULT - PROVIDER SPECIALTY LIST ADULT
[facility] Pediatrics  [address] [place] [address]  [address] [place] [address]  Phone: [phone]  Fax: [phone]

## 2024-11-15 NOTE — DISCHARGE NOTE NURSING/CASE MANAGEMENT/SOCIAL WORK - FINANCIAL ASSISTANCE
[facility] provides services at a reduced cost to those who are determined to be eligible through [facility]’s financial assistance program. Information regarding [facility]’s financial assistance program can be found by going to [email or web address] or by calling [phone].

## 2024-11-15 NOTE — PHYSICAL THERAPY INITIAL EVALUATION ADULT - PERTINENT HX OF CURRENT PROBLEM, REHAB EVAL
Reason for Admission: anemia, GI bleed  History of Present Illness:   ****Collateral information obtained from notes as patient is a poor historian.  He kept on asking for something to drink.  When asked if he knows where he is or why he is in the hospital, he kept on saying "ok."  Could not elicit specific HPI or ROS.***    68M with [facility] with hx of schizophrenia/bipolar, hx of NSTEMI, hx of hyponatremia, recent admission to [facility] for anemia  (needed ICU, transfusion, kcentra, and brief period of levophed but no source was identified) who presents from [facility] again for anemia.  Found to have outpatient hgb of 6.  Also was noted to be hypotensive as well.  Brought here for further evaluation.   In the ED, patient's triage vitals were  BP 98/52  HR 74, RR 18, 98% on NC, T 97F.  Hgb was 5.1 with +occult blood.  Also noted to have hyperkalemia and HUBERT with Cr 1.6 (discharged at 0.83).  Patient ordered for 2 units.  Patient admitted to [facility] for further evaluation and management.  Currently patient has no pain or SOB. No light-headedness or dizziness.   H/H for 11/15/24 9.3/28.7.

## 2024-11-15 NOTE — DISCHARGE NOTE PROVIDER - CARE PROVIDER_API CALL
[facility]  Gastroenterology  [address] [place] [id number]  Phone: [phone]  Fax: [phone]  Follow Up Time:

## 2024-11-15 NOTE — PROGRESS NOTE ADULT - ASSESSMENT
[ASSESSMENT and  PLAN]    D62.0 Anemia due to blood loss  D63.8 Anemia due to chronic disease  K92.2 Gastrointestinal bleeding  D50.0 Likely iron deficiency anemia    69 yo man from [facility], hx NSTEMI, schizoprenica, A fib on Eliquis, HTN, hyponatremia, adm for hypotension SMP 70s and Hgb 6  Patient found to have anemia with hemoglobin 5.1 g/dL.  FOBT positive  Status post 2 unit PRBC transfusion  Admit to S PCU.    In Oct 2024 BP low 80s/50s, CBC wbc 5.83 hgb 5.1 hct 15.4 plts 162 mcv 95.7  BUN/Cr 47/1.3    stool occult blood negative  Given 2L fluid, 3u PRBC, Kcentra,, adm to ICU  post transfusion CBC today first lab Hgb 8.1, second lab Hgb 9.3  CT head negative  CT c/a/p-mild dep atelectasis, sm right effusion    Anemia due to blood loss    Prior workup 1mo ago  appropriate increase of Hgb w the transfusion  no signs of hemolysis  TSH normal  iron studies and B12, folate, Showed iron deficiency with adequate B12 and folate levels  ESR elevated at 31.    has received 3 units PRBC since admission with improvement in hgb 9.3        RECOMMENDATIONS    transfuse as clinically indicated   ferritin 49    to treat with venofer 100mg IV daily x 3      continue to hold anticoagulation.  agree with discharge to [facility] holding all anticoagulation  hematologically stable for discharge to [facility]

## 2024-11-15 NOTE — DISCHARGE NOTE PROVIDER - ATTENDING DISCHARGE PHYSICAL EXAMINATION:
PHYSICAL EXAM:  Vital Signs Last 24 Hrs  T(C): 36.6 (15 Nov 2024 08:06), Max: 36.7 (14 Nov 2024 20:12)  T(F): 97.8 (15 Nov 2024 08:06), Max: 98 (14 Nov 2024 20:12)  HR: 87 (15 Nov 2024 06:34) (83 - 107)  BP: 128/82 (15 Nov 2024 06:34) (123/75 - 149/72)  BP(mean): 91 (14 Nov 2024 20:12) (91 - 96)  RR: 16 (15 Nov 2024 06:34) (14 - 19)  SpO2: 96% (15 Nov 2024 06:34) (96% - 100%)    Parameters below as of 15 Nov 2024 06:34  Patient On (Oxygen Delivery Method): room air    GENERAL:     less pale appearing male in NAD, seen sitting in chair, does not have any specific complaints, keeps stating he "has money"  HEAD:     atraumatic, normocephalic  EYES:     EOMI, less pale conjunctiva   RESPIRATORY:     clear to auscultation bilaterally, no rales or rhonchi or wheezing or rubs  CARDIOVASCULAR:     regular rate and rhythm, soft i/vi systolic murmur  GASTROINTESTINAL:     soft, nontender, nondistended, bowel sounds present  EXTREMITIES:     no clubbing or cyanosis or edema  MUSCULOSKELETAL:     no joint pain or swelling or deformities  NERVOUS SYSTEM:    moves all 4s  PSYCH:     awake and conversant though mumbles often

## 2025-01-17 NOTE — ED PROVIDER NOTE - CONSTITUTIONAL MOOD
Spoke with mother who states symptoms started with dry cough Monday.  Yesterday fever, congestion and worsening cough.   Though it was croup.   Went to ER where they did a swab but left before seeing the provider as they had been waiting 3-4 hours.     Still has fever, cough and runny nose.  Temp  this morning 101-102. Gave motrin and temp back down.  Has not noticed any wheezing  Patient currently sleeping    Alternating motrin and tylenol. Motrin works better  Giving sore throat lollipops with honey  Beekepers natural for kids natural cough syrup     Looking for further recommendations            appropriate

## 2025-02-18 NOTE — ED ADULT NURSE NOTE - PRO INTERPRETER NEED 2
[English

## 2025-02-24 ENCOUNTER — INPATIENT (INPATIENT)
Facility: HOSPITAL | Age: 70
LOS: 2 days | Discharge: OTHER INSTITUTION | DRG: 684 | End: 2025-02-27
Attending: INTERNAL MEDICINE | Admitting: INTERNAL MEDICINE
Payer: MEDICAID

## 2025-02-24 VITALS
DIASTOLIC BLOOD PRESSURE: 65 MMHG | SYSTOLIC BLOOD PRESSURE: 100 MMHG | RESPIRATION RATE: 18 BRPM | OXYGEN SATURATION: 98 % | TEMPERATURE: 98 F | WEIGHT: 186.07 LBS | HEART RATE: 70 BPM

## 2025-02-24 LAB
ALBUMIN SERPL ELPH-MCNC: 2.5 G/DL — LOW (ref 3.3–5)
ALP SERPL-CCNC: 62 U/L — SIGNIFICANT CHANGE UP (ref 40–120)
ALT FLD-CCNC: 13 U/L — SIGNIFICANT CHANGE UP (ref 12–78)
ANION GAP SERPL CALC-SCNC: 10 MMOL/L — SIGNIFICANT CHANGE UP (ref 5–17)
APPEARANCE UR: CLEAR — SIGNIFICANT CHANGE UP
APTT BLD: 31.3 SEC — SIGNIFICANT CHANGE UP (ref 24.5–35.6)
AST SERPL-CCNC: 12 U/L — LOW (ref 15–37)
BASOPHILS # BLD AUTO: 0 K/UL — SIGNIFICANT CHANGE UP (ref 0–0.2)
BASOPHILS NFR BLD AUTO: 0 % — SIGNIFICANT CHANGE UP (ref 0–2)
BILIRUB SERPL-MCNC: 0.5 MG/DL — SIGNIFICANT CHANGE UP (ref 0.2–1.2)
BILIRUB UR-MCNC: NEGATIVE — SIGNIFICANT CHANGE UP
BLD GP AB SCN SERPL QL: SIGNIFICANT CHANGE UP
BUN SERPL-MCNC: 21 MG/DL — SIGNIFICANT CHANGE UP (ref 7–23)
CALCIUM SERPL-MCNC: 8.7 MG/DL — SIGNIFICANT CHANGE UP (ref 8.5–10.1)
CHLORIDE SERPL-SCNC: 94 MMOL/L — LOW (ref 96–108)
CO2 SERPL-SCNC: 25 MMOL/L — SIGNIFICANT CHANGE UP (ref 22–31)
COLOR SPEC: YELLOW — SIGNIFICANT CHANGE UP
CREAT SERPL-MCNC: 2.3 MG/DL — HIGH (ref 0.5–1.3)
DIFF PNL FLD: ABNORMAL
EGFR: 30 ML/MIN/1.73M2 — LOW
EOSINOPHIL # BLD AUTO: 0 K/UL — SIGNIFICANT CHANGE UP (ref 0–0.5)
EOSINOPHIL NFR BLD AUTO: 0 % — SIGNIFICANT CHANGE UP (ref 0–6)
GLUCOSE SERPL-MCNC: 122 MG/DL — HIGH (ref 70–99)
GLUCOSE UR QL: NEGATIVE MG/DL — SIGNIFICANT CHANGE UP
HCT VFR BLD CALC: 28.5 % — LOW (ref 39–50)
HGB BLD-MCNC: 9.6 G/DL — LOW (ref 13–17)
INR BLD: 1.15 RATIO — SIGNIFICANT CHANGE UP (ref 0.85–1.16)
KETONES UR-MCNC: NEGATIVE MG/DL — SIGNIFICANT CHANGE UP
LACTATE SERPL-SCNC: 1.2 MMOL/L — SIGNIFICANT CHANGE UP (ref 0.7–2)
LEUKOCYTE ESTERASE UR-ACNC: ABNORMAL
LYMPHOCYTES # BLD AUTO: 0.61 K/UL — LOW (ref 1–3.3)
LYMPHOCYTES # BLD AUTO: 4 % — LOW (ref 13–44)
MCHC RBC-ENTMCNC: 27.1 PG — SIGNIFICANT CHANGE UP (ref 27–34)
MCHC RBC-ENTMCNC: 33.7 G/DL — SIGNIFICANT CHANGE UP (ref 32–36)
MCV RBC AUTO: 80.5 FL — SIGNIFICANT CHANGE UP (ref 80–100)
MONOCYTES # BLD AUTO: 1.83 K/UL — HIGH (ref 0–0.9)
MONOCYTES NFR BLD AUTO: 12 % — SIGNIFICANT CHANGE UP (ref 2–14)
NEUTROPHILS # BLD AUTO: 12.79 K/UL — HIGH (ref 1.8–7.4)
NEUTROPHILS NFR BLD AUTO: 79 % — HIGH (ref 43–77)
NITRITE UR-MCNC: NEGATIVE — SIGNIFICANT CHANGE UP
NRBC BLD AUTO-RTO: SIGNIFICANT CHANGE UP /100 WBCS (ref 0–0)
PH UR: 5.5 — SIGNIFICANT CHANGE UP (ref 5–8)
PLATELET # BLD AUTO: 158 K/UL — SIGNIFICANT CHANGE UP (ref 150–400)
POTASSIUM SERPL-MCNC: 4.7 MMOL/L — SIGNIFICANT CHANGE UP (ref 3.5–5.3)
POTASSIUM SERPL-SCNC: 4.7 MMOL/L — SIGNIFICANT CHANGE UP (ref 3.5–5.3)
PROT SERPL-MCNC: 6.8 G/DL — SIGNIFICANT CHANGE UP (ref 6–8.3)
PROT UR-MCNC: NEGATIVE MG/DL — SIGNIFICANT CHANGE UP
PROTHROM AB SERPL-ACNC: 13.4 SEC — SIGNIFICANT CHANGE UP (ref 9.9–13.4)
RBC # BLD: 3.54 M/UL — LOW (ref 4.2–5.8)
RBC # FLD: 17.4 % — HIGH (ref 10.3–14.5)
SODIUM SERPL-SCNC: 129 MMOL/L — LOW (ref 135–145)
SP GR SPEC: 1.01 — SIGNIFICANT CHANGE UP (ref 1–1.03)
TROPONIN I, HIGH SENSITIVITY RESULT: 10.8 NG/L — SIGNIFICANT CHANGE UP
UROBILINOGEN FLD QL: 1 MG/DL — SIGNIFICANT CHANGE UP (ref 0.2–1)
VALPROATE SERPL-MCNC: 78 UG/ML — SIGNIFICANT CHANGE UP (ref 50–100)
WBC # BLD: 15.23 K/UL — HIGH (ref 3.8–10.5)
WBC # FLD AUTO: 15.23 K/UL — HIGH (ref 3.8–10.5)

## 2025-02-24 PROCEDURE — 99285 EMERGENCY DEPT VISIT HI MDM: CPT

## 2025-02-24 PROCEDURE — 93010 ELECTROCARDIOGRAM REPORT: CPT

## 2025-02-24 PROCEDURE — 71045 X-RAY EXAM CHEST 1 VIEW: CPT | Mod: 26

## 2025-02-24 RX ORDER — TRAZODONE HCL 100 MG
100 TABLET ORAL ONCE
Refills: 0 | Status: COMPLETED | OUTPATIENT
Start: 2025-02-24 | End: 2025-02-24

## 2025-02-24 RX ADMIN — Medication 100 MILLIGRAM(S): at 21:30

## 2025-02-24 RX ADMIN — Medication 1000 MILLILITER(S): at 21:30

## 2025-02-24 NOTE — ED PROVIDER NOTE - HOW PATIENT ADDRESSED, PROFILE
Scott Double Island Pedicle Flap Text: The defect edges were debeveled with a #15 scalpel blade.  Given the location of the defect, shape of the defect and the proximity to free margins a double island pedicle advancement flap was deemed most appropriate.  Using a sterile surgical marker, an appropriate advancement flap was drawn incorporating the defect, outlining the appropriate donor tissue and placing the expected incisions within the relaxed skin tension lines where possible.    The area thus outlined was incised deep to adipose tissue with a #15 scalpel blade.  The skin margins were undermined to an appropriate distance in all directions around the primary defect and laterally outward around the island pedicle utilizing iris scissors.  There was minimal undermining beneath the pedicle flap.

## 2025-02-24 NOTE — ED PROVIDER NOTE - OBJECTIVE STATEMENT
68 y/o M with  PMH NSTEMI, schizophrenia, bipolar d/o, HTN, hyponatremia, anemia requiring multiple blood transfusions, last admission was 11/2024 presents to the ED from [facility] for hypotension X today rule out sepsis.  Patient unable to provide any further information.

## 2025-02-24 NOTE — ED PROVIDER NOTE - PHYSICAL EXAMINATION
Gen: Chronically ill appearing in NAD.   Eyes: PERRLA  ENT: oral mucosa dry   Head: atraumatic  Heart: s1/s2, RRR  Lung: CTA b/l, no wheezing/rhonchi or rales.  Abd: soft, NT/ND,  Msk: no pedal edema   Neuro: Somnolent but easily arousable   Skin: Pale   Psych: agitated at times

## 2025-02-24 NOTE — ED PROVIDER NOTE - CLINICAL SUMMARY MEDICAL DECISION MAKING FREE TEXT BOX
69 MALE with  PMH NSTEMI, schizophrenia, bipolar d/o, HTN, hypoNa BIBEMS from [facility] for low BP,  unable to obtain other history from patient, per prior notes has required mulsitple transfusiosn in the past, due to GI bleed was on eliquis for afib  will eval for infection or anemia   labs, cultures, h/h

## 2025-02-25 DIAGNOSIS — G93.41 METABOLIC ENCEPHALOPATHY: ICD-10-CM

## 2025-02-25 DIAGNOSIS — A41.9 SEPSIS, UNSPECIFIED ORGANISM: ICD-10-CM

## 2025-02-25 DIAGNOSIS — F20.9 SCHIZOPHRENIA, UNSPECIFIED: ICD-10-CM

## 2025-02-25 DIAGNOSIS — I50.9 HEART FAILURE, UNSPECIFIED: ICD-10-CM

## 2025-02-25 DIAGNOSIS — E03.9 HYPOTHYROIDISM, UNSPECIFIED: ICD-10-CM

## 2025-02-25 DIAGNOSIS — E87.1 HYPO-OSMOLALITY AND HYPONATREMIA: ICD-10-CM

## 2025-02-25 DIAGNOSIS — E78.5 HYPERLIPIDEMIA, UNSPECIFIED: ICD-10-CM

## 2025-02-25 DIAGNOSIS — N17.9 ACUTE KIDNEY FAILURE, UNSPECIFIED: ICD-10-CM

## 2025-02-25 DIAGNOSIS — G20.C PARKINSONISM, UNSPECIFIED: ICD-10-CM

## 2025-02-25 DIAGNOSIS — Z29.9 ENCOUNTER FOR PROPHYLACTIC MEASURES, UNSPECIFIED: ICD-10-CM

## 2025-02-25 DIAGNOSIS — I25.10 ATHEROSCLEROTIC HEART DISEASE OF NATIVE CORONARY ARTERY WITHOUT ANGINA PECTORIS: ICD-10-CM

## 2025-02-25 DIAGNOSIS — E11.9 TYPE 2 DIABETES MELLITUS WITHOUT COMPLICATIONS: ICD-10-CM

## 2025-02-25 DIAGNOSIS — F41.9 ANXIETY DISORDER, UNSPECIFIED: ICD-10-CM

## 2025-02-25 PROCEDURE — G0545: CPT

## 2025-02-25 PROCEDURE — 99222 1ST HOSP IP/OBS MODERATE 55: CPT

## 2025-02-25 RX ORDER — METFORMIN HYDROCHLORIDE 850 MG/1
500 TABLET ORAL
Refills: 0 | Status: DISCONTINUED | OUTPATIENT
Start: 2025-02-25 | End: 2025-02-25

## 2025-02-25 RX ORDER — SODIUM CHLORIDE 9 G/1000ML
1000 INJECTION, SOLUTION INTRAVENOUS
Refills: 0 | Status: DISCONTINUED | OUTPATIENT
Start: 2025-02-25 | End: 2025-02-27

## 2025-02-25 RX ORDER — ASPIRIN 325 MG
81 TABLET ORAL DAILY
Refills: 0 | Status: DISCONTINUED | OUTPATIENT
Start: 2025-02-25 | End: 2025-02-27

## 2025-02-25 RX ORDER — DEXTROSE 50 % IN WATER 50 %
25 SYRINGE (ML) INTRAVENOUS ONCE
Refills: 0 | Status: DISCONTINUED | OUTPATIENT
Start: 2025-02-25 | End: 2025-02-27

## 2025-02-25 RX ORDER — BENZTROPINE MESYLATE 2 MG
2 TABLET ORAL
Refills: 0 | Status: DISCONTINUED | OUTPATIENT
Start: 2025-02-25 | End: 2025-02-27

## 2025-02-25 RX ORDER — LORAZEPAM 4 MG/ML
0.5 VIAL (ML) INJECTION EVERY 6 HOURS
Refills: 0 | Status: DISCONTINUED | OUTPATIENT
Start: 2025-02-25 | End: 2025-02-26

## 2025-02-25 RX ORDER — LEVOTHYROXINE SODIUM 300 MCG
88 TABLET ORAL DAILY
Refills: 0 | Status: DISCONTINUED | OUTPATIENT
Start: 2025-02-25 | End: 2025-02-27

## 2025-02-25 RX ORDER — ONDANSETRON HCL/PF 4 MG/2 ML
4 VIAL (ML) INJECTION EVERY 8 HOURS
Refills: 0 | Status: DISCONTINUED | OUTPATIENT
Start: 2025-02-25 | End: 2025-02-27

## 2025-02-25 RX ORDER — POLYETHYLENE GLYCOL 3350 17 G/17G
17 POWDER, FOR SOLUTION ORAL DAILY
Refills: 0 | Status: DISCONTINUED | OUTPATIENT
Start: 2025-02-25 | End: 2025-02-27

## 2025-02-25 RX ORDER — INSULIN LISPRO 100 U/ML
INJECTION, SOLUTION INTRAVENOUS; SUBCUTANEOUS AT BEDTIME
Refills: 0 | Status: DISCONTINUED | OUTPATIENT
Start: 2025-02-25 | End: 2025-02-25

## 2025-02-25 RX ORDER — CEFTRIAXONE 500 MG/1
1000 INJECTION, POWDER, FOR SOLUTION INTRAMUSCULAR; INTRAVENOUS EVERY 24 HOURS
Refills: 0 | Status: DISCONTINUED | OUTPATIENT
Start: 2025-02-26 | End: 2025-02-26

## 2025-02-25 RX ORDER — LACTOBACILLUS ACIDOPHILUS/PECT 75 MM-100
1 CAPSULE ORAL EVERY 12 HOURS
Refills: 0 | Status: DISCONTINUED | OUTPATIENT
Start: 2025-02-25 | End: 2025-02-27

## 2025-02-25 RX ORDER — METOPROLOL SUCCINATE 50 MG/1
25 TABLET, EXTENDED RELEASE ORAL
Refills: 0 | Status: DISCONTINUED | OUTPATIENT
Start: 2025-02-25 | End: 2025-02-27

## 2025-02-25 RX ORDER — QUETIAPINE FUMARATE 25 MG/1
400 TABLET ORAL DAILY
Refills: 0 | Status: DISCONTINUED | OUTPATIENT
Start: 2025-02-25 | End: 2025-02-27

## 2025-02-25 RX ORDER — HEPARIN SODIUM 1000 [USP'U]/ML
5000 INJECTION INTRAVENOUS; SUBCUTANEOUS EVERY 8 HOURS
Refills: 0 | Status: DISCONTINUED | OUTPATIENT
Start: 2025-02-25 | End: 2025-02-27

## 2025-02-25 RX ORDER — DEXTROSE 50 % IN WATER 50 %
12.5 SYRINGE (ML) INTRAVENOUS ONCE
Refills: 0 | Status: DISCONTINUED | OUTPATIENT
Start: 2025-02-25 | End: 2025-02-27

## 2025-02-25 RX ORDER — QUETIAPINE FUMARATE 25 MG/1
200 TABLET ORAL DAILY
Refills: 0 | Status: DISCONTINUED | OUTPATIENT
Start: 2025-02-25 | End: 2025-02-25

## 2025-02-25 RX ORDER — MELATONIN 5 MG
3 TABLET ORAL AT BEDTIME
Refills: 0 | Status: DISCONTINUED | OUTPATIENT
Start: 2025-02-25 | End: 2025-02-27

## 2025-02-25 RX ORDER — RISPERIDONE 4 MG
4 TABLET ORAL
Refills: 0 | Status: DISCONTINUED | OUTPATIENT
Start: 2025-02-25 | End: 2025-02-27

## 2025-02-25 RX ORDER — BISACODYL 5 MG
10 TABLET, DELAYED RELEASE (ENTERIC COATED) ORAL DAILY
Refills: 0 | Status: DISCONTINUED | OUTPATIENT
Start: 2025-02-25 | End: 2025-02-27

## 2025-02-25 RX ORDER — CEFTRIAXONE 500 MG/1
1000 INJECTION, POWDER, FOR SOLUTION INTRAMUSCULAR; INTRAVENOUS ONCE
Refills: 0 | Status: COMPLETED | OUTPATIENT
Start: 2025-02-25 | End: 2025-02-25

## 2025-02-25 RX ORDER — MAGNESIUM, ALUMINUM HYDROXIDE 200-200 MG
30 TABLET,CHEWABLE ORAL EVERY 4 HOURS
Refills: 0 | Status: DISCONTINUED | OUTPATIENT
Start: 2025-02-25 | End: 2025-02-27

## 2025-02-25 RX ORDER — INSULIN LISPRO 100 U/ML
INJECTION, SOLUTION INTRAVENOUS; SUBCUTANEOUS
Refills: 0 | Status: DISCONTINUED | OUTPATIENT
Start: 2025-02-25 | End: 2025-02-27

## 2025-02-25 RX ORDER — ATORVASTATIN CALCIUM 80 MG/1
40 TABLET, FILM COATED ORAL AT BEDTIME
Refills: 0 | Status: DISCONTINUED | OUTPATIENT
Start: 2025-02-25 | End: 2025-02-27

## 2025-02-25 RX ORDER — ACETAMINOPHEN 500 MG/5ML
650 LIQUID (ML) ORAL EVERY 6 HOURS
Refills: 0 | Status: DISCONTINUED | OUTPATIENT
Start: 2025-02-25 | End: 2025-02-27

## 2025-02-25 RX ORDER — DEXTROSE 50 % IN WATER 50 %
15 SYRINGE (ML) INTRAVENOUS ONCE
Refills: 0 | Status: DISCONTINUED | OUTPATIENT
Start: 2025-02-25 | End: 2025-02-27

## 2025-02-25 RX ORDER — CEFTRIAXONE 500 MG/1
INJECTION, POWDER, FOR SOLUTION INTRAMUSCULAR; INTRAVENOUS
Refills: 0 | Status: DISCONTINUED | OUTPATIENT
Start: 2025-02-25 | End: 2025-02-26

## 2025-02-25 RX ORDER — GLUCAGON 3 MG/1
1 POWDER NASAL ONCE
Refills: 0 | Status: DISCONTINUED | OUTPATIENT
Start: 2025-02-25 | End: 2025-02-27

## 2025-02-25 RX ADMIN — Medication 81 MILLIGRAM(S): at 11:31

## 2025-02-25 RX ADMIN — QUETIAPINE FUMARATE 400 MILLIGRAM(S): 25 TABLET ORAL at 14:03

## 2025-02-25 RX ADMIN — HEPARIN SODIUM 5000 UNIT(S): 1000 INJECTION INTRAVENOUS; SUBCUTANEOUS at 06:09

## 2025-02-25 RX ADMIN — Medication 88 MICROGRAM(S): at 06:10

## 2025-02-25 RX ADMIN — Medication 1 TABLET(S): at 17:38

## 2025-02-25 RX ADMIN — CEFTRIAXONE 1000 MILLIGRAM(S): 500 INJECTION, POWDER, FOR SOLUTION INTRAMUSCULAR; INTRAVENOUS at 11:30

## 2025-02-25 RX ADMIN — Medication 1000 MILLILITER(S): at 00:00

## 2025-02-25 RX ADMIN — Medication 2 MILLIGRAM(S): at 06:10

## 2025-02-25 RX ADMIN — METFORMIN HYDROCHLORIDE 500 MILLIGRAM(S): 850 TABLET ORAL at 06:09

## 2025-02-25 RX ADMIN — METOPROLOL SUCCINATE 25 MILLIGRAM(S): 50 TABLET, EXTENDED RELEASE ORAL at 06:10

## 2025-02-25 RX ADMIN — Medication 4 MILLIGRAM(S): at 17:38

## 2025-02-25 RX ADMIN — METOPROLOL SUCCINATE 25 MILLIGRAM(S): 50 TABLET, EXTENDED RELEASE ORAL at 17:38

## 2025-02-25 RX ADMIN — Medication 0.5 MILLIGRAM(S): at 20:29

## 2025-02-25 RX ADMIN — Medication 100 MILLILITER(S): at 14:03

## 2025-02-25 RX ADMIN — Medication 40 MILLIGRAM(S): at 11:31

## 2025-02-25 RX ADMIN — Medication 4 MILLIGRAM(S): at 06:09

## 2025-02-25 RX ADMIN — Medication 1500 MILLIGRAM(S): at 11:31

## 2025-02-25 RX ADMIN — HEPARIN SODIUM 5000 UNIT(S): 1000 INJECTION INTRAVENOUS; SUBCUTANEOUS at 14:03

## 2025-02-25 RX ADMIN — HEPARIN SODIUM 5000 UNIT(S): 1000 INJECTION INTRAVENOUS; SUBCUTANEOUS at 21:36

## 2025-02-25 RX ADMIN — ATORVASTATIN CALCIUM 40 MILLIGRAM(S): 80 TABLET, FILM COATED ORAL at 21:36

## 2025-02-25 RX ADMIN — Medication 100 MILLILITER(S): at 21:36

## 2025-02-25 RX ADMIN — Medication 2 MILLIGRAM(S): at 17:38

## 2025-02-25 NOTE — CARE COORDINATION ASSESSMENT. - NSCAREPROVIDERS_GEN_ALL_CORE_FT
CARE PROVIDERS:  Accepting Physician: Karin Choi  Administration: Petr Guaman  Administration: Samantha Smyth  Admitting: Karin Choi  Attending: Karin Choi  Cardiology Technician: Yasmin Euceda  Consultant: Charly Webster  Consultant: Weil, Patricia  Consultant: Reginaldo Broderick  Consultant: Nikole Corrales  ED ACP: Juanita Yousif ED Attending: Maryuri Womack ED Nurse: Britney Larsen  Nurse: Barry Sharpe  Nurse: Britney Larsen  Ordered: ServiceAccount, SCMMLM  Ordered: Doctor, Unknown  Outpatient Provider: Pahlavan, Mohsen  Outpatient Provider: Pahlavan, Mohsen  Primary Team: Juanita Yousif  : Amanda Freeman  UR// Supp. Assoc.: Sada Pedraza  UR// Supp. Assoc.: Rebecca Meyer   CARE PROVIDERS:  Accepting Physician: Karin Choi  Access Services: Antoine Melton  Administration: Kal Chandler  Administration: Petr Guaman  Admitting: Karin Choi  Attending: Karin Choi  Cardiology Technician: Yasmin Euceda  Case Management: Juliet Gallardo  Consultant: Charly Webster  Consultant: Weil, Patricia  Consultant: Reginaldo Broderick  Consultant: Nikole Corrales  Covering Team: Cielo Love  ED ACP: Juanita Yousif ED Attending: Maryuri Womack ED Nurse: Britney Larsen  Nurse: Loc Patricia  Nurse: Anthony Segura  Nurse: Kevin Spencer  Nurse: Rosette Berry  Ordered: Doctor, Unknown  Ordered: Izaiah, [facility]  Outpatient Provider: Pahlavan, Mohsen  Outpatient Provider: Pahlavan, Mohsen  Override: Juan Gaines  Override: Anthony Segura  Override: Janine Arriola  Override: Keke Stock  Override: Jenniffer Schulte  Override: Kalina Aquino  PCA/Nursing Assistant: Diamond Olivas  Primary Team: Lissette Flores  Registered Dietitian: Sybil King  Research: Nicolás Kaye  : Hilary Gonzalez  : Amanda Freeman

## 2025-02-25 NOTE — H&P ADULT - NSHPLABSRESULTS_GEN_ALL_CORE
< from: TTE W or WO Ultrasound Enhancing Agent (10.09.23 @ 08:01) >      TRANSTHORACIC ECHOCARDIOGRAM REPORT  ________________________________________________________________________________                                      _______       Pt. Name:       RM HORTON Study Date:    10/9/2023  MRN:            [id number]   YOB: 1955  Accession #:    [id number]    Age:           67 years  Account#:       [id number] Gender:        M  Heart Rate:     69 bpm       Height:        65.00 in (165.10 cm)  Rhythm:         sinus rhythm Weight:        199.00 lb (90.27 kg)  Blood Pressure: 173/88 mmHg  BSA/BMI:       1.97 m² / 33.12 kg/m²  ________________________________________________________________________________________  Referring Physician:    [id number] Nickie Contreras  Interpreting Physician: William Saavedra MD  Primary Sonographer:    Fariha Mary [facility]    CPT:               ECHO TTE W/O CON F/U [facility] - 64855.m;LIMITED \[facility] [place] [facility]\"" - 84249.m  Indication(s):     Chest pain, unspecified - R07.9  Procedure:         Limited transthoracic echocardiogram.  Ordering Location: [facility]  Admission Status:  ED  Study Information: Image quality for this study is technically difficult.    _______________________________________________________________________________________     CONCLUSIONS:      1. Technically difficult image quality.   2. Left ventricular cavity is small. Left ventricular systolic function is hyperdynamic. There are no regional wall motion abnormalities seen.    ________________________________________________________________________________________  FINDINGS:     Left Ventricle:  The left ventricular cavity is small. Left ventricular systolic function is hyperdynamic with an ejection fraction visually estimated at >75%. There are no regional wall motion abnormalities seen.     Aortic Valve:  The aortic valve is tricuspid with normal leaflet excursion.     Mitral Valve:  There is calcification of the mitral valve annulus. There is trace mitral regurgitation.     Pulmonic Valve:  The pulmonic valve was not well visualized.  ____________________________________________________________________  Quantitative Data:  Left Ventricle Measurements: (Indexed to BSA)     IVSd (2D):   1.4 cm  LVPWd (2D):  1.5 cm  LVIDd (2D):  3.9 cm  LVIDs (2D):  2.6 cm  LV Mass:     213 g  108.0 g/m²  Visualized LV EF%: >75%     MV E Vmax: 0.80 m/s  MV A Vmax: 1.10 m/s  MV E/A:    0.73    Aorta Measurements: (normal range) (Indexed to BSA)     Sinuses of Valsalva: 3.60 cm (3.1 - 3.7 cm)  Ao Asc prox:         3.90 cm       Left Atrium Measurements: (Indexed to BSA)  LA Diam 2D: 3.50 cm       LVOT / RVOT/ Qp/Qs Data: (Indexed to BSA)  LVOT Diameter: 2.20 cm    Mitral Valve Measurements:     MV E Vmax: 0.8 m/s  MV A Vmax: 1.1 m/s  MV E/A:    0.7    < end of copied text >

## 2025-02-25 NOTE — CONSULT NOTE ADULT - ASSESSMENT
Initial evaluation/Pulmonary Critical Care Consultation requested by Dr Choi on 2/25/2025 from Dr Charly Webster    Patient examined chart reviewed    HOSPITAL ADMISSION   PATIENT CAME  FROM (if information available)      CHIEF COMPLAINT.   . 2/25/2025 BIBA from [facility] with complaints of low Bp    OVERALL PRESENTATION.  . 2/25/2025 68 y/o M with  PMH NSTEMI, schizophrenia, bipolar d/o, HTN, hyponatremia, anemia requiring multiple blood transfusions, last admission was 11/2024 presents to the ED from [facility] for hypotension X today rule out sepsis.  Patient unable to provide any further information  . 2/25/2025 9:33 AM Pulm consulted     PMH.  . A fib ON APIXABA  .... 11/13/2024 apixaba stoppd because of gi bl   . GI BLEED  11/13/2024 SOB (+)   . FOCAL NARROWING DISTAL SIGMOID COLON 11/14/2024 CT   .... 11/14/2024 needs GI eval and followup   . APIXABA  .... 11/13/2024 12p given kcentra 4000 u   . ANEMIA   .... 11/13-11/14-11/14/2024 Hb 5.1 - 7.7-8.9   . TRANSFUSION  .... 11/13/2024 3 u   . HUBERT  11/13/2024 Cr 1.6    . SCHIZOPHRENIA    PAST HOSPITAL STAYS .  Home Medications:   * Patient Currently Takes Medications as of 15-Nov-2024 12:53 documented in Structured Notes  · metoprolol tartrate 25 mg oral tablet: 1 tab(s) orally 2 times a day  · pantoprazole 40 mg oral delayed release tablet: 1 tab(s) orally 2 times a day  · levothyroxine 88 mcg (0.088 mg) oral tablet: 1 tab(s) orally once a day  · risperiDONE 4 mg oral tablet: 1 tab(s) orally 2 times a day  · QUEtiapine 400 mg oral tablet: 1 tab(s) orally once a day (at bedtime)  · atorvastatin 40 mg oral tablet: 1 tab(s) orally once a day (at bedtime)  · traZODone 100 mg oral tablet: 1 tab(s) orally once a day (at bedtime)  · divalproex sodium 500 mg oral tablet, extended release: 3 tab(s) orally once a day  · folic acid 1 mg oral tablet: 1 tab(s) orally once a day (in the morning)  · benztropine 2 mg oral tablet: 1 tab(s) orally 2 times a day  · docusate sodium 100 mg oral tablet: 2 tab(s) orally 2 times a day  · Vitamin D2 50,000 intl units (1.25 mg) oral capsule: 1 cap(s) orally every 2 weeks  · metFORMIN 1000 mg oral tablet: 1 tab(s) orally 2 times a day  · losartan 50 mg oral tablet: 1 tab(s) orally once a day  ER MGMT .    BEST PRACTICE ISSUES.  . HOB ELEVATN.    .... Yes  . DIET  .   .... CONS CARB SOFT BITE 2/25/2025   .  IV FLUID.  ..... NS 2/25/2025    . DVT PPLX .    .... HPSC 2/25 H 5k  12h   . STRESS ULCR PPLX .   .... PROTONI 2/25/2025 P 40   . DATE/DM MGMT.   ..... See under Endocrine section   GENERAL DATA .   . COVID.         ....   . GOC.    ....    . ICU STAY.    .... no   . INFECTION PPLX .   ....   . ALLGY.   .... lithium: Drug, Rash  .... clozapine: Drug, Rash  .... lithium: Drug, Unknown  .... clozapine: Drug, Unknown  .... aspartate [fr  . WT.   .... 2/25/2025 84  . BMI.  .... 2/25/2025 29    XXXXXXXXXXXXXXXXXXXXXXX  VITALS/GAS EXCHANGE/DRIPS    ABGS.     .  VS/ PO/IO/ VENT/ DRIPS.   2/25/2025 afeb 72 140/70   2/25/2025 ra 96%       XXXXXXXXXXXXXXXXXXXXXXXXXXXXXXXXXXX  PROBLEM ASSESSMENT RECOMMENDATIONS.  RESP.   INFECTION.  . DATA  .... w 2/24/2025 w 15.2   .... ua 2/24/2025 w 100    . UTI   .... 2/24 UA W 100   .... rocephin 2/25/2025 r x 3d      CARDIAC.  . LACTICEMIA   .... la 2/24/2025 la 1.2     . CAD  .... Trop 2/24/2025 tr 10.8   .... ASA 2/25/2025 A 81  .... METOPROLOL 2/25/2025 M 25.2   .... ATORVASTAT 2/25/2025 A 40     . HYTN  .... HYDRALAZINE 2/25 H 50.6p     . A fib  .... 2/25/2025 Not on fdac because of ho bleeding needed 3 u prbc 11/2024    HEMAT.  . ANEMIA   .... Hb 2/24/2025 Hb 9.6  .... Plt 2/24/2025 Plt 158   .... inr 2/24/2025 inr 115     GI.   . LFT MONITORING   .... LFTS 2/24/2025   ........ AP  62  ........ AST 12  ........ ALT 13    RENAL.  . DATA  .... K 2/24/2025 K 4.7   .... CO2 2/24/2025 co2 25   .... ag 2/24/2025 ag 10  .... alb 2/24/2025 alb 2.5     . HYPONATREMIA   .... Na 2/24/2025 Na 129     . HUBERT   .... Cr 2/24/2025 Cr 2.3     ENDO.  . DM   ... SL SCALE 2/25/2025     . HYPOTHYROID  .... LEVOXYL 2/25/2025 L 88     NEURO.  .... BENZTROPINE 2/25/2025 B 2.2   .... DEPAKOTE 2/25/2025 D 1500   .... QUETIAPINE 2/25/2025 Q 400   .... RISPERIDONE 2/25/2025 R 4.2       DISCUSSIONS.  .... Discussed with primary care and relevant consultants on an ongoing basis     XXXXXXXXXXXXXXXXXXXXXX   SUMMARY  CC.   . 2/24/2025 RO SEPSIS   MAIN ISSUES.  . UTI   .... 2/24 UA W 100   .... rocephin 2/25/2025 r x 3d   . A fib  .... 2/25/2025 Not on fdac because of ho bleeding needed 3 u prbc 11/2024  . ANEMIA   .... Hb 2/24/2025 Hb 9.6  . HYPONATREMIA   .... Na 2/24/2025 Na 129   . HUBERT   .... Cr 2/24/2025 Cr 2.3     RESOLVED/SEMI-ELECTIVE/CHRONIC ISSUES.  . CAD   AF Not on FD AC becaiuse of HO bleed needed 3u prbc 11/2024   . SCHIZOPHRENIA       TIME SPENT.  . Over 55 minutes aggregate care time spent on encounter; activities included   direct patient care, counseling and/or coordinating care reviewing notes, lab data/ imaging , discussion with multidisciplinary team/ patient  /family and explaining in detail risks, benefits, alternatives  of the recommendations     CLIFFORD ABDALLA 67 m 2/25/2025 1955

## 2025-02-25 NOTE — CONSULT NOTE ADULT - SUBJECTIVE AND OBJECTIVE BOX
[facility] [place]     Patient is a 69y old  Male who presents with a chief complaint of     HPI:      HPI:    69y Male for Cardiology Consult    PAST MEDICAL & SURGICAL HISTORY:  Schizophrenia      Hypothyroidism      Hyperlipidemia      Schizophrenia      Hypothyroid      Anxiety      DM (diabetes mellitus)      Parkinsonism      Schizophrenia      Hyponatremia      NSTEMI (non-ST elevation myocardial infarction)      Bipolar disorder      DM (diabetes mellitus)      Dementia      HTN (hypertension)      Chronic CHF      MDD (major depressive disorder)      Hypothyroidism      HLD (hyperlipidemia)      Hypothyroidism      Constipation      Folate deficiency      Constipation      H/O candidiasis      Violent behavior      Atrial fibrillation      Hyperkalemia      Epistaxis      DM (diabetes mellitus)          FAMILY HISTORY:      SOCIAL HISTORY:   Alcohol:  Smoking:    Allergies    aspartate (Unknown)  clozapine (Rash)  lithium (Rash)  clozapine (Unknown)  lithium (Unknown)    Intolerances        MEDICATIONS  (STANDING):    MEDICATIONS  (PRN):      REVIEW OF SYSTEMS:  CONSTITUTIONAL: No fever, weight loss, chills, shakes, or fat  RESPIRATORY: No cough, wheezing, hemoptysis, or shortness of breath  CARDIOVASCULAR: No chest pain, dyspnea, palpitations, dizziness, syncope, paroxysmal nocturnal dyspnea, orthopnea, or arm or leg swelling  GASTROINTESTINAL: No abdominal  or epigastric pain, nausea, vomiting, hematemesis, diarrhea, constipation, melena or bright red bloo  NEUROLOGICAL: No headaches, memory loss, slurred speech, limb weakness, loss of strength, numbness, or tremors  SKIN: No itching, burning, rashes, or lesions  ENDOCRINE: No heat or cold intolerance, or hair loss  MUSCULOSKELETAL: No joint pain or swelling, muscle, back, or extremity pain  HEME/LYMPH: No easy bruising or bleeding gums  ALLERY AND IMMUNOLOGIC: No hives or rash.    Vital Signs Last 24 Hrs  T(C): 36.6 (2025 19:45), Max: 36.6 (2025 19:45)  T(F): 97.8 (2025 19:45), Max: 97.8 (2025 19:45)  HR: 66 (2025 01:00) (66 - 70)  BP: 130/70 (2025 01:00) (100/65 - 130/70)  BP(mean): --  RR: 18 (2025 01:00) (18 - 18)  SpO2: 97% (2025 01:00) (97% - 98%)    Parameters below as of 2025 01:00  Patient On (Oxygen Delivery Method): room air        PHYSICAL EXAM:  HEAD:  Atraumatic, Normocephalic  EYES: EOMI, PERRLA, conjunctiva and sclera clear  NECK: Supple and normal thyroid.  No JVD or carotid bruit.   HEART: S1, S2 regular , 1/6 soft ejection systolic murmur in mitral area , no thrill and no gallops .  PULMONARY: Bilateral vesicular breathing , few scattered ronchi and few scattered rales are present .  ABDOMEN: Soft nontender and positive bowl sounds   EXTREMITIES:  No clubbing, cyanosis, or pedal  edema  NEUROLOGICAL: AAOX3 , no focal deficit .    LABS:                        9.6    15.23 )-----------( 158      ( 2025 21:30 )             28.5     02-    129[L]  |  94[L]  |  21  ----------------------------<  122[H]  4.7   |  25  |  2.30[H]    Ca    8.7      2025 21:30    TPro  6.8  /  Alb  2.5[L]  /  TBili  0.5  /  DBili  x   /  AST  12[L]  /  ALT  13  /  AlkPhos  62  02-24        PT/INR - ( 2025 21:30 )   PT: 13.4 sec;   INR: 1.15 ratio         PTT - ( 2025 21:30 )  PTT:31.3 sec  Urinalysis Basic - ( 2025 23:09 )    Color: Yellow / Appearance: Clear / S.010 / pH: x  Gluc: x / Ketone: Negative mg/dL  / Bili: Negative / Urobili: 1.0 mg/dL   Blood: x / Protein: Negative mg/dL / Nitrite: Negative   Leuk Esterase: Moderate / RBC: 5 /HPF /  /HPF   Sq Epi: x / Non Sq Epi: x / Bacteria: Many /HPF      BNP      EKG:  ECHO:  IMAGING:    Assessment and Plan :     Will continue to follow during hospital course and give further recommendations as needed . Thanks for your referral . if any questions please contact me at office [phone] [phone])  SHARITA BYRNE MD [place] [address]  [address]  OFFICE : [phone]  CELL : [phone]  CARDIOLOGY CONSULT :     Patient is a 69y old  Male who presents with a chief complaint of hypotension and lethargy     · Chief Complaint: The patient is a 69y Male complaining of hypotension.  · HPI Objective Statement: 70 y/o M with  PMH NSTEMI, schizophrenia, bipolar d/o, HTN, hyponatremia, anemia requiring multiple blood transfusions, last admission was 2024 presents to the ED from [facility] for hypotension X today rule out sepsis.  Patient unable to provide any further information. Patient very confused and unable to give any information so most of history obtained from RN , PA , PMD and other consultants notes .       PAST MEDICAL & SURGICAL HISTORY:  Schizophrenia      Hypothyroidism      Hyperlipidemia      Schizophrenia      Hypothyroid      Anxiety      DM (diabetes mellitus)      Parkinsonism      Schizophrenia      Hyponatremia      NSTEMI (non-ST elevation myocardial infarction)      Bipolar disorder      DM (diabetes mellitus)      Dementia      HTN (hypertension)      Chronic CHF      MDD (major depressive disorder)      Hypothyroidism      HLD (hyperlipidemia)      Hypothyroidism      Constipation      Folate deficiency      Constipation      H/O candidiasis      Violent behavior      Atrial fibrillation      Hyperkalemia      Epistaxis      DM (diabetes mellitus)          FAMILY HISTORY:      SOCIAL HISTORY:   Alcohol:  Smoking:    Allergies    aspartate (Unknown)  clozapine (Rash)  lithium (Rash)  clozapine (Unknown)  lithium (Unknown)    Intolerances        MEDICATIONS  (STANDING):    MEDICATIONS  (PRN):      REVIEW OF SYSTEMS:  CONSTITUTIONAL: No fever .  RESPIRATORY: No cough, wheezing, hemoptysis, or shortness of breath  CARDIOVASCULAR: No chest pain, dyspnea, palpitations, dizziness, syncope, paroxysmal nocturnal dyspnea, orthopnea, or arm or leg swelling  GASTROINTESTINAL: No abdominal  or epigastric pain, nausea, vomiting, hematemesis, diarrhea, constipation, melena or bright red blood .  NEUROLOGICAL: No headaches, numbness, or tremors  SKIN: No itching, burning, rashes, or lesions  ENDOCRINE: No heat or cold intolerance, or hair loss  MUSCULOSKELETAL: Patient has mild chronic arthritis .  HEME/LYMPH: No easy bruising or bleeding gums  ALLERGY AND IMMUNOLOGIC: No hives or rash.    Vital Signs Last 24 Hrs  T(C): 36.6 (2025 19:45), Max: 36.6 (2025 19:45)  T(F): 97.8 (2025 19:45), Max: 97.8 (2025 19:45)  HR: 66 (2025 01:00) (66 - 70)  BP: 130/70 (2025 01:00) (100/65 - 130/70)  BP(mean): --  RR: 18 (2025 01:00) (18 - 18)  SpO2: 97% (2025 01:00) (97% - 98%)    Parameters below as of 2025 01:00  Patient On (Oxygen Delivery Method): room air        PHYSICAL EXAM:  HEAD:  Atraumatic, Normocephalic  EYES: EOMI, PERRLA, conjunctiva and sclera clear  NECK: Supple and normal thyroid.  No JVD or carotid bruit.   HEART: S1, S2 regular , 1/6 soft ejection systolic murmur in mitral area , no thrill and no gallops .  PULMONARY: Bilateral vesicular breathing , few scattered rhonchi and few scattered rales are present .  ABDOMEN: Soft nontender and positive bowl sounds   EXTREMITIES:  No clubbing, cyanosis, or pedal  edema  NEUROLOGICAL: AA but confused  , no focal deficit .  Skin : No rashes .  Musculoskeletal : No joint swellings .    LABS:                        9.6    15.23 )-----------( 158      ( 2025 21:30 )             28.5     02-24    129[L]  |  94[L]  |  21  ----------------------------<  122[H]  4.7   |  25  |  2.30[H]    Ca    8.7      2025 21:30    TPro  6.8  /  Alb  2.5[L]  /  TBili  0.5  /  DBili  x   /  AST  12[L]  /  ALT  13  /  AlkPhos  62  02-24        PT/INR - ( 2025 21:30 )   PT: 13.4 sec;   INR: 1.15 ratio         PTT - ( 2025 21:30 )  PTT:31.3 sec  Urinalysis Basic - ( 2025 23:09 )    Color: Yellow / Appearance: Clear / S.010 / pH: x  Gluc: x / Ketone: Negative mg/dL  / Bili: Negative / Urobili: 1.0 mg/dL   Blood: x / Protein: Negative mg/dL / Nitrite: Negative   Leuk Esterase: Moderate / RBC: 5 /HPF /  /HPF   Sq Epi: x / Non Sq Epi: x / Bacteria: Many /HPF            EKG: NSR and non specific ST-T changes .    Assessment and Plan :   70 y/o M with  PMH NSTEMI, schizophrenia, bipolar d/o, HTN, hyponatremia, anemia requiring multiple blood transfusions, last admission was 2024 presents to the ED from [facility] for hypotension X today rule out sepsis.  Patient unable to provide any further information. Patient very confused and unable to give any information so most of history obtained from RN , PA , PMD and other consultants notes . Patient has mild dehydration so continue I/V hydration . Patient has Michele . Will like to R/O urosepsis and pneumonia . Continue I/V antibiotics as per PMD . Patient BP much better now . Will send Troponin I at frequent intervals to R/O NON-ST KILEY . Monitor hemoglobin and electrolytes . Will get echocardiogram done . Rest of medications as such . Prognosis guarded .   Will continue to follow during hospital course and give further recommendations as needed . Thanks for your referral . if any questions please contact me at office ([phone] cell [phone])

## 2025-02-25 NOTE — CONSULT NOTE ADULT - TIME BILLING
in direct care of patient , reviewing labs and other results and adjusting medications and in discussion with other consultants , RN , PA and PMD

## 2025-02-25 NOTE — CONSULT NOTE ADULT - SUBJECTIVE AND OBJECTIVE BOX
[facility]  INFECTIOUS DISEASES   [address] [place] [address]  Tel: [phone]     Fax: [phone]  ========================================================  MD Mathew Mustafa Michelle, MD Shah, Kaushal, MD Sunjit, Jaspal, MD Sehrish Shahid, MD   ========================================================    MRN-[id number]  RM HORTON     CC: Patient is a 69y old  Male who presents with a chief complaint of sepsis (2025 04:34)    HPI:  68 y/o M with  PMH NSTEMI, schizophrenia, bipolar d/o, HTN, hyponatremia, anemia requiring multiple blood transfusions, last admission was 2024 presents to the ED from [facility] for hypotension X today rule out sepsis.  Patient unable to provide any further information. (2025 07:32)    PAST MEDICAL & SURGICAL HISTORY:  Schizophrenia  Hypothyroidism  Hyperlipidemia  Anxiety  DM (diabetes mellitus)  Parkinsonism  Hyponatremia  NSTEMI (non-ST elevation myocardial infarction)  Bipolar disorder  Dementia  HTN (hypertension)  Chronic CHF  MDD (major depressive disorder)  HLD (hyperlipidemia)  Constipation  Folate deficiency  H/O candidiasis  Violent behavior  Atrial fibrillation  Hyperkalemia  Epistaxis    Social Hx: No current smoking, EtOH or drugs     FAMILY HISTORY:  Noncontributory     Allergies  aspartate (Unknown)  clozapine (Rash)  lithium (Rash)  clozapine (Unknown)  lithium (Unknown)    MEDICATIONS  (STANDING):  aspirin enteric coated 81 milliGRAM(s) Oral daily  atorvastatin 40 milliGRAM(s) Oral at bedtime  benztropine 2 milliGRAM(s) Oral two times a day  cefTRIAXone   IVPB 1000 milliGRAM(s) IV Intermittent once  cefTRIAXone   IVPB      dextrose 5%. 1000 milliLiter(s) (100 mL/Hr) IV Continuous <Continuous>  dextrose 5%. 1000 milliLiter(s) (50 mL/Hr) IV Continuous <Continuous>  dextrose 50% Injectable 25 Gram(s) IV Push once  dextrose 50% Injectable 12.5 Gram(s) IV Push once  dextrose 50% Injectable 25 Gram(s) IV Push once  dextrose Oral Gel 15 Gram(s) Oral once  divalproex ER 1500 milliGRAM(s) Oral daily  glucagon  Injectable 1 milliGRAM(s) IntraMuscular once  heparin   Injectable 5000 Unit(s) SubCutaneous every 8 hours  insulin lispro (ADMELOG) corrective regimen sliding scale   SubCutaneous three times a day before meals  levothyroxine 88 MICROGram(s) Oral daily  metoprolol tartrate 25 milliGRAM(s) Oral two times a day  pantoprazole    Tablet 40 milliGRAM(s) Oral daily  QUEtiapine 400 milliGRAM(s) Oral daily  risperiDONE   Tablet 4 milliGRAM(s) Oral two times a day  sodium chloride 0.9%. 1000 milliLiter(s) (100 mL/Hr) IV Continuous <Continuous>    MEDICATIONS  (PRN):  acetaminophen     Tablet .. 650 milliGRAM(s) Oral every 6 hours PRN Temp greater or equal to 38C (100.4F), Mild Pain (1 - 3)  aluminum hydroxide/magnesium hydroxide/simethicone Suspension 30 milliLiter(s) Oral every 4 hours PRN Dyspepsia  hydrALAZINE 50 milliGRAM(s) Oral every 4 hours PRN for systolic BP>150  LORazepam   Injectable 0.5 milliGRAM(s) IV Push every 6 hours PRN Anxiety  melatonin 3 milliGRAM(s) Oral at bedtime PRN Insomnia  ondansetron Injectable 4 milliGRAM(s) IV Push every 8 hours PRN Nausea and/or Vomiting     REVIEW OF SYSTEMS:  Unable, not giving details except for dysuria and burning when urinate     Physical Exam:  Vital Signs Last 24 Hrs  T(C): 36.8 (2025 07:25), Max: 36.8 (2025 07:25)  T(F): 98.2 (2025 07:25), Max: 98.2 (2025 07:25)  HR: 72 (2025 07:25) (66 - 81)  BP: 142/72 (2025 07:25) (100/65 - 142/72)  RR: 18 (2025 07:25) (16 - 18)  SpO2: 96% (2025 07:25) (96% - 98%)  Parameters below as of 2025 07:25  Patient On (Oxygen Delivery Method): room air  Height (cm): 170 ( @ 04:41)  Weight (kg): 84.4 ( @ 19:45)  BMI (kg/m2): 29.2 ( @ 04:41)  BSA (m2): 1.96 ( @ 04:41)  GEN: NAD  HEENT: normocephalic and atraumatic. EOMI. PERRL.    NECK: Supple.  No lymphadenopathy   LUNGS: Clear to auscultation.  HEART: Regular rate and rhythm  ABDOMEN: Soft, nontender, and nondistended.   EXTREMITIES: + edema.  NEUROLOGIC: grossly intact.  PSYCHIATRIC: Appropriate affect .  SKIN: No rash     Labs:      129[L]  |  94[L]  |  21  ----------------------------<  122[H]  4.7   |  25  |  2.30[H]    Ca    8.7      2025 21:30    TPro  6.8  /  Alb  2.5[L]  /  TBili  0.5  /  DBili  x   /  AST  12[L]  /  ALT  13  /  AlkPhos  62                          9.6    15.23 )-----------( 158      ( 2025 21:30 )             28.5     PT/INR - ( 2025 21:30 )   PT: 13.4 sec;   INR: 1.15 ratio    PTT - ( 2025 21:30 )  PTT:31.3 sec  Urinalysis Basic - ( 2025 23:09 )    Color: Yellow / Appearance: Clear / S.010 / pH: x  Gluc: x / Ketone: Negative mg/dL  / Bili: Negative / Urobili: 1.0 mg/dL   Blood: x / Protein: Negative mg/dL / Nitrite: Negative   Leuk Esterase: Moderate / RBC: 5 /HPF /  /HPF   Sq Epi: x / Non Sq Epi: x / Bacteria: Many /HPF    LIVER FUNCTIONS - ( 2025 21:30 )  Alb: 2.5 g/dL / Pro: 6.8 g/dL / ALK PHOS: 62 U/L / ALT: 13 U/L / AST: 12 U/L / GGT: x           All imaging and other data have been reviewed.  < from: Xray Chest 1 View- PORTABLE-Urgent (25 @ 20:53) >  Impression:  Low lung volume without kinjal consolidation, effusion, or pneumothorax.    Assessment and Plan:   68 y/o man with PMH of HTN, DM2, CAD, schizophrenia, bipolar, anemia requiring multiple blood transfusions, last admission was 2024 presents to the ED from [facility] for hypotension for one day to rule out sepsis.    He is not very cooperative, cursing and angry.   In ED he had leukocytosis of 15k and UA with TSC=712, not febrile in ED.     # UTI  # Sepsis   # Leukocytosis     - Blood cultures are pending   - UCx is done will follow   - Agree with Ceftriaxone 1gm daily, since no MDRO in the past   - Monitor Tmax, WBC, Creat   - Based on culture results will modify ABx regimen     Thank you for courtesy of this consult.     Will follow.  Discussed with the primary team.     Nikole Corrales MD  Division of Infectious Diseases   Please call ID service at [phone] with any question.    75 minutes spent on total encounter assessing patient, examination, chart review, counseling and coordinating care by the attending physician/nurse/care manager.

## 2025-02-25 NOTE — PATIENT PROFILE ADULT - FALL HARM RISK - HARM RISK INTERVENTIONS
Assistance with ambulation/Assistance OOB with selected safe patient handling equipment/Communicate Risk of Fall with Harm to all staff/Reinforce activity limits and safety measures with patient and family/Review medications for side effects contributing to fall risk/Sit up slowly, dangle for a short time, stand at bedside before walking/Tailored Fall Risk Interventions/Toileting schedule using arm’s reach rule for commode and bathroom/Visual Cue: Yellow wristband and red socks/Bed in lowest position, wheels locked, appropriate side rails in place/Call bell, personal items and telephone in reach/Instruct patient to call for assistance before getting out of bed or chair/Non-slip footwear when patient is out of bed/[place] to call system/Physically safe environment - no spills, clutter or unnecessary equipment/Purposeful Proactive Rounding/Room/bathroom lighting operational, light cord in reach

## 2025-02-25 NOTE — H&P ADULT - PROBLEM/PLAN-10
Detail Level: Detailed
Add 1585x Cpt? (Do Not Bill If You Billed For The Procedure Placing The Sutures. This Is An Add-On Code That Must Be Billed With An E/M Visit Code): No
DISPLAY PLAN FREE TEXT

## 2025-02-25 NOTE — CONSULT NOTE ADULT - SUBJECTIVE AND OBJECTIVE BOX
CHIEF COMPLAINT/ REASON FOR VISIT  .. Patient was seen to address the  issue listed under PROBLEM LIST which is located toward bottom of this note     RM HORTON    [facility] APER 04    Allergies    aspartate (Unknown)  clozapine (Rash)  lithium (Rash)  clozapine (Unknown)  lithium (Unknown)    Intolerances        PAST MEDICAL & SURGICAL HISTORY:  Schizophrenia      Hypothyroidism      Hyperlipidemia      Schizophrenia      Hypothyroid      Anxiety      DM (diabetes mellitus)      Parkinsonism      Schizophrenia      Hyponatremia      NSTEMI (non-ST elevation myocardial infarction)      Bipolar disorder      DM (diabetes mellitus)      Dementia      HTN (hypertension)      Chronic CHF      MDD (major depressive disorder)      Hypothyroidism      HLD (hyperlipidemia)      Hypothyroidism      Constipation      Folate deficiency      Constipation      H/O candidiasis      Violent behavior      Atrial fibrillation      Hyperkalemia      Epistaxis      DM (diabetes mellitus)          FAMILY HISTORY:      Home Medications:  atorvastatin 40 mg oral tablet: 1 tab(s) orally once a day (at bedtime) (15 Nov 2024 11:18)  benztropine 2 mg oral tablet: 1 tab(s) orally 2 times a day (2024 08:54)  divalproex sodium 500 mg oral tablet, extended release: 3 tab(s) orally once a day (15 Nov 2024 11:18)  docusate sodium 100 mg oral tablet: 2 tab(s) orally 2 times a day (2024 08:54)  folic acid 1 mg oral tablet: 1 tab(s) orally once a day (in the morning) (2024 08:54)  levothyroxine 88 mcg (0.088 mg) oral tablet: 1 tab(s) orally once a day (15 Nov 2024 11:18)  losartan 50 mg oral tablet: 1 tab(s) orally once a day (2024 08:54)  metFORMIN 1000 mg oral tablet: 1 tab(s) orally 2 times a day (2024 11:10)  metoprolol tartrate 25 mg oral tablet: 1 tab(s) orally 2 times a day (15 Nov 2024 14:04)  QUEtiapine 400 mg oral tablet: 1 tab(s) orally once a day (at bedtime) (15 Nov 2024 11:18)  risperiDONE 4 mg oral tablet: 1 tab(s) orally 2 times a day (15 Nov 2024 11:18)  traZODone 100 mg oral tablet: 1 tab(s) orally once a day (at bedtime) (15 Nov 2024 11:18)  Vitamin D2 50,000 intl units (1.25 mg) oral capsule: 1 cap(s) orally every 2 weeks (15 Nov 2024 14:05)      MEDICATIONS  (STANDING):  aspirin enteric coated 81 milliGRAM(s) Oral daily  atorvastatin 40 milliGRAM(s) Oral at bedtime  benztropine 2 milliGRAM(s) Oral two times a day  cefTRIAXone   IVPB 1000 milliGRAM(s) IV Intermittent once  cefTRIAXone   IVPB      dextrose 5%. 1000 milliLiter(s) (100 mL/Hr) IV Continuous <Continuous>  dextrose 5%. 1000 milliLiter(s) (50 mL/Hr) IV Continuous <Continuous>  dextrose 50% Injectable 25 Gram(s) IV Push once  dextrose 50% Injectable 12.5 Gram(s) IV Push once  dextrose 50% Injectable 25 Gram(s) IV Push once  dextrose Oral Gel 15 Gram(s) Oral once  divalproex ER 1500 milliGRAM(s) Oral daily  glucagon  Injectable 1 milliGRAM(s) IntraMuscular once  heparin   Injectable 5000 Unit(s) SubCutaneous every 8 hours  insulin lispro (ADMELOG) corrective regimen sliding scale   SubCutaneous three times a day before meals  levothyroxine 88 MICROGram(s) Oral daily  metoprolol tartrate 25 milliGRAM(s) Oral two times a day  pantoprazole    Tablet 40 milliGRAM(s) Oral daily  QUEtiapine 400 milliGRAM(s) Oral daily  risperiDONE   Tablet 4 milliGRAM(s) Oral two times a day  sodium chloride 0.9%. 1000 milliLiter(s) (100 mL/Hr) IV Continuous <Continuous>    MEDICATIONS  (PRN):  acetaminophen     Tablet .. 650 milliGRAM(s) Oral every 6 hours PRN Temp greater or equal to 38C (100.4F), Mild Pain (1 - 3)  aluminum hydroxide/magnesium hydroxide/simethicone Suspension 30 milliLiter(s) Oral every 4 hours PRN Dyspepsia  hydrALAZINE 50 milliGRAM(s) Oral every 4 hours PRN for systolic BP>150  LORazepam   Injectable 0.5 milliGRAM(s) IV Push every 6 hours PRN Anxiety  melatonin 3 milliGRAM(s) Oral at bedtime PRN Insomnia  ondansetron Injectable 4 milliGRAM(s) IV Push every 8 hours PRN Nausea and/or Vomiting      Diet, Consistent Carbohydrate w/Evening Snack:   Soft and Bite Sized (SOFTBTSZ) (02-25-25 @ 07:20) [Active]          Vital Signs Last 24 Hrs  T(C): 36.8 (2025 07:25), Max: 36.8 (2025 07:25)  T(F): 98.2 (:25), Max: 98.2 (2025 07:25)  HR: 72 (:25) (66 - 81)  BP: 142/72 (:25) (100/65 - 142/72)  BP(mean): --  RR: 18 (:25) (16 - 18)  SpO2: 96% (:25) (96% - 98%)    Parameters below as of :25  Patient On (Oxygen Delivery Method): room air          25 @ 07:01  -  25 @ 07:00  --------------------------------------------------------  IN: 0 mL / OUT: 2400 mL / NET: -2400 mL              LABS:                        9.6    15.23 )-----------( 158      ( 2025 21:30 )             28.5     02-24    129[L]  |  94[L]  |  21  ----------------------------<  122[H]  4.7   |  25  |  2.30[H]    Ca    8.7      2025 21:30    TPro  6.8  /  Alb  2.5[L]  /  TBili  0.5  /  DBili  x   /  AST  12[L]  /  ALT  13  /  AlkPhos  62  02-24    PT/INR - ( 2025 21:30 )   PT: 13.4 sec;   INR: 1.15 ratio         PTT - ( 2025 21:30 )  PTT:31.3 sec  Urinalysis Basic - ( 2025 23:09 )    Color: Yellow / Appearance: Clear / S.010 / pH: x  Gluc: x / Ketone: Negative mg/dL  / Bili: Negative / Urobili: 1.0 mg/dL   Blood: x / Protein: Negative mg/dL / Nitrite: Negative   Leuk Esterase: Moderate / RBC: 5 /HPF /  /HPF   Sq Epi: x / Non Sq Epi: x / Bacteria: Many /HPF            WBC:  WBC Count: 15.23 K/uL ( @ 21:30)      MICROBIOLOGY:  RECENT CULTURES:              PT/INR - ( 2025 21:30 )   PT: 13.4 sec;   INR: 1.15 ratio         PTT - ( 2025 21:30 )  PTT:31.3 sec    Sodium:  Sodium: 129 mmol/L ( @ 21:30)      2.30 mg/dL  @ 21:30      Hemoglobin:  Hemoglobin: 9.6 g/dL ( @ 21:30)      Platelets: Platelet Count - Automated: 158 K/uL ( @ 21:30)      LIVER FUNCTIONS - ( 2025 21:30 )  Alb: 2.5 g/dL / Pro: 6.8 g/dL / ALK PHOS: 62 U/L / ALT: 13 U/L / AST: 12 U/L / GGT: x             Urinalysis Basic - ( 2025 23:09 )    Color: Yellow / Appearance: Clear / S.010 / pH: x  Gluc: x / Ketone: Negative mg/dL  / Bili: Negative / Urobili: 1.0 mg/dL   Blood: x / Protein: Negative mg/dL / Nitrite: Negative   Leuk Esterase: Moderate / RBC: 5 /HPF /  /HPF   Sq Epi: x / Non Sq Epi: x / Bacteria: Many /HPF        RADIOLOGY & ADDITIONAL STUDIES:      MICROBIOLOGY:  RECENT CULTURES:

## 2025-02-25 NOTE — H&P ADULT - ASSESSMENT
In ED he had leukocytosis of 15k and UA with VGV=965, not febrile in ED.     # UTI  # Sepsis   # Leukocytosis     - Blood cultures are pending   - UCx is done will follow   - Agree with Ceftriaxone 1gm daily, since no MDRO in the past   - Monitor Tmax, WBC, Creat   - Based on culture results will modify ABx regimen 68 y/o M with  PMH NSTEMI, schizophrenia, bipolar d/o, HTN, hyponatremia, anemia requiring multiple blood transfusions, last admission was 11/2024 presents to the ED from [facility] for hypotension X today rule out sepsis.  Patient unable to provide any further information.  INFECTION.  . DATA  .... w 2/24/2025 w 15.2   .... ua 2/24/2025 w 100  . UTI   .... 2/24 UA W 100   .... rocephin 2/25/2025 r x 3d    CARDIAC.  . LACTICEMIA   .... la 2/24/2025 la 1.2   . CAD  .... Trop 2/24/2025 tr 10.8   .... ASA 2/25/2025 A 81  .... METOPROLOL 2/25/2025 M 25.2   .... ATORVASTAT 2/25/2025 A 40   . HYTN  .... HYDRALAZINE 2/25 H 50.6p   . A fib  .... 2/25/2025 Not on fdac because of ho bleeding needed 3 u prbc 11/2024  HEMAT.  . ANEMIA   .... Hb 2/24/2025 Hb 9.6  .... Plt 2/24/2025 Plt 158   .... inr 2/24/2025 inr 115   GI.   . LFT MONITORING   .... LFTS 2/24/2025   ........ AP  62  ........ AST 12  ........ ALT 13  RENAL.  . DATA  .... K 2/24/2025 K 4.7   .... CO2 2/24/2025 co2 25   .... ag 2/24/2025 ag 10  .... alb 2/24/2025 alb 2.5   . HYPONATREMIA   .... Na 2/24/2025 Na 129   . HUBERT   .... Cr 2/24/2025 Cr 2.3   ENDO.  . DM   ... SL SCALE 2/25/2025   . HYPOTHYROID  .... LEVOXYL 2/25/2025 L 88   NEURO.  .... BENZTROPINE 2/25/2025 B 2.2   .... DEPAKOTE 2/25/2025 D 1500   .... QUETIAPINE 2/25/2025 Q 400   .... RISPERIDONE 2/25/2025 R 4.2   CC.   . 2/24/2025 RO SEPSIS   MAIN ISSUES.  . UTI   .... 2/24 UA W 100   .... rocephin 2/25/2025 r x 3d   . A fib  .... 2/25/2025 Not on fdac because of ho bleeding needed 3 u prbc 11/2024  . ANEMIA   .... Hb 2/24/2025 Hb 9.6  . HYPONATREMIA   .... Na 2/24/2025 Na 129   . HUBERT   .... Cr 2/24/2025 Cr 2.3   . CAD   AF Not on FD AC becaiuse of HO bleed needed 3u prbc 11/2024   . SCHIZOPHRENIA

## 2025-02-25 NOTE — H&P ADULT - HISTORY OF PRESENT ILLNESS
70 y/o M with  PMH NSTEMI, schizophrenia, bipolar d/o, HTN, hyponatremia, anemia requiring multiple blood transfusions, last admission was 11/2024 presents to the ED from [facility] for hypotension X today rule out sepsis.  Patient unable to provide any further information.

## 2025-02-25 NOTE — H&P ADULT - TIME BILLING
75minutes spent on this visit, 50% visit time spent in care co-ordination with other attendings and counselling patient ,writing admission orders ( see complete and current orders and order section) ,requesting necessary consults ,informing family about status & plan of care .I have discussed care plan with [facility], [facility] wellness/admitting /nursing   department ,outpatient PCP , hospital consultants , ER physician & med staff .

## 2025-02-25 NOTE — CARE COORDINATION ASSESSMENT. - OTHER PERTINENT REFERRAL INFORMATION
Sw met with pt at bedside, LM for brother and spoke with [facility]. Pt is a LTC resident at [facility]. Pt has a hx of Schizophrenia and bipolar. Plan is for pt to return to LTC facility when medically stable.  Sw met with pt at bedside, LM for brother and spoke with [facility]. Pt is a LTC resident at [facility]. Pt has a hx of Schizophrenia and bipolar. Plan is for pt to return to LTC facility when medically stable.

## 2025-02-25 NOTE — PHARMACOTHERAPY INTERVENTION NOTE - COMMENTS
Medication reconciliation completed on admission with [facility]. Updated medication list in OMR/prescription writer.     Home Medications:  atorvastatin 40 mg oral tablet: 1 tab(s) orally once a day (at bedtime) (25 Feb 2025 18:11)  benztropine 2 mg oral tablet: 1 tab(s) orally 2 times a day (25 Feb 2025 18:11)  divalproex sodium 500 mg oral tablet, extended release: 3 tab(s) orally once a day (25 Feb 2025 18:11)  docusate sodium 100 mg oral tablet: 2 tab(s) orally 2 times a day (25 Feb 2025 18:11)  folic acid 1 mg oral tablet: 1 tab(s) orally once a day (in the morning) (25 Feb 2025 18:11)  levothyroxine 88 mcg (0.088 mg) oral tablet: 1 tab(s) orally once a day (25 Feb 2025 18:11)  losartan 50 mg oral tablet: 1 tab(s) orally once a day (25 Feb 2025 18:11)  metFORMIN 1000 mg oral tablet: 1 tab(s) orally 2 times a day (25 Feb 2025 18:11)  metoprolol tartrate 25 mg oral tablet: 1 tab(s) orally 2 times a day (25 Feb 2025 18:11)  QUEtiapine 400 mg oral tablet: 1 tab(s) orally once a day (at bedtime) (25 Feb 2025 18:11)  risperiDONE 4 mg oral tablet: 1 tab(s) orally 2 times a day (25 Feb 2025 18:11)  sodium chloride 1 g oral tablet: 1 tab(s) orally 3 times a day (soluble tablet) (25 Feb 2025 18:12)  traZODone 100 mg oral tablet: 1 tab(s) orally once a day (at bedtime) (25 Feb 2025 18:11)  Vitamin D2 50,000 intl units (1.25 mg) oral capsule: 1 cap(s) orally every 2 weeks on Friday (25 Feb 2025 18:09)

## 2025-02-26 ENCOUNTER — RESULT REVIEW (OUTPATIENT)
Age: 70
End: 2025-02-26

## 2025-02-26 LAB
ALBUMIN SERPL ELPH-MCNC: 2.2 G/DL — LOW (ref 3.3–5)
ALP SERPL-CCNC: 62 U/L — SIGNIFICANT CHANGE UP (ref 40–120)
ALT FLD-CCNC: 13 U/L — SIGNIFICANT CHANGE UP (ref 12–78)
ANION GAP SERPL CALC-SCNC: 4 MMOL/L — LOW (ref 5–17)
AST SERPL-CCNC: 13 U/L — LOW (ref 15–37)
BASOPHILS # BLD AUTO: 0.01 K/UL — SIGNIFICANT CHANGE UP (ref 0–0.2)
BASOPHILS NFR BLD AUTO: 0.2 % — SIGNIFICANT CHANGE UP (ref 0–2)
BILIRUB SERPL-MCNC: 0.2 MG/DL — SIGNIFICANT CHANGE UP (ref 0.2–1.2)
BUN SERPL-MCNC: 12 MG/DL — SIGNIFICANT CHANGE UP (ref 7–23)
CALCIUM SERPL-MCNC: 9.1 MG/DL — SIGNIFICANT CHANGE UP (ref 8.5–10.1)
CHLORIDE SERPL-SCNC: 103 MMOL/L — SIGNIFICANT CHANGE UP (ref 96–108)
CO2 SERPL-SCNC: 26 MMOL/L — SIGNIFICANT CHANGE UP (ref 22–31)
CREAT SERPL-MCNC: 0.94 MG/DL — SIGNIFICANT CHANGE UP (ref 0.5–1.3)
EGFR: 88 ML/MIN/1.73M2 — SIGNIFICANT CHANGE UP
EOSINOPHIL # BLD AUTO: 0.06 K/UL — SIGNIFICANT CHANGE UP (ref 0–0.5)
EOSINOPHIL NFR BLD AUTO: 1.4 % — SIGNIFICANT CHANGE UP (ref 0–6)
GLUCOSE SERPL-MCNC: 104 MG/DL — HIGH (ref 70–99)
HCT VFR BLD CALC: 28.1 % — LOW (ref 39–50)
HGB BLD-MCNC: 9.6 G/DL — LOW (ref 13–17)
IMM GRANULOCYTES NFR BLD AUTO: 0.7 % — SIGNIFICANT CHANGE UP (ref 0–0.9)
LYMPHOCYTES # BLD AUTO: 0.56 K/UL — LOW (ref 1–3.3)
LYMPHOCYTES # BLD AUTO: 13.1 % — SIGNIFICANT CHANGE UP (ref 13–44)
MCHC RBC-ENTMCNC: 27.4 PG — SIGNIFICANT CHANGE UP (ref 27–34)
MCHC RBC-ENTMCNC: 34.2 G/DL — SIGNIFICANT CHANGE UP (ref 32–36)
MCV RBC AUTO: 80.3 FL — SIGNIFICANT CHANGE UP (ref 80–100)
MONOCYTES # BLD AUTO: 0.58 K/UL — SIGNIFICANT CHANGE UP (ref 0–0.9)
MONOCYTES NFR BLD AUTO: 13.6 % — SIGNIFICANT CHANGE UP (ref 2–14)
NEUTROPHILS # BLD AUTO: 3.02 K/UL — SIGNIFICANT CHANGE UP (ref 1.8–7.4)
NEUTROPHILS NFR BLD AUTO: 71 % — SIGNIFICANT CHANGE UP (ref 43–77)
NRBC BLD AUTO-RTO: 0 /100 WBCS — SIGNIFICANT CHANGE UP (ref 0–0)
PLATELET # BLD AUTO: 149 K/UL — LOW (ref 150–400)
POTASSIUM SERPL-MCNC: 4.5 MMOL/L — SIGNIFICANT CHANGE UP (ref 3.5–5.3)
POTASSIUM SERPL-SCNC: 4.5 MMOL/L — SIGNIFICANT CHANGE UP (ref 3.5–5.3)
PROT SERPL-MCNC: 6.1 G/DL — SIGNIFICANT CHANGE UP (ref 6–8.3)
RBC # BLD: 3.5 M/UL — LOW (ref 4.2–5.8)
RBC # FLD: 17.2 % — HIGH (ref 10.3–14.5)
SODIUM SERPL-SCNC: 133 MMOL/L — LOW (ref 135–145)
WBC # BLD: 4.26 K/UL — SIGNIFICANT CHANGE UP (ref 3.8–10.5)
WBC # FLD AUTO: 4.26 K/UL — SIGNIFICANT CHANGE UP (ref 3.8–10.5)

## 2025-02-26 PROCEDURE — G0545: CPT

## 2025-02-26 PROCEDURE — 99232 SBSQ HOSP IP/OBS MODERATE 35: CPT

## 2025-02-26 RX ORDER — LORAZEPAM 4 MG/ML
0.5 VIAL (ML) INJECTION EVERY 8 HOURS
Refills: 0 | Status: DISCONTINUED | OUTPATIENT
Start: 2025-02-26 | End: 2025-02-27

## 2025-02-26 RX ORDER — FOLIC ACID 1 MG/1
1 TABLET ORAL DAILY
Refills: 0 | Status: DISCONTINUED | OUTPATIENT
Start: 2025-02-26 | End: 2025-02-27

## 2025-02-26 RX ORDER — AMOXICILLIN AND CLAVULANATE POTASSIUM 500; 125 MG/1; MG/1
1 TABLET, FILM COATED ORAL EVERY 12 HOURS
Refills: 0 | Status: DISCONTINUED | OUTPATIENT
Start: 2025-02-26 | End: 2025-02-27

## 2025-02-26 RX ORDER — HALOPERIDOL 10 MG/1
1 TABLET ORAL EVERY 6 HOURS
Refills: 0 | Status: DISCONTINUED | OUTPATIENT
Start: 2025-02-26 | End: 2025-02-27

## 2025-02-26 RX ORDER — TRAZODONE HCL 100 MG
100 TABLET ORAL AT BEDTIME
Refills: 0 | Status: DISCONTINUED | OUTPATIENT
Start: 2025-02-26 | End: 2025-02-27

## 2025-02-26 RX ORDER — LOSARTAN POTASSIUM 100 MG/1
50 TABLET, FILM COATED ORAL DAILY
Refills: 0 | Status: DISCONTINUED | OUTPATIENT
Start: 2025-02-26 | End: 2025-02-27

## 2025-02-26 RX ADMIN — Medication 0.5 MILLIGRAM(S): at 09:29

## 2025-02-26 RX ADMIN — Medication 88 MICROGRAM(S): at 05:39

## 2025-02-26 RX ADMIN — HEPARIN SODIUM 5000 UNIT(S): 1000 INJECTION INTRAVENOUS; SUBCUTANEOUS at 05:39

## 2025-02-26 RX ADMIN — Medication 2 MILLIGRAM(S): at 05:44

## 2025-02-26 RX ADMIN — Medication 100 MILLIGRAM(S): at 23:01

## 2025-02-26 RX ADMIN — QUETIAPINE FUMARATE 400 MILLIGRAM(S): 25 TABLET ORAL at 12:54

## 2025-02-26 RX ADMIN — CEFTRIAXONE 100 MILLIGRAM(S): 500 INJECTION, POWDER, FOR SOLUTION INTRAMUSCULAR; INTRAVENOUS at 09:29

## 2025-02-26 RX ADMIN — Medication 4 MILLIGRAM(S): at 05:44

## 2025-02-26 RX ADMIN — Medication 50 MILLIGRAM(S): at 23:06

## 2025-02-26 RX ADMIN — Medication 1 TABLET(S): at 16:44

## 2025-02-26 RX ADMIN — HEPARIN SODIUM 5000 UNIT(S): 1000 INJECTION INTRAVENOUS; SUBCUTANEOUS at 23:00

## 2025-02-26 RX ADMIN — METOPROLOL SUCCINATE 25 MILLIGRAM(S): 50 TABLET, EXTENDED RELEASE ORAL at 05:39

## 2025-02-26 RX ADMIN — AMOXICILLIN AND CLAVULANATE POTASSIUM 1 TABLET(S): 500; 125 TABLET, FILM COATED ORAL at 16:44

## 2025-02-26 RX ADMIN — POLYETHYLENE GLYCOL 3350 17 GRAM(S): 17 POWDER, FOR SOLUTION ORAL at 12:55

## 2025-02-26 RX ADMIN — HEPARIN SODIUM 5000 UNIT(S): 1000 INJECTION INTRAVENOUS; SUBCUTANEOUS at 16:44

## 2025-02-26 RX ADMIN — ATORVASTATIN CALCIUM 40 MILLIGRAM(S): 80 TABLET, FILM COATED ORAL at 23:01

## 2025-02-26 RX ADMIN — Medication 4 MILLIGRAM(S): at 18:47

## 2025-02-26 RX ADMIN — Medication 1500 MILLIGRAM(S): at 12:54

## 2025-02-26 RX ADMIN — INSULIN LISPRO 1: 100 INJECTION, SOLUTION INTRAVENOUS; SUBCUTANEOUS at 12:53

## 2025-02-26 RX ADMIN — Medication 2 MILLIGRAM(S): at 18:47

## 2025-02-26 RX ADMIN — Medication 1 TABLET(S): at 05:39

## 2025-02-26 RX ADMIN — Medication 100 MILLILITER(S): at 05:40

## 2025-02-26 RX ADMIN — Medication 40 MILLIGRAM(S): at 12:54

## 2025-02-26 RX ADMIN — METOPROLOL SUCCINATE 25 MILLIGRAM(S): 50 TABLET, EXTENDED RELEASE ORAL at 16:44

## 2025-02-26 RX ADMIN — Medication 81 MILLIGRAM(S): at 12:54

## 2025-02-26 NOTE — PROGRESS NOTE ADULT - SUBJECTIVE AND OBJECTIVE BOX
[facility]  INFECTIOUS DISEASES   [address] [place] [address]  Tel: [phone]     Fax: [phone]  ========================================================  MD Mathew Mustafa Michelle, MD Shah, Kaushal, MD Sunjit, Jaspal, MD Sehrish Shahid, MD   ========================================================    MRN-[id number]  RM HORTON     CC: Patient is a 69y old  Male who presents with a chief complaint of sepsis (25 Feb 2025 04:34)    Follow up: No fever, no new complaint. NAD    PAST MEDICAL & SURGICAL HISTORY:  Schizophrenia  Hypothyroidism  Hyperlipidemia  Anxiety  DM (diabetes mellitus)  Parkinsonism  Hyponatremia  NSTEMI (non-ST elevation myocardial infarction)  Bipolar disorder  Dementia  HTN (hypertension)  Chronic CHF  MDD (major depressive disorder)  HLD (hyperlipidemia)  Constipation  Folate deficiency  H/O candidiasis  Violent behavior  Atrial fibrillation  Hyperkalemia  Epistaxis    Social Hx: No current smoking, EtOH or drugs     FAMILY HISTORY:  Noncontributory     Allergies  aspartate (Unknown)  clozapine (Rash)  lithium (Rash)  clozapine (Unknown)  lithium (Unknown)    MEDICATIONS  (STANDING):  aspirin enteric coated 81 milliGRAM(s) Oral daily  atorvastatin 40 milliGRAM(s) Oral at bedtime  benztropine 2 milliGRAM(s) Oral two times a day  cefTRIAXone   IVPB 1000 milliGRAM(s) IV Intermittent once  cefTRIAXone   IVPB      dextrose 5%. 1000 milliLiter(s) (100 mL/Hr) IV Continuous <Continuous>  dextrose 5%. 1000 milliLiter(s) (50 mL/Hr) IV Continuous <Continuous>  dextrose 50% Injectable 25 Gram(s) IV Push once  dextrose 50% Injectable 12.5 Gram(s) IV Push once  dextrose 50% Injectable 25 Gram(s) IV Push once  dextrose Oral Gel 15 Gram(s) Oral once  divalproex ER 1500 milliGRAM(s) Oral daily  glucagon  Injectable 1 milliGRAM(s) IntraMuscular once  heparin   Injectable 5000 Unit(s) SubCutaneous every 8 hours  insulin lispro (ADMELOG) corrective regimen sliding scale   SubCutaneous three times a day before meals  levothyroxine 88 MICROGram(s) Oral daily  metoprolol tartrate 25 milliGRAM(s) Oral two times a day  pantoprazole    Tablet 40 milliGRAM(s) Oral daily  QUEtiapine 400 milliGRAM(s) Oral daily  risperiDONE   Tablet 4 milliGRAM(s) Oral two times a day  sodium chloride 0.9%. 1000 milliLiter(s) (100 mL/Hr) IV Continuous <Continuous>    REVIEW OF SYSTEMS:  Unable, not giving details     Physical Exam:  Vital Signs Last 24 Hrs  T(C): 36.3 (26 Feb 2025 05:06), Max: 37.1 (25 Feb 2025 21:18)  T(F): 97.3 (26 Feb 2025 05:06), Max: 98.8 (25 Feb 2025 21:18)  HR: 74 (26 Feb 2025 05:06) (74 - 88)  BP: 123/68 (26 Feb 2025 05:06) (123/68 - 158/85)  RR: 19 (26 Feb 2025 05:06) (18 - 19)  SpO2: 96% (26 Feb 2025 05:06) (95% - 96%)  Parameters below as of 26 Feb 2025 05:06  Patient On (Oxygen Delivery Method): room air  GEN: NAD  HEENT: normocephalic and atraumatic. EOMI. PERRL.    NECK: Supple.  No lymphadenopathy   LUNGS: Clear to auscultation.  HEART: Regular rate and rhythm  ABDOMEN: Soft, nontender, and nondistended.   EXTREMITIES: + edema.  NEUROLOGIC: grossly intact.  PSYCHIATRIC: Appropriate affect .  SKIN: No rash     Labs:                        9.6    4.26  )-----------( 149      ( 26 Feb 2025 07:40 )             28.1     02-26    133[L]  |  103  |  12  ----------------------------<  104[H]  4.5   |  26  |  0.94    Ca    9.1      26 Feb 2025 07:40    TPro  6.1  /  Alb  2.2[L]  /  TBili  0.2  /  DBili  x   /  AST  13[L]  /  ALT  13  /  AlkPhos  62  02-26    Culture - Urine (collected 02-24-25 @ 23:09)  Source: Clean Catch  Preliminary Report (02-26-25 @ 01:52):    50,000 - 99,000 CFU/mL Gram positive organisms    Urinalysis with Rflx Culture (collected 02-24-25 @ 23:09)    Culture - Blood (collected 02-24-25 @ 21:35)  Source: .Blood Blood-Peripheral  Preliminary Report (02-26-25 @ 03:01):    No growth at 24 hours    Culture - Blood (collected 02-24-25 @ 21:30)  Source: .Blood Blood-Peripheral  Preliminary Report (02-26-25 @ 03:01):    No growth at 24 hours    WBC Count: 4.26 K/uL (02-26-25 @ 07:40)  WBC Count: 15.23 K/uL (02-24-25 @ 21:30)    Creatinine: 0.94 mg/dL (02-26-25 @ 07:40)  Creatinine: 2.30 mg/dL (02-24-25 @ 21:30)    All imaging and other data have been reviewed.  < from: Xray Chest 1 View- PORTABLE-Urgent (02.24.25 @ 20:53) >  Impression:  Low lung volume without kinjal consolidation, effusion, or pneumothorax.    Assessment and Plan:   68 y/o man with PMH of HTN, DM2, CAD, schizophrenia, bipolar, anemia requiring multiple blood transfusions, last admission was 11/2024 presents to the ED from [facility] for hypotension for one day to rule out sepsis.    He is not very cooperative, cursing and angry.   In ED he had leukocytosis of 15k and UA with YIY=329, not febrile in ED.     # UTI  # Sepsis   # Leukocytosis     - Blood cultures are NGTD  - UCx with Low CFU GPC could be enterococcus or contamination   - Can stop ceftriaxone   - Start oral Augmentin 875mg q12 for 5days     - Tmax, WBC, Creat all normalized today   - Based on culture results will modify ABx regimen     Will follow.    Nikole Corrales MD  Division of Infectious Diseases   Please call ID service at [phone] with any question.    50 minutes spent on total encounter assessing patient, examination, chart review, counseling and coordinating care by the attending physician/nurse/care manager.

## 2025-02-26 NOTE — PROGRESS NOTE ADULT - SUBJECTIVE AND OBJECTIVE BOX
[facility] [place]       HPI:  70 y/o M with  PMH NSTEMI, schizophrenia, bipolar d/o, HTN, hyponatremia, anemia requiring multiple blood transfusions, last admission was 11/2024 presents to the ED from [facility] for hypotension X today rule out sepsis.  Patient unable to provide any further information. (25 Feb 2025 07:32)        SUBJECTIVE:      ALLERGIES:  Allergies    aspartate (Unknown)  clozapine (Rash)  lithium (Rash)  clozapine (Unknown)  lithium (Unknown)    Intolerances          MEDICATIONS  (STANDING):  amoxicillin  875 milliGRAM(s)/clavulanate 1 Tablet(s) Oral every 12 hours  aspirin enteric coated 81 milliGRAM(s) Oral daily  atorvastatin 40 milliGRAM(s) Oral at bedtime  benztropine 2 milliGRAM(s) Oral two times a day  dextrose 5%. 1000 milliLiter(s) (100 mL/Hr) IV Continuous <Continuous>  dextrose 5%. 1000 milliLiter(s) (50 mL/Hr) IV Continuous <Continuous>  dextrose 50% Injectable 25 Gram(s) IV Push once  dextrose 50% Injectable 12.5 Gram(s) IV Push once  dextrose 50% Injectable 25 Gram(s) IV Push once  dextrose Oral Gel 15 Gram(s) Oral once  divalproex ER 1500 milliGRAM(s) Oral daily  folic acid 1 milliGRAM(s) Oral daily  glucagon  Injectable 1 milliGRAM(s) IntraMuscular once  heparin   Injectable 5000 Unit(s) SubCutaneous every 8 hours  insulin lispro (ADMELOG) corrective regimen sliding scale   SubCutaneous three times a day before meals  lactobacillus acidophilus 1 Tablet(s) Oral every 12 hours  levothyroxine 88 MICROGram(s) Oral daily  losartan 50 milliGRAM(s) Oral daily  metoprolol tartrate 25 milliGRAM(s) Oral two times a day  pantoprazole    Tablet 40 milliGRAM(s) Oral daily  polyethylene glycol 3350 17 Gram(s) Oral daily  QUEtiapine 400 milliGRAM(s) Oral daily  risperiDONE   Tablet 4 milliGRAM(s) Oral two times a day  sodium chloride 0.9%. 1000 milliLiter(s) (100 mL/Hr) IV Continuous <Continuous>  traZODone 100 milliGRAM(s) Oral at bedtime    MEDICATIONS  (PRN):  acetaminophen     Tablet .. 650 milliGRAM(s) Oral every 6 hours PRN Temp greater or equal to 38C (100.4F), Mild Pain (1 - 3)  aluminum hydroxide/magnesium hydroxide/simethicone Suspension 30 milliLiter(s) Oral every 4 hours PRN Dyspepsia  bisacodyl Suppository 10 milliGRAM(s) Rectal daily PRN Constipation  haloperidol    Injectable 1 milliGRAM(s) IntraMuscular every 6 hours PRN Agitation  hydrALAZINE 50 milliGRAM(s) Oral every 4 hours PRN for systolic BP>150  LORazepam   Injectable 0.5 milliGRAM(s) IV Push every 8 hours PRN SEVERE AGITATION  melatonin 3 milliGRAM(s) Oral at bedtime PRN Insomnia  ondansetron Injectable 4 milliGRAM(s) IV Push every 8 hours PRN Nausea and/or Vomiting      REVIEW OF SYSTEMS:  CONSTITUTIONAL: No fever,  RESPIRATORY: No cough, wheezing, shortness of breath  CARDIOVASCULAR: No chest pain, dyspnea, palpitations, dizziness, syncope, paroxysmal nocturnal dyspnea, orthopnea, or arm or leg swelling  GASTROINTESTINAL: No abdominal  or epigastric pain, nausea, vomiting,  diarrhea  NEUROLOGICAL: No headaches,  loss of strength, numbness, or tremors    Vital Signs Last 24 Hrs  T(C): 36.7 (27 Feb 2025 12:15), Max: 36.7 (27 Feb 2025 12:15)  T(F): 98 (27 Feb 2025 12:15), Max: 98 (27 Feb 2025 12:15)  HR: 62 (27 Feb 2025 12:15) (62 - 80)  BP: 178/102 (27 Feb 2025 15:39) (133/83 - 178/102)  BP(mean): --  RR: 19 (27 Feb 2025 12:15) (18 - 19)  SpO2: 97% (27 Feb 2025 12:15) (94% - 97%)    Parameters below as of 27 Feb 2025 12:15  Patient On (Oxygen Delivery Method): room air        PHYSICAL EXAM:  HEAD:  Atraumatic, Normocephalic  NECK: Supple and normal thyroid.  No JVD or carotid bruit.   HEART: S1, S2 regular , 1/6 soft ejection systolic murmur in mitral area , no thrill and no gallops .  PULMONARY: Bilateral vesicular breathing , few scattered ronchi and few scattered rales are present .  ABDOMEN: Soft nontender and positive bowl sounds   EXTREMITIES:  No clubbing, cyanosis, or pedal  edema  NEUROLOGICAL: AAOX3 , no focal deficit .    LABS:                        9.6    4.26  )-----------( 149      ( 26 Feb 2025 07:40 )             28.1     02-26    133[L]  |  103  |  12  ----------------------------<  104[H]  4.5   |  26  |  0.94    Ca    9.1      26 Feb 2025 07:40    TPro  6.1  /  Alb  2.2[L]  /  TBili  0.2  /  DBili  x   /  AST  13[L]  /  ALT  13  /  AlkPhos  62  02-26          Urinalysis Basic - ( 26 Feb 2025 07:40 )    Color: x / Appearance: x / SG: x / pH: x  Gluc: 104 mg/dL / Ketone: x  / Bili: x / Urobili: x   Blood: x / Protein: x / Nitrite: x   Leuk Esterase: x / RBC: x / WBC x   Sq Epi: x / Non Sq Epi: x / Bacteria: x      BNP      EKG:  ECHO:  IMAGING:    Assessment/Plan    Will continue to follow during hospital course and give further recommendations as needed . Thanks for your referral . if any questions please contact me at office [phone] [phone])  SHARITA BYRNE MD [place] [address]  [address]  OFFICE : [phone]  CELL : [phone]  CARDIOLOGY F/U  :     Patient is a 69y old  Male who presents with a chief complaint of hypotension and lethargy and now feeling much better and full alert and awake and no distress and BP stable and lying comfortable .    · Chief Complaint: The patient is a 69y Male complaining of hypotension.  · HPI Objective Statement: 70 y/o M with  PMH NSTEMI, schizophrenia, bipolar d/o, HTN, hyponatremia, anemia requiring multiple blood transfusions, last admission was 2024 presents to the ED from [facility] for hypotension X today rule out sepsis.  Patient unable to provide any further information. Patient very confused and unable to give any information so most of history obtained from RN , PA , PMD and other consultants notes .       PAST MEDICAL & SURGICAL HISTORY:  Schizophrenia      Hypothyroidism      Hyperlipidemia      Schizophrenia      Hypothyroid      Anxiety      DM (diabetes mellitus)      Parkinsonism      Schizophrenia      Hyponatremia      NSTEMI (non-ST elevation myocardial infarction)      Bipolar disorder      DM (diabetes mellitus)      Dementia      HTN (hypertension)      Chronic CHF      MDD (major depressive disorder)      Hypothyroidism      HLD (hyperlipidemia)      Hypothyroidism      Constipation      Folate deficiency      Constipation      H/O candidiasis      Violent behavior      Atrial fibrillation      Hyperkalemia      Epistaxis      DM (diabetes mellitus)          FAMILY HISTORY:      SOCIAL HISTORY:   Alcohol:  Smoking:    Allergies    aspartate (Unknown)  clozapine (Rash)  lithium (Rash)  clozapine (Unknown)  lithium (Unknown)    Intolerances        MEDICATIONS  (STANDING):  amoxicillin  875 milliGRAM(s)/clavulanate 1 Tablet(s) Oral every 12 hours  aspirin enteric coated 81 milliGRAM(s) Oral daily  atorvastatin 40 milliGRAM(s) Oral at bedtime  benztropine 2 milliGRAM(s) Oral two times a day  dextrose 5%. 1000 milliLiter(s) (100 mL/Hr) IV Continuous <Continuous>  dextrose 5%. 1000 milliLiter(s) (50 mL/Hr) IV Continuous <Continuous>  dextrose 50% Injectable 25 Gram(s) IV Push once  dextrose 50% Injectable 12.5 Gram(s) IV Push once  dextrose 50% Injectable 25 Gram(s) IV Push once  dextrose Oral Gel 15 Gram(s) Oral once  divalproex ER 1500 milliGRAM(s) Oral daily  folic acid 1 milliGRAM(s) Oral daily  glucagon  Injectable 1 milliGRAM(s) IntraMuscular once  heparin   Injectable 5000 Unit(s) SubCutaneous every 8 hours  insulin lispro (ADMELOG) corrective regimen sliding scale   SubCutaneous three times a day before meals  lactobacillus acidophilus 1 Tablet(s) Oral every 12 hours  levothyroxine 88 MICROGram(s) Oral daily  losartan 50 milliGRAM(s) Oral daily  metoprolol tartrate 25 milliGRAM(s) Oral two times a day  pantoprazole    Tablet 40 milliGRAM(s) Oral daily  polyethylene glycol 3350 17 Gram(s) Oral daily  QUEtiapine 400 milliGRAM(s) Oral daily  risperiDONE   Tablet 4 milliGRAM(s) Oral two times a day  sodium chloride 0.9%. 1000 milliLiter(s) (100 mL/Hr) IV Continuous <Continuous>  traZODone 100 milliGRAM(s) Oral at bedtime    MEDICATIONS  (PRN):  acetaminophen     Tablet .. 650 milliGRAM(s) Oral every 6 hours PRN Temp greater or equal to 38C (100.4F), Mild Pain (1 - 3)  aluminum hydroxide/magnesium hydroxide/simethicone Suspension 30 milliLiter(s) Oral every 4 hours PRN Dyspepsia  bisacodyl Suppository 10 milliGRAM(s) Rectal daily PRN Constipation  haloperidol    Injectable 1 milliGRAM(s) IntraMuscular every 6 hours PRN Agitation  hydrALAZINE 50 milliGRAM(s) Oral every 4 hours PRN for systolic BP>150  LORazepam   Injectable 0.5 milliGRAM(s) IV Push every 8 hours PRN SEVERE AGITATION  melatonin 3 milliGRAM(s) Oral at bedtime PRN Insomnia  ondansetron Injectable 4 milliGRAM(s) IV Push every 8 hours PRN Nausea and/or Vomiting          REVIEW OF SYSTEMS:  CONSTITUTIONAL: No fever .  RESPIRATORY: No cough, wheezing, hemoptysis, or shortness of breath  CARDIOVASCULAR: No chest pain, dyspnea, palpitations, dizziness, syncope, paroxysmal nocturnal dyspnea, orthopnea, or arm or leg swelling  GASTROINTESTINAL: No abdominal  or epigastric pain, nausea, vomiting, hematemesis, diarrhea, constipation, melena or bright red blood .  NEUROLOGICAL: No headaches, numbness, or tremors  SKIN: No itching, burning, rashes, or lesions  ENDOCRINE: No heat or cold intolerance, or hair loss  MUSCULOSKELETAL: Patient has mild chronic arthritis .  HEME/LYMPH: No easy bruising or bleeding gums  ALLERGY AND IMMUNOLOGIC: No hives or rash.    Vital Signs Last 24 Hrs  T(C): 36.6 (2025 19:45), Max: 36.6 (2025 19:45)  T(F): 97.8 (2025 19:45), Max: 97.8 (2025 19:45)  HR: 66 (:) (66 - 70)  BP: 130/70 (:) (100/65 - 130/70)  BP(mean): --  RR: 18 (:) (18 - 18)  SpO2: 97% (:) (97% - 98%)    Parameters below as of   Patient On (Oxygen Delivery Method): room air        PHYSICAL EXAM:  HEAD:  Atraumatic, Normocephalic  EYES: EOMI, PERRLA, conjunctiva and sclera clear  NECK: Supple and normal thyroid.  No JVD or carotid bruit.   HEART: S1, S2 regular , 1/6 soft ejection systolic murmur in mitral area , no thrill and no gallops .  PULMONARY: Bilateral vesicular breathing , few scattered rhonchi and few scattered rales are present .  ABDOMEN: Soft nontender and positive bowl sounds   EXTREMITIES:  No clubbing, cyanosis, or pedal  edema  NEUROLOGICAL: AA but confused  , no focal deficit .  Skin : No rashes .  Musculoskeletal : No joint swellings .    LABS:                        9.6    15.23 )-----------( 158      ( 2025 21:30 )             28.5     02-24    129[L]  |  94[L]  |  21  ----------------------------<  122[H]  4.7   |  25  |  2.30[H]    Ca    8.7      2025 21:30    TPro  6.8  /  Alb  2.5[L]  /  TBili  0.5  /  DBili  x   /  AST  12[L]  /  ALT  13  /  AlkPhos  62  02-24        PT/INR - ( 2025 21:30 )   PT: 13.4 sec;   INR: 1.15 ratio         PTT - ( 2025 21:30 )  PTT:31.3 sec  Urinalysis Basic - ( 2025 23:09 )    Color: Yellow / Appearance: Clear / S.010 / pH: x  Gluc: x / Ketone: Negative mg/dL  / Bili: Negative / Urobili: 1.0 mg/dL   Blood: x / Protein: Negative mg/dL / Nitrite: Negative   Leuk Esterase: Moderate / RBC: 5 /HPF /  /HPF   Sq Epi: x / Non Sq Epi: x / Bacteria: Many /HPF            EKG: NSR and non specific ST-T changes .    Assessment and Plan :   70 y/o M with  PMH NSTEMI, schizophrenia, bipolar d/o, HTN, hyponatremia, anemia requiring multiple blood transfusions, last admission was 2024 presents to the ED from [facility] for hypotension X today rule out sepsis.  Patient unable to provide any further information. Patient very confused and unable to give any information so most of history obtained from RN , PA , PMD and other consultants notes . Patient has mild dehydration so continued  I/V hydration and patient BP much better after I/V fluids and patient was treated for UTI with I/V antibiotics and feeling much better and fully alert and awake and eating well . Patient antihypertensive medications Losartan and metoprolol resumed  . Patient has Michele .  Continue antibiotics as per PMD . Patient BP much better now . Will send Troponin I so far negative and  NON-ST KILEY ruled out  . Monitor hemoglobin and electrolytes . Hemodynamically stable so far .  Will continue to follow during hospital course and give further recommendations as needed . Thanks for your referral . if any questions please contact me at office ([phone] cell [phone])

## 2025-02-26 NOTE — PROGRESS NOTE ADULT - SUBJECTIVE AND OBJECTIVE BOX
PROGRESS NOTE  Patient is a 69y old  Male who presents with a chief complaint of sepsis (25 Feb 2025 04:34)    Chart and available morning labs /imaging are reviewed electronically , urgent issues addressed . More information  is being added upon completion of rounds , when more information is collected and management discussed with consultants , medical staff and social service/case management on the floor   OVERNIGHT  No new issues reported by medical staff . All above noted Patient is resting in a bed comfortably .Confused ,poor mentation .No distress noted     HPI:  70 y/o M with  PMH NSTEMI, schizophrenia, bipolar d/o, HTN, hyponatremia, anemia requiring multiple blood transfusions, last admission was 11/2024 presents to the ED from [facility] for hypotension X today rule out sepsis.  Patient unable to provide any further information. (25 Feb 2025 07:32)    PAST MEDICAL & SURGICAL HISTORY:  Schizophrenia      Hypothyroidism      Hyperlipidemia      Schizophrenia      Hypothyroid      Anxiety      DM (diabetes mellitus)      Parkinsonism      Schizophrenia      Hyponatremia      NSTEMI (non-ST elevation myocardial infarction)      Bipolar disorder      DM (diabetes mellitus)      Dementia      HTN (hypertension)      Chronic CHF      MDD (major depressive disorder)      Hypothyroidism      HLD (hyperlipidemia)      Hypothyroidism      Constipation      Folate deficiency      Constipation      H/O candidiasis      Violent behavior      Atrial fibrillation      Hyperkalemia      Epistaxis      DM (diabetes mellitus)          MEDICATIONS  (STANDING):  amoxicillin  875 milliGRAM(s)/clavulanate 1 Tablet(s) Oral every 12 hours  aspirin enteric coated 81 milliGRAM(s) Oral daily  atorvastatin 40 milliGRAM(s) Oral at bedtime  benztropine 2 milliGRAM(s) Oral two times a day  dextrose 5%. 1000 milliLiter(s) (100 mL/Hr) IV Continuous <Continuous>  dextrose 5%. 1000 milliLiter(s) (50 mL/Hr) IV Continuous <Continuous>  dextrose 50% Injectable 25 Gram(s) IV Push once  dextrose 50% Injectable 12.5 Gram(s) IV Push once  dextrose 50% Injectable 25 Gram(s) IV Push once  dextrose Oral Gel 15 Gram(s) Oral once  divalproex ER 1500 milliGRAM(s) Oral daily  folic acid 1 milliGRAM(s) Oral daily  glucagon  Injectable 1 milliGRAM(s) IntraMuscular once  heparin   Injectable 5000 Unit(s) SubCutaneous every 8 hours  insulin lispro (ADMELOG) corrective regimen sliding scale   SubCutaneous three times a day before meals  lactobacillus acidophilus 1 Tablet(s) Oral every 12 hours  levothyroxine 88 MICROGram(s) Oral daily  losartan 50 milliGRAM(s) Oral daily  metoprolol tartrate 25 milliGRAM(s) Oral two times a day  pantoprazole    Tablet 40 milliGRAM(s) Oral daily  polyethylene glycol 3350 17 Gram(s) Oral daily  QUEtiapine 400 milliGRAM(s) Oral daily  risperiDONE   Tablet 4 milliGRAM(s) Oral two times a day  sodium chloride 0.9%. 1000 milliLiter(s) (100 mL/Hr) IV Continuous <Continuous>  traZODone 100 milliGRAM(s) Oral at bedtime    MEDICATIONS  (PRN):  acetaminophen     Tablet .. 650 milliGRAM(s) Oral every 6 hours PRN Temp greater or equal to 38C (100.4F), Mild Pain (1 - 3)  aluminum hydroxide/magnesium hydroxide/simethicone Suspension 30 milliLiter(s) Oral every 4 hours PRN Dyspepsia  bisacodyl Suppository 10 milliGRAM(s) Rectal daily PRN Constipation  haloperidol    Injectable 1 milliGRAM(s) IntraMuscular every 6 hours PRN Agitation  hydrALAZINE 50 milliGRAM(s) Oral every 4 hours PRN for systolic BP>150  LORazepam   Injectable 0.5 milliGRAM(s) IV Push every 8 hours PRN SEVERE AGITATION  melatonin 3 milliGRAM(s) Oral at bedtime PRN Insomnia  ondansetron Injectable 4 milliGRAM(s) IV Push every 8 hours PRN Nausea and/or Vomiting      OBJECTIVE    T(C): 36.4 (02-26-25 @ 12:56), Max: 37.1 (02-25-25 @ 21:18)  HR: 69 (02-26-25 @ 12:56) (69 - 79)  BP: 179/94 (02-26-25 @ 12:56) (123/68 - 179/94)  RR: 19 (02-26-25 @ 12:56) (18 - 19)  SpO2: 95% (02-26-25 @ 12:56) (95% - 96%)  Wt(kg): --  I&O's Summary    25 Feb 2025 07:01  -  26 Feb 2025 07:00  --------------------------------------------------------  IN: 1200 mL / OUT: 3300 mL / NET: -2100 mL    26 Feb 2025 07:01  -  26 Feb 2025 18:17  --------------------------------------------------------  IN: 0 mL / OUT: 1900 mL / NET: -1900 mL          REVIEW OF SYSTEMS:  CONSTITUTIONAL: No fever, weight loss, or fatigue  EYES: No eye pain, visual disturbances, or discharge  ENMT:   No sinus or throat pain  NECK: No pain or stiffness  RESPIRATORY: No cough, wheezing, chills or hemoptysis; No shortness of breath  CARDIOVASCULAR: No chest pain, palpitations, dizziness, or leg swelling  GASTROINTESTINAL: No abdominal pain. No nausea, vomiting; No diarrhea or constipation. No melena or hematochezia.  GENITOURINARY: No dysuria, frequency, hematuria, or incontinence  NEUROLOGICAL: No headaches, memory loss, loss of strength, numbness, or tremors  SKIN: No itching, burning, rashes, or lesions   MUSCULOSKELETAL: No joint pain or swelling; No muscle, back, or extremity pain    PHYSICAL EXAM:  Appearance: NAD. VS past 24 hrs -as above   HEENT:   Moist oral mucosa. Conjunctiva clear b/l.   Neck : supple  Respiratory: Lungs CTAB.  Gastrointestinal:  Soft, nontender. No rebound. No rigidity. BS present	  Cardiovascular: RRR ,S1S2 present  Neurologic: Non-focal. Moving all extremities.  Extremities: No edema. No erythema. No calf tenderness.  Skin: No rashes, No ecchymoses, No cyanosis.	  wounds ,skin lesions-See skin assesment flow sheet   LABS:                        9.6    4.26  )-----------( 149      ( 26 Feb 2025 07:40 )             28.1     02-26    133[L]  |  103  |  12  ----------------------------<  104[H]  4.5   |  26  |  0.94    Ca    9.1      26 Feb 2025 07:40    TPro  6.1  /  Alb  2.2[L]  /  TBili  0.2  /  DBili  x   /  AST  13[L]  /  ALT  13  /  AlkPhos  62  02-26    CAPILLARY BLOOD GLUCOSE      POCT Blood Glucose.: 109 mg/dL (26 Feb 2025 16:47)  POCT Blood Glucose.: 157 mg/dL (26 Feb 2025 12:07)  POCT Blood Glucose.: 117 mg/dL (26 Feb 2025 08:01)  POCT Blood Glucose.: 118 mg/dL (25 Feb 2025 20:24)    PT/INR - ( 24 Feb 2025 21:30 )   PT: 13.4 sec;   INR: 1.15 ratio         PTT - ( 24 Feb 2025 21:30 )  PTT:31.3 sec  Urinalysis Basic - ( 26 Feb 2025 07:40 )    Color: x / Appearance: x / SG: x / pH: x  Gluc: 104 mg/dL / Ketone: x  / Bili: x / Urobili: x   Blood: x / Protein: x / Nitrite: x   Leuk Esterase: x / RBC: x / WBC x   Sq Epi: x / Non Sq Epi: x / Bacteria: x        Culture - Urine (collected 24 Feb 2025 23:09)  Source: Clean Catch  Preliminary Report (26 Feb 2025 01:52):    50,000 - 99,000 CFU/mL Gram positive organisms    Urinalysis with Rflx Culture (collected 24 Feb 2025 23:09)    Culture - Blood (collected 24 Feb 2025 21:35)  Source: .Blood Blood-Peripheral  Preliminary Report (26 Feb 2025 03:01):    No growth at 24 hours    Culture - Blood (collected 24 Feb 2025 21:30)  Source: .Blood Blood-Peripheral  Preliminary Report (26 Feb 2025 03:01):    No growth at 24 hours      RADIOLOGY & ADDITIONAL TESTS:   reviewed elctronically  ASSESSMENT/PLAN: 	    25 minutes aggregate time was spent on this visit, 50% visit time spent in care co-ordination with other attendings and counselling patient .I have discussed care plan with patient / HCP/family member ,who expressed understanding of problems treatment and their effect and side effects, alternatives in details. I have asked if they have any questions and concerns and appropriately addressed them to best of my ability.

## 2025-02-26 NOTE — PROGRESS NOTE ADULT - SUBJECTIVE AND OBJECTIVE BOX
CHIEF COMPLAINT/ REASON FOR VISIT  .. Patient was seen to address the  issue listed under PROBLEM LIST which is located toward bottom of this note     RM HORTON    PLV 1EAS 114 W1    Allergies    aspartate (Unknown)  clozapine (Rash)  lithium (Rash)  clozapine (Unknown)  lithium (Unknown)    Intolerances        PAST MEDICAL & SURGICAL HISTORY:  Schizophrenia      Hypothyroidism      Hyperlipidemia      Schizophrenia      Hypothyroid      Anxiety      DM (diabetes mellitus)      Parkinsonism      Schizophrenia      Hyponatremia      NSTEMI (non-ST elevation myocardial infarction)      Bipolar disorder      DM (diabetes mellitus)      Dementia      HTN (hypertension)      Chronic CHF      MDD (major depressive disorder)      Hypothyroidism      HLD (hyperlipidemia)      Hypothyroidism      Constipation      Folate deficiency      Constipation      H/O candidiasis      Violent behavior      Atrial fibrillation      Hyperkalemia      Epistaxis      DM (diabetes mellitus)          FAMILY HISTORY:      Home Medications:  atorvastatin 40 mg oral tablet: 1 tab(s) orally once a day (at bedtime) (25 Feb 2025 18:11)  benztropine 2 mg oral tablet: 1 tab(s) orally 2 times a day (25 Feb 2025 18:11)  divalproex sodium 500 mg oral tablet, extended release: 3 tab(s) orally once a day (25 Feb 2025 18:11)  docusate sodium 100 mg oral tablet: 2 tab(s) orally 2 times a day (25 Feb 2025 18:11)  folic acid 1 mg oral tablet: 1 tab(s) orally once a day (in the morning) (25 Feb 2025 18:11)  levothyroxine 88 mcg (0.088 mg) oral tablet: 1 tab(s) orally once a day (25 Feb 2025 18:11)  losartan 50 mg oral tablet: 1 tab(s) orally once a day (25 Feb 2025 18:11)  metFORMIN 1000 mg oral tablet: 1 tab(s) orally 2 times a day (25 Feb 2025 18:11)  metoprolol tartrate 25 mg oral tablet: 1 tab(s) orally 2 times a day (25 Feb 2025 18:11)  QUEtiapine 400 mg oral tablet: 1 tab(s) orally once a day (at bedtime) (25 Feb 2025 18:11)  risperiDONE 4 mg oral tablet: 1 tab(s) orally 2 times a day (25 Feb 2025 18:11)  sodium chloride 1 g oral tablet: 1 tab(s) orally 3 times a day (soluble tablet) (25 Feb 2025 18:12)  traZODone 100 mg oral tablet: 1 tab(s) orally once a day (at bedtime) (25 Feb 2025 18:11)  Vitamin D2 50,000 intl units (1.25 mg) oral capsule: 1 cap(s) orally every 2 weeks on Friday (25 Feb 2025 18:09)      MEDICATIONS  (STANDING):  aspirin enteric coated 81 milliGRAM(s) Oral daily  atorvastatin 40 milliGRAM(s) Oral at bedtime  benztropine 2 milliGRAM(s) Oral two times a day  cefTRIAXone   IVPB      cefTRIAXone   IVPB 1000 milliGRAM(s) IV Intermittent every 24 hours  dextrose 5%. 1000 milliLiter(s) (100 mL/Hr) IV Continuous <Continuous>  dextrose 5%. 1000 milliLiter(s) (50 mL/Hr) IV Continuous <Continuous>  dextrose 50% Injectable 25 Gram(s) IV Push once  dextrose 50% Injectable 12.5 Gram(s) IV Push once  dextrose 50% Injectable 25 Gram(s) IV Push once  dextrose Oral Gel 15 Gram(s) Oral once  divalproex ER 1500 milliGRAM(s) Oral daily  glucagon  Injectable 1 milliGRAM(s) IntraMuscular once  heparin   Injectable 5000 Unit(s) SubCutaneous every 8 hours  insulin lispro (ADMELOG) corrective regimen sliding scale   SubCutaneous three times a day before meals  lactobacillus acidophilus 1 Tablet(s) Oral every 12 hours  levothyroxine 88 MICROGram(s) Oral daily  metoprolol tartrate 25 milliGRAM(s) Oral two times a day  pantoprazole    Tablet 40 milliGRAM(s) Oral daily  polyethylene glycol 3350 17 Gram(s) Oral daily  QUEtiapine 400 milliGRAM(s) Oral daily  risperiDONE   Tablet 4 milliGRAM(s) Oral two times a day  sodium chloride 0.9%. 1000 milliLiter(s) (100 mL/Hr) IV Continuous <Continuous>    MEDICATIONS  (PRN):  acetaminophen     Tablet .. 650 milliGRAM(s) Oral every 6 hours PRN Temp greater or equal to 38C (100.4F), Mild Pain (1 - 3)  aluminum hydroxide/magnesium hydroxide/simethicone Suspension 30 milliLiter(s) Oral every 4 hours PRN Dyspepsia  bisacodyl Suppository 10 milliGRAM(s) Rectal daily PRN Constipation  hydrALAZINE 50 milliGRAM(s) Oral every 4 hours PRN for systolic BP>150  LORazepam   Injectable 0.5 milliGRAM(s) IV Push every 6 hours PRN Anxiety  melatonin 3 milliGRAM(s) Oral at bedtime PRN Insomnia  ondansetron Injectable 4 milliGRAM(s) IV Push every 8 hours PRN Nausea and/or Vomiting      Diet, Consistent Carbohydrate w/Evening Snack:   Soft and Bite Sized (SOFTBTSZ) (02-25-25 @ 07:20) [Active]          Vital Signs Last 24 Hrs  T(C): 36.3 (26 Feb 2025 05:06), Max: 37.1 (25 Feb 2025 21:18)  T(F): 97.3 (26 Feb 2025 05:06), Max: 98.8 (25 Feb 2025 21:18)  HR: 74 (26 Feb 2025 05:06) (74 - 88)  BP: 123/68 (26 Feb 2025 05:06) (123/68 - 158/85)  BP(mean): --  RR: 19 (26 Feb 2025 05:06) (18 - 19)  SpO2: 96% (26 Feb 2025 05:06) (95% - 96%)    Parameters below as of 26 Feb 2025 05:06  Patient On (Oxygen Delivery Method): room air          02-25-25 @ 07:01  -  02-26-25 @ 07:00  --------------------------------------------------------  IN: 1200 mL / OUT: 3300 mL / NET: -2100 mL              LABS:                        9.6    4.26  )-----------( 149      ( 26 Feb 2025 07:40 )             28.1     02-26    133[L]  |  103  |  12  ----------------------------<  104[H]  4.5   |  26  |  0.94    Ca    9.1      26 Feb 2025 07:40    TPro  6.1  /  Alb  2.2[L]  /  TBili  0.2  /  DBili  x   /  AST  13[L]  /  ALT  13  /  AlkPhos  62  02-26    PT/INR - ( 24 Feb 2025 21:30 )   PT: 13.4 sec;   INR: 1.15 ratio         PTT - ( 24 Feb 2025 21:30 )  PTT:31.3 sec  Urinalysis Basic - ( 26 Feb 2025 07:40 )    Color: x / Appearance: x / SG: x / pH: x  Gluc: 104 mg/dL / Ketone: x  / Bili: x / Urobili: x   Blood: x / Protein: x / Nitrite: x   Leuk Esterase: x / RBC: x / WBC x   Sq Epi: x / Non Sq Epi: x / Bacteria: x            WBC:  WBC Count: 4.26 K/uL (02-26 @ 07:40)  WBC Count: 15.23 K/uL (02-24 @ 21:30)      MICROBIOLOGY:  RECENT CULTURES:  02-24 Clean Catch [id number] [id number]   50,000 - 99,000 CFU/mL Gram positive organisms    02-24 .Blood Blood-Peripheral XXXX XXXX   No growth at 24 hours    02-24 .Blood Blood-Peripheral XXXX XXXX   No growth at 24 hours                PT/INR - ( 24 Feb 2025 21:30 )   PT: 13.4 sec;   INR: 1.15 ratio         PTT - ( 24 Feb 2025 21:30 )  PTT:31.3 sec    Sodium:  Sodium: 133 mmol/L (02-26 @ 07:40)  Sodium: 129 mmol/L (02-24 @ 21:30)      0.94 mg/dL 02-26 @ 07:40  2.30 mg/dL 02-24 @ 21:30      Hemoglobin:  Hemoglobin: 9.6 g/dL (02-26 @ 07:40)  Hemoglobin: 9.6 g/dL (02-24 @ 21:30)      Platelets: Platelet Count - Automated: 149 K/uL (02-26 @ 07:40)  Platelet Count - Automated: 158 K/uL (02-24 @ 21:30)      LIVER FUNCTIONS - ( 26 Feb 2025 07:40 )  Alb: 2.2 g/dL / Pro: 6.1 g/dL / ALK PHOS: 62 U/L / ALT: 13 U/L / AST: 13 U/L / GGT: x             Urinalysis Basic - ( 26 Feb 2025 07:40 )    Color: x / Appearance: x / SG: x / pH: x  Gluc: 104 mg/dL / Ketone: x  / Bili: x / Urobili: x   Blood: x / Protein: x / Nitrite: x   Leuk Esterase: x / RBC: x / WBC x   Sq Epi: x / Non Sq Epi: x / Bacteria: x        RADIOLOGY & ADDITIONAL STUDIES:      MICROBIOLOGY:  RECENT CULTURES:  02-24 Clean Catch [id number] [id number]   50,000 - 99,000 CFU/mL Gram positive organisms    02-24 .Blood Blood-Peripheral XXXX XXXX   No growth at 24 hours    02-24 .Blood Blood-Peripheral XXXX XXXX   No growth at 24 hours

## 2025-02-27 ENCOUNTER — TRANSCRIPTION ENCOUNTER (OUTPATIENT)
Age: 70
End: 2025-02-27

## 2025-02-27 VITALS — SYSTOLIC BLOOD PRESSURE: 178 MMHG | DIASTOLIC BLOOD PRESSURE: 102 MMHG

## 2025-02-27 PROCEDURE — 99285 EMERGENCY DEPT VISIT HI MDM: CPT

## 2025-02-27 PROCEDURE — 71045 X-RAY EXAM CHEST 1 VIEW: CPT

## 2025-02-27 PROCEDURE — 81001 URINALYSIS AUTO W/SCOPE: CPT

## 2025-02-27 PROCEDURE — 99232 SBSQ HOSP IP/OBS MODERATE 35: CPT

## 2025-02-27 PROCEDURE — 80164 ASSAY DIPROPYLACETIC ACD TOT: CPT

## 2025-02-27 PROCEDURE — 87186 SC STD MICRODIL/AGAR DIL: CPT

## 2025-02-27 PROCEDURE — 85730 THROMBOPLASTIN TIME PARTIAL: CPT

## 2025-02-27 PROCEDURE — 87040 BLOOD CULTURE FOR BACTERIA: CPT

## 2025-02-27 PROCEDURE — 83605 ASSAY OF LACTIC ACID: CPT

## 2025-02-27 PROCEDURE — 93005 ELECTROCARDIOGRAM TRACING: CPT

## 2025-02-27 PROCEDURE — 87086 URINE CULTURE/COLONY COUNT: CPT

## 2025-02-27 PROCEDURE — 86900 BLOOD TYPING SEROLOGIC ABO: CPT

## 2025-02-27 PROCEDURE — 82962 GLUCOSE BLOOD TEST: CPT

## 2025-02-27 PROCEDURE — 93306 TTE W/DOPPLER COMPLETE: CPT

## 2025-02-27 PROCEDURE — 85025 COMPLETE CBC W/AUTO DIFF WBC: CPT

## 2025-02-27 PROCEDURE — 87077 CULTURE AEROBIC IDENTIFY: CPT

## 2025-02-27 PROCEDURE — 85610 PROTHROMBIN TIME: CPT

## 2025-02-27 PROCEDURE — 86901 BLOOD TYPING SEROLOGIC RH(D): CPT

## 2025-02-27 PROCEDURE — 84484 ASSAY OF TROPONIN QUANT: CPT

## 2025-02-27 PROCEDURE — G0545: CPT

## 2025-02-27 PROCEDURE — 87184 SC STD DISK METHOD PER PLATE: CPT

## 2025-02-27 PROCEDURE — 36415 COLL VENOUS BLD VENIPUNCTURE: CPT

## 2025-02-27 PROCEDURE — 86850 RBC ANTIBODY SCREEN: CPT

## 2025-02-27 PROCEDURE — 80053 COMPREHEN METABOLIC PANEL: CPT

## 2025-02-27 RX ORDER — BENZTROPINE MESYLATE 2 MG
1 TABLET ORAL
Qty: 0 | Refills: 0 | DISCHARGE
Start: 2025-02-27

## 2025-02-27 RX ORDER — RISPERIDONE 4 MG
1 TABLET ORAL
Qty: 0 | Refills: 0 | DISCHARGE
Start: 2025-02-27

## 2025-02-27 RX ORDER — ASPIRIN 325 MG
1 TABLET ORAL
Qty: 0 | Refills: 0 | DISCHARGE
Start: 2025-02-27

## 2025-02-27 RX ORDER — METOPROLOL SUCCINATE 50 MG/1
1 TABLET, EXTENDED RELEASE ORAL
Qty: 0 | Refills: 0 | DISCHARGE
Start: 2025-02-27

## 2025-02-27 RX ORDER — LOSARTAN POTASSIUM 100 MG/1
1 TABLET, FILM COATED ORAL
Qty: 0 | Refills: 0 | DISCHARGE
Start: 2025-02-27

## 2025-02-27 RX ORDER — ATORVASTATIN CALCIUM 80 MG/1
1 TABLET, FILM COATED ORAL
Qty: 0 | Refills: 0 | DISCHARGE
Start: 2025-02-27

## 2025-02-27 RX ORDER — POLYETHYLENE GLYCOL 3350 17 G/17G
17 POWDER, FOR SOLUTION ORAL
Qty: 0 | Refills: 0 | DISCHARGE
Start: 2025-02-27

## 2025-02-27 RX ORDER — LACTOBACILLUS ACIDOPHILUS/PECT 75 MM-100
2 CAPSULE ORAL
Qty: 0 | Refills: 0 | DISCHARGE
Start: 2025-02-27

## 2025-02-27 RX ORDER — QUETIAPINE FUMARATE 25 MG/1
1 TABLET ORAL
Qty: 0 | Refills: 0 | DISCHARGE
Start: 2025-02-27

## 2025-02-27 RX ORDER — ACETAMINOPHEN 500 MG/5ML
2 LIQUID (ML) ORAL
Qty: 0 | Refills: 0 | DISCHARGE
Start: 2025-02-27

## 2025-02-27 RX ORDER — AMOXICILLIN AND CLAVULANATE POTASSIUM 500; 125 MG/1; MG/1
1 TABLET, FILM COATED ORAL
Qty: 0 | Refills: 0 | DISCHARGE
Start: 2025-02-27

## 2025-02-27 RX ORDER — TRAZODONE HCL 100 MG
1 TABLET ORAL
Qty: 0 | Refills: 0 | DISCHARGE
Start: 2025-02-27

## 2025-02-27 RX ORDER — BISACODYL 5 MG
1 TABLET, DELAYED RELEASE (ENTERIC COATED) ORAL
Qty: 0 | Refills: 0 | DISCHARGE
Start: 2025-02-27

## 2025-02-27 RX ORDER — LEVOTHYROXINE SODIUM 300 MCG
1 TABLET ORAL
Qty: 0 | Refills: 0 | DISCHARGE
Start: 2025-02-27

## 2025-02-27 RX ADMIN — LOSARTAN POTASSIUM 50 MILLIGRAM(S): 100 TABLET, FILM COATED ORAL at 05:35

## 2025-02-27 RX ADMIN — FOLIC ACID 1 MILLIGRAM(S): 1 TABLET ORAL at 12:54

## 2025-02-27 RX ADMIN — AMOXICILLIN AND CLAVULANATE POTASSIUM 1 TABLET(S): 500; 125 TABLET, FILM COATED ORAL at 05:35

## 2025-02-27 RX ADMIN — POLYETHYLENE GLYCOL 3350 17 GRAM(S): 17 POWDER, FOR SOLUTION ORAL at 12:59

## 2025-02-27 RX ADMIN — Medication 1500 MILLIGRAM(S): at 12:58

## 2025-02-27 RX ADMIN — METOPROLOL SUCCINATE 25 MILLIGRAM(S): 50 TABLET, EXTENDED RELEASE ORAL at 05:36

## 2025-02-27 RX ADMIN — QUETIAPINE FUMARATE 400 MILLIGRAM(S): 25 TABLET ORAL at 12:56

## 2025-02-27 RX ADMIN — Medication 88 MICROGRAM(S): at 05:35

## 2025-02-27 RX ADMIN — Medication 1 TABLET(S): at 05:36

## 2025-02-27 RX ADMIN — Medication 4 MILLIGRAM(S): at 05:35

## 2025-02-27 RX ADMIN — Medication 81 MILLIGRAM(S): at 12:58

## 2025-02-27 RX ADMIN — Medication 40 MILLIGRAM(S): at 13:00

## 2025-02-27 RX ADMIN — Medication 2 MILLIGRAM(S): at 05:35

## 2025-02-27 RX ADMIN — HEPARIN SODIUM 5000 UNIT(S): 1000 INJECTION INTRAVENOUS; SUBCUTANEOUS at 05:36

## 2025-02-27 NOTE — DIETITIAN INITIAL EVALUATION ADULT - ADD RECOMMEND
1) Recommend consistent carbohydrate, DASH/TLC, minced/moist consistency diet  2) Recommend MVI daily  3) Monitor po intake, diet tolerance, weight trends, labs, GI function, skin integrity

## 2025-02-27 NOTE — DISCHARGE NOTE NURSING/CASE MANAGEMENT/SOCIAL WORK - PATIENT PORTAL LINK FT
You can access the FollowMyHealth Patient Portal offered by [facility] by registering at the following website: [email or web address]. By joining [employer]’s FollowMyHealth portal, you will also be able to view your health information using other applications (apps) compatible with our system.

## 2025-02-27 NOTE — DIETITIAN INITIAL EVALUATION ADULT - ORAL INTAKE PTA/DIET HISTORY
Pt admitted from [facility]. Review transfer documents; pt was on a NCS, ground consistency diet with thin liquids PTA. 1200ml FR.  Pt admitted from [facility]. Review transfer documents; pt was on a NCS, JAYSON, ground consistency diet with thin liquids PTA. 1200ml FR.

## 2025-02-27 NOTE — PROGRESS NOTE ADULT - SUBJECTIVE AND OBJECTIVE BOX
CHIEF COMPLAINT/ REASON FOR VISIT  .. Patient was seen to address the  issue listed under PROBLEM LIST which is located toward bottom of this note     RM HORTON    PLV 1EAS 114 W1    Allergies    aspartate (Unknown)  clozapine (Rash)  lithium (Rash)  clozapine (Unknown)  lithium (Unknown)    Intolerances        PAST MEDICAL & SURGICAL HISTORY:  Schizophrenia      Hypothyroidism      Hyperlipidemia      Schizophrenia      Hypothyroid      Anxiety      DM (diabetes mellitus)      Parkinsonism      Schizophrenia      Hyponatremia      NSTEMI (non-ST elevation myocardial infarction)      Bipolar disorder      DM (diabetes mellitus)      Dementia      HTN (hypertension)      Chronic CHF      MDD (major depressive disorder)      Hypothyroidism      HLD (hyperlipidemia)      Hypothyroidism      Constipation      Folate deficiency      Constipation      H/O candidiasis      Violent behavior      Atrial fibrillation      Hyperkalemia      Epistaxis      DM (diabetes mellitus)          FAMILY HISTORY:      Home Medications:  atorvastatin 40 mg oral tablet: 1 tab(s) orally once a day (at bedtime) (25 Feb 2025 18:11)  benztropine 2 mg oral tablet: 1 tab(s) orally 2 times a day (25 Feb 2025 18:11)  divalproex sodium 500 mg oral tablet, extended release: 3 tab(s) orally once a day (25 Feb 2025 18:11)  docusate sodium 100 mg oral tablet: 2 tab(s) orally 2 times a day (25 Feb 2025 18:11)  folic acid 1 mg oral tablet: 1 tab(s) orally once a day (in the morning) (25 Feb 2025 18:11)  levothyroxine 88 mcg (0.088 mg) oral tablet: 1 tab(s) orally once a day (25 Feb 2025 18:11)  losartan 50 mg oral tablet: 1 tab(s) orally once a day (25 Feb 2025 18:11)  metFORMIN 1000 mg oral tablet: 1 tab(s) orally 2 times a day (25 Feb 2025 18:11)  metoprolol tartrate 25 mg oral tablet: 1 tab(s) orally 2 times a day (25 Feb 2025 18:11)  QUEtiapine 400 mg oral tablet: 1 tab(s) orally once a day (at bedtime) (25 Feb 2025 18:11)  risperiDONE 4 mg oral tablet: 1 tab(s) orally 2 times a day (25 Feb 2025 18:11)  sodium chloride 1 g oral tablet: 1 tab(s) orally 3 times a day (soluble tablet) (25 Feb 2025 18:12)  traZODone 100 mg oral tablet: 1 tab(s) orally once a day (at bedtime) (25 Feb 2025 18:11)  Vitamin D2 50,000 intl units (1.25 mg) oral capsule: 1 cap(s) orally every 2 weeks on Friday (25 Feb 2025 18:09)      MEDICATIONS  (STANDING):  amoxicillin  875 milliGRAM(s)/clavulanate 1 Tablet(s) Oral every 12 hours  aspirin enteric coated 81 milliGRAM(s) Oral daily  atorvastatin 40 milliGRAM(s) Oral at bedtime  benztropine 2 milliGRAM(s) Oral two times a day  dextrose 5%. 1000 milliLiter(s) (100 mL/Hr) IV Continuous <Continuous>  dextrose 5%. 1000 milliLiter(s) (50 mL/Hr) IV Continuous <Continuous>  dextrose 50% Injectable 25 Gram(s) IV Push once  dextrose 50% Injectable 12.5 Gram(s) IV Push once  dextrose 50% Injectable 25 Gram(s) IV Push once  dextrose Oral Gel 15 Gram(s) Oral once  divalproex ER 1500 milliGRAM(s) Oral daily  folic acid 1 milliGRAM(s) Oral daily  glucagon  Injectable 1 milliGRAM(s) IntraMuscular once  heparin   Injectable 5000 Unit(s) SubCutaneous every 8 hours  insulin lispro (ADMELOG) corrective regimen sliding scale   SubCutaneous three times a day before meals  lactobacillus acidophilus 1 Tablet(s) Oral every 12 hours  levothyroxine 88 MICROGram(s) Oral daily  losartan 50 milliGRAM(s) Oral daily  metoprolol tartrate 25 milliGRAM(s) Oral two times a day  pantoprazole    Tablet 40 milliGRAM(s) Oral daily  polyethylene glycol 3350 17 Gram(s) Oral daily  QUEtiapine 400 milliGRAM(s) Oral daily  risperiDONE   Tablet 4 milliGRAM(s) Oral two times a day  sodium chloride 0.9%. 1000 milliLiter(s) (100 mL/Hr) IV Continuous <Continuous>  traZODone 100 milliGRAM(s) Oral at bedtime    MEDICATIONS  (PRN):  acetaminophen     Tablet .. 650 milliGRAM(s) Oral every 6 hours PRN Temp greater or equal to 38C (100.4F), Mild Pain (1 - 3)  aluminum hydroxide/magnesium hydroxide/simethicone Suspension 30 milliLiter(s) Oral every 4 hours PRN Dyspepsia  bisacodyl Suppository 10 milliGRAM(s) Rectal daily PRN Constipation  haloperidol    Injectable 1 milliGRAM(s) IntraMuscular every 6 hours PRN Agitation  hydrALAZINE 50 milliGRAM(s) Oral every 4 hours PRN for systolic BP>150  LORazepam   Injectable 0.5 milliGRAM(s) IV Push every 8 hours PRN SEVERE AGITATION  melatonin 3 milliGRAM(s) Oral at bedtime PRN Insomnia  ondansetron Injectable 4 milliGRAM(s) IV Push every 8 hours PRN Nausea and/or Vomiting      Diet, Consistent Carbohydrate w/Evening Snack:   Soft and Bite Sized (SOFTBTSZ) (02-25-25 @ 07:20) [Active]          Vital Signs Last 24 Hrs  T(C): 36.4 (27 Feb 2025 05:09), Max: 36.6 (26 Feb 2025 23:00)  T(F): 97.5 (27 Feb 2025 05:09), Max: 97.8 (26 Feb 2025 23:00)  HR: 80 (27 Feb 2025 05:09) (60 - 80)  BP: 133/83 (27 Feb 2025 05:09) (133/83 - 183/97)  BP(mean): --  RR: 19 (27 Feb 2025 05:09) (18 - 19)  SpO2: 95% (27 Feb 2025 05:09) (94% - 95%)    Parameters below as of 27 Feb 2025 05:09  Patient On (Oxygen Delivery Method): room air          02-26-25 @ 07:01  -  02-27-25 @ 07:00  --------------------------------------------------------  IN: 1300 mL / OUT: 5250 mL / NET: -3950 mL              LABS:                        9.6    4.26  )-----------( 149      ( 26 Feb 2025 07:40 )             28.1     02-26    133[L]  |  103  |  12  ----------------------------<  104[H]  4.5   |  26  |  0.94    Ca    9.1      26 Feb 2025 07:40    TPro  6.1  /  Alb  2.2[L]  /  TBili  0.2  /  DBili  x   /  AST  13[L]  /  ALT  13  /  AlkPhos  62  02-26      Urinalysis Basic - ( 26 Feb 2025 07:40 )    Color: x / Appearance: x / SG: x / pH: x  Gluc: 104 mg/dL / Ketone: x  / Bili: x / Urobili: x   Blood: x / Protein: x / Nitrite: x   Leuk Esterase: x / RBC: x / WBC x   Sq Epi: x / Non Sq Epi: x / Bacteria: x            WBC:  WBC Count: 4.26 K/uL (02-26 @ 07:40)  WBC Count: 15.23 K/uL (02-24 @ 21:30)      MICROBIOLOGY:  RECENT CULTURES:  02-24 Clean Catch [id number]   **Please Note**: This is a Corrected Report**  50,000 - 99,000 CFU/mL Acinetobacter baumannii/nosocomialis group  Previously reported as:  50,000 - 99,000 CFU/mL Gram positive organisms    02-24 .Blood Blood-Peripheral XXXX XXXX   No growth at 48 Hours    02-24 .Blood Blood-Peripheral XXXX XXXX   No growth at 48 Hours                    Sodium:  Sodium: 133 mmol/L (02-26 @ 07:40)  Sodium: 129 mmol/L (02-24 @ 21:30)      0.94 mg/dL 02-26 @ 07:40  2.30 mg/dL 02-24 @ 21:30      Hemoglobin:  Hemoglobin: 9.6 g/dL (02-26 @ 07:40)  Hemoglobin: 9.6 g/dL (02-24 @ 21:30)      Platelets: Platelet Count - Automated: 149 K/uL (02-26 @ 07:40)  Platelet Count - Automated: 158 K/uL (02-24 @ 21:30)      LIVER FUNCTIONS - ( 26 Feb 2025 07:40 )  Alb: 2.2 g/dL / Pro: 6.1 g/dL / ALK PHOS: 62 U/L / ALT: 13 U/L / AST: 13 U/L / GGT: x             Urinalysis Basic - ( 26 Feb 2025 07:40 )    Color: x / Appearance: x / SG: x / pH: x  Gluc: 104 mg/dL / Ketone: x  / Bili: x / Urobili: x   Blood: x / Protein: x / Nitrite: x   Leuk Esterase: x / RBC: x / WBC x   Sq Epi: x / Non Sq Epi: x / Bacteria: x        RADIOLOGY & ADDITIONAL STUDIES:      MICROBIOLOGY:  RECENT CULTURES:  02-24 Clean Catch [id number]   **Please Note**: This is a Corrected Report**  50,000 - 99,000 CFU/mL Acinetobacter baumannii/nosocomialis group  Previously reported as:  50,000 - 99,000 CFU/mL Gram positive organisms    02-24 .Blood Blood-Peripheral XXXX XXXX   No growth at 48 Hours    02-24 .Blood Blood-Peripheral XXXX XXXX   No growth at 48 Hours

## 2025-02-27 NOTE — PROGRESS NOTE ADULT - ASSESSMENT
REASON FOR VISIT  .. Management of problems listed below      EVENTS.        ROS  . Patient unable to give ROS     PHYSICAL EXAM    HEENT Unremarkable  atraumatic   RESP Fair air entry  Harsh breath sound   CARDIAC S1 S2 No S3     NO JVD    ABDOMEN No hepatosplenomegaly   PEDAL EDEMA present No calf tenderness  NO rash       CHIEF COMPLAINT.   . 2/25/2025 BIBA from [facility] with complaints of low Bp    OVERALL PRESENTATION.  . 2/25/2025 70 y/o M with  PMH NSTEMI, schizophrenia, bipolar d/o, HTN, hyponatremia, anemia requiring multiple blood transfusions, last admission was 11/2024 presents to the ED from [facility] for hypotension X today rule out sepsis.  Patient unable to provide any further information  . 2/25/2025 9:33 AM Pulm consulted     PMH.  . A fib ON APIXABA  .... 11/13/2024 apixaba stoppd because of gi bl   . GI BLEED  11/13/2024 SOB (+)   . FOCAL NARROWING DISTAL SIGMOID COLON 11/14/2024 CT   .... 11/14/2024 needs GI eval and followup   . APIXABA  .... 11/13/2024 12p given kcentra 4000 u   . ANEMIA   .... 11/13-11/14-11/14/2024 Hb 5.1 - 7.7-8.9   . TRANSFUSION  .... 11/13/2024 3 u   . HUBERT  11/13/2024 Cr 1.6    . SCHIZOPHRENIA    PAST HOSPITAL STAYS .  Home Medications:   * Patient Currently Takes Medications as of 15-Nov-2024 12:53 documented in Structured Notes  · metoprolol tartrate 25 mg oral tablet: 1 tab(s) orally 2 times a day  · pantoprazole 40 mg oral delayed release tablet: 1 tab(s) orally 2 times a day  · levothyroxine 88 mcg (0.088 mg) oral tablet: 1 tab(s) orally once a day  · risperiDONE 4 mg oral tablet: 1 tab(s) orally 2 times a day  · QUEtiapine 400 mg oral tablet: 1 tab(s) orally once a day (at bedtime)  · atorvastatin 40 mg oral tablet: 1 tab(s) orally once a day (at bedtime)  · traZODone 100 mg oral tablet: 1 tab(s) orally once a day (at bedtime)  · divalproex sodium 500 mg oral tablet, extended release: 3 tab(s) orally once a day  · folic acid 1 mg oral tablet: 1 tab(s) orally once a day (in the morning)  · benztropine 2 mg oral tablet: 1 tab(s) orally 2 times a day  · docusate sodium 100 mg oral tablet: 2 tab(s) orally 2 times a day  · Vitamin D2 50,000 intl units (1.25 mg) oral capsule: 1 cap(s) orally every 2 weeks  · metFORMIN 1000 mg oral tablet: 1 tab(s) orally 2 times a day  · losartan 50 mg oral tablet: 1 tab(s) orally once a day  ER MGMT .      BEST PRACTICE ISSUES.  . HOB ELEVATN.    .... Yes  . DIET  .   .... CONS CARB SOFT BITE 2/25/2025   .  IV FLUID.  ..... NS 2/25/2025    . DVT PPLX .    .... HPSC 2/25 H 5k  12h   . STRESS ULCR PPLX .   .... PROTONI 2/25/2025 P 40   . DATE/DM MGMT.   ..... See under Endocrine section   GENERAL DATA .   . COVID.         ....   . GOC.    ....    . ICU STAY.    .... no   . INFECTION PPLX .   ....   . ALLGY.   .... lithium: Drug, Rash  .... clozapine: Drug, Rash  .... lithium: Drug, Unknown  .... clozapine: Drug, Unknown  .... aspartate [fr  . WT.   .... 2/25/2025 84  . BMI.  .... 2/25/2025 29      XXXXXXXXXXXXXXXXXXXXXXXXXXXXXXXXXXX  PROBLEM ASSESSMENT RECOMMENDATIONS.  RESP.   INFECTION.  . DATA  .... w 2/24-2/26/2025 w 15.2 - 4.2  .... ua 2/24/2025 w 100  .... bc 2/24 (-)  .... uc 2/24 50-99 gp     . UTI   .... 2/24 UA W 100   .... uc 2/24 50-99 gp   .... rocephin 2/25/2025 r x 3d  dced  .... augmentin 875 2/26/2025 x 5 d Dr Hayden     CARDIAC.  . LACTICEMIA   .... la 2/24/2025 la 1.2     . CAD  .... Trop 2/24/2025 tr 10.8   .... ASA 2/25/2025 A 81  .... METOPROLOL 2/25/2025 M 25.2   .... ATORVASTAT 2/25/2025 A 40     . CHF.   .... tte 2/26/2025   ........ ef 65%  .... HYDRALAZINE 2/25 H 50.6p     . A fib  .... 2/25/2025 Not on fdac because of ho bleeding needed 3 u prbc 11/2024    HEMAT.  . ANEMIA   .... Hb 2/24-2/26/2025 Hb 9.6 - 9.6   .... Plt 2/24/2025 Plt 158   .... inr 2/24/2025 inr 115     GI.   . LFT MONITORING   .... LFTS 2/24/2025   ........ AP  62  ........ AST 12  ........ ALT 13    RENAL.  . DATA  .... K 2/24/2025 K 4.7   .... CO2 2/24/2025 co2 25   .... ag 2/24/2025 ag 10  .... alb 2/24/2025 alb 2.5     . HYPONATREMIA   .... Na 2/24-2/26/2025 Na 129 - 133    . HUBERT   .... Cr 2/24-2/26/2025 Cr 2.3- .9      ENDO.  . DM   ... SL SCALE 2/25/2025     . HYPOTHYROID  .... LEVOXYL 2/25/2025 L 88     NEURO.  .... BENZTROPINE 2/25/2025 B 2.2   .... DEPAKOTE 2/25/2025 D 1500   .... QUETIAPINE 2/25/2025 Q 400   .... RISPERIDONE 2/25/2025 R 4.2       DISCUSSIONS.  .... Discussed with primary care and relevant consultants on an ongoing basis     XXXXXXXXXXXXXXXXXXXXXX   SUMMARY    CC.   . 2/24/2025 RO SEPSIS     MAIN ISSUES.  . UTI   .... 2/24 UA W 100   .... uc 2/24 50-99 gp   .... rocephin 2/25/2025 r x 3d  dced  .... augmentin 875 2/26/2025 x 5 d Dr Hayden  . BRETT fib  .... 2/25/2025 Not on fdac because of ho bleeding needed 3 u prbc 11/2024  . ANEMIA   .... Hb 2/24/2025 Hb 9.6  . HYPONATREMIA   .... Na 2/24/2025 Na 129   . HUBERT   .... Cr 2/24/2025 Cr 2.3     RESOLVED/SEMI-ELECTIVE/CHRONIC ISSUES.  . CAD   AF Not on FD AC becaiuse of HO bleed needed 3u prbc 11/2024   . SCHIZOPHRENIA       TIME SPENT.  . Over 36 minutes aggregate care time spent on encounter; activities included   direct patient care, counseling and/or coordinating care reviewing notes, lab data/ imaging , discussion with multidisciplinary team/ patient  /family and explaining in detail risks, benefits, alternatives  of the recommendations     CLIFFORD ABDALLA 67 m 2/25/2025 1955   
   REASON FOR VISIT  .. Management of problems listed below      EVENTS.        ROS  . Patient unable to give ROS     PHYSICAL EXAM    HEENT Unremarkable  atraumatic   RESP Fair air entry  Harsh breath sound   CARDIAC S1 S2 No S3     NO JVD    ABDOMEN No hepatosplenomegaly   PEDAL EDEMA present No calf tenderness  NO rash       CHIEF COMPLAINT.   . 2/25/2025 BIBA from [facility] with complaints of low Bp    OVERALL PRESENTATION.  . 2/25/2025 70 y/o M with  PMH NSTEMI, schizophrenia, bipolar d/o, HTN, hyponatremia, anemia requiring multiple blood transfusions, last admission was 11/2024 presents to the ED from [facility] for hypotension X today rule out sepsis.  Patient unable to provide any further information  . 2/25/2025 9:33 AM Pulm consulted     PMH.  . A fib ON APIXABA  .... 11/13/2024 apixaba stoppd because of gi bl   . GI BLEED  11/13/2024 SOB (+)   . FOCAL NARROWING DISTAL SIGMOID COLON 11/14/2024 CT   .... 11/14/2024 needs GI eval and followup   . APIXABA  .... 11/13/2024 12p given kcentra 4000 u   . ANEMIA   .... 11/13-11/14-11/14/2024 Hb 5.1 - 7.7-8.9   . TRANSFUSION  .... 11/13/2024 3 u   . HUBERT  11/13/2024 Cr 1.6    . SCHIZOPHRENIA    PAST HOSPITAL STAYS .  Home Medications:   * Patient Currently Takes Medications as of 15-Nov-2024 12:53 documented in Structured Notes  · metoprolol tartrate 25 mg oral tablet: 1 tab(s) orally 2 times a day  · pantoprazole 40 mg oral delayed release tablet: 1 tab(s) orally 2 times a day  · levothyroxine 88 mcg (0.088 mg) oral tablet: 1 tab(s) orally once a day  · risperiDONE 4 mg oral tablet: 1 tab(s) orally 2 times a day  · QUEtiapine 400 mg oral tablet: 1 tab(s) orally once a day (at bedtime)  · atorvastatin 40 mg oral tablet: 1 tab(s) orally once a day (at bedtime)  · traZODone 100 mg oral tablet: 1 tab(s) orally once a day (at bedtime)  · divalproex sodium 500 mg oral tablet, extended release: 3 tab(s) orally once a day  · folic acid 1 mg oral tablet: 1 tab(s) orally once a day (in the morning)  · benztropine 2 mg oral tablet: 1 tab(s) orally 2 times a day  · docusate sodium 100 mg oral tablet: 2 tab(s) orally 2 times a day  · Vitamin D2 50,000 intl units (1.25 mg) oral capsule: 1 cap(s) orally every 2 weeks  · metFORMIN 1000 mg oral tablet: 1 tab(s) orally 2 times a day  · losartan 50 mg oral tablet: 1 tab(s) orally once a day  ER MGMT .        BEST PRACTICE ISSUES.  . HOB ELEVATN.    .... Yes  . DIET  .   .... CONS CARB SOFT BITE 2/25/2025   .  IV FLUID.  ..... NS 2/25/2025    . DVT PPLX .    .... HPSC 2/25 H 5k  12h   . STRESS ULCR PPLX .   .... PROTONI 2/25/2025 P 40   . DATE/DM MGMT.   ..... See under Endocrine section   GENERAL DATA .   . COVID.         ....   . GOC.    ....    . ICU STAY.    .... no   . INFECTION PPLX .   ....   . ALLGY.   .... lithium: Drug, Rash  .... clozapine: Drug, Rash  .... lithium: Drug, Unknown  .... clozapine: Drug, Unknown  .... aspartate [fr  . WT.   .... 2/25/2025 84  . BMI.  .... 2/25/2025 29      XXXXXXXXXXXXXXXXXXXXXXX  VITALS/GAS EXCHANGE/DRIPS    ABGS.     .  VS/ PO/IO/ VENT/ DRIPS.   2/26/2025 afeb 74 120/60  2/26/2025 ra 95%    XXXXXXXXXXXXXXXXXXXXXXXXXXXXXXXXXXX  PROBLEM ASSESSMENT RECOMMENDATIONS.  RESP.   INFECTION.  . DATA  .... w 2/24-2/26/2025 w 15.2 - 4.2  .... ua 2/24/2025 w 100  .... bc 2/24 (-)  .... uc 2/24 50-99 gp     . UTI   .... 2/24 UA W 100   .... uc 2/24 50-99 gp   .... rocephin 2/25/2025 r x 3d  dced  .... augmentin 875 2/26/2025 x 5 d Dr Hayden     CARDIAC.  . LACTICEMIA   .... la 2/24/2025 la 1.2     . CAD  .... Trop 2/24/2025 tr 10.8   .... ASA 2/25/2025 A 81  .... METOPROLOL 2/25/2025 M 25.2   .... ATORVASTAT 2/25/2025 A 40     . CHF.   .... tte 2/26/2025   ........ ef 65%  .... HYDRALAZINE 2/25 H 50.6p     . A fib  .... 2/25/2025 Not on fdac because of ho bleeding needed 3 u prbc 11/2024    HEMAT.  . ANEMIA   .... Hb 2/24-2/26/2025 Hb 9.6 - 9.6   .... Plt 2/24/2025 Plt 158   .... inr 2/24/2025 inr 115     GI.   . LFT MONITORING   .... LFTS 2/24/2025   ........ AP  62  ........ AST 12  ........ ALT 13    RENAL.  . DATA  .... K 2/24/2025 K 4.7   .... CO2 2/24/2025 co2 25   .... ag 2/24/2025 ag 10  .... alb 2/24/2025 alb 2.5     . HYPONATREMIA   .... Na 2/24-2/26/2025 Na 129 - 133    . HUBERT   .... Cr 2/24-2/26/2025 Cr 2.3- .9      ENDO.  . DM   ... SL SCALE 2/25/2025     . HYPOTHYROID  .... LEVOXYL 2/25/2025 L 88     NEURO.  .... BENZTROPINE 2/25/2025 B 2.2   .... DEPAKOTE 2/25/2025 D 1500   .... QUETIAPINE 2/25/2025 Q 400   .... RISPERIDONE 2/25/2025 R 4.2       DISCUSSIONS.  .... Discussed with primary care and relevant consultants on an ongoing basis     XXXXXXXXXXXXXXXXXXXXXX   SUMMARY    CC.   . 2/24/2025 RO SEPSIS     MAIN ISSUES.  . UTI   .... 2/24 UA W 100   .... uc 2/24 50-99 gp   .... rocephin 2/25/2025 r x 3d  dced  .... augmentin 875 2/26/2025 x 5 d Dr Hayden  . BRETT fib  .... 2/25/2025 Not on fdac because of ho bleeding needed 3 u prbc 11/2024  . ANEMIA   .... Hb 2/24/2025 Hb 9.6  . HYPONATREMIA   .... Na 2/24/2025 Na 129   . HUBERT   .... Cr 2/24/2025 Cr 2.3     RESOLVED/SEMI-ELECTIVE/CHRONIC ISSUES.  . CAD   AF Not on FD AC becaiuse of HO bleed needed 3u prbc 11/2024   . SCHIZOPHRENIA       TIME SPENT.  . Over 36 minutes aggregate care time spent on encounter; activities included   direct patient care, counseling and/or coordinating care reviewing notes, lab data/ imaging , discussion with multidisciplinary team/ patient  /family and explaining in detail risks, benefits, alternatives  of the recommendations     CLIFFORD ABDALLA 67 m 2/25/2025 1955   
In ED he had leukocytosis of 15k and UA with BBP=429, not febrile in ED.     # UTI  # Sepsis   # Leukocytosis     - Blood cultures are pending   - UCx is done will follow   - Agree with Ceftriaxone 1gm daily, since no MDRO in the past   - Monitor Tmax, WBC, Creat   - Based on culture results will modify ABx regimen 68 y/o M with  PMH NSTEMI, schizophrenia, bipolar d/o, HTN, hyponatremia, anemia requiring multiple blood transfusions, last admission was 11/2024 presents to the ED from [facility] for hypotension X today rule out sepsis.  Patient unable to provide any further information.  INFECTION.  . DATA  .... w 2/24/2025 w 15.2   .... ua 2/24/2025 w 100  . UTI   .... 2/24 UA W 100   .... rocephin 2/25/2025 r x 3d    CARDIAC.  . LACTICEMIA   .... la 2/24/2025 la 1.2   . CAD  .... Trop 2/24/2025 tr 10.8   .... ASA 2/25/2025 A 81  .... METOPROLOL 2/25/2025 M 25.2   .... ATORVASTAT 2/25/2025 A 40   . HYTN  .... HYDRALAZINE 2/25 H 50.6p   . A fib  .... 2/25/2025 Not on fdac because of ho bleeding needed 3 u prbc 11/2024  HEMAT.  . ANEMIA   .... Hb 2/24/2025 Hb 9.6  .... Plt 2/24/2025 Plt 158   .... inr 2/24/2025 inr 115   GI.   . LFT MONITORING   .... LFTS 2/24/2025   ........ AP  62  ........ AST 12  ........ ALT 13  RENAL.  . DATA  .... K 2/24/2025 K 4.7   .... CO2 2/24/2025 co2 25   .... ag 2/24/2025 ag 10  .... alb 2/24/2025 alb 2.5   . HYPONATREMIA   .... Na 2/24/2025 Na 129   . HUBERT   .... Cr 2/24/2025 Cr 2.3   ENDO.  . DM   ... SL SCALE 2/25/2025   . HYPOTHYROID  .... LEVOXYL 2/25/2025 L 88   NEURO.  .... BENZTROPINE 2/25/2025 B 2.2   .... DEPAKOTE 2/25/2025 D 1500   .... QUETIAPINE 2/25/2025 Q 400   .... RISPERIDONE 2/25/2025 R 4.2   CC.   . 2/24/2025 RO SEPSIS   MAIN ISSUES.  . UTI   .... 2/24 UA W 100   .... rocephin 2/25/2025 r x 3d   . A fib  .... 2/25/2025 Not on fdac because of ho bleeding needed 3 u prbc 11/2024  . ANEMIA   .... Hb 2/24/2025 Hb 9.6  . HYPONATREMIA   .... Na 2/24/2025 Na 129   . HUBERT   .... Cr 2/24/2025 Cr 2.3   . CAD   AF Not on FD AC becaiuse of HO bleed needed 3u prbc 11/2024   . SCHIZOPHRENIA 
In ED he had leukocytosis of 15k and UA with AOO=076, not febrile in ED.     # UTI  # Sepsis   # Leukocytosis     - Blood cultures are pending   - UCx is done will follow   - Agree with Ceftriaxone 1gm daily, since no MDRO in the past   - Monitor Tmax, WBC, Creat   - Based on culture results will modify ABx regimen 70 y/o M with  PMH NSTEMI, schizophrenia, bipolar d/o, HTN, hyponatremia, anemia requiring multiple blood transfusions, last admission was 11/2024 presents to the ED from [facility] for hypotension X today rule out sepsis.  Patient unable to provide any further information.  INFECTION.  . DATA  .... w 2/24/2025 w 15.2   .... ua 2/24/2025 w 100  . UTI   .... 2/24 UA W 100   .... rocephin 2/25/2025 r x 3d    CARDIAC.  . LACTICEMIA   .... la 2/24/2025 la 1.2   . CAD  .... Trop 2/24/2025 tr 10.8   .... ASA 2/25/2025 A 81  .... METOPROLOL 2/25/2025 M 25.2   .... ATORVASTAT 2/25/2025 A 40   . HYTN  .... HYDRALAZINE 2/25 H 50.6p   . A fib  .... 2/25/2025 Not on fdac because of ho bleeding needed 3 u prbc 11/2024  HEMAT.  . ANEMIA   .... Hb 2/24/2025 Hb 9.6  .... Plt 2/24/2025 Plt 158   .... inr 2/24/2025 inr 115   GI.   . LFT MONITORING   .... LFTS 2/24/2025   ........ AP  62  ........ AST 12  ........ ALT 13  RENAL.  . DATA  .... K 2/24/2025 K 4.7   .... CO2 2/24/2025 co2 25   .... ag 2/24/2025 ag 10  .... alb 2/24/2025 alb 2.5   . HYPONATREMIA   .... Na 2/24/2025 Na 129   . UHBERT   .... Cr 2/24/2025 Cr 2.3   ENDO.  . DM   ... SL SCALE 2/25/2025   . HYPOTHYROID  .... LEVOXYL 2/25/2025 L 88   NEURO.  .... BENZTROPINE 2/25/2025 B 2.2   .... DEPAKOTE 2/25/2025 D 1500   .... QUETIAPINE 2/25/2025 Q 400   .... RISPERIDONE 2/25/2025 R 4.2   CC.   . 2/24/2025 RO SEPSIS   MAIN ISSUES.  . UTI   .... 2/24 UA W 100   .... rocephin 2/25/2025 r x 3d   . A fib  .... 2/25/2025 Not on fdac because of ho bleeding needed 3 u prbc 11/2024  . ANEMIA   .... Hb 2/24/2025 Hb 9.6  . HYPONATREMIA   .... Na 2/24/2025 Na 129   . HUBERT   .... Cr 2/24/2025 Cr 2.3   . CAD   AF Not on FD AC becaiuse of HO bleed needed 3u prbc 11/2024   . SCHIZOPHRENIA

## 2025-02-27 NOTE — DISCHARGE NOTE PROVIDER - HOSPITAL COURSE
# UTI  # Sepsis  - Blood cultures are pending   - UCx is done will follow   - Agree with Ceftriaxone 1gm daily, since no MDRO in the past   - Monitor Tmax, WBC, Creat   - Based on culture results will modify ABx regimen 70 y/o M with  PMH NSTEMI, schizophrenia, bipolar d/o, HTN, hyponatremia, anemia requiring multiple blood transfusions, last admission was 11/2024 presents to the ED from [facility] for hypotension X today rule out sepsis.  Patient unable to provide any further information.  INFECTION.  . DATA  .... w 2/24/2025 w 15.2   .... ua 2/24/2025 w 100  . UTI   .... 2/24 UA W 100   .... rocephin 2/25/2025 r x 3d    CARDIAC.  . LACTICEMIA   .... la 2/24/2025 la 1.2   . CAD  .... Trop 2/24/2025 tr 10.8   .... ASA 2/25/2025 A 81  .... METOPROLOL 2/25/2025 M 25.2   .... ATORVASTAT 2/25/2025 A 40   . HYTN  .... HYDRALAZINE 2/25 H 50.6p   . A fib  .... 2/25/2025 Not on fdac because of ho bleeding needed 3 u prbc 11/2024  HEMAT.  . ANEMIA   .... Hb 2/24/2025 Hb 9.6  .... Plt 2/24/2025 Plt 158   .... inr 2/24/2025 inr 115   GI.   . LFT MONITORING   .... LFTS 2/24/2025   ........ AP  62  ........ AST 12  ........ ALT 13  RENAL.  . DATA  .... K 2/24/2025 K 4.7   .... CO2 2/24/2025 co2 25   .... ag 2/24/2025 ag 10  .... alb 2/24/2025 alb 2.5   . HYPONATREMIA   .... Na 2/24/2025 Na 129   . HUBERT   .... Cr 2/24/2025 Cr 2.3   ENDO.  . DM   ... SL SCALE 2/25/2025   . HYPOTHYROID  .... LEVOXYL 2/25/2025 L 88   NEURO.  .... BENZTROPINE 2/25/2025 B 2.2   .... DEPAKOTE 2/25/2025 D 1500   .... QUETIAPINE 2/25/2025 Q 400   .... RISPERIDONE 2/25/2025 R 4.2   CC.   . 2/24/2025 RO SEPSIS   MAIN ISSUES.  . UTI   .... 2/24 UA W 100   .... rocephin 2/25/2025 r x 3d   . A fib  .... 2/25/2025 Not on fdac because of ho bleeding needed 3 u prbc 11/2024  . ANEMIA   .... Hb 2/24/2025 Hb 9.6  . HYPONATREMIA   .... Na 2/24/2025 Na 129   . HUBERT   .... Cr 2/24/2025 Cr 2.3   . CAD   AF Not on FD AC becaiuse of HO bleed needed 3u prbc 11/2024   . SCHIZOPHRENIA   ·  Problem: Acute metabolic encephalopathy.   ·  Problem: HUBERT (acute kidney injury).   ·  Problem: Sepsis.   ·  Problem: Bacterial UTI.   ·  Problem: Hyponatremia.   ·  Problem: DM (diabetes mellitus).   ·  Problem: Hypothyroidism.

## 2025-02-27 NOTE — SOCIAL WORK PROGRESS NOTE - NSSWPROGRESSNOTE_GEN_ALL_CORE
Per medical team, pt cleared for dc. Bed confirmed at [facility]. Flavio pickup scheduled for 3pm. SW reached out to pt's brother/James Orellana in order to provide him with update. Mr. Orellana aware and in agreement with plan.

## 2025-02-27 NOTE — PROGRESS NOTE ADULT - TIME BILLING
in direct care of patient , reviewing labs and other results and adjusting medications and in discussion with other consultants , RN , PA and PMD
in direct care of patient reviewing labs and other results and adjusting medications and in discussion with other consultants , RN and PMD

## 2025-02-27 NOTE — PROGRESS NOTE ADULT - SUBJECTIVE AND OBJECTIVE BOX
PROGRESS NOTE  Patient is a 69y old  Male who presents with a chief complaint of sepsis (25 Feb 2025 04:34)  Chart and available morning labs /imaging are reviewed electronically , urgent issues addressed . More information  is being added upon completion of rounds , when more information is collected and management discussed with consultants , medical staff and social service/case management on the floor     OVERNIGHT      HPI:  70 y/o M with  PMH NSTEMI, schizophrenia, bipolar d/o, HTN, hyponatremia, anemia requiring multiple blood transfusions, last admission was 11/2024 presents to the ED from [facility] for hypotension X today rule out sepsis.  Patient unable to provide any further information. (25 Feb 2025 07:32)    PAST MEDICAL & SURGICAL HISTORY:  Schizophrenia      Hypothyroidism      Hyperlipidemia      Schizophrenia      Hypothyroid      Anxiety      DM (diabetes mellitus)      Parkinsonism      Schizophrenia      Hyponatremia      NSTEMI (non-ST elevation myocardial infarction)      Bipolar disorder      DM (diabetes mellitus)      Dementia      HTN (hypertension)      Chronic CHF      MDD (major depressive disorder)      Hypothyroidism      HLD (hyperlipidemia)      Hypothyroidism      Constipation      Folate deficiency      Constipation      H/O candidiasis      Violent behavior      Atrial fibrillation      Hyperkalemia      Epistaxis      DM (diabetes mellitus)          MEDICATIONS  (STANDING):  amoxicillin  875 milliGRAM(s)/clavulanate 1 Tablet(s) Oral every 12 hours  aspirin enteric coated 81 milliGRAM(s) Oral daily  atorvastatin 40 milliGRAM(s) Oral at bedtime  benztropine 2 milliGRAM(s) Oral two times a day  dextrose 5%. 1000 milliLiter(s) (100 mL/Hr) IV Continuous <Continuous>  dextrose 5%. 1000 milliLiter(s) (50 mL/Hr) IV Continuous <Continuous>  dextrose 50% Injectable 25 Gram(s) IV Push once  dextrose 50% Injectable 12.5 Gram(s) IV Push once  dextrose 50% Injectable 25 Gram(s) IV Push once  dextrose Oral Gel 15 Gram(s) Oral once  divalproex ER 1500 milliGRAM(s) Oral daily  folic acid 1 milliGRAM(s) Oral daily  glucagon  Injectable 1 milliGRAM(s) IntraMuscular once  heparin   Injectable 5000 Unit(s) SubCutaneous every 8 hours  insulin lispro (ADMELOG) corrective regimen sliding scale   SubCutaneous three times a day before meals  lactobacillus acidophilus 1 Tablet(s) Oral every 12 hours  levothyroxine 88 MICROGram(s) Oral daily  losartan 50 milliGRAM(s) Oral daily  metoprolol tartrate 25 milliGRAM(s) Oral two times a day  pantoprazole    Tablet 40 milliGRAM(s) Oral daily  polyethylene glycol 3350 17 Gram(s) Oral daily  QUEtiapine 400 milliGRAM(s) Oral daily  risperiDONE   Tablet 4 milliGRAM(s) Oral two times a day  sodium chloride 0.9%. 1000 milliLiter(s) (100 mL/Hr) IV Continuous <Continuous>  traZODone 100 milliGRAM(s) Oral at bedtime    MEDICATIONS  (PRN):  acetaminophen     Tablet .. 650 milliGRAM(s) Oral every 6 hours PRN Temp greater or equal to 38C (100.4F), Mild Pain (1 - 3)  aluminum hydroxide/magnesium hydroxide/simethicone Suspension 30 milliLiter(s) Oral every 4 hours PRN Dyspepsia  bisacodyl Suppository 10 milliGRAM(s) Rectal daily PRN Constipation  haloperidol    Injectable 1 milliGRAM(s) IntraMuscular every 6 hours PRN Agitation  hydrALAZINE 50 milliGRAM(s) Oral every 4 hours PRN for systolic BP>150  LORazepam   Injectable 0.5 milliGRAM(s) IV Push every 8 hours PRN SEVERE AGITATION  melatonin 3 milliGRAM(s) Oral at bedtime PRN Insomnia  ondansetron Injectable 4 milliGRAM(s) IV Push every 8 hours PRN Nausea and/or Vomiting      OBJECTIVE    T(C): 36.4 (02-27-25 @ 05:09), Max: 36.6 (02-26-25 @ 23:00)  HR: 80 (02-27-25 @ 05:09) (60 - 80)  BP: 133/83 (02-27-25 @ 05:09) (133/83 - 183/97)  RR: 19 (02-27-25 @ 05:09) (18 - 19)  SpO2: 95% (02-27-25 @ 05:09) (94% - 95%)  Wt(kg): --  I&O's Summary    26 Feb 2025 07:01  -  27 Feb 2025 07:00  --------------------------------------------------------  IN: 1300 mL / OUT: 5250 mL / NET: -3950 mL          REVIEW OF SYSTEMS:  CONSTITUTIONAL: No fever, weight loss, or fatigue  EYES: No eye pain, visual disturbances, or discharge  ENMT:   No sinus or throat pain  NECK: No pain or stiffness  RESPIRATORY: No cough, wheezing, chills or hemoptysis; No shortness of breath  CARDIOVASCULAR: No chest pain, palpitations, dizziness, or leg swelling  GASTROINTESTINAL: No abdominal pain. No nausea, vomiting; No diarrhea or constipation. No melena or hematochezia.  GENITOURINARY: No dysuria, frequency, hematuria, or incontinence  NEUROLOGICAL: No headaches, memory loss, loss of strength, numbness, or tremors  SKIN: No itching, burning, rashes, or lesions   MUSCULOSKELETAL: No joint pain or swelling; No muscle, back, or extremity pain    PHYSICAL EXAM:  Appearance: NAD. VS past 24 hrs -as above   HEENT:   Moist oral mucosa. Conjunctiva clear b/l.   Neck : supple  Respiratory: Lungs CTAB.  Gastrointestinal:  Soft, nontender. No rebound. No rigidity. BS present	  Cardiovascular: RRR ,S1S2 present  Neurologic: Non-focal. Moving all extremities.  Extremities: No edema. No erythema. No calf tenderness.  Skin: No rashes, No ecchymoses, No cyanosis.	  wounds ,skin lesions-See skin assesment flow sheet   LABS:                        9.6    4.26  )-----------( 149      ( 26 Feb 2025 07:40 )             28.1     02-26    133[L]  |  103  |  12  ----------------------------<  104[H]  4.5   |  26  |  0.94    Ca    9.1      26 Feb 2025 07:40    TPro  6.1  /  Alb  2.2[L]  /  TBili  0.2  /  DBili  x   /  AST  13[L]  /  ALT  13  /  AlkPhos  62  02-26    CAPILLARY BLOOD GLUCOSE      POCT Blood Glucose.: 102 mg/dL (27 Feb 2025 07:54)  POCT Blood Glucose.: 104 mg/dL (26 Feb 2025 21:55)  POCT Blood Glucose.: 109 mg/dL (26 Feb 2025 16:47)  POCT Blood Glucose.: 157 mg/dL (26 Feb 2025 12:07)      Urinalysis Basic - ( 26 Feb 2025 07:40 )    Color: x / Appearance: x / SG: x / pH: x  Gluc: 104 mg/dL / Ketone: x  / Bili: x / Urobili: x   Blood: x / Protein: x / Nitrite: x   Leuk Esterase: x / RBC: x / WBC x   Sq Epi: x / Non Sq Epi: x / Bacteria: x        Culture - Urine (collected 24 Feb 2025 23:09)  Source: Clean Catch  Preliminary Report (27 Feb 2025 03:54):    **Please Note**: This is a Corrected Report**    50,000 - 99,000 CFU/mL Acinetobacter baumannii/nosocomialis group    Previously reported as:    50,000 - 99,000 CFU/mL Gram positive organisms    Urinalysis with Rflx Culture (collected 24 Feb 2025 23:09)    Culture - Blood (collected 24 Feb 2025 21:35)  Source: .Blood Blood-Peripheral  Preliminary Report (27 Feb 2025 03:01):    No growth at 48 Hours    Culture - Blood (collected 24 Feb 2025 21:30)  Source: .Blood Blood-Peripheral  Preliminary Report (27 Feb 2025 03:01):    No growth at 48 Hours      RADIOLOGY & ADDITIONAL TESTS:   reviewed elctronically  ASSESSMENT/PLAN: 	     PROGRESS NOTE  Patient is a 69y old  Male who presents with a chief complaint of sepsis (25 Feb 2025 04:34)  Chart and available morning labs /imaging are reviewed electronically , urgent issues addressed . More information  is being added upon completion of rounds , when more information is collected and management discussed with consultants , medical staff and social service/case management on the floor     OVERNIGHT      HPI:  68 y/o M with  PMH NSTEMI, schizophrenia, bipolar d/o, HTN, hyponatremia, anemia requiring multiple blood transfusions, last admission was 11/2024 presents to the ED from [facility] for hypotension X today rule out sepsis.  Patient unable to provide any further information. (25 Feb 2025 07:32)    PAST MEDICAL & SURGICAL HISTORY:  Schizophrenia      Hypothyroidism      Hyperlipidemia      Schizophrenia      Hypothyroid      Anxiety      DM (diabetes mellitus)      Parkinsonism      Schizophrenia      Hyponatremia      NSTEMI (non-ST elevation myocardial infarction)      Bipolar disorder      DM (diabetes mellitus)      Dementia      HTN (hypertension)      Chronic CHF      MDD (major depressive disorder)      Hypothyroidism      HLD (hyperlipidemia)      Hypothyroidism      Constipation      Folate deficiency      Constipation      H/O candidiasis      Violent behavior      Atrial fibrillation      Hyperkalemia      Epistaxis      DM (diabetes mellitus)          MEDICATIONS  (STANDING):  amoxicillin  875 milliGRAM(s)/clavulanate 1 Tablet(s) Oral every 12 hours  aspirin enteric coated 81 milliGRAM(s) Oral daily  atorvastatin 40 milliGRAM(s) Oral at bedtime  benztropine 2 milliGRAM(s) Oral two times a day  dextrose 5%. 1000 milliLiter(s) (100 mL/Hr) IV Continuous <Continuous>  dextrose 5%. 1000 milliLiter(s) (50 mL/Hr) IV Continuous <Continuous>  dextrose 50% Injectable 25 Gram(s) IV Push once  dextrose 50% Injectable 12.5 Gram(s) IV Push once  dextrose 50% Injectable 25 Gram(s) IV Push once  dextrose Oral Gel 15 Gram(s) Oral once  divalproex ER 1500 milliGRAM(s) Oral daily  folic acid 1 milliGRAM(s) Oral daily  glucagon  Injectable 1 milliGRAM(s) IntraMuscular once  heparin   Injectable 5000 Unit(s) SubCutaneous every 8 hours  insulin lispro (ADMELOG) corrective regimen sliding scale   SubCutaneous three times a day before meals  lactobacillus acidophilus 1 Tablet(s) Oral every 12 hours  levothyroxine 88 MICROGram(s) Oral daily  losartan 50 milliGRAM(s) Oral daily  metoprolol tartrate 25 milliGRAM(s) Oral two times a day  pantoprazole    Tablet 40 milliGRAM(s) Oral daily  polyethylene glycol 3350 17 Gram(s) Oral daily  QUEtiapine 400 milliGRAM(s) Oral daily  risperiDONE   Tablet 4 milliGRAM(s) Oral two times a day  sodium chloride 0.9%. 1000 milliLiter(s) (100 mL/Hr) IV Continuous <Continuous>  traZODone 100 milliGRAM(s) Oral at bedtime    MEDICATIONS  (PRN):  acetaminophen     Tablet .. 650 milliGRAM(s) Oral every 6 hours PRN Temp greater or equal to 38C (100.4F), Mild Pain (1 - 3)  aluminum hydroxide/magnesium hydroxide/simethicone Suspension 30 milliLiter(s) Oral every 4 hours PRN Dyspepsia  bisacodyl Suppository 10 milliGRAM(s) Rectal daily PRN Constipation  haloperidol    Injectable 1 milliGRAM(s) IntraMuscular every 6 hours PRN Agitation  hydrALAZINE 50 milliGRAM(s) Oral every 4 hours PRN for systolic BP>150  LORazepam   Injectable 0.5 milliGRAM(s) IV Push every 8 hours PRN SEVERE AGITATION  melatonin 3 milliGRAM(s) Oral at bedtime PRN Insomnia  ondansetron Injectable 4 milliGRAM(s) IV Push every 8 hours PRN Nausea and/or Vomiting      OBJECTIVE    T(C): 36.4 (02-27-25 @ 05:09), Max: 36.6 (02-26-25 @ 23:00)  HR: 80 (02-27-25 @ 05:09) (60 - 80)  BP: 133/83 (02-27-25 @ 05:09) (133/83 - 183/97)  RR: 19 (02-27-25 @ 05:09) (18 - 19)  SpO2: 95% (02-27-25 @ 05:09) (94% - 95%)  Wt(kg): --  I&O's Summary    26 Feb 2025 07:01  -  27 Feb 2025 07:00  --------------------------------------------------------  IN: 1300 mL / OUT: 5250 mL / NET: -3950 mL          REVIEW OF SYSTEMS:  CONSTITUTIONAL: No fever, weight loss, or fatigue  EYES: No eye pain, visual disturbances, or discharge  ENMT:   No sinus or throat pain  NECK: No pain or stiffness  RESPIRATORY: No cough, wheezing, chills or hemoptysis; No shortness of breath  CARDIOVASCULAR: No chest pain, palpitations, dizziness, or leg swelling  GASTROINTESTINAL: No abdominal pain. No nausea, vomiting; No diarrhea or constipation. No melena or hematochezia.  GENITOURINARY: No dysuria, frequency, hematuria, or incontinence  NEUROLOGICAL: No headaches, memory loss, loss of strength, numbness, or tremors  SKIN: No itching, burning, rashes, or lesions   MUSCULOSKELETAL: No joint pain or swelling; No muscle, back, or extremity pain    PHYSICAL EXAM:  Appearance: NAD. VS past 24 hrs -as above   HEENT:   Moist oral mucosa. Conjunctiva clear b/l.   Neck : supple  Respiratory: Lungs CTAB.  Gastrointestinal:  Soft, nontender. No rebound. No rigidity. BS present	  Cardiovascular: RRR ,S1S2 present  Neurologic: Non-focal. Moving all extremities.  Extremities: No edema. No erythema. No calf tenderness.  Skin: No rashes, No ecchymoses, No cyanosis.	  wounds ,skin lesions-See skin assesment flow sheet   LABS:                        9.6    4.26  )-----------( 149      ( 26 Feb 2025 07:40 )             28.1     02-26    133[L]  |  103  |  12  ----------------------------<  104[H]  4.5   |  26  |  0.94    Ca    9.1      26 Feb 2025 07:40    TPro  6.1  /  Alb  2.2[L]  /  TBili  0.2  /  DBili  x   /  AST  13[L]  /  ALT  13  /  AlkPhos  62  02-26    CAPILLARY BLOOD GLUCOSE      POCT Blood Glucose.: 102 mg/dL (27 Feb 2025 07:54)  POCT Blood Glucose.: 104 mg/dL (26 Feb 2025 21:55)  POCT Blood Glucose.: 109 mg/dL (26 Feb 2025 16:47)  POCT Blood Glucose.: 157 mg/dL (26 Feb 2025 12:07)      Urinalysis Basic - ( 26 Feb 2025 07:40 )    Color: x / Appearance: x / SG: x / pH: x  Gluc: 104 mg/dL / Ketone: x  / Bili: x / Urobili: x   Blood: x / Protein: x / Nitrite: x   Leuk Esterase: x / RBC: x / WBC x   Sq Epi: x / Non Sq Epi: x / Bacteria: x        Culture - Urine (collected 24 Feb 2025 23:09)  Source: Clean Catch  Preliminary Report (27 Feb 2025 03:54):    **Please Note**: This is a Corrected Report**    50,000 - 99,000 CFU/mL Acinetobacter baumannii/nosocomialis group    Previously reported as:    50,000 - 99,000 CFU/mL Gram positive organisms    Urinalysis with Rflx Culture (collected 24 Feb 2025 23:09)    Culture - Blood (collected 24 Feb 2025 21:35)  Source: .Blood Blood-Peripheral  Preliminary Report (27 Feb 2025 03:01):    No growth at 48 Hours    Culture - Blood (collected 24 Feb 2025 21:30)  Source: .Blood Blood-Peripheral  Preliminary Report (27 Feb 2025 03:01):    No growth at 48 Hours      RADIOLOGY & ADDITIONAL TESTS:   reviewed elctronically  ASSESSMENT/PLAN: 	    Patient was seen and examined on a day of discharge . Plan of care , discharge medications and recommendations discussed with consultants and clearance for discharge obtained .Social service , case management  and medical staff are aware of plan. Family is notified. Discharge summary  is  prepared electronically-see separate document prepared by me .55minutes spent on this visit, 50% visit time spent in care co-ordination with other attendings and counselling patient  I have discussed care plan with patient and HCP,expressed understanding of problems treatment and their effect and side effects, alternatives in detail,I have asked if they have any questions and concerns and appropriately addressed them to best of my ability

## 2025-02-27 NOTE — PROGRESS NOTE ADULT - SUBJECTIVE AND OBJECTIVE BOX
[facility] [place]       HPI:  70 y/o M with  PMH NSTEMI, schizophrenia, bipolar d/o, HTN, hyponatremia, anemia requiring multiple blood transfusions, last admission was 11/2024 presents to the ED from [facility] for hypotension X today rule out sepsis.  Patient unable to provide any further information. (25 Feb 2025 07:32)        SUBJECTIVE:      ALLERGIES:  Allergies    aspartate (Unknown)  clozapine (Rash)  lithium (Rash)  clozapine (Unknown)  lithium (Unknown)    Intolerances          MEDICATIONS  (STANDING):  amoxicillin  875 milliGRAM(s)/clavulanate 1 Tablet(s) Oral every 12 hours  aspirin enteric coated 81 milliGRAM(s) Oral daily  atorvastatin 40 milliGRAM(s) Oral at bedtime  benztropine 2 milliGRAM(s) Oral two times a day  dextrose 5%. 1000 milliLiter(s) (100 mL/Hr) IV Continuous <Continuous>  dextrose 5%. 1000 milliLiter(s) (50 mL/Hr) IV Continuous <Continuous>  dextrose 50% Injectable 25 Gram(s) IV Push once  dextrose 50% Injectable 12.5 Gram(s) IV Push once  dextrose 50% Injectable 25 Gram(s) IV Push once  dextrose Oral Gel 15 Gram(s) Oral once  divalproex ER 1500 milliGRAM(s) Oral daily  folic acid 1 milliGRAM(s) Oral daily  glucagon  Injectable 1 milliGRAM(s) IntraMuscular once  heparin   Injectable 5000 Unit(s) SubCutaneous every 8 hours  insulin lispro (ADMELOG) corrective regimen sliding scale   SubCutaneous three times a day before meals  lactobacillus acidophilus 1 Tablet(s) Oral every 12 hours  levothyroxine 88 MICROGram(s) Oral daily  losartan 50 milliGRAM(s) Oral daily  metoprolol tartrate 25 milliGRAM(s) Oral two times a day  pantoprazole    Tablet 40 milliGRAM(s) Oral daily  polyethylene glycol 3350 17 Gram(s) Oral daily  QUEtiapine 400 milliGRAM(s) Oral daily  risperiDONE   Tablet 4 milliGRAM(s) Oral two times a day  sodium chloride 0.9%. 1000 milliLiter(s) (100 mL/Hr) IV Continuous <Continuous>  traZODone 100 milliGRAM(s) Oral at bedtime    MEDICATIONS  (PRN):  acetaminophen     Tablet .. 650 milliGRAM(s) Oral every 6 hours PRN Temp greater or equal to 38C (100.4F), Mild Pain (1 - 3)  aluminum hydroxide/magnesium hydroxide/simethicone Suspension 30 milliLiter(s) Oral every 4 hours PRN Dyspepsia  bisacodyl Suppository 10 milliGRAM(s) Rectal daily PRN Constipation  haloperidol    Injectable 1 milliGRAM(s) IntraMuscular every 6 hours PRN Agitation  hydrALAZINE 50 milliGRAM(s) Oral every 4 hours PRN for systolic BP>150  LORazepam   Injectable 0.5 milliGRAM(s) IV Push every 8 hours PRN SEVERE AGITATION  melatonin 3 milliGRAM(s) Oral at bedtime PRN Insomnia  ondansetron Injectable 4 milliGRAM(s) IV Push every 8 hours PRN Nausea and/or Vomiting      REVIEW OF SYSTEMS:  CONSTITUTIONAL: No fever,  RESPIRATORY: No cough, wheezing, shortness of breath  CARDIOVASCULAR: No chest pain, dyspnea, palpitations, dizziness, syncope, paroxysmal nocturnal dyspnea, orthopnea, or arm or leg swelling  GASTROINTESTINAL: No abdominal  or epigastric pain, nausea, vomiting,  diarrhea  NEUROLOGICAL: No headaches,  loss of strength, numbness, or tremors    Vital Signs Last 24 Hrs  T(C): 36.7 (27 Feb 2025 12:15), Max: 36.7 (27 Feb 2025 12:15)  T(F): 98 (27 Feb 2025 12:15), Max: 98 (27 Feb 2025 12:15)  HR: 62 (27 Feb 2025 12:15) (62 - 80)  BP: 178/102 (27 Feb 2025 15:39) (133/83 - 178/102)  BP(mean): --  RR: 19 (27 Feb 2025 12:15) (18 - 19)  SpO2: 97% (27 Feb 2025 12:15) (94% - 97%)    Parameters below as of 27 Feb 2025 12:15  Patient On (Oxygen Delivery Method): room air        PHYSICAL EXAM:  HEAD:  Atraumatic, Normocephalic  NECK: Supple and normal thyroid.  No JVD or carotid bruit.   HEART: S1, S2 regular , 1/6 soft ejection systolic murmur in mitral area , no thrill and no gallops .  PULMONARY: Bilateral vesicular breathing , few scattered ronchi and few scattered rales are present .  ABDOMEN: Soft nontender and positive bowl sounds   EXTREMITIES:  No clubbing, cyanosis, or pedal  edema  NEUROLOGICAL: AAOX3 , no focal deficit .    LABS:                        9.6    4.26  )-----------( 149      ( 26 Feb 2025 07:40 )             28.1     02-26    133[L]  |  103  |  12  ----------------------------<  104[H]  4.5   |  26  |  0.94    Ca    9.1      26 Feb 2025 07:40    TPro  6.1  /  Alb  2.2[L]  /  TBili  0.2  /  DBili  x   /  AST  13[L]  /  ALT  13  /  AlkPhos  62  02-26          Urinalysis Basic - ( 26 Feb 2025 07:40 )    Color: x / Appearance: x / SG: x / pH: x  Gluc: 104 mg/dL / Ketone: x  / Bili: x / Urobili: x   Blood: x / Protein: x / Nitrite: x   Leuk Esterase: x / RBC: x / WBC x   Sq Epi: x / Non Sq Epi: x / Bacteria: x      BNP      EKG:  ECHO:  IMAGING:    Assessment/Plan    Will continue to follow during hospital course and give further recommendations as needed . Thanks for your referral . if any questions please contact me at office [phone] [phone])  SHARITA BYRNE MD [place] [address]  [address]  OFFICE : [phone]  CELL : [phone]  CARDIOLOGY CONSULT :     Patient is a 69y old  Male who presents with a chief complaint of hypotension and lethargy and now feeling much better and full alert and awake and no distress and BP stable and lying comfortable .    · Chief Complaint: The patient is a 69y Male complaining of hypotension.  · HPI Objective Statement: 70 y/o M with  PMH NSTEMI, schizophrenia, bipolar d/o, HTN, hyponatremia, anemia requiring multiple blood transfusions, last admission was 2024 presents to the ED from [facility] for hypotension X today rule out sepsis.  Patient unable to provide any further information. Patient very confused and unable to give any information so most of history obtained from RN , PA , PMD and other consultants notes .       PAST MEDICAL & SURGICAL HISTORY:  Schizophrenia      Hypothyroidism      Hyperlipidemia      Schizophrenia      Hypothyroid      Anxiety      DM (diabetes mellitus)      Parkinsonism      Schizophrenia      Hyponatremia      NSTEMI (non-ST elevation myocardial infarction)      Bipolar disorder      DM (diabetes mellitus)      Dementia      HTN (hypertension)      Chronic CHF      MDD (major depressive disorder)      Hypothyroidism      HLD (hyperlipidemia)      Hypothyroidism      Constipation      Folate deficiency      Constipation      H/O candidiasis      Violent behavior      Atrial fibrillation      Hyperkalemia      Epistaxis      DM (diabetes mellitus)          FAMILY HISTORY:      SOCIAL HISTORY:   Alcohol:  Smoking:    Allergies    aspartate (Unknown)  clozapine (Rash)  lithium (Rash)  clozapine (Unknown)  lithium (Unknown)    Intolerances        MEDICATIONS  (STANDING):    MEDICATIONS  (PRN):      REVIEW OF SYSTEMS:  CONSTITUTIONAL: No fever .  RESPIRATORY: No cough, wheezing, hemoptysis, or shortness of breath  CARDIOVASCULAR: No chest pain, dyspnea, palpitations, dizziness, syncope, paroxysmal nocturnal dyspnea, orthopnea, or arm or leg swelling  GASTROINTESTINAL: No abdominal  or epigastric pain, nausea, vomiting, hematemesis, diarrhea, constipation, melena or bright red blood .  NEUROLOGICAL: No headaches, numbness, or tremors  SKIN: No itching, burning, rashes, or lesions  ENDOCRINE: No heat or cold intolerance, or hair loss  MUSCULOSKELETAL: Patient has mild chronic arthritis .  HEME/LYMPH: No easy bruising or bleeding gums  ALLERGY AND IMMUNOLOGIC: No hives or rash.    Vital Signs Last 24 Hrs  T(C): 36.6 (2025 19:45), Max: 36.6 (2025 19:45)  T(F): 97.8 (2025 19:45), Max: 97.8 (2025 19:45)  HR: 66 (2025 01:00) (66 - 70)  BP: 130/70 (2025 01:00) (100/65 - 130/70)  BP(mean): --  RR: 18 (2025 01:00) (18 - 18)  SpO2: 97% (:00) (97% - 98%)    Parameters below as of 2025 01:00  Patient On (Oxygen Delivery Method): room air        PHYSICAL EXAM:  HEAD:  Atraumatic, Normocephalic  EYES: EOMI, PERRLA, conjunctiva and sclera clear  NECK: Supple and normal thyroid.  No JVD or carotid bruit.   HEART: S1, S2 regular , 1/6 soft ejection systolic murmur in mitral area , no thrill and no gallops .  PULMONARY: Bilateral vesicular breathing , few scattered rhonchi and few scattered rales are present .  ABDOMEN: Soft nontender and positive bowl sounds   EXTREMITIES:  No clubbing, cyanosis, or pedal  edema  NEUROLOGICAL: AA but confused  , no focal deficit .  Skin : No rashes .  Musculoskeletal : No joint swellings .    LABS:                        9.6    15.23 )-----------( 158      ( 2025 21:30 )             28.5     02-24    129[L]  |  94[L]  |  21  ----------------------------<  122[H]  4.7   |  25  |  2.30[H]    Ca    8.7      2025 21:30    TPro  6.8  /  Alb  2.5[L]  /  TBili  0.5  /  DBili  x   /  AST  12[L]  /  ALT  13  /  AlkPhos  62  02-24        PT/INR - ( 2025 21:30 )   PT: 13.4 sec;   INR: 1.15 ratio         PTT - ( 2025 21:30 )  PTT:31.3 sec  Urinalysis Basic - ( 2025 23:09 )    Color: Yellow / Appearance: Clear / S.010 / pH: x  Gluc: x / Ketone: Negative mg/dL  / Bili: Negative / Urobili: 1.0 mg/dL   Blood: x / Protein: Negative mg/dL / Nitrite: Negative   Leuk Esterase: Moderate / RBC: 5 /HPF /  /HPF   Sq Epi: x / Non Sq Epi: x / Bacteria: Many /HPF            EKG: NSR and non specific ST-T changes .    Assessment and Plan :   70 y/o M with  PMH NSTEMI, schizophrenia, bipolar d/o, HTN, hyponatremia, anemia requiring multiple blood transfusions, last admission was 2024 presents to the ED from [facility] for hypotension X today rule out sepsis.  Patient unable to provide any further information. Patient very confused and unable to give any information so most of history obtained from RN , PA , PMD and other consultants notes . Patient has mild dehydration so continue I/V hydration . Patient has Michele . Will like to R/O urosepsis and pneumonia . Continue I/V antibiotics as per PMD . Patient BP much better now . Will send Troponin I at frequent intervals to R/O NON-ST KILEY . Monitor hemoglobin and electrolytes . Will get echocardiogram done . Rest of medications as such . Prognosis guarded .   Will continue to follow during hospital course and give further recommendations as needed . Thanks for your referral . if any questions please contact me at office ([phone] cell [phone])      SHARITA BYRNE MD [place] [address]  [address]  OFFICE : [phone]  CELL : [phone]  CARDIOLOGY CONSULT :     Patient is a 69y old  Male who presents with a chief complaint of hypotension and lethargy and now feeling much better and full alert and awake and no distress and BP stable and lying comfortable .    · Chief Complaint: The patient is a 69y Male complaining of hypotension.  · HPI Objective Statement: 68 y/o M with  PMH NSTEMI, schizophrenia, bipolar d/o, HTN, hyponatremia, anemia requiring multiple blood transfusions, last admission was 2024 presents to the ED from [facility] for hypotension X today rule out sepsis.  Patient unable to provide any further information. Patient very confused and unable to give any information so most of history obtained from RN , PA , PMD and other consultants notes .       PAST MEDICAL & SURGICAL HISTORY:  Schizophrenia      Hypothyroidism      Hyperlipidemia      Schizophrenia      Hypothyroid      Anxiety      DM (diabetes mellitus)      Parkinsonism      Schizophrenia      Hyponatremia      NSTEMI (non-ST elevation myocardial infarction)      Bipolar disorder      DM (diabetes mellitus)      Dementia      HTN (hypertension)      Chronic CHF      MDD (major depressive disorder)      Hypothyroidism      HLD (hyperlipidemia)      Hypothyroidism      Constipation      Folate deficiency      Constipation      H/O candidiasis      Violent behavior      Atrial fibrillation      Hyperkalemia      Epistaxis      DM (diabetes mellitus)          FAMILY HISTORY:      SOCIAL HISTORY:   Alcohol:  Smoking:    Allergies    aspartate (Unknown)  clozapine (Rash)  lithium (Rash)  clozapine (Unknown)  lithium (Unknown)    Intolerances        MEDICATIONS  (STANDING):  amoxicillin  875 milliGRAM(s)/clavulanate 1 Tablet(s) Oral every 12 hours  aspirin enteric coated 81 milliGRAM(s) Oral daily  atorvastatin 40 milliGRAM(s) Oral at bedtime  benztropine 2 milliGRAM(s) Oral two times a day  dextrose 5%. 1000 milliLiter(s) (100 mL/Hr) IV Continuous <Continuous>  dextrose 5%. 1000 milliLiter(s) (50 mL/Hr) IV Continuous <Continuous>  dextrose 50% Injectable 25 Gram(s) IV Push once  dextrose 50% Injectable 12.5 Gram(s) IV Push once  dextrose 50% Injectable 25 Gram(s) IV Push once  dextrose Oral Gel 15 Gram(s) Oral once  divalproex ER 1500 milliGRAM(s) Oral daily  folic acid 1 milliGRAM(s) Oral daily  glucagon  Injectable 1 milliGRAM(s) IntraMuscular once  heparin   Injectable 5000 Unit(s) SubCutaneous every 8 hours  insulin lispro (ADMELOG) corrective regimen sliding scale   SubCutaneous three times a day before meals  lactobacillus acidophilus 1 Tablet(s) Oral every 12 hours  levothyroxine 88 MICROGram(s) Oral daily  losartan 50 milliGRAM(s) Oral daily  metoprolol tartrate 25 milliGRAM(s) Oral two times a day  pantoprazole    Tablet 40 milliGRAM(s) Oral daily  polyethylene glycol 3350 17 Gram(s) Oral daily  QUEtiapine 400 milliGRAM(s) Oral daily  risperiDONE   Tablet 4 milliGRAM(s) Oral two times a day  sodium chloride 0.9%. 1000 milliLiter(s) (100 mL/Hr) IV Continuous <Continuous>  traZODone 100 milliGRAM(s) Oral at bedtime    MEDICATIONS  (PRN):  acetaminophen     Tablet .. 650 milliGRAM(s) Oral every 6 hours PRN Temp greater or equal to 38C (100.4F), Mild Pain (1 - 3)  aluminum hydroxide/magnesium hydroxide/simethicone Suspension 30 milliLiter(s) Oral every 4 hours PRN Dyspepsia  bisacodyl Suppository 10 milliGRAM(s) Rectal daily PRN Constipation  haloperidol    Injectable 1 milliGRAM(s) IntraMuscular every 6 hours PRN Agitation  hydrALAZINE 50 milliGRAM(s) Oral every 4 hours PRN for systolic BP>150  LORazepam   Injectable 0.5 milliGRAM(s) IV Push every 8 hours PRN SEVERE AGITATION  melatonin 3 milliGRAM(s) Oral at bedtime PRN Insomnia  ondansetron Injectable 4 milliGRAM(s) IV Push every 8 hours PRN Nausea and/or Vomiting          REVIEW OF SYSTEMS:  CONSTITUTIONAL: No fever .  RESPIRATORY: No cough, wheezing, hemoptysis, or shortness of breath  CARDIOVASCULAR: No chest pain, dyspnea, palpitations, dizziness, syncope, paroxysmal nocturnal dyspnea, orthopnea, or arm or leg swelling  GASTROINTESTINAL: No abdominal  or epigastric pain, nausea, vomiting, hematemesis, diarrhea, constipation, melena or bright red blood .  NEUROLOGICAL: No headaches, numbness, or tremors  SKIN: No itching, burning, rashes, or lesions  ENDOCRINE: No heat or cold intolerance, or hair loss  MUSCULOSKELETAL: Patient has mild chronic arthritis .  HEME/LYMPH: No easy bruising or bleeding gums  ALLERGY AND IMMUNOLOGIC: No hives or rash.    Vital Signs Last 24 Hrs  T(C): 36.6 (2025 19:45), Max: 36.6 (2025 19:45)  T(F): 97.8 (2025 19:45), Max: 97.8 (2025 19:45)  HR: 66 (:00) (66 - 70)  BP: 130/70 (:) (100/65 - 130/70)  BP(mean): --  RR: 18 (:) (18 - 18)  SpO2: 97% (:) (97% - 98%)    Parameters below as of   Patient On (Oxygen Delivery Method): room air        PHYSICAL EXAM:  HEAD:  Atraumatic, Normocephalic  EYES: EOMI, PERRLA, conjunctiva and sclera clear  NECK: Supple and normal thyroid.  No JVD or carotid bruit.   HEART: S1, S2 regular , 1/6 soft ejection systolic murmur in mitral area , no thrill and no gallops .  PULMONARY: Bilateral vesicular breathing , few scattered rhonchi and few scattered rales are present .  ABDOMEN: Soft nontender and positive bowl sounds   EXTREMITIES:  No clubbing, cyanosis, or pedal  edema  NEUROLOGICAL: AA but confused  , no focal deficit .  Skin : No rashes .  Musculoskeletal : No joint swellings .    LABS:                        9.6    15.23 )-----------( 158      ( 2025 21:30 )             28.5     02-24    129[L]  |  94[L]  |  21  ----------------------------<  122[H]  4.7   |  25  |  2.30[H]    Ca    8.7      2025 21:30    TPro  6.8  /  Alb  2.5[L]  /  TBili  0.5  /  DBili  x   /  AST  12[L]  /  ALT  13  /  AlkPhos  62  02-24        PT/INR - ( 2025 21:30 )   PT: 13.4 sec;   INR: 1.15 ratio         PTT - ( 2025 21:30 )  PTT:31.3 sec  Urinalysis Basic - ( 2025 23:09 )    Color: Yellow / Appearance: Clear / S.010 / pH: x  Gluc: x / Ketone: Negative mg/dL  / Bili: Negative / Urobili: 1.0 mg/dL   Blood: x / Protein: Negative mg/dL / Nitrite: Negative   Leuk Esterase: Moderate / RBC: 5 /HPF /  /HPF   Sq Epi: x / Non Sq Epi: x / Bacteria: Many /HPF            EKG: NSR and non specific ST-T changes .    Assessment and Plan :   68 y/o M with  PMH NSTEMI, schizophrenia, bipolar d/o, HTN, hyponatremia, anemia requiring multiple blood transfusions, last admission was 2024 presents to the ED from [facility] for hypotension X today rule out sepsis.  Patient unable to provide any further information. Patient very confused and unable to give any information so most of history obtained from RN , PA , PMD and other consultants notes . Patient has mild dehydration so continued  I/V hydration and patient BP much better after I/V fluids and patient was treated for UTI with I/V antibiotics and feeling much better and fully alert and awake and eating well . Patient antihypertensive medications Losartan and metoprolol resumed  . Patient has Michele .  Continue antibiotics as per PMD . Patient BP much better now . Will send Troponin I so far negative and  NON-ST KILEY ruled out  . Monitor hemoglobin and electrolytes . Hemodynamically stable so far .  Will continue to follow during hospital course and give further recommendations as needed . Thanks for your referral . if any questions please contact me at office ([phone] cell [phone])      SHARITA BYRNE MD [place] [address]  [address]  OFFICE : [phone]  CELL : [phone]  CARDIOLOGY F/U  :     Patient is a 69y old  Male who presents with a chief complaint of hypotension and lethargy and now feeling much better and full alert and awake and no distress and BP stable and lying comfortable .    · Chief Complaint: The patient is a 69y Male complaining of hypotension.  · HPI Objective Statement: 70 y/o M with  PMH NSTEMI, schizophrenia, bipolar d/o, HTN, hyponatremia, anemia requiring multiple blood transfusions, last admission was 2024 presents to the ED from [facility] for hypotension X today rule out sepsis.  Patient unable to provide any further information. Patient very confused and unable to give any information so most of history obtained from RN , PA , PMD and other consultants notes .       PAST MEDICAL & SURGICAL HISTORY:  Schizophrenia      Hypothyroidism      Hyperlipidemia      Schizophrenia      Hypothyroid      Anxiety      DM (diabetes mellitus)      Parkinsonism      Schizophrenia      Hyponatremia      NSTEMI (non-ST elevation myocardial infarction)      Bipolar disorder      DM (diabetes mellitus)      Dementia      HTN (hypertension)      Chronic CHF      MDD (major depressive disorder)      Hypothyroidism      HLD (hyperlipidemia)      Hypothyroidism      Constipation      Folate deficiency      Constipation      H/O candidiasis      Violent behavior      Atrial fibrillation      Hyperkalemia      Epistaxis      DM (diabetes mellitus)          FAMILY HISTORY:      SOCIAL HISTORY:   Alcohol:  Smoking:    Allergies    aspartate (Unknown)  clozapine (Rash)  lithium (Rash)  clozapine (Unknown)  lithium (Unknown)    Intolerances        MEDICATIONS  (STANDING):  amoxicillin  875 milliGRAM(s)/clavulanate 1 Tablet(s) Oral every 12 hours  aspirin enteric coated 81 milliGRAM(s) Oral daily  atorvastatin 40 milliGRAM(s) Oral at bedtime  benztropine 2 milliGRAM(s) Oral two times a day  dextrose 5%. 1000 milliLiter(s) (100 mL/Hr) IV Continuous <Continuous>  dextrose 5%. 1000 milliLiter(s) (50 mL/Hr) IV Continuous <Continuous>  dextrose 50% Injectable 25 Gram(s) IV Push once  dextrose 50% Injectable 12.5 Gram(s) IV Push once  dextrose 50% Injectable 25 Gram(s) IV Push once  dextrose Oral Gel 15 Gram(s) Oral once  divalproex ER 1500 milliGRAM(s) Oral daily  folic acid 1 milliGRAM(s) Oral daily  glucagon  Injectable 1 milliGRAM(s) IntraMuscular once  heparin   Injectable 5000 Unit(s) SubCutaneous every 8 hours  insulin lispro (ADMELOG) corrective regimen sliding scale   SubCutaneous three times a day before meals  lactobacillus acidophilus 1 Tablet(s) Oral every 12 hours  levothyroxine 88 MICROGram(s) Oral daily  losartan 50 milliGRAM(s) Oral daily  metoprolol tartrate 25 milliGRAM(s) Oral two times a day  pantoprazole    Tablet 40 milliGRAM(s) Oral daily  polyethylene glycol 3350 17 Gram(s) Oral daily  QUEtiapine 400 milliGRAM(s) Oral daily  risperiDONE   Tablet 4 milliGRAM(s) Oral two times a day  sodium chloride 0.9%. 1000 milliLiter(s) (100 mL/Hr) IV Continuous <Continuous>  traZODone 100 milliGRAM(s) Oral at bedtime    MEDICATIONS  (PRN):  acetaminophen     Tablet .. 650 milliGRAM(s) Oral every 6 hours PRN Temp greater or equal to 38C (100.4F), Mild Pain (1 - 3)  aluminum hydroxide/magnesium hydroxide/simethicone Suspension 30 milliLiter(s) Oral every 4 hours PRN Dyspepsia  bisacodyl Suppository 10 milliGRAM(s) Rectal daily PRN Constipation  haloperidol    Injectable 1 milliGRAM(s) IntraMuscular every 6 hours PRN Agitation  hydrALAZINE 50 milliGRAM(s) Oral every 4 hours PRN for systolic BP>150  LORazepam   Injectable 0.5 milliGRAM(s) IV Push every 8 hours PRN SEVERE AGITATION  melatonin 3 milliGRAM(s) Oral at bedtime PRN Insomnia  ondansetron Injectable 4 milliGRAM(s) IV Push every 8 hours PRN Nausea and/or Vomiting          REVIEW OF SYSTEMS:  CONSTITUTIONAL: No fever .  RESPIRATORY: No cough, wheezing, hemoptysis, or shortness of breath  CARDIOVASCULAR: No chest pain, dyspnea, palpitations, dizziness, syncope, paroxysmal nocturnal dyspnea, orthopnea, or arm or leg swelling  GASTROINTESTINAL: No abdominal  or epigastric pain, nausea, vomiting, hematemesis, diarrhea, constipation, melena or bright red blood .  NEUROLOGICAL: No headaches, numbness, or tremors  SKIN: No itching, burning, rashes, or lesions  ENDOCRINE: No heat or cold intolerance, or hair loss  MUSCULOSKELETAL: Patient has mild chronic arthritis .  HEME/LYMPH: No easy bruising or bleeding gums  ALLERGY AND IMMUNOLOGIC: No hives or rash.    Vital Signs Last 24 Hrs  T(C): 36.6 (2025 19:45), Max: 36.6 (2025 19:45)  T(F): 97.8 (2025 19:45), Max: 97.8 (2025 19:45)  HR: 66 (:) (66 - 70)  BP: 130/70 (:) (100/65 - 130/70)  BP(mean): --  RR: 18 (:) (18 - 18)  SpO2: 97% (:) (97% - 98%)    Parameters below as of   Patient On (Oxygen Delivery Method): room air        PHYSICAL EXAM:  HEAD:  Atraumatic, Normocephalic  EYES: EOMI, PERRLA, conjunctiva and sclera clear  NECK: Supple and normal thyroid.  No JVD or carotid bruit.   HEART: S1, S2 regular , 1/6 soft ejection systolic murmur in mitral area , no thrill and no gallops .  PULMONARY: Bilateral vesicular breathing , few scattered rhonchi and few scattered rales are present .  ABDOMEN: Soft nontender and positive bowl sounds   EXTREMITIES:  No clubbing, cyanosis, or pedal  edema  NEUROLOGICAL: AA but confused  , no focal deficit .  Skin : No rashes .  Musculoskeletal : No joint swellings .    LABS:                        9.6    15.23 )-----------( 158      ( 2025 21:30 )             28.5     02-24    129[L]  |  94[L]  |  21  ----------------------------<  122[H]  4.7   |  25  |  2.30[H]    Ca    8.7      2025 21:30    TPro  6.8  /  Alb  2.5[L]  /  TBili  0.5  /  DBili  x   /  AST  12[L]  /  ALT  13  /  AlkPhos  62  02-24        PT/INR - ( 2025 21:30 )   PT: 13.4 sec;   INR: 1.15 ratio         PTT - ( 2025 21:30 )  PTT:31.3 sec  Urinalysis Basic - ( 2025 23:09 )    Color: Yellow / Appearance: Clear / S.010 / pH: x  Gluc: x / Ketone: Negative mg/dL  / Bili: Negative / Urobili: 1.0 mg/dL   Blood: x / Protein: Negative mg/dL / Nitrite: Negative   Leuk Esterase: Moderate / RBC: 5 /HPF /  /HPF   Sq Epi: x / Non Sq Epi: x / Bacteria: Many /HPF            EKG: NSR and non specific ST-T changes .    Assessment and Plan :   70 y/o M with  PMH NSTEMI, schizophrenia, bipolar d/o, HTN, hyponatremia, anemia requiring multiple blood transfusions, last admission was 2024 presents to the ED from [facility] for hypotension X today rule out sepsis.  Patient unable to provide any further information. Patient very confused and unable to give any information so most of history obtained from RN , PA , PMD and other consultants notes . Patient has mild dehydration so continued  I/V hydration and patient BP much better after I/V fluids and patient was treated for UTI with I/V antibiotics and feeling much better and fully alert and awake and eating well . Patient antihypertensive medications Losartan and metoprolol resumed  . Patient has Michele .  Continue antibiotics as per PMD . Patient BP much better now . Will send Troponin I so far negative and  NON-ST KILEY ruled out  . Monitor hemoglobin and electrolytes . Hemodynamically stable so far .  Will continue to follow during hospital course and give further recommendations as needed . Thanks for your referral . if any questions please contact me at office ([phone] cell [phone])

## 2025-02-27 NOTE — DIETITIAN INITIAL EVALUATION ADULT - PERTINENT MEDS FT
MEDICATIONS  (STANDING):  amoxicillin  875 milliGRAM(s)/clavulanate 1 Tablet(s) Oral every 12 hours  aspirin enteric coated 81 milliGRAM(s) Oral daily  atorvastatin 40 milliGRAM(s) Oral at bedtime  benztropine 2 milliGRAM(s) Oral two times a day  dextrose 5%. 1000 milliLiter(s) (100 mL/Hr) IV Continuous <Continuous>  dextrose 5%. 1000 milliLiter(s) (50 mL/Hr) IV Continuous <Continuous>  dextrose 50% Injectable 25 Gram(s) IV Push once  dextrose 50% Injectable 12.5 Gram(s) IV Push once  dextrose 50% Injectable 25 Gram(s) IV Push once  dextrose Oral Gel 15 Gram(s) Oral once  divalproex ER 1500 milliGRAM(s) Oral daily  folic acid 1 milliGRAM(s) Oral daily  glucagon  Injectable 1 milliGRAM(s) IntraMuscular once  heparin   Injectable 5000 Unit(s) SubCutaneous every 8 hours  insulin lispro (ADMELOG) corrective regimen sliding scale   SubCutaneous three times a day before meals  lactobacillus acidophilus 1 Tablet(s) Oral every 12 hours  levothyroxine 88 MICROGram(s) Oral daily  losartan 50 milliGRAM(s) Oral daily  metoprolol tartrate 25 milliGRAM(s) Oral two times a day  pantoprazole    Tablet 40 milliGRAM(s) Oral daily  polyethylene glycol 3350 17 Gram(s) Oral daily  QUEtiapine 400 milliGRAM(s) Oral daily  risperiDONE   Tablet 4 milliGRAM(s) Oral two times a day  sodium chloride 0.9%. 1000 milliLiter(s) (100 mL/Hr) IV Continuous <Continuous>  traZODone 100 milliGRAM(s) Oral at bedtime    MEDICATIONS  (PRN):  acetaminophen     Tablet .. 650 milliGRAM(s) Oral every 6 hours PRN Temp greater or equal to 38C (100.4F), Mild Pain (1 - 3)  aluminum hydroxide/magnesium hydroxide/simethicone Suspension 30 milliLiter(s) Oral every 4 hours PRN Dyspepsia  bisacodyl Suppository 10 milliGRAM(s) Rectal daily PRN Constipation  haloperidol    Injectable 1 milliGRAM(s) IntraMuscular every 6 hours PRN Agitation  hydrALAZINE 50 milliGRAM(s) Oral every 4 hours PRN for systolic BP>150  LORazepam   Injectable 0.5 milliGRAM(s) IV Push every 8 hours PRN SEVERE AGITATION  melatonin 3 milliGRAM(s) Oral at bedtime PRN Insomnia  ondansetron Injectable 4 milliGRAM(s) IV Push every 8 hours PRN Nausea and/or Vomiting

## 2025-02-27 NOTE — DISCHARGE NOTE PROVIDER - NSDCCPCAREPLAN_GEN_ALL_CORE_FT
PRINCIPAL DISCHARGE DIAGNOSIS  Diagnosis: Acute metabolic encephalopathy  Assessment and Plan of Treatment:       SECONDARY DISCHARGE DIAGNOSES  Diagnosis: Sepsis  Assessment and Plan of Treatment:     Diagnosis: Bacterial UTI  Assessment and Plan of Treatment:     Diagnosis: HUBERT (acute kidney injury)  Assessment and Plan of Treatment:     Diagnosis: Hyponatremia  Assessment and Plan of Treatment:     Diagnosis: Hypothyroidism  Assessment and Plan of Treatment:     Diagnosis: Chronic CHF  Assessment and Plan of Treatment:     Diagnosis: Parkinsonism  Assessment and Plan of Treatment:     Diagnosis: Schizophrenia  Assessment and Plan of Treatment:     Diagnosis: Dementia  Assessment and Plan of Treatment:     Diagnosis: DM (diabetes mellitus)  Assessment and Plan of Treatment:

## 2025-02-27 NOTE — DISCHARGE NOTE PROVIDER - NSDCCAREPROVSEEN_GEN_ALL_CORE_FT
vIan, Cielo Flores, Lissette Corrales, Nikole Broderick, Reginaldo Choi, Karin Webster, Sanjiv Weil, Samantha

## 2025-02-27 NOTE — DISCHARGE NOTE NURSING/CASE MANAGEMENT/SOCIAL WORK - FINANCIAL ASSISTANCE
[facility] provides services at a reduced cost to those who are determined to be eligible through [facility]’s financial assistance program. Information regarding [facility]’s financial assistance program can be found by going to [email or web address] or by calling [phone].

## 2025-02-27 NOTE — PROGRESS NOTE ADULT - SUBJECTIVE AND OBJECTIVE BOX
[facility]  INFECTIOUS DISEASES   [address] [place] [address]  Tel: [phone]     Fax: [phone]  ========================================================  MD Mathew Mustafa Michelle, MD Shah, Kaushal, MD Sunjit, Jaspal, MD Sehrish Shahid, MD   ========================================================    MRN-[id number]  RM HORTON     CC: Patient is a 69y old  Male who presents with a chief complaint of sepsis (25 Feb 2025 04:34)    Follow up: No fever, no new complaint. NAD    PAST MEDICAL & SURGICAL HISTORY:  Schizophrenia  Hypothyroidism  Hyperlipidemia  Anxiety  DM (diabetes mellitus)  Parkinsonism  Hyponatremia  NSTEMI (non-ST elevation myocardial infarction)  Bipolar disorder  Dementia  HTN (hypertension)  Chronic CHF  MDD (major depressive disorder)  HLD (hyperlipidemia)  Constipation  Folate deficiency  H/O candidiasis  Violent behavior  Atrial fibrillation  Hyperkalemia  Epistaxis    Social Hx: No current smoking, EtOH or drugs     FAMILY HISTORY:  Noncontributory     Allergies  aspartate (Unknown)  clozapine (Rash)  lithium (Rash)  clozapine (Unknown)  lithium (Unknown)    MEDICATIONS  (STANDING):  aspirin enteric coated 81 milliGRAM(s) Oral daily  atorvastatin 40 milliGRAM(s) Oral at bedtime  benztropine 2 milliGRAM(s) Oral two times a day  cefTRIAXone   IVPB 1000 milliGRAM(s) IV Intermittent once  cefTRIAXone   IVPB      dextrose 5%. 1000 milliLiter(s) (100 mL/Hr) IV Continuous <Continuous>  dextrose 5%. 1000 milliLiter(s) (50 mL/Hr) IV Continuous <Continuous>  dextrose 50% Injectable 25 Gram(s) IV Push once  dextrose 50% Injectable 12.5 Gram(s) IV Push once  dextrose 50% Injectable 25 Gram(s) IV Push once  dextrose Oral Gel 15 Gram(s) Oral once  divalproex ER 1500 milliGRAM(s) Oral daily  glucagon  Injectable 1 milliGRAM(s) IntraMuscular once  heparin   Injectable 5000 Unit(s) SubCutaneous every 8 hours  insulin lispro (ADMELOG) corrective regimen sliding scale   SubCutaneous three times a day before meals  levothyroxine 88 MICROGram(s) Oral daily  metoprolol tartrate 25 milliGRAM(s) Oral two times a day  pantoprazole    Tablet 40 milliGRAM(s) Oral daily  QUEtiapine 400 milliGRAM(s) Oral daily  risperiDONE   Tablet 4 milliGRAM(s) Oral two times a day  sodium chloride 0.9%. 1000 milliLiter(s) (100 mL/Hr) IV Continuous <Continuous>    REVIEW OF SYSTEMS:  Unable, not giving details     Physical Exam:  Vital Signs Last 24 Hrs  T(C): 36.4 (27 Feb 2025 05:09), Max: 36.6 (26 Feb 2025 23:00)  T(F): 97.5 (27 Feb 2025 05:09), Max: 97.8 (26 Feb 2025 23:00)  HR: 80 (27 Feb 2025 05:09) (60 - 80)  BP: 133/83 (27 Feb 2025 05:09) (133/83 - 183/97)  BP(mean): --  RR: 19 (27 Feb 2025 05:09) (18 - 19)  SpO2: 95% (27 Feb 2025 05:09) (94% - 95%)  Parameters below as of 27 Feb 2025 05:09  Patient On (Oxygen Delivery Method): room air  GEN: NAD  HEENT: normocephalic and atraumatic. EOMI. PERRL.    NECK: Supple.  No lymphadenopathy   LUNGS: Clear to auscultation.  HEART: Regular rate and rhythm  ABDOMEN: Soft, nontender, and nondistended.   EXTREMITIES: + edema.  NEUROLOGIC: grossly intact.  PSYCHIATRIC: Appropriate affect .  SKIN: No rash     Labs:                        9.6    4.26  )-----------( 149      ( 26 Feb 2025 07:40 )             28.1     02-26    133[L]  |  103  |  12  ----------------------------<  104[H]  4.5   |  26  |  0.94    Ca    9.1      26 Feb 2025 07:40    TPro  6.1  /  Alb  2.2[L]  /  TBili  0.2  /  DBili  x   /  AST  13[L]  /  ALT  13  /  AlkPhos  62  02-26    Culture - Urine (collected 02-24-25 @ 23:09)  Source: Clean Catch  Preliminary Report (02-27-25 @ 03:54):    **Please Note**: This is a Corrected Report**    50,000 - 99,000 CFU/mL Acinetobacter baumannii/nosocomialis group    Previously reported as:    50,000 - 99,000 CFU/mL Gram positive organisms    Urinalysis with Rflx Culture (collected 02-24-25 @ 23:09)    Culture - Blood (collected 02-24-25 @ 21:35)  Source: .Blood Blood-Peripheral  Preliminary Report (02-27-25 @ 03:01):    No growth at 48 Hours    Culture - Blood (collected 02-24-25 @ 21:30)  Source: .Blood Blood-Peripheral  Preliminary Report (02-27-25 @ 03:01):    No growth at 48 Hours    WBC Count: 4.26 K/uL (02-26-25 @ 07:40)  WBC Count: 15.23 K/uL (02-24-25 @ 21:30)    Creatinine: 0.94 mg/dL (02-26-25 @ 07:40)  Creatinine: 2.30 mg/dL (02-24-25 @ 21:30)    All imaging and other data have been reviewed.  < from: Xray Chest 1 View- PORTABLE-Urgent (02.24.25 @ 20:53) >  Impression:  Low lung volume without kinjal consolidation, effusion, or pneumothorax.    Assessment and Plan:   68 y/o man with PMH of HTN, DM2, CAD, schizophrenia, bipolar, anemia requiring multiple blood transfusions, last admission was 11/2024 presents to the ED from [facility] for hypotension for one day to rule out sepsis.    He is not very cooperative, cursing and angry.   In ED he had leukocytosis of 15k and UA with VCS=826, not febrile in ED.     UC with low CFU GPC and acinetobacter, unclear if real or colonized, since has a louis.     # UTI  # Sepsis   # Leukocytosis     - Blood cultures are NGTD  - UCx with GPC and acinetobacter, will follow   - Continue oral Augmentin 875mg q12 for 5days     - Tmax, WBC, Creat all normalized   - Based on culture results will modify ABx regimen     Will follow.    Nikole Corrales MD  Division of Infectious Diseases   Please call ID service at [phone] with any question.    50 minutes spent on total encounter assessing patient, examination, chart review, counseling and coordinating care by the attending physician/nurse/care manager.

## 2025-02-27 NOTE — DIETITIAN INITIAL EVALUATION ADULT - OTHER INFO
Pt is a "68 y/o M with  PMH NSTEMI, schizophrenia, bipolar d/o, HTN, hyponatremia, anemia requiring multiple blood transfusions, last admission was 11/2024 presents to the ED from [facility] for hypotension X today rule out sepsis.  Patient unable to provide any further information."    Visited pt at bedside this am. Pt alert, confused. Nutrition-related hx obtained from transfer papers. Pt with fair appetite/intake. NKFA. No report N/V. No BMs thus far. Bowel regimen rx. CBW on admission 186#. No edema noted. Skin: stage I to sacrum. Pt currently on consistent carbohydrate, soft/bite size diet. Hx of DM, recommend obtaining HgbA1c. On metformin PTA. Diet education not appropriate. Recommend continued assistance/encouragement at meal times. RD remains available and will continue to follow-up.

## 2025-02-27 NOTE — DISCHARGE NOTE PROVIDER - CARE PROVIDER_API CALL
HARSHA VEGA  [address] [place] [id number]  Phone: ()-  Fax: ()-  Follow Up Time: 1-3 days

## 2025-02-27 NOTE — DISCHARGE NOTE NURSING/CASE MANAGEMENT/SOCIAL WORK - NSDCPEFALRISK_GEN_ALL_CORE
For information on Fall & Injury Prevention, visit: [email or web address] OR  [email or web address] OR  https://www.cdc.gov/steadi/patient.html

## 2025-02-27 NOTE — DIETITIAN INITIAL EVALUATION ADULT - NS FNS DIET ORDER
Diet, Consistent Carbohydrate w/Evening Snack:   Soft and Bite Sized ([id number]) (02-25-25 @ 07:20) [Active]

## 2025-02-27 NOTE — DISCHARGE NOTE PROVIDER - NSDCMRMEDTOKEN_GEN_ALL_CORE_FT
acetaminophen 325 mg oral tablet: 2 tab(s) orally every 6 hours As needed Temp greater or equal to 38C (100.4F), Mild Pain (1 - 3)  amoxicillin-clavulanate 875 mg-125 mg oral tablet: 1 tab(s) orally every 12 hours x 5 days  aspirin 81 mg oral delayed release tablet: 1 tab(s) orally once a day  atorvastatin 40 mg oral tablet: 1 tab(s) orally once a day (at bedtime)  benztropine 2 mg oral tablet: 1 tab(s) orally 2 times a day  bisacodyl 10 mg rectal suppository: 1 suppository(ies) rectal once a day As needed Constipation  divalproex sodium 500 mg oral tablet, extended release: 3 tab(s) orally once a day  docusate sodium 100 mg oral tablet: 2 tab(s) orally 2 times a day  folic acid 1 mg oral tablet: 1 tab(s) orally once a day (in the morning)  lactobacillus acidophilus oral tablet: 2 tab(s) orally once a day  levothyroxine 88 mcg (0.088 mg) oral tablet: 1 tab(s) orally once a day  losartan 50 mg oral tablet: 1 tab(s) orally once a day  metFORMIN 1000 mg oral tablet: 1 tab(s) orally 2 times a day  metoprolol tartrate 25 mg oral tablet: 1 tab(s) orally 2 times a day  pantoprazole 40 mg oral delayed release tablet: 1 tab(s) orally 2 times a day  polyethylene glycol 3350 oral powder for reconstitution: 17 gram(s) orally once a day  QUEtiapine 400 mg oral tablet: 1 tab(s) orally once a day  risperiDONE 4 mg oral tablet: 1 tab(s) orally 2 times a day  sodium chloride 1 g oral tablet: 1 tab(s) orally 3 times a day (soluble tablet)  traZODone 100 mg oral tablet: 1 tab(s) orally once a day (at bedtime)  Vitamin D2 50,000 intl units (1.25 mg) oral capsule: 1 cap(s) orally every 2 weeks on Friday

## 2025-02-27 NOTE — PROGRESS NOTE ADULT - PROVIDER SPECIALTY LIST ADULT
Hospitalist
Infectious Disease
Cardiology
Infectious Disease
Pulmonology
Pulmonology
Cardiology
Hospitalist

## 2025-02-27 NOTE — DIETITIAN INITIAL EVALUATION ADULT - PERTINENT LABORATORY DATA
02-26    133[L]  |  103  |  12  ----------------------------<  104[H]  4.5   |  26  |  0.94    Ca    9.1      26 Feb 2025 07:40    TPro  6.1  /  Alb  2.2[L]  /  TBili  0.2  /  DBili  x   /  AST  13[L]  /  ALT  13  /  AlkPhos  62  02-26  POCT Blood Glucose.: 92 mg/dL (02-27-25 @ 11:55)  A1C with Estimated Average Glucose Result: 5.5 % (11-02-24 @ 05:31)

## 2025-02-27 NOTE — CHART NOTE - NSCHARTNOTEFT_GEN_A_CORE
RN called with critical value: Urine Culture 2/24/25: + 50,000-99,000 CFU/ml Acinetobacter baumanii    Possible contamination  ID following, yesterday changed pt from ceftriaxone to augmentin, FU ID recs today  continue augmentin for now  rest of care per primary team  RN to notify with any changes. RN called with critical value: Urine Culture 2/24/25: + 50,000-99,000 CFU/ml Acinetobacter baumanii    Possible contamination  ID following, yesterday changed pt from ceftriaxone to augmentin, stated "UCx with Low CFU GPC could be enterococcus or contamination", FU ID recs today  continue augmentin for now  rest of care per primary team  RN to notify with any changes RN called with critical value: Urine Culture 2/24/25: + 50,000-99,000 CFU/ml Acinetobacter baumanii    Possible contamination  ID following, yesterday changed pt from ceftriaxone to augmentin, FU ID recs today  continue augmentin for now  rest of care per primary team  RN to notify with any changes

## 2025-03-03 PROCEDURE — 97161 PT EVAL LOW COMPLEX 20 MIN: CPT

## 2025-03-03 PROCEDURE — 80048 BASIC METABOLIC PNL TOTAL CA: CPT

## 2025-03-03 PROCEDURE — 83550 IRON BINDING TEST: CPT

## 2025-03-03 PROCEDURE — 83735 ASSAY OF MAGNESIUM: CPT

## 2025-03-03 PROCEDURE — 85730 THROMBOPLASTIN TIME PARTIAL: CPT

## 2025-03-03 PROCEDURE — 84443 ASSAY THYROID STIM HORMONE: CPT

## 2025-03-03 PROCEDURE — 93005 ELECTROCARDIOGRAM TRACING: CPT

## 2025-03-03 PROCEDURE — 82962 GLUCOSE BLOOD TEST: CPT

## 2025-03-03 PROCEDURE — 99285 EMERGENCY DEPT VISIT HI MDM: CPT

## 2025-03-03 PROCEDURE — 82272 OCCULT BLD FECES 1-3 TESTS: CPT

## 2025-03-03 PROCEDURE — 84436 ASSAY OF TOTAL THYROXINE: CPT

## 2025-03-03 PROCEDURE — 80164 ASSAY DIPROPYLACETIC ACD TOT: CPT

## 2025-03-03 PROCEDURE — 74176 CT ABD & PELVIS W/O CONTRAST: CPT | Mod: MC

## 2025-03-03 PROCEDURE — 36415 COLL VENOUS BLD VENIPUNCTURE: CPT

## 2025-03-03 PROCEDURE — P9016: CPT

## 2025-03-03 PROCEDURE — 82728 ASSAY OF FERRITIN: CPT

## 2025-03-03 PROCEDURE — 96361 HYDRATE IV INFUSION ADD-ON: CPT

## 2025-03-03 PROCEDURE — 71045 X-RAY EXAM CHEST 1 VIEW: CPT

## 2025-03-03 PROCEDURE — 96374 THER/PROPH/DIAG INJ IV PUSH: CPT

## 2025-03-03 PROCEDURE — 82607 VITAMIN B-12: CPT

## 2025-03-03 PROCEDURE — 86850 RBC ANTIBODY SCREEN: CPT

## 2025-03-03 PROCEDURE — 81003 URINALYSIS AUTO W/O SCOPE: CPT

## 2025-03-03 PROCEDURE — 85025 COMPLETE CBC W/AUTO DIFF WBC: CPT

## 2025-03-03 PROCEDURE — 86901 BLOOD TYPING SEROLOGIC RH(D): CPT

## 2025-03-03 PROCEDURE — 84100 ASSAY OF PHOSPHORUS: CPT

## 2025-03-03 PROCEDURE — 85027 COMPLETE CBC AUTOMATED: CPT

## 2025-03-03 PROCEDURE — 83880 ASSAY OF NATRIURETIC PEPTIDE: CPT

## 2025-03-03 PROCEDURE — 80053 COMPREHEN METABOLIC PANEL: CPT

## 2025-03-03 PROCEDURE — 84484 ASSAY OF TROPONIN QUANT: CPT

## 2025-03-03 PROCEDURE — 86923 COMPATIBILITY TEST ELECTRIC: CPT

## 2025-03-03 PROCEDURE — 82746 ASSAY OF FOLIC ACID SERUM: CPT

## 2025-03-03 PROCEDURE — 36430 TRANSFUSION BLD/BLD COMPNT: CPT

## 2025-03-03 PROCEDURE — 83540 ASSAY OF IRON: CPT

## 2025-03-03 PROCEDURE — 85610 PROTHROMBIN TIME: CPT

## 2025-03-03 PROCEDURE — 86900 BLOOD TYPING SEROLOGIC ABO: CPT

## 2025-04-18 NOTE — ED ADULT NURSE NOTE - COMFORT/ACCEPTABLE PAIN LEVEL (0-10)
This patient was assessed by the doctor only. Nurse processed and completed the orders from this doctor ie labs, meds, and/or EKG.      0

## 2025-05-27 NOTE — ED ADULT TRIAGE NOTE - CHIEF COMPLAINT QUOTE
From [facility] with aide.  Sent from [facility] for swelling of left leg and left hand X 2 weeks.   Has been to ER and multiple doctors over the past two  weeks.  Paperwork from MD office today states "change in mental status and gait possible CVA."  This RN spoke with EDGARDO Persaud who states unknown onset for symptoms definitely more than 24 hours. From [facility] with aide.  Sent from [facility] for swelling of left leg and left hand X 2 weeks.   Has been to ER and multiple doctors over the past two  weeks.  Paperwork from MD office today states "change in mental status and gait possible CVA."  This RN spoke with EDGARDO Persaud who states unknown onset of change in mental status/lethargy definitely more than 24 hours. Never